# Patient Record
Sex: FEMALE | Race: WHITE | Employment: OTHER | URBAN - METROPOLITAN AREA
[De-identification: names, ages, dates, MRNs, and addresses within clinical notes are randomized per-mention and may not be internally consistent; named-entity substitution may affect disease eponyms.]

---

## 2017-01-03 DIAGNOSIS — E03.9 HYPOTHYROIDISM, UNSPECIFIED TYPE: Primary | ICD-10-CM

## 2017-01-03 NOTE — TELEPHONE ENCOUNTER
levothyroxine (SYNTHROID/LEVOTHROID) 75 MCG      Last Written Prescription Date: 12/2/16  Last Quantity: 90, # refills: 0  Last Office Visit with [facility], [facility] or [facility] prescribing provider: 7/25/16        TSH   Date Value Ref Range Status   12/05/2016 0.20* 0.40 - 4.00 mU/L Final

## 2017-01-04 RX ORDER — LEVOTHYROXINE SODIUM 75 UG/1
75 TABLET ORAL DAILY
Qty: 90 TABLET | Refills: 3 | Status: SHIPPED | OUTPATIENT
Start: 2017-01-04 | End: 2017-04-05

## 2017-01-16 DIAGNOSIS — I10 BENIGN ESSENTIAL HYPERTENSION: Primary | ICD-10-CM

## 2017-01-16 RX ORDER — LOSARTAN POTASSIUM 100 MG/1
100 TABLET ORAL DAILY
Qty: 90 TABLET | Refills: 2 | Status: SHIPPED | OUTPATIENT
Start: 2017-01-16 | End: 2017-03-15

## 2017-01-18 DIAGNOSIS — I48.91 ATRIAL FIBRILLATION AND FLUTTER (H): Primary | ICD-10-CM

## 2017-01-18 DIAGNOSIS — I48.92 ATRIAL FIBRILLATION AND FLUTTER (H): Primary | ICD-10-CM

## 2017-01-18 RX ORDER — ATENOLOL 50 MG/1
50 TABLET ORAL DAILY
Qty: 90 TABLET | Refills: 1 | Status: SHIPPED | OUTPATIENT
Start: 2017-01-18 | End: 2017-04-24

## 2017-01-18 NOTE — TELEPHONE ENCOUNTER
atenolol (TENORMIN) 50 MG tablet      Last Written Prescription Date: 7/25/2016  Last Fill Quantity: 90 tablet, # refills: 3    Last Office Visit with [facility], [facility] or [facility] prescribing provider:  7/25/2016   Future Office Visit:        BP Readings from Last 3 Encounters:   08/15/16 128/86   08/15/16 132/71   07/25/16 136/80

## 2017-01-31 DIAGNOSIS — E03.9 HYPOTHYROIDISM, UNSPECIFIED TYPE: Primary | ICD-10-CM

## 2017-01-31 RX ORDER — LEVOTHYROXINE SODIUM 88 UG/1
88 TABLET ORAL DAILY
Qty: 90 TABLET | Refills: 0 | Status: SHIPPED | OUTPATIENT
Start: 2017-01-31 | End: 2017-04-03

## 2017-01-31 NOTE — TELEPHONE ENCOUNTER
Yuval Jama MD at 1/4/2017 11:52 AM      Status: Signed         Expand All Collapse All     TSH falsely low. FT4 and FT3 noted. Med refill approved               Prescription approved per [facility] Refill Protocol. HINA Kruse R.N.

## 2017-01-31 NOTE — TELEPHONE ENCOUNTER
levothyroxine (SYNTHROID/LEVOTHROID) 88 MCG tablet     Last Written Prescription Date: 12/02/2016  Last Quantity: 90, # refills: 0  Last Office Visit with [facility], [facility] or [facility] prescribing provider: 7/25/2016   Next 5 appointments (look out 90 days)     Feb 07, 2017  1:30 PM   Office Visit with Yuval Jama MD   [facility]    [address] [address] [phone]                   TSH   Date Value Ref Range Status   12/05/2016 0.20* 0.40 - 4.00 mU/L Final

## 2017-02-06 ENCOUNTER — TELEPHONE (OUTPATIENT)
Dept: INTERNAL MEDICINE | Facility: CLINIC | Age: 62
End: 2017-02-06

## 2017-02-06 NOTE — TELEPHONE ENCOUNTER
Call Regarding Preventive Health Screening Colonoscopy    Attempt 1    Message    Comments: per patient requested a call back in March 2017      Outreach   Donita Mercer

## 2017-03-02 DIAGNOSIS — G25.81 RESTLESS LEGS SYNDROME (RLS): ICD-10-CM

## 2017-03-02 RX ORDER — GABAPENTIN 100 MG/1
CAPSULE ORAL
Qty: 90 CAPSULE | Refills: 3 | Status: SHIPPED | OUTPATIENT
Start: 2017-03-02 | End: 2017-04-24 | Stop reason: DRUGHIGH

## 2017-03-14 DIAGNOSIS — Z12.11 SPECIAL SCREENING FOR MALIGNANT NEOPLASMS, COLON: Primary | ICD-10-CM

## 2017-03-15 DIAGNOSIS — I10 BENIGN ESSENTIAL HYPERTENSION: ICD-10-CM

## 2017-03-15 RX ORDER — LOSARTAN POTASSIUM 100 MG/1
100 TABLET ORAL DAILY
Qty: 90 TABLET | Refills: 2 | Status: SHIPPED | OUTPATIENT
Start: 2017-03-15 | End: 2017-04-24

## 2017-04-03 DIAGNOSIS — E03.9 HYPOTHYROIDISM, UNSPECIFIED TYPE: ICD-10-CM

## 2017-04-03 DIAGNOSIS — I10 ESSENTIAL HYPERTENSION: Primary | ICD-10-CM

## 2017-04-03 DIAGNOSIS — E78.5 HYPERLIPIDEMIA LDL GOAL <130: ICD-10-CM

## 2017-04-03 NOTE — TELEPHONE ENCOUNTER
levothyroxine (SYNTHROID/LEVOTHROID) 88 MCG tablet     Last Written Prescription Date: 1/31/17  Last Quantity: 90, # refills: 0  Last Office Visit with [facility], [facility] or [facility] prescribing provider: 7/25/16        TSH   Date Value Ref Range Status   12/05/2016 0.20 (L) 0.40 - 4.00 mU/L Final

## 2017-04-05 RX ORDER — LEVOTHYROXINE SODIUM 88 UG/1
TABLET ORAL
Qty: 90 TABLET | Refills: 0 | Status: SHIPPED | OUTPATIENT
Start: 2017-04-05 | End: 2017-07-24

## 2017-04-05 RX ORDER — LEVOTHYROXINE SODIUM 75 UG/1
75 TABLET ORAL DAILY
Qty: 90 TABLET | Refills: 3 | Status: SHIPPED | OUTPATIENT
Start: 2017-04-05 | End: 2018-04-03

## 2017-04-05 NOTE — TELEPHONE ENCOUNTER
Labs DecemberTSH low but last T3 also low and T4 low normal. FSH and cortisol levels a appropriate for age so doesn't appear to be pituitary gland issue. Pt appears to he low end of normal thyroid function  despite the low TSH given the T3 and T4 levels. Will continue same  Levothyroxine dosing for now and pt to have  fasting labs repeated in early/mid June as ordered. RF done for 90 days for both doses of Levothyroxine. Any future adjustment in dosing will be based on labs in June. Inform pt about RF and to make appt for future labs in June

## 2017-04-24 ENCOUNTER — OFFICE VISIT (OUTPATIENT)
Dept: INTERNAL MEDICINE | Facility: CLINIC | Age: 62
End: 2017-04-24
Payer: COMMERCIAL

## 2017-04-24 VITALS
BODY MASS INDEX: 44.73 KG/M2 | DIASTOLIC BLOOD PRESSURE: 86 MMHG | OXYGEN SATURATION: 95 % | HEART RATE: 67 BPM | TEMPERATURE: 99 F | WEIGHT: 262 LBS | SYSTOLIC BLOOD PRESSURE: 146 MMHG | HEIGHT: 64 IN

## 2017-04-24 DIAGNOSIS — M54.41 BILATERAL LOW BACK PAIN WITH BILATERAL SCIATICA, UNSPECIFIED CHRONICITY: ICD-10-CM

## 2017-04-24 DIAGNOSIS — E03.9 HYPOTHYROIDISM, UNSPECIFIED TYPE: ICD-10-CM

## 2017-04-24 DIAGNOSIS — G43.009 MIGRAINE WITHOUT AURA AND WITHOUT STATUS MIGRAINOSUS, NOT INTRACTABLE: ICD-10-CM

## 2017-04-24 DIAGNOSIS — F33.2 SEVERE EPISODE OF RECURRENT MAJOR DEPRESSIVE DISORDER, WITHOUT PSYCHOTIC FEATURES (H): Primary | ICD-10-CM

## 2017-04-24 DIAGNOSIS — K21.9 GASTROESOPHAGEAL REFLUX DISEASE WITHOUT ESOPHAGITIS: ICD-10-CM

## 2017-04-24 DIAGNOSIS — G47.33 OSA ON CPAP: ICD-10-CM

## 2017-04-24 DIAGNOSIS — M54.42 BILATERAL LOW BACK PAIN WITH BILATERAL SCIATICA, UNSPECIFIED CHRONICITY: ICD-10-CM

## 2017-04-24 DIAGNOSIS — I10 ESSENTIAL HYPERTENSION: ICD-10-CM

## 2017-04-24 DIAGNOSIS — E78.5 HYPERLIPIDEMIA LDL GOAL <130: ICD-10-CM

## 2017-04-24 DIAGNOSIS — I48.0 PAROXYSMAL ATRIAL FIBRILLATION (H): ICD-10-CM

## 2017-04-24 LAB
ALBUMIN SERPL-MCNC: 3.6 G/DL (ref 3.4–5)
ALP SERPL-CCNC: 182 U/L (ref 40–150)
ALT SERPL W P-5'-P-CCNC: 59 U/L (ref 0–50)
ANION GAP SERPL CALCULATED.3IONS-SCNC: 9 MMOL/L (ref 3–14)
AST SERPL W P-5'-P-CCNC: 40 U/L (ref 0–45)
BILIRUB SERPL-MCNC: 0.4 MG/DL (ref 0.2–1.3)
BUN SERPL-MCNC: 10 MG/DL (ref 7–30)
CALCIUM SERPL-MCNC: 9.1 MG/DL (ref 8.5–10.1)
CHLORIDE SERPL-SCNC: 102 MMOL/L (ref 94–109)
CHOLEST SERPL-MCNC: 222 MG/DL
CO2 SERPL-SCNC: 28 MMOL/L (ref 20–32)
CREAT SERPL-MCNC: 0.78 MG/DL (ref 0.52–1.04)
GFR SERPL CREATININE-BSD FRML MDRD: 74 ML/MIN/1.7M2
GLUCOSE SERPL-MCNC: 94 MG/DL (ref 70–99)
HDLC SERPL-MCNC: 56 MG/DL
LDLC SERPL CALC-MCNC: 118 MG/DL
NONHDLC SERPL-MCNC: 166 MG/DL
POTASSIUM SERPL-SCNC: 4.1 MMOL/L (ref 3.4–5.3)
PROT SERPL-MCNC: 7.8 G/DL (ref 6.8–8.8)
SODIUM SERPL-SCNC: 139 MMOL/L (ref 133–144)
T3FREE SERPL-MCNC: 2.6 PG/ML (ref 2.3–4.2)
T4 FREE SERPL-MCNC: 1.07 NG/DL (ref 0.76–1.46)
TRIGL SERPL-MCNC: 241 MG/DL
TSH SERPL DL<=0.05 MIU/L-ACNC: 0.09 MU/L (ref 0.4–4)

## 2017-04-24 PROCEDURE — 99214 OFFICE O/P EST MOD 30 MIN: CPT | Performed by: INTERNAL MEDICINE

## 2017-04-24 PROCEDURE — 80053 COMPREHEN METABOLIC PANEL: CPT | Performed by: INTERNAL MEDICINE

## 2017-04-24 PROCEDURE — 80061 LIPID PANEL: CPT | Performed by: INTERNAL MEDICINE

## 2017-04-24 PROCEDURE — 84439 ASSAY OF FREE THYROXINE: CPT | Performed by: INTERNAL MEDICINE

## 2017-04-24 PROCEDURE — 36415 COLL VENOUS BLD VENIPUNCTURE: CPT | Performed by: INTERNAL MEDICINE

## 2017-04-24 PROCEDURE — 84481 FREE ASSAY (FT-3): CPT | Performed by: INTERNAL MEDICINE

## 2017-04-24 PROCEDURE — 84443 ASSAY THYROID STIM HORMONE: CPT | Performed by: INTERNAL MEDICINE

## 2017-04-24 RX ORDER — GABAPENTIN 300 MG/1
CAPSULE ORAL
Qty: 90 CAPSULE | Refills: 11 | Status: SHIPPED | OUTPATIENT
Start: 2017-04-24 | End: 2017-05-26

## 2017-04-24 RX ORDER — ATENOLOL 50 MG/1
50 TABLET ORAL DAILY
Qty: 30 TABLET | Refills: 11 | Status: SHIPPED | OUTPATIENT
Start: 2017-04-24 | End: 2018-05-11

## 2017-04-24 RX ORDER — TRIAMTERENE AND HYDROCHLOROTHIAZIDE 75; 50 MG/1; MG/1
TABLET ORAL
Qty: 30 TABLET | Refills: 11 | Status: SHIPPED | OUTPATIENT
Start: 2017-04-24 | End: 2018-05-11

## 2017-04-24 RX ORDER — SUMATRIPTAN 100 MG/1
100 TABLET, FILM COATED ORAL
Qty: 10 TABLET | Refills: 11 | Status: SHIPPED | OUTPATIENT
Start: 2017-04-24 | End: 2018-06-29

## 2017-04-24 RX ORDER — LOSARTAN POTASSIUM 100 MG/1
100 TABLET ORAL DAILY
Qty: 30 TABLET | Refills: 11 | Status: SHIPPED | OUTPATIENT
Start: 2017-04-24 | End: 2017-11-21

## 2017-04-24 RX ORDER — PANTOPRAZOLE SODIUM 40 MG/1
40 TABLET, DELAYED RELEASE ORAL DAILY
Qty: 30 TABLET | Refills: 11 | Status: SHIPPED | OUTPATIENT
Start: 2017-04-24 | End: 2018-01-16

## 2017-04-24 NOTE — PATIENT INSTRUCTIONS
Restart Fluoxetine at 20mg capsule, 1 capsule daily in AM for 2 weeks. Then increase to 2 capsules daily (40mg) until run out of the 20mg capsules. Then change to 40mg capsule, 1 capsule daily  Restart use of CPAP.  Increase Gabapentin to 300mg capsule, 1 capsule in AM and 1-2 capsules at bedtime for back pain and restless legs and headaches  Referral to [facility] for physical therapy - [place]  Continue other medications  See me in 1 month for follow-up mood and recheck blood pressure

## 2017-04-24 NOTE — MR AVS SNAPSHOT
After Visit Summary   4/24/2017    Maxine Sears    MRN: [id number]           Patient Information     Date Of Birth          1955        Visit Information        Provider Department      4/24/2017 2:30 PM Yuval Jama MD [facility]        Today's Diagnoses     Severe episode of recurrent major depressive disorder, without psychotic features (H)    -  1    Paroxysmal atrial fibrillation (H)        Hypothyroidism, unspecified type        Essential hypertension        Hyperlipidemia LDL goal <130          Care Instructions    Restart Fluoxetine at 20mg capsule, 1 capsule daily in AM for 2 weeks. Then increase to 2 capsules daily (40mg) until run out of the 20mg capsules. Then change to 40mg capsule, 1 capsule daily  Restart use of CPAP.  Increase Gabapentin to 300mg capsule, 1 capsule in AM and 1-2 capsules at bedtime for back pain and restless legs and headaches  Referral to [facility] for physical therapy - [facility]  Continue other medications        Follow-ups after your visit        Who to contact     If you have questions or need follow up information about today's clinic visit or your schedule please contact [facility] directly at [phone].  Normal or non-critical lab and imaging results will be communicated to you by Bitrockrhart, letter or phone within 4 business days after the clinic has received the results. If you do not hear from us within 7 days, please contact the clinic through Numerifyt or phone. If you have a critical or abnormal lab result, we will notify you by phone as soon as possible.  Submit refill requests through Digital Accademia or call your pharmacy and they will forward the refill request to us. Please allow 3 business days for your refill to be completed.          Additional Information About Your Visit        BitrockrharExecNote Information     Digital [employer]ademia lets you send messages to your doctor, view your test results, renew your prescriptions, schedule  "appointments and more. To sign up, go to [email or web address] . Click on \"Log in\" on the left side of the screen, which will take you to the Welcome page. Then click on \"Sign up Now\" on the right side of the page.     You will be asked to enter the access code listed below, as well as some personal information. Please follow the directions to create your username and password.     Your access code is: [email or web address]  Expires: 2017  3:09 PM     Your access code will  in 90 days. If you need help or a new code, please call your [place] clinic or [phone].        Care EveryWhere ID     This is your Care EveryWhere ID. This could be used by other organizations to access your [place] medical records  [id number]        Your Vitals Were     Pulse Temperature Height Pulse Oximetry Breastfeeding? BMI (Body Mass Index)    67 99  F (37.2  C) (Oral) 5' 4\" (1.626 m) 95% No 44.97 kg/m2       Blood Pressure from Last 3 Encounters:   17 146/86   08/15/16 128/86   08/15/16 132/71    Weight from Last 3 Encounters:   17 262 lb (118.8 kg)   08/15/16 257 lb 6.4 oz (116.8 kg)   08/15/16 257 lb (116.6 kg)              We Performed the Following     Comprehensive metabolic panel     EKG 12-lead complete w/read - Clinics     T3, Free     T4, free     TSH          Today's Medication Changes          These changes are accurate as of: 17  3:09 PM.  If you have any questions, ask your nurse or doctor.               Stop taking these medicines if you haven't already. Please contact your care team if you have questions.     diphenhydrAMINE 25 MG tablet   Commonly known as:  BENADRYL   Stopped by:  Yuval Jama MD           gabapentin 100 MG capsule   Commonly known as:  NEURONTIN   Stopped by:  Yuval Jama MD           traMADol 50 MG tablet   Commonly known as:  ULTRAM   Stopped by:  Yuval Jama MD                    Primary Care Provider Office Phone # Fax #    Yuval Jama [phone] "       [facility] [address] [facility] [address]        Goals        General    My goal is to be able to afford my medications: I will call the [facility]  (pt-stated)     Notes - Note created  1/19/2015  4:08 PM by Jovanna Abdalla RN    As of today's date 1/19/2015 goal is met at 0 - 25%.   Goal Status:  Active        Thank you!     Thank you for choosing [facility]  for your care. Our goal is always to provide you with excellent care. Hearing back from our patients is one way we can continue to improve our services. Please take a few minutes to complete the written survey that you may receive in the mail after your visit with us. Thank you!             Your Updated Medication List - Protect others around you: Learn how to safely use, store and throw away your medicines at www.disposemymeds.org.          This list is accurate as of: 4/24/17  3:09 PM.  Always use your most recent med list.                   Brand Name Dispense Instructions for use    atenolol 50 MG tablet    TENORMIN    90 tablet    Take 1 tablet (50 mg) by mouth daily       flecainide acetate 150 MG Tabs     90 tablet    Take 1/2 tab (75 mg) twice daily by mouth       FLUoxetine 40 MG capsule    PROzac    30 capsule    Take 1 capsule (40 mg) by mouth daily       * levothyroxine 88 MCG tablet    SYNTHROID/LEVOTHROID    90 tablet    TAKE 1 TABLET BY MOUTH DAILY IN ADDITION TO 75 MCG TO EQUAL 163 MCG DAILY       * levothyroxine 75 MCG tablet    SYNTHROID/LEVOTHROID    90 tablet    Take 1 tablet (75 mcg) by mouth daily 1 tab daily in addition to Levothyroxine 88mcg daily so total daily dose = 163mcg daily       losartan 100 MG tablet    COZAAR    90 tablet    Take 1 tablet (100 mg) by mouth daily       order for DME      DREAMSTATION 5-15 CM/H20 NASAL AIRFIT N10 HER       pantoprazole 40 MG EC tablet    PROTONIX    30 tablet    Take 1 tablet (40 mg) by mouth daily Take 30-60 minutes  before a meal.       rivaroxaban ANTICOAGULANT 20 MG Tabs tablet    XARELTO    30 tablet    Take 1 tablet (20 mg) by mouth daily (with dinner)       SUMAtriptan 100 MG tablet    IMITREX    10 tablet    Take 1 tablet (100 mg) by mouth at onset of headache for migraine May repeat in 2 hours if needed: max 2/day;       triamterene-hydrochlorothiazide 75-50 MG per tablet    MAXZIDE    90 tablet    1/2 tab daily       * Notice:  This list has 2 medication(s) that are the same as other medications prescribed for you. Read the directions carefully, and ask your doctor or other care provider to review them with you.

## 2017-04-24 NOTE — NURSING NOTE
"Chief Complaint   Patient presents with     Depression       Initial /86  Pulse 67  Temp 99  F (37.2  C) (Oral)  Ht 5' 4\" (1.626 m)  Wt 262 lb (118.8 kg)  SpO2 95%  Breastfeeding? No  BMI 44.97 kg/m2 Estimated body mass index is 44.97 kg/(m^2) as calculated from the following:    Height as of this encounter: 5' 4\" (1.626 m).    Weight as of this encounter: 262 lb (118.8 kg).  Medication Reconciliation: complete    "

## 2017-04-24 NOTE — PROGRESS NOTES
"  SUBJECTIVE:                                                    Maixne Sears is a 61 year old female who presents to clinic today for the following health issues:      Depression Followup    Status since last visit: Worsened     See PHQ-9 for current symptoms.  Other associated symptoms:     Complicating factors:   Significant life event:  Yes-  Fell and hurt hip- in constant pain   Current substance abuse:  None  Anxiety or Panic symptoms:  Yes-  anxiety    PHQ-9  English PHQ-9   Any Language            Amount of exercise or physical activity: None    Problems taking medications regularly: No- has stopped prozac    Medication side effects: none    Diet: regular (no restrictions)    Pt's past medical history, family history, habits, medications and allergies were reviewed with the patient today.  See snap shot for  HCM status. Most recent lab results reviewed with pt. Problem list and histories reviewed & adjusted, as indicated.  Additional history as below:    Denies chest pain, shortness of breath, abdominal pain  or side effects with medications. Has occ headache forehead, top of head and back of neck. Sx most days. Vision occ fuzzy. Last eye exam 3 years ago.   Has glasses. Some intermittent memory/concentration issues. Somestimes not getting to sleep until 2-3 AM when mind busy thinking about things or due to depression. Has sense of wanting to run away and other times doesn't want to get out of home. Fell months ago when tripped on some roots of a tree. Has some pain intermittent in back  with radiation to the BLEs.   Hx obstructive sleep apnea and not using but willing to restart     Additional ROS:   Constitutional, HEENT, Cardiovascular, Pulmonary, GI and , Neuro, MSK and Psych review of systems/symptoms are otherwise negative or unchanged from previous, except as noted above.      OBJECTIVE:  /86  Pulse 67  Temp 99  F (37.2  C) (Oral)  Ht 5' 4\" (1.626 m)  Wt 262 lb (118.8 kg)  SpO2 95% " " Breastfeeding? No  BMI 44.97 kg/m2   Estimated body mass index is 44.97 kg/(m^2) as calculated from the following:    Height as of this encounter: 5' 4\" (1.626 m).    Weight as of this encounter: 262 lb (118.8 kg).  Eye: PERRL, EOMI  HENT: ear canals and TM's normal and nose and mouth without ulcers or lesions. Narrowed post pharyngeal airway   Neck: no adenopathy. Thyroid normal to palpation. No bruits  Pulm: Lungs clear to auscultation   CV: Regular rates and rhythm  GI: Soft, obese, nontender, Normal active bowel sounds, No hepatosplenomegaly or masses palpable  Ext: Peripheral pulses intact. Minimal BLE edema.  Neuro: Normal strength and tone, sensory exam grossly normal. Stable gait  Gen: Flattened affect. Normal eye contact, thought content  Back: Tenderness to palpation bilateral paralumbar area. Neg SLRT bilaterally.      Assessment/Plan: (See plan discussion below for further details)  1. Severe episode of recurrent major depressive disorder, without psychotic features (H)  PHQ=22. Restart Fluxetine  - FLUoxetine (PROZAC) 20 MG capsule; Take 1 capsule (20 mg) by mouth daily  Dispense: 30 capsule; Refill: 11    2. Paroxysmal atrial fibrillation (H)   in NSR now. Continue current meds  - atenolol (TENORMIN) 50 MG tablet; Take 1 tablet (50 mg) by mouth daily  Dispense: 30 tablet; Refill: 11  - rivaroxaban ANTICOAGULANT (XARELTO) 20 MG TABS tablet; Take 1 tablet (20 mg) by mouth daily (with dinner)  Dispense: 30 tablet; Refill: 11    3. Hypothyroidism, unspecified type  Labs as ordered  - TSH  - T3, Free  - T4, free    4. Essential hypertension  Mild elevation. Continue meds for now. Labs as ordered  - Comprehensive metabolic panel  - losartan (COZAAR) 100 MG tablet; Take 1 tablet (100 mg) by mouth daily  Dispense: 30 tablet; Refill: 11  - triamterene-hydrochlorothiazide (MAXZIDE) 75-50 MG per tablet; 1/2 tab daily  Dispense: 30 tablet; Refill: 11  - Lipid panel reflex to direct LDL    5. Hyperlipidemia LDL " goal <130  Labs as ordered  - Comprehensive metabolic panel  - Lipid panel reflex to direct LDL    6. NELY on CPAP  Restart  CPAP    7. Bilateral low back pain with bilateral sciatica, unspecified chronicity  See plan discussion below  - gabapentin (NEURONTIN) 300 MG capsule; 1 capsule in AM and 2 capsules at bedtime  Dispense: 90 capsule; Refill: 11  - [facility] REFERRAL    8. Gastroesophageal reflux disease without esophagitis  Stable. Continue meds  - pantoprazole (PROTONIX) 40 MG EC tablet; Take 1 tablet (40 mg) by mouth daily Take 30-60 minutes before a meal.  Dispense: 30 tablet; Refill: 11    9. Migraine without aura and without status migrainosus, not intractable  Stable. Continue meds  - SUMAtriptan (IMITREX) 100 MG tablet; Take 1 tablet (100 mg) by mouth at onset of headache for migraine May repeat in 2 hours if needed: max 2/day;  Dispense: 10 tablet; Refill: 11    Plan discussion:  Restart Fluoxetine at 20mg capsule, 1 capsule daily in AM for 2 weeks. Then increase to 2 capsules daily (40mg) until run out of the 20mg capsules. Then change to 40mg capsule, 1 capsule daily  Restart use of CPAP.  Increase Gabapentin to 300mg capsule, 1 capsule in AM and 1-2 capsules at bedtime for back pain and restless legs and headaches  Referral to [facility] for physical therapy [facility]  Continue other medications  See me in 1 month for follow-up mood and recheck blood pressure        Yuval Jama MD  Internal Medicine Department  [facility]

## 2017-04-25 ASSESSMENT — PATIENT HEALTH QUESTIONNAIRE - PHQ9: SUM OF ALL RESPONSES TO PHQ QUESTIONS 1-9: 22

## 2017-05-18 DIAGNOSIS — I48.91 ATRIAL FIBRILLATION AND FLUTTER (H): ICD-10-CM

## 2017-05-18 DIAGNOSIS — I48.92 ATRIAL FIBRILLATION AND FLUTTER (H): ICD-10-CM

## 2017-05-18 RX ORDER — FLECAINIDE ACETATE 150 MG/1
TABLET ORAL
Qty: 30 TABLET | Refills: 0 | Status: SHIPPED | OUTPATIENT
Start: 2017-05-18 | End: 2017-06-22

## 2017-05-18 NOTE — TELEPHONE ENCOUNTER
Flecainide is approved per [facility] refill protocol for 1 total fill.  Patient due for CBC for further refills    Flecainide         Last Written Prescription Date: 10/31/16  Last Fill Quantity: 90,    # refills: 1  Last Office Visit with [facility] or [facility] prescribing provider:  4/24/17   Next 5 appointments (look out 90 days)     May 26, 2017  4:00 PM CDT   Office Visit with Yuval Jama MD   [facility]    [address] [address] [phone]                   Lab Results   Component Value Date    WBC 12.2 07/13/2015     Lab Results   Component Value Date    RBC 4.60 07/13/2015     Lab Results   Component Value Date    HGB 13.3 07/13/2015     Lab Results   Component Value Date    HCT 39.8 07/13/2015     No components found for: MCT  Lab Results   Component Value Date    MCV 87 07/13/2015     Lab Results   Component Value Date    MCH 28.9 07/13/2015     Lab Results   Component Value Date    MCHC 33.4 07/13/2015     Lab Results   Component Value Date    RDW 13.5 07/13/2015     Lab Results   Component Value Date     07/13/2015     Lab Results   Component Value Date    AST 40 04/24/2017     Lab Results   Component Value Date    ALT 59 04/24/2017     Creatinine   Date Value Ref Range Status   04/24/2017 0.78 0.52 - 1.04 mg/dL Final     Tom Spangler, PharmD  [facility]  [phone]

## 2017-05-26 ENCOUNTER — OFFICE VISIT (OUTPATIENT)
Dept: INTERNAL MEDICINE | Facility: CLINIC | Age: 62
End: 2017-05-26
Payer: COMMERCIAL

## 2017-05-26 DIAGNOSIS — F33.2 SEVERE EPISODE OF RECURRENT MAJOR DEPRESSIVE DISORDER, WITHOUT PSYCHOTIC FEATURES (H): ICD-10-CM

## 2017-05-26 DIAGNOSIS — E78.5 HYPERLIPIDEMIA LDL GOAL <130: ICD-10-CM

## 2017-05-26 DIAGNOSIS — M54.42 BILATERAL LOW BACK PAIN WITH BILATERAL SCIATICA, UNSPECIFIED CHRONICITY: ICD-10-CM

## 2017-05-26 DIAGNOSIS — G47.33 OSA ON CPAP: ICD-10-CM

## 2017-05-26 DIAGNOSIS — E66.01 MORBID OBESITY, UNSPECIFIED OBESITY TYPE (H): ICD-10-CM

## 2017-05-26 DIAGNOSIS — E03.9 HYPOTHYROIDISM, UNSPECIFIED TYPE: ICD-10-CM

## 2017-05-26 DIAGNOSIS — M54.41 BILATERAL LOW BACK PAIN WITH BILATERAL SCIATICA, UNSPECIFIED CHRONICITY: ICD-10-CM

## 2017-05-26 PROCEDURE — 99214 OFFICE O/P EST MOD 30 MIN: CPT | Performed by: INTERNAL MEDICINE

## 2017-05-26 RX ORDER — FLUOXETINE 40 MG/1
40 CAPSULE ORAL DAILY
Qty: 90 CAPSULE | Refills: 3 | Status: SHIPPED | OUTPATIENT
Start: 2017-05-26 | End: 2018-05-31 | Stop reason: DRUGHIGH

## 2017-05-26 RX ORDER — GABAPENTIN 300 MG/1
CAPSULE ORAL
Qty: 270 CAPSULE | Refills: 3 | Status: SHIPPED | OUTPATIENT
Start: 2017-05-26 | End: 2018-06-15

## 2017-05-26 NOTE — MR AVS SNAPSHOT
After Visit Summary   5/26/2017    Maxine Sears    MRN: [id number]           Patient Information     Date Of Birth          1955        Visit Information        Provider Department      5/26/2017 4:00 PM Yuval Jama MD [facility]        Today's Diagnoses     Severe episode of recurrent major depressive disorder, without psychotic features (H)        Bilateral low back pain with bilateral sciatica, unspecified chronicity          Care Instructions    Contact  Sleep Clinic [phone] re: CPAP mask issues  Continue current medications  If mood not even better in 4 weeks, then contact clinic.  Calorie/carbohydrate (sugar, bread, potato, pasta, etc) reduction in diet for weight loss.  Increase color on your plate with fruits and vegetables as able.  Increase  frequency of walking or other aerobic exercise as able (goal is daily)  Couch aerobics                    Follow-ups after your visit        Who to contact     If you have questions or need follow up information about today's clinic visit or your schedule please contact [facility] directly at [phone].  Normal or non-critical lab and imaging results will be communicated to you by MyChart, letter or phone within 4 business days after the clinic has received the results. If you do not hear from us within 7 days, please contact the clinic through Emergent Onehart or phone. If you have a critical or abnormal lab result, we will notify you by phone as soon as possible.  Submit refill requests through FetchBack or call your pharmacy and they will forward the refill request to us. Please allow 3 business days for your refill to be completed.          Additional Information About Your Visit        MyChart Information     FetchBack lets you send messages to your doctor, view your test results, renew your prescriptions, schedule appointments and more. To sign up, go to [email or web address] . Click on  "\"Log in\" on the left side of the screen, which will take you to the Welcome page. Then click on \"Sign up Now\" on the right side of the page.     You will be asked to enter the access code listed below, as well as some personal information. Please follow the directions to create your username and password.     Your access code is: [email or web address]  Expires: 2017  3:09 PM     Your access code will  in 90 days. If you need help or a new code, please call your [place] clinic or [phone].        Care EveryWhere ID     This is your Care EveryWhere ID. This could be used by other organizations to access your [place] medical records  [id number]        Your Vitals Were     Pulse Temperature Pulse Oximetry BMI (Body Mass Index)          65 99.3  F (37.4  C) (Oral) 95% 45.14 kg/m2         Blood Pressure from Last 3 Encounters:   17 140/82   17 146/86   08/15/16 128/86    Weight from Last 3 Encounters:   17 263 lb (119.3 kg)   17 262 lb (118.8 kg)   08/15/16 257 lb 6.4 oz (116.8 kg)              Today, you had the following     No orders found for display       Primary Care Provider Office Phone # Fax #    Yuval Jama [phone]       [facility] [address] [facility] [address]        Goals        General    My goal is to be able to afford my medications: I will call the [facility]  (pt-stated)     Notes - Note created  2015  4:08 PM by Jovanna Abdalla, RN    As of today's date 2015 goal is met at 0 - 25%.   Goal Status:  Active        Thank you!     Thank you for choosing [facility]  for your care. Our goal is always to provide you with excellent care. Hearing back from our patients is one way we can continue to improve our services. Please take a few minutes to complete the written survey that you may receive in the mail after your visit with us. Thank you!             Your Updated Medication List " - Protect others around you: Learn how to safely use, store and throw away your medicines at www.disposemymeds.org.          This list is accurate as of: 5/26/17  4:23 PM.  Always use your most recent med list.                   Brand Name Dispense Instructions for use    atenolol 50 MG tablet    TENORMIN    30 tablet    Take 1 tablet (50 mg) by mouth daily       flecainide acetate 150 MG Tabs     30 tablet    Take 1/2 tab (75 mg) twice daily by mouth       FLUoxetine 20 MG capsule    PROzac    30 capsule    Take 1 capsule (20 mg) by mouth daily       gabapentin 300 MG capsule    NEURONTIN    90 capsule    1 capsule in AM and 2 capsules at bedtime       * levothyroxine 88 MCG tablet    SYNTHROID/LEVOTHROID    90 tablet    TAKE 1 TABLET BY MOUTH DAILY IN ADDITION TO 75 MCG TO EQUAL 163 MCG DAILY       * levothyroxine 75 MCG tablet    SYNTHROID/LEVOTHROID    90 tablet    Take 1 tablet (75 mcg) by mouth daily 1 tab daily in addition to Levothyroxine 88mcg daily so total daily dose = 163mcg daily       losartan 100 MG tablet    COZAAR    30 tablet    Take 1 tablet (100 mg) by mouth daily       order for DME      DREAMSTATION 5-15 CM/H20 NASAL AIRFIT N10 HER       pantoprazole 40 MG EC tablet    PROTONIX    30 tablet    Take 1 tablet (40 mg) by mouth daily Take 30-60 minutes before a meal.       rivaroxaban ANTICOAGULANT 20 MG Tabs tablet    XARELTO    30 tablet    Take 1 tablet (20 mg) by mouth daily (with dinner)       SUMAtriptan 100 MG tablet    IMITREX    10 tablet    Take 1 tablet (100 mg) by mouth at onset of headache for migraine May repeat in 2 hours if needed: max 2/day;       triamterene-hydrochlorothiazide 75-50 MG per tablet    MAXZIDE    30 tablet    1/2 tab daily       * Notice:  This list has 2 medication(s) that are the same as other medications prescribed for you. Read the directions carefully, and ask your doctor or other care provider to review them with you.

## 2017-05-26 NOTE — NURSING NOTE
"Chief Complaint   Patient presents with     Depression       Initial /82  Pulse 65  Temp 99.3  F (37.4  C) (Oral)  Wt 263 lb (119.3 kg)  SpO2 95%  BMI 45.14 kg/m2 Estimated body mass index is 45.14 kg/(m^2) as calculated from the following:    Height as of 4/24/17: 5' 4\" (1.626 m).    Weight as of this encounter: 263 lb (119.3 kg).  Medication Reconciliation: complete  "

## 2017-05-26 NOTE — PROGRESS NOTES
"  SUBJECTIVE:                                                    Maxine Sears is a 61 year old female who presents to clinic today for the following health issues:        Depression Followup    Status since last visit: Stable     See PHQ-9 for current symptoms.  Other associated symptoms: None    Complicating factors:   Significant life event:  No   Current substance abuse:  None  Anxiety or Panic symptoms:  No    PHQ-9  English PHQ-9   Any Language            Amount of exercise or physical activity: None    Problems taking medications regularly: No    Medication side effects: none    Diet: regular (no restrictions)    Pt's past medical history, family history, habits, medications and allergies were reviewed with the patient today.  See snap shot for  HCM status. Most recent lab results reviewed with pt. Problem list and histories reviewed & adjusted, as indicated.  Additional history as below:    Has restarted using CPAP most night except for last 2  because of some mask leaking. Leg cramps and RLS sx feeling better with Gabapentin 300mg AM and 600mg PM (8 PM). Has some fatigue in AM after Gabapentin dose  Issues some with insomnia. \"My brain doesn't shut up\". Considering moving to [facility] in August. Some stress with living with sister currently  More calm with higher 40mg dose Prozac daily 2 week ago.  PHQ still elevated but better  Stable chronic back pain with use Gabapentin. occ use OTC    Lab Results   Component Value Date    GLC 94 04/24/2017     No results found for: A1C  Lab Results   Component Value Date    CHOL 222 04/24/2017     Lab Results   Component Value Date     04/24/2017     Lab Results   Component Value Date    HDL 56 04/24/2017     Lab Results   Component Value Date    TRIG 241 04/24/2017     Lab Results   Component Value Date    CR 0.78 04/24/2017     Lab Results   Component Value Date    ALT 59 04/24/2017     Lab Results   Component Value Date    AST 40 04/24/2017     No " results found for: MICROL  Lab Results   Component Value Date    TSH 0.09 04/24/2017         No identified additional risks  The 10-year ASCVD risk score (Georgeannika EISENBERG Jr, et al., 2013) is: 6.1%    Values used to calculate the score:      Age: 61 years      Sex: Female      Is Non- : No      Diabetic: No      Tobacco smoker: No      Systolic Blood Pressure: 138 mmHg      Is BP treated: Yes      HDL Cholesterol: 56 mg/dL      Total Cholesterol: 222 mg/dL          PHQ-9 ([employer]) 4/24/2017   No Interest In Doing Things    Feeling Depressed    Trouble Sleeping    Tired / No Energy    No appetite or Over-Eating    Feeling Bad about Self    Trouble Concentrating    Moving Slow or Restless    Suicidal Thoughts    Total Score    1.  Little interest or pleasure in doing things 3   2.  Feeling down, depressed, or hopeless 3   3.  Trouble falling or staying asleep, or sleeping too much 3   4.  Feeling tired or having little energy 3   5.  Poor appetite or overeating 3   6.  Feeling bad about yourself 3   7.  Trouble concentrating 3   8.  Moving slowly or restless 1   9.  Suicidal or self-harm thoughts 0   PHQ-9 Total Score 22   Difficulty at work, home, or with people Extremely dIfficult     PHQ-9 ([employer]) 5/26/2017   No Interest In Doing Things    Feeling Depressed    Trouble Sleeping    Tired / No Energy    No appetite or Over-Eating    Feeling Bad about Self    Trouble Concentrating    Moving Slow or Restless    Suicidal Thoughts    Total Score    1.  Little interest or pleasure in doing things 2   2.  Feeling down, depressed, or hopeless 1   3.  Trouble falling or staying asleep, or sleeping too much 3   4.  Feeling tired or having little energy 2   5.  Poor appetite or overeating 2   6.  Feeling bad about yourself 2   7.  Trouble concentrating 1   8.  Moving slowly or restless 1   9.  Suicidal or self-harm thoughts 0   PHQ-9 Total Score 14   Difficulty at work, home, or with people Somewhat difficult  "       Additional ROS:   Constitutional, HEENT, Cardiovascular, Pulmonary, GI and , Neuro, MSK and Psych review of systems/symptoms are otherwise negative or unchanged from previous, except as noted above.      OBJECTIVE:  /78  Pulse 65  Temp 98.3  F (36.8  C) (Oral)  Wt 263 lb (119.3 kg)  SpO2 95%  BMI 45.14 kg/m2   Estimated body mass index is 45.14 kg/(m^2) as calculated from the following:    Height as of 4/24/17: 5' 4\" (1.626 m).    Weight as of this encounter: 263 lb (119.3 kg).  Eye: PERRL, EOMI  HENT: ear canals and TM's normal and nose and mouth without ulcers or lesions   Neck: no adenopathy. Thyroid normal to palpation. No bruits  Pulm: Lungs clear to auscultation   CV: Regular rates and rhythm  GI: Soft, obese, nontender, Normal active bowel sounds, No hepatosplenomegaly or masses palpable  Ext: Peripheral pulses intact. Trace BLE ankle edema.  Neuro: Normal strength and tone, sensory exam grossly normal  Gen: Brighter affect. Pleasant  Back: Mild tenderness to palpation bilateral paralumbar area. Neg SLRT bilaterally.      Assessment/Plan: (See plan discussion below for further details)  1. Severe episode of recurrent major depressive disorder, without psychotic features (H)  Improved. Continue current meds and see me back in 3 months. If not improved further, will add Buproprion  - FLUoxetine (PROZAC) 40 MG capsule; Take 1 capsule (40 mg) by mouth daily  Dispense: 90 capsule; Refill: 3    2. Bilateral low back pain with bilateral sciatica, unspecified chronicity  Stable. Continue meds. Stretching in AM  - gabapentin (NEURONTIN) 300 MG capsule; 1 capsule in AM and 2 capsules at bedtime  Dispense: 270 capsule; Refill: 3    3. Morbid obesity, unspecified obesity type (H)  Calorie/carbohydrate (sugar, bread, potato, pasta, etc) reduction in diet for weight loss.  Increase color on your plate with fruits and vegetables. Increase  frequency of walking or other aerobic exercise as able (goal is " daily)    4. NELY on CPAP  Pt to follow-up with  Sleep clinic to look into other CPAP masks with better fit  Contact  Sleep Clinic [phone] re: CPAP mask issues    5. Hyperlipidemia LDL goal <130  Not at risk level to start statin. DIet and exercise encouraged as above    6. Hypothyroidism, unspecified type   TSH low but T3 and T4 low normal range. When levels  Used to get TSH barely into normal ran, T3 or T4 levels abnormally low. Will continue current Levothyroxine dosing    Plan discussion:  Contact  Sleep Clinic [phone] re: CPAP mask issues  Continue current medications, RFs done  If mood not even better in 3 months then  See me back in clinic  Calorie/carbohydrate (sugar, bread, potato, pasta, etc) reduction in diet for weight loss.  Increase color on your plate with fruits and vegetables as able.  Increase  frequency of walking or other aerobic exercise as able (goal is daily)  Couch aerobics when watching TV       Yuval Jama MD  Internal Medicine Department  [facility]      No identified additional risks  The 10-year ASCVD risk score ([place] FAINA Jr, et al., 2013) is: 6.1%    Values used to calculate the score:      Age: 61 years      Sex: Female      Is Non- : No      Diabetic: No      Tobacco smoker: No      Systolic Blood Pressure: 138 mmHg      Is BP treated: Yes      HDL Cholesterol: 56 mg/dL      Total Cholesterol: 222 mg/dL

## 2017-05-26 NOTE — PATIENT INSTRUCTIONS
Contact  Sleep Clinic [phone] re: CPAP mask issues  Continue current medications, RFs done  If mood not even better in 3 months then  See me back in clinic  Calorie/carbohydrate (sugar, bread, potato, pasta, etc) reduction in diet for weight loss.  Increase color on your plate with fruits and vegetables as able.  Increase  frequency of walking or other aerobic exercise as able (goal is daily)  Couch aerobics when watching TV

## 2017-05-27 ASSESSMENT — PATIENT HEALTH QUESTIONNAIRE - PHQ9: SUM OF ALL RESPONSES TO PHQ QUESTIONS 1-9: 14

## 2017-05-29 VITALS
TEMPERATURE: 98.3 F | OXYGEN SATURATION: 95 % | HEART RATE: 65 BPM | WEIGHT: 263 LBS | BODY MASS INDEX: 45.14 KG/M2 | DIASTOLIC BLOOD PRESSURE: 78 MMHG | SYSTOLIC BLOOD PRESSURE: 138 MMHG

## 2017-06-20 DIAGNOSIS — I48.91 ATRIAL FIBRILLATION AND FLUTTER (H): ICD-10-CM

## 2017-06-20 DIAGNOSIS — I48.92 ATRIAL FIBRILLATION AND FLUTTER (H): ICD-10-CM

## 2017-06-21 RX ORDER — FLECAINIDE ACETATE 150 MG/1
TABLET ORAL
Qty: 30 TABLET | Refills: 0 | OUTPATIENT
Start: 2017-06-21

## 2017-06-21 NOTE — TELEPHONE ENCOUNTER
"Flecainide was prescribed by cardiology  (Dr Vaughn). Was refilled \"by protocol\" by pharmacist 1 month ago without my knowledge or intervention. Pharmacy needs to speak with cardiology re: refills of this med. Inform pharmacy to request from cardiology. RF declined by me as I am not prescribing this medication to pt  "

## 2017-06-21 NOTE — TELEPHONE ENCOUNTER
flecainide acetate 150 MG TABS      Last Written Prescription Date:  5/18/2017  Last Fill Quantity: 30,   # refills: 0  Last Office Visit with [facility], [facility] or [facility] prescribing provider: 5/26/2017  Future Office visit:       Routing refill request to provider for review/approval because:  Drug not on the [facility], [facility] or [facility] refill protocol or controlled substance

## 2017-06-22 DIAGNOSIS — I48.91 ATRIAL FIBRILLATION AND FLUTTER (H): ICD-10-CM

## 2017-06-22 DIAGNOSIS — I48.92 ATRIAL FIBRILLATION AND FLUTTER (H): ICD-10-CM

## 2017-06-22 RX ORDER — FLECAINIDE ACETATE 150 MG/1
TABLET ORAL
Qty: 30 TABLET | Refills: 0 | Status: SHIPPED | OUTPATIENT
Start: 2017-06-22 | End: 2017-07-24

## 2017-07-14 ENCOUNTER — OFFICE VISIT (OUTPATIENT)
Dept: CARDIOLOGY | Facility: CLINIC | Age: 62
End: 2017-07-14
Payer: COMMERCIAL

## 2017-07-14 ENCOUNTER — HOSPITAL ENCOUNTER (OUTPATIENT)
Dept: CARDIOLOGY | Facility: CLINIC | Age: 62
Discharge: HOME | End: 2017-07-14
Attending: PHYSICIAN ASSISTANT | Admitting: PHYSICIAN ASSISTANT
Payer: COMMERCIAL

## 2017-07-14 VITALS
SYSTOLIC BLOOD PRESSURE: 118 MMHG | HEIGHT: 64 IN | DIASTOLIC BLOOD PRESSURE: 74 MMHG | WEIGHT: 264 LBS | HEART RATE: 60 BPM | BODY MASS INDEX: 45.07 KG/M2

## 2017-07-14 DIAGNOSIS — I48.0 PAROXYSMAL ATRIAL FIBRILLATION (H): ICD-10-CM

## 2017-07-14 DIAGNOSIS — I48.92 ATRIAL FIBRILLATION AND FLUTTER (H): Primary | ICD-10-CM

## 2017-07-14 DIAGNOSIS — I48.91 ATRIAL FIBRILLATION AND FLUTTER (H): Primary | ICD-10-CM

## 2017-07-14 PROCEDURE — 93306 TTE W/DOPPLER COMPLETE: CPT | Mod: 26 | Performed by: INTERNAL MEDICINE

## 2017-07-14 PROCEDURE — 40000264 ECHO COMPLETE WITH LUMASON

## 2017-07-14 PROCEDURE — 99214 OFFICE O/P EST MOD 30 MIN: CPT | Mod: 25 | Performed by: INTERNAL MEDICINE

## 2017-07-14 PROCEDURE — 93000 ELECTROCARDIOGRAM COMPLETE: CPT | Performed by: INTERNAL MEDICINE

## 2017-07-14 PROCEDURE — 25500064 ZZH RX 255 OP 636: Performed by: PHYSICIAN ASSISTANT

## 2017-07-14 RX ADMIN — SULFUR HEXAFLUORIDE 5 ML: KIT at 14:00

## 2017-07-14 NOTE — LETTER
"7/14/2017    Yuval Jama MD  [facility]   [address] [facility] [address]    RE: Maxine Sears       Dear Colleague,    I had the pleasure of seeing Maxine Sears in the [facility].    Electrophysiology/ Clinic Note         H&P and Plan:   REASON FOR VISIT:  Evaluation of paroxysmal atrial fibrillation.       HISTORY OF PRESENT ILLNESS:  Ms. Sears is a delightful 60-year-old lady with a history of hypertension and paroxysmal atrial fibrillation who is here for routine evaluation.        At the moment, she is doing well. She is on a combination of flecainide and atenolol.  She denies any recurrence of AFib since initiation of medications. During this visit, she denies any symptoms such as shortness of breath, lightheadedness, near-syncopal or syncopal episodes. Although, she admits having dyspnea with major exertion at times, which she attributes for being out of shape.      EKG  today showed normal sinus rhythm, QRS measuring 96 milliseconds.  Echocardiogram also done today revealed normal cardiac function and borderline LVH.    She had labs from April 2017, which revealed normal kidney function, TSH and lipid profile.    Stress test (March 2016) was negative for ischemia.      ASSESSMENT AND PLAN:   1.  Paroxysmal atrial fibrillation.  She responded well to flecainide and atenolol.    she is asymptomatic and denies recurrence of A. fib. Continue current therapy.   2.  Hypertension.  Blood pressure is well controlled.  Continue current medical therapy.   3.  Embolic prevention.  CHADS-VASc score of 2.   On xarelto.  Continue oral anticoagulation indefinitely.     Follow up in clinic with PA in 6 months and with me in 1 year.  Constantin Vaughn MD    Physical Exam:  Vitals: /74  Pulse 60  Ht 1.626 m (5' 4\")  Wt 119.7 kg (264 lb)  BMI 45.32 kg/m2    Constitutional:  AAO x3.  Pt is in NAD.  HEAD: normocephalic.  SKIN: Skin normal color, texture and " turgor with no lesions or eruptions.  Eyes: PERRL, EOMI.  ENT:  Supple, normal JVP. No lymphadenopathy or thyroid enlargement.  Chest:  CTAB.  Cardiac:    RRR, normal  S1 and S2.  No murmurs rubs or gallop.   Abdomen:  Normal BS.  Soft, non-tender and non-distended.  No rebound or guarding.    Extremities:  Pedious pulses palpable B/L.  No LE edema noticed.   Neurological: Strength and sensation grossly symmetric and intact throughout.       CURRENT MEDICATIONS:  Current Outpatient Prescriptions   Medication Sig Dispense Refill     flecainide acetate 150 MG TABS Take 1/2 tab (75 mg) twice daily by mouth 30 tablet 0     gabapentin (NEURONTIN) 300 MG capsule 1 capsule in AM and 2 capsules at bedtime 270 capsule 3     FLUoxetine (PROZAC) 40 MG capsule Take 1 capsule (40 mg) by mouth daily 90 capsule 3     FLUoxetine (PROZAC) 20 MG capsule Take 1 capsule (20 mg) by mouth daily 30 capsule 11     losartan (COZAAR) 100 MG tablet Take 1 tablet (100 mg) by mouth daily 30 tablet 11     atenolol (TENORMIN) 50 MG tablet Take 1 tablet (50 mg) by mouth daily 30 tablet 11     triamterene-hydrochlorothiazide (MAXZIDE) 75-50 MG per tablet 1/2 tab daily 30 tablet 11     pantoprazole (PROTONIX) 40 MG EC tablet Take 1 tablet (40 mg) by mouth daily Take 30-60 minutes before a meal. 30 tablet 11     rivaroxaban ANTICOAGULANT (XARELTO) 20 MG TABS tablet Take 1 tablet (20 mg) by mouth daily (with dinner) 30 tablet 11     SUMAtriptan (IMITREX) 100 MG tablet Take 1 tablet (100 mg) by mouth at onset of headache for migraine May repeat in 2 hours if needed: max 2/day; 10 tablet 11     levothyroxine (SYNTHROID/LEVOTHROID) 88 MCG tablet TAKE 1 TABLET BY MOUTH DAILY IN ADDITION TO 75 MCG TO EQUAL 163 MCG DAILY 90 tablet 0     levothyroxine (SYNTHROID/LEVOTHROID) 75 MCG tablet Take 1 tablet (75 mcg) by mouth daily 1 tab daily in addition to Levothyroxine 88mcg daily so total daily dose = 163mcg daily 90 tablet 3     order for DME  DREAMSTATION  5-15 CM/H20  NASAL AIRFIT N10 HER         ALLERGIES     Allergies   Allergen Reactions     Morphine Anaphylaxis     Ceclor [Cefaclor] Hives     Diltiazem Hives     Lisinopril Cough     Sertraline Nausea and Vomiting       PAST MEDICAL HISTORY:  Past Medical History:   Diagnosis Date     Atrial fibrillation and flutter (H) 1/5/15     Depression       Essential hypertension       Gastroesophageal reflux disease without esophagitis 11/11/2015     GERD (gastroesophageal reflux disease)      Hiatal hernia      Hyperlipidemia LDL goal <130 3/26/2015     Hypothyroidism      Migraine without aura and without status migrainosus, not intractable  Aug 2016     Morbid obesity, unspecified obesity type (H) 12/9/2015     Obesity      NELY on CPAP      Osteoarthritis      RLS (restless legs syndrome)      Symptomatic menopausal or female climacteric states (aka FLASHES)      TMJ disease        PAST SURGICAL HISTORY:  Past Surgical History:   Procedure Laterality Date     APPENDECTOMY       CHOLECYSTECTOMY       HYSTERECTOMY, DOM       TONSILLECTOMY         FAMILY HISTORY:  Family History   Problem Relation Age of Onset     Myocardial Infarction Mother      Heart Failure Mother      Heart Surgery Father      bypass surgery     CEREBROVASCULAR DISEASE Father      Heart Surgery Brother      Hyperlipidemia Brother      Hyperlipidemia Sister      Hyperlipidemia Brother      Sleep Apnea Sister        SOCIAL HISTORY:  Social History     Social History     Marital status:      Spouse name: N/A     Number of children: N/A     Years of education: N/A     Social History Main Topics     Smoking status: Former Smoker     Types: Cigarettes     Quit date: 10/14/1973     Smokeless tobacco: None      Comment: very minimal smoking history     Alcohol use No     Drug use: No     Sexual activity: No     Other Topics Concern     Caffeine Concern No     Sleep Concern Yes     Weight Concern No     Special Diet No     Exercise No      Seat Belt Yes     Social History Narrative       Review of Systems:  Skin:  Positive for lumps or bumps   Eyes:  Positive for glasses  ENT:  Negative    Respiratory:  Positive for dyspnea on exertion;sleep apnea;CPAP  Cardiovascular:  Negative for;edema;syncope or near-syncope;cyanosis;lightheadedness Positive for;exercise intolerance;palpitations;fatigue  Gastroenterology: Negative for constipation;melena;hematochezia  Genitourinary:  Negative    Musculoskeletal:  Positive for joint pain  Neurologic:  Positive for migraine headaches  Psychiatric:  Positive for sleep disturbances;depression  Heme/Lymph/Imm:  Positive for allergies  Endocrine:  Positive for thyroid disorder      Recent Lab Results:  LIPID RESULTS:  Lab Results   Component Value Date    CHOL 222 (H) 04/24/2017    HDL 56 04/24/2017     (H) 04/24/2017    TRIG 241 (H) 04/24/2017    CHOLHDLRATIO 4.7 09/24/2015       LIVER ENZYME RESULTS:  Lab Results   Component Value Date    AST 40 04/24/2017    ALT 59 (H) 04/24/2017       CBC RESULTS:  Lab Results   Component Value Date    WBC 12.2 (H) 07/13/2015    RBC 4.60 07/13/2015    HGB 13.3 07/13/2015    HCT 39.8 07/13/2015    MCV 87 07/13/2015    MCH 28.9 07/13/2015    MCHC 33.4 07/13/2015    RDW 13.5 07/13/2015     07/13/2015       BMP RESULTS:  Lab Results   Component Value Date     04/24/2017    POTASSIUM 4.1 04/24/2017    CHLORIDE 102 04/24/2017    CO2 28 04/24/2017    ANIONGAP 9 04/24/2017    GLC 94 04/24/2017    BUN 10 04/24/2017    CR 0.78 04/24/2017    GFRESTIMATED 74 04/24/2017    GFRESTBLACK >90   GFR Calc   04/24/2017    J CARLOS 9.1 04/24/2017        A1C RESULTS:  No results found for: A1C    INR RESULTS:  No results found for: INR      ECHOCARDIOGRAM  No results found for this or any previous visit (from the past 8760 hour(s)).      Orders Placed This Encounter   Procedures     EKG 12-lead complete w/read - Clinics (performed today)     No orders of the defined types  were placed in this encounter.    There are no discontinued medications.      Encounter Diagnosis   Name Primary?     Atrial fibrillation and flutter (H) Yes     Thank you for allowing me to participate in the care of your patient.    Sincerely,     Constantin Vaughn MD     [facility]

## 2017-07-14 NOTE — PROGRESS NOTES
"Electrophysiology/ Clinic Note         H&P and Plan:   REASON FOR VISIT:  Evaluation of paroxysmal atrial fibrillation.       HISTORY OF PRESENT ILLNESS:  Ms. Sears is a delightful 60-year-old lady with a history of hypertension and paroxysmal atrial fibrillation who is here for routine evaluation.        At the moment, she is doing well. She is on a combination of flecainide and atenolol.  She denies any recurrence of AFib since initiation of medications. During this visit, she denies any symptoms such as shortness of breath, lightheadedness, near-syncopal or syncopal episodes. Although, she admits having dyspnea with major exertion at times, which she attributes for being out of shape.      EKG  today showed normal sinus rhythm, QRS measuring 96 milliseconds.  Echocardiogram also done today revealed normal cardiac function and borderline LVH.    She had labs from April 2017, which revealed normal kidney function, TSH and lipid profile.    Stress test (March 2016) was negative for ischemia.      ASSESSMENT AND PLAN:   1.  Paroxysmal atrial fibrillation.  She responded well to flecainide and atenolol.   she is asymptomatic and denies recurrence of A. fib. Continue current therapy.   2.  Hypertension.  Blood pressure is well controlled.  Continue current medical therapy.   3.  Embolic prevention.  CHADS-VASc score of 2.   On xarelto.  Continue oral anticoagulation indefinitely.     Follow up in clinic with PA in 6 months and with me in 1 year.  Constantin Vaughn MD    Physical Exam:  Vitals: /74  Pulse 60  Ht 1.626 m (5' 4\")  Wt 119.7 kg (264 lb)  BMI 45.32 kg/m2    Constitutional:  AAO x3.  Pt is in NAD.  HEAD: normocephalic.  SKIN: Skin normal color, texture and turgor with no lesions or eruptions.  Eyes: PERRL, EOMI.  ENT:  Supple, normal JVP. No lymphadenopathy or thyroid enlargement.  Chest:  CTAB.  Cardiac:    RRR, normal  S1 and S2.  No murmurs rubs or gallop.   Abdomen:  Normal BS.  Soft, non-tender " and non-distended.  No rebound or guarding.    Extremities:  Pedious pulses palpable B/L.  No LE edema noticed.   Neurological: Strength and sensation grossly symmetric and intact throughout.       CURRENT MEDICATIONS:  Current Outpatient Prescriptions   Medication Sig Dispense Refill     flecainide acetate 150 MG TABS Take 1/2 tab (75 mg) twice daily by mouth 30 tablet 0     gabapentin (NEURONTIN) 300 MG capsule 1 capsule in AM and 2 capsules at bedtime 270 capsule 3     FLUoxetine (PROZAC) 40 MG capsule Take 1 capsule (40 mg) by mouth daily 90 capsule 3     FLUoxetine (PROZAC) 20 MG capsule Take 1 capsule (20 mg) by mouth daily 30 capsule 11     losartan (COZAAR) 100 MG tablet Take 1 tablet (100 mg) by mouth daily 30 tablet 11     atenolol (TENORMIN) 50 MG tablet Take 1 tablet (50 mg) by mouth daily 30 tablet 11     triamterene-hydrochlorothiazide (MAXZIDE) 75-50 MG per tablet 1/2 tab daily 30 tablet 11     pantoprazole (PROTONIX) 40 MG EC tablet Take 1 tablet (40 mg) by mouth daily Take 30-60 minutes before a meal. 30 tablet 11     rivaroxaban ANTICOAGULANT (XARELTO) 20 MG TABS tablet Take 1 tablet (20 mg) by mouth daily (with dinner) 30 tablet 11     SUMAtriptan (IMITREX) 100 MG tablet Take 1 tablet (100 mg) by mouth at onset of headache for migraine May repeat in 2 hours if needed: max 2/day; 10 tablet 11     levothyroxine (SYNTHROID/LEVOTHROID) 88 MCG tablet TAKE 1 TABLET BY MOUTH DAILY IN ADDITION TO 75 MCG TO EQUAL 163 MCG DAILY 90 tablet 0     levothyroxine (SYNTHROID/LEVOTHROID) 75 MCG tablet Take 1 tablet (75 mcg) by mouth daily 1 tab daily in addition to Levothyroxine 88mcg daily so total daily dose = 163mcg daily 90 tablet 3     order for DME DREAMSTATION  5-15 CM/H20  NASAL AIRFIT N10 HER         ALLERGIES     Allergies   Allergen Reactions     Morphine Anaphylaxis     Ceclor [Cefaclor] Hives     Diltiazem Hives     Lisinopril Cough     Sertraline Nausea and Vomiting       PAST MEDICAL HISTORY:  Past  Medical History:   Diagnosis Date     Atrial fibrillation and flutter (H) 1/5/15     Depression       Essential hypertension       Gastroesophageal reflux disease without esophagitis 11/11/2015     GERD (gastroesophageal reflux disease)      Hiatal hernia      Hyperlipidemia LDL goal <130 3/26/2015     Hypothyroidism      Migraine without aura and without status migrainosus, not intractable  Aug 2016     Morbid obesity, unspecified obesity type (H) 12/9/2015     Obesity      NELY on CPAP      Osteoarthritis      RLS (restless legs syndrome)      Symptomatic menopausal or female climacteric states (aka FLASHES)      TMJ disease        PAST SURGICAL HISTORY:  Past Surgical History:   Procedure Laterality Date     APPENDECTOMY       CHOLECYSTECTOMY       HYSTERECTOMY, DOM       TONSILLECTOMY         FAMILY HISTORY:  Family History   Problem Relation Age of Onset     Myocardial Infarction Mother      Heart Failure Mother      Heart Surgery Father      bypass surgery     CEREBROVASCULAR DISEASE Father      Heart Surgery Brother      Hyperlipidemia Brother      Hyperlipidemia Sister      Hyperlipidemia Brother      Sleep Apnea Sister        SOCIAL HISTORY:  Social History     Social History     Marital status:      Spouse name: N/A     Number of children: N/A     Years of education: N/A     Social History Main Topics     Smoking status: Former Smoker     Types: Cigarettes     Quit date: 10/14/1973     Smokeless tobacco: None      Comment: very minimal smoking history     Alcohol use No     Drug use: No     Sexual activity: No     Other Topics Concern     Caffeine Concern No     Sleep Concern Yes     Weight Concern No     Special Diet No     Exercise No     Seat Belt Yes     Social History Narrative       Review of Systems:  Skin:  Positive for lumps or bumps   Eyes:  Positive for glasses  ENT:  Negative    Respiratory:  Positive for dyspnea on exertion;sleep apnea;CPAP  Cardiovascular:  Negative for;edema;syncope or  near-syncope;cyanosis;lightheadedness Positive for;exercise intolerance;palpitations;fatigue  Gastroenterology: Negative for constipation;melena;hematochezia  Genitourinary:  Negative    Musculoskeletal:  Positive for joint pain  Neurologic:  Positive for migraine headaches  Psychiatric:  Positive for sleep disturbances;depression  Heme/Lymph/Imm:  Positive for allergies  Endocrine:  Positive for thyroid disorder      Recent Lab Results:  LIPID RESULTS:  Lab Results   Component Value Date    CHOL 222 (H) 04/24/2017    HDL 56 04/24/2017     (H) 04/24/2017    TRIG 241 (H) 04/24/2017    CHOLHDLRATIO 4.7 09/24/2015       LIVER ENZYME RESULTS:  Lab Results   Component Value Date    AST 40 04/24/2017    ALT 59 (H) 04/24/2017       CBC RESULTS:  Lab Results   Component Value Date    WBC 12.2 (H) 07/13/2015    RBC 4.60 07/13/2015    HGB 13.3 07/13/2015    HCT 39.8 07/13/2015    MCV 87 07/13/2015    MCH 28.9 07/13/2015    MCHC 33.4 07/13/2015    RDW 13.5 07/13/2015     07/13/2015       BMP RESULTS:  Lab Results   Component Value Date     04/24/2017    POTASSIUM 4.1 04/24/2017    CHLORIDE 102 04/24/2017    CO2 28 04/24/2017    ANIONGAP 9 04/24/2017    GLC 94 04/24/2017    BUN 10 04/24/2017    CR 0.78 04/24/2017    GFRESTIMATED 74 04/24/2017    GFRESTBLACK >90   GFR Calc   04/24/2017    J CARLOS 9.1 04/24/2017        A1C RESULTS:  No results found for: A1C    INR RESULTS:  No results found for: INR      ECHOCARDIOGRAM  No results found for this or any previous visit (from the past 8760 hour(s)).      Orders Placed This Encounter   Procedures     EKG 12-lead complete w/read - Clinics (performed today)     No orders of the defined types were placed in this encounter.    There are no discontinued medications.      Encounter Diagnosis   Name Primary?     Atrial fibrillation and flutter (H) Yes         CC  No referring provider defined for this encounter.

## 2017-07-14 NOTE — MR AVS SNAPSHOT
"              After Visit Summary   7/14/2017    Maxine Sears    MRN: [id number]           Patient Information     Date Of Birth          1955        Visit Information        Provider Department      7/14/2017 3:45 PM Constantin Vaughn MD [facility] AT [place]        Today's Diagnoses     Atrial fibrillation and flutter (H)    -  1       Follow-ups after your visit        Your next 10 appointments already scheduled     Jul 14, 2017  3:45 PM CDT   [facility] EP RETURN with Constantin Vaughn MD   [facility] HE[facility] ([facility])    [address] [place] [id number] [phone]              Future tests that were ordered for you today     Open Future Orders        Priority Expected Expires Ordered    Echo Complete with Lumason Routine 7/22/2017 8/15/2017 8/15/2016            Who to contact     If you have questions or need follow up information about today's clinic visit or your schedule please contact [facility] directly at [phone].  Normal or non-critical lab and imaging results will be communicated to you by Nano Thinkhart, letter or phone within 4 business days after the clinic has received the results. If you do not hear from us within 7 days, please contact the clinic through Nano Thinkhart or phone. If you have a critical or abnormal lab result, we will notify you by phone as soon as possible.  Submit refill requests through Patience or call your pharmacy and they will forward the refill request to us. Please allow 3 business days for your refill to be completed.          Additional Information About Your Visit        Nano Thinkhart Information     Patience lets you send messages to your doctor, view your test results, renew your prescriptions, schedule appointments and more. To sign up, go to [email or web address] . Click on \"Log in\" on the left side of the screen, which will take " "you to the Welcome page. Then click on \"Sign up Now\" on the right side of the page.     You will be asked to enter the access code listed below, as well as some personal information. Please follow the directions to create your username and password.     Your access code is: [email or web address]  Expires: 2017  3:09 PM     Your access code will  in 90 days. If you need help or a new code, please call your [facility] clinic or [phone].        Care EveryWhere ID     This is your Care EveryWhere ID. This could be used by other organizations to access your [facility] medical records  [id number]        Your Vitals Were     Pulse Height BMI (Body Mass Index)             60 1.626 m (5' 4\") 45.32 kg/m2          Blood Pressure from Last 3 Encounters:   17 118/74   17 138/78   17 146/86    Weight from Last 3 Encounters:   17 119.7 kg (264 lb)   17 119.3 kg (263 lb)   17 118.8 kg (262 lb)              We Performed the Following     EKG 12-lead complete w/read - Clinics (performed today)        Primary Care Provider Office Phone # Fax #    Yuval Jama [phone]       [facility] [address] [facility] [address]        Goals        General    My goal is to be able to afford my medications: I will [facility] the [facility]  (pt-stated)     Notes - Note created  2015  4:08 PM by Jovanna Abdalla, RN    As of today's date 2015 goal is met at 0 - 25%.   Goal Status:  Active        Equal Access to Services     [facility] AH: Hadii nicole Daily, waaxda luqadaha, qaybta kaalmada adeisraelyada, jay montes. So [facility] [phone].    ATENCIÓN: Si habla español, tiene a roman disposición servicios gratuitos de asistencia lingüística. Llame al [phone].    We comply with applicable federal civil rights laws and [place] laws. We do not discriminate on the basis of race, color, national origin, age, " disability sex, sexual orientation or gender identity.            Thank you!     Thank you for choosing [facility] AT [place]  for your care. Our goal is always to provide you with excellent care. Hearing back from our patients is one way we can continue to improve our services. Please take a few minutes to complete the written survey that you may receive in the mail after your visit with us. Thank you!             Your Updated Medication List - Protect others around you: Learn how to safely use, store and throw away your medicines at www.disposemymeds.org.          This list is accurate as of: 7/14/17  2:42 PM.  Always use your most recent med list.                   Brand Name Dispense Instructions for use Diagnosis    atenolol 50 MG tablet    TENORMIN    30 tablet    Take 1 tablet (50 mg) by mouth daily    Paroxysmal atrial fibrillation (H)       flecainide acetate 150 MG Tabs     30 tablet    Take 1/2 tab (75 mg) twice daily by mouth    Atrial fibrillation and flutter (H)       * FLUoxetine 20 MG capsule    PROzac    30 capsule    Take 1 capsule (20 mg) by mouth daily    Severe episode of recurrent major depressive disorder, without psychotic features (H)       * FLUoxetine 40 MG capsule    PROzac    90 capsule    Take 1 capsule (40 mg) by mouth daily    Severe episode of recurrent major depressive disorder, without psychotic features (H)       gabapentin 300 MG capsule    NEURONTIN    270 capsule    1 capsule in AM and 2 capsules at bedtime    Bilateral low back pain with bilateral sciatica, unspecified chronicity       * levothyroxine 88 MCG tablet    SYNTHROID/LEVOTHROID    90 tablet    TAKE 1 TABLET BY MOUTH DAILY IN ADDITION TO 75 MCG TO EQUAL 163 MCG DAILY    Hypothyroidism, unspecified type       * levothyroxine 75 MCG tablet    SYNTHROID/LEVOTHROID    90 tablet    Take 1 tablet (75 mcg) by mouth daily 1 tab daily in addition to Levothyroxine 88mcg daily so total daily dose  = 163mcg daily    Hypothyroidism, unspecified type       losartan 100 MG tablet    COZAAR    30 tablet    Take 1 tablet (100 mg) by mouth daily    Essential hypertension       order for DME      DREAMSTATION 5-15 CM/H20 NASAL AIRFIT N10 HER        pantoprazole 40 MG EC tablet    PROTONIX    30 tablet    Take 1 tablet (40 mg) by mouth daily Take 30-60 minutes before a meal.    Gastroesophageal reflux disease without esophagitis       rivaroxaban ANTICOAGULANT 20 MG Tabs tablet    XARELTO    30 tablet    Take 1 tablet (20 mg) by mouth daily (with dinner)    Paroxysmal atrial fibrillation (H)       SUMAtriptan 100 MG tablet    IMITREX    10 tablet    Take 1 tablet (100 mg) by mouth at onset of headache for migraine May repeat in 2 hours if needed: max 2/day;    Migraine without aura and without status migrainosus, not intractable       triamterene-hydrochlorothiazide 75-50 MG per tablet    MAXZIDE    30 tablet    1/2 tab daily    Essential hypertension       * Notice:  This list has 4 medication(s) that are the same as other medications prescribed for you. Read the directions carefully, and ask your doctor or other care provider to review them with you.

## 2017-07-24 DIAGNOSIS — E03.9 HYPOTHYROIDISM, UNSPECIFIED TYPE: ICD-10-CM

## 2017-07-24 DIAGNOSIS — I48.91 ATRIAL FIBRILLATION AND FLUTTER (H): ICD-10-CM

## 2017-07-24 DIAGNOSIS — I48.92 ATRIAL FIBRILLATION AND FLUTTER (H): ICD-10-CM

## 2017-07-24 RX ORDER — FLECAINIDE ACETATE 150 MG/1
TABLET ORAL
Qty: 30 TABLET | Refills: 5 | Status: SHIPPED | OUTPATIENT
Start: 2017-07-24 | End: 2018-02-07

## 2017-07-25 RX ORDER — LEVOTHYROXINE SODIUM 88 UG/1
TABLET ORAL
Qty: 30 TABLET | Refills: 7 | Status: SHIPPED | OUTPATIENT
Start: 2017-07-25 | End: 2018-04-02

## 2017-09-13 ENCOUNTER — APPOINTMENT (OUTPATIENT)
Dept: GENERAL RADIOLOGY | Facility: CLINIC | Age: 62
End: 2017-09-13
Attending: EMERGENCY MEDICINE
Payer: COMMERCIAL

## 2017-09-13 ENCOUNTER — HOSPITAL ENCOUNTER (EMERGENCY)
Facility: CLINIC | Age: 62
Discharge: HOME | End: 2017-09-13
Attending: EMERGENCY MEDICINE | Admitting: EMERGENCY MEDICINE
Payer: COMMERCIAL

## 2017-09-13 VITALS
OXYGEN SATURATION: 98 % | HEIGHT: 64 IN | WEIGHT: 255 LBS | HEART RATE: 64 BPM | RESPIRATION RATE: 18 BRPM | TEMPERATURE: 98.6 F | DIASTOLIC BLOOD PRESSURE: 51 MMHG | BODY MASS INDEX: 43.54 KG/M2 | SYSTOLIC BLOOD PRESSURE: 99 MMHG

## 2017-09-13 DIAGNOSIS — S80.02XA CONTUSION OF LEFT KNEE, INITIAL ENCOUNTER: ICD-10-CM

## 2017-09-13 DIAGNOSIS — S82.891A ANKLE FRACTURE, RIGHT, CLOSED, INITIAL ENCOUNTER: ICD-10-CM
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
## 2017-09-13 PROCEDURE — 73110 X-RAY EXAM OF WRIST: CPT | Mod: RT

## 2017-09-13 PROCEDURE — 27786 TREATMENT OF ANKLE FRACTURE: CPT

## 2017-09-13 PROCEDURE — 99285 EMERGENCY DEPT VISIT HI MDM: CPT | Mod: 25

## 2017-09-13 PROCEDURE — 73610 X-RAY EXAM OF ANKLE: CPT | Mod: RT

## 2017-09-13 PROCEDURE — 73562 X-RAY EXAM OF KNEE 3: CPT | Mod: LT

## 2017-09-13 RX ORDER — HYDROCODONE BITARTRATE AND ACETAMINOPHEN 5; 325 MG/1; MG/1
1-2 TABLET ORAL EVERY 6 HOURS PRN
Qty: 15 TABLET | Refills: 0 | Status: SHIPPED | OUTPATIENT
Start: 2017-09-13 | End: 2017-11-21

## 2017-09-13 ASSESSMENT — ENCOUNTER SYMPTOMS
JOINT SWELLING: 1
ARTHRALGIAS: 1
FEVER: 0
ABDOMINAL PAIN: 0
NUMBNESS: 0
SHORTNESS OF BREATH: 0
COUGH: 0
WEAKNESS: 0

## 2017-09-13 NOTE — ED AVS SNAPSHOT
Emergency Department    [address] [facility] [address]    Phone:  [phone]    Fax:  [phone]                                       Maxine Sears   MRN: [id number]    Department:   Emergency Department   Date of Visit:  9/13/2017           Patient Information     Date Of Birth          1955        Your diagnoses for this visit were:     Ankle fracture, right, closed, initial encounter     Contusion of left knee, initial encounter     Fall from standing, initial encounter        You were seen by Tom Ramirez MD.      Follow-up Information     Call Bradley Kramer MD.    Specialty:  Orthopedics    Contact information:    [facility]  [address] [address]  [phone]          Follow up with  Emergency Department.    Specialty:  EMERGENCY MEDICINE    Why:  As needed, If symptoms worsen    Contact information:    [address] [facility] [id number] [phone]        Discharge Instructions         Ankle Fracture, Distal Fibula  You have a fracture, or broken bone, of the end of the fibula bone. The fibula is one of two bones that support the ankle joint.    Home care    You will be given a splint, cast, or special boot to prevent movement at the injury site. Do not put weight on a splint. It will break. Follow your healthcare provider s advice about when to begin bearing weight on a cast or boot.    Keep your leg elevated when sitting or lying down. When sleeping, place a pillow under the injured leg. When sitting, support the injured leg so it is level with your waist. This is very important during the first 48 hours.    Keep the cast or splint completely dry at all times. When bathing, protect the cast or splint with 2 large plastic bags. Place 1 bag outside of the other. Tape each bag with duct tape at the top end. Water can still leak in even when the foot is covered. So it's best to keep the cast, splint, or  boot away from water. If a fiberglass cast or splint gets wet, dry it with a hair dryer on a cool setting.    Place an ice pack over the injured area for no more than 15 to 20 minutes. Do this every 3 to 6 hours for the first 24 to 48 hours. Continue this 3 to 4 times a day as needed. To make an ice pack, put ice cubes in a plastic bag that seals at the top. Wrap the bag in a clean, thin towel or cloth. Never put ice or an ice pack directly on the skin. The ice pack can be put right on the cast or splint. As the ice melts, be careful that the cast or splint doesn t get wet.    You may use over-the-counter pain medicine to control pain, unless another pain medicine was prescribed. If you have chronic liver or kidney disease or ever had a stomach ulcer or GI bleeding, talk with your provider before using these medicines.  Follow-up care  Follow up with your healthcare provider in 1 week, or as advised. This is to be sure the bone is healing properly. If you were given a splint, it may be changed to a cast after the swelling goes down.  If X-rays were taken, you will be told of any new findings that may affect your care.  When to seek medical advice  Call your healthcare provider right away if any of these occur:    The plaster cast or splint becomes wet or soft    The fiberglass cast or splint stays wet for more than 24 hours    There is increased tightness or pain under the cast or splint    Your toes become swollen, cold, blue, numb, or tingly    The cast becomes loose    The cast has a bad smell    Sore areas develop under the cast    The cast develops cracks or breaks   Date Last Reviewed: 11/23/2015 2000-2017 [employer]. [address] [place] [address], [place] [address]. All rights reserved. This information is not intended as a substitute for professional medical care. Always follow your healthcare professional's instructions.          24 Hour Appointment Hotline       To make an appointment at any  [facility], call [phone]). If you don't have a family doctor or clinic, we will help you find one. [facility] are conveniently located to serve the needs of you and your family.             Review of your medicines      Our records show that you are taking the medicines listed below. If these are incorrect, please call your family doctor or clinic.        Dose / Directions Last dose taken    atenolol 50 MG tablet   Commonly known as:  TENORMIN   Dose:  50 mg   Quantity:  30 tablet        Take 1 tablet (50 mg) by mouth daily   Refills:  11        flecainide acetate 150 MG Tabs   Quantity:  30 tablet        Take 1/2 tab (75 mg) twice daily by mouth   Refills:  5        * FLUoxetine 20 MG capsule   Commonly known as:  PROzac   Dose:  20 mg   Quantity:  30 capsule        Take 1 capsule (20 mg) by mouth daily   Refills:  11        * FLUoxetine 40 MG capsule   Commonly known as:  PROzac   Dose:  40 mg   Quantity:  90 capsule        Take 1 capsule (40 mg) by mouth daily   Refills:  3        gabapentin 300 MG capsule   Commonly known as:  NEURONTIN   Quantity:  270 capsule        1 capsule in AM and 2 capsules at bedtime   Refills:  3        * levothyroxine 75 MCG tablet   Commonly known as:  SYNTHROID/LEVOTHROID   Dose:  75 mcg   Quantity:  90 tablet        Take 1 tablet (75 mcg) by mouth daily 1 tab daily in addition to Levothyroxine 88mcg daily so total daily dose = 163mcg daily   Refills:  3        * levothyroxine 88 MCG tablet   Commonly known as:  SYNTHROID/LEVOTHROID   Quantity:  30 tablet        TAKE ONE TABLET BY MOUTH EVERY DAY. TAKE IN ADDITION TO 75MCG TABLETS OF LEVOTHYROXINE   Refills:  7        losartan 100 MG tablet   Commonly known as:  COZAAR   Dose:  100 mg   Quantity:  30 tablet        Take 1 tablet (100 mg) by mouth daily   Refills:  11        order for DME        DREAMSTATION 5-15 CM/H20 NASAL AIRFIT N10 HER   Refills:  0        pantoprazole 40 MG EC tablet   Commonly  known as:  PROTONIX   Dose:  40 mg   Quantity:  30 tablet        Take 1 tablet (40 mg) by mouth daily Take 30-60 minutes before a meal.   Refills:  11        rivaroxaban ANTICOAGULANT 20 MG Tabs tablet   Commonly known as:  XARELTO   Dose:  20 mg   Quantity:  30 tablet        Take 1 tablet (20 mg) by mouth daily (with dinner)   Refills:  11        SUMAtriptan 100 MG tablet   Commonly known as:  IMITREX   Dose:  100 mg   Quantity:  10 tablet        Take 1 tablet (100 mg) by mouth at onset of headache for migraine May repeat in 2 hours if needed: max 2/day;   Refills:  11        triamterene-hydrochlorothiazide 75-50 MG per tablet   Commonly known as:  MAXZIDE   Quantity:  30 tablet        1/2 tab daily   Refills:  11        * Notice:  This list has 4 medication(s) that are the same as other medications prescribed for you. Read the directions carefully, and ask your doctor or other care provider to review them with you.            Procedures and tests performed during your visit     Ankle XR, G/E 3 views, right    Knee XR, 3 views, left    Wrist XR, G/E 3 views, right      Orders Needing Specimen Collection     None      Pending Results     No orders found from 9/11/2017 to 9/14/2017.            Pending Culture Results     No orders found from 9/11/2017 to 9/14/2017.            Pending Results Instructions     If you had any lab results that were not finalized at the time of your Discharge, you can call the ED Lab Result RN at [phone]. You will be contacted by this team for any positive Lab results or changes in treatment. The nurses are available 7 days a week from 10A to 6:30P.  You can leave a message 24 hours per day and they will return your call.        Test Results From Your Hospital Stay        9/13/2017  1:39 PM      Narrative     XR ANKLE RT G/E 3 VW  9/13/2017 1:36 PM    HISTORY:  fall, ankle pain    COMPARISON:  None.        Impression     IMPRESSION:  There is a transverse nondisplaced fracture of the  distal  fibula with overlying soft tissue swelling. Ankle mortise is  preserved. Otherwise negative.     BETTYE BELL MD         9/13/2017  1:39 PM      Narrative     XR WRIST RT G/E 3 VW  9/13/2017 1:36 PM    HISTORY:  fall, wrist pain    COMPARISON:  None.        Impression     IMPRESSION:  Negative.     BETTYE BELL MD         9/13/2017  2:27 PM      Narrative     XR KNEE LT 3 VW  9/13/2017 2:23 PM    HISTORY:  fall, knee pain    COMPARISON:  12/9/2014        Impression     IMPRESSION:  Osseous degenerative changes in the medial compartment  with moderate medial compartment narrowing that appears improved when  compared with the prior exam. Prominent posterior superior patellar  spur causing medial patellofemoral compartment narrowing. Diffuse soft  tissue swelling anterior to the patella and extending caudad into the  upper tibial region. No fractures.    BETTYE BELL MD                Clinical Quality Measure: Blood Pressure Screening     Your blood pressure was checked while you were in the emergency department today. The last reading we obtained was  BP: 99/51 . Please read the guidelines below about what these numbers mean and what you should do about them.  If your systolic blood pressure (the top number) is less than 120 and your diastolic blood pressure (the bottom number) is less than 80, then your blood pressure is normal. There is nothing more that you need to do about it.  If your systolic blood pressure (the top number) is 120-139 or your diastolic blood pressure (the bottom number) is 80-89, your blood pressure may be higher than it should be. You should have your blood pressure rechecked within a year by a primary care provider.  If your systolic blood pressure (the top number) is 140 or greater or your diastolic blood pressure (the bottom number) is 90 or greater, you may have high blood pressure. High blood pressure is treatable, but if left untreated over time it can put you at risk for  "heart attack, stroke, or kidney failure. You should have your blood pressure rechecked by a primary care provider within the next 4 weeks.  If your provider in the emergency department today gave you specific instructions to follow-up with your doctor or provider even sooner than that, you should follow that instruction and not wait for up to 4 weeks for your follow-up visit.        Thank you for choosing [facility]       Thank you for choosing [facility] for your care. Our goal is always to provide you with excellent care. Hearing back from our patients is one way we can continue to improve our services. Please take a few minutes to complete the written survey that you may receive in the mail after you visit with us. Thank you!        [facility] Information     "[employer]" lets you send messages to your doctor, view your test results, renew your prescriptions, schedule appointments and more. To sign up, go to [email or web address]" . Click on \"Log in\" on the left side of the screen, which will take you to the Welcome page. Then click on \"Sign up Now\" on the right side of the page.     You will be asked to enter the access code listed below, as well as some personal information. Please follow the directions to create your username and password.     Your access code is: [email or web address]  Expires: 2017  2:52 PM     Your access code will  in 90 days. If you need help or a new code, please call your [facility] clinic or [phone].        Care EveryWhere ID     This is your Care EveryWhere ID. This could be used by other organizations to access your [facility] medical records  [id number]        Equal Access to Services     SAIMA ZIMMERMAN : Hadjuan Daily, waaxda luqadaha, qaybta kaalmada jay jones. So [facility] [phone].    ATENCIÓN: Si habla español, tiene a roman disposición servicios gratuitos de asistencia lingüística. Llame al [phone].    We comply with " applicable federal civil rights laws and [place] laws. We do not discriminate on the basis of race, color, national origin, age, disability sex, sexual orientation or gender identity.            After Visit Summary       This is your record. Keep this with you and show to your community pharmacist(s) and doctor(s) at your next visit.

## 2017-09-13 NOTE — ED PROVIDER NOTES
History     Chief Complaint:  Fall (as walking to store and tripped and rolled R ankle inward, swollen. pain) and Ankle Pain      HPI   Maxine Sears is a 62 year old female who presents with right ankle pain.  She states that she was walking and she rolled her ankle causing her to fall.  She complains of some minor right wrist pain following.  She is on Xarelto for history of A. fib.  She denies hitting her head and there is no loss of consciousness.    Allergies:  Allergies   Allergen Reactions     Morphine Anaphylaxis     Ceclor [Cefaclor] Hives     Diltiazem Hives     Lisinopril Cough     Sertraline Nausea and Vomiting        Medications:      levothyroxine (SYNTHROID/LEVOTHROID) 88 MCG tablet   flecainide acetate 150 MG TABS   gabapentin (NEURONTIN) 300 MG capsule   FLUoxetine (PROZAC) 40 MG capsule   FLUoxetine (PROZAC) 20 MG capsule   losartan (COZAAR) 100 MG tablet   atenolol (TENORMIN) 50 MG tablet   triamterene-hydrochlorothiazide (MAXZIDE) 75-50 MG per tablet   pantoprazole (PROTONIX) 40 MG EC tablet   rivaroxaban ANTICOAGULANT (XARELTO) 20 MG TABS tablet   SUMAtriptan (IMITREX) 100 MG tablet   levothyroxine (SYNTHROID/LEVOTHROID) 75 MCG tablet   order for DME     Past Medical History:    Past Medical History:   Diagnosis Date     Atrial fibrillation and flutter (H) 1/5/15     Depression       Essential hypertension       Gastroesophageal reflux disease without esophagitis 11/11/2015     GERD (gastroesophageal reflux disease)      Hiatal hernia      Hyperlipidemia LDL goal <130 3/26/2015     Hypothyroidism      Migraine without aura and without status migrainosus, not intractable  Aug 2016     Morbid obesity, unspecified obesity type (H) 12/9/2015     Obesity      NELY on CPAP      Osteoarthritis      RLS (restless legs syndrome)      Symptomatic menopausal or female climacteric states (aka FLASHES)      TMJ disease        Past Surgical History:    Past Surgical History:   Procedure Laterality Date  "    APPENDECTOMY       CHOLECYSTECTOMY       HYSTERECTOMY, DOM       TONSILLECTOMY         Family History:    Family History   Problem Relation Age of Onset     Myocardial Infarction Mother      Heart Failure Mother      Heart Surgery Father      bypass surgery     CEREBROVASCULAR DISEASE Father      Heart Surgery Brother      Hyperlipidemia Brother      Hyperlipidemia Sister      Hyperlipidemia Brother      Sleep Apnea Sister        Social History:  Marital Status:   [5]  Social History   Substance Use Topics     Smoking status: Former Smoker     Types: Cigarettes     Quit date: 10/14/1973     Smokeless tobacco: Not on file      Comment: very minimal smoking history     Alcohol use No        Review of Systems   Constitutional: Negative for fever.   Respiratory: Negative for cough and shortness of breath.    Cardiovascular: Negative for chest pain.   Gastrointestinal: Negative for abdominal pain.   Musculoskeletal: Positive for arthralgias and joint swelling.   Neurological: Negative for weakness and numbness.   All other systems reviewed and are negative.        Physical Exam   First Vitals:  BP: 99/51  Heart Rate: 66  Temp: 98.6  F (37  C)  Resp: 18  Height: 162.6 cm (5' 4\")  Weight: 115.7 kg (255 lb)  SpO2: 98 %    Physical Exam  General: Appears well-developed and well-nourished.   Head: No signs of trauma.   Neck: Normal range of motion. No midline tenderness.  CV: Normal rate and regular rhythm.    Resp: Effort normal and breath sounds normal. No respiratory distress.   MSK: +tenderness and swelling to lateral aspect of right ankle.  +pain with ROM of ankle.  No knee tenderness.  Mild right wrist tenderness without deformity.  No tenderness to remainder of extremities.  +neurovascularly intact distally with intact radial and DP pulses.    Neuro: The patient is alert and oriented.  Speech normal.  GCS 15  Skin: Skin is warm and dry. No rash noted.   Psych: normal mood and affect. behavior is normal. "       Emergency Department Course       Imaging:  Xr Ankle R:  IMPRESSION:  There is a transverse nondisplaced fracture of the distal  fibula with overlying soft tissue swelling. Ankle mortise is  preserved. Otherwise negative.     XR: Wrist R:    IMPRESSION:  Negative.     XR Knee L:  IMPRESSION:  Osseous degenerative changes in the medial compartment  with moderate medial compartment narrowing that appears improved when  compared with the prior exam. Prominent posterior superior patellar  spur causing medial patellofemoral compartment narrowing. Diffuse soft  tissue swelling anterior to the patella and extending caudad into the  upper tibial region. No fractures.    Emergency Department Course:  Patient was seen and evaluated.      Results discussed with patient    Patient discharged home.  Follow up and return instructions discussed.      Impression & Plan      Medical Decision Making:  Maxine Sears is a 62-year-old woman who presents due to right ankle pain.  She was walking when she actually rolled her ankle causing her to fall.  She denies hitting her head or any loss of consciousness.  She complained of pain primarily to the right ankle, but also had some pain to the left knee and right wrist.  X-rays were obtained of the affected areas which showed a distal fibula fracture, but no other signs of acute process.  The patient was placed in a right ankle splint and was able to ambulate with crutches.  She'll be discharged home with pain medication and follow-up with orthopedics.  She declined pain medications while in the emergency department.  She is recommended ice and elevation and nonweightbearing until cleared by orthopedics.    Diagnosis:    ICD-10-CM    1. Ankle fracture, right, closed, initial encounter S82.891A    2. Contusion of left knee, initial encounter S80.02XA    3. Fall from standing, initial encounter W19.XXXA        Disposition:  discharged to home    Discharge Medications:  Hydrocodone  5/325  15 tablets.    Tom Ramirez  9/13/2017    EMERGENCY DEPARTMENT       Tom Ramirez MD  09/13/17 [id number]

## 2017-09-13 NOTE — DISCHARGE INSTRUCTIONS
Ankle Fracture, Distal Fibula  You have a fracture, or broken bone, of the end of the fibula bone. The fibula is one of two bones that support the ankle joint.    Home care    You will be given a splint, cast, or special boot to prevent movement at the injury site. Do not put weight on a splint. It will break. Follow your healthcare provider s advice about when to begin bearing weight on a cast or boot.    Keep your leg elevated when sitting or lying down. When sleeping, place a pillow under the injured leg. When sitting, support the injured leg so it is level with your waist. This is very important during the first 48 hours.    Keep the cast or splint completely dry at all times. When bathing, protect the cast or splint with 2 large plastic bags. Place 1 bag outside of the other. Tape each bag with duct tape at the top end. Water can still leak in even when the foot is covered. So it's best to keep the cast, splint, or boot away from water. If a fiberglass cast or splint gets wet, dry it with a hair dryer on a cool setting.    Place an ice pack over the injured area for no more than 15 to 20 minutes. Do this every 3 to 6 hours for the first 24 to 48 hours. Continue this 3 to 4 times a day as needed. To make an ice pack, put ice cubes in a plastic bag that seals at the top. Wrap the bag in a clean, thin towel or cloth. Never put ice or an ice pack directly on the skin. The ice pack can be put right on the cast or splint. As the ice melts, be careful that the cast or splint doesn t get wet.    You may use over-the-counter pain medicine to control pain, unless another pain medicine was prescribed. If you have chronic liver or kidney disease or ever had a stomach ulcer or GI bleeding, talk with your provider before using these medicines.  Follow-up care  Follow up with your healthcare provider in 1 week, or as advised. This is to be sure the bone is healing properly. If you were given a splint, it may be changed to a  cast after the swelling goes down.  If X-rays were taken, you will be told of any new findings that may affect your care.  When to seek medical advice  Call your healthcare provider right away if any of these occur:    The plaster cast or splint becomes wet or soft    The fiberglass cast or splint stays wet for more than 24 hours    There is increased tightness or pain under the cast or splint    Your toes become swollen, cold, blue, numb, or tingly    The cast becomes loose    The cast has a bad smell    Sore areas develop under the cast    The cast develops cracks or breaks   Date Last Reviewed: 11/23/2015 2000-2017 [employer]. [address] [place] [address]. All rights reserved. This information is not intended as a substitute for professional medical care. Always follow your healthcare professional's instructions.

## 2017-09-13 NOTE — ED NOTES
Bed: ED14  Expected date:   Expected time:   Means of arrival:   Comments:  [facility] [place] [facility] - 62 F ankle injury eta 1300

## 2017-09-13 NOTE — ED AVS SNAPSHOT
Emergency Department    [address] [facility] [address]    Phone:  [phone]    Fax:  [phone]                                       Maxine Sears   MRN: [id number]    Department:   Emergency Department   Date of Visit:  9/13/2017           After Visit Summary Signature Page     I have received my discharge instructions, and my questions have been answered. I have discussed any challenges I see with this plan with the nurse or doctor.    ..........................................................................................................................................  Patient/Patient Representative Signature      ..........................................................................................................................................  Patient Representative Print Name and Relationship to Patient    ..................................................               ................................................  Date                                            Time    ..........................................................................................................................................  Reviewed by Signature/Title    ...................................................              ..............................................  Date                                                            Time

## 2017-09-15 ENCOUNTER — TRANSFERRED RECORDS (OUTPATIENT)
Dept: HEALTH INFORMATION MANAGEMENT | Facility: CLINIC | Age: 62
End: 2017-09-15

## 2017-10-06 ENCOUNTER — TRANSFERRED RECORDS (OUTPATIENT)
Dept: HEALTH INFORMATION MANAGEMENT | Facility: CLINIC | Age: 62
End: 2017-10-06

## 2017-11-06 ENCOUNTER — TELEPHONE (OUTPATIENT)
Dept: INTERNAL MEDICINE | Facility: CLINIC | Age: 62
End: 2017-11-06

## 2017-11-21 ENCOUNTER — OFFICE VISIT (OUTPATIENT)
Dept: INTERNAL MEDICINE | Facility: CLINIC | Age: 62
End: 2017-11-21
Payer: COMMERCIAL

## 2017-11-21 VITALS
DIASTOLIC BLOOD PRESSURE: 80 MMHG | OXYGEN SATURATION: 96 % | BODY MASS INDEX: 46.35 KG/M2 | TEMPERATURE: 99.7 F | HEART RATE: 70 BPM | SYSTOLIC BLOOD PRESSURE: 122 MMHG | WEIGHT: 270 LBS

## 2017-11-21 DIAGNOSIS — J01.10 ACUTE NON-RECURRENT FRONTAL SINUSITIS: ICD-10-CM

## 2017-11-21 DIAGNOSIS — I10 ESSENTIAL HYPERTENSION: Primary | ICD-10-CM

## 2017-11-21 DIAGNOSIS — G47.33 OSA ON CPAP: ICD-10-CM

## 2017-11-21 DIAGNOSIS — K21.9 GASTROESOPHAGEAL REFLUX DISEASE WITHOUT ESOPHAGITIS: ICD-10-CM

## 2017-11-21 DIAGNOSIS — E66.01 MORBID OBESITY, UNSPECIFIED OBESITY TYPE (H): ICD-10-CM

## 2017-11-21 PROCEDURE — 99214 OFFICE O/P EST MOD 30 MIN: CPT | Performed by: INTERNAL MEDICINE

## 2017-11-21 RX ORDER — LOSARTAN POTASSIUM 100 MG/1
50 TABLET ORAL DAILY
Qty: 30 TABLET | Refills: 11
Start: 2017-11-21 | End: 2018-05-11

## 2017-11-21 RX ORDER — AMOXICILLIN 500 MG/1
500 CAPSULE ORAL 3 TIMES DAILY
Qty: 21 CAPSULE | Refills: 0 | Status: SHIPPED | OUTPATIENT
Start: 2017-11-21 | End: 2017-11-28

## 2017-11-21 NOTE — MR AVS SNAPSHOT
After Visit Summary   11/21/2017    Maxine Sears    MRN: [id number]           Patient Information     Date Of Birth          1955        Visit Information        Provider Department      11/21/2017 2:30 PM Yuval Jama MD [facility]        Today's Diagnoses     Gastroesophageal reflux disease without esophagitis    -  1    Essential hypertension        NELY on CPAP        Fever, unspecified fever cause          Care Instructions    Reduce Losartan to 100mg tab, 1/2 tab daily for blood pressure to see if higher blood pressure helps dizziness/lightheadedness  Amoxicillin 500mg three times a day for 7 days (antibiotic) for fever   Omeprazole 20mg tab, 1 tab daily in A about 30 min before breakfast  For heartburn  Will send letter to insurance re: Pantoprazole coverage  Restart CPAP every night to see if helps headaches and fatigue  Update me in 1 week re: lightheadedness and headaches. If headache not better and lightheadedness better, then possible AM dose increase Gabapentin   Stretching neck muscles in AM  Future flu shot when fever/chills resolve          Follow-ups after your visit        Who to contact     If you have questions or need follow up information about today's clinic visit or your schedule please contact [facility] directly at [phone].  Normal or non-critical lab and imaging results will be communicated to you by MyChart, letter or phone within 4 business days after the clinic has received the results. If you do not hear from us within 7 days, please contact the clinic through [facility] or phone. If you have a critical or abnormal lab result, we will notify you by phone as soon as possible.  Submit refill requests through [employer] or call your pharmacy and they will forward the refill request to us. Please allow 3 business days for your refill to be completed.          Additional Information About Your Visit        [facility]  "Information     [employer] lets you send messages to your doctor, view your test results, renew your prescriptions, schedule appointments and more. To sign up, go to [email or web address] . Click on \"Log in\" on the left side of the screen, which will take you to the Welcome page. Then click on \"Sign up Now\" on the right side of the page.     You will be asked to enter the access code listed below, as well as some personal information. Please follow the directions to create your username and password.     Your access code is: [email or web address]  Expires: 2017  1:52 PM     Your access code will  in 90 days. If you need help or a new code, please call your [place] clinic or [phone].        Care EveryWhere ID     This is your Care EveryWhere ID. This could be used by other organizations to access your [place] medical records  [id number]        Your Vitals Were     Pulse Temperature Pulse Oximetry BMI (Body Mass Index)          70 99.7  F (37.6  C) (Oral) 96% 46.35 kg/m2         Blood Pressure from Last 3 Encounters:   17 122/80   17 99/51   17 118/74    Weight from Last 3 Encounters:   17 270 lb (122.5 kg)   17 255 lb (115.7 kg)   17 264 lb (119.7 kg)              Today, you had the following     No orders found for display         Today's Medication Changes          These changes are accurate as of: 17  3:15 PM.  If you have any questions, ask your nurse or doctor.               Start taking these medicines.        Dose/Directions    amoxicillin 500 MG capsule   Commonly known as:  AMOXIL   Used for:  Fever, unspecified fever cause   Started by:  Yuval Jama MD        Dose:  500 mg   Take 1 capsule (500 mg) by mouth 3 times daily for 7 days   Quantity:  21 capsule   Refills:  0       omeprazole 20 MG CR capsule   Commonly known as:  priLOSEC   Used for:  Gastroesophageal reflux disease without esophagitis   Started by:  Yuval Jama MD        Dose:  20 mg "   Take 1 capsule (20 mg) by mouth daily   Quantity:  30 capsule   Refills:  11         These medicines have changed or have updated prescriptions.        Dose/Directions    losartan 100 MG tablet   Commonly known as:  COZAAR   This may have changed:  how much to take   Used for:  Essential hypertension   Changed by:  Yuval Jama MD        Dose:  50 mg   Take 0.5 tablets (50 mg) by mouth daily   Quantity:  30 tablet   Refills:  11            Where to get your medicines      These medications were sent to [facility] [address] [address]     Phone:  [phone]     amoxicillin 500 MG capsule    omeprazole 20 MG CR capsule         Some of these will need a paper prescription and others can be bought over the counter.  Ask your nurse if you have questions.     You don't need a prescription for these medications     losartan 100 MG tablet                Primary Care Provider Office Phone # Fax #    Yuval Jama [phone]       [address] [place] [id number]        Goals        General    My goal is to be able to afford my medications: I will [facility] the [facility]  (pt-stated)     Notes - Note created  1/19/2015  4:08 PM by Jovanna Abdalla RN    As of today's date 1/19/2015 goal is met at 0 - 25%.   Goal Status:  Active        Equal Access to Services     [facility] AH: Hadii nicole khan hadasho Soomaali, waaxda luqadaha, qaybta kaalmada adeegyada, waxyamini kendrick . So [facility] [phone].    ATENCIÓN: Si habla español, tiene a roman disposición servicios gratuitos de asistencia lingüística. Llame al [phone].    We comply with applicable federal civil rights laws and [place] laws. We do not discriminate on the basis of race, color, national origin, age, disability, sex, sexual orientation, or gender identity.            Thank you!     Thank you for choosing [facility]  [facility]  for your care. Our goal is always to provide you with excellent care. Hearing back from our patients is one way we can continue to improve our services. Please take a few minutes to complete the written survey that you may receive in the mail after your visit with us. Thank you!             Your Updated Medication List - Protect others around you: Learn how to safely use, store and throw away your medicines at www.disposemymeds.org.          This list is accurate as of: 11/21/17  3:15 PM.  Always use your most recent med list.                   Brand Name Dispense Instructions for use Diagnosis    amoxicillin 500 MG capsule    AMOXIL    21 capsule    Take 1 capsule (500 mg) by mouth 3 times daily for 7 days    Fever, unspecified fever cause       atenolol 50 MG tablet    TENORMIN    30 tablet    Take 1 tablet (50 mg) by mouth daily    Paroxysmal atrial fibrillation (H)       flecainide acetate 150 MG Tabs     30 tablet    Take 1/2 tab (75 mg) twice daily by mouth    Atrial fibrillation and flutter (H)       FLUoxetine 40 MG capsule    PROzac    90 capsule    Take 1 capsule (40 mg) by mouth daily    Severe episode of recurrent major depressive disorder, without psychotic features (H)       gabapentin 300 MG capsule    NEURONTIN    270 capsule    1 capsule in AM and 2 capsules at bedtime    Bilateral low back pain with bilateral sciatica, unspecified chronicity       * levothyroxine 75 MCG tablet    SYNTHROID/LEVOTHROID    90 tablet    Take 1 tablet (75 mcg) by mouth daily 1 tab daily in addition to Levothyroxine 88mcg daily so total daily dose = 163mcg daily    Hypothyroidism, unspecified type       * levothyroxine 88 MCG tablet    SYNTHROID/LEVOTHROID    30 tablet    TAKE ONE TABLET BY MOUTH EVERY DAY. TAKE IN ADDITION TO 75MCG TABLETS OF LEVOTHYROXINE    Hypothyroidism, unspecified type       losartan 100 MG tablet    COZAAR    30 tablet    Take 0.5 tablets (50 mg) by mouth daily    Essential  hypertension       omeprazole 20 MG CR capsule    priLOSEC    30 capsule    Take 1 capsule (20 mg) by mouth daily    Gastroesophageal reflux disease without esophagitis       order for DME      DREAMSTATION 5-15 CM/H20 NASAL AIRFIT N10 HER        pantoprazole 40 MG EC tablet    PROTONIX    30 tablet    Take 1 tablet (40 mg) by mouth daily Take 30-60 minutes before a meal.    Gastroesophageal reflux disease without esophagitis       rivaroxaban ANTICOAGULANT 20 MG Tabs tablet    XARELTO    30 tablet    Take 1 tablet (20 mg) by mouth daily (with dinner)    Paroxysmal atrial fibrillation (H)       SUMAtriptan 100 MG tablet    IMITREX    10 tablet    Take 1 tablet (100 mg) by mouth at onset of headache for migraine May repeat in 2 hours if needed: max 2/day;    Migraine without aura and without status migrainosus, not intractable       triamterene-hydrochlorothiazide 75-50 MG per tablet    MAXZIDE    30 tablet    1/2 tab daily    Essential hypertension       * Notice:  This list has 2 medication(s) that are the same as other medications prescribed for you. Read the directions carefully, and ask your doctor or other care provider to review them with you.

## 2017-11-21 NOTE — PATIENT INSTRUCTIONS
"Reduce Losartan to 100mg tab, 1/2 tab daily for blood pressure to see if higher blood pressure helps dizziness/lightheadedness  Amoxicillin 500mg three times a day for 7 days (antibiotic) for fever   Omeprazole 20mg tab, 1 tab daily in A about 30 min before breakfast  For heartburn  Will send letter to insurance re: Pantoprazole coverage  Restart CPAP every night to see if helps headaches and fatigue  Update me in 1 week [phone]  re: lightheadedness and headaches. If headache not better and lightheadedness better, then possible AM dose increase Gabapentin   Stretching neck muscles in AM  Future flu shot when fever/chills resolve    Calorie/carbohydrate (sugar, bread, potato, pasta, etc) reduction in diet for weight loss.  Increase color on your plate with fruits and vegetables. Increase  frequency of walking or other aerobic exercise as able (goal is daily). May do \"couch aerobics\"  "

## 2017-11-21 NOTE — PROGRESS NOTES
"  SUBJECTIVE:   Maxine Sears is a 62 year old female who presents to clinic today for the following health issues:        Chief Complaint   Patient presents with     Dizziness     Chills     Fatigue     Heart Burn     Patient's Insurance will not cover Rx given for her heart burn     Headache     Pt's past medical history, family history, habits, medications and allergies were reviewed with the patient today.  See snap shot for  HCM status. Most recent lab results reviewed with pt. Problem list and histories reviewed & adjusted, as indicated.  Additional history as below:    Hx fx right fibula 2 mos ago. Using walking boot and improving. Last saw ortho 1 month ago and will be having further follow-up in a couple weeks.  Using a walker with ambulation. Intermittent lightheadedness. Occurred after getting out of shower or standing too long. No sx in bed. No vertigo. \"Feels like everything is going to go black\". No palpitations.   HA that is waking up or through the day. Hx NELY and not used  CPAP for the past 1 week. Rare use prior to that. Staying hydrated. Rare coffee. HAs some fatigue recently. Sx mostly in back of head/neck area or occ bilateral temporal  Some recent frontal sinnus pain also. Some yellow nasal discharge. Mild fever today  Using Equate Excedrin for headache. Used 2-3x/week max  Heartburn  Well controlled with Pantoprazole. Off med now due to need for PA as limit quantity per year     Additional ROS:   Constitutional, HEENT, Cardiovascular, Pulmonary, GI and , Neuro, MSK and Psych review of systems/symptoms are otherwise negative or unchanged from previous, except as noted above.      OBJECTIVE:  /80  Pulse 70  Temp 99.7  F (37.6  C) (Oral)  Wt 270 lb (122.5 kg)  SpO2 96%  BMI 46.35 kg/m2   Estimated body mass index is 46.35 kg/(m^2) as calculated from the following:    Height as of 9/13/17: 5' 4\" (1.626 m).    Weight as of this encounter: 270 lb (122.5 kg).  Eye: PERRL, " EOMI  HENT: ear canals and TM's normal and nose and mouth without ulcers or lesions. No TA or TMJ area tenderness. Bilateral frontal sinuses tender to palpation with some yellow nasal discharge  Neck: no adenopathy. Thyroid normal to palpation. No bruits. Mild tenderness to palpation paracervical musculature. No nucchal rigi  Pulm: Lungs clear to auscultation   CV: Regular rates and rhythm. No drop in SBP with orthostatic maneuver today  GI: Soft, nontender, Normal active bowel sounds, No hepatosplenomegaly or masses palpable  Ext: Peripheral pulses intact. No  LLE edema. Walking boot RLE and not removed  Neuro: Normal strength and tone, sensory exam grossly normal. Neg Romberg and Hallpike    Assessment/Plan: (See plan discussion below for further details)  1. Essential hypertension  Controlled but having intermittent orthostatic sx despite no BP drop today. See plan discussion below  - losartan (COZAAR) 100 MG tablet; Take 0.5 tablets (50 mg) by mouth daily  Dispense: 30 tablet; Refill: 11    2. Gastroesophageal reflux disease without esophagitis  See plan discussion below  - omeprazole (PRILOSEC) 20 MG CR capsule; Take 1 capsule (20 mg) by mouth daily  Dispense: 30 capsule; Refill: 11    3. Morbid obesity, unspecified obesity type (H)  See plan discussion below    4. Acute non-recurrent frontal sinusitis  - amoxicillin (AMOXIL) 500 MG capsule; Take 1 capsule (500 mg) by mouth 3 times daily for 7 days  Dispense: 21 capsule; Refill: 0    5. NELY on CPAP   restart use CPAP, especially with HA hx      Plan discussion:  Reduce Losartan to 100mg tab, 1/2 tab daily for blood pressure to see if higher blood pressure helps dizziness/lightheadedness  Amoxicillin 500mg three times a day for 7 days (antibiotic) for fever   Omeprazole 20mg tab, 1 tab daily in A about 30 min before breakfast  For heartburn  Will send letter to insurance re: Pantoprazole coverage  Restart CPAP every night to see if helps headaches and  "fatigue  Update me in 1 week [phone]  re: lightheadedness and headaches. If headache not better and lightheadedness better, then possible AM dose increase Gabapentin   Stretching neck muscles in AM  Future flu shot when fever/chills resolve    Calorie/carbohydrate (sugar, bread, potato, pasta, etc) reduction in diet for weight loss.  Increase color on your plate with fruits and vegetables. Increase  frequency of walking or other aerobic exercise as able (goal is daily). May do \"couch aerobics\"              Yuval Jama MD  Internal Medicine Department  [facility]         "

## 2017-11-21 NOTE — LETTER
[facility]  [address] [address]  [phone]      11/23/2017       Maxine Sears  [address]   [facility] [address]    To:  [employer]  Prior Authorization Appeals Dept  Fax [phone]    To whom it may concern,   I am writing this letter in regards to my patient Maxine Sears  and her history of chronic acid reflux troubles (ICD 10 code K21.9). She has tried controlling this with diet, TUMS and  H2 blocker medication (Ranitidine/Zantac) without control. She has responded well to daily use of Pantoprazole 40mg daily and does not have reflux symptoms with that daily dose. Her symptoms are year-round and therefore requires daily therapy. A recent prior authorization request for longterm use of Pantoprazole was declined with the notation that her insurance had a quantity limit of #120 tabs per 365 days. I am requesting that she be  approved for daily use of Pantoprazole throughout the year. If you have further questions regarding this matter, please feel free to contact me at [phone]    Sincerely,            Yuval Jama MD  Internal Medicine

## 2017-11-21 NOTE — NURSING NOTE
"Chief Complaint   Patient presents with     Dizziness     Chills     Fatigue     Heart Burn     Patient's Insurance will not cover Rx given for her heart burn     Headache       Initial /80  Pulse 70  Temp 99.7  F (37.6  C) (Oral)  Wt 270 lb (122.5 kg)  SpO2 96%  BMI 46.35 kg/m2 Estimated body mass index is 46.35 kg/(m^2) as calculated from the following:    Height as of 9/13/17: 5' 4\" (1.626 m).    Weight as of this encounter: 270 lb (122.5 kg).  Medication Reconciliation: complete  "

## 2017-12-07 ENCOUNTER — TELEPHONE (OUTPATIENT)
Dept: INTERNAL MEDICINE | Facility: CLINIC | Age: 62
End: 2017-12-07

## 2017-12-07 NOTE — TELEPHONE ENCOUNTER
-PRIOR AUTHORIZATION REQUIRED-  This medication requires a Prior Authorization in order for the Insurance to cover.  Please contact the insurance to start the PA for this medication.  Please inform the pharmacy if the PA gets approved or denied.  Omeprazole 20mg Capsules  ID:[id number]  Insurance Company:[employer]  Phone #:[phone]    Cynthia Padgett, Pharmacy Technician  [facility]  [phone]

## 2017-12-08 NOTE — TELEPHONE ENCOUNTER
Prior authorization    Medication name omeprazole  Insurance [facility]  Insurance ID number [id number]  Prior authorization faxed through cover my meds

## 2017-12-11 NOTE — TELEPHONE ENCOUNTER
Pt called  Asking for status PA of omeprazole, informed pt that PA n process.  She plans to possibly  prilosec OTC in the nexrt day or so.

## 2017-12-13 NOTE — TELEPHONE ENCOUNTER
" Have two options:\"  1. Try Ranitidine (generic Zantac) again to see if helps control acid reflux (did not work well enough when last tried in September 2014)  2. Pt to pay out of pocket for Omeprazole 20mg daily. Best price I am aware of is at [facility] where #42 tabs costs  $14 or at [facility] where that quantity would cost $15  If wants to try Ranitidine again, can do Rx to see if insurance will cover or not  "

## 2017-12-13 NOTE — TELEPHONE ENCOUNTER
See telephone message from November 6, 2017.  At that time, patient had pantoprazole long-term use initially denied and an appeal letter was written and faxed documenting whether the patient requires long-term PPI therapy.  We have not received back the patient's insurance response regarding this.  Please check with insurance regarding status of that appeal.  Omeprazole will have the same rules as pantoprazole with the patient's insurance so no need to write an additional letter for omeprazole.  Would suggest patient continue to get omeprazole 20 mg daily over-the-counter until we hear back from her insurance regarding the pantoprazole appeal and whether they will cover for her. Please update pt

## 2017-12-14 NOTE — TELEPHONE ENCOUNTER
Patient is wanting the Omeprazole Rx to go to [facility] on [facility].

## 2018-01-02 ENCOUNTER — TELEPHONE (OUTPATIENT)
Dept: INTERNAL MEDICINE | Facility: CLINIC | Age: 63
End: 2018-01-02

## 2018-01-03 NOTE — TELEPHONE ENCOUNTER
Patient states she completed mammogram, but is unsure of any info on it. States she can't schedule colonoscopy as she is in the middle of moving homes. She will either call back to schedule, otherwise if it has been over 6 weeks and she hasn't scheduled yet, she is okay with receiving a call back for reminder.

## 2018-01-10 ENCOUNTER — TELEPHONE (OUTPATIENT)
Dept: INTERNAL MEDICINE | Facility: CLINIC | Age: 63
End: 2018-01-10

## 2018-01-10 DIAGNOSIS — K21.9 GASTROESOPHAGEAL REFLUX DISEASE, ESOPHAGITIS PRESENCE NOT SPECIFIED: Primary | ICD-10-CM

## 2018-01-10 RX ORDER — PANTOPRAZOLE SODIUM 40 MG/1
40 TABLET, DELAYED RELEASE ORAL DAILY
Qty: 90 TABLET | Refills: 3 | Status: SHIPPED | OUTPATIENT
Start: 2018-01-10 | End: 2018-06-29

## 2018-01-11 NOTE — TELEPHONE ENCOUNTER
Addressed at appt 11/21/17  
Appeal has been over turned PA approved for 1 year   
Faxed appeal letter to number below  
For the Pantoprazole 40mg prescription plan limitations exceeded (maximum 120 days supply in 365 days). Prior authorization required. Third party: [employer]  Id: [id number]  Phone : [phone]    Thank you!  Mayelin Gimenez  [facility] [employer]  On behalf of [facility]    
PA denied.  Reason given:      Medication is only covered for qty 120 per 365 days        To appeal will need letter faxed too:      [phone]    
Prior authorization    Medication name pantoprazole  Insurance [facility]  Insurance ID number [id number]  Prior authorization faxed through cover my meds    
Routed to [facility].  
See other tele encounter 1/10/18 (had to create new encounter to removed Omeprazole from the med list since that Rx was entered after this encounter was originally created so cannot be removed through this encounter)  
Attending Only

## 2018-01-11 NOTE — TELEPHONE ENCOUNTER
Call pt. Insurance has finally approved pt to use Pantoprazole daily for  Acid reflux after appeals letter sent to them. If pt now using any Omeprazole, stop that and start Pantoprazole 40mg  capsule, 1 capsule daily in AM about 30-60 min before breakfast. Rx faxed to the [facility] pharmacy

## 2018-01-16 ENCOUNTER — OFFICE VISIT (OUTPATIENT)
Dept: INTERNAL MEDICINE | Facility: CLINIC | Age: 63
End: 2018-01-16
Payer: COMMERCIAL

## 2018-01-16 VITALS
WEIGHT: 265.7 LBS | SYSTOLIC BLOOD PRESSURE: 126 MMHG | TEMPERATURE: 98.8 F | DIASTOLIC BLOOD PRESSURE: 80 MMHG | BODY MASS INDEX: 45.61 KG/M2 | OXYGEN SATURATION: 96 % | HEART RATE: 69 BPM

## 2018-01-16 DIAGNOSIS — J20.9 BRONCHITIS WITH BRONCHOSPASM: Primary | ICD-10-CM

## 2018-01-16 DIAGNOSIS — I48.91 ATRIAL FIBRILLATION AND FLUTTER (H): ICD-10-CM

## 2018-01-16 DIAGNOSIS — I48.92 ATRIAL FIBRILLATION AND FLUTTER (H): ICD-10-CM

## 2018-01-16 PROCEDURE — 99214 OFFICE O/P EST MOD 30 MIN: CPT | Performed by: INTERNAL MEDICINE

## 2018-01-16 RX ORDER — ALBUTEROL SULFATE 90 UG/1
2 AEROSOL, METERED RESPIRATORY (INHALATION) EVERY 4 HOURS PRN
Qty: 1 INHALER | Refills: 1 | Status: SHIPPED | OUTPATIENT
Start: 2018-01-16 | End: 2019-03-02

## 2018-01-16 RX ORDER — AZITHROMYCIN 250 MG/1
TABLET, FILM COATED ORAL
Qty: 6 TABLET | Refills: 0 | Status: SHIPPED | OUTPATIENT
Start: 2018-01-16 | End: 2018-01-21

## 2018-01-16 NOTE — PROGRESS NOTES
SUBJECTIVE:   Maxine Sears is a 62 year old female who presents to clinic today for the following health issues:      RESPIRATORY SYMPTOMS      Duration: 1 week    Description  sore throat, cough, chills, hoarse voice and SOB    Severity: moderate to severe    Accompanying signs and symptoms: productive cough and sometimes with mucus and coughing attacks    History (predisposing factors):  tobacco abuse    Precipitating or alleviating factors: None    Therapies tried and outcome:  dayquil for a few days with out relief    Recent URI  with typical URI sx.  At that time, the patient complained of typical URI symptoms: including low grade temps, coryza, nasal stuffiness, congestion, postnasal drip, sore throat, malaise.  Those symptoms have all left and now is having regular cough throughout the day.  Mainly dry cough, but somtimes productive, mainly in the mornings, but the nonproductive during rest of day.  Has some occ wheezing , cough worse at bedtime and in cold or humid air.    Does not have a history of asthma.  Does not smoke.         Problem list and histories reviewed & adjusted, as indicated.  Additional history: as documented        Reviewed and updated as needed this visit by clinical staffAllergies       Reviewed and updated as needed this visit by Provider           Past Medical History:  ---------------------------  Past Medical History:   Diagnosis Date     Atrial fibrillation and flutter (H) 1/5/15     Depression       Essential hypertension       Gastroesophageal reflux disease without esophagitis 11/11/2015     GERD (gastroesophageal reflux disease)      Hiatal hernia      Hyperlipidemia LDL goal <130 3/26/2015     Hypothyroidism      Migraine without aura and without status migrainosus, not intractable  Aug 2016     Morbid obesity, unspecified obesity type (H) 12/9/2015     Obesity      NELY on CPAP      Osteoarthritis      RLS (restless legs syndrome)      Symptomatic menopausal or female  climacteric states (aka FLASHES)      TMJ disease        Past Surgical History:  ---------------------------  Past Surgical History:   Procedure Laterality Date     APPENDECTOMY       CHOLECYSTECTOMY       HYSTERECTOMY, DOM       TONSILLECTOMY         Current Medications:  ---------------------------  Current Outpatient Prescriptions   Medication Sig Dispense Refill     pantoprazole (PROTONIX) 40 MG EC tablet Take 1 tablet (40 mg) by mouth daily Take 30-60 minutes before a meal. 90 tablet 3     losartan (COZAAR) 100 MG tablet Take 0.5 tablets (50 mg) by mouth daily 30 tablet 11     levothyroxine (SYNTHROID/LEVOTHROID) 88 MCG tablet TAKE ONE TABLET BY MOUTH EVERY DAY. TAKE IN ADDITION TO 75MCG TABLETS OF LEVOTHYROXINE 30 tablet 7     flecainide acetate 150 MG TABS Take 1/2 tab (75 mg) twice daily by mouth 30 tablet 5     gabapentin (NEURONTIN) 300 MG capsule 1 capsule in AM and 2 capsules at bedtime 270 capsule 3     FLUoxetine (PROZAC) 40 MG capsule Take 1 capsule (40 mg) by mouth daily 90 capsule 3     atenolol (TENORMIN) 50 MG tablet Take 1 tablet (50 mg) by mouth daily 30 tablet 11     triamterene-hydrochlorothiazide (MAXZIDE) 75-50 MG per tablet 1/2 tab daily 30 tablet 11     rivaroxaban ANTICOAGULANT (XARELTO) 20 MG TABS tablet Take 1 tablet (20 mg) by mouth daily (with dinner) 30 tablet 11     SUMAtriptan (IMITREX) 100 MG tablet Take 1 tablet (100 mg) by mouth at onset of headache for migraine May repeat in 2 hours if needed: max 2/day; 10 tablet 11     levothyroxine (SYNTHROID/LEVOTHROID) 75 MCG tablet Take 1 tablet (75 mcg) by mouth daily 1 tab daily in addition to Levothyroxine 88mcg daily so total daily dose = 163mcg daily 90 tablet 3     order for DME DREAMSTATION  5-15 CM/H20  NASAL AIRFIT N10 HER         Allergies:  -------------  Allergies   Allergen Reactions     Morphine Anaphylaxis     Ceclor [Cefaclor] Hives     Diltiazem Hives     Lisinopril Cough     Sertraline Nausea and Vomiting       Social  History:  -------------------  Social History     Social History     Marital status:      Spouse name: N/A     Number of children: N/A     Years of education: N/A     Occupational History     Not on file.     Social History Main Topics     Smoking status: Former Smoker     Types: Cigarettes     Quit date: 10/14/1973     Smokeless tobacco: Not on file      Comment: very minimal smoking history     Alcohol use No     Drug use: No     Sexual activity: No     Other Topics Concern     Caffeine Concern No     Sleep Concern Yes     Weight Concern No     Special Diet No     Exercise No     Seat Belt Yes     Social History Narrative       Family Medical History:  ------------------------------  Family History   Problem Relation Age of Onset     Myocardial Infarction Mother      Heart Failure Mother      Heart Surgery Father      bypass surgery     CEREBROVASCULAR DISEASE Father      Heart Surgery Brother      Hyperlipidemia Brother      Hyperlipidemia Sister      Hyperlipidemia Brother      Sleep Apnea Sister          ROS:  REVIEW OF SYSTEMS:    RESP: positive for cough, dyspnea, wheezing; negative for hemoptysis   CV: negative for chest pain, palpitations, PND, orthopnea;   GI: negative for dysphagia, N/V, pain, melena, diarrhea and constipation  NEURO: negative for numbness/tingling, paralysis, incoordination, or focal weakness     OBJECTIVE:                                                    /80  Pulse 69  Temp 98.8  F (37.1  C) (Oral)  Wt 265 lb 11.2 oz (120.5 kg)  SpO2 96%  BMI 45.61 kg/m2     GENERAL alert and no distress  EYES:  Normal sclera,conjunctiva, EOMI  HENT: oral and posterior pharynx without lesions or erythema, facies symmetric  NECK: Neck supple. No LAD, without thyroidmegaly or JVD., Carotids without bruits.  RESP: Scattered mild rhonci in all lung fields, no focal rales, scattered mild end expiraotry wheezes with forced expiration   CV: Irregular rhythm ( consistent with known atrial  fibrillation), regular rate; normal S1S2 without murmurs, rubs or gallops   LYMPH: no cervical lymph adenopathy appreciated  MS: extremities- no gross deformities of the visible extremities noted, no edema  PSYCH: Alert and oriented times 3; speech- coherent  SKIN:  No obvious significant skin lesions on visible portions of face          ASSESSMENT/PLAN:                                                      (J20.9) Bronchitis with bronchospasm  (primary encounter diagnosis)  Comment: Pt appears to be having bronchospasm. Discussed issues of bronchial disease/bronchospasm.    Recommended symptomatic treatment with bronchodilator (albuterol) as needed.  Due to the degree of inflammation present in exam, Prednisone was not indicated.   Antibiotics are possibly indicated based on history and exam findings today.  Continue to treat any congestion as needed with decongestants, any allergic components with nonsedating antihistamine.  If sx. recur or worsen, may need more chronic/regular medication such as Advair or similar.  Patient was instructed to contact us if there is any worsening, changes in symptoms, or no improvement.     Plan: azithromycin (ZITHROMAX) 250 MG tablet,         albuterol (PROAIR HFA) 108 (90 BASE) MCG/ACT         Inhaler            (I48.91,  I48.92) Atrial fibrillation and flutter (H)  Comment:   Plan:        See Patient Instructions    MARIELA KRUEGER M.D., MD  [facility]

## 2018-01-16 NOTE — MR AVS SNAPSHOT
"              After Visit Summary   1/16/2018    Maxine Sears    MRN: [id number]           Patient Information     Date Of Birth          1955        Visit Information        Provider Department      1/16/2018 8:40 AM Danial Haines MD [facility]        Today's Diagnoses     Bronchitis with bronchospasm    -  1    Atrial fibrillation and flutter (H)          Care Instructions    Post infectious Bronchospasm (wheezing):  ===============================================      *  Bronchospasm is irritation of the airways that causes them to spasm and temporarily \"narrow\" which causes coughing (usually minimal sputum production), shortness of breath, dyspnea on exertion, wheezing.  Your airways will be more reactive to things such as temperature changes (too cold, too hot, too humid, etc.), activity, pollutants such as dander, smoke, air pollution, etc.  This will get better with time, but will produce lingering symptoms as described above for a couple weeks to a couple of months.     *  Antibiotics probably indicated     --Azithromycin 2 tablets today, then one tablet per day for 4 days (6 tablets over 5 days)    *  Albuterol inhaler, use 2 puffs, 3-5 times per day as needed for any coughing, wheezing, tightness in the breathing, or shortness of breath.  This inhaled medication helps reduce the inflammation and spasm of the airways which will help the breathing become easier.  This is a similar medication we use to treat asthma, but you do not have asthma.      Consider using this inhaler before any known triggers for our coughing or wheezing (usually these include cold or hot temperatures, humidity, dust, air pollutants, smoke).      *  Expect that your airways may remain more easily irritated for a few weeks after upper respiratory infections.     If you require the albuterol rescue inhaler on a regular basis more than a few times per week, you may need additional " medications to help control the breathing better.    These are the available forms of Albuterol, your insurance formulary will determine which one is preferred.  They all work the same.                 *  Cough suppressant Delsym, follow directions on bottle    *  Mucinex extended release, one or two tablets twice per day for the next 5-7 days.  This helps loosen the secretions to they can be passed more easily out of the body.     *  Be sure to drink lots of fluids.  If the appetite and intake of food is way down, then at leat try to eat soup.  Dehydration is the thing that causes people to end up in the hospital with viral infections and the flu.     *  For sore throat:  Try lozenges (Cepostat, Cepacol, hard candies, Zinc lozenges, etc)    *  If you have thick secretions:  Mucinex extended release twice per day on a regular basis for the next few days, then twice per day as needed.  OK to take the regular Mucinex, you may just have to take it 2-3 times per day.      *  If you have dry nasal passages or frequent bloody noses during the winter time:  Saline nasal spray as often as needed for dry nose.     *  If you have a lot of congestion and/or runny noses:  OK to try decongestants as needed (e.g. pseudoephedrine or phenylephrine).  Be sure to take the lowest dose needed.  Take decongestants from only ONE source.  It is possible to inadvertantly take more than the recommended amounts if you take decongestants from multiple products (i.e. Do NOT take Mucinex-D and Claritin-D together)    *  If you have been told to NOT take decongestants or if you cannot tolerate decongestants due to effect on blood pressure, sleep or heart rate:  Try Coricidin HBP or Chlortrimeton (chlorpheniramine).  These can have a similar effect as some decongestants but will not affect your heart rate or blood pressure.      *  If you have SEVERE nasal congestion:  Affrin nasal spray as needed for severe nasal congestion (especially before  "the airplane trip), but do not use for more than one week.      *  Tylenol as needed for any headaches of low grade temperatures.     *  Ibuprofen as needed for body aches, fevers, headaches    *  Call the clinic if any changes in the symptoms such as worsening fevers, or the amount and quality of the sputum changes, or if you have any major difficulties breathing, or the symptoms fail to clear for a few weeks.    *  Most upper respiratory infection will clear 1-2 weeks, but it is not uncommon for post viral coughs to last for several weeks, even rarely the entire winter.          BRONCHOSPASM (AIRWAY SPASM):               Follow-ups after your visit        Who to contact     If you have questions or need follow up information about today's clinic visit or your schedule please contact [facility] directly at [phone].  Normal or non-critical lab and imaging results will be communicated to you by Narrablehart, letter or phone within 4 business days after the clinic has received the results. If you do not hear from us within 7 days, please contact the clinic through Narrablehart or phone. If you have a critical or abnormal lab result, we will notify you by phone as soon as possible.  Submit refill requests through Smackages or call your pharmacy and they will forward the refill request to us. Please allow 3 business days for your refill to be completed.          Additional Information About Your Visit        Smackages Information     Smackages lets you send messages to your doctor, view your test results, renew your prescriptions, schedule appointments and more. To sign up, go to [email or web address] . Click on \"Log in\" on the left side of the screen, which will take you to the Welcome page. Then click on \"Sign up Now\" on the right side of the page.     You will be asked to enter the access code listed below, as well as some personal information. Please follow the directions to create your username and " password.     Your access code is: [email or web address]  Expires: 2018  9:03 AM     Your access code will  in 90 days. If you need help or a new code, please call your [place] clinic or [phone].        Care EveryWhere ID     This is your Care EveryWhere ID. This could be used by other organizations to access your [place] medical records  [id number]        Your Vitals Were     Pulse Temperature Pulse Oximetry BMI (Body Mass Index)          69 98.8  F (37.1  C) (Oral) 96% 45.61 kg/m2         Blood Pressure from Last 3 Encounters:   18 126/80   17 122/80   17 99/51    Weight from Last 3 Encounters:   18 265 lb 11.2 oz (120.5 kg)   17 270 lb (122.5 kg)   17 255 lb (115.7 kg)              Today, you had the following     No orders found for display         Today's Medication Changes          These changes are accurate as of: 18  9:03 AM.  If you have any questions, ask your nurse or doctor.               Start taking these medicines.        Dose/Directions    albuterol 108 (90 BASE) MCG/ACT Inhaler   Commonly known as:  PROAIR HFA   Used for:  Bronchitis with bronchospasm   Started by:  Danial Haines MD        Dose:  2 puff   Inhale 2 puffs into the lungs every 4 hours as needed for shortness of breath / dyspnea or wheezing   Quantity:  1 Inhaler   Refills:  1       azithromycin 250 MG tablet   Commonly known as:  ZITHROMAX   Used for:  Bronchitis with bronchospasm   Started by:  Danial Haines MD        Two tablets first day, then one tablet daily for four days.   Quantity:  6 tablet   Refills:  0            Where to get your medicines      These medications were sent to [facility] [address] [address]     Phone:  [phone]     albuterol 108 (90 BASE) MCG/ACT Inhaler    azithromycin 250 MG tablet                Primary Care Provider Office Phone # Fax #    Yuval Jama MD  [phone]       [address] [facility] [address]        Goals        General    My goal is to be able to afford my medications: [facility]  (pt-stated)     Notes - Note created  1/19/2015  4:08 PM by Jovanna Abdalla RN    As of today's date 1/19/2015 goal is met at 0 - 25%.   Goal Status:  Active        Equal Access to Services     LOBO [facility] : Hadii aad ku hadasho Soomaali, waaxda luqadaha, qaybta kaalmada adeegyada, waxay idiin hayaan adeeg kharash la'aan . So [facility] [phone].    ATENCIÓN: Si habla espdiana, tiene a roman disposición servicios gratuitos de asistencia lingüística. Llame al [phone].    We comply with applicable federal civil rights laws and [place] laws. We do not discriminate on the basis of race, color, national origin, age, disability, sex, sexual orientation, or gender identity.            Thank you!     Thank you for choosing [facility]  for your care. Our goal is always to provide you with excellent care. Hearing back from our patients is one way we can continue to improve our services. Please take a few minutes to complete the written survey that you may receive in the mail after your visit with us. Thank you!             Your Updated Medication List - Protect others around you: Learn how to safely use, store and throw away your medicines at www.disposemymeds.org.          This list is accurate as of: 1/16/18  9:03 AM.  Always use your most recent med list.                   Brand Name Dispense Instructions for use Diagnosis    albuterol 108 (90 BASE) MCG/ACT Inhaler    PROAIR HFA    1 Inhaler    Inhale 2 puffs into the lungs every 4 hours as needed for shortness of breath / dyspnea or wheezing    Bronchitis with bronchospasm       atenolol 50 MG tablet    TENORMIN    30 tablet    Take 1 tablet (50 mg) by mouth daily    Paroxysmal atrial fibrillation (H)       azithromycin 250 MG tablet    ZITHROMAX     6 tablet    Two tablets first day, then one tablet daily for four days.    Bronchitis with bronchospasm       flecainide acetate 150 MG Tabs     30 tablet    Take 1/2 tab (75 mg) twice daily by mouth    Atrial fibrillation and flutter (H)       FLUoxetine 40 MG capsule    PROzac    90 capsule    Take 1 capsule (40 mg) by mouth daily    Severe episode of recurrent major depressive disorder, without psychotic features (H)       gabapentin 300 MG capsule    NEURONTIN    270 capsule    1 capsule in AM and 2 capsules at bedtime    Bilateral low back pain with bilateral sciatica, unspecified chronicity       * levothyroxine 75 MCG tablet    SYNTHROID/LEVOTHROID    90 tablet    Take 1 tablet (75 mcg) by mouth daily 1 tab daily in addition to Levothyroxine 88mcg daily so total daily dose = 163mcg daily    Hypothyroidism, unspecified type       * levothyroxine 88 MCG tablet    SYNTHROID/LEVOTHROID    30 tablet    TAKE ONE TABLET BY MOUTH EVERY DAY. TAKE IN ADDITION TO 75MCG TABLETS OF LEVOTHYROXINE    Hypothyroidism, unspecified type       losartan 100 MG tablet    COZAAR    30 tablet    Take 0.5 tablets (50 mg) by mouth daily    Essential hypertension       order for DME      DREAMSTATION 5-15 CM/H20 NASAL AIRFIT N10 HER        pantoprazole 40 MG EC tablet    PROTONIX    90 tablet    Take 1 tablet (40 mg) by mouth daily Take 30-60 minutes before a meal.    Gastroesophageal reflux disease, esophagitis presence not specified       rivaroxaban ANTICOAGULANT 20 MG Tabs tablet    XARELTO    30 tablet    Take 1 tablet (20 mg) by mouth daily (with dinner)    Paroxysmal atrial fibrillation (H)       SUMAtriptan 100 MG tablet    IMITREX    10 tablet    Take 1 tablet (100 mg) by mouth at onset of headache for migraine May repeat in 2 hours if needed: max 2/day;    Migraine without aura and without status migrainosus, not intractable       triamterene-hydrochlorothiazide 75-50 MG per tablet    MAXZIDE    30 tablet    1/2 tab daily     Essential hypertension       * Notice:  This list has 2 medication(s) that are the same as other medications prescribed for you. Read the directions carefully, and ask your doctor or other care provider to review them with you.

## 2018-01-16 NOTE — PATIENT INSTRUCTIONS
"Post infectious Bronchospasm (wheezing):  ===============================================      *  Bronchospasm is irritation of the airways that causes them to spasm and temporarily \"narrow\" which causes coughing (usually minimal sputum production), shortness of breath, dyspnea on exertion, wheezing.  Your airways will be more reactive to things such as temperature changes (too cold, too hot, too humid, etc.), activity, pollutants such as dander, smoke, air pollution, etc.  This will get better with time, but will produce lingering symptoms as described above for a couple weeks to a couple of months.     *  Antibiotics probably indicated     --Azithromycin 2 tablets today, then one tablet per day for 4 days (6 tablets over 5 days)    *  Albuterol inhaler, use 2 puffs, 3-5 times per day as needed for any coughing, wheezing, tightness in the breathing, or shortness of breath.  This inhaled medication helps reduce the inflammation and spasm of the airways which will help the breathing become easier.  This is a similar medication we use to treat asthma, but you do not have asthma.      Consider using this inhaler before any known triggers for our coughing or wheezing (usually these include cold or hot temperatures, humidity, dust, air pollutants, smoke).      *  Expect that your airways may remain more easily irritated for a few weeks after upper respiratory infections.     If you require the albuterol rescue inhaler on a regular basis more than a few times per week, you may need additional medications to help control the breathing better.    These are the available forms of Albuterol, your insurance formulary will determine which one is preferred.  They all work the same.                 *  Cough suppressant Delsym, follow directions on bottle    *  Mucinex extended release, one or two tablets twice per day for the next 5-7 days.  This helps loosen the secretions to they can be passed more easily out of the body.     * "  Be sure to drink lots of fluids.  If the appetite and intake of food is way down, then at leat try to eat soup.  Dehydration is the thing that causes people to end up in the hospital with viral infections and the flu.     *  For sore throat:  Try lozenges (Cepostat, Cepacol, hard candies, Zinc lozenges, etc)    *  If you have thick secretions:  Mucinex extended release twice per day on a regular basis for the next few days, then twice per day as needed.  OK to take the regular Mucinex, you may just have to take it 2-3 times per day.      *  If you have dry nasal passages or frequent bloody noses during the winter time:  Saline nasal spray as often as needed for dry nose.     *  If you have a lot of congestion and/or runny noses:  OK to try decongestants as needed (e.g. pseudoephedrine or phenylephrine).  Be sure to take the lowest dose needed.  Take decongestants from only ONE source.  It is possible to inadvertantly take more than the recommended amounts if you take decongestants from multiple products (i.e. Do NOT take Mucinex-D and Claritin-D together)    *  If you have been told to NOT take decongestants or if you cannot tolerate decongestants due to effect on blood pressure, sleep or heart rate:  Try Coricidin HBP or Chlortrimeton (chlorpheniramine).  These can have a similar effect as some decongestants but will not affect your heart rate or blood pressure.      *  If you have SEVERE nasal congestion:  Affrin nasal spray as needed for severe nasal congestion (especially before the airplane trip), but do not use for more than one week.      *  Tylenol as needed for any headaches of low grade temperatures.     *  Ibuprofen as needed for body aches, fevers, headaches    *  Call the clinic if any changes in the symptoms such as worsening fevers, or the amount and quality of the sputum changes, or if you have any major difficulties breathing, or the symptoms fail to clear for a few weeks.    *  Most upper  respiratory infection will clear 1-2 weeks, but it is not uncommon for post viral coughs to last for several weeks, even rarely the entire winter.          BRONCHOSPASM (AIRWAY SPASM):

## 2018-02-07 DIAGNOSIS — I48.92 ATRIAL FIBRILLATION AND FLUTTER (H): ICD-10-CM

## 2018-02-07 DIAGNOSIS — I48.91 ATRIAL FIBRILLATION AND FLUTTER (H): ICD-10-CM

## 2018-02-07 RX ORDER — FLECAINIDE ACETATE 150 MG/1
TABLET ORAL
Qty: 30 TABLET | Refills: 5 | Status: SHIPPED | OUTPATIENT
Start: 2018-02-07 | End: 2018-06-29

## 2018-04-02 DIAGNOSIS — E78.5 HYPERLIPIDEMIA LDL GOAL <130: Primary | ICD-10-CM

## 2018-04-02 DIAGNOSIS — E03.9 HYPOTHYROIDISM, UNSPECIFIED TYPE: ICD-10-CM

## 2018-04-02 DIAGNOSIS — I10 ESSENTIAL HYPERTENSION: ICD-10-CM

## 2018-04-02 NOTE — TELEPHONE ENCOUNTER
"Requested Prescriptions   Pending Prescriptions Disp Refills     levothyroxine (SYNTHROID/LEVOTHROID) 88 MCG tablet [Pharmacy Med Name: LEVOTHYROXINE SODIUM 88MCG TABS] 30 tablet 0      Last Written Prescription Date:  07/25/17  Last Fill Quantity: 30,  # refills: 7   Last office visit: 1/16/2018 with prescribing provider:  01/16/18   Future Office Visit:  0 Sig: TAKE ONE TABLET BY MOUTH EVERY DAY. TAKE IN ADDITION TO 75MCG TABLETS OF LEVOTHYROXINE    Thyroid Protocol Failed    4/2/2018 10:59 AM       Failed - Normal TSH on file in past 12 months    Recent Labs   Lab Test  04/24/17   1535   TSH  0.09*             Passed - Patient is 12 years or older       Passed - Recent (12 mo) or future (30 days) visit within the authorizing provider's specialty    Patient had office visit in the last 12 months or has a visit in the next 30 days with authorizing provider or within the authorizing provider's specialty.  See \"Patient Info\" tab in inbasket, or \"Choose Columns\" in Meds & Orders section of the refill encounter.           Passed - No active pregnancy on record    If patient is pregnant or has had a positive pregnancy test, please check TSH.         Passed - No positive pregnancy test in past 12 months    If patient is pregnant or has had a positive pregnancy test, please check TSH.          "

## 2018-04-03 RX ORDER — LEVOTHYROXINE SODIUM 75 UG/1
75 TABLET ORAL DAILY
Qty: 90 TABLET | Refills: 0 | Status: SHIPPED | OUTPATIENT
Start: 2018-04-03 | End: 2018-06-29

## 2018-04-03 RX ORDER — LEVOTHYROXINE SODIUM 88 UG/1
TABLET ORAL
Qty: 90 TABLET | Refills: 0 | Status: SHIPPED | OUTPATIENT
Start: 2018-04-03 | End: 2018-06-29

## 2018-04-03 NOTE — TELEPHONE ENCOUNTER
Call patient.  Thyroid medications renewed for 90 days.  Patient to have fasting labs done in early to mid May and then see me a few days later to review results and follow-up on blood pressure and other medical issues.

## 2018-05-11 DIAGNOSIS — I10 ESSENTIAL HYPERTENSION: ICD-10-CM

## 2018-05-11 DIAGNOSIS — I48.0 PAROXYSMAL ATRIAL FIBRILLATION (H): ICD-10-CM

## 2018-05-11 NOTE — TELEPHONE ENCOUNTER
"Requested Prescriptions   Pending Prescriptions Disp Refills     atenolol (TENORMIN) 50 MG tablet [Pharmacy Med Name: ATENOLOL 50MG TABS] 30 tablet 1     Sig: TAKE ONE TABLET BY MOUTH EVERY DAY    Beta-Blockers Protocol Passed    5/11/2018  9:07 AM       Passed - Blood pressure under 140/90 in past 12 months    BP Readings from Last 3 Encounters:   01/16/18 126/80   11/21/17 122/80   09/13/17 99/51                Passed - Patient is age 6 or older       Passed - Recent (12 mo) or future (30 days) visit within the authorizing provider's specialty    Patient had office visit in the last 12 months or has a visit in the next 30 days with authorizing provider or within the authorizing provider's specialty.  See \"Patient Info\" tab in inbasket, or \"Choose Columns\" in Meds & Orders section of the refill encounter.            losartan (COZAAR) 100 MG tablet [Pharmacy Med Name: LOSARTAN POTASSIUM 100MG TABS] 30 tablet 2     Sig: TAKE ONE TABLET BY MOUTH EVERY DAY    Angiotensin-II Receptors Failed    5/11/2018  9:07 AM       Failed - Normal serum creatinine on file in past 12 months    Recent Labs   Lab Test  04/24/17   1535   CR  0.78            Failed - Normal serum potassium on file in past 12 months    Recent Labs   Lab Test  04/24/17   1535   POTASSIUM  4.1                   Passed - Blood pressure under 140/90 in past 12 months    BP Readings from Last 3 Encounters:   01/16/18 126/80   11/21/17 122/80   09/13/17 99/51                Passed - Recent (12 mo) or future (30 days) visit within the authorizing provider's specialty    Patient had office visit in the last 12 months or has a visit in the next 30 days with authorizing provider or within the authorizing provider's specialty.  See \"Patient Info\" tab in inbasket, or \"Choose Columns\" in Meds & Orders section of the refill encounter.           Passed - Patient is age 18 or older       Passed - No active pregnancy on record       Passed - No positive pregnancy test in " "past 12 months        triamterene-hydrochlorothiazide (MAXZIDE) 75-50 MG per tablet [Pharmacy Med Name: TRIAMTERENE-HCTZ 75-50MG TABS] 30 tablet 6     Sig: TAKE ONE-HALF TABLET ONCE DAILY    Diuretics (Including Combos) Protocol Failed    5/11/2018  9:07 AM       Failed - Normal serum creatinine on file in past 12 months    Recent Labs   Lab Test  04/24/17   1535   CR  0.78             Failed - Normal serum potassium on file in past 12 months    Recent Labs   Lab Test  04/24/17   1535   POTASSIUM  4.1                   Failed - Normal serum sodium on file in past 12 months    Recent Labs   Lab Test  04/24/17   1535   NA  139             Passed - Blood pressure under 140/90 in past 12 months    BP Readings from Last 3 Encounters:   01/16/18 126/80   11/21/17 122/80   09/13/17 99/51                Passed - Recent (12 mo) or future (30 days) visit within the authorizing provider's specialty    Patient had office visit in the last 12 months or has a visit in the next 30 days with authorizing provider or within the authorizing provider's specialty.  See \"Patient Info\" tab in inbasket, or \"Choose Columns\" in Meds & Orders section of the refill encounter.           Passed - Patient is age 18 or older       Passed - No active pregancy on record       Passed - No positive pregnancy test in past 12 months        XARELTO 20 MG TABS tablet [Pharmacy Med Name: XARELTO 20MG TABS] 30 tablet 2     Sig: TAKE ONE TABLET BY MOUTH EVERY DAY WITH DINNER    Platelet Inhibitors Failed    5/11/2018  9:07 AM       Failed - Normal ALT on file in past 12 months    Recent Labs   Lab Test  04/24/17   1535   ALT  59*            Failed - Normal HGB on file in past 12 months    Recent Labs   Lab Test  07/13/15   1840   HGB  13.3              Failed - Normal AST on file in past 12 months    Recent Labs   Lab Test  04/24/17   1535   AST  40            Failed - Normal Platelets on file in past 12 months    Recent Labs   Lab Test  07/13/15   1840 " "  PLT  353              Failed - Normal serum creatinine on file in past 12 months    Recent Labs   Lab Test  04/24/17   1535   CR  0.78            Passed - Recent (12 mo) or future (30 days) visit within the authorizing provider's specialty    Patient had office visit in the last 12 months or has a visit in the next 30 days with authorizing provider or within the authorizing provider's specialty.  See \"Patient Info\" tab in inbasket, or \"Choose Columns\" in Meds & Orders section of the refill encounter.           Passed - Patient is age 18 or older       Passed - No active pregnancy on record       Passed - No positive pregnancy test in past 12 months        Routing refill request to provider for review/approval because:  Labs not current:  Platelets, creatinine,  Hgb, alt, sodium potassium          "

## 2018-05-16 RX ORDER — LOSARTAN POTASSIUM 100 MG/1
TABLET ORAL
Qty: 90 TABLET | Refills: 3 | Status: SHIPPED | OUTPATIENT
Start: 2018-05-16 | End: 2018-06-29

## 2018-05-16 RX ORDER — TRIAMTERENE AND HYDROCHLOROTHIAZIDE 75; 50 MG/1; MG/1
TABLET ORAL
Qty: 45 TABLET | Refills: 3 | Status: SHIPPED | OUTPATIENT
Start: 2018-05-16 | End: 2018-06-29

## 2018-05-16 RX ORDER — RIVAROXABAN 20 MG/1
TABLET, FILM COATED ORAL
Qty: 90 TABLET | Refills: 3 | Status: SHIPPED | OUTPATIENT
Start: 2018-05-16 | End: 2018-06-29

## 2018-05-16 RX ORDER — ATENOLOL 50 MG/1
TABLET ORAL
Qty: 90 TABLET | Refills: 3 | Status: SHIPPED | OUTPATIENT
Start: 2018-05-16 | End: 2018-06-29

## 2018-05-25 DIAGNOSIS — E03.9 HYPOTHYROIDISM, UNSPECIFIED TYPE: ICD-10-CM

## 2018-05-25 DIAGNOSIS — E78.5 HYPERLIPIDEMIA LDL GOAL <130: ICD-10-CM

## 2018-05-25 DIAGNOSIS — I10 ESSENTIAL HYPERTENSION: ICD-10-CM

## 2018-05-25 LAB
ALBUMIN SERPL-MCNC: 3.7 G/DL (ref 3.4–5)
ALP SERPL-CCNC: 97 U/L (ref 40–150)
ALT SERPL W P-5'-P-CCNC: 20 U/L (ref 0–50)
ANION GAP SERPL CALCULATED.3IONS-SCNC: 7 MMOL/L (ref 3–14)
AST SERPL W P-5'-P-CCNC: 13 U/L (ref 0–45)
BILIRUB SERPL-MCNC: 0.4 MG/DL (ref 0.2–1.3)
BUN SERPL-MCNC: 15 MG/DL (ref 7–30)
CALCIUM SERPL-MCNC: 9.3 MG/DL (ref 8.5–10.1)
CHLORIDE SERPL-SCNC: 106 MMOL/L (ref 94–109)
CHOLEST SERPL-MCNC: 213 MG/DL
CO2 SERPL-SCNC: 26 MMOL/L (ref 20–32)
CREAT SERPL-MCNC: 1.01 MG/DL (ref 0.52–1.04)
ERYTHROCYTE [DISTWIDTH] IN BLOOD BY AUTOMATED COUNT: 15.9 % (ref 10–15)
GFR SERPL CREATININE-BSD FRML MDRD: 55 ML/MIN/1.7M2
GLUCOSE SERPL-MCNC: 115 MG/DL (ref 70–99)
HCT VFR BLD AUTO: 42.4 % (ref 35–47)
HDLC SERPL-MCNC: 51 MG/DL
HGB BLD-MCNC: 13.4 G/DL (ref 11.7–15.7)
LDLC SERPL CALC-MCNC: 103 MG/DL
MCH RBC QN AUTO: 28.3 PG (ref 26.5–33)
MCHC RBC AUTO-ENTMCNC: 31.6 G/DL (ref 31.5–36.5)
MCV RBC AUTO: 90 FL (ref 78–100)
NONHDLC SERPL-MCNC: 162 MG/DL
PLATELET # BLD AUTO: 434 10E9/L (ref 150–450)
POTASSIUM SERPL-SCNC: 4.3 MMOL/L (ref 3.4–5.3)
PROT SERPL-MCNC: 8.1 G/DL (ref 6.8–8.8)
RBC # BLD AUTO: 4.74 10E12/L (ref 3.8–5.2)
SODIUM SERPL-SCNC: 139 MMOL/L (ref 133–144)
T3FREE SERPL-MCNC: 2.2 PG/ML (ref 2.3–4.2)
T4 FREE SERPL-MCNC: 0.96 NG/DL (ref 0.76–1.46)
TRIGL SERPL-MCNC: 297 MG/DL
TSH SERPL DL<=0.005 MIU/L-ACNC: 1.31 MU/L (ref 0.4–4)
WBC # BLD AUTO: 8.2 10E9/L (ref 4–11)

## 2018-05-25 PROCEDURE — 84481 FREE ASSAY (FT-3): CPT | Performed by: INTERNAL MEDICINE

## 2018-05-25 PROCEDURE — 84443 ASSAY THYROID STIM HORMONE: CPT | Performed by: INTERNAL MEDICINE

## 2018-05-25 PROCEDURE — 80061 LIPID PANEL: CPT | Performed by: INTERNAL MEDICINE

## 2018-05-25 PROCEDURE — 84439 ASSAY OF FREE THYROXINE: CPT | Performed by: INTERNAL MEDICINE

## 2018-05-25 PROCEDURE — 85027 COMPLETE CBC AUTOMATED: CPT | Performed by: INTERNAL MEDICINE

## 2018-05-25 PROCEDURE — 80053 COMPREHEN METABOLIC PANEL: CPT | Performed by: INTERNAL MEDICINE

## 2018-05-25 PROCEDURE — 36415 COLL VENOUS BLD VENIPUNCTURE: CPT | Performed by: INTERNAL MEDICINE

## 2018-05-31 ENCOUNTER — OFFICE VISIT (OUTPATIENT)
Dept: INTERNAL MEDICINE | Facility: CLINIC | Age: 63
End: 2018-05-31
Payer: COMMERCIAL

## 2018-05-31 VITALS
BODY MASS INDEX: 45.41 KG/M2 | TEMPERATURE: 99.1 F | HEIGHT: 64 IN | WEIGHT: 266 LBS | RESPIRATION RATE: 16 BRPM | SYSTOLIC BLOOD PRESSURE: 122 MMHG | DIASTOLIC BLOOD PRESSURE: 78 MMHG | HEART RATE: 70 BPM

## 2018-05-31 DIAGNOSIS — E66.01 MORBID OBESITY, UNSPECIFIED OBESITY TYPE (H): ICD-10-CM

## 2018-05-31 DIAGNOSIS — R73.9 BLOOD GLUCOSE ELEVATED: ICD-10-CM

## 2018-05-31 DIAGNOSIS — G47.33 OSA ON CPAP: ICD-10-CM

## 2018-05-31 DIAGNOSIS — F33.2 SEVERE EPISODE OF RECURRENT MAJOR DEPRESSIVE DISORDER, WITHOUT PSYCHOTIC FEATURES (H): Primary | ICD-10-CM

## 2018-05-31 PROCEDURE — 99214 OFFICE O/P EST MOD 30 MIN: CPT | Performed by: INTERNAL MEDICINE

## 2018-05-31 RX ORDER — BUPROPION HYDROCHLORIDE 150 MG/1
150 TABLET ORAL EVERY MORNING
Qty: 30 TABLET | Refills: 11 | Status: SHIPPED | OUTPATIENT
Start: 2018-05-31 | End: 2018-06-29

## 2018-05-31 ASSESSMENT — PAIN SCALES - GENERAL: PAINLEVEL: SEVERE PAIN (6)

## 2018-05-31 NOTE — PROGRESS NOTES
SUBJECTIVE:   Maxine Sears is a 62 year old female who presents to clinic today for the following health issues:  Chief Complaint   Patient presents with     Hyperlipidemia     Hypertension     Hyperlipidemia Follow-Up      Rate your low fat/cholesterol diet?: poor    Taking statin?  No    Other lipid medications/supplements?:  none    Hypertension Follow-up      Outpatient blood pressures are being checked at store.  Results are with in normal ranges.    Low Salt Diet: low salt      Amount of exercise or physical activity: None    Problems taking medications regularly: No    Medication side effects: none    Diet: regular (no restrictions)    Pt's past medical history, family history, habits, medications and allergies were reviewed with the patient today.  See snap shot for  HCM status. Most recent lab results reviewed with pt. Problem list and histories reviewed & adjusted, as indicated.  Additional history as below:    Component      Latest Ref Rng & Units 4/24/2017 5/25/2018   Sodium      133 - 144 mmol/L 139 139   Potassium      3.4 - 5.3 mmol/L 4.1 4.3   Chloride      94 - 109 mmol/L 102 106   Carbon Dioxide      20 - 32 mmol/L 28 26   Anion Gap      3 - 14 mmol/L 9 7   Glucose      70 - 99 mg/dL 94 115 (H)   Urea Nitrogen      7 - 30 mg/dL 10 15   Creatinine      0.52 - 1.04 mg/dL 0.78 1.01   GFR Estimate      >60 mL/min/1.7m2 74 55 (L)   GFR Estimate If Black      >60 mL/min/1.7m2 >90 . . . 67   Calcium      8.5 - 10.1 mg/dL 9.1 9.3   Bilirubin Total      0.2 - 1.3 mg/dL 0.4 0.4   Albumin      3.4 - 5.0 g/dL 3.6 3.7   Protein Total      6.8 - 8.8 g/dL 7.8 8.1   Alkaline Phosphatase      40 - 150 U/L 182 (H) 97   ALT      0 - 50 U/L 59 (H) 20   AST      0 - 45 U/L 40 13   WBC      4.0 - 11.0 10e9/L  8.2   RBC Count      3.8 - 5.2 10e12/L  4.74   Hemoglobin      11.7 - 15.7 g/dL  13.4   Hematocrit      35.0 - 47.0 %  42.4   MCV      78 - 100 fl  90   MCH      26.5 - 33.0 pg  28.3   MCHC      31.5 - 36.5  g/dL  31.6   RDW      10.0 - 15.0 %  15.9 (H)   Platelet Count      150 - 450 10e9/L  434   Cholesterol      <200 mg/dL 222 (H) 213 (H)   Triglycerides      <150 mg/dL 241 (H) 297 (H)   HDL Cholesterol      >49 mg/dL 56 51   LDL Cholesterol Calculated      <100 mg/dL 118 (H) 103 (H)   Non HDL Cholesterol      <130 mg/dL 166 (H) 162 (H)   TSH      0.40 - 4.00 mU/L 0.09 (L) 1.31   T4 Free      0.76 - 1.46 ng/dL 1.07 0.96   Free T3      2.3 - 4.2 pg/mL 2.6        Weight down 4 pounds. Denies CP, SOB, abdominal pain, polyuria, polydipsia, vision changes, extremity numbness/parasthesias or skin problems.  Not using CPAP for obstructive sleep apnea  for quite awhile  Pain in low back with prolonged standing. No  radiation to LEs.  Not doing PT exercises that had been taught in the past   Asking to have  Paperwork done for dog that pt has  in appt. Using for emotional support      No identified additional risks  The 10-year ASCVD risk score (George FAINA Jr, et al., 2013) is: 5.4%    Values used to calculate the score:      Age: 62 years      Sex: Female      Is Non- : No      Diabetic: No      Tobacco smoker: No      Systolic Blood Pressure: 122 mmHg      Is BP treated: Yes      HDL Cholesterol: 51 mg/dL      Total Cholesterol: 213 mg/dL    PHQ-9 ([employer]) 5/26/2017   1.  Little interest or pleasure in doing things 2   2.  Feeling down, depressed, or hopeless 1   3.  Trouble falling or staying asleep, or sleeping too much 3   4.  Feeling tired or having little energy 2   5.  Poor appetite or overeating 2   6.  Feeling bad about yourself 2   7.  Trouble concentrating 1   8.  Moving slowly or restless 1   9.  Suicidal or self-harm thoughts 0   PHQ-9 Total Score 14   Difficulty at work, home, or with people Somewhat difficult     PHQ-9 ([employer]) 5/31/2018   1.  Little interest or pleasure in doing things 3   2.  Feeling down, depressed, or hopeless 3   3.  Trouble falling or staying asleep, or sleeping too  "much 0   4.  Feeling tired or having little energy 3   5.  Poor appetite or overeating 2   6.  Feeling bad about yourself 0   7.  Trouble concentrating 1   8.  Moving slowly or restless 0   9.  Suicidal or self-harm thoughts 0   PHQ-9 Total Score 12   Difficulty at work, home, or with people Not difficult at all        Additional ROS:   Constitutional, HEENT, Cardiovascular, Pulmonary, GI and , Neuro, MSK and Psych review of systems/symptoms are otherwise negative or unchanged from previous, except as noted above.      OBJECTIVE:  /78  Pulse 70  Temp 99.1  F (37.3  C) (Oral)  Resp 16  Ht 5' 4\" (1.626 m)  Wt 266 lb (120.7 kg)  BMI 45.66 kg/m2   Estimated body mass index is 45.66 kg/(m^2) as calculated from the following:    Height as of this encounter: 5' 4\" (1.626 m).    Weight as of this encounter: 266 lb (120.7 kg).  Eye: PERRL, EOMI  HENT: ear canals and TM's normal and nose and mouth without ulcers or lesions. Narrowed airway due to obesity  Neck: no adenopathy. Thyroid normal to palpation. No bruits  Pulm: Lungs clear to auscultation   CV: Regular rates and rhythm  GI: Soft, obese, nontender, Normal active bowel sounds, No hepatosplenomegaly or masses palpable  Ext: Peripheral pulses intact. No edema.  Neuro: Normal strength and tone, sensory exam grossly normal  Gen: Flattened affect. Normal dress and eye contact/thought content  Back: Tenderness to palpation bilateral paralumbar area. Neg SLRT bilaterally.      Assessment/Plan: (See plan discussion below for further details)  1. Severe episode of recurrent major depressive disorder, without psychotic features (H)  See plan discussion below for med change to help mood and also potentially weight  - DEPRESSION ACTION PLAN (DAP)  - FLUoxetine (PROZAC) 20 MG capsule; Take 1 capsule (20 mg) by mouth daily  Dispense: 30 capsule; Refill: 11  - buPROPion (WELLBUTRIN XL) 150 MG 24 hr tablet; Take 1 tablet (150 mg) by mouth every morning  Dispense: 30 " tablet; Refill: 11    2. Morbid obesity, unspecified obesity type (H)  See below for counseling. Also  Will reduce SSRI dose to help with weight    3. NELY on CPAP   Educated pt re: need to use CPAP every night and effect NELY has on mood, energy, risk stroke/MI, mood, etc. Pt will restart use    4. Blood glucose elevated  See counseling below. Labs 3 mos  - Hemoglobin A1c; Future  - Glucose; Future    Plan discussion:  Restart use of CPAP EVERY night  See me in 4 weeks for follow-up of energy/mood.  Calorie/carbohydrate (sugar, bread, potato, pasta, etc) reduction in diet for weight loss.  Increase color on your plate with fruits and vegetables. Increase  frequency of walking or other aerobic exercise as able (goal is daily). Goal [id number] karen diet   Restart Physical therapy exercises for back   Heat in AM if back stiff  Reduce Fluoxetine to 20mg capsule, 1 capsule daily for depression   Buproprion XL 150mg tab, 1 tab daily in AM for depression  Continue other medications  Will await fax re: emotional support animal forms  Calorie/carbohydrate (sugar, bread, potato, pasta, etc) reduction in diet for weight loss.  Increase color on your plate with fruits and vegetables. Increase  frequency of walking or other aerobic exercise as able (goal is daily)  Fasting labs in September for blood sugar and A1C                          Yuval Jama MD  Internal Medicine Department  [facility]

## 2018-05-31 NOTE — PATIENT INSTRUCTIONS
Restart use of CPAP EVERY night  See me in 4 weeks for follow-up of energy/mood.  Calorie/carbohydrate (sugar, bread, potato, pasta, etc) reduction in diet for weight loss.  Increase color on your plate with fruits and vegetables. Increase  frequency of walking or other aerobic exercise as able (goal is daily). Goal [id number] karen diet   Restart Physical therapy exercises for back   Heat in AM if back stiff  Reduce Fluoxetine to 20mg capsule, 1 capsule daily for depression   Buproprion XL 150mg tab, 1 tab daily in AM for depression  Continue other medications  Will await fax re: emotional support animal forms

## 2018-05-31 NOTE — MR AVS SNAPSHOT
After Visit Summary   5/31/2018    Maxine Sears    MRN: [id number]           Patient Information     Date Of Birth          1955        Visit Information        Provider Department      5/31/2018 10:30 AM Yuval Jama MD [facility]        Today's Diagnoses     Severe episode of recurrent major depressive disorder, without psychotic features (H)    -  1      Care Instructions    Restart use of CPAP EVERY night  See me in 4 weeks for follow-up of energy/mood.  Calorie/carbohydrate (sugar, bread, potato, pasta, etc) reduction in diet for weight loss.  Increase color on your plate with fruits and vegetables. Increase  frequency of walking or other aerobic exercise as able (goal is daily). Goal [id number] karen diet   Restart Physical therapy exercises for back   Heat in AM if back stiff  Reduce Fluoxetine to 20mg capsule, 1 capsule daily for depression   Buproprion XL 150mg tab, 1 tab daily in AM for depression  Continue other medications  Will await fax re: emotional support animal forms            Follow-ups after your visit        Who to contact     If you have questions or need follow up information about today's clinic visit or your schedule please contact [facility] directly at [phone].  Normal or non-critical lab and imaging results will be communicated to you by MyChart, letter or phone within 4 business days after the clinic has received the results. If you do not hear from us within 7 days, please contact the clinic through [employer] or phone. If you have a critical or abnormal lab result, we will notify you by phone as soon as possible.  Submit refill requests through [email or web address] or call your pharmacy and they will forward the refill request to us. Please allow 3 business days for your refill to be completed.          Additional Information About Your Visit        [email or web address] Information     [email or web address] lets you send messages to your doctor, view  "your test results, renew your prescriptions, schedule appointments and more. To sign up, go to [email or web address] . Click on \"Log in\" on the left side of the screen, which will take you to the Welcome page. Then click on \"Sign up Now\" on the right side of the page.     You will be asked to enter the access code listed below, as well as some personal information. Please follow the directions to create your username and password.     Your access code is: [email or web address]  Expires: 2018 10:22 AM     Your access code will  in 90 days. If you need help or a new code, please call your [place] clinic or [phone].        Care EveryWhere ID     This is your Care EveryWhere ID. This could be used by other organizations to access your [place] medical records  [id number]        Your Vitals Were     Pulse Temperature Respirations Height BMI (Body Mass Index)       70 99.1  F (37.3  C) (Oral) 16 5' 4\" (1.626 m) 45.66 kg/m2        Blood Pressure from Last 3 Encounters:   18 122/78   18 126/80   17 122/80    Weight from Last 3 Encounters:   18 266 lb (120.7 kg)   18 265 lb 11.2 oz (120.5 kg)   17 270 lb (122.5 kg)              We Performed the Following     DEPRESSION ACTION PLAN (DAP)          Today's Medication Changes          These changes are accurate as of 18 11:08 AM.  If you have any questions, ask your nurse or doctor.               Start taking these medicines.        Dose/Directions    buPROPion 150 MG 24 hr tablet   Commonly known as:  WELLBUTRIN XL   Used for:  Severe episode of recurrent major depressive disorder, without psychotic features (H)   Started by:  Yuval Jama MD        Dose:  150 mg   Take 1 tablet (150 mg) by mouth every morning   Quantity:  30 tablet   Refills:  11         These medicines have changed or have updated prescriptions.        Dose/Directions    FLUoxetine 20 MG capsule   Commonly known as:  PROzac   This may have changed:    - " medication strength  - how much to take   Used for:  Severe episode of recurrent major depressive disorder, without psychotic features (H)   Changed by:  Yuval Jama MD        Dose:  20 mg   Take 1 capsule (20 mg) by mouth daily   Quantity:  30 capsule   Refills:  11            Where to get your medicines      These medications were sent to [facility] [place], [place] - [address]  [address], [facility] [address]     Phone:  [phone]     buPROPion 150 MG 24 hr tablet    FLUoxetine 20 MG capsule                Primary Care Provider Office Phone # Fax #    Yuval Jama [phone]       [address]  [facility] [address]        Goals        General    My goal is to be able to afford my medications: I will [facility] the [facility]  (pt-stated)     Notes - Note created  1/19/2015  4:08 PM by Jovanna Abdalla RN    As of today's date 1/19/2015 goal is met at 0 - 25%.   Goal Status:  Active        Equal Access to Services     [facility]: Hadii aad ku hadasho Soomaali, waaxda luqadaha, qaybta kaalmada adeegyada, waxay idiin hayidalia kendrick . So [facility] [phone].    ATENCIÓN: Si habla español, tiene a roman disposición servicios gratuitos de asistencia lingüística. Llame al [phone].    We comply with applicable federal civil rights laws and [place] laws. We do not discriminate on the basis of race, color, national origin, age, disability, sex, sexual orientation, or gender identity.            Thank you!     Thank you for choosing [facility]  for your care. Our goal is always to provide you with excellent care. Hearing back from our patients is one way we can continue to improve our services. Please take a few minutes to complete the written survey that you may receive in the mail after your visit with us. Thank you!             Your Updated Medication List - Protect others around you: Learn how to safely  use, store and throw away your medicines at www.disposemymeds.org.          This list is accurate as of 5/31/18 11:08 AM.  Always use your most recent med list.                   Brand Name Dispense Instructions for use Diagnosis    albuterol 108 (90 Base) MCG/ACT Inhaler    PROAIR HFA    1 Inhaler    Inhale 2 puffs into the lungs every 4 hours as needed for shortness of breath / dyspnea or wheezing    Bronchitis with bronchospasm       atenolol 50 MG tablet    TENORMIN    90 tablet    TAKE ONE TABLET BY MOUTH EVERY DAY    Paroxysmal atrial fibrillation (H)       buPROPion 150 MG 24 hr tablet    WELLBUTRIN XL    30 tablet    Take 1 tablet (150 mg) by mouth every morning    Severe episode of recurrent major depressive disorder, without psychotic features (H)       flecainide acetate 150 MG Tabs     30 tablet    Take 1/2 tab (75 mg) twice daily by mouth    Atrial fibrillation and flutter (H)       FLUoxetine 20 MG capsule    PROzac    30 capsule    Take 1 capsule (20 mg) by mouth daily    Severe episode of recurrent major depressive disorder, without psychotic features (H)       gabapentin 300 MG capsule    NEURONTIN    270 capsule    1 capsule in AM and 2 capsules at bedtime    Bilateral low back pain with bilateral sciatica, unspecified chronicity       * levothyroxine 88 MCG tablet    SYNTHROID/LEVOTHROID    90 tablet    TAKE ONE TABLET BY MOUTH EVERY DAY. TAKE IN ADDITION TO 75MCG TABLETS OF LEVOTHYROXINE    Hypothyroidism, unspecified type       * levothyroxine 75 MCG tablet    SYNTHROID/LEVOTHROID    90 tablet    Take 1 tablet (75 mcg) by mouth daily 1 tab daily in addition to Levothyroxine 88mcg daily so total daily dose = 163mcg daily    Hypothyroidism, unspecified type       losartan 100 MG tablet    COZAAR    90 tablet    TAKE ONE TABLET BY MOUTH EVERY DAY    Essential hypertension       order for DME      DREAMSTATION 5-15 CM/H20 NASAL AIRFIT N10 HER        pantoprazole 40 MG EC tablet    PROTONIX    90  tablet    Take 1 tablet (40 mg) by mouth daily Take 30-60 minutes before a meal.    Gastroesophageal reflux disease, esophagitis presence not specified       SUMAtriptan 100 MG tablet    IMITREX    10 tablet    Take 1 tablet (100 mg) by mouth at onset of headache for migraine May repeat in 2 hours if needed: max 2/day;    Migraine without aura and without status migrainosus, not intractable       triamterene-hydrochlorothiazide 75-50 MG per tablet    MAXZIDE    45 tablet    TAKE ONE-HALF TABLET ONCE DAILY    Essential hypertension       XARELTO 20 MG Tabs tablet   Generic drug:  rivaroxaban ANTICOAGULANT     90 tablet    TAKE ONE TABLET BY MOUTH EVERY DAY WITH DINNER    Paroxysmal atrial fibrillation (H)       * Notice:  This list has 2 medication(s) that are the same as other medications prescribed for you. Read the directions carefully, and ask your doctor or other care provider to review them with you.

## 2018-05-31 NOTE — LETTER
My Depression Action Plan  Name: Maxine Sears   Date of Birth 1955  Date: 5/31/2018    My doctor: Yuval Jama   My clinic: [address] [id number] [phone]          GREEN    ZONE   Good Control    What it looks like:     Things are going generally well. You have normal up s and down s. You may even feel depressed from time to time, but bad moods usually last less than a day.   What you need to do:  1. Continue to care for yourself (see self care plan)  2. Check your depression survival kit and update it as needed  3. Follow your physician s recommendations including any medication.  4. Do not stop taking medication unless you consult with your physician first.           YELLOW         ZONE Getting Worse    What it looks like:     Depression is starting to interfere with your life.     It may be hard to get out of bed; you may be starting to isolate yourself from others.    Symptoms of depression are starting to last most all day and this has happened for several days.     You may have suicidal thoughts but they are not constant.   What you need to do:     1. Call your care team, your response to treatment will improve if you keep your care team informed of your progress. Yellow periods are signs an adjustment may need to be made.     2. Continue your self-care, even if you have to fake it!    3. Talk to someone in your support network    4. Open up your depression survival kit           RED    ZONE Medical Alert - Get Help    What it looks like:     Depression is seriously interfering with your life.     You may experience these or other symptoms: You can t get out of bed most days, can t work or engage in other necessary activities, you have trouble taking care of basic hygiene, or basic responsibilities, thoughts of suicide or death that will not go away, self-injurious behavior.     What you need to do:  1. Call your care  team and request a same-day appointment. If they are not available (weekends or after hours) call your local crisis line, emergency room or 911.            Depression Self Care Plan / Survival Kit    Self-Care for Depression  Here s the deal. Your body and mind are really not as separate as most people think.  What you do and think affects how you feel and how you feel influences what you do and think. This means if you do things that people who feel good do, it will help you feel better.  Sometimes this is all it takes.  There is also a place for medication and therapy depending on how severe your depression is, so be sure to consult with your medical provider and/ or Behavioral Health Consultant if your symptoms are worsening or not improving.     In order to better manage my stress, I will:    Exercise  Get some form of exercise, every day. This will help reduce pain and release endorphins, the  feel good  chemicals in your brain. This is almost as good as taking antidepressants!  This is not the same as joining a gym and then never going! (they count on that by the way ) It can be as simple as just going for a walk or doing some gardening, anything that will get you moving.      Hygiene   Maintain good hygiene (Get out of bed in the morning, Make your bed, Brush your teeth, Take a shower, and Get dressed like you were going to work, even if you are unemployed).  If your clothes don't fit try to get ones that do.    Diet  I will strive to eat foods that are good for me, drink plenty of water, and avoid excessive sugar, caffeine, alcohol, and other mood-altering substances.  Some foods that are helpful in depression are: complex carbohydrates, B vitamins, flaxseed, fish or fish oil, fresh fruits and vegetables.    Psychotherapy  I agree to participate in Individual Therapy (if recommended).    Medication  If prescribed medications, I agree to take them.  Missing doses can result in serious side effects.  I  understand that drinking alcohol, or other illicit drug use, may cause potential side effects.  I will not stop my medication abruptly without first discussing it with my provider.    Staying Connected With Others  I will stay in touch with my friends, family members, and my primary care provider/team.    Use your imagination  Be creative.  We all have a creative side; it doesn t matter if it s oil painting, sand castles, or mud pies! This will also kick up the endorphins.    Witness Beauty  (AKA stop and smell the roses) Take a look outside, even in mid-winter. Notice colors, textures. Watch the squirrels and birds.     Service to others  Be of service to others.  There is always someone else in need.  By helping others we can  get out of ourselves  and remember the really important things.  This also provides opportunities for practicing all the other parts of the program.    Humor  Laugh and be silly!  Adjust your TV habits for less news and crime-drama and more comedy.    Control your stress  Try breathing deep, massage therapy, biofeedback, and meditation. Find time to relax each day.     My support system    Clinic Contact:  Phone number:    Contact 1:  Phone number:    Contact 2:  Phone number:    Gnosticism/:  Phone number:    Therapist:  Phone number:    Local crisis center:    Phone number:    Other community support:  Phone number:

## 2018-06-01 ENCOUNTER — TELEPHONE (OUTPATIENT)
Dept: INTERNAL MEDICINE | Facility: CLINIC | Age: 63
End: 2018-06-01

## 2018-06-01 ASSESSMENT — PATIENT HEALTH QUESTIONNAIRE - PHQ9: SUM OF ALL RESPONSES TO PHQ QUESTIONS 1-9: 12

## 2018-06-01 NOTE — TELEPHONE ENCOUNTER
Reason for call:  Form   Our goal is to have forms completed within 72 hours, however some forms may require a visit or additional information.     Who is the form from? [employer]   Where did the form come from? form was faxed in  What clinic location was the form placed at? [facility]  Where was the form placed? Pcp's Folder  What number is listed as a contact on the form? [phone]    Phone call message - patient request for a letter, form or note:     Date needed: as soon as possible  Please fax to [employer] Attn: Yajaira Santos @ [phone]  Has the patient signed a consent form for release of information? YES    Additional comment:    Type of letter, form or note:  Request for Service Animal    Phone number to reach patient:  Cell number on file:    Telephone Information:   Mobile [phone]       Best Time:      Can we leave a detailed message on this number?  YES

## 2018-06-15 DIAGNOSIS — M54.42 BILATERAL LOW BACK PAIN WITH BILATERAL SCIATICA, UNSPECIFIED CHRONICITY: ICD-10-CM

## 2018-06-15 DIAGNOSIS — M54.41 BILATERAL LOW BACK PAIN WITH BILATERAL SCIATICA, UNSPECIFIED CHRONICITY: ICD-10-CM

## 2018-06-15 RX ORDER — GABAPENTIN 300 MG/1
CAPSULE ORAL
Qty: 270 CAPSULE | Refills: 3 | Status: SHIPPED | OUTPATIENT
Start: 2018-06-15 | End: 2018-06-29

## 2018-06-15 NOTE — TELEPHONE ENCOUNTER
Requested Prescriptions   Pending Prescriptions Disp Refills     gabapentin (NEURONTIN) 300 MG capsule [Pharmacy Med Name: GABAPENTIN 300MG CAPS] 270 capsule 0     Sig: TAKE ONE CAPSULE BY MOUTH EVERY MORNING AND TAKE TWO CAPSULES BY MOUTH EVERY NIGHT AT BEDTIME    There is no refill protocol information for this order      Last Written Prescription Date:  05/26/17  Last Fill Quantity: 270,  # refills: 3   Last office visit: 5/31/2018 with prescribing provider:  05/31/18   Future Office Visit: 06/29/18  Next 5 appointments (look out 90 days)     Jun 29, 2018 11:30 AM CDT   Office Visit with Yuval Jama MD   [facility]    [address] [address] [phone]

## 2018-06-29 ENCOUNTER — OFFICE VISIT (OUTPATIENT)
Dept: INTERNAL MEDICINE | Facility: CLINIC | Age: 63
End: 2018-06-29
Payer: COMMERCIAL

## 2018-06-29 VITALS
TEMPERATURE: 98.7 F | HEART RATE: 60 BPM | SYSTOLIC BLOOD PRESSURE: 126 MMHG | RESPIRATION RATE: 16 BRPM | DIASTOLIC BLOOD PRESSURE: 84 MMHG | OXYGEN SATURATION: 97 % | WEIGHT: 268 LBS | BODY MASS INDEX: 46 KG/M2

## 2018-06-29 DIAGNOSIS — I48.0 PAROXYSMAL ATRIAL FIBRILLATION (H): ICD-10-CM

## 2018-06-29 DIAGNOSIS — G43.009 MIGRAINE WITHOUT AURA AND WITHOUT STATUS MIGRAINOSUS, NOT INTRACTABLE: ICD-10-CM

## 2018-06-29 DIAGNOSIS — M54.42 BILATERAL LOW BACK PAIN WITH BILATERAL SCIATICA, UNSPECIFIED CHRONICITY: ICD-10-CM

## 2018-06-29 DIAGNOSIS — E03.9 HYPOTHYROIDISM, UNSPECIFIED TYPE: ICD-10-CM

## 2018-06-29 DIAGNOSIS — I10 ESSENTIAL HYPERTENSION: ICD-10-CM

## 2018-06-29 DIAGNOSIS — F33.2 SEVERE EPISODE OF RECURRENT MAJOR DEPRESSIVE DISORDER, WITHOUT PSYCHOTIC FEATURES (H): ICD-10-CM

## 2018-06-29 DIAGNOSIS — K21.9 GASTROESOPHAGEAL REFLUX DISEASE, ESOPHAGITIS PRESENCE NOT SPECIFIED: ICD-10-CM

## 2018-06-29 DIAGNOSIS — M54.41 BILATERAL LOW BACK PAIN WITH BILATERAL SCIATICA, UNSPECIFIED CHRONICITY: ICD-10-CM

## 2018-06-29 PROCEDURE — 99214 OFFICE O/P EST MOD 30 MIN: CPT | Performed by: INTERNAL MEDICINE

## 2018-06-29 RX ORDER — ATENOLOL 50 MG/1
50 TABLET ORAL DAILY
Qty: 90 TABLET | Refills: 3 | Status: SHIPPED | OUTPATIENT
Start: 2018-06-29 | End: 2019-04-30

## 2018-06-29 RX ORDER — BUPROPION HYDROCHLORIDE 150 MG/1
150 TABLET ORAL EVERY MORNING
Qty: 90 TABLET | Refills: 3 | Status: SHIPPED | OUTPATIENT
Start: 2018-06-29 | End: 2018-11-12

## 2018-06-29 RX ORDER — LEVOTHYROXINE SODIUM 88 UG/1
TABLET ORAL
Qty: 90 TABLET | Refills: 3 | Status: SHIPPED | OUTPATIENT
Start: 2018-06-29 | End: 2019-06-24

## 2018-06-29 RX ORDER — GABAPENTIN 300 MG/1
CAPSULE ORAL
Qty: 270 CAPSULE | Refills: 3 | Status: SHIPPED | OUTPATIENT
Start: 2018-06-29 | End: 2019-03-02

## 2018-06-29 RX ORDER — PANTOPRAZOLE SODIUM 40 MG/1
40 TABLET, DELAYED RELEASE ORAL DAILY
Qty: 90 TABLET | Refills: 3 | Status: ON HOLD | OUTPATIENT
Start: 2018-06-29 | End: 2019-03-05

## 2018-06-29 RX ORDER — FLECAINIDE ACETATE 150 MG/1
TABLET ORAL
Qty: 90 TABLET | Refills: 3 | Status: SHIPPED | OUTPATIENT
Start: 2018-06-29 | End: 2019-06-24

## 2018-06-29 RX ORDER — SUMATRIPTAN 100 MG/1
100 TABLET, FILM COATED ORAL
Qty: 10 TABLET | Refills: 11 | Status: SHIPPED | OUTPATIENT
Start: 2018-06-29 | End: 2024-10-02

## 2018-06-29 RX ORDER — LOSARTAN POTASSIUM 100 MG/1
100 TABLET ORAL DAILY
Qty: 90 TABLET | Refills: 3 | Status: ON HOLD | OUTPATIENT
Start: 2018-06-29 | End: 2019-03-05

## 2018-06-29 RX ORDER — TRIAMTERENE AND HYDROCHLOROTHIAZIDE 75; 50 MG/1; MG/1
TABLET ORAL
Qty: 45 TABLET | Refills: 3 | Status: ON HOLD | OUTPATIENT
Start: 2018-06-29 | End: 2019-03-05

## 2018-06-29 RX ORDER — LEVOTHYROXINE SODIUM 75 UG/1
TABLET ORAL
Qty: 90 TABLET | Refills: 3 | Status: SHIPPED | OUTPATIENT
Start: 2018-06-29 | End: 2019-06-24

## 2018-06-29 NOTE — PROGRESS NOTES
"  SUBJECTIVE:   Maxine Sears is a 62 year old female who presents to clinic today for the following health issues:    Chief Complaint   Patient presents with     Depression       Depression Followup    Status since last visit: Improved     See PHQ-9 for current symptoms.  Other associated symptoms: None    Complicating factors:   Significant life event:  No   Current substance abuse:  None  Anxiety or Panic symptoms:  No    PHQ-9 5/26/2017 5/31/2018 6/29/2018   Total Score 14 12 2   Q9: Suicide Ideation Not at all Not at all Not at all           Amount of exercise or physical activity: None    Problems taking medications regularly: No    Medication side effects: none    Diet: regular (no restrictions)    Pt's past medical history, family history, habits, medications and allergies were reviewed with the patient today.  See snap shot for  HCM status. Most recent lab results reviewed with pt. Problem list and histories reviewed & adjusted, as indicated.  Additional history as below:    Patient's mood is significantly better since starting bupropion 150 mg daily.  PHQ score now is 2.  Patient started using her CPAP for sleep apnea and energy is better.  Denies headaches now.  Patient would [facility]  as they do to- door delivery since patient does not have a car.  Other medical issues related to these medications stable       Additional ROS:   Constitutional, HEENT, Cardiovascular, Pulmonary, GI and , Neuro, MSK and Psych review of systems/symptoms are otherwise negative or unchanged from previous, except as noted above.      OBJECTIVE:  /84  Pulse 60  Temp 98.7  F (37.1  C) (Oral)  Resp 16  Wt 268 lb (121.6 kg)  SpO2 97%  BMI 46 kg/m2   Estimated body mass index is 46 kg/(m^2) as calculated from the following:    Height as of 5/31/18: 5' 4\" (1.626 m).    Weight as of this encounter: 268 lb (121.6 kg).    Pulm: Lungs clear to auscultation   CV: Regular rates " and rhythm  GI: Soft, obese, nontender, Normal active bowel sounds, No hepatosplenomegaly or masses palpable  Ext: Peripheral pulses intact. No edema.  Gen: Bright affect. Smiling    Assessment/Plan: (See plan discussion below for further details)  1. Severe episode of recurrent major depressive disorder, without psychotic features (H)  Improved.  Continue current medication  - buPROPion (WELLBUTRIN XL) 150 MG 24 hr tablet; Take 1 tablet (150 mg) by mouth every morning  Dispense: 90 tablet; Refill: 3  - FLUoxetine (PROZAC) 20 MG capsule; Take 1 capsule (20 mg) by mouth daily  Dispense: 90 capsule; Refill: 3    2. Hypothyroidism, unspecified type  TSH at goal.  Continue current medication  - levothyroxine (SYNTHROID/LEVOTHROID) 88 MCG tablet; 1 tab daily in addition to Levothyroxine 75mcg daily so total daily dose = 163mcg daily  Dispense: 90 tablet; Refill: 3  - levothyroxine (SYNTHROID/LEVOTHROID) 75 MCG tablet; 1 tab daily in addition to Levothyroxine 88mcg daily so total daily dose = 163mcg daily  Dispense: 90 tablet; Refill: 3    3. Paroxysmal atrial fibrillation (H)  No recent palpitations.  Continue anticoagulation and rate control  - rivaroxaban ANTICOAGULANT (XARELTO) 20 MG TABS tablet; Take 1 tablet (20 mg) by mouth daily (with dinner)  Dispense: 90 tablet; Refill: 3  - atenolol (TENORMIN) 50 MG tablet; Take 1 tablet (50 mg) by mouth daily  Dispense: 90 tablet; Refill: 3  - flecainide acetate 150 MG TABS; Take 1/2 tab (75 mg) twice daily by mouth  Dispense: 90 tablet; Refill: 3    4. Essential hypertension  Controlled  - triamterene-hydrochlorothiazide (MAXZIDE) 75-50 MG per tablet; TAKE ONE-HALF TABLET ONCE DAILY  Dispense: 45 tablet; Refill: 3  - losartan (COZAAR) 100 MG tablet; Take 1 tablet (100 mg) by mouth daily  Dispense: 90 tablet; Refill: 3    5. Migraine without aura and without status migrainosus, not intractable  Symptoms controlled.  Rare use of Imitrex  - SUMAtriptan (IMITREX) 100 MG tablet;  Take 1 tablet (100 mg) by mouth at onset of headache for migraine May repeat in 2 hours if needed: max 2/day;  Dispense: 10 tablet; Refill: 11    6. Gastroesophageal reflux disease, esophagitis presence not specified  Controlled  - pantoprazole (PROTONIX) 40 MG EC tablet; Take 1 tablet (40 mg) by mouth daily Take 30-60 minutes before a meal.  Dispense: 90 tablet; Refill: 3    7. Bilateral low back pain with bilateral sciatica, unspecified chronicity  Stable symptom control medication  - gabapentin (NEURONTIN) 300 MG capsule; TAKE ONE CAPSULE BY MOUTH EVERY MORNING AND TAKE TWO CAPSULES BY MOUTH EVERY NIGHT AT BEDTIME  Dispense: 270 capsule; Refill: 3      Plan discussion:  Continue current meds  Prescriptions refilled.    Follow up in 6 months. Earlier as needed  Calorie/carbohydrate (sugar, bread, potato, pasta, etc) reduction in diet for weight loss.  Increase color on your plate with fruits and vegetables. Increase  frequency of walking or other aerobic exercise as able (goal is daily)       Yuval Jama MD  Internal Medicine Department  [facility]

## 2018-06-29 NOTE — MR AVS SNAPSHOT
After Visit Summary   6/29/2018    Maxine Sears    MRN: [id number]           Patient Information     Date Of Birth          1955        Visit Information        Provider Department      6/29/2018 11:30 AM Yuval Jama MD [facility]        Today's Diagnoses     Severe episode of recurrent major depressive disorder, without psychotic features (H)        Hypothyroidism, unspecified type        Paroxysmal atrial fibrillation (H)        Essential hypertension        Migraine without aura and without status migrainosus, not intractable        Gastroesophageal reflux disease, esophagitis presence not specified        Bilateral low back pain with bilateral sciatica, unspecified chronicity           Follow-ups after your visit        Your next 10 appointments already scheduled     Aug 10, 2018  3:15 PM CDT   [facility] EP RETURN with Constantin Vaughn MD   [facility] ([facility])    [address] [place] [id number] [phone] OPT 2              Who to contact     If you have questions or need follow up information about today's clinic visit or your schedule please contact [facility] directly at [phone].  Normal or non-critical lab and imaging results will be communicated to you by [employer], letter or phone within 4 business days after the clinic has received the results. If you do not hear from us within 7 days, please contact the clinic through [employer] or phone. If you have a critical or abnormal lab result, we will notify you by phone as soon as possible.  Submit refill requests through [employer] or call your pharmacy and they will forward the refill request to us. Please allow 3 business days for your refill to be completed.          Additional Information About Your Visit        [employer] Information     [employer] lets you send messages to your doctor, view your test results, renew  "your prescriptions, schedule appointments and more. To sign up, go to [email or web address] . Click on \"Log in\" on the left side of the screen, which will take you to the Welcome page. Then click on \"Sign up Now\" on the right side of the page.     You will be asked to enter the access code listed below, as well as some personal information. Please follow the directions to create your username and password.     Your access code is: [email or web address]  Expires: 2018 10:22 AM     Your access code will  in 90 days. If you need help or a new code, please call your [facility] clinic or [phone].        Care EveryWhere ID     This is your Care EveryWhere ID. This could be used by other organizations to access your [facility] medical records  [id number]        Your Vitals Were     Pulse Temperature Respirations Pulse Oximetry BMI (Body Mass Index)       60 98.7  F (37.1  C) (Oral) 16 97% 46 kg/m2        Blood Pressure from Last 3 Encounters:   18 126/84   18 122/78   18 126/80    Weight from Last 3 Encounters:   18 268 lb (121.6 kg)   18 266 lb (120.7 kg)   18 265 lb 11.2 oz (120.5 kg)              Today, you had the following     No orders found for display         Today's Medication Changes          These changes are accurate as of 18  7:41 PM.  If you have any questions, ask your nurse or doctor.               These medicines have changed or have updated prescriptions.        Dose/Directions    atenolol 50 MG tablet   Commonly known as:  TENORMIN   This may have changed:  See the new instructions.   Used for:  Paroxysmal atrial fibrillation (H)   Changed by:  Yuval Jama MD        Dose:  50 mg   Take 1 tablet (50 mg) by mouth daily   Quantity:  90 tablet   Refills:  3       * levothyroxine 88 MCG tablet   Commonly known as:  SYNTHROID/LEVOTHROID   This may have changed:  See the new instructions.   Used for:  Hypothyroidism, unspecified type   Changed by:  Yuval Jama" MD        1 tab daily in addition to Levothyroxine 75mcg daily so total daily dose = 163mcg daily   Quantity:  90 tablet   Refills:  3       * levothyroxine 75 MCG tablet   Commonly known as:  SYNTHROID/LEVOTHROID   This may have changed:    - how much to take  - how to take this  - when to take this   Used for:  Hypothyroidism, unspecified type   Changed by:  Yuval Jama MD        1 tab daily in addition to Levothyroxine 88mcg daily so total daily dose = 163mcg daily   Quantity:  90 tablet   Refills:  3       losartan 100 MG tablet   Commonly known as:  COZAAR   This may have changed:  See the new instructions.   Used for:  Essential hypertension   Changed by:  Yuval Jama MD        Dose:  100 mg   Take 1 tablet (100 mg) by mouth daily   Quantity:  90 tablet   Refills:  3       rivaroxaban ANTICOAGULANT 20 MG Tabs tablet   Commonly known as:  XARELTO   This may have changed:  See the new instructions.   Used for:  Paroxysmal atrial fibrillation (H)   Changed by:  Yuval Jama MD        Dose:  20 mg   Take 1 tablet (20 mg) by mouth daily (with dinner)   Quantity:  90 tablet   Refills:  3       * Notice:  This list has 2 medication(s) that are the same as other medications prescribed for you. Read the directions carefully, and ask your doctor or other care provider to review them with you.         Where to get your medicines      These medications [place] [facility] [place] [address], [place] [address]     Phone:  [phone]     atenolol 50 MG tablet    buPROPion 150 MG 24 hr tablet    flecainide acetate 150 MG Tabs    FLUoxetine 20 MG capsule    gabapentin 300 MG capsule    levothyroxine 75 MCG tablet    levothyroxine 88 MCG tablet    losartan 100 MG tablet    pantoprazole 40 MG EC tablet    rivaroxaban ANTICOAGULANT 20 MG Tabs tablet    SUMAtriptan 100 MG tablet    triamterene-hydrochlorothiazide 75-50 MG per tablet                Primary Care  Provider Office Phone # Fax #    Yuval Jama [phone]       [address] [facility] [address]        Goals        General    My goal is to be able to afford my medications: [facility]  (pt-stated)     Notes - Note created  1/19/2015  4:08 PM by Jovanna Abdalla RN    As of today's date 1/19/2015 goal is met at 0 - 25%.   Goal Status:  Active        Equal Access to Services     [facility] : Hadii aad ku hadasho Soomaali, waaxda luqadaha, qaybta kaalmada adeegyada, waxay idiin hayaan adeeg kharash la'aan . So [facility] [phone].    ATENCIÓN: Si habla español, tiene a roman disposición servicios gratuitos de asistencia lingüística. Llame al [phone].    We comply with applicable federal civil rights laws and [place] laws. We do not discriminate on the basis of race, color, national origin, age, disability, sex, sexual orientation, or gender identity.            Thank you!     Thank you for choosing [facility]  for your care. Our goal is always to provide you with excellent care. Hearing back from our patients is one way we can continue to improve our services. Please take a few minutes to complete the written survey that you may receive in the mail after your visit with us. Thank you!             Your Updated Medication List - Protect others around you: Learn how to safely use, store and throw away your medicines at www.disposemymeds.org.          This list is accurate as of 6/29/18  7:41 PM.  Always use your most recent med list.                   Brand Name Dispense Instructions for use Diagnosis    albuterol 108 (90 Base) MCG/ACT Inhaler    PROAIR HFA    1 Inhaler    Inhale 2 puffs into the lungs every 4 hours as needed for shortness of breath / dyspnea or wheezing    Bronchitis with bronchospasm       atenolol 50 MG tablet    TENORMIN    90 tablet    Take 1 tablet (50 mg) by mouth daily    Paroxysmal atrial fibrillation (H)        buPROPion 150 MG 24 hr tablet    WELLBUTRIN XL    90 tablet    Take 1 tablet (150 mg) by mouth every morning    Severe episode of recurrent major depressive disorder, without psychotic features (H)       flecainide acetate 150 MG Tabs     90 tablet    Take 1/2 tab (75 mg) twice daily by mouth    Paroxysmal atrial fibrillation (H)       FLUoxetine 20 MG capsule    PROzac    90 capsule    Take 1 capsule (20 mg) by mouth daily    Severe episode of recurrent major depressive disorder, without psychotic features (H)       gabapentin 300 MG capsule    NEURONTIN    270 capsule    TAKE ONE CAPSULE BY MOUTH EVERY MORNING AND TAKE TWO CAPSULES BY MOUTH EVERY NIGHT AT BEDTIME    Bilateral low back pain with bilateral sciatica, unspecified chronicity       * levothyroxine 88 MCG tablet    SYNTHROID/LEVOTHROID    90 tablet    1 tab daily in addition to Levothyroxine 75mcg daily so total daily dose = 163mcg daily    Hypothyroidism, unspecified type       * levothyroxine 75 MCG tablet    SYNTHROID/LEVOTHROID    90 tablet    1 tab daily in addition to Levothyroxine 88mcg daily so total daily dose = 163mcg daily    Hypothyroidism, unspecified type       losartan 100 MG tablet    COZAAR    90 tablet    Take 1 tablet (100 mg) by mouth daily    Essential hypertension       order for DME      DREAMSTATION 5-15 CM/H20 NASAL AIRFIT N10 HER        pantoprazole 40 MG EC tablet    PROTONIX    90 tablet    Take 1 tablet (40 mg) by mouth daily Take 30-60 minutes before a meal.    Gastroesophageal reflux disease, esophagitis presence not specified       rivaroxaban ANTICOAGULANT 20 MG Tabs tablet    XARELTO    90 tablet    Take 1 tablet (20 mg) by mouth daily (with dinner)    Paroxysmal atrial fibrillation (H)       SUMAtriptan 100 MG tablet    IMITREX    10 tablet    Take 1 tablet (100 mg) by mouth at onset of headache for migraine May repeat in 2 hours if needed: max 2/day;    Migraine without aura and without status migrainosus, not  intractable       triamterene-hydrochlorothiazide 75-50 MG per tablet    MAXZIDE    45 tablet    TAKE ONE-HALF TABLET ONCE DAILY    Essential hypertension       * Notice:  This list has 2 medication(s) that are the same as other medications prescribed for you. Read the directions carefully, and ask your doctor or other care provider to review them with you.

## 2018-06-30 ASSESSMENT — PATIENT HEALTH QUESTIONNAIRE - PHQ9: SUM OF ALL RESPONSES TO PHQ QUESTIONS 1-9: 2

## 2018-08-10 ENCOUNTER — OFFICE VISIT (OUTPATIENT)
Dept: CARDIOLOGY | Facility: CLINIC | Age: 63
End: 2018-08-10
Payer: COMMERCIAL

## 2018-08-10 VITALS
WEIGHT: 272 LBS | HEART RATE: 66 BPM | BODY MASS INDEX: 46.44 KG/M2 | SYSTOLIC BLOOD PRESSURE: 120 MMHG | DIASTOLIC BLOOD PRESSURE: 80 MMHG | HEIGHT: 64 IN

## 2018-08-10 DIAGNOSIS — I48.91 ATRIAL FIBRILLATION AND FLUTTER (H): Primary | ICD-10-CM

## 2018-08-10 DIAGNOSIS — I48.92 ATRIAL FIBRILLATION AND FLUTTER (H): Primary | ICD-10-CM

## 2018-08-10 PROCEDURE — 93000 ELECTROCARDIOGRAM COMPLETE: CPT | Performed by: INTERNAL MEDICINE

## 2018-08-10 PROCEDURE — 99213 OFFICE O/P EST LOW 20 MIN: CPT | Performed by: INTERNAL MEDICINE

## 2018-08-10 NOTE — PROGRESS NOTES
"Electrophysiology/ Clinic Note         H&P and Plan:     REASON FOR VISIT:  Evaluation of paroxysmal atrial fibrillation.       HISTORY OF PRESENT ILLNESS:  Ms. Sears is a delightful 62-year-old lady with a history of hypertension and paroxysmal atrial fibrillation who is here for routine evaluation.       She continues to do well on a combination of flecainide and atenolol.  She denies any major recurrence of AFib since initiation of medications. She denies any symptoms such as shortness of breath, lightheadedness, near-syncopal or syncopal episodes.       EKG  today showed normal sinus rhythm, QRS measuring 98 milliseconds, which it is unchanged from previous ECG. Echocardiogram done in 2017 revealed normal cardiac function and borderline LVH. Labs done 05/2018 revealed normal kidney function.      ASSESSMENT AND PLAN:   1.  Paroxysmal atrial fibrillation.  She responded well to flecainide and atenolol. We will continue current therapy.   2.  Hypertension.  Blood pressure is well controlled.  Continue current medical therapy.   3.  Embolic prevention.  CHADS-VASc score of 2.   On xarelto.  Continue oral anticoagulation indefinitely.      Follow up in clinic with me in 1 year.      Constantin Vaughn MD    Physical Exam:  Vitals: /80  Pulse 66  Ht 1.626 m (5' 4.02\")  Wt 123.4 kg (272 lb)  BMI 46.67 kg/m2    Constitutional:  AAO x3.  Pt is in NAD.  HEAD: normocephalic.  SKIN: Skin normal color, texture and turgor with no lesions or eruptions.  Eyes: PERRL, EOMI.  ENT:  Supple, normal JVP. No lymphadenopathy or thyroid enlargement.  Chest:  CTAB.  Cardiac: RRR, normal  S1 and S2.  No murmurs rubs or gallop.   Abdomen:  Normal BS.  Soft, non-tender and non-distended.  No rebound or guarding.    Extremities:  Pedious pulses palpable B/L.  No LE edema noticed.   Neurological: Strength and sensation grossly symmetric and intact throughout.       CURRENT MEDICATIONS:  Current Outpatient Prescriptions   Medication " Sig Dispense Refill     albuterol (PROAIR HFA) 108 (90 BASE) MCG/ACT Inhaler Inhale 2 puffs into the lungs every 4 hours as needed for shortness of breath / dyspnea or wheezing 1 Inhaler 1     atenolol (TENORMIN) 50 MG tablet Take 1 tablet (50 mg) by mouth daily 90 tablet 3     buPROPion (WELLBUTRIN XL) 150 MG 24 hr tablet Take 1 tablet (150 mg) by mouth every morning 90 tablet 3     flecainide acetate 150 MG TABS Take 1/2 tab (75 mg) twice daily by mouth 90 tablet 3     FLUoxetine (PROZAC) 20 MG capsule Take 1 capsule (20 mg) by mouth daily 90 capsule 3     gabapentin (NEURONTIN) 300 MG capsule TAKE ONE CAPSULE BY MOUTH EVERY MORNING AND TAKE TWO CAPSULES BY MOUTH EVERY NIGHT AT BEDTIME 270 capsule 3     levothyroxine (SYNTHROID/LEVOTHROID) 75 MCG tablet 1 tab daily in addition to Levothyroxine 88mcg daily so total daily dose = 163mcg daily 90 tablet 3     levothyroxine (SYNTHROID/LEVOTHROID) 88 MCG tablet 1 tab daily in addition to Levothyroxine 75mcg daily so total daily dose = 163mcg daily 90 tablet 3     losartan (COZAAR) 100 MG tablet Take 1 tablet (100 mg) by mouth daily 90 tablet 3     pantoprazole (PROTONIX) 40 MG EC tablet Take 1 tablet (40 mg) by mouth daily Take 30-60 minutes before a meal. 90 tablet 3     rivaroxaban ANTICOAGULANT (XARELTO) 20 MG TABS tablet Take 1 tablet (20 mg) by mouth daily (with dinner) 90 tablet 3     SUMAtriptan (IMITREX) 100 MG tablet Take 1 tablet (100 mg) by mouth at onset of headache for migraine May repeat in 2 hours if needed: max 2/day; 10 tablet 11     triamterene-hydrochlorothiazide (MAXZIDE) 75-50 MG per tablet TAKE ONE-HALF TABLET ONCE DAILY 45 tablet 3     order for DME DREAMSTATION  5-15 CM/H20  NASAL AIRFIT N10 HER         ALLERGIES     Allergies   Allergen Reactions     Morphine Anaphylaxis     Ceclor [Cefaclor] Hives     Diltiazem Hives     Lisinopril Cough     Sertraline Nausea and Vomiting       PAST MEDICAL HISTORY:  Past Medical History:   Diagnosis Date      Atrial fibrillation and flutter (H) 1/5/15     Depression       Essential hypertension       Gastroesophageal reflux disease without esophagitis 11/11/2015     GERD (gastroesophageal reflux disease)      Hiatal hernia      Hyperlipidemia LDL goal <130 3/26/2015     Hypothyroidism      Migraine without aura and without status migrainosus, not intractable  Aug 2016     Morbid obesity, unspecified obesity type (H) 12/9/2015     Obesity      NELY on CPAP      Osteoarthritis      RLS (restless legs syndrome)      Symptomatic menopausal or female climacteric states (aka FLASHES)      TMJ disease        PAST SURGICAL HISTORY:  Past Surgical History:   Procedure Laterality Date     APPENDECTOMY       CHOLECYSTECTOMY       HYSTERECTOMY, DMO       TONSILLECTOMY         FAMILY HISTORY:  Family History   Problem Relation Age of Onset     Myocardial Infarction Mother      Heart Failure Mother      Heart Surgery Father      bypass surgery     Cerebrovascular Disease Father      Heart Surgery Brother      Hyperlipidemia Brother      Hyperlipidemia Sister      Hyperlipidemia Brother      Sleep Apnea Sister        SOCIAL HISTORY:  Social History     Social History     Marital status:      Spouse name: N/A     Number of children: N/A     Years of education: N/A     Social History Main Topics     Smoking status: Former Smoker     Packs/day: 0.25     Types: Cigarettes     Start date: 1971     Quit date: 1972     Smokeless tobacco: Never Used      Comment: very minimal smoking history     Alcohol use No     Drug use: No     Sexual activity: No     Other Topics Concern     Caffeine Concern No     Sleep Concern Yes     Weight Concern No     Special Diet No     Exercise No     Seat Belt Yes     Social History Narrative       Review of Systems:  Skin:  Positive for lumps or bumps   Eyes:  Positive for glasses  ENT:  Negative    Respiratory:  Positive for dyspnea on exertion;sleep apnea;CPAP  Cardiovascular:  Negative for;edema;syncope  or near-syncope;cyanosis;lightheadedness;palpitations Positive for;exercise intolerance;fatigue  Gastroenterology: Negative for constipation;melena;hematochezia  Genitourinary:  Negative    Musculoskeletal:  Positive for joint pain  Neurologic:  Positive for migraine headaches  Psychiatric:  Positive for sleep disturbances;depression  Heme/Lymph/Imm:  Positive for allergies  Endocrine:  Positive for thyroid disorder      Recent Lab Results:  LIPID RESULTS:  Lab Results   Component Value Date    CHOL 213 (H) 05/25/2018    HDL 51 05/25/2018     (H) 05/25/2018    TRIG 297 (H) 05/25/2018    CHOLHDLRATIO 4.7 09/24/2015       LIVER ENZYME RESULTS:  Lab Results   Component Value Date    AST 13 05/25/2018    ALT 20 05/25/2018       CBC RESULTS:  Lab Results   Component Value Date    WBC 8.2 05/25/2018    RBC 4.74 05/25/2018    HGB 13.4 05/25/2018    HCT 42.4 05/25/2018    MCV 90 05/25/2018    MCH 28.3 05/25/2018    MCHC 31.6 05/25/2018    RDW 15.9 (H) 05/25/2018     05/25/2018       BMP RESULTS:  Lab Results   Component Value Date     05/25/2018    POTASSIUM 4.3 05/25/2018    CHLORIDE 106 05/25/2018    CO2 26 05/25/2018    ANIONGAP 7 05/25/2018     (H) 05/25/2018    BUN 15 05/25/2018    CR 1.01 05/25/2018    GFRESTIMATED 55 (L) 05/25/2018    GFRESTBLACK 67 05/25/2018    J CARLOS 9.3 05/25/2018        A1C RESULTS:  No results found for: A1C    INR RESULTS:  No results found for: INR      ECHOCARDIOGRAM  No results found for this or any previous visit (from the past 8760 hour(s)).      Orders Placed This Encounter   Procedures     EKG 12-lead complete w/read - Clinics (performed today)     No orders of the defined types were placed in this encounter.    There are no discontinued medications.      Encounter Diagnosis   Name Primary?     Atrial fibrillation and flutter (H) Yes         CC  Yuval Jama MD  [address] [place] [address]

## 2018-08-10 NOTE — MR AVS SNAPSHOT
"              After Visit Summary   8/10/2018    Maxine Sears    MRN: [id number]           Patient Information     Date Of Birth          1955        Visit Information        Provider Department      8/10/2018 3:15 PM Constantin Vaughn MD [facility]        Today's Diagnoses     Atrial fibrillation and flutter (H)    -  1       Follow-ups after your visit        Who to contact     If you have questions or need follow up information about today's clinic visit or your schedule please contact [facility] directly at [phone].  Normal or non-critical lab and imaging results will be communicated to you by MyChart, letter or phone within 4 business days after the clinic has received the results. If you do not hear from us within 7 days, please contact the clinic through MyChart or phone. If you have a critical or abnormal lab result, we will notify you by phone as soon as possible.  Submit refill requests through [employer] or call your pharmacy and they will forward the refill request to us. Please allow 3 business days for your refill to be completed.          Additional Information About Your Visit        Care EveryWhere ID     This is your Care EveryWhere ID. This could be used by other organizations to access your [place] medical records  [id number]        Your Vitals Were     Pulse Height BMI (Body Mass Index)             66 1.626 m (5' 4.02\") 46.67 kg/m2          Blood Pressure from Last 3 Encounters:   08/10/18 120/80   06/29/18 126/84   05/31/18 122/78    Weight from Last 3 Encounters:   08/10/18 123.4 kg (272 lb)   06/29/18 121.6 kg (268 lb)   05/31/18 120.7 kg (266 lb)              We Performed the Following     EKG 12-lead complete w/read - Clinics (performed today)        Primary Care Provider Office Phone # Fax #    Yuval Jama [phone] [address] [place] [id number]        Goals     "    General    My goal is to be able to afford my medications: [facility]  (pt-stated)     Notes - Note created  1/19/2015  4:08 PM by Jovanna Abdalla RN    As of today's date 1/19/2015 goal is met at 0 - 25%.   Goal Status:  Active        Equal Access to Services     [facility] : Hadii aad ku hadasho Soomaali, waaxda luqadaha, qaybta kaalmada adeegyada, waxyamini idiin hayaan adeisrael luna laomarn . So [facility] [phone].    ATENCIÓN: Si habla español, tiene a roman disposición servicios gratuitos de asistencia lingüística. Llame al [phone].    We comply with applicable federal civil rights laws and [place] laws. We do not discriminate on the basis of race, color, national origin, age, disability, sex, sexual orientation, or gender identity.            Thank you!     Thank you for choosing [facility]  for your care. Our goal is always to provide you with excellent care. Hearing back from our patients is one way we can continue to improve our services. Please take a few minutes to complete the written survey that you may receive in the mail after your visit with us. Thank you!             Your Updated Medication List - Protect others around you: Learn how to safely use, store and throw away your medicines at www.disposemymeds.org.          This list is accurate as of 8/10/18  3:49 PM.  Always use your most recent med list.                   Brand Name Dispense Instructions for use Diagnosis    albuterol 108 (90 Base) MCG/ACT inhaler    PROAIR HFA    1 Inhaler    Inhale 2 puffs into the lungs every 4 hours as needed for shortness of breath / dyspnea or wheezing    Bronchitis with bronchospasm       atenolol 50 MG tablet    TENORMIN    90 tablet    Take 1 tablet (50 mg) by mouth daily    Paroxysmal atrial fibrillation (H)       buPROPion 150 MG 24 hr tablet    WELLBUTRIN XL    90 tablet    Take 1 tablet (150 mg) by mouth every morning     Severe episode of recurrent major depressive disorder, without psychotic features (H)       flecainide acetate 150 MG Tabs     90 tablet    Take 1/2 tab (75 mg) twice daily by mouth    Paroxysmal atrial fibrillation (H)       FLUoxetine 20 MG capsule    PROzac    90 capsule    Take 1 capsule (20 mg) by mouth daily    Severe episode of recurrent major depressive disorder, without psychotic features (H)       gabapentin 300 MG capsule    NEURONTIN    270 capsule    TAKE ONE CAPSULE BY MOUTH EVERY MORNING AND TAKE TWO CAPSULES BY MOUTH EVERY NIGHT AT BEDTIME    Bilateral low back pain with bilateral sciatica, unspecified chronicity       * levothyroxine 88 MCG tablet    SYNTHROID/LEVOTHROID    90 tablet    1 tab daily in addition to Levothyroxine 75mcg daily so total daily dose = 163mcg daily    Hypothyroidism, unspecified type       * levothyroxine 75 MCG tablet    SYNTHROID/LEVOTHROID    90 tablet    1 tab daily in addition to Levothyroxine 88mcg daily so total daily dose = 163mcg daily    Hypothyroidism, unspecified type       losartan 100 MG tablet    COZAAR    90 tablet    Take 1 tablet (100 mg) by mouth daily    Essential hypertension       order for DME      DREAMSTATION 5-15 CM/H20 NASAL AIRFIT N10 HER        pantoprazole 40 MG EC tablet    PROTONIX    90 tablet    Take 1 tablet (40 mg) by mouth daily Take 30-60 minutes before a meal.    Gastroesophageal reflux disease, esophagitis presence not specified       rivaroxaban ANTICOAGULANT 20 MG Tabs tablet    XARELTO    90 tablet    Take 1 tablet (20 mg) by mouth daily (with dinner)    Paroxysmal atrial fibrillation (H)       SUMAtriptan 100 MG tablet    IMITREX    10 tablet    Take 1 tablet (100 mg) by mouth at onset of headache for migraine May repeat in 2 hours if needed: max 2/day;    Migraine without aura and without status migrainosus, not intractable       triamterene-hydrochlorothiazide 75-50 MG per tablet    MAXZIDE    45 tablet    TAKE ONE-HALF TABLET  ONCE DAILY    Essential hypertension       * Notice:  This list has 2 medication(s) that are the same as other medications prescribed for you. Read the directions carefully, and ask your doctor or other care provider to review them with you.

## 2018-08-10 NOTE — LETTER
"8/10/2018    Yuval Jama MD  [address] [facility] [address]    RE: Maxine Sears       Dear Colleague,    I had the pleasure of seeing Maxine Sears in the [facility].    Electrophysiology/ Clinic Note         H&P and Plan:     REASON FOR VISIT:  Evaluation of paroxysmal atrial fibrillation.       HISTORY OF PRESENT ILLNESS:  Ms. Sears is a delightful 62-year-old lady with a history of hypertension and paroxysmal atrial fibrillation who is here for routine evaluation.       She continues to do well on a combination of flecainide and atenolol.  She denies any major recurrence of AFib since initiation of medications. She denies any symptoms such as shortness of breath, lightheadedness, near-syncopal or syncopal episodes.       EKG  today showed normal sinus rhythm, QRS measuring 98 milliseconds, which it is unchanged from previous ECG. Echocardiogram done in 2017 revealed normal cardiac function and borderline LVH. Labs done 05/2018 revealed normal kidney function.      ASSESSMENT AND PLAN:   1.  Paroxysmal atrial fibrillation.  She responded well to flecainide and atenolol. We will continue current therapy.   2.  Hypertension.  Blood pressure is well controlled.  Continue current medical therapy.   3.  Embolic prevention.  CHADS-VASc score of 2.   On xarelto.  Continue oral anticoagulation indefinitely.      Follow up in clinic with me in 1 year.      Constantin Vaughn MD    Physical Exam:  Vitals: /80  Pulse 66  Ht 1.626 m (5' 4.02\")  Wt 123.4 kg (272 lb)  BMI 46.67 kg/m2    Constitutional:  AAO x3.  Pt is in NAD.  HEAD: normocephalic.  SKIN: Skin normal color, texture and turgor with no lesions or eruptions.  Eyes: PERRL, EOMI.  ENT:  Supple, normal JVP. No lymphadenopathy or thyroid enlargement.  Chest:  CTAB.  Cardiac: RRR, normal  S1 and S2.  No murmurs rubs or gallop.   Abdomen:  Normal BS.  Soft, non-tender and non-distended.  No rebound or guarding.  "   Extremities:  Pedious pulses palpable B/L.  No LE edema noticed.   Neurological: Strength and sensation grossly symmetric and intact throughout.       CURRENT MEDICATIONS:  Current Outpatient Prescriptions   Medication Sig Dispense Refill     albuterol (PROAIR HFA) 108 (90 BASE) MCG/ACT Inhaler Inhale 2 puffs into the lungs every 4 hours as needed for shortness of breath / dyspnea or wheezing 1 Inhaler 1     atenolol (TENORMIN) 50 MG tablet Take 1 tablet (50 mg) by mouth daily 90 tablet 3     buPROPion (WELLBUTRIN XL) 150 MG 24 hr tablet Take 1 tablet (150 mg) by mouth every morning 90 tablet 3     flecainide acetate 150 MG TABS Take 1/2 tab (75 mg) twice daily by mouth 90 tablet 3     FLUoxetine (PROZAC) 20 MG capsule Take 1 capsule (20 mg) by mouth daily 90 capsule 3     gabapentin (NEURONTIN) 300 MG capsule TAKE ONE CAPSULE BY MOUTH EVERY MORNING AND TAKE TWO CAPSULES BY MOUTH EVERY NIGHT AT BEDTIME 270 capsule 3     levothyroxine (SYNTHROID/LEVOTHROID) 75 MCG tablet 1 tab daily in addition to Levothyroxine 88mcg daily so total daily dose = 163mcg daily 90 tablet 3     levothyroxine (SYNTHROID/LEVOTHROID) 88 MCG tablet 1 tab daily in addition to Levothyroxine 75mcg daily so total daily dose = 163mcg daily 90 tablet 3     losartan (COZAAR) 100 MG tablet Take 1 tablet (100 mg) by mouth daily 90 tablet 3     pantoprazole (PROTONIX) 40 MG EC tablet Take 1 tablet (40 mg) by mouth daily Take 30-60 minutes before a meal. 90 tablet 3     rivaroxaban ANTICOAGULANT (XARELTO) 20 MG TABS tablet Take 1 tablet (20 mg) by mouth daily (with dinner) 90 tablet 3     SUMAtriptan (IMITREX) 100 MG tablet Take 1 tablet (100 mg) by mouth at onset of headache for migraine May repeat in 2 hours if needed: max 2/day; 10 tablet 11     triamterene-hydrochlorothiazide (MAXZIDE) 75-50 MG per tablet TAKE ONE-HALF TABLET ONCE DAILY 45 tablet 3     order for DME DREAMSTATION  5-15 CM/H20  NASAL AIRFIT N10 HER         ALLERGIES     Allergies    Allergen Reactions     Morphine Anaphylaxis     Ceclor [Cefaclor] Hives     Diltiazem Hives     Lisinopril Cough     Sertraline Nausea and Vomiting       PAST MEDICAL HISTORY:  Past Medical History:   Diagnosis Date     Atrial fibrillation and flutter (H) 1/5/15     Depression       Essential hypertension       Gastroesophageal reflux disease without esophagitis 11/11/2015     GERD (gastroesophageal reflux disease)      Hiatal hernia      Hyperlipidemia LDL goal <130 3/26/2015     Hypothyroidism      Migraine without aura and without status migrainosus, not intractable  Aug 2016     Morbid obesity, unspecified obesity type (H) 12/9/2015     Obesity      NELY on CPAP      Osteoarthritis      RLS (restless legs syndrome)      Symptomatic menopausal or female climacteric states (aka FLASHES)      TMJ disease        PAST SURGICAL HISTORY:  Past Surgical History:   Procedure Laterality Date     APPENDECTOMY       CHOLECYSTECTOMY       HYSTERECTOMY, DOM       TONSILLECTOMY         FAMILY HISTORY:  Family History   Problem Relation Age of Onset     Myocardial Infarction Mother      Heart Failure Mother      Heart Surgery Father      bypass surgery     Cerebrovascular Disease Father      Heart Surgery Brother      Hyperlipidemia Brother      Hyperlipidemia Sister      Hyperlipidemia Brother      Sleep Apnea Sister        SOCIAL HISTORY:  Social History     Social History     Marital status:      Spouse name: N/A     Number of children: N/A     Years of education: N/A     Social History Main Topics     Smoking status: Former Smoker     Packs/day: 0.25     Types: Cigarettes     Start date: 1971     Quit date: 1972     Smokeless tobacco: Never Used      Comment: very minimal smoking history     Alcohol use No     Drug use: No     Sexual activity: No     Other Topics Concern     Caffeine Concern No     Sleep Concern Yes     Weight Concern No     Special Diet No     Exercise No     Seat Belt Yes     Social History  Narrative       Review of Systems:  Skin:  Positive for lumps or bumps   Eyes:  Positive for glasses  ENT:  Negative    Respiratory:  Positive for dyspnea on exertion;sleep apnea;CPAP  Cardiovascular:  Negative for;edema;syncope or near-syncope;cyanosis;lightheadedness;palpitations Positive for;exercise intolerance;fatigue  Gastroenterology: Negative for constipation;melena;hematochezia  Genitourinary:  Negative    Musculoskeletal:  Positive for joint pain  Neurologic:  Positive for migraine headaches  Psychiatric:  Positive for sleep disturbances;depression  Heme/Lymph/Imm:  Positive for allergies  Endocrine:  Positive for thyroid disorder      Recent Lab Results:  LIPID RESULTS:  Lab Results   Component Value Date    CHOL 213 (H) 05/25/2018    HDL 51 05/25/2018     (H) 05/25/2018    TRIG 297 (H) 05/25/2018    CHOLHDLRATIO 4.7 09/24/2015       LIVER ENZYME RESULTS:  Lab Results   Component Value Date    AST 13 05/25/2018    ALT 20 05/25/2018       CBC RESULTS:  Lab Results   Component Value Date    WBC 8.2 05/25/2018    RBC 4.74 05/25/2018    HGB 13.4 05/25/2018    HCT 42.4 05/25/2018    MCV 90 05/25/2018    MCH 28.3 05/25/2018    MCHC 31.6 05/25/2018    RDW 15.9 (H) 05/25/2018     05/25/2018       BMP RESULTS:  Lab Results   Component Value Date     05/25/2018    POTASSIUM 4.3 05/25/2018    CHLORIDE 106 05/25/2018    CO2 26 05/25/2018    ANIONGAP 7 05/25/2018     (H) 05/25/2018    BUN 15 05/25/2018    CR 1.01 05/25/2018    GFRESTIMATED 55 (L) 05/25/2018    GFRESTBLACK 67 05/25/2018    J CARLOS 9.3 05/25/2018        A1C RESULTS:  No results found for: A1C    INR RESULTS:  No results found for: INR      ECHOCARDIOGRAM  No results found for this or any previous visit (from the past 8760 hour(s)).      Orders Placed This Encounter   Procedures     EKG 12-lead complete w/read - Clinics (performed today)     No orders of the defined types were placed in this encounter.    There are no discontinued  medications.      Encounter Diagnosis   Name Primary?     Atrial fibrillation and flutter (H) Yes       Thank you for allowing me to participate in the care of your patient.    Sincerely,     Constantin Vaughn MD     [facility]

## 2018-08-10 NOTE — LETTER
"8/10/2018    Yuval Jama MD  [address] [facility] [address]    RE: Maxine Sears       Dear Colleague,    I had the pleasure of seeing Maxine Sears in the [facility].    Electrophysiology/ Clinic Note         H&P and Plan:     REASON FOR VISIT:  Evaluation of paroxysmal atrial fibrillation.       HISTORY OF PRESENT ILLNESS:  Ms. Sears is a delightful 62-year-old lady with a history of hypertension and paroxysmal atrial fibrillation who is here for routine evaluation.       She continues to do well on a combination of flecainide and atenolol.  She denies any major recurrence of AFib since initiation of medications. She denies any symptoms such as shortness of breath, lightheadedness, near-syncopal or syncopal episodes.       EKG  today showed normal sinus rhythm, QRS measuring 98 milliseconds, which it is unchanged from previous ECG. Echocardiogram done in 2017 revealed normal cardiac function and borderline LVH. Labs done 05/2018 revealed normal kidney function.      ASSESSMENT AND PLAN:   1.  Paroxysmal atrial fibrillation.  She responded well to flecainide and atenolol. We will continue current therapy.   2.  Hypertension.  Blood pressure is well controlled.  Continue current medical therapy.   3.  Embolic prevention.  CHADS-VASc score of 2.   On xarelto.  Continue oral anticoagulation indefinitely.      Follow up in clinic with me in 1 year.      Constantin Vaughn MD    Physical Exam:  Vitals: /80  Pulse 66  Ht 1.626 m (5' 4.02\")  Wt 123.4 kg (272 lb)  BMI 46.67 kg/m2    Constitutional:  AAO x3.  Pt is in NAD.  HEAD: normocephalic.  SKIN: Skin normal color, texture and turgor with no lesions or eruptions.  Eyes: PERRL, EOMI.  ENT:  Supple, normal JVP. No lymphadenopathy or thyroid enlargement.  Chest:  CTAB.  Cardiac: RRR, normal  S1 and S2.  No murmurs rubs or gallop.   Abdomen:  Normal BS.  Soft, non-tender and non-distended.  No rebound or guarding.  "   Extremities:  Pedious pulses palpable B/L.  No LE edema noticed.   Neurological: Strength and sensation grossly symmetric and intact throughout.       CURRENT MEDICATIONS:  Current Outpatient Prescriptions   Medication Sig Dispense Refill     albuterol (PROAIR HFA) 108 (90 BASE) MCG/ACT Inhaler Inhale 2 puffs into the lungs every 4 hours as needed for shortness of breath / dyspnea or wheezing 1 Inhaler 1     atenolol (TENORMIN) 50 MG tablet Take 1 tablet (50 mg) by mouth daily 90 tablet 3     buPROPion (WELLBUTRIN XL) 150 MG 24 hr tablet Take 1 tablet (150 mg) by mouth every morning 90 tablet 3     flecainide acetate 150 MG TABS Take 1/2 tab (75 mg) twice daily by mouth 90 tablet 3     FLUoxetine (PROZAC) 20 MG capsule Take 1 capsule (20 mg) by mouth daily 90 capsule 3     gabapentin (NEURONTIN) 300 MG capsule TAKE ONE CAPSULE BY MOUTH EVERY MORNING AND TAKE TWO CAPSULES BY MOUTH EVERY NIGHT AT BEDTIME 270 capsule 3     levothyroxine (SYNTHROID/LEVOTHROID) 75 MCG tablet 1 tab daily in addition to Levothyroxine 88mcg daily so total daily dose = 163mcg daily 90 tablet 3     levothyroxine (SYNTHROID/LEVOTHROID) 88 MCG tablet 1 tab daily in addition to Levothyroxine 75mcg daily so total daily dose = 163mcg daily 90 tablet 3     losartan (COZAAR) 100 MG tablet Take 1 tablet (100 mg) by mouth daily 90 tablet 3     pantoprazole (PROTONIX) 40 MG EC tablet Take 1 tablet (40 mg) by mouth daily Take 30-60 minutes before a meal. 90 tablet 3     rivaroxaban ANTICOAGULANT (XARELTO) 20 MG TABS tablet Take 1 tablet (20 mg) by mouth daily (with dinner) 90 tablet 3     SUMAtriptan (IMITREX) 100 MG tablet Take 1 tablet (100 mg) by mouth at onset of headache for migraine May repeat in 2 hours if needed: max 2/day; 10 tablet 11     triamterene-hydrochlorothiazide (MAXZIDE) 75-50 MG per tablet TAKE ONE-HALF TABLET ONCE DAILY 45 tablet 3     order for DME DREAMSTATION  5-15 CM/H20  NASAL AIRFIT N10 HER         ALLERGIES     Allergies    Allergen Reactions     Morphine Anaphylaxis     Ceclor [Cefaclor] Hives     Diltiazem Hives     Lisinopril Cough     Sertraline Nausea and Vomiting       PAST MEDICAL HISTORY:  Past Medical History:   Diagnosis Date     Atrial fibrillation and flutter (H) 1/5/15     Depression       Essential hypertension       Gastroesophageal reflux disease without esophagitis 11/11/2015     GERD (gastroesophageal reflux disease)      Hiatal hernia      Hyperlipidemia LDL goal <130 3/26/2015     Hypothyroidism      Migraine without aura and without status migrainosus, not intractable  Aug 2016     Morbid obesity, unspecified obesity type (H) 12/9/2015     Obesity      NELY on CPAP      Osteoarthritis      RLS (restless legs syndrome)      Symptomatic menopausal or female climacteric states (aka FLASHES)      TMJ disease        PAST SURGICAL HISTORY:  Past Surgical History:   Procedure Laterality Date     APPENDECTOMY       CHOLECYSTECTOMY       HYSTERECTOMY, DOM       TONSILLECTOMY         FAMILY HISTORY:  Family History   Problem Relation Age of Onset     Myocardial Infarction Mother      Heart Failure Mother      Heart Surgery Father      bypass surgery     Cerebrovascular Disease Father      Heart Surgery Brother      Hyperlipidemia Brother      Hyperlipidemia Sister      Hyperlipidemia Brother      Sleep Apnea Sister        SOCIAL HISTORY:  Social History     Social History     Marital status:      Spouse name: N/A     Number of children: N/A     Years of education: N/A     Social History Main Topics     Smoking status: Former Smoker     Packs/day: 0.25     Types: Cigarettes     Start date: 1971     Quit date: 1972     Smokeless tobacco: Never Used      Comment: very minimal smoking history     Alcohol use No     Drug use: No     Sexual activity: No     Other Topics Concern     Caffeine Concern No     Sleep Concern Yes     Weight Concern No     Special Diet No     Exercise No     Seat Belt Yes     Social History  Narrative       Review of Systems:  Skin:  Positive for lumps or bumps   Eyes:  Positive for glasses  ENT:  Negative    Respiratory:  Positive for dyspnea on exertion;sleep apnea;CPAP  Cardiovascular:  Negative for;edema;syncope or near-syncope;cyanosis;lightheadedness;palpitations Positive for;exercise intolerance;fatigue  Gastroenterology: Negative for constipation;melena;hematochezia  Genitourinary:  Negative    Musculoskeletal:  Positive for joint pain  Neurologic:  Positive for migraine headaches  Psychiatric:  Positive for sleep disturbances;depression  Heme/Lymph/Imm:  Positive for allergies  Endocrine:  Positive for thyroid disorder      Recent Lab Results:  LIPID RESULTS:  Lab Results   Component Value Date    CHOL 213 (H) 05/25/2018    HDL 51 05/25/2018     (H) 05/25/2018    TRIG 297 (H) 05/25/2018    CHOLHDLRATIO 4.7 09/24/2015       LIVER ENZYME RESULTS:  Lab Results   Component Value Date    AST 13 05/25/2018    ALT 20 05/25/2018       CBC RESULTS:  Lab Results   Component Value Date    WBC 8.2 05/25/2018    RBC 4.74 05/25/2018    HGB 13.4 05/25/2018    HCT 42.4 05/25/2018    MCV 90 05/25/2018    MCH 28.3 05/25/2018    MCHC 31.6 05/25/2018    RDW 15.9 (H) 05/25/2018     05/25/2018       BMP RESULTS:  Lab Results   Component Value Date     05/25/2018    POTASSIUM 4.3 05/25/2018    CHLORIDE 106 05/25/2018    CO2 26 05/25/2018    ANIONGAP 7 05/25/2018     (H) 05/25/2018    BUN 15 05/25/2018    CR 1.01 05/25/2018    GFRESTIMATED 55 (L) 05/25/2018    GFRESTBLACK 67 05/25/2018    J CARLOS 9.3 05/25/2018        A1C RESULTS:  No results found for: A1C    INR RESULTS:  No results found for: INR      ECHOCARDIOGRAM  No results found for this or any previous visit (from the past 8760 hour(s)).      Orders Placed This Encounter   Procedures     EKG 12-lead complete w/read - Clinics (performed today)     No orders of the defined types were placed in this encounter.    There are no discontinued  medications.      Encounter Diagnosis   Name Primary?     Atrial fibrillation and flutter (H) Yes         CC  Yuval Jama MD  [address] [place] [address]                Thank you for allowing me to participate in the care of your patient.      Sincerely,     Constantin Vaughn MD     [facility]    cc:   Yuval Jama MD  [address] [place], [place] [address]

## 2018-08-13 ENCOUNTER — TELEPHONE (OUTPATIENT)
Dept: INTERNAL MEDICINE | Facility: CLINIC | Age: 63
End: 2018-08-13

## 2018-08-13 NOTE — LETTER
[facility]  [address] [address]  [phone]  August 13, 2018    Maxine Sears  [address]  [facility] [address]    Dear Maxine,    We care about your health and based on a review of your medical records, recommend the the following, to better manage your health:      You are in particular need of attention regarding:  -Breast Cancer Screening  -Colon Cancer Screening    I am recommending that you:     -schedule a MAMMOGRAM which is due.    1 in 8 women will develop invasive breast cancer during her lifetime and it is the most common non-skin cancer in American women.  EARLY detection, new treatments, and a better understanding of the disease have increased survival rates - the 5 year survival rate in the 1960s was 63% and today it is close to 90%.    If you are under/uninsured, we recommend you contact the [facility]. They offer mammograms at no charge or on a sliding fee charge. You can schedule with them at [phone]. Please have them send us the results.      Please disregard this reminder if you have had this exam elsewhere within the last year.  It would be helpful for us to have a copy of your mammogram report in your file so that we can best coordinate your care - please contact us with when your test was done so we can update your record.             -schedule a COLONOSCOPY to look for colon cancer (due every 10 years or 5 years in higher risk situations.)        Colon cancer is now the second leading cause of cancer-related deaths in the [place] for both men and women and there are over 130,000 new cases and 50,000 deaths per year from colon cancer.  Colonoscopies can prevent 90-95% of these deaths.  Problem lesions can be removed before they ever become cancer.  This test is not only looking for cancer, but also getting rid of precancerious lesions.    If you are under/uninsured, we recommend you  contact the [facility] program. [facility] is a free colorectal cancer screening program that provides colonoscopies for eligible under/uninsured [place] men and women. If you are interested in receiving a free colonoscopy, please call [employer] at [phone] (mention code [facility]) to see if you re eligible.      If you do not wish to do a colonoscopy or cannot afford to do one, at this time, there is another option. It is called a FIT test or Fecal Immunochemical Occult Blood Test (take home stool sample kit).  It does not replace the colonoscopy for colorectal cancer screening, but it can detect hidden bleeding in the lower colon.  It does need to be repeated every year and if a positive result is obtained, you would be referred for a colonoscopy.          If you have completed either one of these tests at another facility, please call with the details of when and where the tests were done and if they were normal or not. Or have the records sent to our clinic so that we can best coordinate your care.      Here is a list of Health Maintenance topics that are due now or due soon:  Health Maintenance Due   Topic Date Due     MIGRAINE ACTION PLAN  08/23/1973     HIV SCREEN (SYSTEM ASSIGNED)  08/23/1973     Colon Cancer Screening - every 10 years.  08/23/2005     Discuss Advance Directive Planning  08/23/2010     Mammogram - every 2 years  09/15/2017       Please call us at [phone] or [phone] (or use [employer]) to address the above recommendations.     Thank you for trusting [facility].  We appreciate the opportunity to serve you and look forward to supporting your healthcare needs in the future.    If you have (or plan to have) any of these tests done at a facility other than a [facility] or a [facility], please have the results from these tests sent to your primary physician at [facility].    Healthy Regards,    Yuval Jama MD/Yajaira JANG [facility]

## 2018-08-13 NOTE — TELEPHONE ENCOUNTER
Panel Management Review    Patient Active Problem List   Diagnosis     Osteoarthritis     TMJ disease     Atrial fibrillation and flutter (H)     Health Care Home     Hyperlipidemia LDL goal <130     Gastroesophageal reflux disease without esophagitis     Hypothyroidism     Morbid obesity, unspecified obesity type (H)     Migraine without aura and without status migrainosus, not intractable     Essential hypertension     NELY on CPAP     RLS (restless legs syndrome)     Severe episode of recurrent major depressive disorder, without psychotic features (H)     Gastroesophageal reflux disease, esophagitis presence not specified     Patient has the following on her problem list:     Hypertension   Last three blood pressure readings:  BP Readings from Last 3 Encounters:   08/10/18 120/80   06/29/18 126/84   05/31/18 122/78     Blood pressure: Passed    HTN Guidelines:  Age 18-59 BP range:  Less than 140/90  Age 60-85 with Diabetes:  Less than 140/90  Age 60-85 without Diabetes:  less than 150/90      Composite cancer screening  Chart review shows that this patient is due/due soon for the following Mammogram and Colonoscopy  Summary:    Patient is due/failing the following:   COLONOSCOPY and MAMMOGRAM    Action needed:   LETTER SENT     Type of outreach:    Sent letter.    Questions for provider review:    None                                                                                                                                    Yajaira Kennedy, CMA

## 2018-09-13 ENCOUNTER — TELEPHONE (OUTPATIENT)
Dept: INTERNAL MEDICINE | Facility: CLINIC | Age: 63
End: 2018-09-13

## 2018-09-13 NOTE — TELEPHONE ENCOUNTER
Panel Management Review    Patient Active Problem List   Diagnosis     Osteoarthritis     TMJ disease     Atrial fibrillation and flutter (H)     Health Care Home     Hyperlipidemia LDL goal <130     Gastroesophageal reflux disease without esophagitis     Hypothyroidism     Morbid obesity, unspecified obesity type (H)     Migraine without aura and without status migrainosus, not intractable     Essential hypertension     NELY on CPAP     RLS (restless legs syndrome)     Severe episode of recurrent major depressive disorder, without psychotic features (H)     Gastroesophageal reflux disease, esophagitis presence not specified       Patient has the following on her problem list:     Depression / Dysthymia review    Measure:  Needs PHQ-9 score of 4 or less during index window.  Administer PHQ-9 and if score is 5 or more, send encounter to provider for next steps.    5 - 7 month window range: 14    PHQ-9 SCORE 5/26/2017 5/31/2018 6/29/2018   Total Score - - -   Total Score 14 12 2       If PHQ-9 recheck is 5 or more, route to provider for next steps.    Patient is due for:  None      Composite cancer screening  Chart review shows that this patient is due/due soon for the following None  Summary:    Patient is due/failing the following:   No Action Needed    Action needed:   No Action Needed    Type of outreach:    No Action Needed    Questions for provider review:    None                                                                                                                                    Yajaira Kennedy, CMA

## 2018-11-08 NOTE — PROGRESS NOTES
"  SUBJECTIVE:   Maxine Sears is a 63 year old female who presents to clinic today for the following health issues:    Chief Complaint   Patient presents with     Imm/Inj     Flu Shot     Hypertension     Hyperlipidemia     Thyroid Problem       Hyperlipidemia Follow-Up      Rate your low fat/cholesterol diet?: fair    Taking statin?  No    Other lipid medications/supplements?:  none    Hypertension Follow-up      Outpatient blood pressures are not being checked.    Low Salt Diet: low salt    Hypothyroidism Follow-up      Since last visit, patient describes the following symptoms: Weight stable, no hair loss, no skin changes, no constipation, no loose stools      Amount of exercise or physical activity: Minimal    Problems taking medications regularly: No    Medication side effects: Profuse Sweating from antidepressants    Diet: low salt    Pt has stopped Wellbutrin and Fluoxetine. Mood much better now. PHQ=3 and 2 points of the score related to fatigue with obstructive sleep apnea. Using CPAP   More motivated since off med. Doing a lot of crafts and in community room  since then.  Denies chest pain, shortness of breath, abdominal pain, headache, vision changes or side effects with medications.  Weight  same as pervious. Lower carb diet.  Motivated to lose weight. Will be getting more vegetables with diet soon after \"payday\". HAs exerciseroom that can be using. Building as longer hallways    No identified additional risks  The 10-year ASCVD risk score (George FAINA Jr, et al., 2013) is: 5.8%    Values used to calculate the score:      Age: 63 years      Sex: Female      Is Non- : No      Diabetic: No      Tobacco smoker: No      Systolic Blood Pressure: 120 mmHg      Is BP treated: Yes      HDL Cholesterol: 51 mg/dL      Total Cholesterol: 213 mg/dL    Lab Results   Component Value Date     05/25/2018     No results found for: A1C  Lab Results   Component Value Date    CHOL 213 " "05/25/2018     Lab Results   Component Value Date     05/25/2018     Lab Results   Component Value Date    HDL 51 05/25/2018     Lab Results   Component Value Date    TRIG 297 05/25/2018     Lab Results   Component Value Date    CR 1.01 05/25/2018     Lab Results   Component Value Date    ALT 20 05/25/2018     Lab Results   Component Value Date    AST 13 05/25/2018       Lab Results   Component Value Date    TSH 1.31 05/25/2018         Pt's past medical history, family history, habits, medications and allergies were reviewed with the patient today.  See snap shot for  HCM status. Most recent lab results reviewed with pt. Problem list and histories reviewed & adjusted, as indicated.  Additional history as below:       Additional ROS:   Constitutional, HEENT, Cardiovascular, Pulmonary, GI and , Neuro, MSK and Psych review of systems/symptoms are otherwise negative or unchanged from previous, except as noted above.      OBJECTIVE:  /78  Pulse 62  Temp 98.7  F (37.1  C) (Oral)  Resp 16  Wt 272 lb (123.4 kg)  SpO2 96%  BMI 46.67 kg/m2   Estimated body mass index is 46.67 kg/(m^2) as calculated from the following:    Height as of 8/10/18: 5' 4.02\" (1.626 m).    Weight as of this encounter: 272 lb (123.4 kg).     Neck: no adenopathy. Thyroid normal to palpation. No bruits  Pulm: Lungs clear to auscultation   CV: Regular rates and rhythm  GI: Soft, morbidly obese, nontender, Normal active bowel sounds, No hepatosplenomegaly or masses palpable  Ext: Peripheral pulses intact. Trace BLE edema.  Neuro: Normal strength and tone, sensory exam grossly normal  Gen: Pleasant affect.  Smiling.  Normal thought content    Assessment/Plan: (See plan discussion below for further details)  1. Blood glucose elevated  Counseled regarding carbohydrate reduction and increase in exercise.  Labs as below  - Hemoglobin A1c  - Basic metabolic panel; Future    2. Essential hypertension  Controlled.  Continue current " medication.  Peter labs ordered  - Basic metabolic panel; Future    3. Morbid obesity, unspecified obesity type (H)  See plan discussion below regarding counseling    4. Hyperlipidemia LDL goal <130  Mild elevation 6 months ago.  Work on diet and exercise recheck in 6 months  - Lipid panel reflex to direct LDL Fasting; Future    5. Hypothyroidism, unspecified type  T3 minimally low at last lab check.  Normal T4 and TSH.  Recheck labs in 6 months with current levothyroxine dosing  - TSH with free T4 reflex; Future    6. Visit for screening mammogram  - MA SCREENING DIGITAL BILAT - Future  (s+30); Future    7. Need for prophylactic vaccination and inoculation against influenza  - FLU VACCINE, (RIV4) RECOMBINANT HA  , IM (FluBlok, egg free) [90201]- >18 YRS (FMG recommended  50-64 YRS)  - Vaccine Administration, Initial [40794]    8. Screen for colon cancer  Declines  colonoscopy despite MD recommendation for the procedure. FIT test ordered. Reviewed the reduced sensitivity of the FIT test for colon cancer screening compared to colonoscopy and pt states understanding of this. Patient to inform physician in the future if willing to undergo future colonoscopy.   - Fecal colorectal cancer screen (FIT); Future        Plan discussion:  Labs as ordered  Flu vaccine   Check with insurance or speak with your pharmacist re: Shingrix vaccine coverage for shingles prevention.  This is a 2 shot series done 2-6 months apart  Walking exercise in the hallways of the building or try stationary bike or swimming if able to get access to a pool   Reduce calorie/carbohydrate (sugar, bread, potato, pasta, rice, etc)  intake  in diet for weight loss, increase color on your plate with fruits and vegetables and meats   See me in 6 months for follow-up or earlier as needed  FIT stool test   Mammogram         Yuval Jama MD  Internal Medicine Department  [facility]              Injectable Influenza Immunization Documentation    1.   Is the person to be vaccinated sick today?   No    2. Does the person to be vaccinated have an allergy to a component   of the vaccine?   No  Egg Allergy Algorithm Link    3. Has the person to be vaccinated ever had a serious reaction   to influenza vaccine in the past?   No    4. Has the person to be vaccinated ever had Guillain-Barré syndrome?   No    Form completed by Yajaira Kennedy [facility]

## 2018-11-12 ENCOUNTER — OFFICE VISIT (OUTPATIENT)
Dept: INTERNAL MEDICINE | Facility: CLINIC | Age: 63
End: 2018-11-12
Payer: COMMERCIAL

## 2018-11-12 VITALS
TEMPERATURE: 98.7 F | RESPIRATION RATE: 16 BRPM | WEIGHT: 272 LBS | OXYGEN SATURATION: 96 % | SYSTOLIC BLOOD PRESSURE: 120 MMHG | BODY MASS INDEX: 46.67 KG/M2 | DIASTOLIC BLOOD PRESSURE: 78 MMHG | HEART RATE: 62 BPM

## 2018-11-12 DIAGNOSIS — Z12.31 VISIT FOR SCREENING MAMMOGRAM: ICD-10-CM

## 2018-11-12 DIAGNOSIS — E03.9 HYPOTHYROIDISM, UNSPECIFIED TYPE: ICD-10-CM

## 2018-11-12 DIAGNOSIS — I10 ESSENTIAL HYPERTENSION: ICD-10-CM

## 2018-11-12 DIAGNOSIS — Z12.11 SCREEN FOR COLON CANCER: ICD-10-CM

## 2018-11-12 DIAGNOSIS — E66.01 MORBID OBESITY, UNSPECIFIED OBESITY TYPE (H): ICD-10-CM

## 2018-11-12 DIAGNOSIS — R73.9 BLOOD GLUCOSE ELEVATED: ICD-10-CM

## 2018-11-12 DIAGNOSIS — Z23 NEED FOR PROPHYLACTIC VACCINATION AND INOCULATION AGAINST INFLUENZA: ICD-10-CM

## 2018-11-12 DIAGNOSIS — E78.5 HYPERLIPIDEMIA LDL GOAL <130: ICD-10-CM

## 2018-11-12 LAB — HBA1C MFR BLD: 6.3 % (ref 0–5.6)

## 2018-11-12 PROCEDURE — 36415 COLL VENOUS BLD VENIPUNCTURE: CPT | Performed by: INTERNAL MEDICINE

## 2018-11-12 PROCEDURE — 99214 OFFICE O/P EST MOD 30 MIN: CPT | Mod: 25 | Performed by: INTERNAL MEDICINE

## 2018-11-12 PROCEDURE — 90682 RIV4 VACC RECOMBINANT DNA IM: CPT | Performed by: INTERNAL MEDICINE

## 2018-11-12 PROCEDURE — 83036 HEMOGLOBIN GLYCOSYLATED A1C: CPT | Performed by: INTERNAL MEDICINE

## 2018-11-12 PROCEDURE — 90471 IMMUNIZATION ADMIN: CPT | Performed by: INTERNAL MEDICINE

## 2018-11-12 ASSESSMENT — PATIENT HEALTH QUESTIONNAIRE - PHQ9: SUM OF ALL RESPONSES TO PHQ QUESTIONS 1-9: 3

## 2018-11-12 NOTE — MR AVS SNAPSHOT
After Visit Summary   11/12/2018    Maxine Sears    MRN: [id number]           Patient Information     Date Of Birth          1955        Visit Information        Provider Department      11/12/2018 11:30 AM Yuval Jama MD [facility]        Today's Diagnoses     Hyperlipidemia LDL goal <130    -  1    Screen for colon cancer        Visit for screening mammogram        Need for prophylactic vaccination and inoculation against influenza        Blood glucose elevated        Essential hypertension          Care Instructions    Labs as ordered  Flu vaccine   Check with insurance or speak with your pharmacist re: Shingrix vaccine coverage for shingles prevention.  This is a 2 shot series done 2-6 months apart  Walking exercise in the hallways of the building or try stationary bike or swimming if able to get access to a pool  See me in 6 months for follow-up or earlier as needed                 Follow-ups after your visit        Future tests that were ordered for you today     Open Future Orders        Priority Expected Expires Ordered    MA SCREENING DIGITAL BILAT - Future  (s+30) Routine  11/8/2019 11/12/2018    Basic metabolic panel Routine 10/13/2019 11/12/2019 11/12/2018    Fecal colorectal cancer screen (FIT) Routine 12/3/2018 2/4/2019 11/12/2018            Who to contact     If you have questions or need follow up information about today's clinic visit or your schedule please contact [facility] directly at [phone].  Normal or non-critical lab and imaging results will be communicated to you by MyChart, letter or phone within 4 business days after the clinic has received the results. If you do not hear from us within 7 days, please contact the clinic through MyChart or phone. If you have a critical or abnormal lab result, we will notify you by phone as soon as possible.  Submit refill requests through mInfo or call your pharmacy and  "they will forward the refill request to us. Please allow 3 business days for your refill to be completed.          Additional Information About Your Visit        MyChart Information     [employer] lets you send messages to your doctor, view your test results, renew your prescriptions, schedule appointments and more. To sign up, go to [email or web address] . Click on \"Log in\" on the left side of the screen, which will take you to the Welcome page. Then click on \"Sign up Now\" on the right side of the page.     You will be asked to enter the access code listed below, as well as some personal information. Please follow the directions to create your username and password.     Your access code is: [email or web address]  Expires: 2/10/2019 12:08 PM     Your access code will  in 90 days. If you need help or a new code, please call your [facility] clinic or [phone].        Care EveryWhere ID     This is your Care EveryWhere ID. This could be used by other organizations to access your [facility] medical records  [id number]        Your Vitals Were     Pulse Temperature Respirations Pulse Oximetry BMI (Body Mass Index)       62 98.7  F (37.1  C) (Oral) 16 96% 46.67 kg/m2        Blood Pressure from Last 3 Encounters:   18 120/78   08/10/18 120/80   18 126/84    Weight from Last 3 Encounters:   18 272 lb (123.4 kg)   08/10/18 272 lb (123.4 kg)   18 268 lb (121.6 kg)              We Performed the Following     FLU VACCINE, (RIV4) RECOMBINANT HA  , IM (FluBlok, egg free) [01133]- >18 YRS (FMG recommended  50-64 YRS)     Hemoglobin A1c     Vaccine Administration, Initial [85989]        Primary Care Provider Office Phone # Fax #    Yuval Jama [phone]       [address] [facility] [address]        Goals        General    My goal is to be able to afford my medications: [facility] will [facility] the [facility]  (pt-stated)     Notes - Note created  2015  4:08 PM by Rm, " KRISTOPHER Nugent    As of today's date 1/19/2015 goal is met at 0 - 25%.   Goal Status:  Active        Equal Access to Services     ALANALOBO ERASMO : Hadii nicole khan tori Daily, wajermaineda lubryan, nicolasta katcda karen, jay luna laomardavid jyoti. So [facility] [phone].    ATENCIÓN: Si habla español, tiene a roman disposición servicios gratuitos de asistencia lingüística. Llame al [phone].    We comply with applicable federal civil rights laws and [place] laws. We do not discriminate on the basis of race, color, national origin, age, disability, sex, sexual orientation, or gender identity.            Thank you!     Thank you for choosing [facility]  for your care. Our goal is always to provide you with excellent care. Hearing back from our patients is one way we can continue to improve our services. Please take a few minutes to complete the written survey that you may receive in the mail after your visit with us. Thank you!             Your Updated Medication List - Protect others around you: Learn how to safely use, store and throw away your medicines at www.disposemymeds.org.          This list is accurate as of 11/12/18 12:08 PM.  Always use your most recent med list.                   Brand Name Dispense Instructions for use Diagnosis    albuterol 108 (90 Base) MCG/ACT inhaler    PROAIR HFA    1 Inhaler    Inhale 2 puffs into the lungs every 4 hours as needed for shortness of breath / dyspnea or wheezing    Bronchitis with bronchospasm       atenolol 50 MG tablet    TENORMIN    90 tablet    Take 1 tablet (50 mg) by mouth daily    Paroxysmal atrial fibrillation (H)       flecainide acetate 150 MG Tabs     90 tablet    Take 1/2 tab (75 mg) twice daily by mouth    Paroxysmal atrial fibrillation (H)       gabapentin 300 MG capsule    NEURONTIN    270 capsule    TAKE ONE CAPSULE BY MOUTH EVERY MORNING AND TAKE TWO CAPSULES BY MOUTH EVERY NIGHT AT BEDTIME    Bilateral low back  pain with bilateral sciatica, unspecified chronicity       * levothyroxine 88 MCG tablet    SYNTHROID/LEVOTHROID    90 tablet    1 tab daily in addition to Levothyroxine 75mcg daily so total daily dose = 163mcg daily    Hypothyroidism, unspecified type       * levothyroxine 75 MCG tablet    SYNTHROID/LEVOTHROID    90 tablet    1 tab daily in addition to Levothyroxine 88mcg daily so total daily dose = 163mcg daily    Hypothyroidism, unspecified type       losartan 100 MG tablet    COZAAR    90 tablet    Take 1 tablet (100 mg) by mouth daily    Essential hypertension       order for DME      DREAMSTATION 5-15 CM/H20 NASAL AIRFIT N10 HER        pantoprazole 40 MG EC tablet    PROTONIX    90 tablet    Take 1 tablet (40 mg) by mouth daily Take 30-60 minutes before a meal.    Gastroesophageal reflux disease, esophagitis presence not specified       rivaroxaban ANTICOAGULANT 20 MG Tabs tablet    XARELTO    90 tablet    Take 1 tablet (20 mg) by mouth daily (with dinner)    Paroxysmal atrial fibrillation (H)       SUMAtriptan 100 MG tablet    IMITREX    10 tablet    Take 1 tablet (100 mg) by mouth at onset of headache for migraine May repeat in 2 hours if needed: max 2/day;    Migraine without aura and without status migrainosus, not intractable       triamterene-hydrochlorothiazide 75-50 MG per tablet    MAXZIDE    45 tablet    TAKE ONE-HALF TABLET ONCE DAILY    Essential hypertension       * Notice:  This list has 2 medication(s) that are the same as other medications prescribed for you. Read the directions carefully, and ask your doctor or other care provider to review them with you.

## 2018-11-12 NOTE — LETTER
"[facility]  [address] [address]  [phone]      2/10/2019       Maxine Sears  [address]  [facility] [address]        Dear Maxine,  Review of your chart shows you will be due for follow-up laboratory monitoring studies in  March  with your history of mild cholesterol and blood sugar elevations  Call our appointment desk at [phone] to schedule a  fasting  \"lab only\"  appointment at that time  For fasting labs, please refrain from eating for 8 hours or more. You may drink water and take medications.   Please also make an appointment to see me a few days after the lab test is drawn  to review the results and follow-up on other medical issues as necessary.   In the meantime, reduce carbohydrate (sugars, starchy foods such as bread, rice, potato, pasta, etc) intake  in your diet, increase the amount of color on your plate with fruits, vegetables and lean meats and increase frequency of walking or other exercise   as able.    Sincerely,      Yuval Jama MD  Internal Medicine      "

## 2018-11-12 NOTE — PATIENT INSTRUCTIONS
Labs as ordered  Flu vaccine   Check with insurance or speak with your pharmacist re: Shingrix vaccine coverage for shingles prevention.  This is a 2 shot series done 2-6 months apart  Walking exercise in the hallways of the building or try stationary bike or swimming if able to get access to a pool   Reduce calorie/carbohydrate (sugar, bread, potato, pasta, rice, etc)  intake  in diet for weight loss, increase color on your plate with fruits and vegetables and meats   See me in 6 months for follow-up or earlier as needed  FIT stool test   Mammogram

## 2018-11-14 DIAGNOSIS — Z12.11 SCREEN FOR COLON CANCER: ICD-10-CM

## 2018-11-14 PROCEDURE — 82274 ASSAY TEST FOR BLOOD FECAL: CPT | Performed by: INTERNAL MEDICINE

## 2018-11-15 LAB — HEMOCCULT STL QL IA: NEGATIVE

## 2018-11-26 ENCOUNTER — TELEPHONE (OUTPATIENT)
Dept: INTERNAL MEDICINE | Facility: CLINIC | Age: 63
End: 2018-11-26

## 2018-11-26 NOTE — LETTER
[facility]  [address] [address]  [phone]  November 26, 2018    Maxine Sears  [address]  [facility] [address]    Dear Maxine,    We care about your health and based on a review of your medical records, recommend the the following, to better manage your health:      You are in particular need of attention regarding:  -Depression/Anxiety    I am recommending that you:     Please complete the enclosed PHQ9 and mail back to clinic in the envelope provided before January 31, 2019.        Here is a list of Health Maintenance topics that are due now or due soon:  Health Maintenance Due   Topic Date Due     MIGRAINE ACTION PLAN  08/23/1973     Colon Cancer Screening - every 10 years.  08/23/2005     Discuss Advance Directive Planning  08/23/2010     Mammogram - every 2 years  09/15/2017       Please call us at [phone] or [phone] (or use [employer]) to address the above recommendations.     Thank you for trusting [facility].  We appreciate the opportunity to serve you and look forward to supporting your healthcare needs in the future.    If you have (or plan to have) any of these tests done at a facility other than a [facility] or a [facility], please have the results from these tests sent to your primary physician at [facility].    Healthy Regards,    Yuval Jama MD/Yajaira Kennedy CMA

## 2018-11-26 NOTE — TELEPHONE ENCOUNTER
Panel Management Review      Patient has the following on her problem list:     Depression / Dysthymia review    Measure:  Needs PHQ-9 score of 4 or less during index window.  Administer PHQ-9 and if score is 5 or more, send encounter to provider for next steps.    5 - 7 month window range: 6    PHQ-9 SCORE 5/31/2018 6/29/2018 11/12/2018   Total Score - - -   Total Score 12 2 3       If PHQ-9 recheck is 5 or more, route to provider for next steps.    Patient is due for:  PHQ9      Composite cancer screening  Chart review shows that this patient is due/due soon for the following None  Summary:    Patient is due/failing the following:   PHQ9    Action needed:   Patient needs to do PHQ9.    Type of outreach:    Sent letter.    Questions for provider review:    None                                                                                                                                    Yajaira Kennedy CMA

## 2019-01-15 ENCOUNTER — TELEPHONE (OUTPATIENT)
Dept: INTERNAL MEDICINE | Facility: CLINIC | Age: 64
End: 2019-01-15

## 2019-01-15 NOTE — TELEPHONE ENCOUNTER
Prior Authorization Retail Medication Request    Medication/Dose: pantoprazole sodium  ICD code (if different than what is on RX):  K21.9  Previously Tried and Failed:    Rationale:      Insurance Name:  [employer] [phone]  Insurance ID:  [id number]    KEY: [id number]    Pharmacy Information (if different than what is on RX)  Name:  [address]  Phone:  [phone]

## 2019-01-21 NOTE — TELEPHONE ENCOUNTER
Central Prior Authorization Team   Phone: [phone]      [place] Initiation    Medication: pantoprazole sodium  Insurance Company: [facility] - Phone [phone] Fax [phone]  Pharmacy Filling the Rx: [facility] [id number] - [place], [place] - [address] [place] [address] AV  Filling Pharmacy Phone: [phone]  Filling Pharmacy Fax:    Start Date: 1/21/2019

## 2019-01-22 NOTE — TELEPHONE ENCOUNTER
PRIOR AUTHORIZATION DENIED    Medication: pantoprazole sodium    Denial Date: 1/21/2019    Denial Rational:  Plan limits one tablet per day for a maximum of 120 days within 365.      Appeal Information:    If you would like to appeal, please supply P/A team with a letter of medical necessity with clinical reason.

## 2019-02-03 ENCOUNTER — HOSPITAL ENCOUNTER (EMERGENCY)
Facility: CLINIC | Age: 64
Discharge: HOME | End: 2019-02-04
Attending: EMERGENCY MEDICINE | Admitting: EMERGENCY MEDICINE
Payer: COMMERCIAL

## 2019-02-03 DIAGNOSIS — I48.91 ATRIAL FIBRILLATION WITH RVR (H): ICD-10-CM

## 2019-02-03 LAB
ANION GAP SERPL CALCULATED.3IONS-SCNC: 9 MMOL/L (ref 3–14)
BUN SERPL-MCNC: 17 MG/DL (ref 7–30)
CALCIUM SERPL-MCNC: 8.9 MG/DL (ref 8.5–10.1)
CHLORIDE SERPL-SCNC: 101 MMOL/L (ref 94–109)
CO2 SERPL-SCNC: 25 MMOL/L (ref 20–32)
CREAT SERPL-MCNC: 0.82 MG/DL (ref 0.52–1.04)
ERYTHROCYTE [DISTWIDTH] IN BLOOD BY AUTOMATED COUNT: 15 % (ref 10–15)
GFR SERPL CREATININE-BSD FRML MDRD: 76 ML/MIN/{1.73_M2}
GLUCOSE SERPL-MCNC: 119 MG/DL (ref 70–99)
HCT VFR BLD AUTO: 38.1 % (ref 35–47)
HGB BLD-MCNC: 12.6 G/DL (ref 11.7–15.7)
INTERPRETATION ECG - MUSE: NORMAL
MCH RBC QN AUTO: 28.1 PG (ref 26.5–33)
MCHC RBC AUTO-ENTMCNC: 33.1 G/DL (ref 31.5–36.5)
MCV RBC AUTO: 85 FL (ref 78–100)
PLATELET # BLD AUTO: 416 10E9/L (ref 150–450)
POTASSIUM SERPL-SCNC: 3.5 MMOL/L (ref 3.4–5.3)
RBC # BLD AUTO: 4.49 10E12/L (ref 3.8–5.2)
SODIUM SERPL-SCNC: 135 MMOL/L (ref 133–144)
TSH SERPL DL<=0.005 MIU/L-ACNC: 0.48 MU/L (ref 0.4–4)
WBC # BLD AUTO: 12.3 10E9/L (ref 4–11)

## 2019-02-03 PROCEDURE — 96374 THER/PROPH/DIAG INJ IV PUSH: CPT

## 2019-02-03 PROCEDURE — 85027 COMPLETE CBC AUTOMATED: CPT | Performed by: EMERGENCY MEDICINE

## 2019-02-03 PROCEDURE — 25000128 H RX IP 250 OP 636: Performed by: EMERGENCY MEDICINE

## 2019-02-03 PROCEDURE — 96361 HYDRATE IV INFUSION ADD-ON: CPT

## 2019-02-03 PROCEDURE — 99285 EMERGENCY DEPT VISIT HI MDM: CPT | Mod: 25

## 2019-02-03 PROCEDURE — 93005 ELECTROCARDIOGRAM TRACING: CPT

## 2019-02-03 PROCEDURE — 25000125 ZZHC RX 250: Performed by: EMERGENCY MEDICINE

## 2019-02-03 PROCEDURE — 99152 MOD SED SAME PHYS/QHP 5/>YRS: CPT

## 2019-02-03 PROCEDURE — 84443 ASSAY THYROID STIM HORMONE: CPT | Performed by: EMERGENCY MEDICINE

## 2019-02-03 PROCEDURE — 80048 BASIC METABOLIC PNL TOTAL CA: CPT | Performed by: EMERGENCY MEDICINE

## 2019-02-03 PROCEDURE — 92960 CARDIOVERSION ELECTRIC EXT: CPT

## 2019-02-03 RX ORDER — METOPROLOL TARTRATE 1 MG/ML
5 INJECTION, SOLUTION INTRAVENOUS
Status: COMPLETED | OUTPATIENT
Start: 2019-02-03 | End: 2019-02-03

## 2019-02-03 RX ORDER — SODIUM CHLORIDE 9 MG/ML
1000 INJECTION, SOLUTION INTRAVENOUS CONTINUOUS
Status: DISCONTINUED | OUTPATIENT
Start: 2019-02-03 | End: 2019-02-04 | Stop reason: HOSPADM

## 2019-02-03 RX ADMIN — METOPROLOL TARTRATE 5 MG: 5 INJECTION, SOLUTION INTRAVENOUS at 23:30

## 2019-02-03 RX ADMIN — SODIUM CHLORIDE 1000 ML: 9 INJECTION, SOLUTION INTRAVENOUS at 23:39

## 2019-02-03 RX ADMIN — METOPROLOL TARTRATE 5 MG: 5 INJECTION, SOLUTION INTRAVENOUS at 23:43

## 2019-02-03 RX ADMIN — METOPROLOL TARTRATE 5 MG: 5 INJECTION, SOLUTION INTRAVENOUS at 23:48

## 2019-02-03 ASSESSMENT — MIFFLIN-ST. JEOR: SCORE: 1769.25

## 2019-02-03 ASSESSMENT — ENCOUNTER SYMPTOMS
FEVER: 0
COUGH: 1
PALPITATIONS: 1
RHINORRHEA: 1
SHORTNESS OF BREATH: 1

## 2019-02-03 NOTE — ED AVS SNAPSHOT
Emergency Department  [address] [place] [address]  Phone:  [phone]  Fax:  [phone]                                    Maxine Sears   MRN: [id number]    Department:   Emergency Department   Date of Visit:  2/3/2019           After Visit Summary Signature Page    I have received my discharge instructions, and my questions have been answered. I have discussed any challenges I see with this plan with the nurse or doctor.    ..........................................................................................................................................  Patient/Patient Representative Signature      ..........................................................................................................................................  Patient Representative Print Name and Relationship to Patient    ..................................................               ................................................  Date                                   Time    ..........................................................................................................................................  Reviewed by Signature/Title    ...................................................              ..............................................  Date                                               Time          22EPIC Rev 08/18

## 2019-02-04 VITALS
BODY MASS INDEX: 46.26 KG/M2 | DIASTOLIC BLOOD PRESSURE: 76 MMHG | OXYGEN SATURATION: 99 % | TEMPERATURE: 98.8 F | HEART RATE: 66 BPM | RESPIRATION RATE: 18 BRPM | SYSTOLIC BLOOD PRESSURE: 137 MMHG | WEIGHT: 271 LBS | HEIGHT: 64 IN

## 2019-02-04 PROCEDURE — 25000128 H RX IP 250 OP 636: Performed by: EMERGENCY MEDICINE

## 2019-02-04 RX ORDER — PROPOFOL 10 MG/ML
61-100 INJECTION, EMULSION INTRAVENOUS ONCE
Status: COMPLETED | OUTPATIENT
Start: 2019-02-04 | End: 2019-02-04

## 2019-02-04 RX ORDER — FLUMAZENIL 0.1 MG/ML
0.2 INJECTION, SOLUTION INTRAVENOUS
Status: DISCONTINUED | OUTPATIENT
Start: 2019-02-04 | End: 2019-02-04 | Stop reason: HOSPADM

## 2019-02-04 RX ORDER — NALOXONE HYDROCHLORIDE 0.4 MG/ML
.1-.4 INJECTION, SOLUTION INTRAMUSCULAR; INTRAVENOUS; SUBCUTANEOUS
Status: DISCONTINUED | OUTPATIENT
Start: 2019-02-04 | End: 2019-02-04 | Stop reason: HOSPADM

## 2019-02-04 RX ADMIN — PROPOFOL 80 MG: 10 INJECTION, EMULSION INTRAVENOUS at 01:12

## 2019-02-04 NOTE — ED NOTES
Pt was sedated for cardioversion. Initial bolus of propofol 40mg given at 0112 and shocked with 150J synced. Initially converted to NSR, then reverted to a-fib. 40mg bolus of propofol redosed at 0114 and shocked again with 150J synced. Pt converted to NSR, rate of 67.

## 2019-02-04 NOTE — ED NOTES
Pt discharged from ED. Pt unable to get ride home. No taxis running due to weather. Pt will stay in room 7 and continue to try and get transportation home.

## 2019-02-04 NOTE — ED PROVIDER NOTES
History     Chief Complaint:  Palpitations     HPI   Maxine Sears is a 63 year old female with history of Afib, on Xarelto, hypertension, hyperlipidemia, hypothyroidism, and NELY, who presents for evaluation of palpitations. She reports being more often in a regular rhythm than in Afib. At 2100 tonight, she felt that her heart was racing and normally slows back down on its own, but as it did not slow she called EMS. Per EMS, she was in atrial fibrillation with RVR, rate in the 120's, and hypertensive to the 170's. In the past, when she has needed conversion, she reports responding to medications well. Endorses mild shortness of breath and needing to take a deeper breath every once in a while, recent runny nose, cough, and has been taking OTC medications. She has been eating and drinking appropriately in the past few days, and recently urine has been clear. Denies chest pain, or leg swelling. Denies any alcohol or caffeine use.     Allergies:  Morphine  Ceclor [Cefaclor]  Diltiazem  Lisinopril  Sertraline      Medications:    albuterol (PROAIR HFA) 108 (90 BASE) MCG/ACT Inhaler  atenolol (TENORMIN) 50 MG tablet  flecainide acetate 150 MG TABS  gabapentin (NEURONTIN) 300 MG capsule  levothyroxine (SYNTHROID/LEVOTHROID) 88 MCG tablet  losartan (COZAAR) 100 MG tablet  pantoprazole (PROTONIX) 40 MG EC tablet  rivaroxaban ANTICOAGULANT (XARELTO) 20 MG TABS tablet  SUMAtriptan (IMITREX) 100 MG tablet  triamterene-hydrochlorothiazide (MAXZIDE) 75-50 MG per tablet    Past Medical History:    Atrial fibrillation and flutter  Depression  Hypertension  GERD (gastroesophageal reflux disease)  Hiatal hernia  Hyperlipidemia  Hypothyroidism  Migraines  Morbid obesity  NELY on CPAP  Osteoarthritis  Restless legs syndrome  Symptomatic menopausal female  TMJ disease    Past Surgical History:    Appendectomy  Cholecystectomy   Hysterectomy, DOM  Tonsillectomy     Family History:    Myocardial infarction  Heart failure  Heart  "bypass  Hyperlipidemia  Sleep apnea    Social History:  Relationship status:   Tobacco use: Former, 0.25 PPD  Alcohol use: No  The patient presents via ambulance.    Marital Status:   [5]     Review of Systems   Constitutional: Negative for fever.   HENT: Positive for rhinorrhea.    Respiratory: Positive for cough and shortness of breath.    Cardiovascular: Positive for palpitations. Negative for chest pain and leg swelling.   All other systems reviewed and are negative.    Physical Exam     Patient Vitals for the past 24 hrs:   BP Temp Temp src Pulse Heart Rate Resp SpO2 Height Weight   02/04/19 0508 137/76 -- -- -- 74 18 99 % -- --   02/04/19 0125 -- -- -- -- -- 14 98 % -- --   02/04/19 0122 145/82 -- -- -- -- 17 100 % -- --   02/04/19 0118 150/85 -- -- 66 -- 19 100 % -- --   02/04/19 0116 148/85 -- -- 67 -- (!) 34 100 % -- --   02/04/19 0115 -- -- -- 67 -- -- -- -- --   02/04/19 0112 (!) 157/109 -- -- 117 -- 15 100 % -- --   02/04/19 0110 (!) 160/130 -- -- 131 -- 17 97 % -- --   02/04/19 0109 (!) 156/106 -- -- -- -- 17 98 % -- --   02/04/19 0000 (!) 151/101 -- -- 103 115 22 98 % -- --   02/03/19 2340 (!) 161/111 -- -- 124 129 17 98 % -- --   02/03/19 2320 (!) 186/114 -- -- 124 125 11 95 % -- --   02/03/19 2307 (!) 172/117 98.8  F (37.1  C) Oral 111 -- 26 96 % 1.626 m (5' 4\") 122.9 kg (271 lb)        Physical Exam  General: Alert and cooperative with exam. Patient in mild distress. Normal mentation.  Head:  Scalp is NC/AT  Eyes:  No scleral icterus, PERRL  ENT:  The external nose and ears are normal. The oropharynx is normal and without erythema; mucus membranes are moist. Uvula midline, no evidence of deep space infection.  Neck:  Normal range of motion without rigidity.  CV:  Tachycardic rate and irregularly irregular rhythm     Resp:  Breath sounds are clear bilaterally    Non-labored, no retractions or accessory muscle use  GI:  Obese. Abdomen is soft, no distension, no tenderness. No peritoneal " signs  MS:  No lower extremity edema   Skin:  Warm and dry, No rash or lesions noted.  Neuro: Oriented x 3. No gross motor deficits.    Emergency Department Course     ECG #1:  Indication: Palpitations  Completed at 2306.  Read at 2315.   Rate 129 bpm. MS interval *. QRS duration 88. QT/QTc 320/468. P-R-T axes *  53  -46.  Atrial fibrillation with rapid ventricular response. Marked ST abnormality, possible interior subendocardial injury. Abnormal ECG. Changes noted above compared to EKG dated 7/13/15.     ECG #2:  Indication: Post-cardioversion  Completed at 0118.  Read at 0120.   Rate 67 bpm. MS interval 200. QRS duration 88. QT/QTc 414/437. P-R-T axes 44  34  35.  Normal sinus rhythm. Normal ECG.       Laboratory:  Laboratory findings were communicated with the patient who voiced understanding of the findings.    CBC: WBC: 12.3(H) o/w WNL (HGB 12.6, )  BMP: Glucose: 119(H) o/w WNL (Creatinine 0.82)   TSH with free T4 reflex: 0.48     Procedures:  [facility] Procedure Note      Sedation:      Performed by: Jay Charles DO  Authorized by: Jay Charles DO    Pre-Procedure Assessment done at 2330.    Expected Level:  Moderate Sedation    Indication:  Sedation is required to allow for Cardioversion    Consent obtained from patient after discussing the risks, benefits and alternatives.    PO Intake:  Appropriately NPO for procedure    ASA Class:  Class 2 - MILD SYSTEMIC DISEASE, NO ACUTE PROBLEMS, NO FUNCTIONAL LIMITATIONS.    Mallampati:  Grade 2:  Soft palate, base of uvula, tonsillar pillars, and portion of posterior pharyngeal wall visible    Lungs: Lungs Clear with good breath sounds bilaterally.     Heart: normal heart sounds with tachycardia    History and physical reviewed and no updates needed. I have reviewed the lab findings, diagnostic data, medications, and the plan for sedation. I have determined this patient to be an appropriate candidate for the planned  sedation and procedure and have reassessed the patient IMMEDIATELY PRIOR to sedation and procedure.    Sedation Post Procedure Summary:    Prior to the start of the procedure and with procedural staff participation, I verbally confirmed the patient s identity using two indicators, relevant allergies, that the procedure was appropriate and matched the consent or emergent situation, and that the correct equipment/implants were available. Immediately prior to starting the procedure I conducted the Time Out with the procedural staff and re-confirmed the patient s name, procedure, and site/side. (The [facility] universal protocol was followed.)  Yes      Sedatives: Propofol Total of 80 mg; 40mg then first shock. Patient reverted to A fib and was provided 20 mg +20 mg propofol and shocked again    Vital signs, airway, End Tidal CO2 and pulse oximetry were monitored and remained stable throughout the procedure and sedation was maintained until the procedure was complete.  The patient was monitored by staff until sedation discharge criteria were met.    Patient tolerance: Patient tolerated the procedure well with no immediate complications.    Time of sedation in minutes:  10 minutes from beginning to end of physician one to one monitoring.       Electrical Cardioversion Procedure Note:         Indication:  Atrial Fibrillation        Consent:  Risks (including but not limited to: arrhythmia, stroke or death),  benefits and alternatives were discussed with patient and consent for procedure was obtained.      Timeout:  Universal protocol was followed.  TIME OUT conducted just    Prior to starting procedure confirmed patient identity, site/side, procedure    Patient position, and availability of correct equipment and implants:    Yes      Medication: Propofol (Diprivan) total of 80 mg. 40mg then first shock. Patient reverted to A fib and was provided 20 mg +20 mg propofol and shocked again      Procedure note:   Electrodes were placed  in the anterior/posterior position,   Trial 1:  40 mg Propofol, Synchronized shock at  150 was successful however after 1 minute the patient reverted to atrial fibrillation and Trial 2:  20 mg + 20 mg Propofol, Synchronized shock at  150  was successful    Patient Status:  Patient tolerated the procedure well.  There were no complications.      Interventions:  2339 Normal Saline 1000 mL IV   2348 Metoprolol 5 mg IV x3  0112 Propofol 80 mg total IV    Emergency Department Course:  Nursing notes and vitals reviewed.  EKG obtained in the ED, see results above.    IV was inserted and blood was drawn for laboratory testing, results above.     2324 I performed an exam of the patient as documented above.   0023 I reassessed the patient.   0107  TIME OUT  0110 , O2 97%, /130  0111  40 mg Propofol pushed IV  0112  Shock charged, and delivered at 150 Joules.   0112 HR 71, O2 100%, sinus rhythm  0113  , Afib, /109  0114 20 mg Propofol pushed  0115  20 mg Propofol pushed  0115 Charged to 150 Joules, and shock delivered. HR 68.  0117  /65, HR 70, O2 100%.  0118 Repeat EKG obtained in the ED, see results above.    0225  I reassessed the patient.     Findings and plan explained to the patient. Patient discharged home with instructions regarding supportive care, medications, and reasons to return. The importance of close follow-up was reviewed.      I personally reviewed the laboratory results with the Patient and answered all related questions prior to discharge.    Impression & Plan      Medical Decision Making:  Maxine Sears is a 63 year old female who presents for evaluation of palpitations, with very clear onset and is anticoagulated on Xarelto (compliant with anticoagulation).  This is consistent with atrial fibrillation with rapid ventricular response.  Based on acute symptoms less than 48 hours, very clear story, good historian and after obtaining informed consent,  electrical cardioversion was successful in converting rhythm back to normal sinus.   I doubt acute coronary syndrome, thyroid issues, PE, dissection, drug ingestion, acute electrolyte imbalance, etc.  Labs without significant findings as noted above.  Repeat EKG demonstrates normal sinus rhythm. She is asymptomatic after cardioversion now and would not hospitalize. Discussed with patient and she is in agreement.  Recommended close follow-up with her electrophysiologist and continued compliance with medications.  Recommended avoidance of over-the-counter cough and cold medicines as well as stimulants or alcohol.  Return precautions discussed.  Patient discharged home.    Diagnosis:    ICD-10-CM    1. Atrial fibrillation with RVR (H) I48.91        Disposition:   Discharged to home       Scribe Disclosure:  I, Rama Boston, am serving as a scribe at 11:27 PM on 2/3/2019 to document services personally performed by Jay Charles DO, based on my observations and the provider's statements to me.       Michel Marvin  2/3/2019    EMERGENCY DEPARTMENT       Jay Charles DO  02/04/19 0828

## 2019-02-04 NOTE — ED NOTES
Bed: ED13  Expected date:   Expected time:   Means of arrival:   Comments:  Pete 535- 63F- A-adalberto with RVR

## 2019-03-02 ENCOUNTER — HOSPITAL ENCOUNTER (INPATIENT)
Facility: CLINIC | Age: 64
LOS: 3 days | Discharge: HOME | End: 2019-03-05
Attending: EMERGENCY MEDICINE | Admitting: INTERNAL MEDICINE
Payer: MEDICAID

## 2019-03-02 DIAGNOSIS — K92.2 GASTROINTESTINAL HEMORRHAGE, UNSPECIFIED GASTROINTESTINAL HEMORRHAGE TYPE: ICD-10-CM

## 2019-03-02 DIAGNOSIS — K92.2 ACUTE GI BLEEDING: Primary | ICD-10-CM

## 2019-03-02 DIAGNOSIS — I10 ESSENTIAL HYPERTENSION: ICD-10-CM

## 2019-03-02 LAB
ABO + RH BLD: NORMAL
ABO + RH BLD: NORMAL
ALBUMIN SERPL-MCNC: 3.5 G/DL (ref 3.4–5)
ALP SERPL-CCNC: 92 U/L (ref 40–150)
ALT SERPL W P-5'-P-CCNC: 20 U/L (ref 0–50)
ANION GAP SERPL CALCULATED.3IONS-SCNC: 6 MMOL/L (ref 3–14)
AST SERPL W P-5'-P-CCNC: 15 U/L (ref 0–45)
BILIRUB SERPL-MCNC: 0.2 MG/DL (ref 0.2–1.3)
BLD GP AB SCN SERPL QL: NORMAL
BLOOD BANK CMNT PATIENT-IMP: NORMAL
BUN SERPL-MCNC: 40 MG/DL (ref 7–30)
CALCIUM SERPL-MCNC: 9.2 MG/DL (ref 8.5–10.1)
CHLORIDE SERPL-SCNC: 109 MMOL/L (ref 94–109)
CO2 SERPL-SCNC: 25 MMOL/L (ref 20–32)
CREAT SERPL-MCNC: 0.76 MG/DL (ref 0.52–1.04)
ERYTHROCYTE [DISTWIDTH] IN BLOOD BY AUTOMATED COUNT: 15.5 % (ref 10–15)
GFR SERPL CREATININE-BSD FRML MDRD: 83 ML/MIN/{1.73_M2}
GLUCOSE SERPL-MCNC: 127 MG/DL (ref 70–99)
HCT VFR BLD AUTO: 33.4 % (ref 35–47)
HGB BLD-MCNC: 10.7 G/DL (ref 11.7–15.7)
INR PPP: 1.78 (ref 0.86–1.14)
MCH RBC QN AUTO: 27.6 PG (ref 26.5–33)
MCHC RBC AUTO-ENTMCNC: 32 G/DL (ref 31.5–36.5)
MCV RBC AUTO: 86 FL (ref 78–100)
PLATELET # BLD AUTO: 403 10E9/L (ref 150–450)
POTASSIUM SERPL-SCNC: 4.2 MMOL/L (ref 3.4–5.3)
PROT SERPL-MCNC: 7.6 G/DL (ref 6.8–8.8)
RBC # BLD AUTO: 3.88 10E12/L (ref 3.8–5.2)
SODIUM SERPL-SCNC: 140 MMOL/L (ref 133–144)
SPECIMEN EXP DATE BLD: NORMAL
TROPONIN I SERPL-MCNC: <0.015 UG/L (ref 0–0.04)
WBC # BLD AUTO: 13.2 10E9/L (ref 4–11)

## 2019-03-02 PROCEDURE — 86901 BLOOD TYPING SEROLOGIC RH(D): CPT | Performed by: EMERGENCY MEDICINE

## 2019-03-02 PROCEDURE — 99285 EMERGENCY DEPT VISIT HI MDM: CPT | Mod: 25

## 2019-03-02 PROCEDURE — 84484 ASSAY OF TROPONIN QUANT: CPT | Performed by: EMERGENCY MEDICINE

## 2019-03-02 PROCEDURE — 25000555 ZZHC RX FACTOR IP 250 OP 636: Performed by: EMERGENCY MEDICINE

## 2019-03-02 PROCEDURE — 25000128 H RX IP 250 OP 636: Performed by: INTERNAL MEDICINE

## 2019-03-02 PROCEDURE — 99223 1ST HOSP IP/OBS HIGH 75: CPT | Mod: AI | Performed by: INTERNAL MEDICINE

## 2019-03-02 PROCEDURE — 25000132 ZZH RX MED GY IP 250 OP 250 PS 637: Performed by: INTERNAL MEDICINE

## 2019-03-02 PROCEDURE — 96361 HYDRATE IV INFUSION ADD-ON: CPT

## 2019-03-02 PROCEDURE — 96375 TX/PRO/DX INJ NEW DRUG ADDON: CPT

## 2019-03-02 PROCEDURE — 25000128 H RX IP 250 OP 636: Performed by: EMERGENCY MEDICINE

## 2019-03-02 PROCEDURE — 93005 ELECTROCARDIOGRAM TRACING: CPT

## 2019-03-02 PROCEDURE — 85610 PROTHROMBIN TIME: CPT | Performed by: EMERGENCY MEDICINE

## 2019-03-02 PROCEDURE — 85018 HEMOGLOBIN: CPT | Performed by: INTERNAL MEDICINE

## 2019-03-02 PROCEDURE — 21000001 ZZH R&B HEART CARE

## 2019-03-02 PROCEDURE — C9113 INJ PANTOPRAZOLE SODIUM, VIA: HCPCS | Performed by: EMERGENCY MEDICINE

## 2019-03-02 PROCEDURE — 86900 BLOOD TYPING SEROLOGIC ABO: CPT | Performed by: EMERGENCY MEDICINE

## 2019-03-02 PROCEDURE — 25800030 ZZH RX IP 258 OP 636: Performed by: INTERNAL MEDICINE

## 2019-03-02 PROCEDURE — 86850 RBC ANTIBODY SCREEN: CPT | Performed by: EMERGENCY MEDICINE

## 2019-03-02 PROCEDURE — 85027 COMPLETE CBC AUTOMATED: CPT | Performed by: EMERGENCY MEDICINE

## 2019-03-02 PROCEDURE — C9132 KCENTRA, PER I.U.: HCPCS | Performed by: EMERGENCY MEDICINE

## 2019-03-02 PROCEDURE — C9113 INJ PANTOPRAZOLE SODIUM, VIA: HCPCS | Performed by: INTERNAL MEDICINE

## 2019-03-02 PROCEDURE — 80053 COMPREHEN METABOLIC PANEL: CPT | Performed by: EMERGENCY MEDICINE

## 2019-03-02 PROCEDURE — 96374 THER/PROPH/DIAG INJ IV PUSH: CPT

## 2019-03-02 PROCEDURE — 99207 ZZC CDG-MDM COMPONENT: MEETS MODERATE - UP CODED: CPT | Performed by: INTERNAL MEDICINE

## 2019-03-02 PROCEDURE — 36415 COLL VENOUS BLD VENIPUNCTURE: CPT | Performed by: INTERNAL MEDICINE

## 2019-03-02 RX ORDER — GABAPENTIN 300 MG/1
900 CAPSULE ORAL AT BEDTIME
COMMUNITY
End: 2019-10-07

## 2019-03-02 RX ORDER — ONDANSETRON 2 MG/ML
4 INJECTION INTRAMUSCULAR; INTRAVENOUS
Status: DISCONTINUED | OUTPATIENT
Start: 2019-03-02 | End: 2019-03-02

## 2019-03-02 RX ORDER — SODIUM CHLORIDE 9 MG/ML
INJECTION, SOLUTION INTRAVENOUS CONTINUOUS
Status: DISCONTINUED | OUTPATIENT
Start: 2019-03-02 | End: 2019-03-03

## 2019-03-02 RX ORDER — NITROGLYCERIN 0.4 MG/1
0.4 TABLET SUBLINGUAL EVERY 5 MIN PRN
Status: DISCONTINUED | OUTPATIENT
Start: 2019-03-02 | End: 2019-03-05 | Stop reason: HOSPADM

## 2019-03-02 RX ORDER — ONDANSETRON 4 MG/1
4 TABLET, ORALLY DISINTEGRATING ORAL EVERY 6 HOURS PRN
Status: DISCONTINUED | OUTPATIENT
Start: 2019-03-02 | End: 2019-03-05 | Stop reason: HOSPADM

## 2019-03-02 RX ORDER — ONDANSETRON 2 MG/ML
4 INJECTION INTRAMUSCULAR; INTRAVENOUS EVERY 6 HOURS PRN
Status: DISCONTINUED | OUTPATIENT
Start: 2019-03-02 | End: 2019-03-05 | Stop reason: HOSPADM

## 2019-03-02 RX ORDER — NALOXONE HYDROCHLORIDE 0.4 MG/ML
.1-.4 INJECTION, SOLUTION INTRAMUSCULAR; INTRAVENOUS; SUBCUTANEOUS
Status: DISCONTINUED | OUTPATIENT
Start: 2019-03-02 | End: 2019-03-05 | Stop reason: HOSPADM

## 2019-03-02 RX ORDER — ACETAMINOPHEN 325 MG/1
650 TABLET ORAL EVERY 4 HOURS PRN
Status: DISCONTINUED | OUTPATIENT
Start: 2019-03-02 | End: 2019-03-05 | Stop reason: HOSPADM

## 2019-03-02 RX ORDER — LIDOCAINE 40 MG/G
CREAM TOPICAL
Status: DISCONTINUED | OUTPATIENT
Start: 2019-03-02 | End: 2019-03-03

## 2019-03-02 RX ADMIN — ONDANSETRON 4 MG: 2 INJECTION INTRAMUSCULAR; INTRAVENOUS at 16:58

## 2019-03-02 RX ADMIN — PROTHROMBIN, COAGULATION FACTOR VII HUMAN, COAGULATION FACTOR IX HUMAN, COAGULATION FACTOR X HUMAN, PROTEIN C, PROTEIN S HUMAN, AND WATER 4950 UNITS: KIT at 17:10

## 2019-03-02 RX ADMIN — PANTOPRAZOLE SODIUM 40 MG: 40 INJECTION, POWDER, FOR SOLUTION INTRAVENOUS at 16:58

## 2019-03-02 RX ADMIN — PANTOPRAZOLE SODIUM 40 MG: 40 INJECTION, POWDER, FOR SOLUTION INTRAVENOUS at 20:17

## 2019-03-02 RX ADMIN — SODIUM CHLORIDE 1000 ML: 9 INJECTION, SOLUTION INTRAVENOUS at 16:58

## 2019-03-02 RX ADMIN — ACETAMINOPHEN 650 MG: 325 TABLET, FILM COATED ORAL at 22:21

## 2019-03-02 RX ADMIN — SODIUM CHLORIDE: 9 INJECTION, SOLUTION INTRAVENOUS at 20:58

## 2019-03-02 ASSESSMENT — ENCOUNTER SYMPTOMS
DYSURIA: 0
FATIGUE: 1
VOMITING: 0
NAUSEA: 1
CHILLS: 1
FEVER: 0
FREQUENCY: 0
DIARRHEA: 0
BLOOD IN STOOL: 1
LIGHT-HEADEDNESS: 1
SHORTNESS OF BREATH: 0
ABDOMINAL PAIN: 0
HEMATURIA: 0
HEADACHES: 1

## 2019-03-02 NOTE — ED NOTES
Bed: ED10  Expected date:   Expected time:   Means of arrival:   Comments:  535  63 F GI bleed  1519

## 2019-03-02 NOTE — H&P
"[facility]    History and Physical  Hospitalist       Date of Admission:  3/2/2019    Assessment & Plan   Maxine Sears is a 63 year old female with PMH of atrial fibrillation on Xarelto, hypothyroidism, HTN, NELY, GERD, hiatal hernia, who presents with acute upper GI bleed.    Acute GI bleed, probably upper  Syncope secondary to above  Relative hypotension  Patient reports onset of weakness this morning, then melena at 2:30PM today. In the ED, she had a repeat large melenic stool witnessed by the ED attending. She reports syncopizing at that time and doesn't remember having the stool or being cleaned. Her vitals show relative hypotension SBP 100s, reports dizziness when sitting up. Exam reveals mild epigastric tenderness as well as vasoconstriction in her hands and feet. Labwork reveals Hgb of 10.7 which is below her baseline of 12.6. Overall she appears to have an active GI bleed that is non life threatening and compensated at this point.   I will have GI consulted for intervention in the morning, however if she decompensates (more bleeding, hypotension, tachycardia, etc), this may be done more urgently.   Cause of her bleed appears to be related to her Xarelto. She could have a bleeding ulcer. She denies any significant NSAID use. Xarelto was \"reversed\" in the ED with LANI garcia.  - NPO except for ice chips  - NS @ 100ml/hr  - PPI IV BID  - Blood consent form signed, type and screen ordered  - Transfuse pRBC for Hgb < 7.0 or active bleeding  - Hemoglobin q6h  - Bedside commode  - IMC status due to syncope from bleeding while in ED  - [facility] GI consult    Paroxysmal atrial fibrillation: Currently in NSR with HRs in 70s.  - Hold Xarelto  - Hold flecainide while NPO    HTN: SBP 100s, soft.  - Hold PTA atenolol, losartan, Maxzide pending EGD    Hypothyroidism: Hold levothyroxine while NPO    NELY: CPAP at bedtime    DVT Prophylaxis: Pneumatic Compression Devices  Code Status: Full " Code    Disposition: Expected discharge in ~2 days    Rasheed Dimas MD    Primary Care Physician   Yuval Jama    Chief Complaint   Melena    History is obtained from the patient    History of Present Illness   Maxine Sears is a 63 year old female who presents with melena. She reports feeling a weakness starting this early morning which made it difficult for her to sleep. This feeling persisted, and then around 2:30PM today she had an acute episode of large dark tarry stool. She has never had bleeding like thi sheilaefore. Denies any significant abdominal pain. Was feeling diaphoretic, dizzy, and lightheaded. She denies any significant ibuprofen use-- on average less than once a week. She has a prescription for PPIs for reflux/hiatal hernia but didn't take any until this morning. She reports a history of blood transfusions from blood loss after a hysterectomy.    In the ED, patient had a large melenic stool. She reports that she passed out during this episode and doesn't remember having the stool. She reports feeling dizzy/lightheaded before passing out.    Past Medical History    I have reviewed this patient's medical history and updated it with pertinent information if needed.   Past Medical History:   Diagnosis Date     Atrial fibrillation and flutter (H) 1/5/15     Depression       Essential hypertension       Gastroesophageal reflux disease without esophagitis 11/11/2015     GERD (gastroesophageal reflux disease)      Hiatal hernia      Hyperlipidemia LDL goal <130 3/26/2015     Hypothyroidism      Migraine without aura and without status migrainosus, not intractable  Aug 2016     Morbid obesity, unspecified obesity type (H) 12/9/2015     Obesity      NELY on CPAP      Osteoarthritis      RLS (restless legs syndrome)      Symptomatic menopausal or female climacteric states (aka FLASHES)      TMJ disease        Past Surgical History   I have reviewed this patient's surgical history and updated it with  pertinent information if needed.  Past Surgical History:   Procedure Laterality Date     APPENDECTOMY       CHOLECYSTECTOMY       HYSTERECTOMY, DOM       TONSILLECTOMY         Prior to Admission Medications   Prior to Admission Medications   Prescriptions Last Dose Informant Patient Reported? Taking?   SUMAtriptan (IMITREX) 100 MG tablet PRN Self No No   Sig: Take 1 tablet (100 mg) by mouth at onset of headache for migraine May repeat in 2 hours if needed: max 2/day;   atenolol (TENORMIN) 50 MG tablet 3/2/2019 at AM Self No Yes   Sig: Take 1 tablet (50 mg) by mouth daily   flecainide acetate 150 MG TABS 3/1/2019 at PM Self No Yes   Sig: Take 1/2 tab (75 mg) twice daily by mouth   gabapentin (NEURONTIN) 300 MG capsule 3/1/2019 at PM Self Yes Yes   Sig: Take 600 mg by mouth At Bedtime 2 capsules   levothyroxine (SYNTHROID/LEVOTHROID) 75 MCG tablet 3/2/2019 at AM Self No Yes   Si tab daily in addition to Levothyroxine 88mcg daily so total daily dose = 163mcg daily   levothyroxine (SYNTHROID/LEVOTHROID) 88 MCG tablet 3/2/2019 at AM Self No Yes   Si tab daily in addition to Levothyroxine 75mcg daily so total daily dose = 163mcg daily   losartan (COZAAR) 100 MG tablet 3/2/2019 at AM Self No Yes   Sig: Take 1 tablet (100 mg) by mouth daily   order for DME  Self Yes No   Sig: DREAMSTATION  5-15 CM/H20  NASAL AIRFIT N10 HER   pantoprazole (PROTONIX) 40 MG EC tablet 3/2/2019 at AM Self No Yes   Sig: Take 1 tablet (40 mg) by mouth daily Take 30-60 minutes before a meal.   rivaroxaban ANTICOAGULANT (XARELTO) 20 MG TABS tablet 3/2/2019 at AM Self No Yes   Sig: Take 1 tablet (20 mg) by mouth daily (with dinner)   triamterene-hydrochlorothiazide (MAXZIDE) 75-50 MG per tablet 3/2/2019 at AM Self No Yes   Sig: TAKE ONE-HALF TABLET ONCE DAILY      Facility-Administered Medications: None     Allergies   Allergies   Allergen Reactions     Morphine Anaphylaxis     Ceclor [Cefaclor] Hives     Diltiazem Hives     Lisinopril Cough      Sertraline Nausea and Vomiting       Social History   I have reviewed this patient's social history and updated it with pertinent information if needed. Maxine Sears  reports that she quit smoking about 47 years ago. Her smoking use included cigarettes. She started smoking about 48 years ago. She smoked 0.25 packs per day. she has never used smokeless tobacco. She reports that she does not drink alcohol or use drugs.    Family History   I have reviewed this patient's family history and updated it with pertinent information if needed.   Family History   Problem Relation Age of Onset     Myocardial Infarction Mother      Heart Failure Mother      Heart Surgery Father         bypass surgery     Cerebrovascular Disease Father      Heart Surgery Brother      Hyperlipidemia Brother      Hyperlipidemia Sister      Hyperlipidemia Brother      Sleep Apnea Sister        Review of Systems   The 10 point Review of Systems is negative other than noted in the HPI or here.     Physical Exam   Temp: 98.1  F (36.7  C) Temp src: Oral BP: 103/62 Pulse: 75   Resp: 14 SpO2: 97 % O2 Device: None (Room air)    Vital Signs with Ranges  Temp:  [98.1  F (36.7  C)] 98.1  F (36.7  C)  Pulse:  [73-75] 75  Resp:  [14-16] 14  BP: ()/() 103/62  SpO2:  [97 %] 97 %  0 lbs 0 oz    Constitutional: Obese female in NAD  Eyes: PERRL, nonicteric, normal ocular movements  HEENT: Normocephalic, atraumatic, oral mucosa moist  Respiratory: CTAB, no wheezing or crackles  Cardiovascular: RRR, normal S1/2, no m/r/g  GI: No organomegaly, normoactive bowel sounds, mild epigastric tenderness  Vascular: Palpable peripheral pulses in upper and lower extremities, nonpitting lower extremity pitting edema, noted cool and clammy hands and feet  Skin: No rashes or scars  Musculoskeletal: Moves all extremities  Neurologic: A&Ox3  Psychiatric: Appropriate affect and mood    Data   Data reviewed today:  I personally reviewed labwork.  Recent Labs   Lab  03/02/19  1610   WBC 13.2*   HGB 10.7*   MCV 86      INR 1.78*      POTASSIUM 4.2   CHLORIDE 109   CO2 25   BUN 40*   CR 0.76   ANIONGAP 6   J CARLOS 9.2   *   ALBUMIN 3.5   PROTTOTAL 7.6   BILITOTAL 0.2   ALKPHOS 92   ALT 20   AST 15   TROPI <0.015       Imaging:  No results found for this or any previous visit (from the past 24 hour(s)).

## 2019-03-02 NOTE — ED NOTES
Brought in by [facility] from home for dizziness and rectal bleeding.  Started having dizziness last night, today about an hour before coming to ED had black stools.  Denies any abd pain at this time.  No vomiting however does have nausea .  Is pale in color and clammy.  Saline lock in place by EMS to left hand and gave  mL.

## 2019-03-02 NOTE — ED NOTES
"[facility]  ED Nurse Handoff Report    ED Chief complaint: Generalized Weakness and Rectal Bleeding      ED Diagnosis:   Final diagnoses:   Gastrointestinal hemorrhage, unspecified gastrointestinal hemorrhage type       Code Status: Full Code    Allergies:   Allergies   Allergen Reactions     Morphine Anaphylaxis     Ceclor [Cefaclor] Hives     Diltiazem Hives     Lisinopril Cough     Sertraline Nausea and Vomiting       Activity level - Baseline/Home:  Independent    Activity Level - Current:   Stand with Assist     Needed?: No    Isolation: No  Infection: Not Applicable  Bariatric?: No    Vital Signs:   Vitals:    03/02/19 1519 03/02/19 1701 03/02/19 1702   BP: (!) 130/118 (!) 85/64 103/62   Pulse: 73 75 75   Resp: 16 16 14   Temp: 98.1  F (36.7  C)     TempSrc: Oral     SpO2: 97%     Height: 1.626 m (5' 4\")         Cardiac Rhythm: ,   Cardiac  Cardiac Rhythm: Normal sinus rhythm    Pain level: 0-10 Pain Scale: 0    Is this patient confused?: No   Does this patient have a guardian?  No         If yes, is there guardianship documents in the Epic \"Code/ACP\" activity?  N/A         Guardian Notified?  N/A  [place] - Suicide Severity Rating Scale Completed?  Yes  If yes, what color did the patient score?  White    Patient Report: Initial Complaint: Dizziness and rectal bleeding  Focused Assessment: Brought in by [facility] from home for dizziness that started last night and rectal bleeding that occurred about an hour before calling EMS.  Is pale in color and clammy.  Does have some nausea however there is no vomiting.  Denies any abd pain at this time.  Tests Performed: bllod work and EKG  Abnormal Results: see lab results  Treatments provided: 0.9% NS 1 L, Zofran 4 mg IV, Protonix 40 mg IV, KCentra Infusion, 2 IV lines    Family Comments: sister at bedside    OBS brochure/video discussed/provided to patient/family: N/A              Name of person given brochure if not patient: n/a          "     Relationship to patient: n/a    ED Medications:   Medications   ondansetron (ZOFRAN) injection 4 mg (4 mg Intravenous Given 3/2/19 1658)   0.9% sodium chloride BOLUS (1,000 mLs Intravenous New Bag 3/2/19 1658)   pantoprazole (PROTONIX) 40 mg IV push injection (40 mg Intravenous Given 3/2/19 1658)   prothrombin 4 factor complex concentrate (KCENTRA) infusion 4,950 Units (4,950 Units Intravenous Given 3/2/19 1710)       Drips infusing?:  Yes    For the majority of the shift this patient was Yellow.   Interventions performed were basic cares, medications, GI bleed.    Severe Sepsis OR Septic Shock Diagnosis Present: No    To be done/followed up on inpatient unit:  stop infusions when completed    ED NURSE PHONE NUMBER: *[id number]

## 2019-03-02 NOTE — ED PROVIDER NOTES
History     Chief Complaint:  Generalized Weakness and Rectal Bleeding    HPI   Maxine Sears is a 63 year old female with a history of atrial fibrillation on Xarelto who presents with generalized weakness and rectal bleeding. Last night, the patient reports she suddenly began feeling very weak and lightheaded. This morning when she woke up, she states she continued to feel extremely weak and lightheaded with intermittent nausea and chills. Then this afternoon around 1500, the patient notes she went to use the bathroom when she had a very dark, tarry stool, so she called 911 to bring her to the ED for further evaluation. Here in the ED, she continues to endorse lightheadedness, weakness, and nausea. She also notes a slight headache. The patient reports she uses Advil from time to time for discomfort, and notes her last dose was a few days ago. She denies any chest pain or pressure, leg swelling, abdominal pain, vomiting, shortness of breath, fevers, urinary symptoms, rashes or sores, history of bleeding issues including ulcers, or other acute symptoms or changes.    Allergies:  Morphine  Ceclor   Diltiazem  Lisinopril  Sertraline     Medications:    Albuterol inhaler  Atenolol  Gabapentin  Levothyroxine  Losartan  Protonix  Imitrex  Triamterene-hydrochlorothiazide  Xarelto    Past Medical History:    Atrial fibrillation and flutter  Depression  Hypertension  Gastroesophageal reflux disease  Hiatal hernia  Hyperlipidemia  Hypothyroidism  Migraines  Morbid obesity  Obstructive sleep apnea on CPAP  Restless legs syndrome  Osteoarthritis  TMJ disease    Past Surgical History:    Appendectomy  Cholecystectomy  Total abdominal hysterectomy with bilateral salpingo-oophorectomy  Forearm/wrist surgery  Tonsillectomy  Tubal ligation    Family History:    myocardial infarction   Heart failure  Cerebrovascular disease    Social History:  Marital Status:     Smoker:   Former   Alcohol:   No   Drugs:   No   Lives:  "   Independently  PCP:   Yuval Jama    Review of Systems   Constitutional: Positive for chills and fatigue. Negative for fever.   Respiratory: Negative for shortness of breath.    Cardiovascular: Negative for chest pain and leg swelling.   Gastrointestinal: Positive for blood in stool and nausea. Negative for abdominal pain, diarrhea and vomiting.   Genitourinary: Negative for dysuria, frequency, hematuria and urgency.   Skin: Negative for rash.   Neurological: Positive for light-headedness and headaches.   All other systems reviewed and are negative.    Physical Exam     Patient Vitals for the past 24 hrs:   BP Temp Temp src Pulse Resp SpO2 Height   03/02/19 1702 103/62 -- -- 75 14 -- --   03/02/19 1701 (!) 85/64 -- -- 75 16 -- --   03/02/19 1519 (!) 130/118 98.1  F (36.7  C) Oral 73 16 97 % 1.626 m (5' 4\")     Physical Exam  Constitutional: Morbidly obese white female, supine. Diaphoretic.  HENT: No signs of trauma.   Eyes: EOM are normal. Pupils are equal, round, and reactive to light.   Neck: Normal range of motion. No JVD present. No cervical adenopathy.  Cardiovascular: Regular rhythm.  Exam reveals no gallop and no friction rub.    No murmur heard.  Pulmonary/Chest: Bilateral breath sounds normal. No wheezes, rhonchi or rales.  Abdominal: Soft. No tenderness. No rebound or guarding. Transverse suprapubic incision. 1+ femoral pulses.  Rectal: Black hemoccult positive stool.  HC positive  Musculoskeletal: No edema. No tenderness.   Lymphadenopathy: No lymphadenopathy.   Neurological: Alert and oriented to person, place, and time. Normal strength. Coordination normal.   Skin: Skin is warm and dry. No rash noted. No erythema.     Emergency Department Course   ECG (15:36:37):  Rate 74 bpm. ID interval 192. QRS duration 88. QT/QTc 388/430. P-R-T axes 35 35 41. Normal sinus rhythm. Nonspecific T wave abnormality. Interpreted at 1617 by Kei Cagle MD.    Laboratory:  CBC: WBC 13.2, HGB 10.7, PLT " 403  CMP: Glucose 127, BUN 40, Creatinine 0.76  Troponin: <0.015  INR: 1.78  ABO/Rh type: A positive    Interventions:  1658: 4 mg Zofran IV  1658: NS 1L IV Bolus  1658: 40 mg Protonix IV  1710: 4,950 units Kcentra IV    Emergency Department Course:  The patient arrived in the emergency department via EMS.  Past medical records, nursing notes, and vitals reviewed.  1618: I performed an exam of the patient and obtained history, as documented above.  ECG performed, results above.  IV inserted and blood drawn. The patient was placed on continuous cardiac monitoring and pulse oximetry.    1717: I discussed the patient's case with Dr. Dimas of the hospitalist service, who will admit the patient to an [facility] bed for further monitoring, evaluation, and treatment.     1730: Dr. Dimas spoke with the patient and obtained consent for a blood transfusion if the need arises. He also contacted gastroenterology who agrees with the plan.    Impression & Plan    Medical Decision Making:  Maxine Sears is a 63 year old female who presents to the ED for evaluation of lightheadedness and black tarry stools. She is on Xarelto for paroxysmal atrial fibrillation and states she occasionally takes Advil. Her last dose was three days ago. She is not a drinker. She began feeling lightheaded as if she might pass out last night. Then this morning, she noted she had her first episode of black tarry stool. She denies any abdominal pain or any history of ulcers or bleeding problems. On exam, she is morbidly obese and diaphoretic. Her vital signs are stable. Rectal exam revealed melenotic stool coming from the anus, on both buttocks and upper thighs posteriorly. The patient had IVs placed x2. She was given fluids, Zofran, Protonix, and Kcentra. Her hemoglobin is 10, which is down approximately two points compared to one month ago. She is starting to feel a little bit better. I discussed the patient with the hospitalist. She will be admitted to [facility]  for further evaluation and treatment. The hospitalist did contact GI as well.    Diagnosis:    ICD-10-CM   1. Gastrointestinal hemorrhage, unspecified gastrointestinal hemorrhage type K92.2     Disposition: Admitted to [facility]    Jade Rivera  3/2/2019    EMERGENCY DEPARTMENT    Jade MORALES, am serving as a scribe at 4:18 PM on 3/2/2019 to document services personally performed by Kei Cagle MD based on my observations and the provider's statements to me.      Kei Cagle MD  03/02/19 1939

## 2019-03-02 NOTE — PHARMACY-ADMISSION MEDICATION HISTORY
Admission medication history interview status for the 3/2/2019  admission is complete. See EPIC admission navigator for prior to admission medications     Medication history source reliability:Good    Actions taken by pharmacist (provider contacted, etc):  PTA list obtained from patient's discharge report summary on 2/5/19 and interviewed with patient     Additional medication history information not noted on PTA med list :None    Medication reconciliation/reorder completed by provider prior to medication history? No    Time spent in this activity:  20 minutes    Prior to Admission medications    Medication Sig Last Dose Taking? Auth Provider   atenolol (TENORMIN) 50 MG tablet Take 1 tablet (50 mg) by mouth daily 3/2/2019 at AM Yes Yuval Jama MD   flecainide acetate 150 MG TABS Take 1/2 tab (75 mg) twice daily by mouth 3/1/2019 at PM Yes Yuval Jama MD   gabapentin (NEURONTIN) 300 MG capsule Take 600 mg by mouth At Bedtime 2 capsules 3/1/2019 at PM Yes Unknown, Entered By History   levothyroxine (SYNTHROID/LEVOTHROID) 75 MCG tablet 1 tab daily in addition to Levothyroxine 88mcg daily so total daily dose = 163mcg daily 3/2/2019 at AM Yes Yuval Jama MD   levothyroxine (SYNTHROID/LEVOTHROID) 88 MCG tablet 1 tab daily in addition to Levothyroxine 75mcg daily so total daily dose = 163mcg daily 3/2/2019 at AM Yes Yuval Jama MD   losartan (COZAAR) 100 MG tablet Take 1 tablet (100 mg) by mouth daily 3/2/2019 at AM Yes Yuval Jama MD   pantoprazole (PROTONIX) 40 MG EC tablet Take 1 tablet (40 mg) by mouth daily Take 30-60 minutes before a meal. 3/2/2019 at AM Yes Yuval Jama MD   rivaroxaban ANTICOAGULANT (XARELTO) 20 MG TABS tablet Take 1 tablet (20 mg) by mouth daily (with dinner) 3/2/2019 at AM Yes Yuval Jama MD   triamterene-hydrochlorothiazide (MAXZIDE) 75-50 MG per tablet TAKE ONE-HALF TABLET ONCE DAILY 3/2/2019 at AM Yes Yuval Jama MD   order for DME DREAMSTATION  5-15 CM/H20  NASAL AIRFIT N10 HER    Reported, Patient   SUMAtriptan (IMITREX) 100 MG tablet Take 1 tablet (100 mg) by mouth at onset of headache for migraine May repeat in 2 hours if needed: max 2/day; CORRYN  Yuval Jama MD Poppy Yue Wang, PharmD IV Student

## 2019-03-03 LAB
ANION GAP SERPL CALCULATED.3IONS-SCNC: 4 MMOL/L (ref 3–14)
BUN SERPL-MCNC: 37 MG/DL (ref 7–30)
CALCIUM SERPL-MCNC: 8.2 MG/DL (ref 8.5–10.1)
CHLORIDE SERPL-SCNC: 115 MMOL/L (ref 94–109)
CO2 SERPL-SCNC: 25 MMOL/L (ref 20–32)
CREAT SERPL-MCNC: 0.88 MG/DL (ref 0.52–1.04)
ERYTHROCYTE [DISTWIDTH] IN BLOOD BY AUTOMATED COUNT: 15.5 % (ref 10–15)
GFR SERPL CREATININE-BSD FRML MDRD: 70 ML/MIN/{1.73_M2}
GLUCOSE SERPL-MCNC: 107 MG/DL (ref 70–99)
HCT VFR BLD AUTO: 27.1 % (ref 35–47)
HGB BLD-MCNC: 8.1 G/DL (ref 11.7–15.7)
HGB BLD-MCNC: 8.7 G/DL (ref 11.7–15.7)
HGB BLD-MCNC: 8.9 G/DL (ref 11.7–15.7)
HGB BLD-MCNC: 9.1 G/DL (ref 11.7–15.7)
INTERPRETATION ECG - MUSE: NORMAL
MCH RBC QN AUTO: 28.1 PG (ref 26.5–33)
MCHC RBC AUTO-ENTMCNC: 32.8 G/DL (ref 31.5–36.5)
MCV RBC AUTO: 86 FL (ref 78–100)
PLATELET # BLD AUTO: 316 10E9/L (ref 150–450)
POTASSIUM SERPL-SCNC: 3.5 MMOL/L (ref 3.4–5.3)
RBC # BLD AUTO: 3.17 10E12/L (ref 3.8–5.2)
SODIUM SERPL-SCNC: 144 MMOL/L (ref 133–144)
UPPER GI ENDOSCOPY: NORMAL
WBC # BLD AUTO: 14.1 10E9/L (ref 4–11)

## 2019-03-03 PROCEDURE — 25000128 H RX IP 250 OP 636: Performed by: INTERNAL MEDICINE

## 2019-03-03 PROCEDURE — 3E0G8GC INTRODUCTION OF OTHER THERAPEUTIC SUBSTANCE INTO UPPER GI, VIA NATURAL OR ARTIFICIAL OPENING ENDOSCOPIC: ICD-10-PCS | Performed by: INTERNAL MEDICINE

## 2019-03-03 PROCEDURE — 36415 COLL VENOUS BLD VENIPUNCTURE: CPT | Performed by: INTERNAL MEDICINE

## 2019-03-03 PROCEDURE — 25000132 ZZH RX MED GY IP 250 OP 250 PS 637: Performed by: INTERNAL MEDICINE

## 2019-03-03 PROCEDURE — 12000000 ZZH R&B MED SURG/OB

## 2019-03-03 PROCEDURE — 80048 BASIC METABOLIC PNL TOTAL CA: CPT | Performed by: INTERNAL MEDICINE

## 2019-03-03 PROCEDURE — C9113 INJ PANTOPRAZOLE SODIUM, VIA: HCPCS | Performed by: INTERNAL MEDICINE

## 2019-03-03 PROCEDURE — 25000125 ZZHC RX 250: Performed by: INTERNAL MEDICINE

## 2019-03-03 PROCEDURE — 43255 EGD CONTROL BLEEDING ANY: CPT | Performed by: INTERNAL MEDICINE

## 2019-03-03 PROCEDURE — 40000884 ZZH STATISTIC STEP DOWN HRS NIGHT

## 2019-03-03 PROCEDURE — G0500 MOD SEDAT ENDO SERVICE >5YRS: HCPCS | Performed by: INTERNAL MEDICINE

## 2019-03-03 PROCEDURE — 85027 COMPLETE CBC AUTOMATED: CPT | Performed by: INTERNAL MEDICINE

## 2019-03-03 PROCEDURE — 85018 HEMOGLOBIN: CPT | Performed by: INTERNAL MEDICINE

## 2019-03-03 PROCEDURE — 99232 SBSQ HOSP IP/OBS MODERATE 35: CPT | Performed by: INTERNAL MEDICINE

## 2019-03-03 RX ORDER — GABAPENTIN 600 MG/1
600 TABLET ORAL AT BEDTIME
Status: DISCONTINUED | OUTPATIENT
Start: 2019-03-03 | End: 2019-03-05 | Stop reason: HOSPADM

## 2019-03-03 RX ORDER — LIDOCAINE 40 MG/G
CREAM TOPICAL
Status: DISCONTINUED | OUTPATIENT
Start: 2019-03-03 | End: 2019-03-03 | Stop reason: HOSPADM

## 2019-03-03 RX ORDER — LEVOTHYROXINE SODIUM 75 UG/1
75 TABLET ORAL DAILY
Status: DISCONTINUED | OUTPATIENT
Start: 2019-03-03 | End: 2019-03-03

## 2019-03-03 RX ORDER — LEVOTHYROXINE SODIUM 88 UG/1
88 TABLET ORAL DAILY
Status: DISCONTINUED | OUTPATIENT
Start: 2019-03-03 | End: 2019-03-03

## 2019-03-03 RX ORDER — FLECAINIDE ACETATE 50 MG/1
75 TABLET ORAL 2 TIMES DAILY
Status: DISCONTINUED | OUTPATIENT
Start: 2019-03-03 | End: 2019-03-03

## 2019-03-03 RX ORDER — FENTANYL CITRATE 50 UG/ML
INJECTION, SOLUTION INTRAMUSCULAR; INTRAVENOUS PRN
Status: DISCONTINUED | OUTPATIENT
Start: 2019-03-03 | End: 2019-03-03 | Stop reason: HOSPADM

## 2019-03-03 RX ADMIN — LEVOTHYROXINE SODIUM 163 MCG: 88 TABLET ORAL at 13:52

## 2019-03-03 RX ADMIN — GABAPENTIN 600 MG: 600 TABLET, FILM COATED ORAL at 21:05

## 2019-03-03 RX ADMIN — Medication 75 MG: at 13:52

## 2019-03-03 RX ADMIN — PANTOPRAZOLE SODIUM 40 MG: 40 INJECTION, POWDER, FOR SOLUTION INTRAVENOUS at 08:05

## 2019-03-03 RX ADMIN — PANTOPRAZOLE SODIUM 40 MG: 40 INJECTION, POWDER, FOR SOLUTION INTRAVENOUS at 21:04

## 2019-03-03 RX ADMIN — Medication 75 MG: at 21:05

## 2019-03-03 ASSESSMENT — MIFFLIN-ST. JEOR: SCORE: 1690

## 2019-03-03 ASSESSMENT — ACTIVITIES OF DAILY LIVING (ADL): ADLS_ACUITY_SCORE: 15

## 2019-03-03 NOTE — PROGRESS NOTES
[facility]    Hospitalist Progress Note    Assessment & Plan   Maxine Sears is a 63 year old female with PMH of atrial fibrillation on Xarelto, hypothyroidism, HTN, NELY, GERD, hiatal hernia, who presents with acute upper GI bleed.     Acute GI bleed, probably upper  Acute blood loss anemia due to above   Syncope due to #1 and 2 above  -Patient presenting with melena that started yesterday, however in retrospect patient has had on and off melena for a few weeks now.    -in the ED, she had a repeat large melenic stool witnessed by the ED attending.   -She reports syncopizing at that time and doesn't remember having the stool or being cleaned. Her vitals show relative hypotension SBP 100s, reports dizziness when sitting up.   -Baseline hemoglobin appears to be between 12.5-13.5; presenting hemoglobin yesterday was 10.7, now dropped to 8.9.  -Continue IV Protonix 40 mg twice daily  -Serial hemoglobin  -N.p.o., GI to see this morning for endoscopy  -Transfuse as needed                  Paroxysmal atrial fibrillation:   -Currently in NSR with HRs in 70s.  -Hold Xarelto  -Continue flecainide     HTN: SBP 100s, soft.  -Hold PTA atenolol, losartan, Maxzide pending EGD     Hypothyroidism:   -Hold levothyroxine while NPO     NELY:   -CPAP per home settings         # Pain Assessment:  Current Pain Score 3/3/2019   Patient currently in pain? sleeping: patient not able to self report   Pain score (0-10) -   Maxine carver pain level was assessed and she currently denies pain.        D/W: RN  DVT Prophylaxis: Pneumatic Compression Devices  Code Status: Full Code    Disposition: Expected discharge in 1-2 days    Zachary Sal MD    Interval History   No further melena.  Hemoglobin slightly lower at 8.9.  Denies lightheadedness or dizziness now.  No abdominal pain.  Denies chest pain or dyspnea.    -Data reviewed today: I reviewed all new labs and imaging results over the last 24 hours. I personally reviewed  the EKG tracing showing Normal sinus rhythm.      Physical Exam   Temp: 98.7  F (37.1  C) Temp src: Oral BP: 107/54 Pulse: 68 Heart Rate: 67 Resp: 20 SpO2: 98 % O2 Device: None (Room air)    Vitals:    03/03/19 0210   Weight: 115 kg (253 lb 8.5 oz)     Vital Signs with Ranges  Temp:  [98.1  F (36.7  C)-98.7  F (37.1  C)] 98.7  F (37.1  C)  Pulse:  [68-81] 68  Heart Rate:  [] 67  Resp:  [10-20] 20  BP: ()/() 107/54  SpO2:  [97 %-99 %] 98 %  I/O last 3 completed shifts:  In: 1875 [I.V.:1875]  Out: 200 [Urine:200]    Constitutional: AAOX3, NAD, Appears comfortable  HEENT: Moist oral mucosa, no oral lesions, No pallor or icterus  Neck- Supple, Good ROM, No JVD  Respiratory:  No crackles, No wheezes, CTA B/L, Normal WOB  Cardiovascular: RRR, No murmur  GI: Soft, Non- tender, BS- normoactive, No Guarding/rebound/rigidity  Skin/Integument: Warm and dry, no rashes  MSK: No joint deformity or swelling, no edema  Neuro: CN- grossly intact       Medications       - MEDICATION INSTRUCTIONS -       - MEDICATION INSTRUCTIONS -       sodium chloride 100 mL/hr at 03/02/19 2058         flecainide  150 mg Oral BID     gabapentin  600 mg Oral At Bedtime     levothyroxine  75 mcg Oral Daily     levothyroxine  88 mcg Oral Daily     pantoprazole (PROTONIX) IV  40 mg Intravenous BID     sodium chloride (PF)  3 mL Intracatheter Q8H       Data     Recent Labs   Lab 03/03/19  0540 03/02/19  2345 03/02/19  1610   WBC 14.1*  --  13.2*   HGB 8.9* 9.1* 10.7*   MCV 86  --  86     --  403   INR  --   --  1.78*     --  140   POTASSIUM 3.5  --  4.2   CHLORIDE 115*  --  109   CO2 25  --  25   BUN 37*  --  40*   CR 0.88  --  0.76   ANIONGAP 4  --  6   J CARLOS 8.2*  --  9.2   *  --  127*   ALBUMIN  --   --  3.5   PROTTOTAL  --   --  7.6   BILITOTAL  --   --  0.2   ALKPHOS  --   --  92   ALT  --   --  20   AST  --   --  15   TROPI  --   --  <0.015       No results found for this or any previous visit (from the past 24  hour(s)).

## 2019-03-03 NOTE — PLAN OF CARE
Pt c/o headache, improved w/ tylenol. Bedrest w/ BSC, pt get lightheaded and feels like she will pass out when sitting, positive orthostatic change. No bloody stools noted this shift. Fluids running at 100ml/hr. NPO w/ Ice chips, planing EGD today, education provided.  Tele SR. Follow hgb Q6H, last draw was 9.1. Continue to monitor closely.

## 2019-03-03 NOTE — PROGRESS NOTES
[facility]  Gastroenterology Progress Note     Maxine Sears MRN# [id number]   YOB: 1955 Age: 63 year old          Assessment and Plan:     Acute GI bleeding  Clinically doing well no further episodes of vomiting or melena patient's hemoglobin is dropped to 8.9 baseline is between 12-13.  Patient had been on Xarelto.  Patient is denying any new GI symptoms.  EGD was done this morning which showed a 15 mm bleeding duodenal ulcer which was treated with BiCAP and appendectomy injection hemostasis was secured.  I will recommend the patient to continue on clear liquid diet today start on Protonix 40 mg twice a day IV check serial hemoglobins.       Gastrointestinal hemorrhage, unspecified gastrointestinal hemorrhage type      Interval History:   doing well; no cp, sob, n/v/d, or abd pain.              Review of Systems:   C: NEGATIVE for fever, chills, change in weight  E/M: NEGATIVE for ear, mouth and throat problems  R: NEGATIVE for significant cough or SOB  CV: NEGATIVE for chest pain, palpitations or peripheral edema             Medications:   I have reviewed this patient's current medications    flecainide  75 mg Oral Q12H QUINTIN     gabapentin  600 mg Oral At Bedtime     levothyroxine  163 mcg Oral QAM AC     pantoprazole (PROTONIX) IV  40 mg Intravenous BID     sodium chloride (PF)  3 mL Intracatheter Q8H                  Physical Exam:   Vitals were reviewed  Vital Signs with Ranges  Temp:  [98.1  F (36.7  C)-98.7  F (37.1  C)] 98.7  F (37.1  C)  Pulse:  [68-90] 71  Heart Rate:  [] 67  Resp:  [10-29] 25  BP: ()/() 107/56  SpO2:  [97 %-100 %] 98 %  I/O last 3 completed shifts:  In: 1875 [I.V.:1875]  Out: 200 [Urine:200]  Constitutional: healthy, alert and no distress   Cardiovascular: negative, PMI normal. No lifts, heaves, or thrills. RRR. No murmurs, clicks gallops or rub  Respiratory: negative, Percussion normal. Good diaphragmatic excursion. Lungs  clear  Head: Normocephalic. No masses, lesions, tenderness or abnormalities  Neck: Neck supple. No adenopathy. Thyroid symmetric, normal size,, Carotids without bruits.  Abdomen: Abdomen soft, non-tender. BS normal. No masses, organomegaly  SKIN: no suspicious lesions or rashes           Data:   I reviewed the patient's new clinical lab test results.   Recent Labs   Lab Test 03/03/19  0540 03/02/19  2345 03/02/19  1610 02/03/19  2317   WBC 14.1*  --  13.2* 12.3*   HGB 8.9* 9.1* 10.7* 12.6   MCV 86  --  86 85     --  403 416   INR  --   --  1.78*  --      Recent Labs   Lab Test 03/03/19  0540 03/02/19  1610 02/03/19  2317   POTASSIUM 3.5 4.2 3.5   CHLORIDE 115* 109 101   CO2 25 25 25   BUN 37* 40* 17   ANIONGAP 4 6 9     Recent Labs   Lab Test 03/02/19  1610 05/25/18  1023 04/24/17  1535  04/27/11  1030   ALBUMIN 3.5 3.7 3.6   < >  --    BILITOTAL 0.2 0.4 0.4   < >  --    ALT 20 20 59*   < >  --    AST 15 13 40   < >  --    PROTEIN  --   --   --   --  100*    < > = values in this interval not displayed.       I reviewed the patient's new imaging results.    All laboratory data reviewed  All imaging studies reviewed by me.    Ben Ko MD,  3/3/2019  [facility]  Office : [phone]  Cell: [phone]

## 2019-03-03 NOTE — PROGRESS NOTES
SPIRITUAL HEALTH SERVICES  Spiritual Assessment Progress Note  [facility]   met with pt to give her a copy of [employer] Choices - HCD.  Pt had just gotten back from EGD but was open to conversation about completion of document.     provided education and pt will complete the document later.    No need to follow up unless requested.                                                                                                                                           Berenice Whitt M.Div., [facility]  Staff    Pager [phone]

## 2019-03-03 NOTE — CONSULTS
Consult received.  Chart reviewed.  Patient is hemodynamically stable.  NPO  I/V Protonix.   Plan for EGD in AM or tonight if change in status.    Ben Cruz MD  [facility]

## 2019-03-03 NOTE — CONSULTS
"BRIEF NUTRITION ASSESSMENT      REASON FOR ASSESSMENT:  Maxine Sears is a 63 year old female seen by Registered Dietitian for Admission Nutrition Risk Screen for unintentional loss of 10# or more in the past two months    CURRENT DIET AND INTAKE:  Diet:  Clear liquid              Chart reviewed  3/3: EGD was done this morning which showed a 15 mm bleeding duodenal ulcer which was treated with BiCAP     Visited with pt this afternoon  Pt tells me that she is hungry and would like me to order her some lunch    Pt has had a good appetite  Notes that she follows a regular diet  \"I love potatoes!\"  States that she has made some diet changes that have likely resulted in wt loss - \"I have been eating more meat and veggies and less starches\"    ANTHROPOMETRICS:  Height: 5' 4\"  Weight:(3/3) 115 kg /  253 lbs 8.46 oz  Body mass index is 43.52 kg/m .   Weight Status: Obesity Grade III BMI >40  IBW:  54.5 kg  %IBW: 211%  Weight History:   Wt Readings from Last 10 Encounters:   03/03/19 115 kg (253 lb 8.5 oz)   02/03/19 122.9 kg (271 lb)   11/12/18 123.4 kg (272 lb)   08/10/18 123.4 kg (272 lb)   06/29/18 121.6 kg (268 lb)   05/31/18 120.7 kg (266 lb)   01/16/18 120.5 kg (265 lb 11.2 oz)   11/21/17 122.5 kg (270 lb)   09/13/17 115.7 kg (255 lb)   07/14/17 119.7 kg (264 lb)         LABS:  Labs noted    MALNUTRITION:  Patient does not meet two of the following criteria necessary for diagnosing malnutrition: significant weight loss, reduced intake, subcutaneous fat loss, muscle loss or fluid retention    NUTRITION INTERVENTION:  Nutrition Diagnosis:  No nutrition diagnosis at this time.    Implementation:  Nutrition Education ---> Reviewed current diet order and meal ordering process    FOLLOW UP/MONITORING:   Will re-evaluate in 7 - 10 days, or sooner, if re-consulted.          "

## 2019-03-04 LAB
HGB BLD-MCNC: 7.5 G/DL (ref 11.7–15.7)
HGB BLD-MCNC: 7.8 G/DL (ref 11.7–15.7)

## 2019-03-04 PROCEDURE — 85018 HEMOGLOBIN: CPT | Performed by: INTERNAL MEDICINE

## 2019-03-04 PROCEDURE — 99232 SBSQ HOSP IP/OBS MODERATE 35: CPT | Performed by: INTERNAL MEDICINE

## 2019-03-04 PROCEDURE — 25000132 ZZH RX MED GY IP 250 OP 250 PS 637: Performed by: INTERNAL MEDICINE

## 2019-03-04 PROCEDURE — 25000128 H RX IP 250 OP 636: Performed by: INTERNAL MEDICINE

## 2019-03-04 PROCEDURE — 36415 COLL VENOUS BLD VENIPUNCTURE: CPT | Performed by: INTERNAL MEDICINE

## 2019-03-04 PROCEDURE — C9113 INJ PANTOPRAZOLE SODIUM, VIA: HCPCS | Performed by: INTERNAL MEDICINE

## 2019-03-04 PROCEDURE — 12000000 ZZH R&B MED SURG/OB

## 2019-03-04 RX ORDER — FLUMAZENIL 0.1 MG/ML
0.2 INJECTION, SOLUTION INTRAVENOUS
Status: ACTIVE | OUTPATIENT
Start: 2019-03-04 | End: 2019-03-04

## 2019-03-04 RX ORDER — NALOXONE HYDROCHLORIDE 0.4 MG/ML
.1-.4 INJECTION, SOLUTION INTRAMUSCULAR; INTRAVENOUS; SUBCUTANEOUS
Status: ACTIVE | OUTPATIENT
Start: 2019-03-04 | End: 2019-03-05

## 2019-03-04 RX ORDER — LEVOTHYROXINE SODIUM 75 UG/1
75 TABLET ORAL DAILY
Status: DISCONTINUED | OUTPATIENT
Start: 2019-03-04 | End: 2019-03-04

## 2019-03-04 RX ORDER — ATENOLOL 50 MG/1
50 TABLET ORAL DAILY
Status: DISCONTINUED | OUTPATIENT
Start: 2019-03-04 | End: 2019-03-05 | Stop reason: HOSPADM

## 2019-03-04 RX ORDER — SUMATRIPTAN 50 MG/1
100 TABLET, FILM COATED ORAL
Status: DISCONTINUED | OUTPATIENT
Start: 2019-03-04 | End: 2019-03-05 | Stop reason: HOSPADM

## 2019-03-04 RX ADMIN — LEVOTHYROXINE SODIUM 163 MCG: 88 TABLET ORAL at 06:41

## 2019-03-04 RX ADMIN — GABAPENTIN 600 MG: 600 TABLET, FILM COATED ORAL at 21:12

## 2019-03-04 RX ADMIN — PANTOPRAZOLE SODIUM 40 MG: 40 INJECTION, POWDER, FOR SOLUTION INTRAVENOUS at 09:39

## 2019-03-04 RX ADMIN — ACETAMINOPHEN 650 MG: 325 TABLET, FILM COATED ORAL at 06:48

## 2019-03-04 RX ADMIN — PANTOPRAZOLE SODIUM 40 MG: 40 INJECTION, POWDER, FOR SOLUTION INTRAVENOUS at 20:49

## 2019-03-04 RX ADMIN — Medication 75 MG: at 20:49

## 2019-03-04 RX ADMIN — ACETAMINOPHEN 650 MG: 325 TABLET, FILM COATED ORAL at 10:47

## 2019-03-04 RX ADMIN — ATENOLOL 50 MG: 50 TABLET ORAL at 17:41

## 2019-03-04 RX ADMIN — Medication 75 MG: at 09:39

## 2019-03-04 ASSESSMENT — ACTIVITIES OF DAILY LIVING (ADL)
ADLS_ACUITY_SCORE: 15

## 2019-03-04 NOTE — PLAN OF CARE
A&Ox4. VSS on RA. Up with SBA; denies lightheadedness/dizziness with ambulation. Clear liquid diet. Hgb 8.1, 7.8, trending down, q6h checks, next 0600 still pending. trnsf <7, has current T&C. C/o headache 3/10, tylenol given. Slept well. LS clear. BS hypoactive, no BM this shift. 2 PIV SL.

## 2019-03-04 NOTE — PROGRESS NOTES
[facility]  Gastroenterology Progress Note     Maxine Sears MRN# [id number]   YOB: 1955 Age: 63 year old          Assessment and Plan:     Acute GI bleeding- S/p EGD on 3/3/2019 revealed bleeding duodenal ulcer. Treated with argon plasma coagulation. Hemoglobin has decreased slightly from 7.8 to 7.5 over last 6 hours. Will repeat again after 6 hours.  Tolerating clear liquid diet, will advance to full. If hemoglobin continues to drop may need to consider repeat EGD/ PRBC transfusion. Continue to hold Xarelto. Continue with pantoprazole IV.    Anemia         Gastrointestinal hemorrhage, unspecified gastrointestinal hemorrhage type      Interval History:   no new complaints, doing well, denies chest pain, denies shortness of breath, denies abdominal pain, alert, oriented to person, place and time and doing well; no cp, sob, n/v/d, or abd pain.              Review of Systems:   C: NEGATIVE for fever, chills, change in weight  E/M: NEGATIVE for ear, mouth and throat problems  R: NEGATIVE for significant cough or SOB  CV: NEGATIVE for chest pain, palpitations or peripheral edema             Medications:   I have reviewed this patient's current medications    flecainide  75 mg Oral Q12H QUINTIN     gabapentin  600 mg Oral At Bedtime     levothyroxine  163 mcg Oral QAM AC     pantoprazole (PROTONIX) IV  40 mg Intravenous BID     sodium chloride (PF)  3 mL Intracatheter Q8H                  Physical Exam:   Vitals were reviewed  Vital Signs with Ranges  Temp:  [98.2  F (36.8  C)-98.9  F (37.2  C)] 98.3  F (36.8  C)  Pulse:  [67-90] 76  Heart Rate:  [67-81] 72  Resp:  [16-29] 16  BP: ()/(40-81) 134/67  SpO2:  [95 %-100 %] 97 %  I/O last 3 completed shifts:  In: 1775 [P.O.:950; I.V.:825]  Out: 1375 [Urine:1375]  Constitutional: healthy, alert and no distress   Cardiovascular: negative, PMI normal. No lifts, heaves, or thrills. RRR. No murmurs, clicks gallops or rub  Respiratory:  negative, Percussion normal. Good diaphragmatic excursion. Lungs clear  Head: Normocephalic. No masses, lesions, tenderness or abnormalities  Neck: Neck supple. No adenopathy. Thyroid symmetric, normal size,, Carotids without bruits.  Abdomen: Abdomen soft, non-tender. BS normal. No masses, organomegaly           Data:   I reviewed the patient's new clinical lab test results.   Recent Labs   Lab Test 03/04/19  0638 03/04/19  0045 03/03/19  1748  03/03/19  0540  03/02/19  1610 02/03/19  2317   WBC  --   --   --   --  14.1*  --  13.2* 12.3*   HGB 7.5* 7.8* 8.1*   < > 8.9*   < > 10.7* 12.6   MCV  --   --   --   --  86  --  86 85   PLT  --   --   --   --  316  --  403 416   INR  --   --   --   --   --   --  1.78*  --     < > = values in this interval not displayed.     Recent Labs   Lab Test 03/03/19  0540 03/02/19  1610 02/03/19  2317   POTASSIUM 3.5 4.2 3.5   CHLORIDE 115* 109 101   CO2 25 25 25   BUN 37* 40* 17   ANIONGAP 4 6 9     Recent Labs   Lab Test 03/02/19  1610 05/25/18  1023 04/24/17  1535  04/27/11  1030   ALBUMIN 3.5 3.7 3.6   < >  --    BILITOTAL 0.2 0.4 0.4   < >  --    ALT 20 20 59*   < >  --    AST 15 13 40   < >  --    PROTEIN  --   --   --   --  100*    < > = values in this interval not displayed.       I reviewed the patient's new imaging results.    All laboratory data reviewed  All imaging studies reviewed by me.    Theresa Knox PA-C,  3/4/2019  [facility]  Office : [phone]  Cell: [phone] (Dr. Ko)  Cell: [phone] (Theresa Knox PA-C)

## 2019-03-04 NOTE — PLAN OF CARE
Pt is A&Ox4, VSS on RA. C/o headache throughout day, tylenol and heat packs given, mildly effective. Ambulated in ely x1. Resting in afternoon. Tolerated full liquid diet, no nausea. Hgb 7.5 -> 7.8 @ 1200. Recheck TBD per GI. Up SBA. Voiding well. No BM. Tele NSR.

## 2019-03-04 NOTE — PROGRESS NOTES
[facility]    Hospitalist Progress Note    Assessment & Plan   Maxine Sears is a 63 year old female with PMH of atrial fibrillation on Xarelto, hypothyroidism, HTN, NELY, GERD, hiatal hernia, who presents with acute upper GI bleed.     Acute GI bleed, probably upper  Acute blood loss anemia due to above   Syncope due to #1 and 2 above  -Patient presenting with melena that started day prior to presentation, however in retrospect patient has had on and off melena for a few weeks now.  -in the ED, she had a repeat large melenic stool witnessed by the ED attending.   -She reports syncopizing at that time and doesn't remember having the stool or being cleaned. Her vitals show relative hypotension SBP 100s, reports dizziness when sitting up.   -Baseline hemoglobin appears to be between 12.5-13.5; presenting hemoglobin was 10.7, haritha of 7.5.  -Upper GI endoscopy 3/3 revealed  oozing duodenal ulcer with a visible vessel. Treated with argon plasma coagulation   -Due to slow drift down of hemoglobin, continue to monitor her hemoglobin although no clinical evidence of hematochezia or melena.  If hemoglobin continues to drop may need repeat endoscopy.  Last hemoglobin was 7.8.  Appears to be stabilizing.  -Continue IV Protonix 40 mg twice daily  -Serial hemoglobin  -Full liquid diet  -Transfuse as needed                  Paroxysmal atrial fibrillation:   -Currently in NSR.  -Hold Xarelto  -Continue flecainide     HTN:   -Shiley blood pressure was soft, blood pressure better today  -Hold PTA  losartan, Maxzide   -Resume prior to admission atenolol     Hypothyroidism:   -Resume levothyroxine     NELY:   -CPAP per home settings         # Pain Assessment:  Current Pain Score 3/4/2019   Patient currently in pain? -   Pain score (0-10) 2   Maxine carver pain level was assessed and she currently denies pain.        D/W: RN  DVT Prophylaxis: Pneumatic Compression Devices  Code Status: Full Code    Disposition:  Expected discharge 3/5, if hemoglobin stable and no ongoing evidence of bleeding    Zachary Sal MD    Interval History   Hemoglobin slightly drifting low however no hematochezia or melena reported.  Patient status post endoscopy yesterday.  Denies abdominal pain.    -Data reviewed today: I reviewed all new labs and imaging results over the last 24 hours. I personally reviewed the EKG tracing showing Normal sinus rhythm.      Physical Exam   Temp: 98.3  F (36.8  C) Temp src: Oral BP: 134/67 Pulse: 76 Heart Rate: 72 Resp: 16 SpO2: 97 % O2 Device: None (Room air)    Vitals:    03/03/19 0210   Weight: 115 kg (253 lb 8.5 oz)     Vital Signs with Ranges  Temp:  [98.3  F (36.8  C)-98.9  F (37.2  C)] 98.3  F (36.8  C)  Pulse:  [76] 76  Heart Rate:  [72-80] 72  Resp:  [16-18] 16  BP: (125-154)/(58-71) 134/67  SpO2:  [95 %-98 %] 97 %  I/O last 3 completed shifts:  In: 1775 [P.O.:950; I.V.:825]  Out: 1375 [Urine:1375]    Constitutional: AAOX3, NAD  HEENT: No pallor or icterus  Neck- Supple, Good ROM, No JVD  Respiratory:  CTA B/L, Normal WOB  Cardiovascular: RRR, No murmur  GI: Soft, Non- tender, BS- normoactive  Skin/Integument: Warm and dry, no rashes  MSK: No joint deformity or swelling, no edema  Neuro: CN- grossly intact       Medications       - MEDICATION INSTRUCTIONS -           flecainide  75 mg Oral Q12H QUINTIN     gabapentin  600 mg Oral At Bedtime     levothyroxine  163 mcg Oral QAM AC     pantoprazole (PROTONIX) IV  40 mg Intravenous BID     sodium chloride (PF)  3 mL Intracatheter Q8H       Data     Recent Labs   Lab 03/04/19  1222 03/04/19  0638 03/04/19  0045  03/03/19  0540  03/02/19  1610   WBC  --   --   --   --  14.1*  --  13.2*   HGB 7.8* 7.5* 7.8*   < > 8.9*   < > 10.7*   MCV  --   --   --   --  86  --  86   PLT  --   --   --   --  316  --  403   INR  --   --   --   --   --   --  1.78*   NA  --   --   --   --  144  --  140   POTASSIUM  --   --   --   --  3.5  --  4.2   CHLORIDE  --   --   --   --  115*   --  109   CO2  --   --   --   --  25  --  25   BUN  --   --   --   --  37*  --  40*   CR  --   --   --   --  0.88  --  0.76   ANIONGAP  --   --   --   --  4  --  6   J CARLOS  --   --   --   --  8.2*  --  9.2   GLC  --   --   --   --  107*  --  127*   ALBUMIN  --   --   --   --   --   --  3.5   PROTTOTAL  --   --   --   --   --   --  7.6   BILITOTAL  --   --   --   --   --   --  0.2   ALKPHOS  --   --   --   --   --   --  92   ALT  --   --   --   --   --   --  20   AST  --   --   --   --   --   --  15   TROPI  --   --   --   --   --   --  <0.015    < > = values in this interval not displayed.       No results found for this or any previous visit (from the past 24 hour(s)).

## 2019-03-04 NOTE — PROGRESS NOTES
SW:  D: SW spoke with patient. Tentative dischagr eplan for tomorrow 3/5. Patient reports sister Morelia can pick her up around 1000. If this time changes for any reason patient will inform the bedside nurse. No further concerns.  P: Follow as needed.    BIB Garcia

## 2019-03-04 NOTE — PLAN OF CARE
A&Ox4. VSS on RA. Up with SBA; denies lightheadedness/dizziness with ambulation. Clear liquid diet. Hgb 8.1, trending down, q6h checks. Denies pain. LS clear. BS hypoactive, no BM this shift. 2 PIV SL. Will continue to monitor.

## 2019-03-05 VITALS
WEIGHT: 253.53 LBS | RESPIRATION RATE: 16 BRPM | HEART RATE: 76 BPM | DIASTOLIC BLOOD PRESSURE: 52 MMHG | OXYGEN SATURATION: 95 % | SYSTOLIC BLOOD PRESSURE: 105 MMHG | TEMPERATURE: 97.9 F | HEIGHT: 64 IN | BODY MASS INDEX: 43.28 KG/M2

## 2019-03-05 PROBLEM — K26.9 DUODENAL ULCER: Status: ACTIVE | Noted: 2019-03-04

## 2019-03-05 LAB
ERYTHROCYTE [DISTWIDTH] IN BLOOD BY AUTOMATED COUNT: 16 % (ref 10–15)
HCT VFR BLD AUTO: 22.2 % (ref 35–47)
HGB BLD-MCNC: 7.2 G/DL (ref 11.7–15.7)
HGB BLD-MCNC: 7.3 G/DL (ref 11.7–15.7)
MCH RBC QN AUTO: 28.1 PG (ref 26.5–33)
MCHC RBC AUTO-ENTMCNC: 32.4 G/DL (ref 31.5–36.5)
MCV RBC AUTO: 87 FL (ref 78–100)
PLATELET # BLD AUTO: 279 10E9/L (ref 150–450)
RBC # BLD AUTO: 2.56 10E12/L (ref 3.8–5.2)
WBC # BLD AUTO: 9.6 10E9/L (ref 4–11)

## 2019-03-05 PROCEDURE — 85018 HEMOGLOBIN: CPT | Performed by: INTERNAL MEDICINE

## 2019-03-05 PROCEDURE — 25000132 ZZH RX MED GY IP 250 OP 250 PS 637: Performed by: INTERNAL MEDICINE

## 2019-03-05 PROCEDURE — C9113 INJ PANTOPRAZOLE SODIUM, VIA: HCPCS | Performed by: INTERNAL MEDICINE

## 2019-03-05 PROCEDURE — 25000128 H RX IP 250 OP 636: Performed by: INTERNAL MEDICINE

## 2019-03-05 PROCEDURE — 36415 COLL VENOUS BLD VENIPUNCTURE: CPT | Performed by: INTERNAL MEDICINE

## 2019-03-05 PROCEDURE — 99239 HOSP IP/OBS DSCHRG MGMT >30: CPT | Performed by: INTERNAL MEDICINE

## 2019-03-05 PROCEDURE — 85027 COMPLETE CBC AUTOMATED: CPT | Performed by: INTERNAL MEDICINE

## 2019-03-05 RX ORDER — PANTOPRAZOLE SODIUM 40 MG/1
40 TABLET, DELAYED RELEASE ORAL
Status: DISCONTINUED | OUTPATIENT
Start: 2019-03-05 | End: 2019-03-05 | Stop reason: HOSPADM

## 2019-03-05 RX ORDER — LOSARTAN POTASSIUM 100 MG/1
100 TABLET ORAL DAILY
Qty: 90 TABLET | Refills: 3
Start: 2019-03-05 | End: 2020-02-21

## 2019-03-05 RX ORDER — PANTOPRAZOLE SODIUM 40 MG/1
40 TABLET, DELAYED RELEASE ORAL
Qty: 60 TABLET | Refills: 1 | Status: SHIPPED | OUTPATIENT
Start: 2019-03-05 | End: 2019-04-16

## 2019-03-05 RX ADMIN — PANTOPRAZOLE SODIUM 40 MG: 40 INJECTION, POWDER, FOR SOLUTION INTRAVENOUS at 08:00

## 2019-03-05 RX ADMIN — ATENOLOL 50 MG: 50 TABLET ORAL at 08:01

## 2019-03-05 RX ADMIN — LEVOTHYROXINE SODIUM 163 MCG: 88 TABLET ORAL at 06:34

## 2019-03-05 RX ADMIN — Medication 75 MG: at 08:01

## 2019-03-05 ASSESSMENT — ACTIVITIES OF DAILY LIVING (ADL)
ADLS_ACUITY_SCORE: 15

## 2019-03-05 NOTE — PLAN OF CARE
Discharge    Patient discharged to home via daughter    Care plan note: Patient was A/O x4. VSS on RA. Up SBA, no complaints of dizziness/lightheadedness. Denies pain/nausea/SOB. Tolerated mechanical soft diet. Hgb 7.2, spoke with Dr. Ko and he is okay with patient discharging. No s/s of bleeding. Discharge AVS reviewed, patient educated on GI bleed/new med protonix and when to seek medical attention.Instructed to make follow up for around 3/11, patient made one for 3/15. Instructed to hold xaeralto until follow up. Dr. Ko would like her to follow up as well, his clinic number was attached. Patient aware she needs to  prescription. Patient verbalized understanding and had no further questions. IV removed. All patient belongings accounted for and sent home with patient.     Listed belongings gathered and returned to patient. Yes  Care Plan and Patient education resolved: Yes  Prescriptions if needed, hard copies sent with patient  No  Home and hospital acquired medications returned to patient: NA  Medication Bin checked and emptied on discharge Yes  Follow up appointment made for patient: Yes

## 2019-03-05 NOTE — PLAN OF CARE
Pt A/O x4, VSS on RA. Pt denies pain, N/V and SOB. Most recent Hgb 7.3, Q6H checks. No s/s of bleeding. Pt denies pain, N/V and SOB. Abdomen soft, nontender. BS hypoactive, passing flatus. LS clear bilaterally. Tele NSR. SBA, full liquid diet. Discharge today pending Hgb. Will continue to monitor.     0645: Most recent Hgb 7.2.

## 2019-03-05 NOTE — DISCHARGE SUMMARY
[facility]    Discharge Summary  Hospitalist    Date of Admission:  3/2/2019  Date of Discharge:  3/5/2019  Discharging Provider: Zachary Sal MD    Discharge Diagnoses      Acute GI bleed due to bleeding duodenal ulcer  Acute blood loss anemia due to above   Syncope due to #1 and 2 above  Paroxysmal atrial fibrillation   HTN  Hypothyroidism  NELY    Hospital Course   Maxine Sears is a 63 year old female with PMH of atrial fibrillation on Xarelto, hypothyroidism, HTN, NELY, GERD, hiatal hernia, who presents with acute upper GI bleed.     Acute GI bleed, probably upper  Acute blood loss anemia due to above   Syncope due to #1 and 2 above  -Patient presenting with melena that started day prior to presentation, however in retrospect patient has had on and off melena for a few weeks now.  -in the ED, she had a repeat large melenic stool witnessed by the ED attending.   -She reports syncopizing at that time and doesn't remember having the stool or being cleaned. Her vitals show relative hypotension SBP 100s, reports dizziness when sitting up.   -Baseline hemoglobin appears to be between 12.5-13.5; presenting hemoglobin was 10.7, haritha of 7.2.  Did not require blood transfusion.  -Upper GI endoscopy 3/3 revealed  oozing duodenal ulcer with a visible vessel. Treated with argon plasma coagulation   -Due to slow drift down of hemoglobin, she was monitored in the hospital for another 24 hours.  Hemoglobin was stable.  No clinical evidence of ongoing bleeding.  Patient will be discharged on twice daily Protonix.  Discontinue Xarelto for another week.  Please see discussion below.  -Outpatient follow-up with GI in 1 week                  Paroxysmal atrial fibrillation:   -Currently in NSR.  -Hold Xarelto for another week, to be resumed 3/12 after CBC done at PCPs office  -Continue flecainide     HTN:   -Blood pressure initially was on the softer side  -Continue PTA  losartan, and atenolol.hold Maxzide  until reevaluated by PCP.    Hypothyroidism:   -Resume levothyroxine     NELY:   -CPAP per home settings     Zachary Sal MD    Significant Results and Procedures   See below    Pending Results     Unresulted Labs Ordered in the Past 30 Days of this Admission     No orders found from 1/1/2019 to 3/3/2019.          Code Status   Full Code       Primary Care Physician   Yuval Jama    Physical Exam   Temp: 97.9  F (36.6  C) Temp src: Oral BP: 105/52   Heart Rate: 65 Resp: 16 SpO2: 95 % O2 Device: None (Room air)      Constitutional: AAOX3, NAD  Respiratory: CTA B/L, Normal WOB  Cardiovascular: RRR, No murmur  GI: Soft, Non- tender, BS- normoactive  Neuro: CN- grossly intact; moving all 4 extremities spontaneously.    Discharge Disposition   Discharged to home  Condition at discharge: Stable    Consultations This Hospital Stay   GASTROENTEROLOGY IP CONSULT  ADVANCE DIRECTIVE IP CONSULT    Time Spent on this Encounter   I, Zachary Sal, personally saw the patient today and spent greater than 30 minutes discharging this patient.    Discharge Orders      Follow-up and recommended labs and tests    Follow up with primary care provider, Yuval Jama, within 7 days for hospital follow- up.  The following labs/tests are recommended: CBC on 3/11/19  Dr Ko in 7-10 days     Activity    Your activity upon discharge: activity as tolerated     Discharge Instructions    Hold Xarelto until follow-up CBC obtained on 3/11/19.  Can resume on 3/12/19 if hemoglobin stable and no further evidence of GI bleed.     Diet    Follow this diet upon discharge: Orders Placed This Encounter      Mechanical/Dental Soft Diet       Discharge Medications   Current Discharge Medication List      CONTINUE these medications which have CHANGED    Details   losartan (COZAAR) 100 MG tablet Take 1 tablet (100 mg) by mouth daily  Qty: 90 tablet, Refills: 3    Comments: Resume 3/6 if BP stable  Associated Diagnoses: Essential hypertension       pantoprazole (PROTONIX) 40 MG EC tablet Take 1 tablet (40 mg) by mouth 2 times daily (before meals)  Qty: 60 tablet, Refills: 1    Comments: Future refills by PCP Dr. Yuval Jama with phone number [phone].  Associated Diagnoses: Acute GI bleeding         CONTINUE these medications which have NOT CHANGED    Details   atenolol (TENORMIN) 50 MG tablet Take 1 tablet (50 mg) by mouth daily  Qty: 90 tablet, Refills: 3    Comments: Profile only. Pt doesn't require refill at this time  Associated Diagnoses: Paroxysmal atrial fibrillation (H)      flecainide acetate 150 MG TABS Take 1/2 tab (75 mg) twice daily by mouth  Qty: 90 tablet, Refills: 3    Comments: Profile only. Pt doesn't require refill at this time  Associated Diagnoses: Paroxysmal atrial fibrillation (H)      gabapentin (NEURONTIN) 300 MG capsule Take 600 mg by mouth At Bedtime 2 capsules      !! levothyroxine (SYNTHROID/LEVOTHROID) 75 MCG tablet 1 tab daily in addition to Levothyroxine 88mcg daily so total daily dose = 163mcg daily  Qty: 90 tablet, Refills: 3    Comments: Profile only. Pt doesn't require refill at this time  Associated Diagnoses: Hypothyroidism, unspecified type      !! levothyroxine (SYNTHROID/LEVOTHROID) 88 MCG tablet 1 tab daily in addition to Levothyroxine 75mcg daily so total daily dose = 163mcg daily  Qty: 90 tablet, Refills: 3    Comments: Profile only. Pt doesn't require refill at this time  Associated Diagnoses: Hypothyroidism, unspecified type      order for DME DREAMSTATION  5-15 CM/H20  NASAL AIRFIT N10 HER      SUMAtriptan (IMITREX) 100 MG tablet Take 1 tablet (100 mg) by mouth at onset of headache for migraine May repeat in 2 hours if needed: max 2/day;  Qty: 10 tablet, Refills: 11    Comments: Profile only. Pt doesn't require refill at this time  Associated Diagnoses: Migraine without aura and without status migrainosus, not intractable       !! - Potential duplicate medications found. Please discuss with provider.       STOP taking these medications       rivaroxaban ANTICOAGULANT (XARELTO) 20 MG TABS tablet Comments:   Reason for Stopping:         triamterene-hydrochlorothiazide (MAXZIDE) 75-50 MG per tablet Comments:   Reason for Stopping:             Allergies   Allergies   Allergen Reactions     Morphine Anaphylaxis     Ceclor [Cefaclor] Hives     Diltiazem Hives     Lisinopril Cough     Sertraline Nausea and Vomiting     Data   Most Recent 3 CBC's:  Recent Labs   Lab Test 03/05/19  0622 03/05/19  0001 03/04/19  1816  03/03/19  0540  03/02/19  1610   WBC 9.6  --   --   --  14.1*  --  13.2*   HGB 7.2* 7.3* 7.8*   < > 8.9*   < > 10.7*   MCV 87  --   --   --  86  --  86     --   --   --  316  --  403    < > = values in this interval not displayed.      Most Recent 3 BMP's:  Recent Labs   Lab Test 03/03/19  0540 03/02/19  1610 02/03/19  2317    140 135   POTASSIUM 3.5 4.2 3.5   CHLORIDE 115* 109 101   CO2 25 25 25   BUN 37* 40* 17   CR 0.88 0.76 0.82   ANIONGAP 4 6 9   J CARLOS 8.2* 9.2 8.9   * 127* 119*     Most Recent 2 LFT's:  Recent Labs   Lab Test 03/02/19  1610 05/25/18  1023   AST 15 13   ALT 20 20   ALKPHOS 92 97   BILITOTAL 0.2 0.4     Most Recent INR's and Anticoagulation Dosing History:  Anticoagulation Dose History     Recent Dosing and Labs Latest Ref Rng & Units 3/2/2019    INR 0.86 - 1.14 1.78(H)        Most Recent 3 Troponin's:  Recent Labs   Lab Test 03/02/19  1610 07/13/15  1840 01/05/15  2055   TROPI <0.015 <0.015  The 99th percentile for upper reference range is 0.045 ug/L.  Troponin values in   the range of 0.045 - 0.120 ug/L may be associated with risks of adverse   clinical events.   <0.015  The 99th percentile for upper reference range is 0.045 ug/L.  Troponin values in   the range of 0.045 - 0.120 ug/L may be associated with risks of adverse   clinical events.   Effective 7/30/2014, the reference range for this assay has changed to reflect   new instrumentation/methodology.       Most  Recent Cholesterol Panel:  Recent Labs   Lab Test 05/25/18  1023   CHOL 213*   *   HDL 51   TRIG 297*     Most Recent 6 Bacteria Isolates From Any Culture (See EPIC Reports for Culture Details):  Recent Labs   Lab Test 04/27/11  1030   CULT >100,000 colonies/mL Escherichia coli     Most Recent TSH, T4 and A1c Labs:  Recent Labs   Lab Test 02/03/19  2317 11/12/18  1215 05/25/18  1023   TSH 0.48  --  1.31   T4  --   --  0.96   A1C  --  6.3*  --        Results for orders placed or performed during the hospital encounter of 09/13/17   Ankle XR, G/E 3 views, right    Narrative    XR ANKLE RT G/E 3 VW  9/13/2017 1:36 PM    HISTORY:  fall, ankle pain    COMPARISON:  None.      Impression    IMPRESSION:  There is a transverse nondisplaced fracture of the distal  fibula with overlying soft tissue swelling. Ankle mortise is  preserved. Otherwise negative.     BETTYE BELL MD   Wrist XR, G/E 3 views, right    Narrative    XR WRIST RT G/E 3 VW  9/13/2017 1:36 PM    HISTORY:  fall, wrist pain    COMPARISON:  None.      Impression    IMPRESSION:  Negative.     BETTYE BELL MD   Knee XR, 3 views, left    Narrative    XR KNEE LT 3 VW  9/13/2017 2:23 PM    HISTORY:  fall, knee pain    COMPARISON:  12/9/2014      Impression    IMPRESSION:  Osseous degenerative changes in the medial compartment  with moderate medial compartment narrowing that appears improved when  compared with the prior exam. Prominent posterior superior patellar  spur causing medial patellofemoral compartment narrowing. Diffuse soft  tissue swelling anterior to the patella and extending caudad into the  upper tibial region. No fractures.    BETTYE BELL MD

## 2019-03-05 NOTE — PROGRESS NOTES
Patient is doing well no further signs of GI bleeding hemoglobin is stable.  I will recommend to advance diet.  Continue on Protonix 40 mg twice a day.  Patient can be discharged from GI standpoint if patient is able to tolerate soft diet and hemoglobin remains stable in the next 24 hours.    Ben Ko MD

## 2019-03-05 NOTE — PLAN OF CARE
"Pt. A and O x4, calm and cooperative. HGB 7.8 at 1800, next HGB to be checked at midnight.  Up with SBA to the bathroom, denies dizziness, no stools this shift. Cinthya. Full liquid diet without nausea. BS hypoactive. VS stable. Pt. Anticipating possible discharge tomorrow if HGB remains stable. Pt. C/O headache which she feels is a \"sinus\" headache, refused offer of tylenol, warm pack x1 to facial area with stated decrease in pain.   "

## 2019-03-06 ENCOUNTER — PATIENT OUTREACH (OUTPATIENT)
Dept: CARE COORDINATION | Facility: CLINIC | Age: 64
End: 2019-03-06

## 2019-03-06 ENCOUNTER — TELEPHONE (OUTPATIENT)
Dept: INTERNAL MEDICINE | Facility: CLINIC | Age: 64
End: 2019-03-06

## 2019-03-06 DIAGNOSIS — I10 ESSENTIAL HYPERTENSION: ICD-10-CM

## 2019-03-06 DIAGNOSIS — K92.2 ACUTE GI BLEEDING: Primary | ICD-10-CM

## 2019-03-06 NOTE — Clinical Note
Dr. Jama,This patient has an appointment with you next week Friday 3/15 to follow-up from recent hospitalization for GI bleed.  She was instructed to hold her Xarelto (A-fib) until repeat labs and confirmation of no signs/symptoms of bleeding.  There are currently no orders/appointments for her to get labs done.  Would you like her to get some prior to seeing you?  If so, can you please enter lab orders.  I am happy to communicate your response to the patient and assist her to set up a lab appointment, if needed.Thanks!Damien Person, RNClinic Care CoordinatorSt. Vincent Clay Hospital XerxesP: [phone]

## 2019-03-06 NOTE — TELEPHONE ENCOUNTER
Please see note below.  BP was elevated while in hospital and now patient is off of blood thinner and experiencing HA pain of 8/10.  Writer felt patient should be reassessed today.  Scheduled for appt with partner today.

## 2019-03-06 NOTE — TELEPHONE ENCOUNTER
"Hospital/TCU/ED for chronic condition Discharge Protocol    \"Hi, my name is Antoinette Flores, a registered nurse, and I am calling from [facility] [place].  I am calling to follow up and see how things are going for you after your recent emergency visit/hospital/TCU stay.\"    Tell me how you are doing now that you are home?\" I still have a terrible HA and can't seem to take anything for it due to having to stay away from NSAIDS.  (Writer suggested Tylenol but patient notes that she does not have any on hand and cannot afford to buy some until next week).      Discharge Instructions    \"Let's review your discharge instructions.  What is/are the follow-up recommendations?  Pt. Response: I am following up with Dr. Jama on 3/15.    \"Has an appointment with your primary care provider been scheduled?\"   Yes. (confirm)    \"When you see the provider, I would recommend that you bring your medications with you.\"    Medications    \"Tell me what changed about your medicines when you discharged?\"    Changes to chronic meds?    0-1    \"What questions do you have about your medications?\"    None     New diagnoses of heart failure, COPD, diabetes, or MI?    No              Medication reconciliation completed? Yes  Was MTM referral placed (*Make sure to put transitions as reason for referral)?   No    Call Summary    \"What questions or concerns do you have about your recent visit and your follow-up care?\"     What should I do about my headache?. Advice given: Advised patient be seen in clinic to have BP checked and discuss with provider other options for HA treatment.  Hospital stopped patient's blood thinner and now patient having HA pain of 8/10.  She does note that she had a headache while in hospital too and was given Tylenol which did not help.  PCP does not have any openings today.  Scheduled with partner.     \"If you have questions or things don't continue to improve, we encourage you contact us through the main clinic number (give " "number).  Even if the clinic is not open, triage nurses are available 24/7 to help you.     We would like you to know that our clinic has extended hours (provide information).  We also have urgent care (provide details on closest location and hours/contact info)\"      \"Thank you for your time and take care!\"             "

## 2019-03-06 NOTE — PROGRESS NOTES
can'tClinic Care Coordination Contact    Situation: Patient chart reviewed by care coordinator.    Background: Patient admitted to [facility] from 3/2/19 to 3/5/19 for upper GI bleed.  Her Xarelto was held; to be resumed on 3/11/19 if hemoglobin stable and no signs of GI bleed.  The patient was discharged home with recommendations for follow-up with PCP and GI (Dr. Ko).    Assessment: Patient called by clinic RN today for hospital follow-up at which time she reported headache (pain 8/10) and report of inability to afford Tylenol at this time.  Due to history of elevated BP, patient being off of blood thinner, and now headache, the patient was scheduled for a PCP visit today 3/6/19 for further evaluation.    Plan/Recommendations: CCC RN will request [facility] SW to provide patient with medication assistance information during today's clinic visit, if needed, due to patient mention of inability to afford Tylenol.  CCC RN will plan to outreach to patient in approximately 1 business day to review hospitalization, clinic visit, and assess need for additional support/resources.    Damien Person RN  Clinic Care Coordinator  [facility]  Ph: [phone]

## 2019-03-07 ASSESSMENT — ACTIVITIES OF DAILY LIVING (ADL): DEPENDENT_IADLS:: INDEPENDENT

## 2019-03-07 NOTE — PROGRESS NOTES
Clinic Care Coordination Contact    Clinic Care Coordination Contact  OUTREACH    Referral Information:  Referral Source: IP Report  Primary Diagnosis: GI Disorders  Chief Complaint   Patient presents with     Clinic Care Coordination - [facility]     GI bleed     Clinical Concerns:  Follow-up call to the patient to get updates on her status following recent hospitalization for GI bleed.  Of note, the patient was to have clinic appointment yesterday for headache but cancelled.    Today the patient reports to be feeling quite well.  She updated that her transportation fell through yesterday; her sister was using her car for her own appointments and wasn't able to get back in time for the patient to make it to her appointment.  Additionally, the patient's sister was able to  some Tylenol for her which she took and reports nearly resolved headache.  She does have a slight headache today but states it is manageable.    The patient reports a bowel movement today which still appeared a bit dark but the patient does not feel this is active bleeding.  She did report some swelling to her BLE, left leg > right leg; she thinks this is due to not taking her triamterene-hydrochlorothiazide.    The patient confirmed her upcoming PCP appointment.  CCC RN provided her with phone number to schedule GI appointment with Dr. Ko as instructed (ph: [phone]).    Medication Management:  The patient confirmed she is holding her Xarelto and triamterene-hydrochlorothiazide as instructed after her hospitalization.  She denied concerns regarding affording her medications stating everything prescription is covered, it's just over-the-counter ones that are sometimes too expensive.    Functional Status:  Independent    Diet/Exercise/Sleep:  Food Insecurity: No - the patient currently stated that her money is tight until she gets paid but that she has enough food.  She agreed to contact [facility] RN should this change, and was open to  receiving resources for food, if needed, at her upcoming PCP visit.    Transportation:  Transportation concerns (GOAL):: No  Transportation means:: Regular car     Psychosocial:  Informal Support system:: Family (sister)    Patient/Caregiver understanding: Yes     Future Appointments              In 5 days [facility]    In 1 week Yuval Jama MD [facility]        Plan: The patient will continue monitoring for signs/symptoms of GI bleeding and will promptly contact clinic with any concerns.  The patient will continue holding her Xarelto and triamterene-hydrochlorothiazide until otherwise instructed.  The patient will schedule an appointment with Dr. Maikel HARMON as recommended and will attend her upcoming PCP appointment as scheduled.  The patient agreed to contact [facility] RN with additional questions or concerns.  CCC RN will outreach to patient in approximately 1 week to get updates on patient status, assess progress with follow-up, and offer additional support and resources as indicated.    UPDATE 3/8/19 at 0910: Per Dr. Jama, CCC RN called and requested that patient get fasting labs drawn mid-next week prior to her PCP appointment.  The patient was agreeable; CCC RN assisted patient to schedule lab appointment.    Damien Person, RN  Clinic Care Coordinator  [facility]  Ph: [phone]

## 2019-03-09 NOTE — TELEPHONE ENCOUNTER
Med was prescribed again by GI 3/5/19 and apparently went through OK then.  CC note reviewed from 3/6/19 and states no issues with getting meds

## 2019-03-11 ENCOUNTER — TELEPHONE (OUTPATIENT)
Dept: INTERNAL MEDICINE | Facility: CLINIC | Age: 64
End: 2019-03-11

## 2019-03-11 NOTE — TELEPHONE ENCOUNTER
Prior Authorization Retail Medication Request    Medication/Dose: Xarelto 20mg  ICD code (if different than what is on RX):    Previously Tried and Failed:    Rationale:      Insurance Name:  [place]icaid [place] [id number]  Insurance ID:  [id number]      Pharmacy Information (if different than what is on RX)  Name:  [address]  Phone:  [phone]

## 2019-03-12 ENCOUNTER — HOSPITAL ENCOUNTER (OUTPATIENT)
Facility: CLINIC | Age: 64
End: 2019-03-12
Attending: INTERNAL MEDICINE | Admitting: INTERNAL MEDICINE
Payer: COMMERCIAL

## 2019-03-13 DIAGNOSIS — R73.9 BLOOD GLUCOSE ELEVATED: ICD-10-CM

## 2019-03-13 DIAGNOSIS — I10 ESSENTIAL HYPERTENSION: ICD-10-CM

## 2019-03-13 DIAGNOSIS — K92.2 ACUTE GI BLEEDING: ICD-10-CM

## 2019-03-13 DIAGNOSIS — E78.5 HYPERLIPIDEMIA LDL GOAL <130: ICD-10-CM

## 2019-03-13 LAB
ALBUMIN SERPL-MCNC: 3.8 G/DL (ref 3.4–5)
ALP SERPL-CCNC: 89 U/L (ref 40–150)
ALT SERPL W P-5'-P-CCNC: 22 U/L (ref 0–50)
ANION GAP SERPL CALCULATED.3IONS-SCNC: 8 MMOL/L (ref 3–14)
AST SERPL W P-5'-P-CCNC: 15 U/L (ref 0–45)
BILIRUB SERPL-MCNC: 0.3 MG/DL (ref 0.2–1.3)
BUN SERPL-MCNC: 11 MG/DL (ref 7–30)
CALCIUM SERPL-MCNC: 9 MG/DL (ref 8.5–10.1)
CHLORIDE SERPL-SCNC: 106 MMOL/L (ref 94–109)
CHOLEST SERPL-MCNC: 172 MG/DL
CO2 SERPL-SCNC: 24 MMOL/L (ref 20–32)
CREAT SERPL-MCNC: 0.92 MG/DL (ref 0.52–1.04)
GFR SERPL CREATININE-BSD FRML MDRD: 66 ML/MIN/{1.73_M2}
GLUCOSE SERPL-MCNC: 140 MG/DL (ref 70–99)
HBA1C MFR BLD: 5.9 % (ref 0–5.6)
HDLC SERPL-MCNC: 45 MG/DL
HGB BLD-MCNC: 9 G/DL (ref 11.7–15.7)
IRON SATN MFR SERPL: 3 % (ref 15–46)
IRON SERPL-MCNC: 15 UG/DL (ref 35–180)
LDLC SERPL CALC-MCNC: 85 MG/DL
NONHDLC SERPL-MCNC: 127 MG/DL
POTASSIUM SERPL-SCNC: 3.8 MMOL/L (ref 3.4–5.3)
PROT SERPL-MCNC: 7.6 G/DL (ref 6.8–8.8)
RETICS # AUTO: 88.4 10E9/L (ref 25–95)
RETICS/RBC NFR AUTO: 2.6 % (ref 0.5–2)
SODIUM SERPL-SCNC: 138 MMOL/L (ref 133–144)
TIBC SERPL-MCNC: 472 UG/DL (ref 240–430)
TRIGL SERPL-MCNC: 212 MG/DL

## 2019-03-13 PROCEDURE — 83550 IRON BINDING TEST: CPT | Performed by: INTERNAL MEDICINE

## 2019-03-13 PROCEDURE — 85018 HEMOGLOBIN: CPT | Performed by: INTERNAL MEDICINE

## 2019-03-13 PROCEDURE — 85045 AUTOMATED RETICULOCYTE COUNT: CPT | Performed by: INTERNAL MEDICINE

## 2019-03-13 PROCEDURE — 36415 COLL VENOUS BLD VENIPUNCTURE: CPT | Performed by: INTERNAL MEDICINE

## 2019-03-13 PROCEDURE — 80061 LIPID PANEL: CPT | Performed by: INTERNAL MEDICINE

## 2019-03-13 PROCEDURE — 83036 HEMOGLOBIN GLYCOSYLATED A1C: CPT | Performed by: INTERNAL MEDICINE

## 2019-03-13 PROCEDURE — 83540 ASSAY OF IRON: CPT | Performed by: INTERNAL MEDICINE

## 2019-03-13 PROCEDURE — 80053 COMPREHEN METABOLIC PANEL: CPT | Performed by: INTERNAL MEDICINE

## 2019-03-13 NOTE — PROGRESS NOTES
SUBJECTIVE:   Maxine Sears is a 63 year old female who presents to clinic today for the following health issues:          Hospital Follow-up Visit:    Hospital/Nursing Home/IP Rehab Facility: [facility]  Date of Admission: 03/03/2019  Date of Discharge: 03/05/2019  Reason(s) for Admission: Acute GI Bleed  Tele contact 3/6/19            Problems taking medications regularly:  None       Medication changes since discharge: None       Problems adhering to non-medication therapy:  None    Summary of hospitalization:  [facility] discharge summary reviewed  Diagnostic Tests/Treatments reviewed.  Follow up needed:  Labs and future EGD  Other Healthcare Providers Involved in Patient s Care:          GI  Update since discharge: improved.     Post Discharge Medication Reconciliation: discharge medications reconciled and changed, per note/orders (see AVS).  Plan of care communicated with patient     Coding guidelines for this visit:  Type of Medical   Decision Making Face-to-Face Visit       within 7 Days of discharge Face-to-Face Visit        within 14 days of discharge   Moderate Complexity [id number]   High Complexity [id number]            Discharge summary reviewed. Part of the summary as below:    Discharge Summary  Hospitalist     Date of Admission:  3/2/2019  Date of Discharge:  3/5/2019  Discharging Provider: Zachary Sal MD        Discharge Diagnoses        Acute GI bleed due to bleeding duodenal ulcer  Acute blood loss anemia due to above   Syncope due to #1 and 2 above  Paroxysmal atrial fibrillation   HTN  Hypothyroidism  NELY        Hospital Course     Maxine Sears is a 63 year old female with PMH of atrial fibrillation on Xarelto, hypothyroidism, HTN, NELY, GERD, hiatal hernia, who presents with acute upper GI bleed.     Acute GI bleed, probably upper  Acute blood loss anemia due to above   Syncope due to #1 and 2 above  -Patient presenting with melena that started day  prior to presentation, however in retrospect patient has had on and off melena for a few weeks now.  -in the ED, she had a repeat large melenic stool witnessed by the ED attending.   -She reports syncopizing at that time and doesn't remember having the stool or being cleaned. Her vitals show relative hypotension SBP 100s, reports dizziness when sitting up.   -Baseline hemoglobin appears to be between 12.5-13.5; presenting hemoglobin was 10.7, haritha of 7.2.  Did not require blood transfusion.  -Upper GI endoscopy 3/3 revealed  oozing duodenal ulcer with a visible vessel. Treated with argon plasma coagulation   -Due to slow drift down of hemoglobin, she was monitored in the hospital for another 24 hours.  Hemoglobin was stable.  No clinical evidence of ongoing bleeding.  Patient will be discharged on twice daily Protonix.  Discontinue Xarelto for another week.  Please see discussion below.  -Outpatient follow-up with GI in 1 week                  Paroxysmal atrial fibrillation:   -Currently in NSR.  -Hold Xarelto for another week, to be resumed 3/12 after CBC done at PCPs office  -Continue flecainide     HTN:   -Blood pressure initially was on the softer side  -Continue PTA  losartan, and atenolol.hold Maxzide until reevaluated by PCP.     Hypothyroidism:   -Resume levothyroxine     NELY:   -CPAP per home settings     Zachary Sal MD       Pt's past medical history, family history, habits, medications and allergies were reviewed with the patient today.  See snap shot for  HCM status. Most recent lab results reviewed with pt. Problem list and histories reviewed & adjusted, as indicated.  Additional history as below:    Since hospital discharge, patient strength has been improving but still somewhat tired.  Hemoglobin has improved to 9.0.  Patient quite iron deficient currently.  Patient has been exercising by doing some walking in the hallways of her building.  Is able to go down to the end of the hallway before  "notices some fatigue and slight shortness of breath.  No chest pain.  Patient states both of these are better than when she was in the hospital.  Legs feel a little bit weak with prolonged standing.  Denies numbness in them.  No falls since coming home.  Patient will be having a repeat endoscopy in late April to follow-up on the duodenal ulcer seen on endoscopy during recent hospitalization.  History of paroxysmal atrial fibrillation.  Patient on antiarrhythmic medication and off of Xarelto currently since recent GI bleed.  Patient denies abdominal pain.  Stools have become brown again.  Occasional headache which is on the forehead and bilateral temporal and top of the head with some radiation to the neck.  Patient gets these occasionally.  Denies headache currently.  States this is  very different from her migraine headaches in which she will have pain behind the right eye only.  No associated nausea or vomiting with these.       Additional ROS:   Constitutional, HEENT, Cardiovascular, Pulmonary, GI and , Neuro, MSK and Psych review of systems/symptoms are otherwise negative or unchanged from previous, except as noted above.      OBJECTIVE:  /70   Pulse 70   Temp 97.9  F (36.6  C) (Oral)   Resp 16   Ht 1.626 m (5' 4\")   Wt 122.6 kg (270 lb 4.8 oz)   SpO2 96%   BMI 46.40 kg/m     Estimated body mass index is 46.4 kg/m  as calculated from the following:    Height as of this encounter: 1.626 m (5' 4\").    Weight as of this encounter: 122.6 kg (270 lb 4.8 oz).     HENT: ear canals and TM's normal and nose and mouth without ulcers or lesions. No TA or TMJ area tenderness to palpation   Neck: no adenopathy. Thyroid normal to palpation. No bruits.  Mild tenderness to palpation bilateral paracervical musculature. No nucchal rigidity  Pulm: Lungs clear to auscultation   CV: Regular rates and rhythm  GI: Soft, obese, nontender, Normal active bowel sounds, No hepatosplenomegaly or masses palpable  Ext: " Peripheral pulses intact. No edema.  Neuro: Normal strength and tone, sensory exam grossly normal    Assessment/Plan: (See plan discussion below for further details)  1. Acute GI bleeding  Recent duodenal ulcer treated with argon plasma coagulation.  Hemoglobin improving and stools now back to normal in color.  Will continue PPI therapy.  Patient will have endoscopy in late April for follow-up as scheduled     2. Anemia, unspecified type  Improving.  Patient quite iron deficient secondary to recent GI bleed.  Will start Vitron-C given PPI use.  Recheck hemoglobin in a week.  If improving further, will then reinitiate anticoagulation therapy as below.  To assist hemoglobin production, will also give a B12 shot x1 today  - cyanocobalamin injection 1,000 mcg  - ferrous fumarate 65 mg, [place]. FE,-Vitamin C 125 mg (VITRON-C)  MG TABS tablet; Take 1 tablet by mouth daily  Dispense: 30 tablet; Refill: 1  - Hemoglobin; Future  - INJECTION INTRAMUSCULAR OR SUB-Q    3. Atrial fibrillation and flutter (H)  Patient currently in normal sinus rhythm.  On flecainide.  Will defer starting anticoagulation back until hemoglobin a little bit higher even though GI had cleared patient to restart and follow-up hemoglobin improved.  Patient at very low risk for embolic issue at this time since in sinus rhythm and taking antiarrhythmic therapy.    4. Morbid obesity, unspecified obesity type (H)   Patient up 5 pounds over the past year.  See counseling below    5. Tension headache  No symptoms currently.  Patient will do specific neck stretching exercises in the morning.  Tylenol if recurrent pain    Plan discussion:   Vitron- C, 1 tab  Daily for iron replacement (prescription or over the counter). May cause some darkening of stool  Nonfasting Hemoglobin lab next Friday.    Call if stools darker AND feeling weaker suddenly   Stay off Xarelto for now. If hemoglobin better still next week, then will restart Heat/stretching neck muscles  in AM for tension headache prevention  Endoscopy in 6-8 weeks with Dr Ko. If back on Xarelto then, will need for hold for 3 days prior  Vit B12 shot today  Neck stretching exercises in the morning.  Heat as needed to relax neck and upper back musculature.   Knee lift and knee extension exercises 2-3x/day    Yuval Jama MD  Internal Medicine Department  [facility]

## 2019-03-14 NOTE — TELEPHONE ENCOUNTER
PA Initiation    Medication: Xarelto 20mg- INITIATED  Insurance Company: [place] Medicaid ([facility]) - Phone [phone] Fax [phone]  Pharmacy Filling the Rx: [place] [facility] [id number] - [place], [place] - [facility] [address]  Filling Pharmacy Phone: [phone]  Filling Pharmacy Fax: [phone]  Start Date: 3/14/2019

## 2019-03-15 ENCOUNTER — PATIENT OUTREACH (OUTPATIENT)
Dept: INTERNAL MEDICINE | Facility: CLINIC | Age: 64
End: 2019-03-15

## 2019-03-15 ENCOUNTER — OFFICE VISIT (OUTPATIENT)
Dept: INTERNAL MEDICINE | Facility: CLINIC | Age: 64
End: 2019-03-15
Payer: MEDICAID

## 2019-03-15 VITALS
BODY MASS INDEX: 46.15 KG/M2 | HEIGHT: 64 IN | TEMPERATURE: 97.9 F | RESPIRATION RATE: 16 BRPM | WEIGHT: 270.3 LBS | SYSTOLIC BLOOD PRESSURE: 122 MMHG | DIASTOLIC BLOOD PRESSURE: 70 MMHG | HEART RATE: 70 BPM | OXYGEN SATURATION: 96 %

## 2019-03-15 DIAGNOSIS — I48.92 ATRIAL FIBRILLATION AND FLUTTER (H): ICD-10-CM

## 2019-03-15 DIAGNOSIS — K92.2 ACUTE GI BLEEDING: Primary | ICD-10-CM

## 2019-03-15 DIAGNOSIS — E66.01 MORBID OBESITY, UNSPECIFIED OBESITY TYPE (H): ICD-10-CM

## 2019-03-15 DIAGNOSIS — G44.209 TENSION HEADACHE: ICD-10-CM

## 2019-03-15 DIAGNOSIS — D64.9 ANEMIA, UNSPECIFIED TYPE: ICD-10-CM

## 2019-03-15 DIAGNOSIS — I48.91 ATRIAL FIBRILLATION AND FLUTTER (H): ICD-10-CM

## 2019-03-15 PROCEDURE — 99495 TRANSJ CARE MGMT MOD F2F 14D: CPT | Mod: 25 | Performed by: INTERNAL MEDICINE

## 2019-03-15 PROCEDURE — 96372 THER/PROPH/DIAG INJ SC/IM: CPT | Performed by: INTERNAL MEDICINE

## 2019-03-15 RX ORDER — CYANOCOBALAMIN 1000 UG/ML
1000 INJECTION, SOLUTION INTRAMUSCULAR; SUBCUTANEOUS ONCE
Status: COMPLETED | OUTPATIENT
Start: 2019-03-15 | End: 2019-03-15

## 2019-03-15 RX ADMIN — CYANOCOBALAMIN 1000 MCG: 1000 INJECTION, SOLUTION INTRAMUSCULAR; SUBCUTANEOUS at 12:24

## 2019-03-15 ASSESSMENT — ACTIVITIES OF DAILY LIVING (ADL): DEPENDENT_IADLS:: CLEANING;TRANSPORTATION

## 2019-03-15 ASSESSMENT — MIFFLIN-ST. JEOR: SCORE: 1766.07

## 2019-03-15 NOTE — LETTER
[facility]  Complex Care Plan  About Me  Patient Name:  Maxine Velasquez    YOB: 1955  Age:     63 year old   Anita MRN:   [id number] Telephone Information:  Home Phone [phone]   Mobile [phone]       Address:    [address]  [facility] [facility] [address] Email address:  No e-mail address on record      Emergency Contact(s)  Name Relationship Lgl Grd Work Phone Home Phone Mobile Phone   1. SEVERSON,VIVIAN Sister   [phone]   2. BRANDI GEORGES Daughter  none [phone]   3. PRATIMA VELASQUEZ Son   [phone]           Primary language:  English     needed? No   [facility]:  [phone] op. 1  Other communication barriers: None  Preferred Method of Communication:  Phone  Current living arrangement: I live alone  Mobility Status/ Medical Equipment: Independent    Health Maintenance  Health Maintenance Reviewed: Due/Overdue   Health Maintenance Due   Topic Date Due     PREVENTIVE CARE VISIT  1955     MIGRAINE ACTION PLAN  08/23/1973     COLON CANCER SCREEN (SYSTEM ASSIGNED)  08/23/2005     ZOSTER IMMUNIZATION (1 of 2) 08/23/2005     ADVANCE DIRECTIVE PLANNING Q5 YRS  08/23/2010     MAMMO SCREEN Q2 YR (SYSTEM ASSIGNED)  09/15/2017     My Access Plan  Medical Emergency 911   Primary Clinic Line [facility] [phone]   24 Hour Appointment Line [phone] or  [phone]) (toll-free)   24 Hour Nurse Line [phone] (toll-free)   Preferred Urgent Care [facility], [phone]   Preferred Hospital [facility]  [phone]   Preferred Pharmacy [facility] [place], [place] - [address]     Behavioral Health Crisis Line The National Suicide Prevention Lifeline at 1-415.413.9654 or 911     My Care Team Members    Patient Care Team       Relationship Specialty Notifications Start End     Yuval Jama MD PCP - General Internal Medicine  10/20/14     Merged    Phone: [phone] Fax: [phone]         [address] [place] [address]    Yuval Jama MD  Internal Medicine  10/20/14     Merged    Phone: [phone] Fax: [phone]         [address] [place] [address]    Yuval Jama MD Assigned PCP   12/19/14     Phone: [phone] Fax: [phone]         [address] [address]    Damien Person, RN Lead Care Coordinator Primary Care - CC  3/6/19     Phone: [phone]                 My Care Plans  Self Management and Treatment Plan  Goals and (Comments)  Goals        General    1. Medical (pt-stated)     Notes - Note edited  3/15/2019  1:33 PM by Damien Person, RN    Goal Statement: I will work to feel less fatigued and weak by taking my medications as prescribed, getting routine lab-work, and continuing to working with my care teams within the next 2 months.  Measure of Success: The patient will report improved strength and energy and will demonstrate stable labs.  Supportive Steps to Achieve: The patient will continue attending health appointments as recommended, will take her medications as prescribed, and will report any new signs/symptoms to clinic.  CCC RN will continue routine patient outreaches to provide ongoing support and additional resources as needed.  Barriers: History of GI bleed.  Strengths: Patient is motivated to feel better and states her health as her top priority.  Date to Achieve By: 5/31/19  Patient expressed understanding of goal: Yes             Action Plans on File:    Depression     Advance Care Plans/Directives Type:        My Medical and Care Information  Problem List   Patient Active Problem List   Diagnosis     Osteoarthritis     TMJ disease     Atrial fibrillation and flutter (H)     Health Care Home     Hyperlipidemia LDL goal <130     Gastroesophageal reflux disease without esophagitis     Hypothyroidism     Morbid obesity,  unspecified obesity type (H)     Migraine without aura and without status migrainosus, not intractable     Essential hypertension     NELY on CPAP     RLS (restless legs syndrome)     Severe episode of recurrent major depressive disorder, without psychotic features (H)     Gastroesophageal reflux disease, esophagitis presence not specified     Acute GI bleeding     Duodenal ulcer      Current Medications:  Current Outpatient Medications   Medication     atenolol (TENORMIN) 50 MG tablet     ferrous fumarate 65 mg, [place]. FE,-Vitamin C 125 mg (VITRON-C)  MG TABS tablet     flecainide acetate 150 MG TABS     gabapentin (NEURONTIN) 300 MG capsule     levothyroxine (SYNTHROID/LEVOTHROID) 75 MCG tablet     levothyroxine (SYNTHROID/LEVOTHROID) 88 MCG tablet     losartan (COZAAR) 100 MG tablet     order for DME     pantoprazole (PROTONIX) 40 MG EC tablet     SUMAtriptan (IMITREX) 100 MG tablet     No current facility-administered medications for this visit.      Care Coordination Start Date: 3/15/2019   Frequency of Care Coordination: 2 weeks   Form Last Updated: 03/15/2019

## 2019-03-15 NOTE — PROGRESS NOTES
Clinic Care Coordination Contact    Clinic Care Coordination Contact  OUTREACH    Referral Information:  Referral Source: IP Report  Primary Diagnosis: GI Disorders  Chief Complaint   Patient presents with     Clinic Care Coordination - Face To Face     Clinical Concerns:  CCC RN met with the patient today during her PCP clinic visit.  The patient reports she's doing fair but has noticed persistent fatigue and weakness.  Her labs show her hemoglobin to be improved but significant for iron deficiency.    Dr. Jama is going to have her continue holding her Xarelto for another week until another repeat hemoglobin confirms it continues to improve.  She needs an endoscopy in 6-8 weeks with Dr. Ko (GI).    At this time, the patient states that her top priority is her health and getting things on track.  She is motivated to regain her strength and energy and lose some weight.  CCC RN and patient discussed these goals and the patient decided to focus on her strength and energy right now.    Medication Management:  The patient received a Vitamin B12 shot today during her clinic appointment.  She will also be starting Vitron C (iron and Vit C supplement) to help with her iron deficiency.      Functional Status:  Dependent ADLs:: Independent  Dependent IADLs:: Cleaning, Transportation  The patient has a  that comes 2-3 times per week.  Mobility Status: Independent    Living Situation:  Lives alone in Independent Senior Living apartment.    Diet/Exercise/Sleep:  Food Insecurity: No  Tube Feeding: No  The patient reports her money has been tight, but she can still afford enough food and stated if she ever is in need, her sister always gets her food.  The patient denied receiving additional food resources at this time but agreed to contact CCC RN should her situation change.    Exercise:: Yes  Days per week of moderate to strenuous exercise (like a brisk walk): 3  On average, minutes per day of exercise at this  level: 20  How intense was your typical exercise? : Light (like stretching or slow walking)  Exercise Minutes per Week: 60    Transportation:  The patient has a friends and fellow [employer] members that can provide her with transportation when needed.     Psychosocial:  Informal Support system:: Family, Friends, [place] based     Financial/Insurance:   The patient is working to pay a couple of her bills off by May, so her money is a little tighter right now than usual.  However, she stated good support from her sister and fellow [employer] members and denies need for additional financial assistance at this time.     Resources and Interventions:  Equipment Currently Used at Home: walker, rolling     Goals:   Goals        General    1. Medical (pt-stated)     Notes - Note edited  3/15/2019  1:33 PM by Damien Person RN    Goal Statement: I will work to feel less fatigued and weak by taking my medications as prescribed, getting routine lab-work, and continuing to working with my care teams within the next 2 months.  Measure of Success: The patient will report improved strength and energy and will demonstrate stable labs.  Supportive Steps to Achieve: The patient will continue attending health appointments as recommended, will take her medications as prescribed, and will report any new signs/symptoms to clinic.  CCC RN will continue routine patient outreaches to provide ongoing support and additional resources as needed.  Barriers: History of GI bleed.  Strengths: Patient is motivated to feel better and states her health as her top priority.  Date to Achieve By: 5/31/19  Patient expressed understanding of goal: Yes          Patient/Caregiver understanding: Yes  Outreach Frequency: 2 weeks    Plan: The patient will continue taking her medications as prescribed and will contact clinic with any new/worsening symptoms or any concerns.  The patient will contact CCC RN with any additional questions or concerns.  The patient will  plan for endoscopy in 6-8 weeks with GI for further evaluation of GI bleed.  [facility] RN will outreach to patient in approximately 2 weeks to get updates on patient status, assess goal progress, and offer additional support and resources as indicated.    Damien Perosn RN  Clinic Care Coordinator  [facility]  Ph: [phone]

## 2019-03-15 NOTE — PATIENT INSTRUCTIONS
Vitron- C, 1 tab  Daily for iron replacement (prescription or over the counter). May cause some darkening of stool  Nonfasting Hemoglobin lab next Friday.    Call if stools darker AND feeling weaker suddenly   Stay off Xarelto for now. If hemoglobin better still next week, then will restart Heat/stretching neck muscles in AM for tension headache prevention  Endoscopy in 6-8 weeks with Dr Ko. If back on Xarelto then, will need for hold for 3 days prior  Vit B12 shot today  Neck stretching exercises in the morning.  Heat as needed to relax neck and upper back musculature.   Knee lift and knee extension exercises 2-3x/day

## 2019-03-15 NOTE — LETTER
[facility] CARE COORDINATION  [facility]  [address], [place] [address]    March 15, 2019    Maxine Rothmandmore  [address]  [facility] [address]      Dear Maxine,    I am a clinic care coordinator who works with Yuval Jama MD at [facility]. I wanted to thank you for spending the time to talk with me and provide you with my contact information so that you can call me with questions or concerns about your health care. Below is a description of clinic care coordination and how I can further assist you.     The clinic care coordinator is a registered nurse and/or  who understand the health care system. The goal of clinic care coordination is to help you manage your health and improve access to the [facility] system in the most efficient manner. The registered nurse can assist you in meeting your health care goals by providing education, coordinating services, and strengthening the communication among your providers. The  can assist you with financial, behavioral, psychosocial, chemical dependency, counseling, and/or psychiatric resources.    Please feel free to contact me at [phone] with any questions or concerns. We at [facility] are focused on providing you with the highest-quality healthcare experience possible and that all starts with you.     Sincerely,     Damien Person RN  Clinic Care Coordinator  [facility]  Ph: [phone]      Enclosed: I have enclosed a copy of the Complex Care Plan. This has helpful information and goals that we have talked about. Please keep this in an easy to access place to use as needed.

## 2019-03-19 NOTE — TELEPHONE ENCOUNTER
Prior Authorization Approval    Authorization Effective Date: 3/1/2019  Authorization Expiration Date: 3/31/2019  Medication: Xarelto 20mg- APPROVED  Approved Dose/Quantity:   Reference #: [id number]   Insurance Company: [place] Medicaid ([facility]) - Phone [phone] Fax [phone]  Expected CoPay:       CoPay Card Available:      Foundation Assistance Needed:    Which Pharmacy is filling the prescription (Not needed for infusion/clinic administered): [facility] ([id number] - [place], [place] - [address] [place] [address]  Pharmacy Notified: Yes  Patient Notified: Yes

## 2019-03-26 DIAGNOSIS — D64.9 ANEMIA, UNSPECIFIED TYPE: ICD-10-CM

## 2019-03-26 LAB — HGB BLD-MCNC: 8.8 G/DL (ref 11.7–15.7)

## 2019-03-26 PROCEDURE — 85018 HEMOGLOBIN: CPT | Performed by: INTERNAL MEDICINE

## 2019-03-26 PROCEDURE — 36415 COLL VENOUS BLD VENIPUNCTURE: CPT | Performed by: INTERNAL MEDICINE

## 2019-04-10 ENCOUNTER — TELEPHONE (OUTPATIENT)
Dept: INTERNAL MEDICINE | Facility: CLINIC | Age: 64
End: 2019-04-10

## 2019-04-10 ENCOUNTER — HOSPITAL ENCOUNTER (OUTPATIENT)
Facility: CLINIC | Age: 64
Setting detail: OBSERVATION
Discharge: HOME | End: 2019-04-12
Attending: EMERGENCY MEDICINE | Admitting: INTERNAL MEDICINE
Payer: COMMERCIAL

## 2019-04-10 ENCOUNTER — OFFICE VISIT (OUTPATIENT)
Dept: URGENT CARE | Facility: URGENT CARE | Age: 64
End: 2019-04-10
Payer: COMMERCIAL

## 2019-04-10 VITALS
BODY MASS INDEX: 47.26 KG/M2 | TEMPERATURE: 98.5 F | RESPIRATION RATE: 18 BRPM | SYSTOLIC BLOOD PRESSURE: 118 MMHG | OXYGEN SATURATION: 96 % | DIASTOLIC BLOOD PRESSURE: 70 MMHG | HEART RATE: 75 BPM | WEIGHT: 275.3 LBS

## 2019-04-10 DIAGNOSIS — K92.2 ACUTE GI BLEEDING: Primary | ICD-10-CM

## 2019-04-10 DIAGNOSIS — K26.9 DUODENAL ULCER: ICD-10-CM

## 2019-04-10 DIAGNOSIS — K92.2 UPPER GI BLEED: ICD-10-CM

## 2019-04-10 DIAGNOSIS — K92.1 MELENA: ICD-10-CM

## 2019-04-10 DIAGNOSIS — Z87.19 HX OF DUODENAL ULCER: ICD-10-CM

## 2019-04-10 DIAGNOSIS — K92.1 BLACK STOOLS: ICD-10-CM

## 2019-04-10 LAB
ABO + RH BLD: NORMAL
ABO + RH BLD: NORMAL
ALBUMIN SERPL-MCNC: 3.7 G/DL (ref 3.4–5)
ALP SERPL-CCNC: 101 U/L (ref 40–150)
ALT SERPL W P-5'-P-CCNC: 19 U/L (ref 0–50)
ANION GAP SERPL CALCULATED.3IONS-SCNC: 5 MMOL/L (ref 3–14)
AST SERPL W P-5'-P-CCNC: 14 U/L (ref 0–45)
BASOPHILS # BLD AUTO: 0.1 10E9/L (ref 0–0.2)
BASOPHILS # BLD AUTO: 0.1 10E9/L (ref 0–0.2)
BASOPHILS NFR BLD AUTO: 0.5 %
BASOPHILS NFR BLD AUTO: 0.6 %
BILIRUB SERPL-MCNC: 0.2 MG/DL (ref 0.2–1.3)
BLD GP AB SCN SERPL QL: NORMAL
BLOOD BANK CMNT PATIENT-IMP: NORMAL
BUN SERPL-MCNC: 11 MG/DL (ref 7–30)
CALCIUM SERPL-MCNC: 9 MG/DL (ref 8.5–10.1)
CHLORIDE SERPL-SCNC: 105 MMOL/L (ref 94–109)
CO2 SERPL-SCNC: 28 MMOL/L (ref 20–32)
CREAT SERPL-MCNC: 0.82 MG/DL (ref 0.52–1.04)
DIFFERENTIAL METHOD BLD: ABNORMAL
DIFFERENTIAL METHOD BLD: ABNORMAL
EOSINOPHIL # BLD AUTO: 0.2 10E9/L (ref 0–0.7)
EOSINOPHIL # BLD AUTO: 0.2 10E9/L (ref 0–0.7)
EOSINOPHIL NFR BLD AUTO: 1.9 %
EOSINOPHIL NFR BLD AUTO: 2.3 %
ERYTHROCYTE [DISTWIDTH] IN BLOOD BY AUTOMATED COUNT: 16.1 % (ref 10–15)
ERYTHROCYTE [DISTWIDTH] IN BLOOD BY AUTOMATED COUNT: 16.2 % (ref 10–15)
GFR SERPL CREATININE-BSD FRML MDRD: 76 ML/MIN/{1.73_M2}
GLUCOSE SERPL-MCNC: 106 MG/DL (ref 70–99)
HCT VFR BLD AUTO: 32.5 % (ref 35–47)
HCT VFR BLD AUTO: 33.6 % (ref 35–47)
HGB BLD-MCNC: 10.6 G/DL (ref 11.7–15.7)
HGB BLD-MCNC: 9.1 G/DL (ref 11.7–15.7)
HGB BLD-MCNC: 9.9 G/DL (ref 11.7–15.7)
IMM GRANULOCYTES # BLD: 0 10E9/L (ref 0–0.4)
IMM GRANULOCYTES NFR BLD: 0.3 %
LYMPHOCYTES # BLD AUTO: 3.1 10E9/L (ref 0.8–5.3)
LYMPHOCYTES # BLD AUTO: 3.5 10E9/L (ref 0.8–5.3)
LYMPHOCYTES NFR BLD AUTO: 34.3 %
LYMPHOCYTES NFR BLD AUTO: 35.3 %
MCH RBC QN AUTO: 26.3 PG (ref 26.5–33)
MCH RBC QN AUTO: 26.8 PG (ref 26.5–33)
MCHC RBC AUTO-ENTMCNC: 30.5 G/DL (ref 31.5–36.5)
MCHC RBC AUTO-ENTMCNC: 31.5 G/DL (ref 31.5–36.5)
MCV RBC AUTO: 85 FL (ref 78–100)
MCV RBC AUTO: 86 FL (ref 78–100)
MONOCYTES # BLD AUTO: 0.8 10E9/L (ref 0–1.3)
MONOCYTES # BLD AUTO: 0.8 10E9/L (ref 0–1.3)
MONOCYTES NFR BLD AUTO: 8.1 %
MONOCYTES NFR BLD AUTO: 8.8 %
NEUTROPHILS # BLD AUTO: 4.6 10E9/L (ref 1.6–8.3)
NEUTROPHILS # BLD AUTO: 5.6 10E9/L (ref 1.6–8.3)
NEUTROPHILS NFR BLD AUTO: 53 %
NEUTROPHILS NFR BLD AUTO: 54.9 %
NRBC # BLD AUTO: 0 10*3/UL
NRBC BLD AUTO-RTO: 0 /100
PLATELET # BLD AUTO: 541 10E9/L (ref 150–450)
PLATELET # BLD AUTO: 599 10E9/L (ref 150–450)
POTASSIUM SERPL-SCNC: 3.6 MMOL/L (ref 3.4–5.3)
PROT SERPL-MCNC: 7.7 G/DL (ref 6.8–8.8)
RBC # BLD AUTO: 3.77 10E12/L (ref 3.8–5.2)
RBC # BLD AUTO: 3.95 10E12/L (ref 3.8–5.2)
SODIUM SERPL-SCNC: 138 MMOL/L (ref 133–144)
SPECIMEN EXP DATE BLD: NORMAL
WBC # BLD AUTO: 10.2 10E9/L (ref 4–11)
WBC # BLD AUTO: 8.7 10E9/L (ref 4–11)

## 2019-04-10 PROCEDURE — 36415 COLL VENOUS BLD VENIPUNCTURE: CPT | Performed by: PHYSICIAN ASSISTANT

## 2019-04-10 PROCEDURE — 99207 ZZC CDG-MDM COMPONENT: MEETS LOW - DOWN CODED: CPT | Performed by: INTERNAL MEDICINE

## 2019-04-10 PROCEDURE — 85025 COMPLETE CBC W/AUTO DIFF WBC: CPT | Performed by: PHYSICIAN ASSISTANT

## 2019-04-10 PROCEDURE — 86850 RBC ANTIBODY SCREEN: CPT | Performed by: EMERGENCY MEDICINE

## 2019-04-10 PROCEDURE — 86900 BLOOD TYPING SEROLOGIC ABO: CPT | Performed by: EMERGENCY MEDICINE

## 2019-04-10 PROCEDURE — 96365 THER/PROPH/DIAG IV INF INIT: CPT

## 2019-04-10 PROCEDURE — 25800030 ZZH RX IP 258 OP 636: Performed by: EMERGENCY MEDICINE

## 2019-04-10 PROCEDURE — C9113 INJ PANTOPRAZOLE SODIUM, VIA: HCPCS | Performed by: EMERGENCY MEDICINE

## 2019-04-10 PROCEDURE — 25000128 H RX IP 250 OP 636: Performed by: EMERGENCY MEDICINE

## 2019-04-10 PROCEDURE — 36415 COLL VENOUS BLD VENIPUNCTURE: CPT | Performed by: INTERNAL MEDICINE

## 2019-04-10 PROCEDURE — 85018 HEMOGLOBIN: CPT | Performed by: INTERNAL MEDICINE

## 2019-04-10 PROCEDURE — 99285 EMERGENCY DEPT VISIT HI MDM: CPT | Mod: 25

## 2019-04-10 PROCEDURE — 96366 THER/PROPH/DIAG IV INF ADDON: CPT

## 2019-04-10 PROCEDURE — 99219 ZZC INITIAL OBSERVATION CARE,LEVL II: CPT | Performed by: INTERNAL MEDICINE

## 2019-04-10 PROCEDURE — G0378 HOSPITAL OBSERVATION PER HR: HCPCS

## 2019-04-10 PROCEDURE — 99215 OFFICE O/P EST HI 40 MIN: CPT | Performed by: PHYSICIAN ASSISTANT

## 2019-04-10 PROCEDURE — 80053 COMPREHEN METABOLIC PANEL: CPT | Performed by: EMERGENCY MEDICINE

## 2019-04-10 PROCEDURE — 86901 BLOOD TYPING SEROLOGIC RH(D): CPT | Performed by: EMERGENCY MEDICINE

## 2019-04-10 PROCEDURE — 25000132 ZZH RX MED GY IP 250 OP 250 PS 637: Performed by: INTERNAL MEDICINE

## 2019-04-10 PROCEDURE — 85025 COMPLETE CBC W/AUTO DIFF WBC: CPT | Performed by: EMERGENCY MEDICINE

## 2019-04-10 RX ORDER — ONDANSETRON 2 MG/ML
4 INJECTION INTRAMUSCULAR; INTRAVENOUS EVERY 6 HOURS PRN
Status: DISCONTINUED | OUTPATIENT
Start: 2019-04-10 | End: 2019-04-12 | Stop reason: HOSPADM

## 2019-04-10 RX ORDER — GABAPENTIN 300 MG/1
600 CAPSULE ORAL AT BEDTIME
Status: DISCONTINUED | OUTPATIENT
Start: 2019-04-10 | End: 2019-04-12 | Stop reason: HOSPADM

## 2019-04-10 RX ORDER — ACETAMINOPHEN 650 MG/1
650 SUPPOSITORY RECTAL EVERY 4 HOURS PRN
Status: DISCONTINUED | OUTPATIENT
Start: 2019-04-10 | End: 2019-04-12 | Stop reason: HOSPADM

## 2019-04-10 RX ORDER — LEVOTHYROXINE SODIUM 75 UG/1
75 TABLET ORAL
Status: DISCONTINUED | OUTPATIENT
Start: 2019-04-11 | End: 2019-04-10

## 2019-04-10 RX ORDER — FLECAINIDE ACETATE 150 MG/1
150 TABLET ORAL DAILY
Status: DISCONTINUED | OUTPATIENT
Start: 2019-04-11 | End: 2019-04-10

## 2019-04-10 RX ORDER — LOSARTAN POTASSIUM 100 MG/1
100 TABLET ORAL DAILY
Status: DISCONTINUED | OUTPATIENT
Start: 2019-04-11 | End: 2019-04-12 | Stop reason: HOSPADM

## 2019-04-10 RX ORDER — ACETAMINOPHEN 325 MG/1
650 TABLET ORAL EVERY 4 HOURS PRN
Status: DISCONTINUED | OUTPATIENT
Start: 2019-04-10 | End: 2019-04-12 | Stop reason: HOSPADM

## 2019-04-10 RX ORDER — NALOXONE HYDROCHLORIDE 0.4 MG/ML
.1-.4 INJECTION, SOLUTION INTRAMUSCULAR; INTRAVENOUS; SUBCUTANEOUS
Status: DISCONTINUED | OUTPATIENT
Start: 2019-04-10 | End: 2019-04-12 | Stop reason: HOSPADM

## 2019-04-10 RX ORDER — ATENOLOL 50 MG/1
50 TABLET ORAL DAILY
Status: DISCONTINUED | OUTPATIENT
Start: 2019-04-11 | End: 2019-04-12 | Stop reason: HOSPADM

## 2019-04-10 RX ORDER — PANTOPRAZOLE SODIUM 40 MG/1
40 TABLET, DELAYED RELEASE ORAL
Status: DISCONTINUED | OUTPATIENT
Start: 2019-04-10 | End: 2019-04-12 | Stop reason: HOSPADM

## 2019-04-10 RX ORDER — ONDANSETRON 4 MG/1
4 TABLET, ORALLY DISINTEGRATING ORAL EVERY 6 HOURS PRN
Status: DISCONTINUED | OUTPATIENT
Start: 2019-04-10 | End: 2019-04-12 | Stop reason: HOSPADM

## 2019-04-10 RX ORDER — LEVOTHYROXINE SODIUM 88 UG/1
88 TABLET ORAL
Status: DISCONTINUED | OUTPATIENT
Start: 2019-04-11 | End: 2019-04-10

## 2019-04-10 RX ADMIN — SODIUM CHLORIDE 8 MG/HR: 9 INJECTION, SOLUTION INTRAVENOUS at 17:12

## 2019-04-10 RX ADMIN — SODIUM CHLORIDE 80 MG: 9 INJECTION, SOLUTION INTRAVENOUS at 16:34

## 2019-04-10 RX ADMIN — FLECAINIDE ACETATE 75 MG: 50 TABLET ORAL at 22:21

## 2019-04-10 RX ADMIN — SODIUM CHLORIDE 1000 ML: 9 INJECTION, SOLUTION INTRAVENOUS at 16:30

## 2019-04-10 RX ADMIN — GABAPENTIN 600 MG: 300 CAPSULE ORAL at 20:31

## 2019-04-10 RX ADMIN — PANTOPRAZOLE SODIUM 40 MG: 40 TABLET, DELAYED RELEASE ORAL at 22:21

## 2019-04-10 ASSESSMENT — MIFFLIN-ST. JEOR
SCORE: 1796.46
SCORE: 1787.39

## 2019-04-10 ASSESSMENT — ENCOUNTER SYMPTOMS
BACK PAIN: 1
LIGHT-HEADEDNESS: 0
BLOOD IN STOOL: 1
SHORTNESS OF BREATH: 0
VOMITING: 0
DIZZINESS: 0
ABDOMINAL PAIN: 0
FEVER: 0
NAUSEA: 0

## 2019-04-10 NOTE — H&P
[facility]  History and Physical  Hospitalist       Date of Admission:  4/10/2019    Assessment & Plan   Maxine Sears is a very pleasant 63 year old female with GERD, hypothyroidism, morbid obesity, atrial flutter, hypertension, dyslipidemia, depression, migraines who was admitted on 4/10/2019 for melena in the setting of recent known oozing duodenal ulcer and recent resumption of anticoagulation with Xarelto 6 days prior to admission.    Melena  Duodenal Ulcer  Had upper GI endoscopy on 3/3/19 with finding of blood in the gastric antrum and an oozing, cratered duodenal ulcer with visible vessel in the first portion of the duodenum.  Area was injected with epinephrine for hemostasis.  Hemoglobin has been stable and she has been on oral iron replacement.  Remained off of Xarelto since prior hospitalization and only resumed it 6 days prior to admission.  Single episode of dark stool, suspected melena earlier today prompting an urgent care visit which then turned into an emergency department visit.  No further stools--either regular or melanotic--in the emergency department nor in the observation unit.  -admit to observation  -BID PPI  -Hold Xarelto for now  -serial hemoglobin q 8 hours  -Dr. Ko to consult for GI  -clear liquid diet  -NPO after midnight for anticipated EGD tomorrow  -resume oral iron replacement at discharge    Hypothyroidism  -continue PTA levothyroxine    Atrial Flutter  Hypertension  Stable BP and pulse.  Xarelto was on hold for a week after her discharge on 3/5/19 and has since been resumed.  She is very worried about preventing a stroke and so thought it was safe to resume Xarelto on 4/5/19.  -Continue PTA atenolol, losartan, flecainide  -Holding Xarelto for now.  Consider using modified stroke risk reduction with baby aspirin only or remain off of anticoagulation entirely for another month or so, prior to resuming therapeutic anticoagulation for stroke  prevention.    Depression  Chronic headaches  -Continue gabapentin    DVT prophylaxis: Pneumatic Compression Devices and Ambulate every shift  Code Status:  Full    Disposition: Expected discharge in to home tomorrow after GI consult and EGD.    Blessing Matos MD  Text Page    ~~~~~~~~~~~~~~~~~~~~~~~~~~~~~~~~~~~~~~~~~~~~~~~~~~~~~  Primary Care Physician   Yuval Jama    Chief Complaint   melena    History is obtained from the patient and medical records.    History of Present Illness   Maxine Sears is a very pleasant 63 year old female who presents after single episode of melena earlier today after going out for [place] with her sister.  Of note she is on chronic Xarelto for atrial flutter and had a recent hospitalization from 3/2 through 3/5/19 for acute blood loss anemia secondary to JOHN oozing duodenal ulcer with visible vessel.  Underwent EGD by Dr. Ko who injected the area with epinephrine and achieved good hemostasis.  She has been continuing on the twice daily proton pump inhibitor.  Advised to stop Xarelto for a week, and long exceeded this recommendation, but resumed this 6 days ago (on 4/5/19).  Denies any abdominal pain, rectal pain, hematemesis, hemoptysis, bright red blood per rectum.  Has chronic low back pain and occasional headaches but denies any recent ingestion of ibuprofen, aspirin, Naprosyn.    Discussed with Dr. Doron Matos from the Emergency Department.  Hemoglobin 10.6 which is increased from 8.8 on 3/28/19 when she was discharged after the prior GI bleed.  No hypotension or tachycardia.    Past Medical History    I have reviewed this patient's medical history and updated it with pertinent information if needed.   Past Medical History:   Diagnosis Date     Atrial fibrillation and flutter (H) 1/5/15     Depression       Duodenal ulcer 03/04/2019     Essential hypertension       Gastroesophageal reflux disease without esophagitis 11/11/2015     GERD (gastroesophageal reflux  disease)      Hiatal hernia      Hyperlipidemia LDL goal <130 3/26/2015     Hypothyroidism      Migraine without aura and without status migrainosus, not intractable  Aug 2016     Morbid obesity, unspecified obesity type (H) 2015     Obesity      NELY on CPAP      Osteoarthritis      RLS (restless legs syndrome)      Symptomatic menopausal or female climacteric states (aka FLASHES)      TMJ disease      Past Surgical History   I have reviewed this patient's surgical history and updated it with pertinent information if needed.  Past Surgical History:   Procedure Laterality Date     APPENDECTOMY       CHOLECYSTECTOMY       HYSTERECTOMY, DOM       TONSILLECTOMY         Prior to Admission Medications   Prior to Admission Medications   Prescriptions Last Dose Informant Patient Reported? Taking?   SUMAtriptan (IMITREX) 100 MG tablet Past Month at Unknown time Self No Yes   Sig: Take 1 tablet (100 mg) by mouth at onset of headache for migraine May repeat in 2 hours if needed: max 2/day;   atenolol (TENORMIN) 50 MG tablet Past Month at Unknown time Self No Yes   Sig: Take 1 tablet (50 mg) by mouth daily   ferrous fumarate 65 mg, [place]. FE,-Vitamin C 125 mg (VITRON-C)  MG TABS tablet 4/10/2019 at Unknown time Self No Yes   Sig: Take 1 tablet by mouth daily   flecainide acetate 150 MG TABS 4/10/2019 at Unknown time Self No Yes   Sig: Take 1/2 tab (75 mg) twice daily by mouth   gabapentin (NEURONTIN) 300 MG capsule 2019 at Unknown time Self Yes Yes   Sig: Take 600 mg by mouth At Bedtime 2 capsules   levothyroxine (SYNTHROID/LEVOTHROID) 75 MCG tablet 4/10/2019 at Unknown time Self No Yes   Si tab daily in addition to Levothyroxine 88mcg daily so total daily dose = 163mcg daily   levothyroxine (SYNTHROID/LEVOTHROID) 88 MCG tablet 4/10/2019 at Unknown time Self No Yes   Si tab daily in addition to Levothyroxine 75mcg daily so total daily dose = 163mcg daily   losartan (COZAAR) 100 MG tablet 4/10/2019 at  Unknown time Self No Yes   Sig: Take 1 tablet (100 mg) by mouth daily   order for DME  Self Yes No   Sig: DREAMSTATION  5-15 CM/H20  NASAL AIRFIT N10 HER   pantoprazole (PROTONIX) 40 MG EC tablet 4/10/2019 at Unknown time Self No Yes   Sig: Take 1 tablet (40 mg) by mouth 2 times daily (before meals)      Facility-Administered Medications: None     Allergies   Allergies   Allergen Reactions     Morphine Anaphylaxis     Ceclor [Cefaclor] Hives     Diltiazem Hives     Lisinopril Cough     Sertraline Nausea and Vomiting       Social History   I have reviewed this patient's social history and updated it with pertinent information if needed. Maxine Sears  reports that she quit smoking about 47 years ago. Her smoking use included cigarettes. She started smoking about 48 years ago. She smoked 0.25 packs per day. She has never used smokeless tobacco. She reports that she does not drink alcohol or use drugs.    Family History   I have reviewed this patient's family history and updated it with pertinent information if needed.   Family History   Problem Relation Age of Onset     Myocardial Infarction Mother      Heart Failure Mother      Heart Surgery Father         bypass surgery     Cerebrovascular Disease Father      Heart Surgery Brother      Hyperlipidemia Brother      Hyperlipidemia Sister      Hyperlipidemia Brother      Sleep Apnea Sister        Review of Systems   The 10 point Review of Systems is negative other than noted in the HPI or here.    Physical Exam   Temp: 98.5  F (36.9  C) Temp src: Temporal BP: 131/67 Pulse: 65   Resp: 18 SpO2: 97 % O2 Device: None (Room air)    Vital Signs with Ranges  Temp:  [98.5  F (36.9  C)] 98.5  F (36.9  C)  Pulse:  [65-75] 65  Resp:  [18] 18  BP: (118-164)/(67-73) 131/67  SpO2:  [96 %-99 %] 97 %  275 lbs 0 oz    Constitutional: Alert, NAD, pleasant and interactive  Eyes: PERRL, sclerae anicteric  HEENT: mmm, atraumatic  Respiratory: Lungs CTAB, no wheezes or  crackles  Cardiovascular: RRR, no murmurs  no LE edema  GI: Obese, soft, non-tender, nondistended  Skin/Integument: warm, dry, no acute rashes  Lymph/Hematologic: no supra or infraclavicular lymphadenopathy, no petechiae   Genitourinary: not examined  Musc: KRAFT, normal limb strength x 4  Neuro: AOx3, no focal deficits, no tremors  Psych: not anxious, not confused      Data   Data reviewed today:  I personally reviewed no images or EKG's today.  Recent Labs   Lab 04/10/19  1622 04/10/19  1339   WBC 10.2 8.7   HGB 10.6* 9.9*   MCV 85 86   * 541*     --    POTASSIUM 3.6  --    CHLORIDE 105  --    CO2 28  --    BUN 11  --    CR 0.82  --    ANIONGAP 5  --    J CARLOS 9.0  --    *  --    ALBUMIN 3.7  --    PROTTOTAL 7.7  --    BILITOTAL 0.2  --    ALKPHOS 101  --    ALT 19  --    AST 14  --        Imaging:  No results found for this or any previous visit (from the past 24 hour(s)).

## 2019-04-10 NOTE — TELEPHONE ENCOUNTER
Patient called stated she is currently sitting outside of  OX clinic    Went to bathroom and noted stool is black and runny in color.  Hx of GI bleed.     Patient currently on Vitron-C supplement and PPI patient reported she had recently resumed her Xalrelto as she did not hear back from PCP if she should restart after 3/26/2019 hemoglobin level.    No weakness, No lightheadedness, No fatigue. AxOx4. Abnormal color stool x1 today. No vomiting.    Advised be seen in UC, may need to repeat Iron level since recent resumption of Xalrelto. Unclear if abnormal stool color d/t supplement of further bleeding of GI ulcer.     Pt expressed understanding and acceptance of the plan.  Pt had no further questions at this time.  Advised can call back to clinic at any time with concerns.     Alice VERA RN, BSN, PHN

## 2019-04-10 NOTE — ED PROVIDER NOTES
"  History     Chief Complaint:  Rectal Bleeding     HPI   Maxine Sears is a 63 year old female with a history of duodenal ulcer and GI bleed who presents to the Emergency Department today for evaluation of rectal bleeding. Patient reports she went to lunch with her sister and when she used the restroom she had a runny and dark, almost black bowel movement. She presented to an Urgent Care where she says a rectal exam was performed which showed \"a lot\" of blood. Patient reports lower back pain on examination here. No chest pain or shortness of breath. No fever. No abdominal pain, nausea, or vomiting. No lightheadedness or dizziness. She last ate at lunchtime. Her GI physician is Dr. Ko.    Allergies:  Morphine: anaphylaxis  Ceclor: hives  Diltiazem: hives  Lisinopril: cough  Sertraline: nausea, vomiting      Medications:    Vitron  Gabapentin  Synthroid  Cozaar  Protonix  Imitrex   Tenormin   Xarelto     Past Medical History:    Duodenal ulcer  GI bleed  GERD  Depression  NELY  RLS  Hypertension  Migraine  Obesity  Hypothyroidism  Hyperlipidemia  Atrial fibrillation and flutter  Osteoarthritis  TMJ disease  Hiatal hernia    Past Surgical History:    Appendectomy  Cholecystectomy  Hysterectomy  Tonsillectomy     Family History:    Myocardial infarction  Heart failure  Cerebrovascular disease  Hyperlipidemia  Sleep apnea     Social History:  Smoking status: Former smoker, quit date 1972  Alcohol use: No  Marital Status:   [5]     Review of Systems   Constitutional: Negative for fever.   Respiratory: Negative for shortness of breath.    Cardiovascular: Negative for chest pain.   Gastrointestinal: Positive for blood in stool. Negative for abdominal pain, nausea and vomiting.   Musculoskeletal: Positive for back pain.   Neurological: Negative for dizziness and light-headedness.   All other systems reviewed and are negative.    Physical Exam     Patient Vitals for the past 24 hrs:   BP Temp Temp src Pulse " "Resp SpO2 Height Weight   04/10/19 1830 -- -- -- -- -- 99 % -- --   04/10/19 1829 127/75 -- -- 63 -- 99 % -- --   04/10/19 1721 -- -- -- -- -- 97 % -- --   04/10/19 1720 131/67 -- -- 65 -- -- -- --   04/10/19 1500 164/73 98.5  F (36.9  C) Temporal 70 18 99 % 1.626 m (5' 4\") 124.7 kg (275 lb)       Physical Exam    General: Alert, appears well-developed and well-nourished. Cooperative.     In mild distress  HEENT:  Head:  Atraumatic  Ears:  External ears are normal  Mouth/Throat:  Oropharynx is without erythema or exudate and mucous membranes are moist.   Eyes:   Conjunctivae normal and EOM are normal. No scleral icterus.  CV:  Normal rate, regular rhythm, normal heart sounds and radial pulses are 2+ and symmetric.  No murmur.  Resp:  Breath sounds are clear bilaterally    Non-labored, no retractions or accessory muscle use  GI:  Obese. Abdomen is soft, no distension, no tenderness. No rebound or guarding. No CVA tenderness bilaterally  Rectal:  Performed in presence of female nurse. No kaelyn blood on digital examination. No masses or hemorrhoids detected.  MS:  Normal range of motion. No edema.    Normal strength in all 4 extremities.     Back atraumatic.    No midline cervical, thoracic, or lumbar tenderness  Skin:  Warm and dry.  No rash or lesions noted.  Neuro:   Alert. Normal strength. GCS: 15  Psych: Normal mood and affect.  Lymph: No anterior or posterior cervical lymphadenopathy noted.     Emergency Department Course     Laboratory:  CBC: HGB 10.6 (L),  (H) o/w WNL (WBC 10.2)  CMP: Glucose 106 (H) o/w WNL (Creatinine 0.82)    ABO/Rh type and screen: A positive    Interventions:  1630: NS 1L IV Bolus  1634: Protonix 80 MG IV  1712: Protonix 80 MG infusion 8 MG/hr IV    Emergency Department Course:  Past medical records, nursing notes, and vitals reviewed.  1608: I performed an exam of the patient and obtained history, as documented above.     IV inserted and blood drawn.    1639: I spoke with  " Maikel of Gastroenterology regarding this patient.    1647: I rechecked the patient. Explained findings to the patient.    Findings and plan explained to the Patient who consents to admission.     1659: Discussed the patient with Dr. Matos, who will admit the patient to a obs bed for further monitoring, evaluation, and treatment.     Impression & Plan      Medical Decision Making:  This presents with blood in stool/melena.  I considered a broad differential diagnosis for this patient including upper GIB (ulcer, gastritis, avm, tumor, etc) vs lower GIB (tumor, diverticulosis, avm, meckels, etc), colitis, aortoenteric fistula, hemorrhoids, fissures,  Ischemic colitis, bacterial stool infection (salmonella, shigella, e. Coli, campylobacter).  The workup in the ED is consistent with gastrointestinal bleeding, suspicious for upper GI bleed. I favor upper at this time due to melena and hx of duodenal ulcer. I did give protonix.  No indication to start octreotide as my suspicion of variceal bleed is so low.  Given amount of blood on exam/degree of anemia/sxs consistent with concerning degree of blood loss, I would admit at this point for serial hemoglobins and endoscopy in AM.  Dr. Ko is aware of patient.  Patient was typed and screened. Discussed with hospitalist. They are stable and do not need ICU care at this point.     Diagnosis:    ICD-10-CM   1. Melena K92.1       2. Hx of duodenal ulcer Z87.19   3. Upper GI bleed K92.2     Disposition:  Admitted    Abeba Giles  4/10/2019    EMERGENCY DEPARTMENT  Scribe Disclosure:  I, Abeba Chan, am serving as a scribe at 4:08 PM on 4/10/2019 to document services personally performed by Doron Matos MD based on my observations and the provider's statements to me.        Doron Matos MD  04/10/19 3502

## 2019-04-10 NOTE — PLAN OF CARE
RECEIVING UNIT ED HANDOFF REVIEW    ED Nurse Handoff Report was reviewed by: Oneida Cox on April 10, 2019 at 6:23 PM

## 2019-04-10 NOTE — ED NOTES
"[facility]  ED Nurse Handoff Report    ED Chief complaint: Rectal Bleeding (hx of bleeding ulcer last month. today had a large black stool after eating breakfast.)      ED Diagnosis:   Final diagnoses:   Melena   Hx of duodenal ulcer   Upper GI bleed       Code Status: Full Code    Allergies:   Allergies   Allergen Reactions     Morphine Anaphylaxis     Ceclor [Cefaclor] Hives     Diltiazem Hives     Lisinopril Cough     Sertraline Nausea and Vomiting       Activity level - Baseline/Home:  Independent    Activity Level - Current:   Stand with Assist     Needed?: No    Isolation: No  Infection: Not Applicable  Bariatric?: No    Vital Signs:   Vitals:    04/10/19 1500   BP: 164/73   Pulse: 70   Resp: 18   Temp: 98.5  F (36.9  C)   TempSrc: Temporal   SpO2: 99%   Weight: 124.7 kg (275 lb)   Height: 1.626 m (5' 4\")       Cardiac Rhythm: ,        Pain level:      Is this patient confused?: No   Does this patient have a guardian?  No         If yes, is there guardianship documents in the Epic \"Code/ACP\" activity?  N/A         Guardian Notified?  N/A  [place] - Suicide Severity Rating Scale Completed?  Yes  If yes, what color did the patient score?  White    Patient Report: Initial Complaint: Maxine Sears is a 63 year old female with a history of duodenal ulcer and GI bleed who presents to the Emergency Department today for evaluation of rectal bleeding. Patient reports she went to lunch with her sister and when she used the restroom she had a runny and dark, almost black bowel movement.       Focused Assessment: A & O.  Slightly hypertensive but other VSS.  Up with SBA.  Denies pain.    Tests Performed: labs  Abnormal Results:   Results for orders placed or performed during the hospital encounter of 04/10/19   CBC with platelets differential   Result Value Ref Range    WBC 10.2 4.0 - 11.0 10e9/L    RBC Count 3.95 3.8 - 5.2 10e12/L    Hemoglobin 10.6 (L) 11.7 - 15.7 g/dL    Hematocrit " 33.6 (L) 35.0 - 47.0 %    MCV 85 78 - 100 fl    MCH 26.8 26.5 - 33.0 pg    MCHC 31.5 31.5 - 36.5 g/dL    RDW 16.2 (H) 10.0 - 15.0 %    Platelet Count 599 (H) 150 - 450 10e9/L    Diff Method Automated Method     % Neutrophils 54.9 %    % Lymphocytes 34.3 %    % Monocytes 8.1 %    % Eosinophils 1.9 %    % Basophils 0.5 %    % Immature Granulocytes 0.3 %    Nucleated RBCs 0 0 /100    Absolute Neutrophil 5.6 1.6 - 8.3 10e9/L    Absolute Lymphocytes 3.5 0.8 - 5.3 10e9/L    Absolute Monocytes 0.8 0.0 - 1.3 10e9/L    Absolute Eosinophils 0.2 0.0 - 0.7 10e9/L    Absolute Basophils 0.1 0.0 - 0.2 10e9/L    Abs Immature Granulocytes 0.0 0 - 0.4 10e9/L    Absolute Nucleated RBC 0.0    Comprehensive metabolic panel   Result Value Ref Range    Sodium 138 133 - 144 mmol/L    Potassium 3.6 3.4 - 5.3 mmol/L    Chloride 105 94 - 109 mmol/L    Carbon Dioxide 28 20 - 32 mmol/L    Anion Gap 5 3 - 14 mmol/L    Glucose 106 (H) 70 - 99 mg/dL    Urea Nitrogen 11 7 - 30 mg/dL    Creatinine 0.82 0.52 - 1.04 mg/dL    GFR Estimate 76 >60 mL/min/[1.73_m2]    GFR Estimate If Black 88 >60 mL/min/[1.73_m2]    Calcium 9.0 8.5 - 10.1 mg/dL    Bilirubin Total 0.2 0.2 - 1.3 mg/dL    Albumin 3.7 3.4 - 5.0 g/dL    Protein Total 7.7 6.8 - 8.8 g/dL    Alkaline Phosphatase 101 40 - 150 U/L    ALT 19 0 - 50 U/L    AST 14 0 - 45 U/L   ABO/Rh type and screen   Result Value Ref Range    ABO A     RH(D) Pos     Antibody Screen Neg     Test Valid Only At [facility]        Specimen Expires 04/13/2019      Treatments provided: protonix, 1L bolus    Family Comments: NA    OBS brochure/video discussed/provided to patient/family: Yes              Name of person given brochure if not patient: NA              Relationship to patient: NA    ED Medications:   Medications   pantoprazole (PROTONIX) 80 mg in sodium chloride 0.9 % 100 mL infusion (8 mg/hr Intravenous New Bag 4/10/19 3412)   0.9% sodium chloride BOLUS (1,000 mLs Intravenous New Bag 4/10/19  4450)   pantoprazole (PROTONIX) 80 mg in sodium chloride 0.9 % 100 mL intermittent infusion (0 mg Intravenous Stopped 4/10/19 9321)       Drips infusing?:  Yes    For the majority of the shift this patient was Green.   Interventions performed were NA.    Severe Sepsis OR Septic Shock Diagnosis Present: No    To be done/followed up on inpatient unit:  See [facility]    ED NURSE PHONE NUMBER: [id number]

## 2019-04-10 NOTE — PROGRESS NOTES
SUBJECTIVE:  Chief Complaint   Patient presents with     Black Stool     x 1 day but has history of bleeding ulcers. See triage note     Maxine Sears is a 63 year old female whose symptoms began 1 day ago and include black stools.    Symptoms are sudden onset and moderate.    Aggravating factors: nothing.    Alleviating factors:nothing  Associated symptoms:  Pain:no pain  Fever: no fever  Stools: dark stools  Appetite: normal  Risk factors: hx of GI Bleed with surgery, cauterization    Past Medical History:   Diagnosis Date     Atrial fibrillation and flutter (H) 1/5/15     Depression       Duodenal ulcer 2019     Essential hypertension       Gastroesophageal reflux disease without esophagitis 2015     GERD (gastroesophageal reflux disease)      Hiatal hernia      Hyperlipidemia LDL goal <130 3/26/2015     Hypothyroidism      Migraine without aura and without status migrainosus, not intractable  Aug 2016     Morbid obesity, unspecified obesity type (H) 2015     Obesity      NELY on CPAP      Osteoarthritis      RLS (restless legs syndrome)      Symptomatic menopausal or female climacteric states (aka FLASHES)      TMJ disease      Allergies   Allergen Reactions     Morphine Anaphylaxis     Ceclor [Cefaclor] Hives     Diltiazem Hives     Lisinopril Cough     Sertraline Nausea and Vomiting     Social History     Tobacco Use     Smoking status: Former Smoker     Packs/day: 0.25     Types: Cigarettes     Start date:      Last attempt to quit:      Years since quittin.3     Smokeless tobacco: Never Used     Tobacco comment: very minimal smoking history   Substance Use Topics     Alcohol use: No     Alcohol/week: 0.0 oz     Family History   Problem Relation Age of Onset     Myocardial Infarction Mother      Heart Failure Mother      Heart Surgery Father         bypass surgery     Cerebrovascular Disease Father      Heart Surgery Brother      Hyperlipidemia Brother      Hyperlipidemia  Sister      Hyperlipidemia Brother      Sleep Apnea Sister        ROS:  CONSTITUTIONAL:NEGATIVE for fever, chills, change in weight  INTEGUMENTARY/SKIN: NEGATIVE for worrisome rashes, moles or lesions  EYES: NEGATIVE for vision changes or irritation  ENT/MOUTH: NEGATIVE for ear, mouth and throat problems  RESP:NEGATIVE for significant cough or SOB  CV: NEGATIVE for chest pain, palpitations or peripheral edema  GI: POSITIVE for nausea, and dark tarry stools  : NEGATIVE for dysuria  EXT: Normal ROM  MUSCULOSKELETAL: NEGATIVE for significant arthralgias or myalgia  NEURO: NEGATIVE for weakness, dizziness or paresthesias    OBJECTIVE:  /70   Pulse 75   Temp 98.5  F (36.9  C) (Oral)   Resp 18   Wt 124.9 kg (275 lb 4.8 oz)   SpO2 96%   BMI 47.26 kg/m    GENERAL APPEARANCE: healthy, alert and no distress  HENT: ear canals and TM's normal.  Nose and mouth without ulcers, erythema or lesions  NECK: supple, nontender, no lymphadenopathy  RESP: lungs clear to auscultation - no rales, rhonchi or wheezes  CV: regular rates and rhythm, normal S1 S2, no murmur noted  ABDOMEN:  soft, nontender, no HSM or masses and bowel sounds normal  : Deferred  RECTAL: Positive for hemoccult stool   NEURO: Normal strength and tone, sensory exam grossly normal,  normal speech and mentation  SKIN: no suspicious lesions or rashes    Results for orders placed or performed in visit on 04/10/19   CBC with platelets differential   Result Value Ref Range    WBC 8.7 4.0 - 11.0 10e9/L    RBC Count 3.77 (L) 3.8 - 5.2 10e12/L    Hemoglobin 9.9 (L) 11.7 - 15.7 g/dL    Hematocrit 32.5 (L) 35.0 - 47.0 %    MCV 86 78 - 100 fl    MCH 26.3 (L) 26.5 - 33.0 pg    MCHC 30.5 (L) 31.5 - 36.5 g/dL    RDW 16.1 (H) 10.0 - 15.0 %    Platelet Count 541 (H) 150 - 450 10e9/L    % Neutrophils 53.0 %    % Lymphocytes 35.3 %    % Monocytes 8.8 %    % Eosinophils 2.3 %    % Basophils 0.6 %    Absolute Neutrophil 4.6 1.6 - 8.3 10e9/L    Absolute Lymphocytes 3.1  0.8 - 5.3 10e9/L    Absolute Monocytes 0.8 0.0 - 1.3 10e9/L    Absolute Eosinophils 0.2 0.0 - 0.7 10e9/L    Absolute Basophils 0.1 0.0 - 0.2 10e9/L    Diff Method Automated Method        ASSESSMENT/PLAN      ICD-10-CM    1. Acute GI bleeding K92.2 CBC with platelets differential   2. Duodenal ulcer K26.9 CBC with platelets differential   3. Black stools K92.1 CBC with platelets differential       Encounter Diagnoses   Name Primary?     Acute GI bleeding Yes     Duodenal ulcer      Black stools        Orders Placed This Encounter     CBC with platelets differential       Patient sent to the ED for GI bleed, she has had prior surgery and may need cauterization again  She has a ride to [facility] ED as a GI bleed can pose a life threatening acute medical problem

## 2019-04-11 LAB
HGB BLD-MCNC: 8.9 G/DL (ref 11.7–15.7)
UPPER GI ENDOSCOPY: NORMAL

## 2019-04-11 PROCEDURE — 25000132 ZZH RX MED GY IP 250 OP 250 PS 637: Performed by: INTERNAL MEDICINE

## 2019-04-11 PROCEDURE — 36415 COLL VENOUS BLD VENIPUNCTURE: CPT | Performed by: INTERNAL MEDICINE

## 2019-04-11 PROCEDURE — 40000104 ZZH STATISTIC MODERATE SEDATION < 10 MIN: Performed by: INTERNAL MEDICINE

## 2019-04-11 PROCEDURE — 25000128 H RX IP 250 OP 636: Performed by: INTERNAL MEDICINE

## 2019-04-11 PROCEDURE — 25000125 ZZHC RX 250: Performed by: INTERNAL MEDICINE

## 2019-04-11 PROCEDURE — 43235 EGD DIAGNOSTIC BRUSH WASH: CPT | Performed by: INTERNAL MEDICINE

## 2019-04-11 PROCEDURE — 85018 HEMOGLOBIN: CPT | Performed by: INTERNAL MEDICINE

## 2019-04-11 PROCEDURE — G0378 HOSPITAL OBSERVATION PER HR: HCPCS

## 2019-04-11 PROCEDURE — 99225 ZZC SUBSEQUENT OBSERVATION CARE,LEVEL II: CPT | Performed by: PHYSICIAN ASSISTANT

## 2019-04-11 RX ORDER — FENTANYL CITRATE 50 UG/ML
INJECTION, SOLUTION INTRAMUSCULAR; INTRAVENOUS PRN
Status: DISCONTINUED | OUTPATIENT
Start: 2019-04-11 | End: 2019-04-11 | Stop reason: HOSPADM

## 2019-04-11 RX ORDER — LIDOCAINE 40 MG/G
CREAM TOPICAL
Status: DISCONTINUED | OUTPATIENT
Start: 2019-04-11 | End: 2019-04-11 | Stop reason: HOSPADM

## 2019-04-11 RX ORDER — NALOXONE HYDROCHLORIDE 0.4 MG/ML
.1-.4 INJECTION, SOLUTION INTRAMUSCULAR; INTRAVENOUS; SUBCUTANEOUS
Status: ACTIVE | OUTPATIENT
Start: 2019-04-11 | End: 2019-04-12

## 2019-04-11 RX ORDER — FLUMAZENIL 0.1 MG/ML
0.2 INJECTION, SOLUTION INTRAVENOUS
Status: ACTIVE | OUTPATIENT
Start: 2019-04-11 | End: 2019-04-11

## 2019-04-11 RX ORDER — LIDOCAINE 40 MG/G
CREAM TOPICAL
Status: DISCONTINUED | OUTPATIENT
Start: 2019-04-11 | End: 2019-04-12 | Stop reason: HOSPADM

## 2019-04-11 RX ADMIN — FLECAINIDE ACETATE 75 MG: 50 TABLET ORAL at 10:57

## 2019-04-11 RX ADMIN — ACETAMINOPHEN 650 MG: 325 TABLET, FILM COATED ORAL at 05:55

## 2019-04-11 RX ADMIN — PANTOPRAZOLE SODIUM 40 MG: 40 TABLET, DELAYED RELEASE ORAL at 16:03

## 2019-04-11 RX ADMIN — LOSARTAN POTASSIUM 100 MG: 100 TABLET, FILM COATED ORAL at 10:57

## 2019-04-11 RX ADMIN — POLYETHYLENE GLYCOL 3350, SODIUM SULFATE ANHYDROUS, SODIUM BICARBONATE, SODIUM CHLORIDE, POTASSIUM CHLORIDE 4000 ML: 236; 22.74; 6.74; 5.86; 2.97 POWDER, FOR SOLUTION ORAL at 16:01

## 2019-04-11 RX ADMIN — FLECAINIDE ACETATE 75 MG: 50 TABLET ORAL at 22:44

## 2019-04-11 RX ADMIN — LEVOTHYROXINE SODIUM 163 MCG: 88 TABLET ORAL at 07:26

## 2019-04-11 RX ADMIN — PANTOPRAZOLE SODIUM 40 MG: 40 TABLET, DELAYED RELEASE ORAL at 05:55

## 2019-04-11 RX ADMIN — ACETAMINOPHEN 650 MG: 325 TABLET, FILM COATED ORAL at 18:34

## 2019-04-11 RX ADMIN — ATENOLOL 50 MG: 50 TABLET ORAL at 10:57

## 2019-04-11 RX ADMIN — ACETAMINOPHEN 650 MG: 325 TABLET, FILM COATED ORAL at 14:28

## 2019-04-11 NOTE — CONSULTS
[facility]  Gastroenterology Consultation         Maxine Sears  [address]  [place] [address]  63 year old female    Admission Date/Time: 4/10/2019  Primary Care Provider: Yuval Jama  Referring / Attending Physician: Dr. Tara Matos    We were asked to see the patient in consultation by Dr. Matos for evaluation of melena.      CC: Melena    HPI:  Maxine Sears is a 63 year old female who was admitted through ER due to history of black tarry stool started yesterday.  Patient was recently hospitalized and diagnosed with bleeding duodenal ulcer which was treated with endoscopic therapy patient did well patient was on PPI patient was taken off her Xarelto during that time patient restarted Xarelto about a week ago and now patient noticed her stools gotten significantly dark.  Patient's hemoglobin was stable compared to when patient left the hospital patient has been taking her Protonix.  Patient is denying any other systemic complaint patient is significantly worried due to current finding.  Patient denied any history of fever chills chest pain shortness of breath.    ROS: A comprehensive ten point review of systems was negative aside from those in mentioned in the HPI.      PAST MED HX:  I have reviewed this patient's medical history and updated it with pertinent information if needed.   Past Medical History:   Diagnosis Date     Atrial fibrillation and flutter (H) 1/5/15     Depression       Duodenal ulcer 03/04/2019     Essential hypertension       Gastroesophageal reflux disease without esophagitis 11/11/2015     GERD (gastroesophageal reflux disease)      Hiatal hernia      Hyperlipidemia LDL goal <130 3/26/2015     Hypothyroidism      Migraine without aura and without status migrainosus, not intractable  Aug 2016     Morbid obesity, unspecified obesity type (H) 12/9/2015     Obesity      NELY on CPAP      Osteoarthritis      RLS (restless legs syndrome)       Symptomatic menopausal or female climacteric states (aka FLASHES)      TMJ disease        MEDICATIONS:   Prior to Admission Medications   Prescriptions Last Dose Informant Patient Reported? Taking?   SUMAtriptan (IMITREX) 100 MG tablet Past Month at Unknown time Self No Yes   Sig: Take 1 tablet (100 mg) by mouth at onset of headache for migraine May repeat in 2 hours if needed: max 2/day;   atenolol (TENORMIN) 50 MG tablet Past Month at Unknown time Self No Yes   Sig: Take 1 tablet (50 mg) by mouth daily   ferrous fumarate 65 mg, Resighini. FE,-Vitamin C 125 mg (VITRON-C)  MG TABS tablet 4/10/2019 at Unknown time Self No Yes   Sig: Take 1 tablet by mouth daily   flecainide acetate 150 MG TABS 4/10/2019 at Unknown time Self No Yes   Sig: Take 1/2 tab (75 mg) twice daily by mouth   gabapentin (NEURONTIN) 300 MG capsule 2019 at Unknown time Self Yes Yes   Sig: Take 600 mg by mouth At Bedtime 2 capsules   levothyroxine (SYNTHROID/LEVOTHROID) 75 MCG tablet 4/10/2019 at Unknown time Self No Yes   Si tab daily in addition to Levothyroxine 88mcg daily so total daily dose = 163mcg daily   levothyroxine (SYNTHROID/LEVOTHROID) 88 MCG tablet 4/10/2019 at Unknown time Self No Yes   Si tab daily in addition to Levothyroxine 75mcg daily so total daily dose = 163mcg daily   losartan (COZAAR) 100 MG tablet 4/10/2019 at Unknown time Self No Yes   Sig: Take 1 tablet (100 mg) by mouth daily   order for DME  Self Yes No   Sig: DREAMSTATION  5-15 CM/H20  NASAL AIRFIT N10 HER   pantoprazole (PROTONIX) 40 MG EC tablet 4/10/2019 at Unknown time Self No Yes   Sig: Take 1 tablet (40 mg) by mouth 2 times daily (before meals)      Facility-Administered Medications: None       ALLERGIES:   Allergies   Allergen Reactions     Morphine Anaphylaxis     Ceclor [Cefaclor] Hives     Diltiazem Hives     Lisinopril Cough     Sertraline Nausea and Vomiting       SOCIAL HISTORY:  Social History     Tobacco Use     Smoking status: Former Smoker      Packs/day: 0.25     Types: Cigarettes     Start date:      Last attempt to quit:      Years since quittin.3     Smokeless tobacco: Never Used     Tobacco comment: very minimal smoking history   Substance Use Topics     Alcohol use: No     Alcohol/week: 0.0 oz     Drug use: No       FAMILY HISTORY:  Family History   Problem Relation Age of Onset     Myocardial Infarction Mother      Heart Failure Mother      Heart Surgery Father         bypass surgery     Cerebrovascular Disease Father      Heart Surgery Brother      Hyperlipidemia Brother      Hyperlipidemia Sister      Hyperlipidemia Brother      Sleep Apnea Sister        PHYSICAL EXAM:   General awake alert oriented  Vital Signs with Ranges  Temp: 96  F (35.6  C) Temp src: Oral BP: 149/67 Pulse: 63 Heart Rate: 63 Resp: 18 SpO2: 97 % O2 Device: None (Room air) Oxygen Delivery: 2 LPM  I/O last 3 completed shifts:  In: 50 [P.O.:50]  Out: 1700 [Urine:1700]    Constitutional: healthy, alert and no distress   Cardiovascular: negative, PMI normal. No lifts, heaves, or thrills. RRR. No murmurs, clicks gallops or rub  Respiratory: negative, Percussion normal. Good diaphragmatic excursion. Lungs clear  Head: Normocephalic. No masses, lesions, tenderness or abnormalities  Neck: Neck supple. No adenopathy. Thyroid symmetric, normal size,, Carotids without bruits.  Abdomen: Abdomen soft, non-tender. BS normal. No masses, organomegaly  SKIN: no suspicious lesions or rashes          ADDITIONAL COMMENTS:   I reviewed the patient's new clinical lab test results.   Recent Labs   Lab Test 19  0542 04/10/19  2140 04/10/19  1622 04/10/19  1339  19  0622  19  1610   WBC  --   --  10.2 8.7  --  9.6   < > 13.2*   HGB 8.9* 9.1* 10.6* 9.9*   < > 7.2*   < > 10.7*   MCV  --   --  85 86  --  87   < > 86   PLT  --   --  599* 541*  --  279   < > 403   INR  --   --   --   --   --   --   --  1.78*    < > = values in this interval not displayed.     Recent Labs    Lab Test 04/10/19  1622 03/13/19  0952 03/03/19  0540   POTASSIUM 3.6 3.8 3.5   CHLORIDE 105 106 115*   CO2 28 24 25   BUN 11 11 37*   ANIONGAP 5 8 4     Recent Labs   Lab Test 04/10/19  1622 03/13/19  0952 03/02/19  1610  04/27/11  1030   ALBUMIN 3.7 3.8 3.5   < >  --    BILITOTAL 0.2 0.3 0.2   < >  --    ALT 19 22 20   < >  --    AST 14 15 15   < >  --    PROTEIN  --   --   --   --  100*    < > = values in this interval not displayed.       I reviewed the patient's new imaging results.        CONSULTATION ASSESSMENT AND PLAN:    Active Problems:    Melena    Assessment: Very pleasant 63-year-old female with recent history of upper GI bleed from duodenal ulcer which was treated with endoscopic therapy and cauterization.  Patient had been off her Xarelto to last week patient restarted Xarelto about a week ago now patient had another bout of black tarry stool yesterday.  Findings are quite worrisome for possible recurrent GI bleed possible recurrent ulcer disease.  I will recommend the patient to be monitored with serial hemoglobins n.p.o. type and cross IV proton pump inhibitors.  Plan to proceed with upper GI endoscopy.  If negative then will consider further workup with colonoscopy patient has not had a colonoscopy for quite some time.  Plan was discussed in detail with patient.  Thank you very much for letting me participate in her care.                 Ben Ko MD, FACP  [facility].  Office: [phone]  Cell : [phone]/O melena.  H/O GI bleed from Duodenal ulcer.  On Xeralto.  Hemoglobin stable.  Plan for EGD tomorrow.  Hold Xeralto.  I/V Protonix    Ben Ko MD  [facility]

## 2019-04-11 NOTE — PLAN OF CARE
A&O. VSS. Denies pain. NPO. Gastro consulted. Protonix switched to PO.           Observation goals PRIOR TO DISCHARGE     Comments: -diagnostic tests and consults completed and resulted- not met  -vital signs normal or at patient baseline- met  Nurse to notify provider when observation goals have been met and patient is ready for discharge.

## 2019-04-11 NOTE — PROGRESS NOTES
[facility]  Gastroenterology Progress Note     Maxine Sears MRN# [id number]   YOB: 1955 Age: 63 year old          Assessment and Plan:     Melena  Patient did well overnight no new GI complaint no further bowel movement.  Patient has been off her Xarelto her hemoglobin is stable.  Upper GI endoscopy showed gastric scarring in the antrum area otherwise normal upper GI endoscopy.  Small hiatus hernia and Schatzki's ring.  Start patient on full liquid diet today and plan for colonoscopy tomorrow hold on her Xarelto.  Continue on PPIs.            Melena  Hx of duodenal ulcer  Upper GI bleed      Interval History:   doing well; no cp, sob, n/v/d, or abd pain.              Review of Systems:   C: NEGATIVE for fever, chills, change in weight  E/M: NEGATIVE for ear, mouth and throat problems  R: NEGATIVE for significant cough or SOB  CV: NEGATIVE for chest pain, palpitations or peripheral edema             Medications:   I have reviewed this patient's current medications    atenolol  50 mg Oral Daily     flecainide  75 mg Oral Q12H QUINTIN     gabapentin  600 mg Oral At Bedtime     levothyroxine  163 mcg Oral QAM AC     losartan  100 mg Oral Daily     pantoprazole  40 mg Oral BID AC                  Physical Exam:   Vitals were reviewed  Vital Signs with Ranges  Temp:  [95.8  F (35.4  C)-98.5  F (36.9  C)] 96  F (35.6  C)  Pulse:  [62-76] 63  Heart Rate:  [61-73] 63  Resp:  [8-18] 18  BP: ()/(43-75) 149/67  SpO2:  [92 %-100 %] 97 %  I/O last 3 completed shifts:  In: 50 [P.O.:50]  Out: 1700 [Urine:1700]  Constitutional: healthy, alert and no distress   Cardiovascular: negative, PMI normal. No lifts, heaves, or thrills. RRR. No murmurs, clicks gallops or rub  Respiratory: negative, Percussion normal. Good diaphragmatic excursion. Lungs clear  Head: Normocephalic. No masses, lesions, tenderness or abnormalities  Neck: Neck supple. No adenopathy. Thyroid symmetric, normal size,, Carotids  without bruits.  Abdomen: Abdomen soft, non-tender. BS normal. No masses, organomegaly  SKIN: no suspicious lesions or rashes           Data:   I reviewed the patient's new clinical lab test results.   Recent Labs   Lab Test 04/11/19  0542 04/10/19  2140 04/10/19  1622 04/10/19  1339  03/05/19  0622  03/02/19  1610   WBC  --   --  10.2 8.7  --  9.6   < > 13.2*   HGB 8.9* 9.1* 10.6* 9.9*   < > 7.2*   < > 10.7*   MCV  --   --  85 86  --  87   < > 86   PLT  --   --  599* 541*  --  279   < > 403   INR  --   --   --   --   --   --   --  1.78*    < > = values in this interval not displayed.     Recent Labs   Lab Test 04/10/19  1622 03/13/19  0952 03/03/19  0540   POTASSIUM 3.6 3.8 3.5   CHLORIDE 105 106 115*   CO2 28 24 25   BUN 11 11 37*   ANIONGAP 5 8 4     Recent Labs   Lab Test 04/10/19  1622 03/13/19  0952 03/02/19  1610  04/27/11  1030   ALBUMIN 3.7 3.8 3.5   < >  --    BILITOTAL 0.2 0.3 0.2   < >  --    ALT 19 22 20   < >  --    AST 14 15 15   < >  --    PROTEIN  --   --   --   --  100*    < > = values in this interval not displayed.       I reviewed the patient's new imaging results.    All laboratory data reviewed  All imaging studies reviewed by me.    Ben Ko MD,  4/11/2019  [facility]  Office : [phone]  Cell: [phone]

## 2019-04-11 NOTE — PLAN OF CARE
Observation goals PRIOR TO DISCHARGE     Comments: -diagnostic tests and consults completed and resulted- not met  -vital signs normal or at patient baseline - met  Nurse to notify provider when observation goals have been met and patient is ready for discharge.

## 2019-04-11 NOTE — PLAN OF CARE
Observation goals PRIOR TO DISCHARGE     Comments: -diagnostic tests and consults completed and resulted- not met  -vital signs normal or at patient baseline- met  Nurse to notify provider when observation goals have been met and patient is ready for discharge.

## 2019-04-11 NOTE — PROGRESS NOTES
[facility]    Medicine Progress Note - Hospitalist Service       Date of Admission:  4/10/2019  Assessment & Plan    Maxine Sears is a very pleasant 63 year old female with GERD, hypothyroidism, morbid obesity, atrial flutter, hypertension, dyslipidemia, depression, migraines who was admitted on 4/10/2019 for melena in the setting of recent known oozing duodenal ulcer and recent resumption of anticoagulation with Xarelto 6 days prior to admission.    Melena  Recent GI bleed 2/2 Duodenal Ulcer: Had upper GI endoscopy on 3/3/19 with finding of blood in the gastric antrum and an oozing, cratered duodenal ulcer with visible vessel in the first portion of the duodenum.  Area was injected with epinephrine for hemostasis.  Hemoglobin has been stable and she has been on oral iron replacement.  Remained off of Xarelto since prior hospitalization and only resumed it 6 days prior to admission.  Single episode of dark stool, suspected melena earlier today prompting an urgent care visit which then turned into an emergency department visit.   - Hgb trend 9.9--8.9. Approximately baseline from recent discharge  - Greatly appreciate GI consult  - Underwent EGD 4/11 negative for acute bleed. Plan for colonoscopy  tomorrow  - Continue to hold Xarelto  - Continue PPI     Hypothyroidism  -continue PTA levothyroxine    Atrial Flutter [id number] of 2  Hypertension  Stable BP and pulse.  Xarelto was on hold for a week after her discharge on 3/5/19 and has since been resumed.  She is very worried about preventing a stroke and so thought it was safe to resume Xarelto on 4/5/19.  -Continue PTA atenolol, losartan, flecainide  -Holding Xarelto for now.  Will discuss with GI when to resume. May be beneficial to follow up with EP as outpatient to discuss other anticoagulation options    Depression  Chronic headaches  -Continue gabapentin         Diet: Clear Liquid Diet    DVT Prophylaxis: Pneumatic Compression  Devices  Vasquez Catheter: not present  Code Status: Full Code      Disposition Plan   Expected discharge: Likely home tomorrow after colonoscopy    Entered: Nova Coronado PA-C 04/11/2019, 1:10 PM       The patient's care was discussed with the Patient and GI Consultant.    Nova Coronado PA-C  Hospitalist Service  [facility]    ______________________________________________________________________    Interval History   Case discuss GI no signs of bleeding. Will plan colonoscopy    Visited with patient. She is doing well. No further melena.     Data reviewed today: I reviewed all medications, new labs and imaging results over the last 24 hours. I personally reviewed no images or EKG's today.    Physical Exam   Vital Signs: Temp: 95.7  F (35.4  C) Temp src: Oral BP: 111/42 Pulse: 63 Heart Rate: 63 Resp: 16 SpO2: 93 % O2 Device: None (Room air) Oxygen Delivery: 2 LPM  Weight: 277 lbs 0 oz  Constitutional: Alert, resting comfortably in NAD  Respiratory: Normal effort, symmetric expansion, no crackles or wheezing  Cardiovascular: RRR no murmurs   GI: Non distended, normal bowels sounds, no tenderness or guarding  MSK: LE without edema. Dorsalis pedis pulse palpated bilaterally.   Skin/Integumen: Clear  Neuro: CN II-XII grossly intact  Psych:  Alert and oriented x 3. Normal affect      Data   Recent Labs   Lab 04/11/19  0542 04/10/19  2140 04/10/19  1622 04/10/19  1339   WBC  --   --  10.2 8.7   HGB 8.9* 9.1* 10.6* 9.9*   MCV  --   --  85 86   PLT  --   --  599* 541*   NA  --   --  138  --    POTASSIUM  --   --  3.6  --    CHLORIDE  --   --  105  --    CO2  --   --  28  --    BUN  --   --  11  --    CR  --   --  0.82  --    ANIONGAP  --   --  5  --    J CARLOS  --   --  9.0  --    GLC  --   --  106*  --    ALBUMIN  --   --  3.7  --    PROTTOTAL  --   --  7.7  --    BILITOTAL  --   --  0.2  --    ALKPHOS  --   --  101  --    ALT  --   --  19  --    AST  --   --  14  --

## 2019-04-11 NOTE — PLAN OF CARE
Alert and oriented X 4, up with SBA. EGD completed today, unremarkable. No BM since admission. BS hypoactive, denies abd pain. APAP given for HA. Tolerated mechanical soft diet after procedure, now back on clear liquids for planned colonoscopy tomorrow AM. Bowel prep to start this afternoon. IV SL. VSS on RA. Continue to monitor.

## 2019-04-11 NOTE — PLAN OF CARE
Discharge goals:  Diagnostic tests and consults completed and resulted - goal not met  Vital signs normal or at patient baseline - goal not met    Pt is a/o x 4. Soft BP otherwise VSS. Denies pain. Started on bowel prep. Starting to feel gassy, no BMs yet. Tolerating prep drink, up to bathroom independently.

## 2019-04-12 ENCOUNTER — PATIENT OUTREACH (OUTPATIENT)
Dept: CARE COORDINATION | Facility: CLINIC | Age: 64
End: 2019-04-12

## 2019-04-12 VITALS
TEMPERATURE: 98.4 F | HEART RATE: 67 BPM | RESPIRATION RATE: 20 BRPM | WEIGHT: 277 LBS | BODY MASS INDEX: 47.29 KG/M2 | OXYGEN SATURATION: 97 % | SYSTOLIC BLOOD PRESSURE: 141 MMHG | DIASTOLIC BLOOD PRESSURE: 53 MMHG | HEIGHT: 64 IN

## 2019-04-12 LAB
ANION GAP SERPL CALCULATED.3IONS-SCNC: 7 MMOL/L (ref 3–14)
BUN SERPL-MCNC: 9 MG/DL (ref 7–30)
CALCIUM SERPL-MCNC: 8.5 MG/DL (ref 8.5–10.1)
CHLORIDE SERPL-SCNC: 108 MMOL/L (ref 94–109)
CO2 SERPL-SCNC: 27 MMOL/L (ref 20–32)
COLONOSCOPY: NORMAL
CREAT SERPL-MCNC: 0.73 MG/DL (ref 0.52–1.04)
ERYTHROCYTE [DISTWIDTH] IN BLOOD BY AUTOMATED COUNT: 16.2 % (ref 10–15)
GFR SERPL CREATININE-BSD FRML MDRD: 87 ML/MIN/{1.73_M2}
GLUCOSE SERPL-MCNC: 104 MG/DL (ref 70–99)
HCT VFR BLD AUTO: 28.3 % (ref 35–47)
HGB BLD-MCNC: 8.7 G/DL (ref 11.7–15.7)
MCH RBC QN AUTO: 26 PG (ref 26.5–33)
MCHC RBC AUTO-ENTMCNC: 30.7 G/DL (ref 31.5–36.5)
MCV RBC AUTO: 85 FL (ref 78–100)
PLATELET # BLD AUTO: 433 10E9/L (ref 150–450)
POTASSIUM SERPL-SCNC: 3.5 MMOL/L (ref 3.4–5.3)
RBC # BLD AUTO: 3.34 10E12/L (ref 3.8–5.2)
SODIUM SERPL-SCNC: 142 MMOL/L (ref 133–144)
WBC # BLD AUTO: 8.5 10E9/L (ref 4–11)

## 2019-04-12 PROCEDURE — 25000132 ZZH RX MED GY IP 250 OP 250 PS 637: Performed by: INTERNAL MEDICINE

## 2019-04-12 PROCEDURE — 99217 ZZC OBSERVATION CARE DISCHARGE: CPT | Performed by: INTERNAL MEDICINE

## 2019-04-12 PROCEDURE — G0378 HOSPITAL OBSERVATION PER HR: HCPCS

## 2019-04-12 PROCEDURE — 25000128 H RX IP 250 OP 636: Performed by: SURGERY

## 2019-04-12 PROCEDURE — 85027 COMPLETE CBC AUTOMATED: CPT | Performed by: PHYSICIAN ASSISTANT

## 2019-04-12 PROCEDURE — G0500 MOD SEDAT ENDO SERVICE >5YRS: HCPCS | Performed by: SURGERY

## 2019-04-12 PROCEDURE — 45378 DIAGNOSTIC COLONOSCOPY: CPT | Performed by: SURGERY

## 2019-04-12 PROCEDURE — 80048 BASIC METABOLIC PNL TOTAL CA: CPT | Performed by: PHYSICIAN ASSISTANT

## 2019-04-12 PROCEDURE — 36415 COLL VENOUS BLD VENIPUNCTURE: CPT | Performed by: PHYSICIAN ASSISTANT

## 2019-04-12 RX ORDER — LIDOCAINE 40 MG/G
CREAM TOPICAL
Status: CANCELLED | OUTPATIENT
Start: 2019-04-12

## 2019-04-12 RX ORDER — FLUMAZENIL 0.1 MG/ML
0.2 INJECTION, SOLUTION INTRAVENOUS
Status: DISCONTINUED | OUTPATIENT
Start: 2019-04-12 | End: 2019-04-12 | Stop reason: HOSPADM

## 2019-04-12 RX ORDER — FENTANYL CITRATE 50 UG/ML
INJECTION, SOLUTION INTRAMUSCULAR; INTRAVENOUS PRN
Status: DISCONTINUED | OUTPATIENT
Start: 2019-04-12 | End: 2019-04-12 | Stop reason: HOSPADM

## 2019-04-12 RX ORDER — NALOXONE HYDROCHLORIDE 0.4 MG/ML
.1-.4 INJECTION, SOLUTION INTRAMUSCULAR; INTRAVENOUS; SUBCUTANEOUS
Status: DISCONTINUED | OUTPATIENT
Start: 2019-04-12 | End: 2019-04-12 | Stop reason: HOSPADM

## 2019-04-12 RX ORDER — ONDANSETRON 2 MG/ML
4 INJECTION INTRAMUSCULAR; INTRAVENOUS
Status: CANCELLED | OUTPATIENT
Start: 2019-04-12

## 2019-04-12 RX ADMIN — FLECAINIDE ACETATE 75 MG: 50 TABLET ORAL at 11:45

## 2019-04-12 RX ADMIN — PANTOPRAZOLE SODIUM 40 MG: 40 TABLET, DELAYED RELEASE ORAL at 10:25

## 2019-04-12 RX ADMIN — LEVOTHYROXINE SODIUM 163 MCG: 88 TABLET ORAL at 10:25

## 2019-04-12 RX ADMIN — ACETAMINOPHEN 650 MG: 325 TABLET, FILM COATED ORAL at 07:41

## 2019-04-12 RX ADMIN — ATENOLOL 50 MG: 50 TABLET ORAL at 11:46

## 2019-04-12 RX ADMIN — LOSARTAN POTASSIUM 100 MG: 100 TABLET, FILM COATED ORAL at 11:46

## 2019-04-12 ASSESSMENT — ACTIVITIES OF DAILY LIVING (ADL): DEPENDENT_IADLS:: CLEANING;TRANSPORTATION

## 2019-04-12 NOTE — PLAN OF CARE
PRIOR TO DISCHARGE     Comments:   -diagnostic tests and consults completed and resulted not met  -vital signs normal or at patient baseline met     Cont with bowel prep. Plan for colonoscopy tomorrow.

## 2019-04-12 NOTE — PLAN OF CARE
Alert and oriented X 4,VSS on RA up with SBA to independent. EGD completed yesterday, unremarkable. BS hypoactive, denies any pain. NPO for colonoscopy today, bowl prep completed, BM clear. I.V is SL. Continue to monitor.

## 2019-04-12 NOTE — PLAN OF CARE
A&Ox4. Up independently. VSS on RA. C/o 8/10 headache, given tylenol with relief. Hgb 8.7, pt reports no BM or signs of bleeding since 4/10. Colonoscopy complete. Advanced diet and tolerating regular without nausea.     Pt given discharge instructions. Questions answered. Discharge medications given and reviewed with patient. Follow up appointment scheduled for 4/25 pt aware. Pt to [place] home with sister

## 2019-04-12 NOTE — PLAN OF CARE
Observation goals PRIOR TO DISCHARGE       Comments:     -Diagnostic tests and consults completed and resulted -Not met, endoscopy in the morning    -Vital signs normal or at patient baseline-Met     Nurse to notify provider when observation goals have been met and patient is ready for discharge.

## 2019-04-12 NOTE — DISCHARGE SUMMARY
[facility]    Discharge Summary  Hospitalist    Date of Admission:  4/10/2019  Date of Discharge:  4/12/2019  1:50 PM  Discharging Provider: Yuli Denny MD  Date of Service (when I saw the patient): 04/12/19    Discharge Diagnoses   Melena  Iron deficiency anemia  H/o paroxysmal Afib    Chronic and stable medical problems:  HTN  Hypothyroidism  NELY  Migranes    History of Present Illness   Maxine Sears is a very pleasant 63 year old female with GERD, hypothyroidism, morbid obesity, atrial flutter, hypertension, dyslipidemia, depression, migraines who was admitted on 4/10/2019 for melena in the setting of recent known oozing duodenal ulcer and recent resumption of anticoagulation with Xarelto 6 days prior to admission; for a detailed HPI- please refer to H&P done by Dr Blessing Matos on 04/10/2019.      Hospital Course   Maxine Sears was admitted on 4/10/2019.  The following problems were addressed during her hospitalization:    Melena  Iron deficiency anemia  Recent GI bleed 2/2 Duodenal Ulcer  - [facility] for acute GI bleeding due to bleeding duodenal ulcer with acute blood loss anemia; she was on Xarelto for her h/o paroxysmal Afib; EGD on 3/3 revealed oozing duodenal ulcer with a visible vessel- treated with argon plasma coagulation; serial Hb trended down gradually from 12.5 to haritha of 7.2; she did not require blood transfusions; she was started on PPI BID and iron supplementation and recommended to hold Xarelto until follow up with PMD whom she saw on 3/15; repeat Hb was 9; he recommended to continue holding Xarelto until Hb will further improve; she resumed taking Xarelto on 04/05/2019 but then she had another episode of black stool on 04/10/2019 and presented to ER. She does not take ASA or other NSAIDs.  Her initial Hb was 9.9 and she was hemodynamically stable; she was admitted to Observation unit for further monitoring; PTA Xarelto  was held;  serial Hb trended down a little but relatively stable 9.1--8.9--8.7; she was seen by Dr Ko who repeated EGD on 04/11- which revealed mild Schatzki ring  And small hiatal hernia but no evidence of ulcers or active bleeding; Colonoscopy on 04/14- was also negative; GI recommends video capsule endoscopy as outpatient; follow up with PMD next week and repeat Hb; recommended to continue holding Xarelto at this time and she will also follow up with Dr Vaughn and further discuss about reinitiating anticoagulation therapy; CHADsVAsc score is 2; she remained in NSR; she will continue with her PTA Flecainide.        Yuli Denny MD    Significant Results and Procedures   EGD 04/11/2019  The examined esophagus was normal.   A mild Schatzki ring was found at the gastroesophageal junction.   A small hiatal hernia was present.   A 8 mm scar was found in the gastric antrum.   The duodenal bulb, first portion of the duodenum and second portion of  the duodenum were normal.     Colonoscopy 04/12/2019  The perianal and digital rectal examinations were normal.   A few small-mouthed diverticula were found in the sigmoid colon.   The exam was otherwise without abnormality on direct and retroflexion views.   The terminal ileum appeared normal.        Pending Results   None  Unresulted Labs Ordered in the Past 30 Days of this Admission     No orders found from 2/9/2019 to 4/11/2019.          Code Status   Full Code       Primary Care Physician   Yuval Jama    Physical Exam   Temp: 98.4  F (36.9  C) Temp src: Oral BP: 141/53 Pulse: 67 Heart Rate: 58 Resp: 20 SpO2: 97 % O2 Device: None (Room air) Oxygen Delivery: 2 LPM  Vitals:    04/10/19 1500 04/10/19 1910   Weight: 124.7 kg (275 lb) 125.6 kg (277 lb)     Vital Signs with Ranges  Temp:  [97.2  F (36.2  C)-98.4  F (36.9  C)] 98.4  F (36.9  C)  Pulse:  [56-84] 67  Heart Rate:  [58-85] 58  Resp:  [9-28] 20  BP: ()/(42-87) 141/53  SpO2:  [94 %-99 %] 97 %  I/O  last 3 completed shifts:  In: 660 [P.O.:660]  Out: -     Constitutional: Alert, resting comfortably in NAD  Respiratory:   Normal effort, symmetric expansion, no crackles or wheezing  Cardiovascular: S1S2, RRR, no murmurs   GI: Non distended, normal bowels sounds, no tenderness or guarding  MSK: LE without edema. Dorsalis pedis pulse palpated bilaterally.   Skin/Integumen: Clear  Neuro: CN II-XII grossly intact  Psych:  Alert and oriented x 3. Normal affect      Discharge Disposition   Discharged to home  Condition at discharge: Good    Consultations This Hospital Stay   GASTROENTEROLOGY IP CONSULT    Time Spent on this Encounter   I, Yuli Denny, personally saw the patient today and spent less than or equal to 30 minutes discharging this patient.    Discharge Orders      Reason for your hospital stay    You were admitted for evaluation of black stool. You were seen by GI and underwent EGD and colonoscopy which did not show any evidence of bleeding. Your Hb remained stable; you should not resume Xarelto at this time until you will follow up with Dr Vaughn to further discuss anticoagulation. You should also follow up with Dr Ko (GI) for outpatient video capsule endoscopy.     Follow-up and recommended labs and tests     Follow up with primary care provider, Yuval Jama, within 7 days for hospital follow- up.  The following labs/tests are recommended: Hb.    Follow up with Dr Vaughn- cardiology- to further discuss about resuming blood thinner medication.  Follow up with GI- Dr Ko for video capsule endoscopy.     Activity    Your activity upon discharge: activity as tolerated and no driving for today     When to contact your care team    Call your primary doctor or return to ER if you have any of the following: temperature greater than 100.5 or less than 96, recurrent blackstools of bloody stools, abdominal pain, coffee ground emesis, shortness of breath, dizziness, loss of consciousness.     Full Code      Diet    Follow this diet upon discharge: Orders Placed This Encounter      Advance Diet as Tolerated: Regular Diet Adult     Discharge Medications   Current Discharge Medication List      CONTINUE these medications which have NOT CHANGED    Details   atenolol (TENORMIN) 50 MG tablet Take 1 tablet (50 mg) by mouth daily  Qty: 90 tablet, Refills: 3    Comments: Profile only. Pt doesn't require refill at this time  Associated Diagnoses: Paroxysmal atrial fibrillation (H)      ferrous fumarate 65 mg, Augustine. FE,-Vitamin C 125 mg (VITRON-C)  MG TABS tablet Take 1 tablet by mouth daily  Qty: 30 tablet, Refills: 1    Associated Diagnoses: Anemia, unspecified type      flecainide acetate 150 MG TABS Take 1/2 tab (75 mg) twice daily by mouth  Qty: 90 tablet, Refills: 3    Comments: Profile only. Pt doesn't require refill at this time  Associated Diagnoses: Paroxysmal atrial fibrillation (H)      gabapentin (NEURONTIN) 300 MG capsule Take 600 mg by mouth At Bedtime 2 capsules      !! levothyroxine (SYNTHROID/LEVOTHROID) 75 MCG tablet 1 tab daily in addition to Levothyroxine 88mcg daily so total daily dose = 163mcg daily  Qty: 90 tablet, Refills: 3    Comments: Profile only. Pt doesn't require refill at this time  Associated Diagnoses: Hypothyroidism, unspecified type      !! levothyroxine (SYNTHROID/LEVOTHROID) 88 MCG tablet 1 tab daily in addition to Levothyroxine 75mcg daily so total daily dose = 163mcg daily  Qty: 90 tablet, Refills: 3    Comments: Profile only. Pt doesn't require refill at this time  Associated Diagnoses: Hypothyroidism, unspecified type      losartan (COZAAR) 100 MG tablet Take 1 tablet (100 mg) by mouth daily  Qty: 90 tablet, Refills: 3    Comments: Resume 3/6 if BP stable  Associated Diagnoses: Essential hypertension      pantoprazole (PROTONIX) 40 MG EC tablet Take 1 tablet (40 mg) by mouth 2 times daily (before meals)  Qty: 60 tablet, Refills: 1    Comments: Future refills by PCP Dr. Yuval DUDLEY  [place] with phone number [phone].  Associated Diagnoses: Acute GI bleeding      SUMAtriptan (IMITREX) 100 MG tablet Take 1 tablet (100 mg) by mouth at onset of headache for migraine May repeat in 2 hours if needed: max 2/day;  Qty: 10 tablet, Refills: 11    Comments: Profile only. Pt doesn't require refill at this time  Associated Diagnoses: Migraine without aura and without status migrainosus, not intractable      order for DME DREAMSTATION  5-15 CM/H20  NASAL AIRFIT N10 HER       !! - Potential duplicate medications found. Please discuss with provider.        Allergies   Allergies   Allergen Reactions     Morphine Anaphylaxis     Ceclor [Cefaclor] Hives     Diltiazem Hives     Lisinopril Cough     Sertraline Nausea and Vomiting     Data   Most Recent 3 CBC's:  Recent Labs   Lab Test 04/12/19  0555 04/11/19  0542 04/10/19  2140 04/10/19  1622 04/10/19  1339   WBC 8.5  --   --  10.2 8.7   HGB 8.7* 8.9* 9.1* 10.6* 9.9*   MCV 85  --   --  85 86     --   --  599* 541*      Most Recent 3 BMP's:  Recent Labs   Lab Test 04/12/19  0555 04/10/19  1622 03/13/19  0952    138 138   POTASSIUM 3.5 3.6 3.8   CHLORIDE 108 105 106   CO2 27 28 24   BUN 9 11 11   CR 0.73 0.82 0.92   ANIONGAP 7 5 8   J CARLOS 8.5 9.0 9.0   * 106* 140*     Most Recent 2 LFT's:  Recent Labs   Lab Test 04/10/19  1622 03/13/19  0952   AST 14 15   ALT 19 22   ALKPHOS 101 89   BILITOTAL 0.2 0.3     Most Recent INR's and Anticoagulation Dosing History:  Anticoagulation Dose History     Recent Dosing and Labs Latest Ref Rng & Units 3/2/2019    INR 0.86 - 1.14 1.78(H)        Most Recent 3 Troponin's:  Recent Labs   Lab Test 03/02/19  1610 07/13/15  1840 01/05/15  2055   TROPI <0.015 <0.015  The 99th percentile for upper reference range is 0.045 ug/L.  Troponin values in   the range of 0.045 - 0.120 ug/L may be associated with risks of adverse   clinical events.   <0.015  The 99th percentile for upper reference range is 0.045 ug/L.  Troponin  values in   the range of 0.045 - 0.120 ug/L may be associated with risks of adverse   clinical events.   Effective 7/30/2014, the reference range for this assay has changed to reflect   new instrumentation/methodology.       Most Recent Cholesterol Panel:  Recent Labs   Lab Test 03/13/19  0952   CHOL 172   LDL 85   HDL 45*   TRIG 212*     Most Recent 6 Bacteria Isolates From Any Culture (See EPIC Reports for Culture Details):  Recent Labs   Lab Test 04/27/11  1030   CULT >100,000 colonies/mL Escherichia coli     Most Recent TSH, T4 and A1c Labs:  Recent Labs   Lab Test 03/13/19  0952 02/03/19  2317  05/25/18  1023   TSH  --  0.48  --  1.31   T4  --   --   --  0.96   A1C 5.9*  --    < >  --     < > = values in this interval not displayed.     Results for orders placed or performed during the hospital encounter of 09/13/17   Ankle XR, G/E 3 views, right    Narrative    XR ANKLE RT G/E 3 VW  9/13/2017 1:36 PM    HISTORY:  fall, ankle pain    COMPARISON:  None.      Impression    IMPRESSION:  There is a transverse nondisplaced fracture of the distal  fibula with overlying soft tissue swelling. Ankle mortise is  preserved. Otherwise negative.     BETTYE BELL MD   Wrist XR, G/E 3 views, right    Narrative    XR WRIST RT G/E 3 VW  9/13/2017 1:36 PM    HISTORY:  fall, wrist pain    COMPARISON:  None.      Impression    IMPRESSION:  Negative.     BETTYE BELL MD   Knee XR, 3 views, left    Narrative    XR KNEE LT 3 VW  9/13/2017 2:23 PM    HISTORY:  fall, knee pain    COMPARISON:  12/9/2014      Impression    IMPRESSION:  Osseous degenerative changes in the medial compartment  with moderate medial compartment narrowing that appears improved when  compared with the prior exam. Prominent posterior superior patellar  spur causing medial patellofemoral compartment narrowing. Diffuse soft  tissue swelling anterior to the patella and extending caudad into the  upper tibial region. No fractures.    BETTYE BELL MD

## 2019-04-12 NOTE — PROGRESS NOTES
PRIMARY DIAGNOSIS: GI BLEED    OUTPATIENT/OBSERVATION GOALS TO BE MET BEFORE DISCHARGE  1. Orthostatic performed: N/A    2. Stable Hgb Yes.   Recent Labs   Lab Test 04/12/19  0555 04/11/19  0542 04/10/19  2140   HGB 8.7* 8.9* 9.1*       3. Resolved or declined bleeding episodes: Yes Last episode: 4/10 per pt    4. Appropriate testing complete: No    5. Cleared for discharge by consultants (if involved): No    6. Safe discharge environment identified: No    Discharge Planner Nurse   Safe discharge environment identified: No  Barriers to discharge: Yes       Entered by: Katja Fernandez 04/12/2019 7:49 AM     Please review provider order for any additional goals.   Nurse to notify provider when observation goals have been met and patient is ready for discharge.

## 2019-04-12 NOTE — PROGRESS NOTES
Clinic Care Coordination Contact    Situation: Patient chart reviewed by [facility] RN for routine follow-up outreach.    Background: Patient with history of GI bleed.  Patient was seen in urgent care on 4/10/19 for melena for which she was sent to the ED.    Assessment: Per chart review, the patient is currently admitted at [facility] as of 4/10/19 for upper GI bleed.    Plan/Recommendations: CCC RN will review chart in approximately 1 week and will outreach to patient when discharged from hospital.    Damien Person RN  Clinic Care Coordinator  [facility]  Ph: [phone]

## 2019-04-12 NOTE — DISCHARGE INSTRUCTIONS
Follow up as indicated.  Ben Ko MD,  4/11/2019  [facility]  Office : [phone]

## 2019-04-12 NOTE — PROGRESS NOTES
PRIMARY DIAGNOSIS: GI BLEED    OUTPATIENT/OBSERVATION GOALS TO BE MET BEFORE DISCHARGE  1. Orthostatic performed: N/A    2. Stable Hgb Yes.   Recent Labs   Lab Test 04/12/19  0555 04/11/19  0542 04/10/19  2140   HGB 8.7* 8.9* 9.1*       3. Resolved or declined bleeding episodes: Yes Last episode: 4/10 per pt    4. Appropriate testing complete: Yes    5. Cleared for discharge by consultants (if involved): Yes    6. Safe discharge environment identified: Yes    Discharge Planner Nurse   Safe discharge environment identified: Yes  Barriers to discharge: No       Entered by: Katja Fernandez 04/12/2019 1:13 PM     Please review provider order for any additional goals.   Nurse to notify provider when observation goals have been met and patient is ready for discharge.

## 2019-04-16 ENCOUNTER — TELEPHONE (OUTPATIENT)
Dept: INTERNAL MEDICINE | Facility: CLINIC | Age: 64
End: 2019-04-16

## 2019-04-16 DIAGNOSIS — K92.2 ACUTE GI BLEEDING: ICD-10-CM

## 2019-04-16 RX ORDER — PANTOPRAZOLE SODIUM 40 MG/1
TABLET, DELAYED RELEASE ORAL
Qty: 60 TABLET | Refills: 6 | Status: SHIPPED | OUTPATIENT
Start: 2019-04-16 | End: 2019-11-04

## 2019-04-16 NOTE — TELEPHONE ENCOUNTER
Prior Authorization Retail Medication Request    Medication/Dose: pantoprazole Sodium 40mg DR tablets  ICD code (if different than what is on RX):  K92.2  Previously Tried and Failed:    Rationale:      Insurance Name:  [employer] [facility] [phone]  Insurance ID:  [id number]    KEY: [id number]    Pharmacy Information (if different than what is on RX)  Name:  [address] [address]   Phone:  [phone]

## 2019-04-16 NOTE — TELEPHONE ENCOUNTER
"Requested Prescriptions   Pending Prescriptions Disp Refills     pantoprazole (PROTONIX) 40 MG EC tablet [Pharmacy Med Name: PANTOPRAZOLE 40MG TAB] 60 tablet 1     Sig: TAKE 1 TABLET BY MOUTH TWICE A DAY BEFORE MEALS       PPI Protocol Passed - 4/16/2019 11:15 AM        Passed - Not on Clopidogrel (unless Pantoprazole ordered)        Passed - No diagnosis of osteoporosis on record        Passed - Recent (12 mo) or future (30 days) visit within the authorizing provider's specialty     Patient had office visit in the last 12 months or has a visit in the next 30 days with authorizing provider or within the authorizing provider's specialty.  See \"Patient Info\" tab in inbasket, or \"Choose Columns\" in Meds & Orders section of the refill encounter.              Passed - Medication is active on med list        Passed - Patient is age 18 or older        Passed - No active pregnacy on record        Passed - No positive pregnancy test in past 12 months          "

## 2019-04-18 ENCOUNTER — PATIENT OUTREACH (OUTPATIENT)
Dept: CARE COORDINATION | Facility: CLINIC | Age: 64
End: 2019-04-18

## 2019-04-18 ENCOUNTER — ANCILLARY PROCEDURE (OUTPATIENT)
Dept: GENERAL RADIOLOGY | Facility: CLINIC | Age: 64
End: 2019-04-18
Attending: INTERNAL MEDICINE
Payer: COMMERCIAL

## 2019-04-18 ENCOUNTER — OFFICE VISIT (OUTPATIENT)
Dept: INTERNAL MEDICINE | Facility: CLINIC | Age: 64
End: 2019-04-18
Payer: COMMERCIAL

## 2019-04-18 VITALS
TEMPERATURE: 98.7 F | RESPIRATION RATE: 16 BRPM | HEART RATE: 74 BPM | BODY MASS INDEX: 46.86 KG/M2 | DIASTOLIC BLOOD PRESSURE: 74 MMHG | SYSTOLIC BLOOD PRESSURE: 144 MMHG | WEIGHT: 273 LBS | OXYGEN SATURATION: 98 %

## 2019-04-18 DIAGNOSIS — M54.50 CHRONIC BILATERAL LOW BACK PAIN WITHOUT SCIATICA: ICD-10-CM

## 2019-04-18 DIAGNOSIS — E66.01 MORBID OBESITY, UNSPECIFIED OBESITY TYPE (H): Primary | ICD-10-CM

## 2019-04-18 DIAGNOSIS — D64.9 ANEMIA, UNSPECIFIED TYPE: ICD-10-CM

## 2019-04-18 DIAGNOSIS — I48.91 ATRIAL FIBRILLATION AND FLUTTER (H): ICD-10-CM

## 2019-04-18 DIAGNOSIS — G89.29 CHRONIC BILATERAL LOW BACK PAIN WITHOUT SCIATICA: ICD-10-CM

## 2019-04-18 DIAGNOSIS — Z87.19 HISTORY OF GI BLEED: ICD-10-CM

## 2019-04-18 DIAGNOSIS — I48.92 ATRIAL FIBRILLATION AND FLUTTER (H): ICD-10-CM

## 2019-04-18 PROBLEM — K92.1 MELENA: Status: RESOLVED | Noted: 2019-04-10 | Resolved: 2019-04-18

## 2019-04-18 PROBLEM — K92.2 ACUTE GI BLEEDING: Status: RESOLVED | Noted: 2019-03-02 | Resolved: 2019-04-18

## 2019-04-18 PROBLEM — K26.9 DUODENAL ULCER: Status: RESOLVED | Noted: 2019-03-04 | Resolved: 2019-04-18

## 2019-04-18 LAB
FERRITIN SERPL-MCNC: 34 NG/ML (ref 8–252)
HGB BLD-MCNC: 10.7 G/DL (ref 11.7–15.7)
IRON SATN MFR SERPL: 34 % (ref 15–46)
IRON SERPL-MCNC: 155 UG/DL (ref 35–180)
TIBC SERPL-MCNC: 455 UG/DL (ref 240–430)

## 2019-04-18 PROCEDURE — 83540 ASSAY OF IRON: CPT | Performed by: INTERNAL MEDICINE

## 2019-04-18 PROCEDURE — 85018 HEMOGLOBIN: CPT | Performed by: INTERNAL MEDICINE

## 2019-04-18 PROCEDURE — 36415 COLL VENOUS BLD VENIPUNCTURE: CPT | Performed by: INTERNAL MEDICINE

## 2019-04-18 PROCEDURE — 72100 X-RAY EXAM L-S SPINE 2/3 VWS: CPT

## 2019-04-18 PROCEDURE — 83550 IRON BINDING TEST: CPT | Performed by: INTERNAL MEDICINE

## 2019-04-18 PROCEDURE — 99214 OFFICE O/P EST MOD 30 MIN: CPT | Performed by: INTERNAL MEDICINE

## 2019-04-18 PROCEDURE — 82728 ASSAY OF FERRITIN: CPT | Performed by: INTERNAL MEDICINE

## 2019-04-18 ASSESSMENT — ACTIVITIES OF DAILY LIVING (ADL): DEPENDENT_IADLS:: CLEANING;TRANSPORTATION

## 2019-04-18 ASSESSMENT — PATIENT HEALTH QUESTIONNAIRE - PHQ9: SUM OF ALL RESPONSES TO PHQ QUESTIONS 1-9: 3

## 2019-04-18 NOTE — LETTER
[facility]  [address], [place] [address]    April 19, 2019        Maxine Sears  [address]  [facility] [address]

## 2019-04-18 NOTE — PROGRESS NOTES
SUBJECTIVE:   Maxine Sears is a 63 year old female who presents to clinic today for the following   health issues:  Chief Complaint   Patient presents with     [facility] Hospitalization     Hospital Follow-up Visit:    Hospital/Nursing Home/IP Rehab Facility: [facility]  Date of Admission: 04/10/2019  Date of Discharge: 04/12/2019  Reason(s) for Admission: Melena  No telephone contact within 48 hours of discharge          Problems taking medications regularly:  None       Medication changes since discharge: None       Problems adhering to non-medication therapy:  None      Summary of hospitalization:  [facility] discharge summary reviewed  Diagnostic Tests/Treatments reviewed.  Follow up needed: labs  Other Healthcare Providers Involved in Patient s Care:          GI  Update since discharge: improved.     Post Discharge Medication Reconciliation: discharge medications reconciled, continue medications without change.  Plan of care communicated with patient and sister here today     Coding guidelines for this visit:  Type of Medical   Decision Making Face-to-Face Visit       within 7 Days of discharge Face-to-Face Visit        within 14 days of discharge   Moderate Complexity [id number]   High Complexity [id number]          Discharge summary reviewed. Part of the summary as below:    Discharge Summary  Hospitalist     Date of Admission:  4/10/2019  Date of Discharge:  4/12/2019  1:50 PM  Discharging Provider: Ylui Denny MD  Date of Service (when I saw the patient): 04/12/19        Discharge Diagnoses     Melena  Iron deficiency anemia  H/o paroxysmal Afib     Chronic and stable medical problems:  HTN  Hypothyroidism  NELY  Migranes          History of Present Illness     Maxine Sears is a very pleasant 63 year old female with GERD, hypothyroidism, morbid obesity, atrial flutter, hypertension, dyslipidemia, depression, migraines who was admitted on 4/10/2019 for melena in the  setting of recent known oozing duodenal ulcer and recent resumption of anticoagulation with Xarelto 6 days prior to admission; for a detailed HPI- please refer to H&P done by Dr Blessing Matos on 04/10/2019.             Hospital Course     Maxine Sears was admitted on 4/10/2019.  The following problems were addressed during her hospitalization:     Melena  Iron deficiency anemia  Recent GI bleed 2/2 Duodenal Ulcer  - she was admitted to [facility] from 03/02--03/05/2019 for acute GI bleeding due to bleeding duodenal ulcer with acute blood loss anemia; she was on Xarelto for her h/o paroxysmal Afib; EGD on 3/3 revealed oozing duodenal ulcer with a visible vessel- treated with argon plasma coagulation; serial Hb trended down gradually from 12.5 to haritha of 7.2; she did not require blood transfusions; she was started on PPI BID and iron supplementation and recommended to hold Xarelto until follow up with PMD whom she saw on 3/15; repeat Hb was 9; he recommended to continue holding Xarelto until Hb will further improve; she resumed taking Xarelto on 04/05/2019 but then she had another episode of black stool on 04/10/2019 and presented to ER. She does not take ASA or other NSAIDs.  Her initial Hb was 9.9 and she was hemodynamically stable; she was admitted to Observation unit for further monitoring; PTA Xarelto was held;  serial Hb trended down a little but relatively stable 9.1--8.9--8.7; she was seen by Dr Ko who repeated EGD on 04/11- which revealed mild Schatzki ring  And small hiatal hernia but no evidence of ulcers or active bleeding; Colonoscopy on 04/14- was also negative; GI recommends video capsule endoscopy as outpatient; follow up with PMD next week and repeat Hb; recommended to continue holding Xarelto at this time and she will also follow up with Dr Vaughn and further discuss about reinitiating anticoagulation therapy; CHADsVAsc score is 2; she remained in NSR; she will continue with her PTA  "Flecainide.           Yuli Denny MD          Significant Results and Procedures     EGD 04/11/2019  The examined esophagus was normal.   A mild Schatzki ring was found at the gastroesophageal junction.   A small hiatal hernia was present.   A 8 mm scar was found in the gastric antrum.   The duodenal bulb, first portion of the duodenum and second portion of  the duodenum were normal.      Colonoscopy 04/12/2019  The perianal and digital rectal examinations were normal.   A few small-mouthed diverticula were found in the sigmoid colon.   The exam was otherwise without abnormality on direct and retroflexion views.   The terminal ileum appeared normal.      Pt's past medical history, family history, habits, medications and allergies were reviewed with the patient today.  See snap shot for  HCM status. Most recent lab results reviewed with pt. Problem list and histories reviewed & adjusted, as indicated.  Additional history as below:    Normal BM yesterday. No blood seen in stool since discharge  Has not had video capsule endoscopy scheduled yet. Is supposed to be hearing from Dr Ko's office soon per when pt spoke with GI recently  Denies chest pain, shortness of breath or abd pain.  Stable chronic LBP. Prior PT in 2016 that did not help   Weight up 8 pounds in the past 1 year       Additional ROS:   Constitutional, HEENT, Cardiovascular, Pulmonary, GI and , Neuro, MSK and Psych review of systems/symptoms are otherwise negative or unchanged from previous, except as noted above.      OBJECTIVE:  /74   Pulse 74   Temp 98.7  F (37.1  C) (Oral)   Resp 16   Wt 123.8 kg (273 lb)   SpO2 98%   BMI 46.86 kg/m     Estimated body mass index is 46.86 kg/m  as calculated from the following:    Height as of 4/10/19: 1.626 m (5' 4\").    Weight as of this encounter: 123.8 kg (273 lb).     Neck: no adenopathy. Thyroid normal to palpation. No bruits  Pulm: Lungs clear to auscultation   CV: Regular rates and " rhythm  GI: Soft, morbidly obese, nontender, Normal active bowel sounds, No hepatosplenomegaly or masses palpable  Ext: Peripheral pulses intact. No edema.  Neuro: Normal strength and tone, sensory exam grossly normal  Back: Tenderness to palpation bilateral paralumbar area. Neg SLRT bilaterally.      Assessment/Plan: (See plan discussion below for further details)  1. History of GI bleed  No sign of active bleeding on recent endoscopy/endoscopy.  Will await video capsule study results from GI    2. Anemia, unspecified type  Related to GI bleeding.  Labs as ordered to determine if additional iron supplementation needed  - Ferritin  - Hemoglobin  - Iron and iron binding capacity    3. Atrial fibrillation and flutter (H)  Currently off of anti-coagulation due to history of recent GI bleeding.  Normal sinus rhythm now  with flecainide.  Plan is to follow-up with cardiology later this month to discuss timing of restarting Xarelto.  Remain off for now until further GI work-up completed    4. Morbid obesity, unspecified obesity type (H)  Contributing to chronic back pain and other issues.  Counseled regarding calorie/carbohydrate reduction.  Daily walking encouraged as able with back issues    5. Chronic bilateral low back pain without sciatica  Chronic.  Has not had physical therapy in 3 years.  Not a candidate for NSAIDs with GI bleed history patient not interested in aquatic therapy.  X-ray to rule out compression fracture.  If negative, will refer back to physical therapy for treatment and core strengthening  - XR Lumbar Spine 2/3 Views; Future            Yuval Jama MD  Internal Medicine Department  [facility]

## 2019-04-18 NOTE — PROGRESS NOTES
Clinic Care Coordination Contact    Clinic Care Coordination Contact  OUTREACH    Referral Information:  Referral Source: IP Report  Primary Diagnosis: GI Disorders  Chief Complaint   Patient presents with     Clinic Care Coordination - Face To Face     Clinical Concerns:  CCC RN met with the patient today during her PCP appointment.  She was following up with Dr. Jama regarding her recent hospitalization for upper GI bleed.    The patient reports to be feeling improved but still notes fatigue and limited stamina.  She denied any recent s/s of bleeding.  She will be having labs done today as well as further work-up by her GI team to assess for causes of her bleeding.    The patient updated that she feels she has been doing fairly well lately aside from her repeat episode of GI bleeding.  She continues to have good support from her sister and [employer]ism friends.  CCC RN discussed assisting patient to obtain additional in-home resources; see Financial/Insurance below.    Pain:  The patient reports chronic back pain.  She is due to get a back xray today for further evaluation.    Medication Management:  The patient confirmed she has all of her medications and has been taking them as prescribed.  No concerns identified.     Financial/Insurance:   The patient updated that she lost her  due to a change in the 's job.  The patient stated she gets about $22/month from her 's pension and about $1217/month from Social Security.  She has never looked into [facility] services but was interested in doing so.  [facility] RN provided her with the contact information for [facility] and encouraged her to request a [place] Cho[place] to evaluate whether or not she would be eligible for [facility] services; the patient agreed to do so.     Resources and Interventions:  Equipment Currently Used at Home: walker, rolling  The patient hopes to get a cane to have for around the house and for shorter distances; she will  wait to get this until she finds out whether or not she is eligible for [facility] services.    Referrals Placed: [facility]     Goals Addressed                 This Visit's Progress       Patient Stated      1. Medical (pt-stated)   On track     Goal Statement: I will work to feel less fatigued and weak by taking my medications as prescribed, getting routine lab-work, and continuing to working with my care teams within the next 2 months.  Measure of Success: The patient will report improved strength and energy and will demonstrate stable labs.  Supportive Steps to Achieve: The patient will continue attending health appointments as recommended, will take her medications as prescribed, and will report any new signs/symptoms to clinic.  CCC RN will continue routine patient outreaches to provide ongoing support and additional resources as needed.  Barriers: History of GI bleed.  Strengths: Patient is motivated to feel better and states her health as her top priority.  Date to Achieve By: 5/31/19  Patient expressed understanding of goal: Yes    As of today's date 4/18/2019 goal is met at 26 - 50%.   Goal Status:  Active -- The patient has continued taking her medications as prescribed, getting lab work as ordered, and collaborating with her care teams.  She was recently in the hospital again for GI bleeding but took appropriate steps for evaluation.        2. Psychosocial (pt-stated)   New     Goal Statement: I will inquire about additional in-home services by applying for Medical Assistance within the next 1 month.  Measure of Success: The patient will confirm contact with the [facility] and [facility] Assessment completed.  Supportive Steps to Achieve: CCC RN provided the patient with the contact information for [facility].  The patient will contact [facility] to request [facility] assessment with goal of obtaining additional in-home assistance.  Barriers: Limited knowledge of available resources.  Strengths:  The patient is motivated to obtain additional in-home assistance.  Date to Achieve By: 5/31/19  Patient expressed understanding of goal: Yes          Patient/Caregiver understanding: Yes  Outreach Frequency: 2 weeks  Future Appointments              In 1 week Constantin Vaughn MD [facility] CLIN        Plan:    The patient will contact [facility] to request [facility] assessment to evaluate whether or not she is eligible for [facility] services.    The patient will continue monitoring for any new/worsening symptoms, especially GI bleeding, and will notify clinic staff of any concerns.    The patient will participate in recommended follow-up and will attend her upcoming appointments as scheduled.    The patient will contact CCC RN with additional questions or concerns.    CCC RN will outreach to patient in approximately 2 weeks to get updates on patient status, assess goal progress, and offer additional support and resources as indicated.    Damien Person, RN  Clinic Care Coordinator  [facility]  Ph: [phone]

## 2019-04-18 NOTE — LETTER
[facility] Home  Complex Care Plan  About Me:    Patient Name:  Maxine Velasquez    YOB: 1955  Age:         63 year old   Anita MRN:    [id number] Telephone Information:  Home Phone [phone]   Mobile [phone]       Address:  [address]  [place] [address] Email address:  No e-mail address on record      Emergency Contact(s)    Name Relationship Lgl Grd Work Phone Home Phone Mobile Phone   1. SEVERSON,VIVIAN Sister   [phone]   2. BRANDI GEORGES Daughter  none [phone]   3. PRATIMA VELASQUEZ Son   [phone]           Primary language:  English     needed? No   [facility]:  [phone] op. 1  Other communication barriers: None  Preferred Method of Communication:  Phone  Current living arrangement: I live alone  Mobility Status/ Medical Equipment: Independent w/Device    Health Maintenance  Health Maintenance Reviewed: Due/Overdue   Health Maintenance Due   Topic Date Due     PREVENTIVE CARE VISIT  1955     ASTHMA CONTROL TEST Q3 MOS  08/23/1960     MIGRAINE ACTION PLAN  08/23/1973     ZOSTER IMMUNIZATION (1 of 2) 08/23/2005     ADVANCE DIRECTIVE PLANNING Q5 YRS  08/23/2010     MAMMO SCREEN Q2 YR (SYSTEM ASSIGNED)  09/15/2017     My Access Plan  Medical Emergency 911   Primary Clinic Line [facility] [phone]   24 Hour Appointment Line [phone] or  [phone]) (toll-free)   24 Hour Nurse Line [phone] (toll-free)   Preferred Urgent Care [facility], [phone]   Preferred Hospital [facility]  [phone]   Preferred Pharmacy [facility] [place], [place] - [address]     Behavioral Health Crisis Line The National Suicide Prevention Lifeline at 1-101.600.4924 or 911      My Care Team Members  Patient Care Team       Relationship Specialty Notifications Start  End    Yuval Jama MD PCP - General Internal Medicine  10/20/14     Merged    Phone: [phone] Fax: [phone]         [address] [place] [address]    Yuval Jama MD  Internal Medicine  10/20/14     Merged    Phone: [phone] Fax: [phone]         [address] [place] [address]    Yuval Jama MD Assigned PCP   12/19/14     Phone: [phone] Fax: [phone]         [address] [place] [address]    Damien Person, RN Lead Care Coordinator Primary Care -   3/6/19     Phone: [phone]                 My Care Plans  Self Management and Treatment Plan  Goals and (Comments)  Goals        General    1. Medical (pt-stated)     Notes - Note edited  4/19/2019  9:15 AM by Damien Person, RN    Goal Statement: I will work to feel less fatigued and weak by taking my medications as prescribed, getting routine lab-work, and continuing to working with my care teams within the next 2 months.  Measure of Success: The patient will report improved strength and energy and will demonstrate stable labs.  Supportive Steps to Achieve: The patient will continue attending health appointments as recommended, will take her medications as prescribed, and will report any new signs/symptoms to clinic.  CCC RN will continue routine patient outreaches to provide ongoing support and additional resources as needed.  Barriers: History of GI bleed.  Strengths: Patient is motivated to feel better and states her health as her top priority.  Date to Achieve By: 5/31/19  Patient expressed understanding of goal: Yes    As of today's date 4/18/2019 goal is met at 26 - 50%.   Goal Status:  Active -- The patient has continued taking her medications as prescribed, getting lab work as ordered, and collaborating with her care teams.  She was recently in the hospital again for GI bleeding but took appropriate steps for evaluation.      2. Psychosocial (pt-stated)     Notes - Note edited  4/19/2019  9:17 AM by Raza  KRISTOPHER Quezada    Goal Statement: I will inquire about additional in-home services by applying for Medical Assistance within the next 1 month.  Measure of Success: The patient will confirm contact with the [facility] and [facility] Assessment completed.  Supportive Steps to Achieve: CCC RN provided the patient with the contact information for [facility].  The patient will contact [facility] to request [facility] assessment with goal of obtaining additional in-home assistance.  Barriers: Limited knowledge of available resources.  Strengths: The patient is motivated to obtain additional in-home assistance.  Date to Achieve By: 5/31/19  Patient expressed understanding of goal: Yes             Action Plans on File:    Depression      My Medical and Care Information  Problem List   Patient Active Problem List   Diagnosis     Osteoarthritis     TMJ disease     Atrial fibrillation and flutter (H)     Health Care Home     Hyperlipidemia LDL goal <130     Gastroesophageal reflux disease without esophagitis     Hypothyroidism     Morbid obesity, unspecified obesity type (H)     Migraine without aura and without status migrainosus, not intractable     Essential hypertension     NELY on CPAP     RLS (restless legs syndrome)     Severe episode of recurrent major depressive disorder, without psychotic features (H)     Gastroesophageal reflux disease, esophagitis presence not specified      Current Medications:  Current Outpatient Medications   Medication     atenolol (TENORMIN) 50 MG tablet     ferrous fumarate 65 mg, [place]. FE,-Vitamin C 125 mg (VITRON-C)  MG TABS tablet     flecainide acetate 150 MG TABS     gabapentin (NEURONTIN) 300 MG capsule     levothyroxine (SYNTHROID/LEVOTHROID) 75 MCG tablet     levothyroxine (SYNTHROID/LEVOTHROID) 88 MCG tablet     losartan (COZAAR) 100 MG tablet     order for DME     pantoprazole (PROTONIX) 40 MG EC tablet     SUMAtriptan (IMITREX) 100 MG tablet     No current  facility-administered medications for this visit.      Care Coordination Start Date: 3/15/2019   Frequency of Care Coordination: 2 weeks   Form Last Updated: 04/19/2019

## 2019-04-19 ASSESSMENT — ASTHMA QUESTIONNAIRES: ACT_TOTALSCORE: 25

## 2019-04-22 NOTE — TELEPHONE ENCOUNTER
Patient is wondering if Dr Jama could write a letter of medical necessity for the patient to refill her pantropazole.  Patient is almost out of medication.

## 2019-04-22 NOTE — TELEPHONE ENCOUNTER
PA Initiation    Medication: pantoprazole Sodium 40mg DR tablets - INITIATED  Insurance Company: [employer] Plus PMAP - Phone [phone] Fax [phone]  Pharmacy Filling the Rx: [facility] [place] [place] [id number] - [place], [place] - [address] [place] [address] AVE  Filling Pharmacy Phone: [phone]  Filling Pharmacy Fax:    Start Date: 4/22/2019

## 2019-04-23 NOTE — TELEPHONE ENCOUNTER
Prior Authorization Approval    Authorization Effective Date: 1/22/2019  Authorization Expiration Date: 4/22/2020  Medication: pantoprazole Sodium 40mg DR tablets - APPROVED  Approved Dose/Quantity: 60 FOR 30  Reference #:     Insurance Company: [employer] Plus PMAP - Phone [phone] Fax [phone]  Expected CoPay:       CoPay Card Available:      Foundation Assistance Needed:    Which Pharmacy is filling the prescription (Not needed for infusion/clinic administered): [facility] [id number] - [place], [place] - [address] [place] [address]  Pharmacy Notified: Yes  Patient Notified: Yes

## 2019-04-29 ENCOUNTER — TELEPHONE (OUTPATIENT)
Dept: INTERNAL MEDICINE | Facility: CLINIC | Age: 64
End: 2019-04-29

## 2019-04-29 NOTE — TELEPHONE ENCOUNTER
Panel Management Review    Patient Active Problem List   Diagnosis     Osteoarthritis     TMJ disease     Atrial fibrillation and flutter (H)     Health Care Home     Hyperlipidemia LDL goal <130     Gastroesophageal reflux disease without esophagitis     Hypothyroidism     Morbid obesity, unspecified obesity type (H)     Migraine without aura and without status migrainosus, not intractable     Essential hypertension     NELY on CPAP     RLS (restless legs syndrome)     Severe episode of recurrent major depressive disorder, without psychotic features (H)     Gastroesophageal reflux disease, esophagitis presence not specified       Patient has the following on her problem list:     Depression / Dysthymia review    Measure:  Needs PHQ-9 score of 4 or less during index window.  Administer PHQ-9 and if score is 5 or more, send encounter to provider for next steps.    5 - 7 month window range: 3    PHQ-9 SCORE 6/29/2018 11/12/2018 4/18/2019   PHQ-9 Total Score - - -   PHQ-9 Total Score 2 3 3       If PHQ-9 recheck is 5 or more, route to provider for next steps.    Patient is due for:  None    Hypertension   Last three blood pressure readings:  BP Readings from Last 3 Encounters:   04/18/19 144/74   04/12/19 141/53   04/10/19 118/70     Blood pressure: Passed    HTN Guidelines:  Less than 140/90      Composite cancer screening  Chart review shows that this patient is due/due soon for the following None  Summary:    Patient is due/failing the following:   PHYSICAL    Action needed:   Patient needs office visit for Px.    Type of outreach:    Phone, left message for patient to call back.     Questions for provider review:    None                                                                                                                                    Yajaira Kennedy, [facility]       Chart routed to none .

## 2019-04-30 ENCOUNTER — OFFICE VISIT (OUTPATIENT)
Dept: CARDIOLOGY | Facility: CLINIC | Age: 64
End: 2019-04-30
Payer: COMMERCIAL

## 2019-04-30 VITALS
HEART RATE: 63 BPM | DIASTOLIC BLOOD PRESSURE: 80 MMHG | HEIGHT: 64 IN | SYSTOLIC BLOOD PRESSURE: 165 MMHG | WEIGHT: 273 LBS | BODY MASS INDEX: 46.61 KG/M2

## 2019-04-30 DIAGNOSIS — I10 BENIGN ESSENTIAL HYPERTENSION: Primary | ICD-10-CM

## 2019-04-30 PROCEDURE — 99214 OFFICE O/P EST MOD 30 MIN: CPT | Performed by: INTERNAL MEDICINE

## 2019-04-30 RX ORDER — CARVEDILOL 6.25 MG/1
6.25 TABLET ORAL 2 TIMES DAILY WITH MEALS
Qty: 60 TABLET | Refills: 3 | Status: SHIPPED | OUTPATIENT
Start: 2019-04-30 | End: 2019-05-07

## 2019-04-30 ASSESSMENT — MIFFLIN-ST. JEOR: SCORE: 1778.57

## 2019-04-30 NOTE — PROGRESS NOTES
"Electrophysiology/ Clinic Note         H&P and Plan:     REASON FOR VISIT:  Evaluation of paroxysmal atrial fibrillation.       HISTORY OF PRESENT ILLNESS:  Ms. Sears is a delightful 60-year-old lady with a history of hypertension and paroxysmal atrial fibrillation who is here for routine evaluation.     Patient has had several admissions in the hospital this year.  In February, she was admitted with A. fib with RVR and underwent successful cardioversion.  Later, in March, she was readmitted with a severe GI bleed.  She was found to have duodenal ulcer which was cauterized.  Xarelto was discontinued but later  The medication was resumed.  She was readmitted in April with lower GI bleed.  At that time, colonoscopy and endoscopy were performed and no source of bleeding was noticed.  She is currently off anticoagulation and is scheduled to have a capsule study done in 2 weeks.    Today, she has no complaints.  She denies recurrence of A. fib.  She denies chest pain, shortness of breath, lightheadedness, near-syncope or syncope.    EKG done in March showed normal sinus rhythm with QRS measuring 88 ms. Echocardiogram today in 2017 showed normal LV function and borderline LVH.      ASSESSMENT AND PLAN:   1.  Paroxysmal atrial fibrillation.  No recurrence of A. fib after the episode in February.  Blood pressure is elevated today.  I recommend switching atenolol to Coreg.  We will continue flecainide at current dose.    2.  Hypertension.  Blood pressure is elevated.  Plans as detailed above.  She will follow-up here in 1 to 2 weeks to see an ANALY in clinic to reevaluate blood pressure.  3.  Embolic prevention.  CHADS-VASc score of 2.   Patient had a recent GI bleeds.  We will continue to hold off on anticoagulation to figure out the source.  Will reconsider restarting oral anticoagulation 3 to 6 months after GI bleed.      Constantin Vaughn MD    Physical Exam:  Vitals: Ht 1.626 m (5' 4.02\")   BMI 46.84 kg/m  "     Constitutional:  AAO x3.  Pt is in NAD.  HEAD: normocephalic.  SKIN: Skin normal color, texture and turgor with no lesions or eruptions.  Eyes: PERRL, EOMI.  ENT:  Supple, normal JVP. No lymphadenopathy or thyroid enlargement.  Chest:  CTAB.  Cardiac:   RRR, normal  S1 and S2.  No murmurs rubs or gallop.    Abdomen:  Normal BS.  Soft, non-tender and non-distended.  No rebound or guarding.    Extremities:  Pedious pulses palpable B/L.  No LE edema noticed.   Neurological: Strength and sensation grossly symmetric and intact throughout.       CURRENT MEDICATIONS:  Current Outpatient Medications   Medication Sig Dispense Refill     atenolol (TENORMIN) 50 MG tablet Take 1 tablet (50 mg) by mouth daily 90 tablet 3     ferrous fumarate 65 mg, [place]. FE,-Vitamin C 125 mg (VITRON-C)  MG TABS tablet Take 1 tablet by mouth daily 30 tablet 1     flecainide acetate 150 MG TABS Take 1/2 tab (75 mg) twice daily by mouth 90 tablet 3     gabapentin (NEURONTIN) 300 MG capsule Take 600 mg by mouth At Bedtime 2 capsules       levothyroxine (SYNTHROID/LEVOTHROID) 75 MCG tablet 1 tab daily in addition to Levothyroxine 88mcg daily so total daily dose = 163mcg daily 90 tablet 3     levothyroxine (SYNTHROID/LEVOTHROID) 88 MCG tablet 1 tab daily in addition to Levothyroxine 75mcg daily so total daily dose = 163mcg daily 90 tablet 3     losartan (COZAAR) 100 MG tablet Take 1 tablet (100 mg) by mouth daily 90 tablet 3     order for DME DREAMSTATION  5-15 CM/H20  NASAL AIRFIT N10 HER       pantoprazole (PROTONIX) 40 MG EC tablet TAKE 1 TABLET BY MOUTH TWICE A DAY BEFORE MEALS 60 tablet 6     SUMAtriptan (IMITREX) 100 MG tablet Take 1 tablet (100 mg) by mouth at onset of headache for migraine May repeat in 2 hours if needed: max 2/day; 10 tablet 11       ALLERGIES     Allergies   Allergen Reactions     Morphine Anaphylaxis     Ceclor [Cefaclor] Hives     Diltiazem Hives     Lisinopril Cough     Sertraline Nausea and Vomiting       PAST  MEDICAL HISTORY:  Past Medical History:   Diagnosis Date     Atrial fibrillation and flutter (H) 1/5/15     Depression       Duodenal ulcer 03/04/2019     Essential hypertension       Gastroesophageal reflux disease without esophagitis 11/11/2015     GERD (gastroesophageal reflux disease)      Hiatal hernia      Hyperlipidemia LDL goal <130 3/26/2015     Hypothyroidism      Migraine without aura and without status migrainosus, not intractable  Aug 2016     Morbid obesity, unspecified obesity type (H) 12/9/2015     Obesity      NELY on CPAP      Osteoarthritis      RLS (restless legs syndrome)      Symptomatic menopausal or female climacteric states (aka FLASHES)      TMJ disease        PAST SURGICAL HISTORY:  Past Surgical History:   Procedure Laterality Date     APPENDECTOMY       CHOLECYSTECTOMY       COLONOSCOPY N/A 4/12/2019    Procedure: COLONOSCOPY;  Surgeon: Vahid Cardona MD;  Location:  GI     ESOPHAGOSCOPY, GASTROSCOPY, DUODENOSCOPY (EGD), COMBINED N/A 4/11/2019    Procedure: COMBINED ESOPHAGOSCOPY, GASTROSCOPY, DUODENOSCOPY (EGD);  Surgeon: Ben Ko MD;  Location:  GI     HYSTERECTOMY, DOM       TONSILLECTOMY         FAMILY HISTORY:  Family History   Problem Relation Age of Onset     Myocardial Infarction Mother      Heart Failure Mother      Heart Surgery Father         bypass surgery     Cerebrovascular Disease Father      Heart Surgery Brother      Hyperlipidemia Brother      Hyperlipidemia Sister      Hyperlipidemia Brother      Sleep Apnea Sister        SOCIAL HISTORY:  Social History     Socioeconomic History     Marital status:      Spouse name: Not on file     Number of children: Not on file     Years of education: Not on file     Highest education level: Not on file   Occupational History     Not on file   Social Needs     Financial resource strain: Not on file     Food insecurity:     Worry: Not on file     Inability: Not on file     Transportation needs:      Medical: Not on file     Non-medical: Not on file   Tobacco Use     Smoking status: Former Smoker     Packs/day: 0.25     Types: Cigarettes     Start date:      Last attempt to quit:      Years since quittin.3     Smokeless tobacco: Never Used     Tobacco comment: very minimal smoking history   Substance and Sexual Activity     Alcohol use: No     Alcohol/week: 0.0 oz     Drug use: No     Sexual activity: Never   Lifestyle     Physical activity:     Days per week: Not on file     Minutes per session: Not on file     Stress: Not on file   Relationships     Social connections:     Talks on phone: Not on file     Gets together: Not on file     Attends [employer] service: Not on file     Active member of club or organization: Not on file     Attends meetings of clubs or organizations: Not on file     Relationship status: Not on file     Intimate partner violence:     Fear of current or ex partner: Not on file     Emotionally abused: Not on file     Physically abused: Not on file     Forced sexual activity: Not on file   Other Topics Concern     Parent/sibling w/ CABG, MI or angioplasty before 65F 55M? Not Asked      Service Not Asked     Blood Transfusions Not Asked     Caffeine Concern No     Occupational Exposure Not Asked     Hobby Hazards Not Asked     Sleep Concern Yes     Stress Concern Not Asked     Weight Concern No     Special Diet No     Back Care Not Asked     Exercise No     Bike Helmet Not Asked     Seat Belt Yes     Self-Exams Not Asked   Social History Narrative     Not on file       Review of Systems:  Skin:        Eyes:       ENT:       Respiratory:       Cardiovascular:       Gastroenterology:      Genitourinary:       Musculoskeletal:       Neurologic:       Psychiatric:       Heme/Lymph/Imm:       Endocrine:           Recent Lab Results:  LIPID RESULTS:  Lab Results   Component Value Date    CHOL 172 2019    HDL 45 (L) 2019    LDL 85 2019    TRIG 212 (H)  03/13/2019    CHOLHDLRATIO 4.7 09/24/2015       LIVER ENZYME RESULTS:  Lab Results   Component Value Date    AST 14 04/10/2019    ALT 19 04/10/2019       CBC RESULTS:  Lab Results   Component Value Date    WBC 8.5 04/12/2019    RBC 3.34 (L) 04/12/2019    HGB 10.7 (L) 04/18/2019    HCT 28.3 (L) 04/12/2019    MCV 85 04/12/2019    MCH 26.0 (L) 04/12/2019    MCHC 30.7 (L) 04/12/2019    RDW 16.2 (H) 04/12/2019     04/12/2019       BMP RESULTS:  Lab Results   Component Value Date     04/12/2019    POTASSIUM 3.5 04/12/2019    CHLORIDE 108 04/12/2019    CO2 27 04/12/2019    ANIONGAP 7 04/12/2019     (H) 04/12/2019    BUN 9 04/12/2019    CR 0.73 04/12/2019    GFRESTIMATED 87 04/12/2019    GFRESTBLACK >90 04/12/2019    J CARLOS 8.5 04/12/2019        A1C RESULTS:  Lab Results   Component Value Date    A1C 5.9 (H) 03/13/2019       INR RESULTS:  Lab Results   Component Value Date    INR 1.78 (H) 03/02/2019         ECHOCARDIOGRAM  No results found for this or any previous visit (from the past 8760 hour(s)).      No orders of the defined types were placed in this encounter.    No orders of the defined types were placed in this encounter.    There are no discontinued medications.      No diagnosis found.      CC  Yvual Jama MD  [address] [place] [id number]

## 2019-04-30 NOTE — LETTER
4/30/2019    Yuval Jama MD  [address] [facility] [address]    RE: Maxine Lopezore       Dear Colleague,    I had the pleasure of seeing Maxine Sears in the [facility].    Electrophysiology/ Clinic Note         H&P and Plan:     REASON FOR VISIT:  Evaluation of paroxysmal atrial fibrillation.       HISTORY OF PRESENT ILLNESS:  Ms. Sears is a delightful 60-year-old lady with a history of hypertension and paroxysmal atrial fibrillation who is here for routine evaluation.     Patient has had several admissions in the hospital this year.  In February, she was admitted with A. fib with RVR and underwent successful cardioversion.  Later, in March, she was readmitted with a severe GI bleed.  She was found to have duodenal ulcer which was cauterized.  Xarelto was discontinued but later  The medication was resumed.  She was readmitted in April with lower GI bleed.  At that time, colonoscopy and endoscopy were performed and no source of bleeding was noticed.  She is currently off anticoagulation and is scheduled to have a capsule study done in 2 weeks.    Today, she has no complaints.  She denies recurrence of A. fib.  She denies chest pain, shortness of breath, lightheadedness, near-syncope or syncope.    EKG done in March showed normal sinus rhythm with QRS measuring 88 ms. Echocardiogram today  in 2017 showed normal LV function and borderline LVH.      ASSESSMENT AND PLAN:   1.  Paroxysmal atrial fibrillation.  No recurrence of A. fib after the episode in February.  Blood pressure is elevated today.  I recommend switching atenolol to Coreg.  We will continue flecainide at current dose.    2.  Hypertension.  Blood pressure is elevated.  Plans as detailed above.  She will follow-up here in 1 to 2 weeks to see an ANALY in clinic to reevaluate blood pressure.  3.  Embolic prevention.  CHADS-VASc score of 2.   Patient had a recent GI bleeds.  We will continue to hold off on  "anticoagulation to figure out the source.  Will reconsider restarting oral anticoagulation 3 to 6 months after GI bleed.      Constantin Vaughn MD    Physical Exam:  Vitals: Ht 1.626 m (5' 4.02\")   BMI 46.84 kg/m       Constitutional:  AAO x3.  Pt is in NAD.  HEAD: normocephalic.  SKIN: Skin normal color, texture and turgor with no lesions or eruptions.  Eyes: PERRL, EOMI.  ENT:  Supple, normal JVP. No lymphadenopathy or thyroid enlargement.  Chest:  CTAB.  Cardiac:   RRR, normal  S1 and S2.  No murmurs rubs or gallop.    Abdomen:  Normal BS.  Soft, non-tender and non-distended.  No rebound or guarding.    Extremities:  Pedious pulses palpable B/L.  No LE edema noticed.   Neurological: Strength and sensation grossly symmetric and intact throughout.       CURRENT MEDICATIONS:  Current Outpatient Medications   Medication Sig Dispense Refill     atenolol (TENORMIN) 50 MG tablet Take 1 tablet (50 mg) by mouth daily 90 tablet 3     ferrous fumarate 65 mg, [place]. FE,-Vitamin C 125 mg (VITRON-C)  MG TABS tablet Take 1 tablet by mouth daily 30 tablet 1     flecainide acetate 150 MG TABS Take 1/2 tab (75 mg) twice daily by mouth 90 tablet 3     gabapentin (NEURONTIN) 300 MG capsule Take 600 mg by mouth At Bedtime 2 capsules       levothyroxine (SYNTHROID/LEVOTHROID) 75 MCG tablet 1 tab daily in addition to Levothyroxine 88mcg daily so total daily dose = 163mcg daily 90 tablet 3     levothyroxine (SYNTHROID/LEVOTHROID) 88 MCG tablet 1 tab daily in addition to Levothyroxine 75mcg daily so total daily dose = 163mcg daily 90 tablet 3     losartan (COZAAR) 100 MG tablet Take 1 tablet (100 mg) by mouth daily 90 tablet 3     order for DME DREAMSTATION  5-15 CM/H20  NASAL AIRFIT N10 HER       pantoprazole (PROTONIX) 40 MG EC tablet TAKE 1 TABLET BY MOUTH TWICE A DAY BEFORE MEALS 60 tablet 6     SUMAtriptan (IMITREX) 100 MG tablet Take 1 tablet (100 mg) by mouth at onset of headache for migraine May repeat in 2 hours if needed: " max 2/day; 10 tablet 11       ALLERGIES     Allergies   Allergen Reactions     Morphine Anaphylaxis     Ceclor [Cefaclor] Hives     Diltiazem Hives     Lisinopril Cough     Sertraline Nausea and Vomiting       PAST MEDICAL HISTORY:  Past Medical History:   Diagnosis Date     Atrial fibrillation and flutter (H) 1/5/15     Depression       Duodenal ulcer 03/04/2019     Essential hypertension       Gastroesophageal reflux disease without esophagitis 11/11/2015     GERD (gastroesophageal reflux disease)      Hiatal hernia      Hyperlipidemia LDL goal <130 3/26/2015     Hypothyroidism      Migraine without aura and without status migrainosus, not intractable  Aug 2016     Morbid obesity, unspecified obesity type (H) 12/9/2015     Obesity      NELY on CPAP      Osteoarthritis      RLS (restless legs syndrome)      Symptomatic menopausal or female climacteric states (aka FLASHES)      TMJ disease        PAST SURGICAL HISTORY:  Past Surgical History:   Procedure Laterality Date     APPENDECTOMY       CHOLECYSTECTOMY       COLONOSCOPY N/A 4/12/2019    Procedure: COLONOSCOPY;  Surgeon: Vahid Cardona MD;  Location:  GI     ESOPHAGOSCOPY, GASTROSCOPY, DUODENOSCOPY (EGD), COMBINED N/A 4/11/2019    Procedure: COMBINED ESOPHAGOSCOPY, GASTROSCOPY, DUODENOSCOPY (EGD);  Surgeon: Ben Ko MD;  Location:  GI     HYSTERECTOMY, DOM       TONSILLECTOMY         FAMILY HISTORY:  Family History   Problem Relation Age of Onset     Myocardial Infarction Mother      Heart Failure Mother      Heart Surgery Father         bypass surgery     Cerebrovascular Disease Father      Heart Surgery Brother      Hyperlipidemia Brother      Hyperlipidemia Sister      Hyperlipidemia Brother      Sleep Apnea Sister        SOCIAL HISTORY:  Social History     Socioeconomic History     Marital status:      Spouse name: Not on file     Number of children: Not on file     Years of education: Not on file     Highest education level:  Not on file   Occupational History     Not on file   Social Needs     Financial resource strain: Not on file     Food insecurity:     Worry: Not on file     Inability: Not on file     Transportation needs:     Medical: Not on file     Non-medical: Not on file   Tobacco Use     Smoking status: Former Smoker     Packs/day: 0.25     Types: Cigarettes     Start date:      Last attempt to quit:      Years since quittin.3     Smokeless tobacco: Never Used     Tobacco comment: very minimal smoking history   Substance and Sexual Activity     Alcohol use: No     Alcohol/week: 0.0 oz     Drug use: No     Sexual activity: Never   Lifestyle     Physical activity:     Days per week: Not on file     Minutes per session: Not on file     Stress: Not on file   Relationships     Social connections:     Talks on phone: Not on file     Gets together: Not on file     Attends [employer]alian service: Not on file     Active member of club or organization: Not on file     Attends meetings of clubs or organizations: Not on file     Relationship status: Not on file     Intimate partner violence:     Fear of current or ex partner: Not on file     Emotionally abused: Not on file     Physically abused: Not on file     Forced sexual activity: Not on file   Other Topics Concern     Parent/sibling w/ CABG, MI or angioplasty before 65F 55M? Not Asked      Service Not Asked     Blood Transfusions Not Asked     Caffeine Concern No     Occupational Exposure Not Asked     Hobby Hazards Not Asked     Sleep Concern Yes     Stress Concern Not Asked     Weight Concern No     Special Diet No     Back Care Not Asked     Exercise No     Bike Helmet Not Asked     Seat Belt Yes     Self-Exams Not Asked   Social History Narrative     Not on file       Review of Systems:  Skin:        Eyes:       ENT:       Respiratory:       Cardiovascular:       Gastroenterology:      Genitourinary:       Musculoskeletal:       Neurologic:       Psychiatric:        Heme/Lymph/Imm:       Endocrine:           Recent Lab Results:  LIPID RESULTS:  Lab Results   Component Value Date    CHOL 172 03/13/2019    HDL 45 (L) 03/13/2019    LDL 85 03/13/2019    TRIG 212 (H) 03/13/2019    CHOLHDLRATIO 4.7 09/24/2015       LIVER ENZYME RESULTS:  Lab Results   Component Value Date    AST 14 04/10/2019    ALT 19 04/10/2019       CBC RESULTS:  Lab Results   Component Value Date    WBC 8.5 04/12/2019    RBC 3.34 (L) 04/12/2019    HGB 10.7 (L) 04/18/2019    HCT 28.3 (L) 04/12/2019    MCV 85 04/12/2019    MCH 26.0 (L) 04/12/2019    MCHC 30.7 (L) 04/12/2019    RDW 16.2 (H) 04/12/2019     04/12/2019       BMP RESULTS:  Lab Results   Component Value Date     04/12/2019    POTASSIUM 3.5 04/12/2019    CHLORIDE 108 04/12/2019    CO2 27 04/12/2019    ANIONGAP 7 04/12/2019     (H) 04/12/2019    BUN 9 04/12/2019    CR 0.73 04/12/2019    GFRESTIMATED 87 04/12/2019    GFRESTBLACK >90 04/12/2019    J CARLOS 8.5 04/12/2019        A1C RESULTS:  Lab Results   Component Value Date    A1C 5.9 (H) 03/13/2019       INR RESULTS:  Lab Results   Component Value Date    INR 1.78 (H) 03/02/2019         ECHOCARDIOGRAM  No results found for this or any previous visit (from the past 8760 hour(s)).      No orders of the defined types were placed in this encounter.    No orders of the defined types were placed in this encounter.    There are no discontinued medications.      No diagnosis found.        Thank you for allowing me to participate in the care of your patient.    Sincerely,     Constantin Vaughn MD     [facility]

## 2019-04-30 NOTE — LETTER
4/30/2019    Yuval Jama MD  [address] [facility] [address]    RE: Maxine Lopezore       Dear Colleague,    I had the pleasure of seeing Maxine Sears in the [facility].    Electrophysiology/ Clinic Note         H&P and Plan:     REASON FOR VISIT:  Evaluation of paroxysmal atrial fibrillation.       HISTORY OF PRESENT ILLNESS:  Ms. Sears is a delightful 60-year-old lady with a history of hypertension and paroxysmal atrial fibrillation who is here for routine evaluation.     Patient has had several admissions in the hospital this year.  In February, she was admitted with A. fib with RVR and underwent successful cardioversion.  Later, in March, she was readmitted with a severe GI bleed.  She was found to have duodenal ulcer which was cauterized.  Xarelto was discontinued but later  The medication was resumed.  She was readmitted in April with lower GI bleed.  At that time, colonoscopy and endoscopy were performed and no source of bleeding was noticed.  She is currently off anticoagulation and is scheduled to have a capsule study done in 2 weeks.    Today, she has no complaints.  She denies recurrence of A. fib.  She denies chest pain, shortness of breath, lightheadedness, near-syncope or syncope.    EKG done in March showed normal sinus rhythm with QRS measuring 88 ms. Echocardiogram today  in 2017 showed normal LV function and borderline LVH.      ASSESSMENT AND PLAN:   1.  Paroxysmal atrial fibrillation.  No recurrence of A. fib after the episode in February.  Blood pressure is elevated today.  I recommend switching atenolol to Coreg.  We will continue flecainide at current dose.    2.  Hypertension.  Blood pressure is elevated.  Plans as detailed above.  She will follow-up here in 1 to 2 weeks to see an ANALY in clinic to reevaluate blood pressure.  3.  Embolic prevention.  CHADS-VASc score of 2.   Patient had a recent GI bleeds.  We will continue to hold off on  "anticoagulation to figure out the source.  Will reconsider restarting oral anticoagulation 3 to 6 months after GI bleed.      Constantin Vaughn MD    Physical Exam:  Vitals: Ht 1.626 m (5' 4.02\")   BMI 46.84 kg/m       Constitutional:  AAO x3.  Pt is in NAD.  HEAD: normocephalic.  SKIN: Skin normal color, texture and turgor with no lesions or eruptions.  Eyes: PERRL, EOMI.  ENT:  Supple, normal JVP. No lymphadenopathy or thyroid enlargement.  Chest:  CTAB.  Cardiac:   RRR, normal  S1 and S2.  No murmurs rubs or gallop.    Abdomen:  Normal BS.  Soft, non-tender and non-distended.  No rebound or guarding.    Extremities:  Pedious pulses palpable B/L.  No LE edema noticed.   Neurological: Strength and sensation grossly symmetric and intact throughout.       CURRENT MEDICATIONS:  Current Outpatient Medications   Medication Sig Dispense Refill     atenolol (TENORMIN) 50 MG tablet Take 1 tablet (50 mg) by mouth daily 90 tablet 3     ferrous fumarate 65 mg, [place]. FE,-Vitamin C 125 mg (VITRON-C)  MG TABS tablet Take 1 tablet by mouth daily 30 tablet 1     flecainide acetate 150 MG TABS Take 1/2 tab (75 mg) twice daily by mouth 90 tablet 3     gabapentin (NEURONTIN) 300 MG capsule Take 600 mg by mouth At Bedtime 2 capsules       levothyroxine (SYNTHROID/LEVOTHROID) 75 MCG tablet 1 tab daily in addition to Levothyroxine 88mcg daily so total daily dose = 163mcg daily 90 tablet 3     levothyroxine (SYNTHROID/LEVOTHROID) 88 MCG tablet 1 tab daily in addition to Levothyroxine 75mcg daily so total daily dose = 163mcg daily 90 tablet 3     losartan (COZAAR) 100 MG tablet Take 1 tablet (100 mg) by mouth daily 90 tablet 3     order for DME DREAMSTATION  5-15 CM/H20  NASAL AIRFIT N10 HER       pantoprazole (PROTONIX) 40 MG EC tablet TAKE 1 TABLET BY MOUTH TWICE A DAY BEFORE MEALS 60 tablet 6     SUMAtriptan (IMITREX) 100 MG tablet Take 1 tablet (100 mg) by mouth at onset of headache for migraine May repeat in 2 hours if needed: " max 2/day; 10 tablet 11       ALLERGIES     Allergies   Allergen Reactions     Morphine Anaphylaxis     Ceclor [Cefaclor] Hives     Diltiazem Hives     Lisinopril Cough     Sertraline Nausea and Vomiting       PAST MEDICAL HISTORY:  Past Medical History:   Diagnosis Date     Atrial fibrillation and flutter (H) 1/5/15     Depression       Duodenal ulcer 03/04/2019     Essential hypertension       Gastroesophageal reflux disease without esophagitis 11/11/2015     GERD (gastroesophageal reflux disease)      Hiatal hernia      Hyperlipidemia LDL goal <130 3/26/2015     Hypothyroidism      Migraine without aura and without status migrainosus, not intractable  Aug 2016     Morbid obesity, unspecified obesity type (H) 12/9/2015     Obesity      NELY on CPAP      Osteoarthritis      RLS (restless legs syndrome)      Symptomatic menopausal or female climacteric states (aka FLASHES)      TMJ disease        PAST SURGICAL HISTORY:  Past Surgical History:   Procedure Laterality Date     APPENDECTOMY       CHOLECYSTECTOMY       COLONOSCOPY N/A 4/12/2019    Procedure: COLONOSCOPY;  Surgeon: Vahid Cardona MD;  Location:  GI     ESOPHAGOSCOPY, GASTROSCOPY, DUODENOSCOPY (EGD), COMBINED N/A 4/11/2019    Procedure: COMBINED ESOPHAGOSCOPY, GASTROSCOPY, DUODENOSCOPY (EGD);  Surgeon: Ben Ko MD;  Location:  GI     HYSTERECTOMY, DOM       TONSILLECTOMY         FAMILY HISTORY:  Family History   Problem Relation Age of Onset     Myocardial Infarction Mother      Heart Failure Mother      Heart Surgery Father         bypass surgery     Cerebrovascular Disease Father      Heart Surgery Brother      Hyperlipidemia Brother      Hyperlipidemia Sister      Hyperlipidemia Brother      Sleep Apnea Sister        SOCIAL HISTORY:  Social History     Socioeconomic History     Marital status:      Spouse name: Not on file     Number of children: Not on file     Years of education: Not on file     Highest education level:  Not on file   Occupational History     Not on file   Social Needs     Financial resource strain: Not on file     Food insecurity:     Worry: Not on file     Inability: Not on file     Transportation needs:     Medical: Not on file     Non-medical: Not on file   Tobacco Use     Smoking status: Former Smoker     Packs/day: 0.25     Types: Cigarettes     Start date:      Last attempt to quit:      Years since quittin.3     Smokeless tobacco: Never Used     Tobacco comment: very minimal smoking history   Substance and Sexual Activity     Alcohol use: No     Alcohol/week: 0.0 oz     Drug use: No     Sexual activity: Never   Lifestyle     Physical activity:     Days per week: Not on file     Minutes per session: Not on file     Stress: Not on file   Relationships     Social connections:     Talks on phone: Not on file     Gets together: Not on file     Attends Pentecostalism service: Not on file     Active member of club or organization: Not on file     Attends meetings of clubs or organizations: Not on file     Relationship status: Not on file     Intimate partner violence:     Fear of current or ex partner: Not on file     Emotionally abused: Not on file     Physically abused: Not on file     Forced sexual activity: Not on file   Other Topics Concern     Parent/sibling w/ CABG, MI or angioplasty before 65F 55M? Not Asked      Service Not Asked     Blood Transfusions Not Asked     Caffeine Concern No     Occupational Exposure Not Asked     Hobby Hazards Not Asked     Sleep Concern Yes     Stress Concern Not Asked     Weight Concern No     Special Diet No     Back Care Not Asked     Exercise No     Bike Helmet Not Asked     Seat Belt Yes     Self-Exams Not Asked   Social History Narrative     Not on file       Review of Systems:  Skin:        Eyes:       ENT:       Respiratory:       Cardiovascular:       Gastroenterology:      Genitourinary:       Musculoskeletal:       Neurologic:       Psychiatric:        Heme/Lymph/Imm:       Endocrine:           Recent Lab Results:  LIPID RESULTS:  Lab Results   Component Value Date    CHOL 172 03/13/2019    HDL 45 (L) 03/13/2019    LDL 85 03/13/2019    TRIG 212 (H) 03/13/2019    CHOLHDLRATIO 4.7 09/24/2015       LIVER ENZYME RESULTS:  Lab Results   Component Value Date    AST 14 04/10/2019    ALT 19 04/10/2019       CBC RESULTS:  Lab Results   Component Value Date    WBC 8.5 04/12/2019    RBC 3.34 (L) 04/12/2019    HGB 10.7 (L) 04/18/2019    HCT 28.3 (L) 04/12/2019    MCV 85 04/12/2019    MCH 26.0 (L) 04/12/2019    MCHC 30.7 (L) 04/12/2019    RDW 16.2 (H) 04/12/2019     04/12/2019       BMP RESULTS:  Lab Results   Component Value Date     04/12/2019    POTASSIUM 3.5 04/12/2019    CHLORIDE 108 04/12/2019    CO2 27 04/12/2019    ANIONGAP 7 04/12/2019     (H) 04/12/2019    BUN 9 04/12/2019    CR 0.73 04/12/2019    GFRESTIMATED 87 04/12/2019    GFRESTBLACK >90 04/12/2019    J CARLOS 8.5 04/12/2019        A1C RESULTS:  Lab Results   Component Value Date    A1C 5.9 (H) 03/13/2019       INR RESULTS:  Lab Results   Component Value Date    INR 1.78 (H) 03/02/2019         ECHOCARDIOGRAM  No results found for this or any previous visit (from the past 8760 hour(s)).      No orders of the defined types were placed in this encounter.    No orders of the defined types were placed in this encounter.    There are no discontinued medications.      No diagnosis found.      CC  Yuval Jama MD  [address] [place] [id number]                Thank you for allowing me to participate in the care of your patient.      Sincerely,     Constantin Vaughn MD     [facility]    cc:   Yuval Jama MD  [address] [place] [id number]

## 2019-05-03 ENCOUNTER — PATIENT OUTREACH (OUTPATIENT)
Dept: CARE COORDINATION | Facility: CLINIC | Age: 64
End: 2019-05-03

## 2019-05-03 ASSESSMENT — ACTIVITIES OF DAILY LIVING (ADL): DEPENDENT_IADLS:: CLEANING;TRANSPORTATION

## 2019-05-03 NOTE — Clinical Note
Dr. Jama -- I just spoke with Roxanna to get updates on a few things we're working on and she mentioned that she hadn't heard back about her lab or xray results.  I briefly glanced at her labs and told her that her hemoglobin was better, but let her know I would ask if you/your team would be contacting her about all of the results from 4/18/19.  Thank you!  --Damien CARTAGENA RN Care Coordinator

## 2019-05-03 NOTE — PROGRESS NOTES
Clinic Care Coordination Contact    Clinic Care Coordination Contact  OUTREACH    Referral Information:  Referral Source: IP Report  Primary Diagnosis: GI Disorders  Chief Complaint   Patient presents with     Clinic Care Coordination - Follow-up     goal check-in   Clinical Concerns:  CCC RN call to the patient to get updates on status, assess goal progress, and provide support and additional resources as needed.    The patient reports to be doing fairly well.  She denied any return GI symptoms.  She did state she's had a headache all day today for which she's taken Tylenol and has been drinking plenty of water without much relief; she stated it is not a migraine.  She hopes it will subside after getting some rest tonight.    The patient confirmed she has been attending her follow-up appointments lately.  She stated she never received any call/letter regarding her 4/18/18 lab and xray results; CCC RN will forward request for follow-up to PCP.    Financial/Insurance:   The patient updated that she has not yet contact the [facility] to inquire about [place].  CCC RN encouraged to do so sooner rather than later as it can take some time for the assessment and subsequent approval of services.  The patient agreed to do so within the next week.     Referrals Placed: [facility]    Goals Addressed                 This Visit's Progress       Patient Stated      1. Medical (pt-stated)   On track     Goal Statement: I will work to feel less fatigued and weak by taking my medications as prescribed, getting routine lab-work, and continuing to working with my care teams within the next 2 months.  Measure of Success: The patient will report improved strength and energy and will demonstrate stable labs.  Supportive Steps to Achieve: The patient will continue attending health appointments as recommended, will take her medications as prescribed, and will report any new signs/symptoms to clinic.  CCC RN will continue  routine patient outreaches to provide ongoing support and additional resources as needed.  Barriers: History of GI bleed.  Strengths: Patient is motivated to feel better and states her health as her top priority.  Date to Achieve By: 5/31/19  Patient expressed understanding of goal: Yes        2. Psychosocial (pt-stated)   Not on track     Goal Statement: I will inquire about additional in-home services by applying for Medical Assistance within the next 1 month.  Measure of Success: The patient will confirm contact with the [facility] and [place].  Supportive Steps to Achieve: CCC RN provided the patient with the contact information for [facility].  The patient will contact [facility] to request [place] with goal of obtaining additional in-home assistance.  Barriers: Limited knowledge of available resources.  Strengths: The patient is motivated to obtain additional in-home assistance.  Date to Achieve By: 5/31/19  Patient expressed understanding of goal: Yes          Patient/Caregiver understanding: Yes  Outreach Frequency: 2 weeks  Future Appointments              In 4 days Ilda Sue PA-C [facility]        Plan: CCC RN will forward patient's request for lab and xray results to PCP.  The patient will contact the [facility] to inquire about an [place] for additional resources, if she is eligible.  The patient will continue attending her appointments as scheduled and will contact CCC RN with additional questions or concerns.  CCC RN will outreach to patient in approximately 3 weeks to get updates on patient status, assess goal progress, and offer additional support and resources as indicated.    Damien Person, RN  Clinic Care Coordinator  [facility]  Ph: [phone]

## 2019-05-06 PROBLEM — K92.2 GI BLEED: Status: ACTIVE | Noted: 2019-05-06

## 2019-05-07 ENCOUNTER — OFFICE VISIT (OUTPATIENT)
Dept: CARDIOLOGY | Facility: CLINIC | Age: 64
End: 2019-05-07
Attending: INTERNAL MEDICINE
Payer: COMMERCIAL

## 2019-05-07 VITALS
HEART RATE: 78 BPM | BODY MASS INDEX: 46.78 KG/M2 | SYSTOLIC BLOOD PRESSURE: 138 MMHG | WEIGHT: 274 LBS | HEIGHT: 64 IN | DIASTOLIC BLOOD PRESSURE: 68 MMHG

## 2019-05-07 DIAGNOSIS — I48.91 ATRIAL FIBRILLATION AND FLUTTER (H): Primary | ICD-10-CM

## 2019-05-07 DIAGNOSIS — E78.5 HYPERLIPIDEMIA LDL GOAL <130: ICD-10-CM

## 2019-05-07 DIAGNOSIS — I10 BENIGN ESSENTIAL HYPERTENSION: ICD-10-CM

## 2019-05-07 DIAGNOSIS — I48.92 ATRIAL FIBRILLATION AND FLUTTER (H): Primary | ICD-10-CM

## 2019-05-07 PROCEDURE — 99214 OFFICE O/P EST MOD 30 MIN: CPT | Performed by: PHYSICIAN ASSISTANT

## 2019-05-07 PROCEDURE — 93000 ELECTROCARDIOGRAM COMPLETE: CPT | Performed by: PHYSICIAN ASSISTANT

## 2019-05-07 RX ORDER — CARVEDILOL 12.5 MG/1
12.5 TABLET ORAL 2 TIMES DAILY WITH MEALS
Qty: 60 TABLET | Refills: 5 | Status: SHIPPED | OUTPATIENT
Start: 2019-05-07 | End: 2019-10-09

## 2019-05-07 RX ORDER — TRIAMTERENE AND HYDROCHLOROTHIAZIDE 75; 50 MG/1; MG/1
0.5 TABLET ORAL DAILY
COMMUNITY
Start: 2019-05-07 | End: 2020-02-10 | Stop reason: ALTCHOICE

## 2019-05-07 ASSESSMENT — MIFFLIN-ST. JEOR: SCORE: 1782.86

## 2019-05-07 NOTE — PROGRESS NOTES
HPI:   I had the pleasure of seeing Roxanna when she came for follow up of paroxysmal atrial fibrillation.  She is a 63 year old who sees Dr. Vaughn for her history of:    1.  Paroxysmal atrial fibrillation first diagnosed 9/2015.  She is undergone DC cardioversion, most recently 2/2019.  She has been on flecainide  2.  GI bleeds, with hospitalization 3/2019 with a hemoglobin down to 7.2 g/dL.  EGD revealed duodenal ulcer which was cauterized.  Xarelto was held, but then later restarted.  She was rehospitalized 4/2019 with a lower GI bleed.  Hemoglobin got down to 8.7 g/dL.  Xarelto was discontinued, and Dr. Vaughn recommended not restarting this for at least 3 to 6 months.  Capsule endoscopy has been planned    3.  Benign essential hypertension      Roxanna saw Dr. Vaughn 4/30.  At that time, they reviewed her most recent hospitalizations for upper and lower GI bleed, with plans for a capsule endoscopy.  Due to hypertension, he switched her atenolol 50 mg daily to carvedilol 6.25 mg twice daily.  She is now back for follow-up.    Overall, think she is doing pretty well.  She does not have a blood pressure cuff at home but does not think she is had any problems with the new carvedilol.  She denies any trouble with chest pain, pressure or tightness.  Denies palpitations, lightheadedness or dizziness.  She continues to complain of edema, but denies orthopnea or PND.    It turns out that she was taken off of triamterene/HCTZ for soft blood pressures back in 3/2019 during her hospitalization.  It does not appear that Dr. Jama restarted this, but on her own she decided to restart half tablet daily for her swelling, which is helped immensely.  She has had a BMP since, and electrolytes and renal function were stable.     EKG today, which I overread, showed sinus rhythm at 76 bpm.  QRS duration was 100 ms on flecainide 75 mg twice daily    Echocardiogram 7/2017 showed an EF of 55 to 60% with borderline left ventricular  hypertrophy.  Left atrium was mildly dilated at 4.6 cm with a volume index of 24.2 mL/m .  She was noted to have a dilated IVC with elevated right atrial pressure at 15-20.  No significant valvular abnormalities were noted.  Nuclear stress test 3/2016 was negative for ischemia.    Assessment & Plan:    1.  Benign essential hypertension    Blood pressure is improved but a bit too high    Updated her medication list that she has been taking half of her triamterene HCTZ    PLAN:    Increase carvedilol to 12.5 mg twice daily    See me or Dr. Jama in 1 month    2.  Paroxysmal atrial fibrillation    No recurrence    Continues flecainide, and EKG is as above.  QRS duration is acceptable on flecainide 75 mg twice daily    Remains off Xarelto given upper and lower GI bleed    PLAN:    Continue current medications.    Avoid anticoagulation for at least 3 to 6 months    3.  GI bleed    Upcoming capsule endoscopy    PLAN:    Continue to avoid anticoagulation    Monika Sue PA-C, MSPAS      Orders Placed This Encounter   Procedures     Follow-Up with Cardiac Advanced Practice Provider     EKG 12-lead complete w/read - Clinics (performed today)     Orders Placed This Encounter   Medications     carvedilol (COREG) 12.5 MG tablet     Sig: Take 1 tablet (12.5 mg) by mouth 2 times daily (with meals)     Dispense:  60 tablet     Refill:  5     triamterene-HCTZ (MAXZIDE) 75-50 MG tablet     Sig: Take 0.5 tablets by mouth daily     Medications Discontinued During This Encounter   Medication Reason     carvedilol (COREG) 6.25 MG tablet          Encounter Diagnoses   Name Primary?     Benign essential hypertension      Atrial fibrillation and flutter (H) Yes     Hyperlipidemia LDL goal <130        CURRENT MEDICATIONS:  Current Outpatient Medications   Medication Sig Dispense Refill     carvedilol (COREG) 12.5 MG tablet Take 1 tablet (12.5 mg) by mouth 2 times daily (with meals) 60 tablet 5     ferrous fumarate 65 mg, [place]. FE,-Vitamin C  125 mg (VITRON-C)  MG TABS tablet Take 1 tablet by mouth daily 30 tablet 1     flecainide acetate 150 MG TABS Take 1/2 tab (75 mg) twice daily by mouth 90 tablet 3     gabapentin (NEURONTIN) 300 MG capsule Take 600 mg by mouth At Bedtime 2 capsules       levothyroxine (SYNTHROID/LEVOTHROID) 75 MCG tablet 1 tab daily in addition to Levothyroxine 88mcg daily so total daily dose = 163mcg daily 90 tablet 3     levothyroxine (SYNTHROID/LEVOTHROID) 88 MCG tablet 1 tab daily in addition to Levothyroxine 75mcg daily so total daily dose = 163mcg daily 90 tablet 3     losartan (COZAAR) 100 MG tablet Take 1 tablet (100 mg) by mouth daily 90 tablet 3     pantoprazole (PROTONIX) 40 MG EC tablet TAKE 1 TABLET BY MOUTH TWICE A DAY BEFORE MEALS 60 tablet 6     SUMAtriptan (IMITREX) 100 MG tablet Take 1 tablet (100 mg) by mouth at onset of headache for migraine May repeat in 2 hours if needed: max 2/day; 10 tablet 11     triamterene-HCTZ (MAXZIDE) 75-50 MG tablet Take 0.5 tablets by mouth daily       order for DME DREAMSTATION  5-15 CM/H20  NASAL AIRFIT N10 HER         ALLERGIES     Allergies   Allergen Reactions     Morphine Anaphylaxis     Ceclor [Cefaclor] Hives     Diltiazem Hives     Lisinopril Cough     Sertraline Nausea and Vomiting       PAST MEDICAL HISTORY:  Past Medical History:   Diagnosis Date     Atrial fibrillation and flutter (H) 1/5/15     Depression       Duodenal ulcer 03/04/2019     Essential hypertension       Gastroesophageal reflux disease without esophagitis 11/11/2015     GERD (gastroesophageal reflux disease)      Hiatal hernia      Hyperlipidemia LDL goal <130 3/26/2015     Hypothyroidism      Migraine without aura and without status migrainosus, not intractable  Aug 2016     Morbid obesity, unspecified obesity type (H) 12/9/2015     Obesity      NELY on CPAP      Osteoarthritis      RLS (restless legs syndrome)      Symptomatic menopausal or female climacteric states (aka FLASHES)      TMJ disease         PAST SURGICAL HISTORY:  Past Surgical History:   Procedure Laterality Date     APPENDECTOMY       CHOLECYSTECTOMY       COLONOSCOPY N/A 2019    Procedure: COLONOSCOPY;  Surgeon: Vahid Cardona MD;  Location:  GI     ESOPHAGOSCOPY, GASTROSCOPY, DUODENOSCOPY (EGD), COMBINED N/A 2019    Procedure: COMBINED ESOPHAGOSCOPY, GASTROSCOPY, DUODENOSCOPY (EGD);  Surgeon: Ben Ko MD;  Location:  GI     HYSTERECTOMY, DOM       TONSILLECTOMY         FAMILY HISTORY:  Family History   Problem Relation Age of Onset     Myocardial Infarction Mother      Heart Failure Mother      Heart Surgery Father         bypass surgery     Cerebrovascular Disease Father      Heart Surgery Brother      Hyperlipidemia Brother      Hyperlipidemia Sister      Hyperlipidemia Brother      Sleep Apnea Sister        SOCIAL HISTORY:  Social History     Socioeconomic History     Marital status:      Spouse name: None     Number of children: None     Years of education: None     Highest education level: None   Occupational History     None   Social Needs     Financial resource strain: None     Food insecurity:     Worry: None     Inability: None     Transportation needs:     Medical: None     Non-medical: None   Tobacco Use     Smoking status: Former Smoker     Packs/day: 0.25     Types: Cigarettes     Start date:      Last attempt to quit: 1972     Years since quittin.3     Smokeless tobacco: Never Used     Tobacco comment: very minimal smoking history   Substance and Sexual Activity     Alcohol use: No     Alcohol/week: 0.0 oz     Drug use: No     Sexual activity: Never   Lifestyle     Physical activity:     Days per week: None     Minutes per session: None     Stress: None   Relationships     Social connections:     Talks on phone: None     Gets together: None     Attends [employer] service: None     Active member of club or organization: None     Attends meetings of clubs or organizations: None      "Relationship status: None     Intimate partner violence:     Fear of current or ex partner: None     Emotionally abused: None     Physically abused: None     Forced sexual activity: None   Other Topics Concern     Parent/sibling w/ CABG, MI or angioplasty before 65F 55M? Not Asked      Service Not Asked     Blood Transfusions Not Asked     Caffeine Concern No     Occupational Exposure Not Asked     Hobby Hazards Not Asked     Sleep Concern Yes     Stress Concern Not Asked     Weight Concern No     Special Diet No     Back Care Not Asked     Exercise No     Bike Helmet Not Asked     Seat Belt Yes     Self-Exams Not Asked   Social History Narrative     None       Review of Systems:  Skin:  Positive for lumps or bumps   Eyes:  Positive for glasses  ENT:  Negative    Respiratory:  Positive for dyspnea on exertion;sleep apnea;CPAP  Cardiovascular:  Negative for;syncope or near-syncope;cyanosis;lightheadedness;palpitations;chest pain Positive for;edema  Gastroenterology: Negative for constipation;melena;hematochezia  Genitourinary:  Negative    Musculoskeletal:  Positive for joint pain  Neurologic:  Positive for migraine headaches  Psychiatric:  Positive for sleep disturbances;depression  Heme/Lymph/Imm:  Positive for allergies  Endocrine:  Positive for thyroid disorder    Physical Exam:  Vitals: /68   Pulse 78   Ht 1.626 m (5' 4\")   Wt 124.3 kg (274 lb)   BMI 47.03 kg/m      Constitutional:  cooperative, alert and oriented, well developed, well nourished, in no acute distress        Skin:  warm and dry to the touch, no apparent skin lesions or masses noted        Head:  normocephalic, no masses or lesions        Eyes:  pupils equal and round;conjunctivae and lids unremarkable;sclera white        ENT:  no pallor or cyanosis, dentition good        Neck:  JVP normal        Chest:  normal respiratory excursion        Cardiac: regular rhythm                  Abdomen:  abdomen soft obese      Vascular: pulses " full and equal                                      Extremities and Back:  no deformities, clubbing, cyanosis, erythema observed        Neurological:  no gross motor deficits          Recent Lab Results:  LIPID RESULTS:  Lab Results   Component Value Date    CHOL 172 03/13/2019    HDL 45 (L) 03/13/2019    LDL 85 03/13/2019    TRIG 212 (H) 03/13/2019    CHOLHDLRATIO 4.7 09/24/2015       LIVER ENZYME RESULTS:  Lab Results   Component Value Date    AST 14 04/10/2019    ALT 19 04/10/2019       CBC RESULTS:  Lab Results   Component Value Date    WBC 8.5 04/12/2019    RBC 3.34 (L) 04/12/2019    HGB 10.7 (L) 04/18/2019    HCT 28.3 (L) 04/12/2019    MCV 85 04/12/2019    MCH 26.0 (L) 04/12/2019    MCHC 30.7 (L) 04/12/2019    RDW 16.2 (H) 04/12/2019     04/12/2019       BMP RESULTS:  Lab Results   Component Value Date     04/12/2019    POTASSIUM 3.5 04/12/2019    CHLORIDE 108 04/12/2019    CO2 27 04/12/2019    ANIONGAP 7 04/12/2019     (H) 04/12/2019    BUN 9 04/12/2019    CR 0.73 04/12/2019    GFRESTIMATED 87 04/12/2019    GFRESTBLACK >90 04/12/2019    J CARLOS 8.5 04/12/2019        A1C RESULTS:  Lab Results   Component Value Date    A1C 5.9 (H) 03/13/2019       INR RESULTS:  Lab Results   Component Value Date    INR 1.78 (H) 03/02/2019

## 2019-05-07 NOTE — PATIENT INSTRUCTIONS
1. BP looked much better since switching the atenolol to carvedilol 6.25 mg twice a day.  We're still wanting the BP a bit lower    2. INCREASE carvedilol to 12.5 mg twice daily - new Rx sent in but OK to take 2 of your current 6.25 mg tabs    3. See me back in 1 month for BP check (or Dr. Jama) but CALL if issues prior! [phone]

## 2019-05-07 NOTE — LETTER
5/7/2019    Yuval Jama MD  [address] [facility] [address]    RE: Maxine Sears       Dear Colleague,    I had the pleasure of seeing Maxine Rothmandmore in the [facility].    HPI:   I had the pleasure of seeing Roxanna when she came for follow up of paroxysmal atrial fibrillation.  She is a 63 year old who sees Dr. Vaughn for her history of:    1.  Paroxysmal atrial fibrillation first diagnosed 9/2015.  She is undergone DC cardioversion, most recently 2/2019.  She has been on flecainide  2.  GI bleeds, with hospitalization 3/2019 with a hemoglobin down to 7.2 g/dL.  EGD revealed duodenal ulcer which was cauterized.  Xarelto was held, but then later restarted.  She was rehospitalized 4/2019 with a lower GI bleed.  Hemoglobin got down to 8.7 g/dL.  Xarelto was discontinued, and Dr. Vaughn recommended not restarting this for at least 3 to 6 months.  Capsule endoscopy has been planned    3.  Benign essential hypertension      Roxanna saw Dr. Vaughn 4/30.  At that time, they reviewed her most recent hospitalizations for upper and lower GI bleed, with plans for a capsule endoscopy.  Due to hypertension, he switched her atenolol 50 mg daily to carvedilol 6.25 mg twice daily.  She is now back for follow-up.    Overall, think she is doing pretty well.  She does not have a blood pressure cuff at home but does not think she is had any problems with the new carvedilol.  She denies any trouble with chest pain, pressure or tightness.  Denies palpitations, lightheadedness or dizziness.  She continues to complain of edema, but denies orthopnea or PND.    It turns out that she was taken off of triamterene/HCTZ for soft blood pressures back in 3/2019 during her hospitalization.  It does not appear that Dr. Jama restarted this, but on her own she decided to restart half tablet daily for her swelling, which is helped immensely.  She has had a BMP since, and electrolytes and renal function were  stable.     EKG today, which I overread, showed sinus rhythm at 76 bpm.  QRS duration was 100 ms on flecainide 75 mg twice daily    Echocardiogram 7/2017 showed an EF of 55 to 60% with borderline left ventricular hypertrophy.  Left atrium was mildly dilated at 4.6 cm with a volume index of 24.2 mL/m .  She was noted to have a dilated IVC with elevated right atrial pressure at 15-20.  No significant valvular abnormalities were noted.  Nuclear stress test 3/2016 was negative for ischemia.    Assessment & Plan:    1.  Benign essential hypertension    Blood pressure is improved but a bit too high    Updated her medication list that she has been taking half of her triamterene HCTZ    PLAN:    Increase carvedilol to 12.5 mg twice daily    See me or Dr. Jama in 1 month    2.  Paroxysmal atrial fibrillation    No recurrence    Continues flecainide, and EKG is as above.  QRS duration is acceptable on flecainide 75 mg twice daily    Remains off Xarelto given upper and lower GI bleed    PLAN:    Continue current medications.    Avoid anticoagulation for at least 3 to 6 months    3.  GI bleed    Upcoming capsule endoscopy    PLAN:    Continue to avoid anticoagulation    Monika Sue PA-C, MSPAS      Orders Placed This Encounter   Procedures     Follow-Up with Cardiac Advanced Practice Provider     EKG 12-lead complete w/read - Clinics (performed today)     Orders Placed This Encounter   Medications     carvedilol (COREG) 12.5 MG tablet     Sig: Take 1 tablet (12.5 mg) by mouth 2 times daily (with meals)     Dispense:  60 tablet     Refill:  5     triamterene-HCTZ (MAXZIDE) 75-50 MG tablet     Sig: Take 0.5 tablets by mouth daily     Medications Discontinued During This Encounter   Medication Reason     carvedilol (COREG) 6.25 MG tablet          Encounter Diagnoses   Name Primary?     Benign essential hypertension      Atrial fibrillation and flutter (H) Yes     Hyperlipidemia LDL goal <130        CURRENT MEDICATIONS:  Current  Outpatient Medications   Medication Sig Dispense Refill     carvedilol (COREG) 12.5 MG tablet Take 1 tablet (12.5 mg) by mouth 2 times daily (with meals) 60 tablet 5     ferrous fumarate 65 mg, [place]. FE,-Vitamin C 125 mg (VITRON-C)  MG TABS tablet Take 1 tablet by mouth daily 30 tablet 1     flecainide acetate 150 MG TABS Take 1/2 tab (75 mg) twice daily by mouth 90 tablet 3     gabapentin (NEURONTIN) 300 MG capsule Take 600 mg by mouth At Bedtime 2 capsules       levothyroxine (SYNTHROID/LEVOTHROID) 75 MCG tablet 1 tab daily in addition to Levothyroxine 88mcg daily so total daily dose = 163mcg daily 90 tablet 3     levothyroxine (SYNTHROID/LEVOTHROID) 88 MCG tablet 1 tab daily in addition to Levothyroxine 75mcg daily so total daily dose = 163mcg daily 90 tablet 3     losartan (COZAAR) 100 MG tablet Take 1 tablet (100 mg) by mouth daily 90 tablet 3     pantoprazole (PROTONIX) 40 MG EC tablet TAKE 1 TABLET BY MOUTH TWICE A DAY BEFORE MEALS 60 tablet 6     SUMAtriptan (IMITREX) 100 MG tablet Take 1 tablet (100 mg) by mouth at onset of headache for migraine May repeat in 2 hours if needed: max 2/day; 10 tablet 11     triamterene-HCTZ (MAXZIDE) 75-50 MG tablet Take 0.5 tablets by mouth daily       order for DME DREAMSTATION  5-15 CM/H20  NASAL AIRFIT N10 HER         ALLERGIES     Allergies   Allergen Reactions     Morphine Anaphylaxis     Ceclor [Cefaclor] Hives     Diltiazem Hives     Lisinopril Cough     Sertraline Nausea and Vomiting       PAST MEDICAL HISTORY:  Past Medical History:   Diagnosis Date     Atrial fibrillation and flutter (H) 1/5/15     Depression       Duodenal ulcer 03/04/2019     Essential hypertension       Gastroesophageal reflux disease without esophagitis 11/11/2015     GERD (gastroesophageal reflux disease)      Hiatal hernia      Hyperlipidemia LDL goal <130 3/26/2015     Hypothyroidism      Migraine without aura and without status migrainosus, not intractable  Aug 2016     Morbid  obesity, unspecified obesity type (H) 2015     Obesity      NELY on CPAP      Osteoarthritis      RLS (restless legs syndrome)      Symptomatic menopausal or female climacteric states (aka FLASHES)      TMJ disease        PAST SURGICAL HISTORY:  Past Surgical History:   Procedure Laterality Date     APPENDECTOMY       CHOLECYSTECTOMY       COLONOSCOPY N/A 2019    Procedure: COLONOSCOPY;  Surgeon: Vahid Cardona MD;  Location:  GI     ESOPHAGOSCOPY, GASTROSCOPY, DUODENOSCOPY (EGD), COMBINED N/A 2019    Procedure: COMBINED ESOPHAGOSCOPY, GASTROSCOPY, DUODENOSCOPY (EGD);  Surgeon: Ben Ko MD;  Location:  GI     HYSTERECTOMY, DOM       TONSILLECTOMY         FAMILY HISTORY:  Family History   Problem Relation Age of Onset     Myocardial Infarction Mother      Heart Failure Mother      Heart Surgery Father         bypass surgery     Cerebrovascular Disease Father      Heart Surgery Brother      Hyperlipidemia Brother      Hyperlipidemia Sister      Hyperlipidemia Brother      Sleep Apnea Sister        SOCIAL HISTORY:  Social History     Socioeconomic History     Marital status:      Spouse name: None     Number of children: None     Years of education: None     Highest education level: None   Occupational History     None   Social Needs     Financial resource strain: None     Food insecurity:     Worry: None     Inability: None     Transportation needs:     Medical: None     Non-medical: None   Tobacco Use     Smoking status: Former Smoker     Packs/day: 0.25     Types: Cigarettes     Start date:      Last attempt to quit:      Years since quittin.3     Smokeless tobacco: Never Used     Tobacco comment: very minimal smoking history   Substance and Sexual Activity     Alcohol use: No     Alcohol/week: 0.0 oz     Drug use: No     Sexual activity: Never   Lifestyle     Physical activity:     Days per week: None     Minutes per session: None     Stress: None  "  Relationships     Social connections:     Talks on phone: None     Gets together: None     Attends [employer] service: None     Active member of club or organization: None     Attends meetings of clubs or organizations: None     Relationship status: None     Intimate partner violence:     Fear of current or ex partner: None     Emotionally abused: None     Physically abused: None     Forced sexual activity: None   Other Topics Concern     Parent/sibling w/ CABG, MI or angioplasty before 65F 55M? Not Asked      Service Not Asked     Blood Transfusions Not Asked     Caffeine Concern No     Occupational Exposure Not Asked     Hobby Hazards Not Asked     Sleep Concern Yes     Stress Concern Not Asked     Weight Concern No     Special Diet No     Back Care Not Asked     Exercise No     Bike Helmet Not Asked     Seat Belt Yes     Self-Exams Not Asked   Social History Narrative     None       Review of Systems:  Skin:  Positive for lumps or bumps   Eyes:  Positive for glasses  ENT:  Negative    Respiratory:  Positive for dyspnea on exertion;sleep apnea;CPAP  Cardiovascular:  Negative for;syncope or near-syncope;cyanosis;lightheadedness;palpitations;chest pain Positive for;edema  Gastroenterology: Negative for constipation;melena;hematochezia  Genitourinary:  Negative    Musculoskeletal:  Positive for joint pain  Neurologic:  Positive for migraine headaches  Psychiatric:  Positive for sleep disturbances;depression  Heme/Lymph/Imm:  Positive for allergies  Endocrine:  Positive for thyroid disorder    Physical Exam:  Vitals: /68   Pulse 78   Ht 1.626 m (5' 4\")   Wt 124.3 kg (274 lb)   BMI 47.03 kg/m       Constitutional:  cooperative, alert and oriented, well developed, well nourished, in no acute distress        Skin:  warm and dry to the touch, no apparent skin lesions or masses noted        Head:  normocephalic, no masses or lesions        Eyes:  pupils equal and round;conjunctivae and lids " unremarkable;sclera white        ENT:  no pallor or cyanosis, dentition good        Neck:  JVP normal        Chest:  normal respiratory excursion        Cardiac: regular rhythm                  Abdomen:  abdomen soft obese      Vascular: pulses full and equal                                      Extremities and Back:  no deformities, clubbing, cyanosis, erythema observed        Neurological:  no gross motor deficits          Recent Lab Results:  LIPID RESULTS:  Lab Results   Component Value Date    CHOL 172 03/13/2019    HDL 45 (L) 03/13/2019    LDL 85 03/13/2019    TRIG 212 (H) 03/13/2019    CHOLHDLRATIO 4.7 09/24/2015       LIVER ENZYME RESULTS:  Lab Results   Component Value Date    AST 14 04/10/2019    ALT 19 04/10/2019       CBC RESULTS:  Lab Results   Component Value Date    WBC 8.5 04/12/2019    RBC 3.34 (L) 04/12/2019    HGB 10.7 (L) 04/18/2019    HCT 28.3 (L) 04/12/2019    MCV 85 04/12/2019    MCH 26.0 (L) 04/12/2019    MCHC 30.7 (L) 04/12/2019    RDW 16.2 (H) 04/12/2019     04/12/2019       BMP RESULTS:  Lab Results   Component Value Date     04/12/2019    POTASSIUM 3.5 04/12/2019    CHLORIDE 108 04/12/2019    CO2 27 04/12/2019    ANIONGAP 7 04/12/2019     (H) 04/12/2019    BUN 9 04/12/2019    CR 0.73 04/12/2019    GFRESTIMATED 87 04/12/2019    GFRESTBLACK >90 04/12/2019    J CARLOS 8.5 04/12/2019        A1C RESULTS:  Lab Results   Component Value Date    A1C 5.9 (H) 03/13/2019       INR RESULTS:  Lab Results   Component Value Date    INR 1.78 (H) 03/02/2019                     Thank you for allowing me to participate in the care of your patient.      Sincerely,     Ilda Sue PA-C     [facility]    cc:   Constantin Vaughn MD  [address]  [place] [id number]

## 2019-05-07 NOTE — LETTER
5/7/2019    Yuval Jama MD  [address] [facility] [address]    RE: Maxine Sears       Dear Colleague,    I had the pleasure of seeing Maxine Rothmandmore in the [facility].    HPI:   I had the pleasure of seeing oRxanna when she came for follow up of paroxysmal atrial fibrillation.  She is a 63 year old who sees Dr. Vaughn for her history of:    1.  Paroxysmal atrial fibrillation first diagnosed 9/2015.  She is undergone DC cardioversion, most recently 2/2019.  She has been on flecainide  2.  GI bleeds, with hospitalization 3/2019 with a hemoglobin down to 7.2 g/dL.  EGD revealed duodenal ulcer which was cauterized.  Xarelto was held, but then later restarted.  She was rehospitalized 4/2019 with a lower GI bleed.  Hemoglobin got down to 8.7 g/dL.  Xarelto was discontinued, and Dr. Vaughn recommended not restarting this for at least 3 to 6 months.  Capsule endoscopy has been planned    3.  Benign essential hypertension      Roxanna saw Dr. Vaughn 4/30.  At that time, they reviewed her most recent hospitalizations for upper and lower GI bleed, with plans for a capsule endoscopy.  Due to hypertension, he switched her atenolol 50 mg daily to carvedilol 6.25 mg twice daily.  She is now back for follow-up.    Overall, think she is doing pretty well.  She does not have a blood pressure cuff at home but does not think she is had any problems with the new carvedilol.  She denies any trouble with chest pain, pressure or tightness.  Denies palpitations, lightheadedness or dizziness.  She continues to complain of edema, but denies orthopnea or PND.    It turns out that she was taken off of triamterene/HCTZ for soft blood pressures back in 3/2019 during her hospitalization.  It does not appear that Dr. Jama restarted this, but on her own she decided to restart half tablet daily for her swelling, which is helped immensely.  She has had a BMP since, and electrolytes and renal function were  stable.     EKG today, which I overread, showed sinus rhythm at 76 bpm.  QRS duration was 100 ms on flecainide 75 mg twice daily    Echocardiogram 7/2017 showed an EF of 55 to 60% with borderline left ventricular hypertrophy.  Left atrium was mildly dilated at 4.6 cm with a volume index of 24.2 mL/m .  She was noted to have a dilated IVC with elevated right atrial pressure at 15-20.  No significant valvular abnormalities were noted.  Nuclear stress test 3/2016 was negative for ischemia.    Assessment & Plan:    1.  Benign essential hypertension    Blood pressure is improved but a bit too high    Updated her medication list that she has been taking half of her triamterene HCTZ    PLAN:    Increase carvedilol to 12.5 mg twice daily    See me or Dr. Jama in 1 month    2.  Paroxysmal atrial fibrillation    No recurrence    Continues flecainide, and EKG is as above.  QRS duration is acceptable on flecainide 75 mg twice daily    Remains off Xarelto given upper and lower GI bleed    PLAN:    Continue current medications.    Avoid anticoagulation for at least 3 to 6 months    3.  GI bleed    Upcoming capsule endoscopy    PLAN:    Continue to avoid anticoagulation    Monika Sue PA-C, MSPAS      Orders Placed This Encounter   Procedures     Follow-Up with Cardiac Advanced Practice Provider     EKG 12-lead complete w/read - Clinics (performed today)     Orders Placed This Encounter   Medications     carvedilol (COREG) 12.5 MG tablet     Sig: Take 1 tablet (12.5 mg) by mouth 2 times daily (with meals)     Dispense:  60 tablet     Refill:  5     triamterene-HCTZ (MAXZIDE) 75-50 MG tablet     Sig: Take 0.5 tablets by mouth daily     Medications Discontinued During This Encounter   Medication Reason     carvedilol (COREG) 6.25 MG tablet          Encounter Diagnoses   Name Primary?     Benign essential hypertension      Atrial fibrillation and flutter (H) Yes     Hyperlipidemia LDL goal <130        CURRENT MEDICATIONS:  Current  Outpatient Medications   Medication Sig Dispense Refill     carvedilol (COREG) 12.5 MG tablet Take 1 tablet (12.5 mg) by mouth 2 times daily (with meals) 60 tablet 5     ferrous fumarate 65 mg, [place]. FE,-Vitamin C 125 mg (VITRON-C)  MG TABS tablet Take 1 tablet by mouth daily 30 tablet 1     flecainide acetate 150 MG TABS Take 1/2 tab (75 mg) twice daily by mouth 90 tablet 3     gabapentin (NEURONTIN) 300 MG capsule Take 600 mg by mouth At Bedtime 2 capsules       levothyroxine (SYNTHROID/LEVOTHROID) 75 MCG tablet 1 tab daily in addition to Levothyroxine 88mcg daily so total daily dose = 163mcg daily 90 tablet 3     levothyroxine (SYNTHROID/LEVOTHROID) 88 MCG tablet 1 tab daily in addition to Levothyroxine 75mcg daily so total daily dose = 163mcg daily 90 tablet 3     losartan (COZAAR) 100 MG tablet Take 1 tablet (100 mg) by mouth daily 90 tablet 3     pantoprazole (PROTONIX) 40 MG EC tablet TAKE 1 TABLET BY MOUTH TWICE A DAY BEFORE MEALS 60 tablet 6     SUMAtriptan (IMITREX) 100 MG tablet Take 1 tablet (100 mg) by mouth at onset of headache for migraine May repeat in 2 hours if needed: max 2/day; 10 tablet 11     triamterene-HCTZ (MAXZIDE) 75-50 MG tablet Take 0.5 tablets by mouth daily       order for DME DREAMSTATION  5-15 CM/H20  NASAL AIRFIT N10 HER         ALLERGIES     Allergies   Allergen Reactions     Morphine Anaphylaxis     Ceclor [Cefaclor] Hives     Diltiazem Hives     Lisinopril Cough     Sertraline Nausea and Vomiting       PAST MEDICAL HISTORY:  Past Medical History:   Diagnosis Date     Atrial fibrillation and flutter (H) 1/5/15     Depression       Duodenal ulcer 03/04/2019     Essential hypertension       Gastroesophageal reflux disease without esophagitis 11/11/2015     GERD (gastroesophageal reflux disease)      Hiatal hernia      Hyperlipidemia LDL goal <130 3/26/2015     Hypothyroidism      Migraine without aura and without status migrainosus, not intractable  Aug 2016     Morbid  obesity, unspecified obesity type (H) 2015     Obesity      NELY on CPAP      Osteoarthritis      RLS (restless legs syndrome)      Symptomatic menopausal or female climacteric states (aka FLASHES)      TMJ disease        PAST SURGICAL HISTORY:  Past Surgical History:   Procedure Laterality Date     APPENDECTOMY       CHOLECYSTECTOMY       COLONOSCOPY N/A 2019    Procedure: COLONOSCOPY;  Surgeon: Vaihd Cardona MD;  Location:  GI     ESOPHAGOSCOPY, GASTROSCOPY, DUODENOSCOPY (EGD), COMBINED N/A 2019    Procedure: COMBINED ESOPHAGOSCOPY, GASTROSCOPY, DUODENOSCOPY (EGD);  Surgeon: Ben Ko MD;  Location:  GI     HYSTERECTOMY, DOM       TONSILLECTOMY         FAMILY HISTORY:  Family History   Problem Relation Age of Onset     Myocardial Infarction Mother      Heart Failure Mother      Heart Surgery Father         bypass surgery     Cerebrovascular Disease Father      Heart Surgery Brother      Hyperlipidemia Brother      Hyperlipidemia Sister      Hyperlipidemia Brother      Sleep Apnea Sister        SOCIAL HISTORY:  Social History     Socioeconomic History     Marital status:      Spouse name: None     Number of children: None     Years of education: None     Highest education level: None   Occupational History     None   Social Needs     Financial resource strain: None     Food insecurity:     Worry: None     Inability: None     Transportation needs:     Medical: None     Non-medical: None   Tobacco Use     Smoking status: Former Smoker     Packs/day: 0.25     Types: Cigarettes     Start date:      Last attempt to quit:      Years since quittin.3     Smokeless tobacco: Never Used     Tobacco comment: very minimal smoking history   Substance and Sexual Activity     Alcohol use: No     Alcohol/week: 0.0 oz     Drug use: No     Sexual activity: Never   Lifestyle     Physical activity:     Days per week: None     Minutes per session: None     Stress: None  "  Relationships     Social connections:     Talks on phone: None     Gets together: None     Attends Christianity service: None     Active member of club or organization: None     Attends meetings of clubs or organizations: None     Relationship status: None     Intimate partner violence:     Fear of current or ex partner: None     Emotionally abused: None     Physically abused: None     Forced sexual activity: None   Other Topics Concern     Parent/sibling w/ CABG, MI or angioplasty before 65F 55M? Not Asked      Service Not Asked     Blood Transfusions Not Asked     Caffeine Concern No     Occupational Exposure Not Asked     Hobby Hazards Not Asked     Sleep Concern Yes     Stress Concern Not Asked     Weight Concern No     Special Diet No     Back Care Not Asked     Exercise No     Bike Helmet Not Asked     Seat Belt Yes     Self-Exams Not Asked   Social History Narrative     None       Review of Systems:  Skin:  Positive for lumps or bumps   Eyes:  Positive for glasses  ENT:  Negative    Respiratory:  Positive for dyspnea on exertion;sleep apnea;CPAP  Cardiovascular:  Negative for;syncope or near-syncope;cyanosis;lightheadedness;palpitations;chest pain Positive for;edema  Gastroenterology: Negative for constipation;melena;hematochezia  Genitourinary:  Negative    Musculoskeletal:  Positive for joint pain  Neurologic:  Positive for migraine headaches  Psychiatric:  Positive for sleep disturbances;depression  Heme/Lymph/Imm:  Positive for allergies  Endocrine:  Positive for thyroid disorder    Physical Exam:  Vitals: /68   Pulse 78   Ht 1.626 m (5' 4\")   Wt 124.3 kg (274 lb)   BMI 47.03 kg/m       Constitutional:  cooperative, alert and oriented, well developed, well nourished, in no acute distress        Skin:  warm and dry to the touch, no apparent skin lesions or masses noted        Head:  normocephalic, no masses or lesions        Eyes:  pupils equal and round;conjunctivae and lids " unremarkable;sclera white        ENT:  no pallor or cyanosis, dentition good        Neck:  JVP normal        Chest:  normal respiratory excursion        Cardiac: regular rhythm                  Abdomen:  abdomen soft obese      Vascular: pulses full and equal                                      Extremities and Back:  no deformities, clubbing, cyanosis, erythema observed        Neurological:  no gross motor deficits          Recent Lab Results:  LIPID RESULTS:  Lab Results   Component Value Date    CHOL 172 03/13/2019    HDL 45 (L) 03/13/2019    LDL 85 03/13/2019    TRIG 212 (H) 03/13/2019    CHOLHDLRATIO 4.7 09/24/2015       LIVER ENZYME RESULTS:  Lab Results   Component Value Date    AST 14 04/10/2019    ALT 19 04/10/2019       CBC RESULTS:  Lab Results   Component Value Date    WBC 8.5 04/12/2019    RBC 3.34 (L) 04/12/2019    HGB 10.7 (L) 04/18/2019    HCT 28.3 (L) 04/12/2019    MCV 85 04/12/2019    MCH 26.0 (L) 04/12/2019    MCHC 30.7 (L) 04/12/2019    RDW 16.2 (H) 04/12/2019     04/12/2019       BMP RESULTS:  Lab Results   Component Value Date     04/12/2019    POTASSIUM 3.5 04/12/2019    CHLORIDE 108 04/12/2019    CO2 27 04/12/2019    ANIONGAP 7 04/12/2019     (H) 04/12/2019    BUN 9 04/12/2019    CR 0.73 04/12/2019    GFRESTIMATED 87 04/12/2019    GFRESTBLACK >90 04/12/2019    J CARLOS 8.5 04/12/2019        A1C RESULTS:  Lab Results   Component Value Date    A1C 5.9 (H) 03/13/2019       INR RESULTS:  Lab Results   Component Value Date    INR 1.78 (H) 03/02/2019         Thank you for allowing me to participate in the care of your patient.    Sincerely,     Ilda Sue PA-C     [facility]

## 2019-05-20 ENCOUNTER — TRANSFERRED RECORDS (OUTPATIENT)
Dept: HEALTH INFORMATION MANAGEMENT | Facility: CLINIC | Age: 64
End: 2019-05-20

## 2019-05-23 ENCOUNTER — ANCILLARY PROCEDURE (OUTPATIENT)
Dept: GENERAL RADIOLOGY | Facility: CLINIC | Age: 64
End: 2019-05-23
Attending: INTERNAL MEDICINE
Payer: COMMERCIAL

## 2019-05-23 DIAGNOSIS — Z04.9 ENCOUNTER FOR EXAMINATION AND OBSERVATION FOR UNSPECIFIED REASON: ICD-10-CM

## 2019-05-23 PROCEDURE — 74019 RADEX ABDOMEN 2 VIEWS: CPT | Performed by: INTERNAL MEDICINE

## 2019-06-05 ENCOUNTER — PATIENT OUTREACH (OUTPATIENT)
Dept: CARE COORDINATION | Facility: CLINIC | Age: 64
End: 2019-06-05

## 2019-06-05 ASSESSMENT — ACTIVITIES OF DAILY LIVING (ADL): DEPENDENT_IADLS:: CLEANING;TRANSPORTATION

## 2019-06-05 NOTE — PROGRESS NOTES
Clinic Care Coordination Contact    Clinic Care Coordination Contact  OUTREACH    Referral Information:  Referral Source: IP Report  Primary Diagnosis: GI Disorders  Chief Complaint   Patient presents with     Clinic Care Coordination - Follow-up     goal check-in     Clinical Concerns:  CCC RN outreach to patient to get updates on status, assess goal progress, and provide support and resources as needed.    The patient updated that she's been feeling fairly well lately.  She does continue to have some fatigue, but feels this has been improving.  She stated she has been keeping very busy with multiple doctors appointments.    Unfortunately due to her busy schedule, the patient stated she still has not yet contacted the [facility] to inquire about her eligibility for additional resources; she stated she keeps forgetting.  She just purchased a new dry erase white board to help her keep track of things and will write this on her board to help her remember to call the [facility] as discussed.    The patient stated her son still talks about having her move to [place] where he is so he can support her and care for her as needed.  The patient says she might move, but it wouldn't be until next year.  In the meantime, the patient's son checks in on her frequently and continues providing support and encouragement.    Pain  Chronic back pain    Diet/Exercise/Sleep:  The patient states she has been making sure she is getting up several times per day to walk around and do things around the house.  She's found this to be getting a little easier each week.  Her son is trying to help her sign up for a web-site called [employer] in order to help her track her activity/exercise and encourage her to lose weight.     Psychosocial:  Son lives in [place]; he is very supportive of patient.     [facility]     Goals Addressed                 This Visit's Progress       Patient Stated      1. Medical (pt-stated)   On track      Goal Statement: I will work to feel less fatigued and weak by taking my medications as prescribed, getting routine lab-work, and continuing to working with my care teams within the next 2 months.  Measure of Success: The patient will report improved strength and energy and will demonstrate stable labs.  Supportive Steps to Achieve: The patient will continue attending health appointments as recommended, will take her medications as prescribed, and will report any new signs/symptoms to clinic.  CCC RN will continue routine patient outreaches to provide ongoing support and additional resources as needed.  Barriers: History of GI bleed.  Strengths: Patient is motivated to feel better and states her health as her top priority.  Date to Achieve By: 6/30/19  Patient expressed understanding of goal: Yes        2. Psychosocial (pt-stated)   Not on track     Goal Statement: I will inquire about additional in-home services by applying for Medical Assistance within the next 1 month.  Measure of Success: The patient will confirm contact with the Critical access hospital and [place].  Supportive Steps to Achieve: CCC RN provided the patient with the contact information for [facility].  The patient will contact [facility] to request [place] with goal of obtaining additional in-home assistance.  Barriers: Limited knowledge of available resources.  Strengths: The patient is motivated to obtain additional in-home assistance.  Date to Achieve By: 6/30/19  Patient expressed understanding of goal: Yes          Patient/Caregiver understanding: Yes  Outreach Frequency: monthly  Future Appointments              In 1 week Ilda Sue PA-C [facility]        Plan: The patient will continue following with her care teams as scheduled.  The patient will contact the Critical access hospital to inquire about her eligibility for additional resources and will call CCC RN  with additional questions or concerns.  The patient will continue working on being active daily as discussed.  CCC RN will outreach to patient in approximately 3 weeks to get updates on patient status, assess goal progress, and offer additional support and resources as indicated.    Damien Person RN  Clinic Care Coordinator  [facility]  Ph: [phone]

## 2019-06-24 DIAGNOSIS — I48.0 PAROXYSMAL ATRIAL FIBRILLATION (H): ICD-10-CM

## 2019-06-24 DIAGNOSIS — E03.9 HYPOTHYROIDISM, UNSPECIFIED TYPE: ICD-10-CM

## 2019-06-24 DIAGNOSIS — I10 ESSENTIAL HYPERTENSION: ICD-10-CM

## 2019-06-25 RX ORDER — FLECAINIDE ACETATE 150 MG/1
TABLET ORAL
Qty: 22 TABLET | Refills: 9 | Status: ON HOLD | OUTPATIENT
Start: 2019-06-25 | End: 2020-01-22

## 2019-06-25 RX ORDER — LEVOTHYROXINE SODIUM 88 UG/1
TABLET ORAL
Qty: 22 TABLET | Refills: 5 | Status: SHIPPED | OUTPATIENT
Start: 2019-06-25 | End: 2020-01-21

## 2019-06-25 RX ORDER — TRIAMTERENE AND HYDROCHLOROTHIAZIDE 75; 50 MG/1; MG/1
TABLET ORAL
Qty: 11 TABLET | Refills: 9 | Status: SHIPPED | OUTPATIENT
Start: 2019-06-25 | End: 2019-10-03

## 2019-06-25 RX ORDER — LOSARTAN POTASSIUM 100 MG/1
TABLET ORAL
Qty: 22 TABLET | Refills: 9 | Status: SHIPPED | OUTPATIENT
Start: 2019-06-25 | End: 2019-10-03

## 2019-06-25 RX ORDER — LEVOTHYROXINE SODIUM 75 UG/1
TABLET ORAL
Qty: 22 TABLET | Refills: 5 | Status: SHIPPED | OUTPATIENT
Start: 2019-06-25 | End: 2020-01-21

## 2019-06-25 NOTE — TELEPHONE ENCOUNTER
Levothyroxine,     Prescription approved per [facility] Refill Protocol.    Alice VERA RN, BSN, PHN

## 2019-06-25 NOTE — TELEPHONE ENCOUNTER
"Requested Prescriptions   Pending Prescriptions Disp Refills     levothyroxine (SYNTHROID/LEVOTHROID) 88 MCG tablet [Pharmacy Med Name: LEVOTHYROXIN 88MCG TAB] 22 tablet      Sig: TAKE 1 TABLET BY MOUTH DAILY IN ADDITION TO 75 MCG TOTAL DAILY DOSE 163 MCG   Last Written Prescription Date:  6/29/2018  Last Fill Quantity: 90,  # refills: 3   Last Office Visit: 4/18/2019   Future Office Visit:         Thyroid Protocol Passed - 6/24/2019  8:42 AM        Passed - Patient is 12 years or older        Passed - Recent (12 mo) or future (30 days) visit within the authorizing provider's specialty     Patient had office visit in the last 12 months or has a visit in the next 30 days with authorizing provider or within the authorizing provider's specialty.  See \"Patient Info\" tab in inbasket, or \"Choose Columns\" in Meds & Orders section of the refill encounter.              Passed - Medication is active on med list        Passed - Normal TSH on file in past 12 months     Recent Labs   Lab Test 02/03/19  2317   TSH 0.48              Passed - No active pregnancy on record     If patient is pregnant or has had a positive pregnancy test, please check TSH.          Passed - No positive pregnancy test in past 12 months     If patient is pregnant or has had a positive pregnancy test, please check TSH.          levothyroxine (SYNTHROID/LEVOTHROID) 75 MCG tablet [Pharmacy Med Name: LEVOTHYROXIN 75MCG TAB] 22 tablet      Sig: TAKE 1 TABLET BY MOUTH DAILY IN ADDITION TO 88 MCG TOTAL DAILY DOSE 163MCG   Last Written Prescription Date:  6/29/2018  Last Fill Quantity: 90,  # refills: 3   Last Office Visit: 4/18/2019   Future Office Visit:         Thyroid Protocol Passed - 6/24/2019  8:42 AM        Passed - Patient is 12 years or older        Passed - Recent (12 mo) or future (30 days) visit within the authorizing provider's specialty     Patient had office visit in the last 12 months or has a visit in the next 30 days with authorizing provider " "or within the authorizing provider's specialty.  See \"Patient Info\" tab in inbasket, or \"Choose Columns\" in Meds & Orders section of the refill encounter.              Passed - Medication is active on med list        Passed - Normal TSH on file in past 12 months     Recent Labs   Lab Test 02/03/19  2317   TSH 0.48              Passed - No active pregnancy on record     If patient is pregnant or has had a positive pregnancy test, please check TSH.          Passed - No positive pregnancy test in past 12 months     If patient is pregnant or has had a positive pregnancy test, please check TSH.          triamterene-HCTZ (MAXZIDE) 75-50 MG tablet [Pharmacy Med Name: TRIAMT/HCTZ 75-50MG TAB] 11 tablet      Sig: TAKE 1/2 TABLET BY MOUTH DAILY   Historical    Diuretics (Including Combos) Protocol Passed - 6/24/2019  8:42 AM        Passed - Blood pressure under 140/90 in past 12 months     BP Readings from Last 3 Encounters:   05/07/19 138/68   04/30/19 165/80   04/18/19 144/74                 Passed - Recent (12 mo) or future (30 days) visit within the authorizing provider's specialty     Patient had office visit in the last 12 months or has a visit in the next 30 days with authorizing provider or within the authorizing provider's specialty.  See \"Patient Info\" tab in inbasket, or \"Choose Columns\" in Meds & Orders section of the refill encounter.              Passed - Medication is active on med list        Passed - Patient is age 18 or older        Passed - No active pregancy on record        Passed - Normal serum creatinine on file in past 12 months     Recent Labs   Lab Test 04/12/19  0555   CR 0.73              Passed - Normal serum potassium on file in past 12 months     Recent Labs   Lab Test 04/12/19  0555   POTASSIUM 3.5                    Passed - Normal serum sodium on file in past 12 months     Recent Labs   Lab Test 04/12/19  0555                 Passed - No positive pregnancy test in past 12 months        " "losartan (COZAAR) 100 MG tablet [Pharmacy Med Name: LOSARTAN POT 100MG TAB] 22 tablet      Sig: TAKE 1 TABLET BY MOUTH DAILY   Last Written Prescription Date:  3/5/2019  Last Fill Quantity: 90,  # refills: 3   Last Office Visit: 4/18/2019   Future Office Visit:         Angiotensin-II Receptors Passed - 6/24/2019  8:42 AM        Passed - Blood pressure under 140/90 in past 12 months     BP Readings from Last 3 Encounters:   05/07/19 138/68   04/30/19 165/80   04/18/19 144/74                 Passed - Recent (12 mo) or future (30 days) visit within the authorizing provider's specialty     Patient had office visit in the last 12 months or has a visit in the next 30 days with authorizing provider or within the authorizing provider's specialty.  See \"Patient Info\" tab in inbasket, or \"Choose Columns\" in Meds & Orders section of the refill encounter.              Passed - Medication is active on med list        Passed - Patient is age 18 or older        Passed - No active pregnancy on record        Passed - Normal serum creatinine on file in past 12 months     Recent Labs   Lab Test 04/12/19  0555   CR 0.73             Passed - Normal serum potassium on file in past 12 months     Recent Labs   Lab Test 04/12/19  0555   POTASSIUM 3.5                    Passed - No positive pregnancy test in past 12 months        flecainide (TAMBOCOR) 150 MG tablet [Pharmacy Med Name: FLECAINIDE 150MG TAB] 22 tablet      Sig: TAKE 1/2 TABLET BY MOUTH TWICE A DAY   Last Written Prescription Date:  6/29/2018  Last Fill Quantity: 90,  # refills: 3   Last Office Visit: 4/18/2019   Future Office Visit:         Anti Arrhythmic Agents Protocol Failed - 6/24/2019  8:42 AM        Failed - Medication needs approval from authorizing provider     Please forward this request to the authorizing provider for approval.           Passed - Lipid Panel on file in past year     Recent Labs   Lab Test 03/13/19  0952  09/24/15  1039   CHOL 172   < > 215*   TRIG " "212*   < > 291*   HDL 45*   < > 46*   LDL 85   < > 111   NHDL 127   < >  --    VLDL  --   --  58*   CHOLHDLRATIO  --   --  4.7    < > = values in this interval not displayed.               Passed - CBC on file in past year     Recent Labs   Lab Test 04/18/19  1526 04/12/19  0555   WBC  --  8.5   RBC  --  3.34*   HGB 10.7* 8.7*   HCT  --  28.3*   PLT  --  433                 Passed - ALT on file in past year     Recent Labs   Lab Test 04/10/19  1622   ALT 19             Passed - Serum Creatinine on file in past year     Recent Labs   Lab Test 04/12/19  0555   CR 0.73             Passed - Serum Sodium on file in past year     Recent Labs   Lab Test 04/12/19  0555                 Passed - Serum Potassium on file in past year     Recent Labs   Lab Test 04/12/19  0555   POTASSIUM 3.5             Passed - Patient is 18 years of age or older        Passed - Patient is not pregnant        Passed - No positive pregnancy test on file in past year        Passed - Recent (6 mo) or future (30 days) visit with authorizing provider's specialty     Patient had office visit in the last 6 months or has a visit in the next 30 days with authorizing provider.  See \"Patient Info\" tab in inbasket, or \"Choose Columns\" in Meds & Orders section of the refill encounter.              "

## 2019-06-25 NOTE — TELEPHONE ENCOUNTER
Triamterene-hydrochlorothiazide, Flecainde Acetate, and Losartan    Prescription approved per [facility] Refill Protocol.          Benign essential hypertension    Blood pressure is improved but a bit too high    Updated her medication list that she has been taking half of her triamterene HCTZ     PLAN:    Increase carvedilol to 12.5 mg twice daily    See me or Dr. Jama in 1 month     2.  Paroxysmal atrial fibrillation    No recurrence    Continues flecainide, and EKG is as above.  QRS duration is acceptable on flecainide 75 mg twice daily    Remains off Xarelto given upper and lower GI bleed     PLAN:    Continue current medications.    Avoid anticoagulation for at least 3 to 6 months     Monika uSe PA-C, MSPAS

## 2019-06-26 DIAGNOSIS — M54.41 BILATERAL LOW BACK PAIN WITH BILATERAL SCIATICA, UNSPECIFIED CHRONICITY: ICD-10-CM

## 2019-06-26 DIAGNOSIS — M54.42 BILATERAL LOW BACK PAIN WITH BILATERAL SCIATICA, UNSPECIFIED CHRONICITY: ICD-10-CM

## 2019-06-26 RX ORDER — GABAPENTIN 300 MG/1
CAPSULE ORAL
Qty: 270 CAPSULE | Refills: 3 | Status: SHIPPED | OUTPATIENT
Start: 2019-06-26 | End: 2019-10-03

## 2019-06-26 NOTE — TELEPHONE ENCOUNTER
Requested Prescriptions   Pending Prescriptions Disp Refills     gabapentin (NEURONTIN) 300 MG capsule [Pharmacy Med Name: GABAPENTIN 300MG CAP] 66 capsule      Sig: TAKE 1 CAPSULE BY MOUTH EVERY MORNING AND TAKE 2 CAPSULES BY MOUTH EVERY NIGHT AT BEDTIME       There is no refill protocol information for this order        Last Written Prescription Date:  hist  Last Fill Quantity: hist,  # refills: hist   Last office visit: 4/18/2019 with prescribing provider:  4/18/19   Future Office Visit:

## 2019-09-11 PROBLEM — K92.2 GI BLEED: Status: RESOLVED | Noted: 2019-05-06 | Resolved: 2019-09-11

## 2019-10-02 NOTE — PROGRESS NOTES
HPI:   had the pleasure of seeing Roxanna when she came for follow up of paroxysmal atrial fibrillation.  She is a 64 year old who sees Dr. Vaughn for her history of:     1. Paroxysmal atrial fibrillation first diagnosed 9/2015.  She is undergone DC cardioversion, most recently 2/2019.  She has been on flecainide  2. GI bleeds, with hospitalization 3/2019 with a hemoglobin down to 7.2 g/dL.  EGD revealed duodenal ulcer which was cauterized.  Xarelto was held, but then later restarted.  She was rehospitalized 4/2019 with a lower GI bleed.  Hemoglobin got down to 8.7 g/dL.  Xarelto was discontinued, and Dr. Vaughn recommended not restarting this for at least 3-6 months. Capsule endoscopy 5/2019 showed normal mucosa but did not reach the colon (Dr. Ko)   3. Benign essential hypertension with recent med changes    I saw Roxanna 5/2019 at which time she was doing well from CV perspective. Her triamterene/HCTZ had been adjusted due to hypotension while in the hospital for UGI/LGI.  I increased her Coreg due to elevated BPs.     Interval History:  Overall feeling well. No recurrent atrial fibrillation    BPs at home 120-130s/70-80s (has it checked by RN in her apartment building and [facility]).    We discussed her GI bleeding Spring 2019. She'd been off of her PPI x 2 months b/c insurance company had refused to pay for medication.    No complaints of chest pain, pressure or tightness.  No orthopnea, PND or edema. No change in exercise tolerance or breathing. Overall, she is feeling well. Is eager to start losing weight.    Diagnostic Testing:  EKG today, which I overread, showed SR 73 with QRS duration 102 ms  EKG 5/7/2019 showed SR at 76 bpm.  QRS duration was 100 ms on flecainide 75 mg twice daily     Echocardiogram 7/2017 showed an EF of 55 to 60% with borderline left ventricular hypertrophy.  Left atrium was mildly dilated at 4.6 cm with a volume index of 24.2 mL/m .  She was noted to have a dilated IVC  with elevated right atrial pressure at 15-20.  No significant valvular abnormalities were noted.    Nuclear stress test 3/2016 was negative for ischemia.       Assessment & Plan:    1. Paroxysmal AFib     Last episode 2/2019 with DCCV. On flecainide and EKG as above    No AC since 3/2019 due to UGI, LGI despite CHADSVASc 2 (HTN, sex)    PLAN:    OK to exercise - start with light exercise and build up slowly    CBC at Dr. Jama's office Monday. Start Xarelto 20 mg daily (samples given)and recheck Hgb in 2 weeks again and then again in 1 month to ensure not dropping. We'll call with results and see how she's doing and send Xarelto Rx. She'd prefer this instead of trying something new    Stay on PPI    Continue all other medications      2. Benign Essential HTN    Currently on Coreg 12.5 mg BID, losaratgn 100 mg daily and Maxzide 37.5/25.      BP looks better    PLAN:    Continue current emdciatons    See me 6 months but call if issues sooner!       Monika Sue PA-C, MSPAS      Orders Placed This Encounter   Procedures     Hemoglobin     Hemoglobin     CBC with platelets     Follow-Up with Cardiac Advanced Practice Provider     EKG 12-lead complete w/read - Clinics (performed today)     EKG 12-lead complete w/read - Clinics (to be scheduled)     Orders Placed This Encounter   Medications     rivaroxaban ANTICOAGULANT (XARELTO ANTICOAGULANT) 20 MG TABS tablet     Sig: Take 1 tablet (20 mg) by mouth daily (with dinner)     Medications Discontinued During This Encounter   Medication Reason     gabapentin (NEURONTIN) 300 MG capsule Medication Reconciliation Clean Up     losartan (COZAAR) 100 MG tablet Medication Reconciliation Clean Up     triamterene-HCTZ (MAXZIDE) 75-50 MG tablet Medication Reconciliation Clean Up         Encounter Diagnoses   Name Primary?     Benign essential hypertension      Atrial fibrillation and flutter (H) Yes     Hyperlipidemia LDL goal <130      Gastrointestinal hemorrhage associated with  gastric ulcer        CURRENT MEDICATIONS:  Current Outpatient Medications   Medication Sig Dispense Refill     carvedilol (COREG) 12.5 MG tablet Take 1 tablet (12.5 mg) by mouth 2 times daily (with meals) 60 tablet 5     ferrous fumarate 65 mg, [place]. FE,-Vitamin C 125 mg (VITRON-C)  MG TABS tablet Take 1 tablet by mouth daily 30 tablet 1     flecainide (TAMBOCOR) 150 MG tablet TAKE 1/2 TABLET BY MOUTH TWICE A DAY 22 tablet 9     gabapentin (NEURONTIN) 300 MG capsule Take 900 mg by mouth At Bedtime 3 capsules       levothyroxine (SYNTHROID/LEVOTHROID) 75 MCG tablet TAKE 1 TABLET BY MOUTH DAILY IN ADDITION TO 88 MCG TOTAL DAILY DOSE 163MCG 22 tablet 5     levothyroxine (SYNTHROID/LEVOTHROID) 88 MCG tablet TAKE 1 TABLET BY MOUTH DAILY IN ADDITION TO 75 MCG TOTAL DAILY DOSE 163 MCG 22 tablet 5     losartan (COZAAR) 100 MG tablet Take 1 tablet (100 mg) by mouth daily 90 tablet 3     order for DME DREAMSTATION  5-15 CM/H20  NASAL AIRFIT N10 HER       pantoprazole (PROTONIX) 40 MG EC tablet TAKE 1 TABLET BY MOUTH TWICE A DAY BEFORE MEALS 60 tablet 6     rivaroxaban ANTICOAGULANT (XARELTO ANTICOAGULANT) 20 MG TABS tablet Take 1 tablet (20 mg) by mouth daily (with dinner)       SUMAtriptan (IMITREX) 100 MG tablet Take 1 tablet (100 mg) by mouth at onset of headache for migraine May repeat in 2 hours if needed: max 2/day; 10 tablet 11     triamterene-HCTZ (MAXZIDE) 75-50 MG tablet Take 0.5 tablets by mouth daily         ALLERGIES     Allergies   Allergen Reactions     Morphine Anaphylaxis     Ceclor [Cefaclor] Hives     Diltiazem Hives     Lisinopril Cough     Sertraline Nausea and Vomiting       PAST MEDICAL HISTORY:  Past Medical History:   Diagnosis Date     Atrial fibrillation and flutter (H) 1/5/15     Depression       Duodenal ulcer 03/04/2019     Essential hypertension       Gastroesophageal reflux disease without esophagitis 11/11/2015     GERD (gastroesophageal reflux disease)      Hiatal hernia       Hyperlipidemia LDL goal <130 3/26/2015     Hypothyroidism      Migraine without aura and without status migrainosus, not intractable  Aug 2016     Morbid obesity, unspecified obesity type (H) 2015     Obesity      NELY on CPAP      Osteoarthritis      RLS (restless legs syndrome)      Symptomatic menopausal or female climacteric states (aka FLASHES)      TMJ disease        PAST SURGICAL HISTORY:  Past Surgical History:   Procedure Laterality Date     APPENDECTOMY       CHOLECYSTECTOMY       COLONOSCOPY N/A 2019    Procedure: COLONOSCOPY;  Surgeon: Vahid Cardona MD;  Location:  GI     ESOPHAGOSCOPY, GASTROSCOPY, DUODENOSCOPY (EGD), COMBINED N/A 2019    Procedure: COMBINED ESOPHAGOSCOPY, GASTROSCOPY, DUODENOSCOPY (EGD);  Surgeon: Ben Ko MD;  Location:  GI     HYSTERECTOMY, DOM       TONSILLECTOMY         FAMILY HISTORY:  Family History   Problem Relation Age of Onset     Myocardial Infarction Mother      Heart Failure Mother      Heart Surgery Father         bypass surgery     Cerebrovascular Disease Father      Heart Surgery Brother      Hyperlipidemia Brother      Hyperlipidemia Sister      Hyperlipidemia Brother      Sleep Apnea Sister        SOCIAL HISTORY:  Social History     Socioeconomic History     Marital status:      Spouse name: None     Number of children: None     Years of education: None     Highest education level: None   Occupational History     None   Social Needs     Financial resource strain: None     Food insecurity:     Worry: None     Inability: None     Transportation needs:     Medical: None     Non-medical: None   Tobacco Use     Smoking status: Former Smoker     Packs/day: 0.25     Years: 1.00     Pack years: 0.25     Types: Cigarettes     Start date:      Last attempt to quit:      Years since quittin.7     Smokeless tobacco: Never Used     Tobacco comment: very minimal smoking history   Substance and Sexual Activity     Alcohol  "use: No     Alcohol/week: 0.0 standard drinks     Drug use: No     Sexual activity: Never   Lifestyle     Physical activity:     Days per week: None     Minutes per session: None     Stress: None   Relationships     Social connections:     Talks on phone: None     Gets together: None     Attends Gnosticism service: None     Active member of club or organization: None     Attends meetings of clubs or organizations: None     Relationship status: None     Intimate partner violence:     Fear of current or ex partner: None     Emotionally abused: None     Physically abused: None     Forced sexual activity: None   Other Topics Concern     Parent/sibling w/ CABG, MI or angioplasty before 65F 55M? Not Asked      Service Not Asked     Blood Transfusions Not Asked     Caffeine Concern No     Occupational Exposure Not Asked     Hobby Hazards Not Asked     Sleep Concern Yes     Stress Concern Not Asked     Weight Concern No     Special Diet No     Back Care Not Asked     Exercise No     Bike Helmet Not Asked     Seat Belt Yes     Self-Exams Not Asked   Social History Narrative     None       Review of Systems:  Skin:  Negative     Eyes:  Positive for glasses  ENT:  Negative    Respiratory:  Positive for sleep apnea;CPAP  Cardiovascular:  Negative for;palpitations;chest pain;edema Positive for  Gastroenterology: Negative for melena;hematochezia  Genitourinary:  Negative    Musculoskeletal:  Positive for arthritis  Neurologic:  Negative    Psychiatric:  Positive for depression  Heme/Lymph/Imm:  Positive for allergies  Endocrine:  Positive for thyroid disorder    Physical Exam:  Vitals: /82   Pulse 72   Ht 1.626 m (5' 4\")   Wt 120.9 kg (266 lb 9.6 oz)   BMI 45.76 kg/m      Constitutional:  cooperative, alert and oriented, well developed, well nourished, in no acute distress        Skin:  warm and dry to the touch, no apparent skin lesions or masses noted        Head:  normocephalic, no masses or lesions    "     Eyes:  pupils equal and round;conjunctivae and lids unremarkable;sclera white        ENT:  no pallor or cyanosis, dentition good        Neck:  JVP normal        Chest:  normal respiratory excursion        Cardiac: regular rhythm                  Abdomen:  abdomen soft obese      Vascular: pulses full and equal                                      Extremities and Back:  no deformities, clubbing, cyanosis, erythema observed;no edema        Neurological:  no gross motor deficits          Recent Lab Results:  LIPID RESULTS:  Lab Results   Component Value Date    CHOL 172 03/13/2019    HDL 45 (L) 03/13/2019    LDL 85 03/13/2019    TRIG 212 (H) 03/13/2019    CHOLHDLRATIO 4.7 09/24/2015       LIVER ENZYME RESULTS:  Lab Results   Component Value Date    AST 14 04/10/2019    ALT 19 04/10/2019       CBC RESULTS:  Lab Results   Component Value Date    WBC 8.5 04/12/2019    RBC 3.34 (L) 04/12/2019    HGB 10.7 (L) 04/18/2019    HCT 28.3 (L) 04/12/2019    MCV 85 04/12/2019    MCH 26.0 (L) 04/12/2019    MCHC 30.7 (L) 04/12/2019    RDW 16.2 (H) 04/12/2019     04/12/2019       BMP RESULTS:  Lab Results   Component Value Date     04/12/2019    POTASSIUM 3.5 04/12/2019    CHLORIDE 108 04/12/2019    CO2 27 04/12/2019    ANIONGAP 7 04/12/2019     (H) 04/12/2019    BUN 9 04/12/2019    CR 0.73 04/12/2019    GFRESTIMATED 87 04/12/2019    GFRESTBLACK >90 04/12/2019    J CARLOS 8.5 04/12/2019

## 2019-10-03 ENCOUNTER — OFFICE VISIT (OUTPATIENT)
Dept: CARDIOLOGY | Facility: CLINIC | Age: 64
End: 2019-10-03
Payer: COMMERCIAL

## 2019-10-03 VITALS
HEART RATE: 72 BPM | SYSTOLIC BLOOD PRESSURE: 130 MMHG | BODY MASS INDEX: 45.52 KG/M2 | DIASTOLIC BLOOD PRESSURE: 82 MMHG | WEIGHT: 266.6 LBS | HEIGHT: 64 IN

## 2019-10-03 DIAGNOSIS — I48.91 ATRIAL FIBRILLATION AND FLUTTER (H): Primary | ICD-10-CM

## 2019-10-03 DIAGNOSIS — K25.4 GASTROINTESTINAL HEMORRHAGE ASSOCIATED WITH GASTRIC ULCER: ICD-10-CM

## 2019-10-03 DIAGNOSIS — E78.5 HYPERLIPIDEMIA LDL GOAL <130: ICD-10-CM

## 2019-10-03 DIAGNOSIS — I10 BENIGN ESSENTIAL HYPERTENSION: ICD-10-CM

## 2019-10-03 DIAGNOSIS — I48.92 ATRIAL FIBRILLATION AND FLUTTER (H): Primary | ICD-10-CM

## 2019-10-03 PROCEDURE — 93000 ELECTROCARDIOGRAM COMPLETE: CPT | Performed by: PHYSICIAN ASSISTANT

## 2019-10-03 PROCEDURE — 99214 OFFICE O/P EST MOD 30 MIN: CPT | Performed by: PHYSICIAN ASSISTANT

## 2019-10-03 ASSESSMENT — MIFFLIN-ST. JEOR: SCORE: 1744.29

## 2019-10-03 NOTE — PATIENT INSTRUCTIONS
1. EKG today looked good!    2. Blood work @ Dr. Jama's appt Monday, 2 weeks from now and then another 2 weeks ([facility]'s office OK) to make sure no evidence of bleeding.  Set up with Dr. Jama's office     3. Start Xarelto 20 mg daily. Stay on your Protonix!!    4. My nurses are Krystal Rankin and Cassie: [phone]    5. See me in 6 months but call if issues!

## 2019-10-03 NOTE — LETTER
10/3/2019    Yuval Jama MD  [address] [facility] [address]    RE: Maxine Sears       Dear Colleague,    I had the pleasure of seeing Maxine Sears in the [facility].    HPI:   had the pleasure of seeing Roxanna when she came for follow up of paroxysmal atrial fibrillation.  She is a 64 year old who sees Dr. Vaughn for her history of:     1. Paroxysmal atrial fibrillation first diagnosed 9/2015.  She is undergone DC cardioversion, most recently 2/2019.  She has been on flecainide  2. GI bleeds, with hospitalization 3/2019 with a hemoglobin down to 7.2 g/dL.  EGD revealed duodenal ulcer which was cauterized.  Xarelto was held, but then later restarted.  She was rehospitalized 4/2019 with a lower GI bleed.  Hemoglobin got down to 8.7 g/dL.  Xarelto was discontinued, and Dr. Vaughn recommended not restarting this for at least 3-6 months. Capsule endoscopy 5/2019 showed normal mucosa but did not reach the colon (Dr. Ko)   3. Benign essential hypertension with recent med changes    I saw Roxanna 5/2019 at which time she was doing well from CV perspective. Her triamterene/HCTZ had been adjusted due to hypotension while in the hospital for UGI/LGI.  I increased her Coreg due to elevated BPs.     Interval History:  Overall feeling well. No recurrent atrial fibrillation    BPs at home 120-130s/70-80s (has it checked by RN in her apartment building and [facility]).    We discussed her GI bleeding Spring 2019. She'd been off of her PPI x 2 months b/c insurance company had refused to pay for medication.    No complaints of chest pain, pressure or tightness.  No orthopnea, PND or edema. No change in exercise tolerance or breathing. Overall, she is feeling well. Is eager to start losing weight.    Diagnostic Testing:  EKG today, which I overread, showed SR 73 with QRS duration 102 ms  EKG 5/7/2019 showed SR at 76 bpm.  QRS duration was 100 ms on flecainide 75 mg twice  daily     Echocardiogram 7/2017 showed an EF of 55 to 60% with borderline left ventricular hypertrophy.  Left atrium was mildly dilated at 4.6 cm with a volume index of 24.2 mL/m .  She was noted to have a dilated IVC with elevated right atrial pressure at 15-20.  No significant valvular abnormalities were noted.    Nuclear stress test 3/2016 was negative for ischemia.       Assessment & Plan:    1. Paroxysmal AFib     Last episode 2/2019 with DCCV. On flecainide and EKG as above    No AC since 3/2019 due to UGI, LGI despite CHADSVASc 2 (HTN, sex)    PLAN:    OK to exercise - start with light exercise and build up slowly    CBC at Dr. Jama's office Monday. Start Xarelto 20 mg daily (samples given)and recheck Hgb in 2 weeks again and then again in 1 month to ensure not dropping. We'll call with results and see how she's doing and send Xarelto Rx. She'd prefer this instead of trying something new    Stay on PPI    Continue all other medications      2. Benign Essential HTN    Currently on Coreg 12.5 mg BID, losaratgn 100 mg daily and Maxzide 37.5/25.      BP looks better    PLAN:    Continue current emdciatons    See me 6 months but call if issues sooner!       Monika Sue PA-C, MSPAS      Orders Placed This Encounter   Procedures     Hemoglobin     Hemoglobin     CBC with platelets     Follow-Up with Cardiac Advanced Practice Provider     EKG 12-lead complete w/read - Clinics (performed today)     EKG 12-lead complete w/read - Clinics (to be scheduled)     Orders Placed This Encounter   Medications     rivaroxaban ANTICOAGULANT (XARELTO ANTICOAGULANT) 20 MG TABS tablet     Sig: Take 1 tablet (20 mg) by mouth daily (with dinner)     Medications Discontinued During This Encounter   Medication Reason     gabapentin (NEURONTIN) 300 MG capsule Medication Reconciliation Clean Up     losartan (COZAAR) 100 MG tablet Medication Reconciliation Clean Up     triamterene-HCTZ (MAXZIDE) 75-50 MG tablet Medication Reconciliation  Clean Up         Encounter Diagnoses   Name Primary?     Benign essential hypertension      Atrial fibrillation and flutter (H) Yes     Hyperlipidemia LDL goal <130      Gastrointestinal hemorrhage associated with gastric ulcer        CURRENT MEDICATIONS:  Current Outpatient Medications   Medication Sig Dispense Refill     carvedilol (COREG) 12.5 MG tablet Take 1 tablet (12.5 mg) by mouth 2 times daily (with meals) 60 tablet 5     ferrous fumarate 65 mg, [place]. FE,-Vitamin C 125 mg (VITRON-C)  MG TABS tablet Take 1 tablet by mouth daily 30 tablet 1     flecainide (TAMBOCOR) 150 MG tablet TAKE 1/2 TABLET BY MOUTH TWICE A DAY 22 tablet 9     gabapentin (NEURONTIN) 300 MG capsule Take 900 mg by mouth At Bedtime 3 capsules       levothyroxine (SYNTHROID/LEVOTHROID) 75 MCG tablet TAKE 1 TABLET BY MOUTH DAILY IN ADDITION TO 88 MCG TOTAL DAILY DOSE 163MCG 22 tablet 5     levothyroxine (SYNTHROID/LEVOTHROID) 88 MCG tablet TAKE 1 TABLET BY MOUTH DAILY IN ADDITION TO 75 MCG TOTAL DAILY DOSE 163 MCG 22 tablet 5     losartan (COZAAR) 100 MG tablet Take 1 tablet (100 mg) by mouth daily 90 tablet 3     order for DME DREAMSTATION  5-15 CM/H20  NASAL AIRFIT N10 HER       pantoprazole (PROTONIX) 40 MG EC tablet TAKE 1 TABLET BY MOUTH TWICE A DAY BEFORE MEALS 60 tablet 6     rivaroxaban ANTICOAGULANT (XARELTO ANTICOAGULANT) 20 MG TABS tablet Take 1 tablet (20 mg) by mouth daily (with dinner)       SUMAtriptan (IMITREX) 100 MG tablet Take 1 tablet (100 mg) by mouth at onset of headache for migraine May repeat in 2 hours if needed: max 2/day; 10 tablet 11     triamterene-HCTZ (MAXZIDE) 75-50 MG tablet Take 0.5 tablets by mouth daily         ALLERGIES     Allergies   Allergen Reactions     Morphine Anaphylaxis     Ceclor [Cefaclor] Hives     Diltiazem Hives     Lisinopril Cough     Sertraline Nausea and Vomiting       PAST MEDICAL HISTORY:  Past Medical History:   Diagnosis Date     Atrial fibrillation and flutter (H) 1/5/15      Depression       Duodenal ulcer 03/04/2019     Essential hypertension       Gastroesophageal reflux disease without esophagitis 11/11/2015     GERD (gastroesophageal reflux disease)      Hiatal hernia      Hyperlipidemia LDL goal <130 3/26/2015     Hypothyroidism      Migraine without aura and without status migrainosus, not intractable  Aug 2016     Morbid obesity, unspecified obesity type (H) 12/9/2015     Obesity      NELY on CPAP      Osteoarthritis      RLS (restless legs syndrome)      Symptomatic menopausal or female climacteric states (aka FLASHES)      TMJ disease        PAST SURGICAL HISTORY:  Past Surgical History:   Procedure Laterality Date     APPENDECTOMY       CHOLECYSTECTOMY       COLONOSCOPY N/A 4/12/2019    Procedure: COLONOSCOPY;  Surgeon: Vahid Cardona MD;  Location:  GI     ESOPHAGOSCOPY, GASTROSCOPY, DUODENOSCOPY (EGD), COMBINED N/A 4/11/2019    Procedure: COMBINED ESOPHAGOSCOPY, GASTROSCOPY, DUODENOSCOPY (EGD);  Surgeon: Ben Ko MD;  Location:  GI     HYSTERECTOMY, DOM       TONSILLECTOMY         FAMILY HISTORY:  Family History   Problem Relation Age of Onset     Myocardial Infarction Mother      Heart Failure Mother      Heart Surgery Father         bypass surgery     Cerebrovascular Disease Father      Heart Surgery Brother      Hyperlipidemia Brother      Hyperlipidemia Sister      Hyperlipidemia Brother      Sleep Apnea Sister        SOCIAL HISTORY:  Social History     Socioeconomic History     Marital status:      Spouse name: None     Number of children: None     Years of education: None     Highest education level: None   Occupational History     None   Social Needs     Financial resource strain: None     Food insecurity:     Worry: None     Inability: None     Transportation needs:     Medical: None     Non-medical: None   Tobacco Use     Smoking status: Former Smoker     Packs/day: 0.25     Years: 1.00     Pack years: 0.25     Types: Cigarettes      "Start date:      Last attempt to quit: 1972     Years since quittin.7     Smokeless tobacco: Never Used     Tobacco comment: very minimal smoking history   Substance and Sexual Activity     Alcohol use: No     Alcohol/week: 0.0 standard drinks     Drug use: No     Sexual activity: Never   Lifestyle     Physical activity:     Days per week: None     Minutes per session: None     Stress: None   Relationships     Social connections:     Talks on phone: None     Gets together: None     Attends Tenriism service: None     Active member of club or organization: None     Attends meetings of clubs or organizations: None     Relationship status: None     Intimate partner violence:     Fear of current or ex partner: None     Emotionally abused: None     Physically abused: None     Forced sexual activity: None   Other Topics Concern     Parent/sibling w/ CABG, MI or angioplasty before 65F 55M? Not Asked      Service Not Asked     Blood Transfusions Not Asked     Caffeine Concern No     Occupational Exposure Not Asked     Hobby Hazards Not Asked     Sleep Concern Yes     Stress Concern Not Asked     Weight Concern No     Special Diet No     Back Care Not Asked     Exercise No     Bike Helmet Not Asked     Seat Belt Yes     Self-Exams Not Asked   Social History Narrative     None       Review of Systems:  Skin:  Negative     Eyes:  Positive for glasses  ENT:  Negative    Respiratory:  Positive for sleep apnea;CPAP  Cardiovascular:  Negative for;palpitations;chest pain;edema Positive for  Gastroenterology: Negative for melena;hematochezia  Genitourinary:  Negative    Musculoskeletal:  Positive for arthritis  Neurologic:  Negative    Psychiatric:  Positive for depression  Heme/Lymph/Imm:  Positive for allergies  Endocrine:  Positive for thyroid disorder    Physical Exam:  Vitals: /82   Pulse 72   Ht 1.626 m (5' 4\")   Wt 120.9 kg (266 lb 9.6 oz)   BMI 45.76 kg/m       Constitutional:  cooperative, alert " and oriented, well developed, well nourished, in no acute distress        Skin:  warm and dry to the touch, no apparent skin lesions or masses noted        Head:  normocephalic, no masses or lesions        Eyes:  pupils equal and round;conjunctivae and lids unremarkable;sclera white        ENT:  no pallor or cyanosis, dentition good        Neck:  JVP normal        Chest:  normal respiratory excursion        Cardiac: regular rhythm                  Abdomen:  abdomen soft obese      Vascular: pulses full and equal                                      Extremities and Back:  no deformities, clubbing, cyanosis, erythema observed;no edema        Neurological:  no gross motor deficits          Recent Lab Results:  LIPID RESULTS:  Lab Results   Component Value Date    CHOL 172 03/13/2019    HDL 45 (L) 03/13/2019    LDL 85 03/13/2019    TRIG 212 (H) 03/13/2019    CHOLHDLRATIO 4.7 09/24/2015       LIVER ENZYME RESULTS:  Lab Results   Component Value Date    AST 14 04/10/2019    ALT 19 04/10/2019       CBC RESULTS:  Lab Results   Component Value Date    WBC 8.5 04/12/2019    RBC 3.34 (L) 04/12/2019    HGB 10.7 (L) 04/18/2019    HCT 28.3 (L) 04/12/2019    MCV 85 04/12/2019    MCH 26.0 (L) 04/12/2019    MCHC 30.7 (L) 04/12/2019    RDW 16.2 (H) 04/12/2019     04/12/2019       BMP RESULTS:  Lab Results   Component Value Date     04/12/2019    POTASSIUM 3.5 04/12/2019    CHLORIDE 108 04/12/2019    CO2 27 04/12/2019    ANIONGAP 7 04/12/2019     (H) 04/12/2019    BUN 9 04/12/2019    CR 0.73 04/12/2019    GFRESTIMATED 87 04/12/2019    GFRESTBLACK >90 04/12/2019    J CARLOS 8.5 04/12/2019                Thank you for allowing me to participate in the care of your patient.    Sincerely,     Ilda Sue PA-C     [facility]

## 2019-10-07 ENCOUNTER — OFFICE VISIT (OUTPATIENT)
Dept: INTERNAL MEDICINE | Facility: CLINIC | Age: 64
End: 2019-10-07
Payer: COMMERCIAL

## 2019-10-07 VITALS
HEIGHT: 64 IN | OXYGEN SATURATION: 95 % | BODY MASS INDEX: 45.24 KG/M2 | TEMPERATURE: 98 F | RESPIRATION RATE: 16 BRPM | HEART RATE: 88 BPM | DIASTOLIC BLOOD PRESSURE: 80 MMHG | WEIGHT: 265 LBS | SYSTOLIC BLOOD PRESSURE: 128 MMHG

## 2019-10-07 DIAGNOSIS — Z23 NEED FOR PROPHYLACTIC VACCINATION WITH TETANUS-DIPHTHERIA (TD): ICD-10-CM

## 2019-10-07 DIAGNOSIS — I10 ESSENTIAL HYPERTENSION: ICD-10-CM

## 2019-10-07 DIAGNOSIS — Z00.01 ENCOUNTER FOR ROUTINE ADULT MEDICAL EXAM WITH ABNORMAL FINDINGS: ICD-10-CM

## 2019-10-07 DIAGNOSIS — E03.9 HYPOTHYROIDISM, UNSPECIFIED TYPE: ICD-10-CM

## 2019-10-07 DIAGNOSIS — Z13.820 SCREENING FOR OSTEOPOROSIS: ICD-10-CM

## 2019-10-07 DIAGNOSIS — M54.50 CHRONIC BILATERAL LOW BACK PAIN WITHOUT SCIATICA: ICD-10-CM

## 2019-10-07 DIAGNOSIS — D64.9 ANEMIA, UNSPECIFIED TYPE: ICD-10-CM

## 2019-10-07 DIAGNOSIS — E66.01 MORBID OBESITY, UNSPECIFIED OBESITY TYPE (H): ICD-10-CM

## 2019-10-07 DIAGNOSIS — G89.29 CHRONIC BILATERAL LOW BACK PAIN WITHOUT SCIATICA: ICD-10-CM

## 2019-10-07 DIAGNOSIS — Z23 NEED FOR PROPHYLACTIC VACCINATION AND INOCULATION AGAINST INFLUENZA: ICD-10-CM

## 2019-10-07 LAB
ANION GAP SERPL CALCULATED.3IONS-SCNC: 7 MMOL/L (ref 3–14)
BUN SERPL-MCNC: 9 MG/DL (ref 7–30)
CALCIUM SERPL-MCNC: 9 MG/DL (ref 8.5–10.1)
CHLORIDE SERPL-SCNC: 103 MMOL/L (ref 94–109)
CO2 SERPL-SCNC: 26 MMOL/L (ref 20–32)
CREAT SERPL-MCNC: 0.74 MG/DL (ref 0.52–1.04)
ERYTHROCYTE [DISTWIDTH] IN BLOOD BY AUTOMATED COUNT: 17.7 % (ref 10–15)
FERRITIN SERPL-MCNC: 44 NG/ML (ref 8–252)
GFR SERPL CREATININE-BSD FRML MDRD: 85 ML/MIN/{1.73_M2}
GLUCOSE SERPL-MCNC: 95 MG/DL (ref 70–99)
HCT VFR BLD AUTO: 36.8 % (ref 35–47)
HGB BLD-MCNC: 11.4 G/DL (ref 11.7–15.7)
IRON SATN MFR SERPL: 15 % (ref 15–46)
IRON SERPL-MCNC: 65 UG/DL (ref 35–180)
MCH RBC QN AUTO: 25.5 PG (ref 26.5–33)
MCHC RBC AUTO-ENTMCNC: 31 G/DL (ref 31.5–36.5)
MCV RBC AUTO: 82 FL (ref 78–100)
PLATELET # BLD AUTO: 426 10E9/L (ref 150–450)
POTASSIUM SERPL-SCNC: 3.7 MMOL/L (ref 3.4–5.3)
RBC # BLD AUTO: 4.47 10E12/L (ref 3.8–5.2)
SODIUM SERPL-SCNC: 136 MMOL/L (ref 133–144)
T4 FREE SERPL-MCNC: 1.49 NG/DL (ref 0.76–1.46)
TIBC SERPL-MCNC: 430 UG/DL (ref 240–430)
TSH SERPL DL<=0.005 MIU/L-ACNC: 0.01 MU/L (ref 0.4–4)
WBC # BLD AUTO: 9.5 10E9/L (ref 4–11)

## 2019-10-07 PROCEDURE — 36415 COLL VENOUS BLD VENIPUNCTURE: CPT | Performed by: INTERNAL MEDICINE

## 2019-10-07 PROCEDURE — 90682 RIV4 VACC RECOMBINANT DNA IM: CPT | Performed by: INTERNAL MEDICINE

## 2019-10-07 PROCEDURE — 99213 OFFICE O/P EST LOW 20 MIN: CPT | Mod: 25 | Performed by: INTERNAL MEDICINE

## 2019-10-07 PROCEDURE — 82728 ASSAY OF FERRITIN: CPT | Performed by: INTERNAL MEDICINE

## 2019-10-07 PROCEDURE — 83540 ASSAY OF IRON: CPT | Performed by: INTERNAL MEDICINE

## 2019-10-07 PROCEDURE — 84443 ASSAY THYROID STIM HORMONE: CPT | Performed by: INTERNAL MEDICINE

## 2019-10-07 PROCEDURE — 80048 BASIC METABOLIC PNL TOTAL CA: CPT | Performed by: INTERNAL MEDICINE

## 2019-10-07 PROCEDURE — 84439 ASSAY OF FREE THYROXINE: CPT | Performed by: INTERNAL MEDICINE

## 2019-10-07 PROCEDURE — 99396 PREV VISIT EST AGE 40-64: CPT | Mod: 25 | Performed by: INTERNAL MEDICINE

## 2019-10-07 PROCEDURE — 90714 TD VACC NO PRESV 7 YRS+ IM: CPT | Performed by: INTERNAL MEDICINE

## 2019-10-07 PROCEDURE — 90472 IMMUNIZATION ADMIN EACH ADD: CPT | Performed by: INTERNAL MEDICINE

## 2019-10-07 PROCEDURE — 90471 IMMUNIZATION ADMIN: CPT | Performed by: INTERNAL MEDICINE

## 2019-10-07 PROCEDURE — 85027 COMPLETE CBC AUTOMATED: CPT | Performed by: INTERNAL MEDICINE

## 2019-10-07 PROCEDURE — 83550 IRON BINDING TEST: CPT | Performed by: INTERNAL MEDICINE

## 2019-10-07 RX ORDER — GABAPENTIN 300 MG/1
900 CAPSULE ORAL AT BEDTIME
COMMUNITY
Start: 2019-10-07 | End: 2020-04-17

## 2019-10-07 ASSESSMENT — PATIENT HEALTH QUESTIONNAIRE - PHQ9: SUM OF ALL RESPONSES TO PHQ QUESTIONS 1-9: 0

## 2019-10-07 ASSESSMENT — MIFFLIN-ST. JEOR: SCORE: 1737.03

## 2019-10-07 NOTE — PROGRESS NOTES
SUBJECTIVE:   CC: Maxine Sears is an 64 year old woman who presents for preventive health visit and follow-up regarding hypertension, hypothyroidism and anemia.    Healthy Habits:     Getting at least 3 servings of Calcium per day:  Yes    Bi-annual eye exam:  Yes    Dental care twice a year:  NO    Sleep apnea or symptoms of sleep apnea:  Sleep apnea    Diet:  Regular (no restrictions)    Frequency of exercise:  None    Taking medications regularly:  Yes    Medication side effects:  None    PHQ-2 Total Score: 0    Additional concerns today:  No          Today's PHQ-2 Score:   PHQ-2 (  [employer]) 10/7/2019   Q1: Little interest or pleasure in doing things 0   Q2: Feeling down, depressed or hopeless 0   PHQ-2 Score 0   Q1: Little interest or pleasure in doing things Not at all   Q2: Feeling down, depressed or hopeless Not at all   PHQ-2 Score 0       Abuse: Current or Past(Physical, Sexual or Emotional)- No  Do you feel safe in your environment? Yes    Social History     Tobacco Use     Smoking status: Former Smoker     Packs/day: 0.25     Years: 1.00     Pack years: 0.25     Types: Cigarettes     Start date:      Last attempt to quit: 1972     Years since quittin.7     Smokeless tobacco: Never Used     Tobacco comment: very minimal smoking history   Substance Use Topics     Alcohol use: No     Alcohol/week: 0.0 standard drinks     If you drink alcohol do you typically have >3 drinks per day or >7 drinks per week? No    Alcohol Use 10/7/2019   Prescreen: >3 drinks/day or >7 drinks/week? Not Applicable       Reviewed orders with patient.  Reviewed health maintenance and updated orders accordingly - Yes  Labs reviewed in EPIC    Mammogram Screening: Patient over age 50, mutual decision to screen reflected in health maintenance.    Pertinent mammograms are reviewed under the imaging tab.  History of abnormal Pap smear: Status post benign hysterectomy. Health Maintenance and Surgical History updated.    "  Reviewed and updated as needed this visit by clinical staff         Reviewed and updated as needed this visit by Provider            Review of Systems  CONSTITUTIONAL: NEGATIVE for fever, chills. Weight down 12 pounds in 6 mos without much effort  INTEGUMENTARY/SKIN: NEGATIVE for worrisome rashes, moles or lesions  EYES: NEGATIVE for vision changes or irritation. Has glasses. Eye exam earlier this year  ENT: NEGATIVE for ear, mouth and throat problems  RESP: NEGATIVE for significant cough or new SOB. On CPAP  BREAST: NEGATIVE for masses, tenderness or discharge  CV: NEGATIVE for chest pain, palpitations or peripheral edema with meds. No orthopnea, PND. On Flecainide. Recent cardiology note reviewed.   GI: NEGATIVE for nausea, abdominal pain, heartburn. Rare constipation. BMs normal past few days. Colonoscopy UTD  : NEGATIVE for unusual urinary or vaginal symptoms. No vaginal bleeding.  MUSCULOSKELETAL:  POSITIVE for stable LBP. No radiation to LEs currently. Mild scoliosis and DJD lumbar spine   NEURO: NEGATIVE for weakness, dizziness or paresthesias. Stable RLS with Gabapentin at night only   PSYCHIATRIC: NEGATIVE for changes in mood or affect   HEME: hx anemia after prior GI bleeding when on NSAIDS. No longer on them. Needs lab f/u. Denies seeing any blood in stools now     OBJECTIVE:   /80   Pulse 88   Temp 98  F (36.7  C) (Temporal)   Resp 16   Ht 1.626 m (5' 4\")   Wt 120.2 kg (265 lb)   SpO2 95%   BMI 45.49 kg/m    Physical Exam    General appearance -   alert, no distress  Skin - No rashes or lesions.  Head - normocephalic, atraumatic  Eyes - TYRONE, EOMI, fundi exam with nondilated pupils negative.  Ears - External ears normal. Canals clear. TM's normal.  Nose/Sinuses - Nares normal. Septum midline. Mucosa normal. No drainage or sinus tenderness.  Oropharynx - No erythema, no adenopathy, no exudates.  Neck - Supple without adenopathy or thyromegaly. No bruits.  Lungs - Clear to auscultation " without wheezes/rhonchi.  Heart - Regular rate and rhythm without murmurs, clicks, or gallops.  Nodes - No supraclavicular, axillary, or inguinal adenopathy palpable.  Breasts - deferred  Abdomen - Abdomen obese, soft, non-tender. BS normal. No masses or hepatosplenomegaly palpable. No bruits.  Extremities -No cyanosis, clubbing. Minimal BLE edema.    Musculoskeletal -   Muscular strength intact.. Mild lumbar scoliosis.  Mild tenderness palpation bilateral paralumbar musculature.  Negative straight leg raise test bilaterally.  Peripheral pulses - radial=4/4, femoral=4/4, posterior tibial=4/4, dorsalis pedis=4/4,  Neuro - Gait normal. Reflexes normal and symmetric. Sensation grossly WNL.  Genital/Rectal - deferred        ASSESSMENT/PLAN:   1. Encounter for routine adult medical exam with abnormal findings  See below for healthcare maintenance.  Has mammogram scheduled be done in 3 days.  Otherwise up-to-date    2. Morbid obesity, unspecified obesity type (H)  Weight down.  See counseling below regarding further improvement in diet and increasing exercise for further weight loss    3. Anemia, unspecified type  Previous GI blood loss from NSAIDs use.  Denies seeing any blood in stools currently on off of NSAIDs.  Due for follow-up labs  - CBC with platelets  - Iron and iron binding capacity  - Ferritin  - OFFICE/OUTPT VISIT,EST,LEVL III    4. Essential hypertension  Controlled.  Continue current medication.  Labs ordered  - Basic metabolic panel  - OFFICE/OUTPT VISIT,EST,LEVL III    5. Hypothyroidism, unspecified type  Overdue for thyroid function labs.  Continue current levothyroxine dose for now pending lab results  - TSH with free T4 reflex  - T4 free  - T4 free  - OFFICE/OUTPT VISIT,EST,LEVL III    6. Need for prophylactic vaccination and inoculation against influenza  - INFLUENZA QUAD, RECOMBINANT, P-FREE (RIV4) (FLUBLOCK) [88645]  - Vaccine Administration, Initial [90336]    7. Need for prophylactic vaccination  "with tetanus-diphtheria (Td)  - TD (ADULT, 7+) PRESERVE FREE    8. Screening for osteoporosis  - DX Hip/Pelvis/Spine; Future    9. Chronic bilateral low back pain without sciatica  Patient declined physical therapy at this time.  Will monitor.  Tylenol as needed for pain.  Patient will visit if she wishes to have future referral ordered        COUNSELING:  Reviewed preventive health counseling, as reflected in patient instructions    Estimated body mass index is 45.76 kg/m  as calculated from the following:    Height as of 10/3/19: 1.626 m (5' 4\").    Weight as of 10/3/19: 120.9 kg (266 lb 9.6 oz).    Weight management plan: Discussed healthy diet and exercise guidelines     reports that she quit smoking about 47 years ago. Her smoking use included cigarettes. She started smoking about 48 years ago. She has a 0.25 pack-year smoking history. She has never used smokeless tobacco.      Counseling Resources:  ATP IV Guidelines  Pooled Cohorts Equation Calculator  Breast Cancer Risk Calculator  FRAX Risk Assessment  ICSI Preventive Guidelines  Dietary Guidelines for Americans, 2010  USDA's MyPlate  ASA Prophylaxis  Lung CA Screening      PLAN:  Mammogram and Bone density test - [facility].   Tetanus   (Td) vaccine   Flu vaccine  Ffasting labs today as ordered  Continue current medications  Reduce calorie/carbohydrate (sugar, bread, potato, pasta, rice, alcohol etc)  intake in diet.  Increase color on your plate with fruits and vegetables. Increase  frequency of walking or other aerobic exercise as able (goal is daily)  If willing to start physical therapy for your back, let me know and I will place referral   Pt was informed regarding extra E&M billing for management of new or established medical issues not related to today's wellness visit      Yuval Jama MD  [facility]       "

## 2019-10-09 DIAGNOSIS — I10 BENIGN ESSENTIAL HYPERTENSION: ICD-10-CM

## 2019-10-09 RX ORDER — CARVEDILOL 12.5 MG/1
12.5 TABLET ORAL 2 TIMES DAILY WITH MEALS
Qty: 180 TABLET | Refills: 3 | Status: ON HOLD | OUTPATIENT
Start: 2019-10-09 | End: 2020-01-22

## 2019-10-10 ENCOUNTER — ANCILLARY PROCEDURE (OUTPATIENT)
Dept: BONE DENSITY | Facility: CLINIC | Age: 64
End: 2019-10-10
Attending: INTERNAL MEDICINE
Payer: COMMERCIAL

## 2019-10-10 ENCOUNTER — ANCILLARY PROCEDURE (OUTPATIENT)
Dept: MAMMOGRAPHY | Facility: CLINIC | Age: 64
End: 2019-10-10
Attending: INTERNAL MEDICINE
Payer: COMMERCIAL

## 2019-10-10 ENCOUNTER — PATIENT OUTREACH (OUTPATIENT)
Dept: CARE COORDINATION | Facility: CLINIC | Age: 64
End: 2019-10-10

## 2019-10-10 DIAGNOSIS — Z12.31 VISIT FOR SCREENING MAMMOGRAM: ICD-10-CM

## 2019-10-10 DIAGNOSIS — Z13.820 SCREENING FOR OSTEOPOROSIS: ICD-10-CM

## 2019-10-10 PROCEDURE — 77080 DXA BONE DENSITY AXIAL: CPT | Performed by: INTERNAL MEDICINE

## 2019-10-10 PROCEDURE — 77067 SCR MAMMO BI INCL CAD: CPT | Mod: TC

## 2019-10-10 ASSESSMENT — ACTIVITIES OF DAILY LIVING (ADL): DEPENDENT_IADLS:: CLEANING;TRANSPORTATION

## 2019-10-10 NOTE — LETTER
[place] CARE COORDINATION  [facility]  [address], [place] [address]    October 10, 2019        Maxine Velasquez  [address]  [facility] [address]                                                                          Critical access hospital  Complex Care Plan  About Me:    Patient Name:  Maxine Velasquez    YOB: 1955  Age:         64 year old   [place] MRN:    [id number] Telephone Information:  Home Phone [phone]   Mobile [phone]       Address:  [address]  [facility] [address] Email address:  No e-mail address on record      Emergency Contact(s)    Name Relationship Lgl Grd Work Phone Home Phone Mobile Phone   1. SEVERSON,VIVIAN Sister   [phone]   2. BRANDI GEORGES Daughter  none [phone]   3. PRATIMA VELASQUEZ Son   [phone]           Primary language:  English     needed? No   [place] [facility]:  [phone] op. 1  Other communication barriers: None  Preferred Method of Communication:  Phone  Current living arrangement: I live alone  Mobility Status/ Medical Equipment: Independent w/Device    Health Maintenance  Health Maintenance Reviewed: Due/Overdue   Health Maintenance Due   Topic Date Due     PREVENTIVE CARE VISIT  1955     ADVANCE CARE PLANNING  1955     MIGRAINE ACTION PLAN  1955     PNEUMOCOCCAL IMMUNIZATION 19-64 MEDIUM RISK (1 of 1 - PPSV23) 08/23/1974     HPV  08/23/1976     ZOSTER IMMUNIZATION (1 of 2) 08/23/2005     MAMMO SCREENING  09/15/2017     DTAP/TDAP/TD IMMUNIZATION (2 - Td) 10/14/2019     My Access Plan  Medical Emergency 911   Primary Clinic Line [facility] - [phone]   24 Hour Appointment Line [phone] or  [phone]) (toll-free)   24 Hour Nurse Line [phone] (toll-free)   Preferred Urgent Care [facility], [phone]    Preferred Hospital [facility]  [phone]   Preferred Pharmacy [facility] [place], [place] - [address]     Behavioral Health Crisis Line The National Suicide Prevention Lifeline at 1-570.343.8979 or 913       My Care Team Members  Patient Care Team       Relationship Specialty Notifications Start End    Yuval Jama MD PCP - General Internal Medicine  10/20/14     Merged    Phone: [phone] Fax: [phone]         [address] [place] [address]    Yuval Jama MD  Internal Medicine  10/20/14     Merged    Phone: [phone] Fax: [phone]         [address] [place] [address]    Yuval Jama MD Assigned PCP   12/19/14     Phone: [phone] Fax: [phone]         [address] [address]    Damien Person, RN Lead Care Coordinator Primary Care -   3/6/19     Phone: [phone]                 My Care Plans  Self Management and Treatment Plan  Goals and (Comments)  Goals        General    1. Being Active (pt-stated)     Notes - Note created  10/10/2019  1:59 PM by Damien Person, RN    Goal Statement: I will increase my activity level during the next 3 months to help with weight loss.  Measure of Success: The patient will report an increase in physical activity during the next 3 months as compared to current activity level.  Supportive Steps to Achieve: The patient will continue walking as much as possible and will explore options of swimming at her local [employer].  She will also explore the small gym that is within her apartment.  CCC RN will continue routine patient outreaches for ongoing support and additional resources as needed.  Barriers: The patient reports ongoing fatigue.  Strengths: The patient understands the importance of increased physical activity for weight loss.  Date to Achieve By: 1/31/20  Patient expressed understanding of goal: Yes             Action Plans on File:    Depression       My Medical and Care  Information  Problem List   Patient Active Problem List   Diagnosis     Osteoarthritis     TMJ disease     Atrial fibrillation and flutter (H)     Health Care Home     Hyperlipidemia LDL goal <130     Hypothyroidism     Morbid obesity, unspecified obesity type (H)     Migraine without aura and without status migrainosus, not intractable     Essential hypertension     NELY on CPAP     RLS (restless legs syndrome)     Severe episode of recurrent major depressive disorder, without psychotic features (H)     Gastroesophageal reflux disease, esophagitis presence not specified      Current Medications:  Current Outpatient Medications   Medication     carvedilol (COREG) 12.5 MG tablet     ferrous fumarate 65 mg, [place]. FE,-Vitamin C 125 mg (VITRON-C)  MG TABS tablet     flecainide (TAMBOCOR) 150 MG tablet     gabapentin (NEURONTIN) 300 MG capsule     levothyroxine (SYNTHROID/LEVOTHROID) 75 MCG tablet     levothyroxine (SYNTHROID/LEVOTHROID) 88 MCG tablet     losartan (COZAAR) 100 MG tablet     order for DME     pantoprazole (PROTONIX) 40 MG EC tablet     rivaroxaban ANTICOAGULANT (XARELTO ANTICOAGULANT) 20 MG TABS tablet     SUMAtriptan (IMITREX) 100 MG tablet     triamterene-HCTZ (MAXZIDE) 75-50 MG tablet     No current facility-administered medications for this visit.      Care Coordination Start Date: 3/15/2019   Frequency of Care Coordination: monthly   Form Last Updated: 10/10/2019

## 2019-10-10 NOTE — PROGRESS NOTES
Clinic Care Coordination Contact    Follow Up Progress Note      Assessment: The patient saw both PCP and Cardiology within the past week.  She reports both appointments went fairly well.  She updated that she continues to feel tired but feels she has been more active as compared to a few months ago.  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  She hasn't been doing much exercise but was told by Cardiology she can increase her physical activity and both PCP and Cardiology encouraged weight-loss.    Goals addressed this encounter:   Goals Addressed                 This Visit's Progress       Patient Stated      1. Being Active (pt-stated)        Goal Statement: I will increase my activity level during the next 3 months to help with weight loss.  Measure of Success: The patient will report an increase in physical activity during the next 3 months as compared to current activity level.  Supportive Steps to Achieve: The patient will continue walking as much as possible and will explore options of swimming at her local [employer].  She will also explore the small gym that is within her apartment.  CCC RN will continue routine patient outreaches for ongoing support and additional resources as needed.  Barriers: The patient reports ongoing fatigue.  Strengths: The patient understands the importance of increased physical activity for weight loss.  Date to Achieve By: 1/31/20  Patient expressed understanding of goal: Yes        COMPLETED: 1. Medical (pt-stated)        Goal Statement: I will work to feel less fatigued and weak by taking my medications as prescribed, getting routine lab-work, and continuing to working with my care teams within the next 2 months.  Measure of Success: The patient will report improved strength and energy and will demonstrate stable labs.  Supportive Steps to Achieve: The patient will continue attending health appointments as recommended, will take  her medications as prescribed, and will report any new signs/symptoms to clinic.  CCC RN will continue routine patient outreaches to provide ongoing support and additional resources as needed.  Barriers: History of GI bleed.  Strengths: Patient is motivated to feel better and states her health as her top priority.  Date to Achieve By: 6/30/19  Patient expressed understanding of goal: Yes        COMPLETED: 2. Psychosocial (pt-stated)        Goal Statement: I will inquire about additional in-home services by applying for Medical Assistance within the next 1 month.  Measure of Success: The patient will confirm contact with the Critical access hospital and [facility] Assessment completed.  Supportive Steps to Achieve: CCC RN provided the patient with the contact information for [facility].  The patient will contact [facility] to request [facility] assessment with goal of obtaining additional in-home assistance.  Barriers: Limited knowledge of available resources.  Strengths: The patient is motivated to obtain additional in-home assistance.  Date to Achieve By: 6/30/19  Patient expressed understanding of goal: Yes        Intervention/Education provided during outreach: The patient was agreeable to working toward goal of increased physical activity.  She turns 65 next year so is currently looking into options she can enroll in that will include Silver Sneakers so she can go to the gym for much cheaper.  She still may explore her local [employer] as she would like to swim there.  Additionally, she stated her apartment building does have a small gym that she will explore.     Outreach Frequency: monthly    Plan: The patient will work on increasing her physical activity to help with weight loss as discussed.  The patient will continue following with her care teams as recommended.  She will contact CCC RN with additional questions/concerns.  CCC RN will outreach to patient in approximately 1 month to get updates on patient status, assess  goal progress, and offer additional support and resources as indicated.    Damien Person, RN  Clinic Care Coordinator  [facility]  Ph: [phone]

## 2019-11-04 DIAGNOSIS — K92.2 ACUTE GI BLEEDING: ICD-10-CM

## 2019-11-04 RX ORDER — PANTOPRAZOLE SODIUM 40 MG/1
TABLET, DELAYED RELEASE ORAL
Qty: 60 TABLET | Refills: 5 | Status: SHIPPED | OUTPATIENT
Start: 2019-11-04 | End: 2020-04-17

## 2019-11-18 ENCOUNTER — PATIENT OUTREACH (OUTPATIENT)
Dept: CARE COORDINATION | Facility: CLINIC | Age: 64
End: 2019-11-18

## 2019-11-18 ASSESSMENT — ACTIVITIES OF DAILY LIVING (ADL): DEPENDENT_IADLS:: CLEANING;TRANSPORTATION

## 2019-11-18 NOTE — PROGRESS NOTES
Clinic Care Coordination Contact    Follow Up Progress Note      Assessment: The patient updated that she's been doing fairly well.  She has continued to be quite active at [employer] several days a week to help with quilting which she really enjoys.  She also has been trying to be more active in general.  She decided to wait until she turns 66 yo to pursue Silver Sneakers and hasn't made any moves to get a membership at the [facility] due to costs.  She is working to pay off some bills first and then still may explore getting a gym membership.  The patient feels proud of having lost about 12 lbs in the span of about 6 months, which she states was partially intentional, partially not.  She updated that she was able to find a new  so she again has support with things around her apartment.  She denied outstanding questions/concerns at this time.    Goals addressed this encounter:   Goals Addressed                 This Visit's Progress       Patient Stated      1. Being Active (pt-stated)   On track     Goal Statement: I will increase my activity level during the next 3 months to help with weight loss.  Measure of Success: The patient will report an increase in physical activity during the next 3 months as compared to current activity level.  Supportive Steps to Achieve: The patient will continue walking as much as possible and will explore options of swimming at her local [facility].  She will also explore the small gym that is within her apartment.  CCC RN will continue routine patient outreaches for ongoing support and additional resources as needed.  Barriers: The patient reports ongoing fatigue.  Strengths: The patient understands the importance of increased physical activity for weight loss.  Date to Achieve By: 1/31/20  Patient expressed understanding of goal: Yes        Intervention/Education provided during outreach: CCC RN provided encouragement to the patient for continuing to be active.  Discussed trying to  increase her physical activity even in small ways by walking around her apartment more, which she agreed to do.  Her son continues to be a source of motivation for her pursuit of weight-loss, exercise, and eating healthy.     Outreach Frequency: monthly    Plan: The patient will continue to work on weight loss by being active per her goal.  She will contact CCC RN with additional questions or concerns.  CCC RN will outreach to patient in approximately 1 month to get updates on patient status, assess goal progress, and offer additional support and resources as indicated.    Damien Person RN  Clinic Care Coordinator  [facility]  Ph: [phone]

## 2019-11-19 DIAGNOSIS — K25.4 GASTROINTESTINAL HEMORRHAGE ASSOCIATED WITH GASTRIC ULCER: ICD-10-CM

## 2019-11-19 LAB — HGB BLD-MCNC: 10.5 G/DL (ref 11.7–15.7)

## 2019-11-19 PROCEDURE — 36415 COLL VENOUS BLD VENIPUNCTURE: CPT | Performed by: PHYSICIAN ASSISTANT

## 2019-11-19 PROCEDURE — 85018 HEMOGLOBIN: CPT | Performed by: PHYSICIAN ASSISTANT

## 2019-11-20 ENCOUNTER — DOCUMENTATION ONLY (OUTPATIENT)
Dept: CARDIOLOGY | Facility: CLINIC | Age: 64
End: 2019-11-20

## 2019-11-20 DIAGNOSIS — I48.92 ATRIAL FIBRILLATION AND FLUTTER (H): ICD-10-CM

## 2019-11-20 DIAGNOSIS — K21.9 GASTROESOPHAGEAL REFLUX DISEASE, ESOPHAGITIS PRESENCE NOT SPECIFIED: Primary | ICD-10-CM

## 2019-11-20 DIAGNOSIS — I48.91 ATRIAL FIBRILLATION AND FLUTTER (H): ICD-10-CM

## 2019-11-20 DIAGNOSIS — D64.9 ANEMIA, UNSPECIFIED TYPE: ICD-10-CM

## 2019-11-20 NOTE — PROGRESS NOTES
Pls let pt know I got results of recent CBC. We had restarted Xarelto 20 mg daily on 10/3 after her GI bleed in 4/2019 and I asked that she get routine CBCs to make sure no bleeding    Her Hgb has dropped from 11.4 to 10.5 g/dL.  1) STOP Xarelto 20 mg daily. Pls update EPIC  2) I'll let Dr. Jama know we stopped Xarelto  3) Repeat Hgb in ~3-4 weeks to make sure it's stable/coming up. I've ordered.       Component Hemoglobin   Latest Ref Rng & Units 11.7 - 15.7 g/dL   4/18/2019 10.7 (L)   10/7/2019 11.4 (L)   11/19/2019 10.5 (L)

## 2019-11-20 NOTE — PROGRESS NOTES
Kostas Jama!  KASI.    I started Roxanna back on Xarelto 20 mg daily 10/3.  She has a h/o GI bleed 4/2019 but was on PPI so we thought we'd retry it for her high CHADSVASc.     Her follow-up Hgb has dropped ~1 gram and I'm going to ask her to stop this and repeat Hgb in 3-4 weeks.    Pls let me know if you'd like anything different.    SAMMI Frank

## 2019-11-21 NOTE — PROGRESS NOTES
Pt made aware of CBC and the drop in her hgb from 11.4 to 10.5.  Explained that Monika would like pt to stop the Xarelto and that Monika will make Dr Jama aware that the Xarelto is being stopped.  Pt will have a repeat hgb in 3-4 weeks at Dr Jama office as this is closer to her home. Med list updated. JNelsonRN

## 2019-12-04 DIAGNOSIS — D64.9 ANEMIA, UNSPECIFIED TYPE: Primary | ICD-10-CM

## 2019-12-04 DIAGNOSIS — E03.9 HYPOTHYROIDISM, UNSPECIFIED TYPE: ICD-10-CM

## 2019-12-04 RX ORDER — LEVOTHYROXINE SODIUM 75 UG/1
TABLET ORAL
Qty: 30 TABLET | OUTPATIENT
Start: 2019-12-04

## 2019-12-04 RX ORDER — LEVOTHYROXINE SODIUM 150 UG/1
150 TABLET ORAL DAILY
Qty: 30 TABLET | Refills: 6 | Status: SHIPPED | OUTPATIENT
Start: 2019-12-04 | End: 2020-02-10 | Stop reason: DRUGHIGH

## 2019-12-04 RX ORDER — LEVOTHYROXINE SODIUM 88 UG/1
TABLET ORAL
Qty: 30 TABLET | OUTPATIENT
Start: 2019-12-04

## 2019-12-04 NOTE — TELEPHONE ENCOUNTER
"Requested Prescriptions   Pending Prescriptions Disp Refills     levothyroxine (SYNTHROID/LEVOTHROID) 88 MCG tablet [Pharmacy Med Name: LEVOTHYROXIN 88MCG TAB] 30 tablet      Sig: TAKE 1 TABLET BY MOUTH DAILY IN ADDITION TO 75 MCG TOTAL DAILY DOSE 163 MCG       Thyroid Protocol Failed - 12/4/2019  9:13 AM        Failed - Normal TSH on file in past 12 months     Recent Labs   Lab Test 10/07/19  1526   TSH 0.01*              Passed - Patient is 12 years or older        Passed - Recent (12 mo) or future (30 days) visit within the authorizing provider's specialty     Patient has had an office visit with the authorizing provider or a provider within the authorizing providers department within the previous 12 mos or has a future within next 30 days. See \"Patient Info\" tab in inbasket, or \"Choose Columns\" in Meds & Orders section of the refill encounter.              Passed - Medication is active on med list        Passed - No active pregnancy on record     If patient is pregnant or has had a positive pregnancy test, please check TSH.          Passed - No positive pregnancy test in past 12 months     If patient is pregnant or has had a positive pregnancy test, please check TSH.          levothyroxine (SYNTHROID/LEVOTHROID) 75 MCG tablet [Pharmacy Med Name: LEVOTHYROXIN 75MCG TAB] 30 tablet      Sig: TAKE 1 TABLET BY MOUTH DAILY IN ADDITION TO 88 MCG TOTAL DAILY DOSE 163MCG       Thyroid Protocol Failed - 12/4/2019  9:13 AM        Failed - Normal TSH on file in past 12 months     Recent Labs   Lab Test 10/07/19  1526   TSH 0.01*              Passed - Patient is 12 years or older        Passed - Recent (12 mo) or future (30 days) visit within the authorizing provider's specialty     Patient has had an office visit with the authorizing provider or a provider within the authorizing providers department within the previous 12 mos or has a future within next 30 days. See \"Patient Info\" tab in inbasket, or \"Choose Columns\" in " Meds & Orders section of the refill encounter.              Passed - Medication is active on med list        Passed - No active pregnancy on record     If patient is pregnant or has had a positive pregnancy test, please check TSH.          Passed - No positive pregnancy test in past 12 months     If patient is pregnant or has had a positive pregnancy test, please check TSH.          Routing refill request to provider for review/approval because:  Labs out of range:  tsh

## 2019-12-04 NOTE — TELEPHONE ENCOUNTER
Call pt. Recent lab test shows that with  Weight loss over the past year, pt's thyroid function is now running a little high with taking 163mcg daily (75mcg and 88mcg tab together daily). Will therefore have p[t stop taking the two combined pills and change to levothyroxine 150mcg tab, 1 tab daily in AM for thyroid. rx sent to pharmacy. Pt to have a nonfasting TSH lab repeated in 6 weeks (mid/late January 2020). Lab ordered. Assist pt with scheduling lab appt

## 2019-12-16 NOTE — PROGRESS NOTES
Previously reviewed. Pt had last A fib in early 2019. On Flecainide and has been in NSR on EKGs since then. Hgb had been rising before Xarelto and then mild drop with Xarelto. HAs sarita neg colonoscopy, EGD and SB pill cam study. Agree with pt being off of Xarelto with Flecainide use keeping pt in NSR and repeat future Hgb and iron labs as ordered

## 2020-01-14 ENCOUNTER — PATIENT OUTREACH (OUTPATIENT)
Dept: CARE COORDINATION | Facility: CLINIC | Age: 65
End: 2020-01-14

## 2020-01-14 ASSESSMENT — ACTIVITIES OF DAILY LIVING (ADL): DEPENDENT_IADLS:: CLEANING;TRANSPORTATION

## 2020-01-14 NOTE — PROGRESS NOTES
Clinic Care Coordination Contact  [facility]/Voicemail    Referral Source: IP Report  CCC RN outreach to get updates on patient status, assess goal progress, and provide support and additional resources as needed.    Clinical Data: Care Coordinator Outreach  Outreach attempted x 1.  Left message on patient's voicemail with call back information and requested return call.  Plan: Care Coordinator will try to reach patient again in 1-2 business days.    Damien Person RN  Clinic Care Coordinator  [facility]  Ph: [phone]

## 2020-01-14 NOTE — LETTER
[place] CARE COORDINATION  [facility]  [address], [place] [address]    January 17, 2020    Maxine Sears  [address]  [facility] [address]      Dear Maxine,    I have been attempting to reach you since our last contact. I would like to continue to work with you and provide any additional support you may need on achieving your health care related goals. I would appreciate if you would give me a call at [phone] to let me know if you would like to continue working together. I know that there are many things that can affect our ability to communicate and I hope we can continue to work together.    All of us at the [facility] are invested in your health and are here to assist you in meeting your goals.     Sincerely,    Damien Person RN

## 2020-01-17 ASSESSMENT — ACTIVITIES OF DAILY LIVING (ADL): DEPENDENT_IADLS:: CLEANING;TRANSPORTATION

## 2020-01-17 NOTE — PROGRESS NOTES
Clinic Care Coordination Contact  [facility]/Voicemail    Referral Source: IP Report  CCC RN outreach to get updates on patient status, assess goal progress, and provide support and additional resources as needed.    Clinical Data: Care Coordinator Outreach  Outreach attempted x 2.  Left message on patient's voicemail with call back information and requested return call.  Plan: Care Coordinator will send unable to contact letter with care coordinator contact information via mail. Care Coordinator will try to reach patient again in 1 month.    Damien Person RN  Clinic Care Coordinator  [facility]  Ph: [phone]

## 2020-01-21 ENCOUNTER — APPOINTMENT (OUTPATIENT)
Dept: CARDIOLOGY | Facility: CLINIC | Age: 65
End: 2020-01-21
Attending: PHYSICIAN ASSISTANT
Payer: COMMERCIAL

## 2020-01-21 ENCOUNTER — HOSPITAL ENCOUNTER (OUTPATIENT)
Facility: CLINIC | Age: 65
Setting detail: OBSERVATION
Discharge: HOME | End: 2020-01-22
Attending: EMERGENCY MEDICINE | Admitting: INTERNAL MEDICINE
Payer: COMMERCIAL

## 2020-01-21 DIAGNOSIS — I48.91 ATRIAL FIBRILLATION WITH RVR (H): Primary | ICD-10-CM

## 2020-01-21 DIAGNOSIS — I48.0 PAROXYSMAL ATRIAL FIBRILLATION (H): ICD-10-CM

## 2020-01-21 LAB
ALBUMIN SERPL-MCNC: 3.6 G/DL (ref 3.4–5)
ALP SERPL-CCNC: 113 U/L (ref 40–150)
ALT SERPL W P-5'-P-CCNC: 18 U/L (ref 0–50)
ANION GAP SERPL CALCULATED.3IONS-SCNC: 6 MMOL/L (ref 3–14)
AST SERPL W P-5'-P-CCNC: 19 U/L (ref 0–45)
BASOPHILS # BLD AUTO: 0 10E9/L (ref 0–0.2)
BASOPHILS NFR BLD AUTO: 0.4 %
BILIRUB SERPL-MCNC: 0.4 MG/DL (ref 0.2–1.3)
BUN SERPL-MCNC: 11 MG/DL (ref 7–30)
CALCIUM SERPL-MCNC: 9.4 MG/DL (ref 8.5–10.1)
CHLORIDE SERPL-SCNC: 104 MMOL/L (ref 94–109)
CO2 SERPL-SCNC: 28 MMOL/L (ref 20–32)
CREAT SERPL-MCNC: 0.7 MG/DL (ref 0.52–1.04)
DIFFERENTIAL METHOD BLD: ABNORMAL
EOSINOPHIL # BLD AUTO: 0.2 10E9/L (ref 0–0.7)
EOSINOPHIL NFR BLD AUTO: 2.4 %
ERYTHROCYTE [DISTWIDTH] IN BLOOD BY AUTOMATED COUNT: 16.4 % (ref 10–15)
GFR SERPL CREATININE-BSD FRML MDRD: >90 ML/MIN/{1.73_M2}
GLUCOSE SERPL-MCNC: 118 MG/DL (ref 70–99)
HCT VFR BLD AUTO: 39 % (ref 35–47)
HGB BLD-MCNC: 12.4 G/DL (ref 11.7–15.7)
IMM GRANULOCYTES # BLD: 0 10E9/L (ref 0–0.4)
IMM GRANULOCYTES NFR BLD: 0.1 %
INTERPRETATION ECG - MUSE: NORMAL
LMWH PPP CHRO-ACNC: 0.35 IU/ML
LYMPHOCYTES # BLD AUTO: 4 10E9/L (ref 0.8–5.3)
LYMPHOCYTES NFR BLD AUTO: 44.7 %
MAGNESIUM SERPL-MCNC: 2.1 MG/DL (ref 1.6–2.3)
MCH RBC QN AUTO: 26.1 PG (ref 26.5–33)
MCHC RBC AUTO-ENTMCNC: 31.8 G/DL (ref 31.5–36.5)
MCV RBC AUTO: 82 FL (ref 78–100)
MONOCYTES # BLD AUTO: 0.6 10E9/L (ref 0–1.3)
MONOCYTES NFR BLD AUTO: 6.9 %
NEUTROPHILS # BLD AUTO: 4.1 10E9/L (ref 1.6–8.3)
NEUTROPHILS NFR BLD AUTO: 45.5 %
NRBC # BLD AUTO: 0 10*3/UL
NRBC BLD AUTO-RTO: 0 /100
PLATELET # BLD AUTO: 476 10E9/L (ref 150–450)
POTASSIUM SERPL-SCNC: 3.7 MMOL/L (ref 3.4–5.3)
PROT SERPL-MCNC: 7.7 G/DL (ref 6.8–8.8)
RBC # BLD AUTO: 4.75 10E12/L (ref 3.8–5.2)
SODIUM SERPL-SCNC: 137 MMOL/L (ref 133–144)
T4 FREE SERPL-MCNC: 1.3 NG/DL (ref 0.76–1.46)
TROPONIN I SERPL-MCNC: <0.015 UG/L (ref 0–0.04)
TSH SERPL DL<=0.005 MIU/L-ACNC: 0.08 MU/L (ref 0.4–4)
WBC # BLD AUTO: 9 10E9/L (ref 4–11)

## 2020-01-21 PROCEDURE — G0378 HOSPITAL OBSERVATION PER HR: HCPCS

## 2020-01-21 PROCEDURE — 36415 COLL VENOUS BLD VENIPUNCTURE: CPT | Performed by: PHYSICIAN ASSISTANT

## 2020-01-21 PROCEDURE — 96365 THER/PROPH/DIAG IV INF INIT: CPT

## 2020-01-21 PROCEDURE — 84443 ASSAY THYROID STIM HORMONE: CPT | Performed by: EMERGENCY MEDICINE

## 2020-01-21 PROCEDURE — 84484 ASSAY OF TROPONIN QUANT: CPT | Performed by: EMERGENCY MEDICINE

## 2020-01-21 PROCEDURE — 99285 EMERGENCY DEPT VISIT HI MDM: CPT | Mod: 25

## 2020-01-21 PROCEDURE — 99207 ZZC CDG-CODE CATEGORY CHANGED: CPT | Performed by: PHYSICIAN ASSISTANT

## 2020-01-21 PROCEDURE — 93306 TTE W/DOPPLER COMPLETE: CPT

## 2020-01-21 PROCEDURE — 93005 ELECTROCARDIOGRAM TRACING: CPT

## 2020-01-21 PROCEDURE — 96361 HYDRATE IV INFUSION ADD-ON: CPT

## 2020-01-21 PROCEDURE — 99214 OFFICE O/P EST MOD 30 MIN: CPT | Mod: 25 | Performed by: INTERNAL MEDICINE

## 2020-01-21 PROCEDURE — 85520 HEPARIN ASSAY: CPT | Performed by: PHYSICIAN ASSISTANT

## 2020-01-21 PROCEDURE — 25000132 ZZH RX MED GY IP 250 OP 250 PS 637: Performed by: INTERNAL MEDICINE

## 2020-01-21 PROCEDURE — 25500064 ZZH RX 255 OP 636: Performed by: INTERNAL MEDICINE

## 2020-01-21 PROCEDURE — 84484 ASSAY OF TROPONIN QUANT: CPT | Performed by: PHYSICIAN ASSISTANT

## 2020-01-21 PROCEDURE — 25800030 ZZH RX IP 258 OP 636: Performed by: EMERGENCY MEDICINE

## 2020-01-21 PROCEDURE — 99207 ZZC APP CREDIT; MD BILLING SHARED VISIT: CPT | Performed by: INTERNAL MEDICINE

## 2020-01-21 PROCEDURE — 25000132 ZZH RX MED GY IP 250 OP 250 PS 637: Performed by: PHYSICIAN ASSISTANT

## 2020-01-21 PROCEDURE — 85025 COMPLETE CBC W/AUTO DIFF WBC: CPT | Performed by: EMERGENCY MEDICINE

## 2020-01-21 PROCEDURE — 99220 ZZC INITIAL OBSERVATION CARE,LEVL III: CPT | Performed by: PHYSICIAN ASSISTANT

## 2020-01-21 PROCEDURE — 96366 THER/PROPH/DIAG IV INF ADDON: CPT

## 2020-01-21 PROCEDURE — 83735 ASSAY OF MAGNESIUM: CPT | Performed by: EMERGENCY MEDICINE

## 2020-01-21 PROCEDURE — 93306 TTE W/DOPPLER COMPLETE: CPT | Mod: 26 | Performed by: INTERNAL MEDICINE

## 2020-01-21 PROCEDURE — 84439 ASSAY OF FREE THYROXINE: CPT | Performed by: EMERGENCY MEDICINE

## 2020-01-21 PROCEDURE — 25000128 H RX IP 250 OP 636

## 2020-01-21 PROCEDURE — 80053 COMPREHEN METABOLIC PANEL: CPT | Performed by: EMERGENCY MEDICINE

## 2020-01-21 PROCEDURE — 96376 TX/PRO/DX INJ SAME DRUG ADON: CPT

## 2020-01-21 RX ORDER — POTASSIUM CHLORIDE 29.8 MG/ML
20 INJECTION INTRAVENOUS
Status: DISCONTINUED | OUTPATIENT
Start: 2020-01-21 | End: 2020-01-22 | Stop reason: HOSPADM

## 2020-01-21 RX ORDER — NALOXONE HYDROCHLORIDE 0.4 MG/ML
.1-.4 INJECTION, SOLUTION INTRAMUSCULAR; INTRAVENOUS; SUBCUTANEOUS
Status: DISCONTINUED | OUTPATIENT
Start: 2020-01-21 | End: 2020-01-22 | Stop reason: HOSPADM

## 2020-01-21 RX ORDER — METOPROLOL TARTRATE 25 MG/1
25 TABLET, FILM COATED ORAL ONCE
Status: COMPLETED | OUTPATIENT
Start: 2020-01-21 | End: 2020-01-21

## 2020-01-21 RX ORDER — LEVOTHYROXINE SODIUM 75 UG/1
150 TABLET ORAL DAILY
Status: DISCONTINUED | OUTPATIENT
Start: 2020-01-22 | End: 2020-01-22 | Stop reason: HOSPADM

## 2020-01-21 RX ORDER — METOPROLOL TARTRATE 1 MG/ML
5 INJECTION, SOLUTION INTRAVENOUS EVERY 5 MIN PRN
Status: DISCONTINUED | OUTPATIENT
Start: 2020-01-21 | End: 2020-01-22 | Stop reason: HOSPADM

## 2020-01-21 RX ORDER — PANTOPRAZOLE SODIUM 40 MG/1
40 TABLET, DELAYED RELEASE ORAL
Status: DISCONTINUED | OUTPATIENT
Start: 2020-01-21 | End: 2020-01-22 | Stop reason: HOSPADM

## 2020-01-21 RX ORDER — METOPROLOL TARTRATE 50 MG
50 TABLET ORAL 2 TIMES DAILY
Status: DISCONTINUED | OUTPATIENT
Start: 2020-01-21 | End: 2020-01-22 | Stop reason: HOSPADM

## 2020-01-21 RX ORDER — ACETAMINOPHEN 650 MG/1
650 SUPPOSITORY RECTAL EVERY 4 HOURS PRN
Status: DISCONTINUED | OUTPATIENT
Start: 2020-01-21 | End: 2020-01-22 | Stop reason: HOSPADM

## 2020-01-21 RX ORDER — ONDANSETRON 4 MG/1
4 TABLET, ORALLY DISINTEGRATING ORAL EVERY 6 HOURS PRN
Status: DISCONTINUED | OUTPATIENT
Start: 2020-01-21 | End: 2020-01-22 | Stop reason: HOSPADM

## 2020-01-21 RX ORDER — ACETAMINOPHEN 325 MG/1
650 TABLET ORAL EVERY 4 HOURS PRN
Status: DISCONTINUED | OUTPATIENT
Start: 2020-01-21 | End: 2020-01-22 | Stop reason: HOSPADM

## 2020-01-21 RX ORDER — LIDOCAINE 40 MG/G
CREAM TOPICAL
Status: DISCONTINUED | OUTPATIENT
Start: 2020-01-21 | End: 2020-01-22 | Stop reason: HOSPADM

## 2020-01-21 RX ORDER — LOSARTAN POTASSIUM 100 MG/1
100 TABLET ORAL DAILY
Status: DISCONTINUED | OUTPATIENT
Start: 2020-01-22 | End: 2020-01-22 | Stop reason: HOSPADM

## 2020-01-21 RX ORDER — POTASSIUM CL/LIDO/0.9 % NACL 10MEQ/0.1L
10 INTRAVENOUS SOLUTION, PIGGYBACK (ML) INTRAVENOUS
Status: DISCONTINUED | OUTPATIENT
Start: 2020-01-21 | End: 2020-01-22 | Stop reason: HOSPADM

## 2020-01-21 RX ORDER — GABAPENTIN 300 MG/1
900 CAPSULE ORAL AT BEDTIME
Status: DISCONTINUED | OUTPATIENT
Start: 2020-01-21 | End: 2020-01-21

## 2020-01-21 RX ORDER — POTASSIUM CHLORIDE 7.45 MG/ML
10 INJECTION INTRAVENOUS
Status: DISCONTINUED | OUTPATIENT
Start: 2020-01-21 | End: 2020-01-22 | Stop reason: HOSPADM

## 2020-01-21 RX ORDER — ONDANSETRON 2 MG/ML
4 INJECTION INTRAMUSCULAR; INTRAVENOUS EVERY 6 HOURS PRN
Status: DISCONTINUED | OUTPATIENT
Start: 2020-01-21 | End: 2020-01-22 | Stop reason: HOSPADM

## 2020-01-21 RX ORDER — HEPARIN SODIUM 10000 [USP'U]/100ML
800 INJECTION, SOLUTION INTRAVENOUS CONTINUOUS
Status: DISCONTINUED | OUTPATIENT
Start: 2020-01-21 | End: 2020-01-22

## 2020-01-21 RX ORDER — MAGNESIUM SULFATE HEPTAHYDRATE 40 MG/ML
4 INJECTION, SOLUTION INTRAVENOUS EVERY 4 HOURS PRN
Status: DISCONTINUED | OUTPATIENT
Start: 2020-01-21 | End: 2020-01-22 | Stop reason: HOSPADM

## 2020-01-21 RX ORDER — GABAPENTIN 300 MG/1
900 CAPSULE ORAL DAILY
Status: DISCONTINUED | OUTPATIENT
Start: 2020-01-21 | End: 2020-01-22 | Stop reason: HOSPADM

## 2020-01-21 RX ORDER — POTASSIUM CHLORIDE 1.5 G/1.58G
20-40 POWDER, FOR SOLUTION ORAL
Status: DISCONTINUED | OUTPATIENT
Start: 2020-01-21 | End: 2020-01-22 | Stop reason: HOSPADM

## 2020-01-21 RX ORDER — POTASSIUM CHLORIDE 1500 MG/1
20-40 TABLET, EXTENDED RELEASE ORAL
Status: DISCONTINUED | OUTPATIENT
Start: 2020-01-21 | End: 2020-01-22 | Stop reason: HOSPADM

## 2020-01-21 RX ADMIN — METOPROLOL TARTRATE 50 MG: 50 TABLET, FILM COATED ORAL at 19:24

## 2020-01-21 RX ADMIN — POTASSIUM CHLORIDE 20 MEQ: 1500 TABLET, EXTENDED RELEASE ORAL at 15:30

## 2020-01-21 RX ADMIN — HUMAN ALBUMIN MICROSPHERES AND PERFLUTREN 9 ML: 10; .22 INJECTION, SOLUTION INTRAVENOUS at 15:38

## 2020-01-21 RX ADMIN — SODIUM CHLORIDE 1000 ML: 9 INJECTION, SOLUTION INTRAVENOUS at 11:28

## 2020-01-21 RX ADMIN — ACETAMINOPHEN 650 MG: 325 TABLET, FILM COATED ORAL at 22:43

## 2020-01-21 RX ADMIN — GABAPENTIN 900 MG: 300 CAPSULE ORAL at 19:33

## 2020-01-21 RX ADMIN — ACETAMINOPHEN 650 MG: 325 TABLET, FILM COATED ORAL at 15:30

## 2020-01-21 RX ADMIN — HEPARIN SODIUM 950 UNITS/HR: 10000 INJECTION, SOLUTION INTRAVENOUS at 12:44

## 2020-01-21 RX ADMIN — METOPROLOL TARTRATE 25 MG: 25 TABLET ORAL at 15:31

## 2020-01-21 RX ADMIN — FLECAINIDE ACETATE 75 MG: 50 TABLET ORAL at 19:24

## 2020-01-21 RX ADMIN — PANTOPRAZOLE SODIUM 40 MG: 40 TABLET, DELAYED RELEASE ORAL at 17:06

## 2020-01-21 RX ADMIN — Medication 4750 UNITS: at 12:45

## 2020-01-21 ASSESSMENT — MIFFLIN-ST. JEOR: SCORE: 1721.16

## 2020-01-21 ASSESSMENT — ENCOUNTER SYMPTOMS: PALPITATIONS: 1

## 2020-01-21 NOTE — PROGRESS NOTES
RECEIVING UNIT ED HANDOFF REVIEW    ED Nurse Handoff Report was reviewed by: Sagrario Bro RN on January 21, 2020 at 2:27 PM

## 2020-01-21 NOTE — CONSULTS
[facility]    Cardiology Consultation     Date of Admission:  1/21/2020  Date of Consult (When I saw the patient): 01/21/20  Requesting Physician: Dr. Brown  Primary care physician: Yuval Jama    Primary cardiologist: Dr. Vaughn  Staff Cardiologist:  Dr. Jackson    Reason for consult/ Chief Complaint: atrial fibrillation with RVR    History is obtained from the patient.    History of Present Illness   Maxine Sears is a 64 year old female with PMH significant for paroxsymal atrial fibrillation, NELY, hyperlipidemia, obesity, hypothyroidism, hypertension and history of GIB while on xarelto.     Patient follows with our electrophysiology team (Dr. Vaughn and Monika Sue PA-C) for paroxsymal atrial fibrillation which was first diagnosed in 9/2015. Monicat has underwent cardioversions in the past, most recently 2/2019. Patient is on flecainide and carvedilol. Not on anticoagulation due to recurrent GI bleed while on xarelto March and April 2019.  Patient was most recently seen by Monika Sue PA-C in October 2019, at which point patient was retrialed on anticoagulation, xarelto was again ultimately stopped in November due to drop in hemoglobin from 11.4-> 10.5.     Patient presented to the ED today with palpitations, found to be in atrial fibrillation with RVR. She also notes some episodes earlier this month. ED provider notes speaking with primary cardiologist. Carvedilol switched to metoprolol tartrate 50mg BID. Started on a heparin gtt for anticoagulation.     Upon presentation labs show stable kidney function, creatinine 0.7, stable electrolytes, negative troponin, hemoglobin 12.4. EKG showed atrial fibrillation with RVR, rate 144bpm.     Patient seen at bedside, echocardiogram being completed. Patient noted to have converted into sinus rhythm with rates in the 70s. Patient reports symptoms of palpitations started this morning. She tried coughing which can sometimes resolve symptoms but didn't  "help. Patient then got lightheaded with bending over which prompted her to call 911. Patient reports resolution of symptoms with restoration of sinus rhythm. Patient denies chest pain or chest tightness. Notes some occasional shortness of breath with the palpitations, \"can't get a deep breath\". Denies orthopnea or PND. Notes some stress yesterday with having to put her sisters cat to sleep. Denies caffeine use. Has sleep apnea and reports compliance with CPAP most nights. Denies alcohol use. Denies tobacco use.     Code Status    Prior    Past Medical History   I have reviewed this patient's medical history and updated it with pertinent information if needed.   Past Medical History:   Diagnosis Date     Atrial fibrillation and flutter (H) 1/5/15     Depression       Duodenal ulcer 03/04/2019     Essential hypertension       Gastroesophageal reflux disease without esophagitis 11/11/2015     GERD (gastroesophageal reflux disease)      Hiatal hernia      Hyperlipidemia LDL goal <130 3/26/2015     Hypothyroidism      Migraine without aura and without status migrainosus, not intractable  Aug 2016     Morbid obesity, unspecified obesity type (H) 12/9/2015     Obesity      NELY on CPAP      Osteoarthritis      RLS (restless legs syndrome)      Symptomatic menopausal or female climacteric states (aka FLASHES)      TMJ disease        Past Surgical History   I have reviewed this patient's surgical history and updated it with pertinent information if needed.  Past Surgical History:   Procedure Laterality Date     APPENDECTOMY       CHOLECYSTECTOMY       COLONOSCOPY N/A 4/12/2019    Procedure: COLONOSCOPY;  Surgeon: Vahid Cardona MD;  Location:  GI     ESOPHAGOSCOPY, GASTROSCOPY, DUODENOSCOPY (EGD), COMBINED N/A 4/11/2019    Procedure: COMBINED ESOPHAGOSCOPY, GASTROSCOPY, DUODENOSCOPY (EGD);  Surgeon: Ben Ko MD;  Location:  GI     HYSTERECTOMY, [facility]  08/1999     TONSILLECTOMY         Prior to Admission " Medications   Prior to Admission Medications   Prescriptions Last Dose Informant Patient Reported? Taking?   SUMAtriptan (IMITREX) 100 MG tablet prn Self No Yes   Sig: Take 1 tablet (100 mg) by mouth at onset of headache for migraine May repeat in 2 hours if needed: max 2/day;   carvedilol (COREG) 12.5 MG tablet 1/21/2020 at am  No Yes   Sig: Take 1 tablet (12.5 mg) by mouth 2 times daily (with meals)   flecainide (TAMBOCOR) 150 MG tablet 1/21/2020 at am  No Yes   Sig: TAKE 1/2 TABLET BY MOUTH TWICE A DAY   gabapentin (NEURONTIN) 300 MG capsule 1/20/2020 at hs  Yes Yes   Sig: Take 900 mg by mouth At Bedtime For restless legs syndrome   levothyroxine (SYNTHROID/LEVOTHROID) 150 MCG tablet 1/21/2020 at am  No Yes   Sig: Take 1 tablet (150 mcg) by mouth daily   losartan (COZAAR) 100 MG tablet 1/21/2020 at am Self No Yes   Sig: Take 1 tablet (100 mg) by mouth daily   order for DME  Self Yes No   Sig: DREAMSTATION  5-15 CM/H20  NASAL AIRFIT N10 HER   pantoprazole (PROTONIX) 40 MG EC tablet 1/21/2020 at am  No Yes   Sig: TAKE 1 TABLET BY MOUTH TWICE A DAY BEFORE MEALS   triamterene-HCTZ (MAXZIDE) 75-50 MG tablet 1/21/2020 at am  Yes Yes   Sig: Take 0.5 tablets by mouth daily      Facility-Administered Medications: None     Allergies   Allergies   Allergen Reactions     Morphine Anaphylaxis     Ceclor [Cefaclor] Hives     Diltiazem Hives     Lisinopril Cough     Sertraline Nausea and Vomiting       Social History   I have reviewed this patient's social history and updated it with pertinent information if needed. Maxine Sears  reports that she quit smoking about 48 years ago. Her smoking use included cigarettes. She started smoking about 49 years ago. She has a 0.25 pack-year smoking history. She has never used smokeless tobacco. She reports that she does not drink alcohol or use drugs.    Family History   I have reviewed this patient's family history and updated it with pertinent information if needed.   Family  History   Problem Relation Age of Onset     Myocardial Infarction Mother      Heart Failure Mother      Heart Surgery Father         bypass surgery     Cerebrovascular Disease Father      Heart Surgery Brother      Hyperlipidemia Brother      Hyperlipidemia Sister      Hyperlipidemia Brother      Sleep Apnea Sister        Review of Systems   The 10 point Review of Systems is negative other than noted in the HPI or here.     Physical Exam   Temp: 97.2  F (36.2  C) Temp src: Oral BP: (!) 132/92 Pulse: 105 Heart Rate: 126 Resp: 20 SpO2: 97 % O2 Device: None (Room air)    Vital Signs with Ranges  Temp:  [97.2  F (36.2  C)-98.2  F (36.8  C)] 97.2  F (36.2  C)  Pulse:  [104-134] 105  Heart Rate:  [117-134] 126  Resp:  [9-20] 20  BP: (113-209)/() 132/92  SpO2:  [97 %-100 %] 97 %  265 lbs 0 oz  GEN: well nourished, in no acute distress.  HEENT:  Pupils equal, round. Sclerae nonicteric.   NECK: Supple, no masses appreciated.   C/V:  Regular rate and rhythm, no murmur, rub or gallop.   RESP: Respirations are unlabored. Clear to auscultation bilaterally without wheezing, rales, or rhonchi.  GI: Abdomen soft, obese, nontender.  EXTREM: No LE edema.  NEURO: Alert and oriented, cooperative.  SKIN: Warm and dry.    Data   Most Recent 3 CBC's:  Recent Labs   Lab Test 01/21/20  1125 11/19/19  1302 10/07/19  1526  04/12/19  0555   WBC 9.0  --  9.5  --  8.5   HGB 12.4 10.5* 11.4*   < > 8.7*   MCV 82  --  82  --  85   *  --  426  --  433    < > = values in this interval not displayed.     Most Recent 3 BMP's:  Recent Labs   Lab Test 01/21/20  1125 10/07/19  1526 04/12/19  0555    136 142   POTASSIUM 3.7 3.7 3.5   CHLORIDE 104 103 108   CO2 28 26 27   BUN 11 9 9   CR 0.70 0.74 0.73   ANIONGAP 6 7 7   J CARLOS 9.4 9.0 8.5   * 95 104*     Most Recent 3 Troponin's:  Recent Labs   Lab Test 01/21/20  1125 03/02/19  1610 07/13/15  1840   TROPI <0.015 <0.015 <0.015  The 99th percentile for upper reference range is 0.045  ug/L.  Troponin values in   the range of 0.045 - 0.120 ug/L may be associated with risks of adverse   clinical events.         Assessment & Plan   Maxine Sears is a 64 year old female with PMH significant for paroxsymal atrial fibrillation, NELY, hyperlipidemia, obesity, hypothyroidism, hypertension and history of GIB while on xarelto. Patient presented today with palpitations and found to be in atrial fibrillation with RVR.     1. Atrial fibrillation with RVR - patient presented with atrial fibrillation with RVR, spontaneously converted this afternoon after dosage of oral metoprolol. Symptoms resolved with restoration of sinus rhythm. Patient has history of paroxsymal atrial fibrillation for which she has been on flecainide. She follows with our electrophysiology team outpatient.    - [id number] score 2 (HTN, female)   - Not on oral anticoagulation given recurrent GI bleeds, see #2   - Currently on heparin gtt   - Continue flecainide   - Continue metoprolol tartrate 50mg BID (changed this admission from carvedilol -> metoprolol tartrate).    - EP consultation given recurrent atrial fibrillation while on flecainide.   2. History of GI bleeds on anticoagulation - Not on anticoagulation due to recurrent GI bleed while on xarelto March and April 2019.  Patient was retrialed on anticoagulation in October 2019, xarelto was again ultimately stopped in November due to drop in hemoglobin from 11.4-> 10.5. Currently on heparin gtt. Continue PPI.  3. Hypertension - controlled   4. Obstructive sleep apnea - compliant with CPAP most nights.       RECOMMENDATIONS  1. Electrophysiology consultation to see patient tomorrow given recurrent atrial fibrillation on flecainide  2. Echocardiogram pending  3. Consideration for watchman implantation   4. Continue metoprolol tartrate 50mg BID  5. Continue flecainide    Thank you for this consultation.  This patient was discussed with Dr. Jackson.  Final recommendations are pending  his documentation.      LUPE Asif [facility]  Pager: [phone]

## 2020-01-21 NOTE — ED NOTES
"[facility]  ED Nurse Handoff Report    ED Chief complaint: Palpitations      ED Diagnosis:   Final diagnoses:   Paroxysmal atrial fibrillation (H)       Code Status: Full Code    Allergies:   Allergies   Allergen Reactions     Morphine Anaphylaxis     Ceclor [Cefaclor] Hives     Diltiazem Hives     Lisinopril Cough     Sertraline Nausea and Vomiting       Patient Story: Palpitations  Focused Assessment:  Pt lives in a home independent. At approx 730 pt noticed palpitations. Pt denies CP. Pt is currently not anticoagulated because she had a gi bleed.     Treatments and/or interventions provided: fluids  Patient's response to treatments and/or interventions:     To be done/followed up on inpatient unit:      Does this patient have any cognitive concerns?:     Activity level - Baseline/Home:  Independent  Activity Level - Current:   Independent    Patient's Preferred language: English   Needed?: No    Isolation: None  Infection: Not Applicable  Bariatric?: No    Vital Signs:   Vitals:    01/21/20 1145 01/21/20 1200 01/21/20 1215 01/21/20 1225   BP: (!) 145/120 (!) 159/95     Pulse: 133 131 128    Resp: 18 12 19    Temp:       TempSrc:       SpO2: 97% 98% 97%    Weight:    120.2 kg (265 lb)   Height:    1.6 m (5' 3\")       Cardiac Rhythm:     Was the PSS-3 completed:   Yes  What interventions are required if any?               Family Comments: no family here  OBS brochure/video discussed/provided to patient/family: Yes              Name of person given brochure if not patient:               Relationship to patient:     For the majority of the shift this patient's behavior was Green.   Behavioral interventions performed were .    ED NURSE PHONE NUMBER: [phone]         "

## 2020-01-21 NOTE — PLAN OF CARE
Pt arrived from Ed today for afib with RVR. Converted back to SR. A/o. VSS. No pain. Some SOB with exertion. Tolerating diet. Up adlib. IV heparin gtt infusing. Next Xa level check at 1900. Echo done. Cards consult done. EP to see now. Will continue to monitor.

## 2020-01-21 NOTE — ED PROVIDER NOTES
History     Chief Complaint:  Palpitations       HPI   Maxine Sears is a 64 year old female who presents with palpitations that started this morning around 7:30 AM.  She states she has paroxysmal A. fib, takes flecainide and Coreg, does not take any blood thinners.  She does have A. fib occasionally, but states she spends most of her time in sinus tach.  She has no chest pain, no new activities.  When questioned about what her last week was like, she states that she may or may not have had some palpations, and is reasonably sure that this morning was the first time she had palpitations in late last month.  No syncope, no fainting, no lightheadedness.  No chest pain or arm pain.    Allergies:  Morphine  Ceclor [Cefaclor]  Diltiazem  Lisinopril  Sertraline     Medications:    carvedilol (COREG) 12.5 MG tablet  flecainide (TAMBOCOR) 150 MG tablet  gabapentin (NEURONTIN) 300 MG capsule  levothyroxine (SYNTHROID/LEVOTHROID) 150 MCG tablet  losartan (COZAAR) 100 MG tablet  pantoprazole (PROTONIX) 40 MG EC tablet  SUMAtriptan (IMITREX) 100 MG tablet  triamterene-HCTZ (MAXZIDE) 75-50 MG tablet  order for DME        Past Medical History:    Past Medical History:   Diagnosis Date     Atrial fibrillation and flutter (H) 1/5/15     Depression       Duodenal ulcer 03/04/2019     Essential hypertension       Gastroesophageal reflux disease without esophagitis 11/11/2015     GERD (gastroesophageal reflux disease)      Hiatal hernia      Hyperlipidemia LDL goal <130 3/26/2015     Hypothyroidism      Migraine without aura and without status migrainosus, not intractable  Aug 2016     Morbid obesity, unspecified obesity type (H) 12/9/2015     Obesity      NELY on CPAP      Osteoarthritis      RLS (restless legs syndrome)      Symptomatic menopausal or female climacteric states (aka FLASHES)      TMJ disease        Patient Active Problem List    Diagnosis Date Noted     Gastroesophageal reflux disease, esophagitis presence not  specified 01/10/2018     Priority: Medium     Severe episode of recurrent major depressive disorder, without psychotic features (H) 04/24/2017     Priority: Medium     NELY on CPAP      Priority: Medium     RLS (restless legs syndrome)      Priority: Medium     Essential hypertension 06/29/2016     Priority: Medium     Migraine without aura and without status migrainosus, not intractable 03/19/2016     Priority: Medium     Morbid obesity, unspecified obesity type (H) 12/09/2015     Priority: Medium     Hypothyroidism 12/03/2015     Priority: Medium     Hyperlipidemia LDL goal <130 03/26/2015     Priority: Medium     Health Care Home 01/19/2015     Priority: Medium     Status:  CLOSED  Care Coordinator:  Jovanna Abdalla RN    See Letters for [facility]  Date:  January 19, 2015           Atrial fibrillation and flutter (H)      Priority: Medium     Osteoarthritis      Priority: Medium     TMJ disease      Priority: Medium        Past Surgical History:    Past Surgical History:   Procedure Laterality Date     APPENDECTOMY       CHOLECYSTECTOMY       COLONOSCOPY N/A 4/12/2019    Procedure: COLONOSCOPY;  Surgeon: Vahid Cardona MD;  Location:  GI     ESOPHAGOSCOPY, GASTROSCOPY, DUODENOSCOPY (EGD), COMBINED N/A 4/11/2019    Procedure: COMBINED ESOPHAGOSCOPY, GASTROSCOPY, DUODENOSCOPY (EGD);  Surgeon: Ben Ko MD;  Location:  GI     HYSTERECTOMY, [facility]  08/1999     TONSILLECTOMY          Family History:    family history includes Cerebrovascular Disease in her father; Heart Failure in her mother; Heart Surgery in her brother and father; Hyperlipidemia in her brother, brother, and sister; Myocardial Infarction in her mother; Sleep Apnea in her sister.    Social History:   reports that she quit smoking about 48 years ago. Her smoking use included cigarettes. She started smoking about 49 years ago. She has a 0.25 pack-year smoking history. She has never used smokeless tobacco. She reports that she does  "not drink alcohol or use drugs.    PCP: Yuval Jama     Review of Systems   Cardiovascular: Positive for palpitations. Negative for chest pain.   All other systems reviewed and are negative.        Physical Exam     Patient Vitals for the past 24 hrs:   BP Temp Temp src Pulse Heart Rate Resp SpO2 Height Weight   01/21/20 1225 -- -- -- -- -- -- -- 1.6 m (5' 3\") 120.2 kg (265 lb)   01/21/20 1215 -- -- -- 128 125 19 97 % -- --   01/21/20 1200 (!) 159/95 -- -- 131 134 12 98 % -- --   01/21/20 1145 (!) 145/120 -- -- 133 121 18 97 % -- --   01/21/20 1130 (!) 140/95 -- -- 124 120 12 97 % -- --   01/21/20 1120 130/70 98.2  F (36.8  C) Oral 121 121 16 97 % -- --   01/21/20 1115 130/70 -- -- -- -- -- 97 % -- --        Physical Exam  Vitals: reviewed by me  General: Pt seen on [facility], cooperative, and alert to conversation, nontoxic, nondiaphoretic.  Eyes: Tracking well, clear conjunctiva BL  ENT: MMM, midline trachea.   Lungs: No tachypnea, no accessory muscle use. No respiratory distress.   CV: Rate as above, irregularly irregular rhythm.    Abd: Soft, non tender, no guarding, no rebound. Non distended  MSK: no peripheral edema or joint effusion.  No evidence of trauma  Skin: No rash, normal turgor and temperature  Neuro: Clear speech and no facial droop.  Psych: Not RIS, no e/o AH/VH      Emergency Department Course     ECG   Irregularly irregular rhythm with a rate of 144, does have some ST depression in the lateral leads, normal intervals, no ectopy.    Laboratory:  Labs Ordered and Resulted from Time of ED Arrival Up to the Time of Departure from the ED   CBC WITH PLATELETS DIFFERENTIAL - Abnormal; Notable for the following components:       Result Value    MCH 26.1 (*)     RDW 16.4 (*)     Platelet Count 476 (*)     All other components within normal limits   COMPREHENSIVE METABOLIC PANEL - Abnormal; Notable for the following components:    Glucose 118 (*)     All other components within normal " "limits   TROPONIN I   PLATELETS MONITORED PER HEPARIN TREATMENT PROTOCOL (FOR MEANINGFUL USE   MEASURE WEIGHT   NOTIFY PHYSICIAN   NOTIFY PHYSICIAN      Interventions:  Medications   heparin 25,000 units in 0.45% NaCl 250 mL ANTICOAGULANT  infusion (950 Units/hr Intravenous New Bag 1/21/20 1244)   0.9% sodium chloride BOLUS (1,000 mLs Intravenous New Bag 1/21/20 1128)   heparin ANTICOAGULANT  Loading Dose bolus dose from infusion pump 4,750 Units (4,750 Units Intravenous Given 1/21/20 1245)        Emergency Department Course:  Past medical records, nursing notes, and vitals reviewed.  I performed an exam of the patient and obtained history, as documented above.    Impression & Plan      Medical Decision Making:  This is a very pleasant 64-year-old female who presents the emergency room with atrial fibrillation.  She has paroxysmal A. fib, and is meant to be on anticoagulation, but has recently had this stopped, back in November.  Here in the ER, she states that she is \"pretty sure\" that she has had no palpitations throughout the week, however given her paroxysmal nature and longstanding A. fib, I am uncomfortable simply cardioverting her and sending her home.  I did speak with her cardiologist, who seem to share, at least in part, my concern, and together as a team we decided to admit the patient to the care of Dr. Hauser, while she is being anticoagulated.  She has no chest pain here, EKG changes likely due to demand ischemia, and regardless is being treated with heparin.  Will plan for admission as above, patient resting comfortably here again no pain at this time.    Diagnosis:    ICD-10-CM    1. Paroxysmal atrial fibrillation (H) I48.0         1/21/2020   Chad Mathews*        Chad Mathews MD  01/21/20 1322       Chad Mathews MD  01/21/20 1323    "

## 2020-01-21 NOTE — PLAN OF CARE
Observation goals    Cardiology consult complete : not met    Rate controlled or in sinus rhythm : not  met    Nurse to notify provider when observation goals have been met and patient is ready for discharge

## 2020-01-21 NOTE — ED NOTES
Bed: ED20  Expected date:   Expected time:   Means of arrival:   Comments:  516 48F chest discomfort anxiety ETA 1117

## 2020-01-21 NOTE — H&P
[facility]    History and Physical  Hospitalist       Date of Admission:  1/21/2020    Assessment & Plan   Maxine Sears is a 64 year old female with a past medical  History significant for NELY, HLD, obesity, hypothyroidism, GERD, history of GIB while on Xarelto, HTN, and hx paroxysmal atrial fibrillation who presented to the Emergency Department for evaluation of palpitations.     Atrial fibrillation with RVR  Patient has history of paroxysmal afib and follows with Dr. Vaughn in outpatient setting. She has previously undergone cardioversion, most recently 2/2019. She is on flecainide PTA as well as Coreg for HTN. Presents with onset of palpitations and mild chest pressure this am. Reports some self limiting episodes earlier this month as well. EKG shows afib with RVR. HR in the -140s. Not on AC PTA due to recurrent GI bleeds while on Xarelto March and April 2019. Discussed with Cardiology in ED, possible FRIDA/cardioversion.  --Cardiology consulted, appreciate assistance  --continue heparin started in the ED  --continue PTA flecainide- defer modifications to Cardiology   --will switch Coreg to Metoprolol tartrate and give 25mg now followed by 50mg bid starting tonight   --telemetry  --last ECHO 2017, will repeat   --check mag, TSH  --high electrolyte replacement protocol     Hx GIB secondary to duodenal ulcer   Patient previously on Xarelto for afib, however admitted in March with anemia and found to have duodenal ulcer on EGD. Following resumption of Xarelto she was again admitted in April with GI bleed. Retrialed Xarelto October 2019, ultimately stopped in Nov due to drop in hgb from 11.4>10.5. denies recent bleeding. Taking PPI bid. Has not followed up with GI since May 2019.   --continue PTA PPI    NELY. Continue CPAP per home settings    Hypothyroidism. Patient on levothyroxine PTA. Dose changed from 163mcg to 150mcg in December 2019 for TSH of 0.01.   --check TSH with reflex to  "FT4  --continue levothyroxine     HTN. PTA is on Coreg 12.5mg bid, losartan 100mg daily, and triamterene-hydrochlorothiazide   --change Coreg to Metoprolol as above   --continue PTA losartan  --while hold diuretic while NPO and titrating rate control agents     Obesity. Encouraged continued weight loss    RLS. Continue gabapentin at bedtime      DVT Prophylaxis: Heparin  Code Status: Full Code    Disposition: Expected discharge 1-2 days pending Cardiology plan.     This patient was seen and examined with Dr. Hauser who agrees with the above plan.    Ruma Brown PA-C    Primary Care Physician   Yuval Jama    Chief Complaint   Palpitations     History is obtained from the patient    History of Present Illness   Maxine Sears is a 64 year old female  with a past medical  History significant for NELY, HLD, obesity, hypothyroidism, GERD, history of GIB while on Xarelto, HTN, and hx paroxysmal atrial fibrillation who presented to the Emergency Department for evaluation of palpitations. The patient reports she was in her usual state of health when she woke up the day of admission. Around 0730 she began to feel \"funny\" in her chest similar to previous episodes of afib. She coughed several times as this has helped \"keep her heart from going fast\" in the past but after some time she developed palpitations, sensation of racing heart, dyspnea, and mild chest pressure. She bent over to pick something up and upon standing became very light headed. She ultimately called 911 and was brought to the ED for evaluation. EKG showed atrial fibrillation with RVR and some ST depression in lateral leads. Cardioversion was considered and case discussed with Cardiology who recommended initiation of heparin and admission for consideration of FRIDA. The patient is presently evaluated in the ED by me. She reports she still feels occasional palpitations but is currently without chest pain or dyspnea. She says she has had a few " episodes of palpitations since the first of the year but these have been self limiting previously. She has been off anticoagulation since re trialing Xarelto 10/2019 with subsequent small hgb drop. She denies recent melena, hematochezia, or any other evidence of bleeding. She has been compliant with PPI and all cardiac medications. Denies recent med changes. She has had no recent illnesses and reports normal po intake.       Past Medical History    Past Medical History:   Diagnosis Date     Atrial fibrillation and flutter (H) 1/5/15     Depression       Duodenal ulcer 03/04/2019     Essential hypertension       Gastroesophageal reflux disease without esophagitis 11/11/2015     GERD (gastroesophageal reflux disease)      Hiatal hernia      Hyperlipidemia LDL goal <130 3/26/2015     Hypothyroidism      Migraine without aura and without status migrainosus, not intractable  Aug 2016     Morbid obesity, unspecified obesity type (H) 12/9/2015     Obesity      NELY on CPAP      Osteoarthritis      RLS (restless legs syndrome)      Symptomatic menopausal or female climacteric states (aka FLASHES)      TMJ disease        Past Surgical History   Past Surgical History:   Procedure Laterality Date     APPENDECTOMY       CHOLECYSTECTOMY       COLONOSCOPY N/A 4/12/2019    Procedure: COLONOSCOPY;  Surgeon: Vahid Cardona MD;  Location:  GI     ESOPHAGOSCOPY, GASTROSCOPY, DUODENOSCOPY (EGD), COMBINED N/A 4/11/2019    Procedure: COMBINED ESOPHAGOSCOPY, GASTROSCOPY, DUODENOSCOPY (EGD);  Surgeon: Ben Ko MD;  Location:  GI     HYSTERECTOMY, [facility]  08/1999     TONSILLECTOMY         Prior to Admission Medications   Prior to Admission Medications   Prescriptions Last Dose Informant Patient Reported? Taking?   SUMAtriptan (IMITREX) 100 MG tablet prn Self No Yes   Sig: Take 1 tablet (100 mg) by mouth at onset of headache for migraine May repeat in 2 hours if needed: max 2/day;   carvedilol (COREG) 12.5 MG tablet  2020 at am  No Yes   Sig: Take 1 tablet (12.5 mg) by mouth 2 times daily (with meals)   flecainide (TAMBOCOR) 150 MG tablet 2020 at am  No Yes   Sig: TAKE 1/2 TABLET BY MOUTH TWICE A DAY   gabapentin (NEURONTIN) 300 MG capsule 2020 at hs  Yes Yes   Sig: Take 900 mg by mouth At Bedtime For restless legs syndrome   levothyroxine (SYNTHROID/LEVOTHROID) 150 MCG tablet 2020 at am  No Yes   Sig: Take 1 tablet (150 mcg) by mouth daily   losartan (COZAAR) 100 MG tablet 2020 at am Self No Yes   Sig: Take 1 tablet (100 mg) by mouth daily   order for DME  Self Yes No   Sig: DREAMSTATION  5-15 CM/H20  NASAL AIRFIT N10 HER   pantoprazole (PROTONIX) 40 MG EC tablet 2020 at am  No Yes   Sig: TAKE 1 TABLET BY MOUTH TWICE A DAY BEFORE MEALS   triamterene-HCTZ (MAXZIDE) 75-50 MG tablet 2020 at am  Yes Yes   Sig: Take 0.5 tablets by mouth daily      Facility-Administered Medications: None     Allergies   Allergies   Allergen Reactions     Morphine Anaphylaxis     Ceclor [Cefaclor] Hives     Diltiazem Hives     Lisinopril Cough     Sertraline Nausea and Vomiting       Social History   Denies alcohol use. Former smoker, quit >40 years ago.     Family History   Father  of CVA at age 54. Mother  of MI at age 69.     Review of Systems   The 10 point Review of Systems is negative other than noted in the HPI or here.     Physical Exam   Temp: 98.2  F (36.8  C) Temp src: Oral BP: (!) 159/95 Pulse: 128 Heart Rate: 125 Resp: 19 SpO2: 97 %      Vital Signs with Ranges  Temp:  [98.2  F (36.8  C)] 98.2  F (36.8  C)  Pulse:  [121-133] 128  Heart Rate:  [120-134] 125  Resp:  [12-19] 19  BP: (130-159)/() 159/95  SpO2:  [97 %-98 %] 97 %  265 lbs 0 oz    Temp: 98.2  F (36.8  C) Temp src: Oral BP: (!) 159/95 Pulse: 128 Heart Rate: 125 Resp: 19 SpO2: 97 %      Vitals:    20 1225   Weight: 120.2 kg (265 lb)     Vital Signs with Ranges  Temp:  [98.2  F (36.8  C)] 98.2  F (36.8  C)  Pulse:  [121-133]  128  Heart Rate:  [120-134] 125  Resp:  [12-19] 19  BP: (130-159)/() 159/95  SpO2:  [97 %-98 %] 97 %  No intake/output data recorded.    Constitutional: Alert and oriented, laying down in bed. Appears comfortable and is pleasantly conversant   ENT: normal cephalic, moist mucous membranes  Eyes:  Sclera anicteric. EOMI  Respiratory: Lungs clear to auscultation bilaterally, no increased work of breathing or wheezing   Cardiovascular: irregularly irregular rhythm with mild tachycardia, no murmur, no significant LE edema, distal pulses +2/4  GI:  active bowel sounds, abdomen is obese, soft, non-tender  MSK: moves all four extremities. Normal tone   Neuro:  Speech is clear. Face symmetric. No focal deficits.      Data     Recent Labs   Lab 01/21/20  1125   WBC 9.0   HGB 12.4   MCV 82   *      POTASSIUM 3.7   CHLORIDE 104   CO2 28   BUN 11   CR 0.70   ANIONGAP 6   J CARLOS 9.4   *   ALBUMIN 3.6   PROTTOTAL 7.7   BILITOTAL 0.4   ALKPHOS 113   ALT 18   AST 19   TROPI <0.015       No results found for this or any previous visit (from the past 24 hour(s)).

## 2020-01-22 VITALS
WEIGHT: 265 LBS | TEMPERATURE: 97.8 F | HEART RATE: 58 BPM | HEIGHT: 63 IN | BODY MASS INDEX: 46.95 KG/M2 | RESPIRATION RATE: 18 BRPM | DIASTOLIC BLOOD PRESSURE: 56 MMHG | SYSTOLIC BLOOD PRESSURE: 129 MMHG | OXYGEN SATURATION: 97 %

## 2020-01-22 LAB
ANION GAP SERPL CALCULATED.3IONS-SCNC: 7 MMOL/L (ref 3–14)
BUN SERPL-MCNC: 14 MG/DL (ref 7–30)
CALCIUM SERPL-MCNC: 8.8 MG/DL (ref 8.5–10.1)
CHLORIDE SERPL-SCNC: 105 MMOL/L (ref 94–109)
CO2 SERPL-SCNC: 24 MMOL/L (ref 20–32)
CREAT SERPL-MCNC: 0.72 MG/DL (ref 0.52–1.04)
GFR SERPL CREATININE-BSD FRML MDRD: 88 ML/MIN/{1.73_M2}
GLUCOSE SERPL-MCNC: 130 MG/DL (ref 70–99)
LMWH PPP CHRO-ACNC: 0.47 IU/ML
POTASSIUM SERPL-SCNC: 3.2 MMOL/L (ref 3.4–5.3)
SODIUM SERPL-SCNC: 136 MMOL/L (ref 133–144)

## 2020-01-22 PROCEDURE — G0378 HOSPITAL OBSERVATION PER HR: HCPCS

## 2020-01-22 PROCEDURE — 25000128 H RX IP 250 OP 636: Performed by: INTERNAL MEDICINE

## 2020-01-22 PROCEDURE — 25000132 ZZH RX MED GY IP 250 OP 250 PS 637: Performed by: PHYSICIAN ASSISTANT

## 2020-01-22 PROCEDURE — 96366 THER/PROPH/DIAG IV INF ADDON: CPT

## 2020-01-22 PROCEDURE — 99217 ZZC OBSERVATION CARE DISCHARGE: CPT | Performed by: PHYSICIAN ASSISTANT

## 2020-01-22 PROCEDURE — 25000132 ZZH RX MED GY IP 250 OP 250 PS 637: Performed by: INTERNAL MEDICINE

## 2020-01-22 PROCEDURE — 80048 BASIC METABOLIC PNL TOTAL CA: CPT | Performed by: PHYSICIAN ASSISTANT

## 2020-01-22 PROCEDURE — 85520 HEPARIN ASSAY: CPT | Performed by: PHYSICIAN ASSISTANT

## 2020-01-22 PROCEDURE — 99214 OFFICE O/P EST MOD 30 MIN: CPT | Performed by: INTERNAL MEDICINE

## 2020-01-22 PROCEDURE — 36415 COLL VENOUS BLD VENIPUNCTURE: CPT | Performed by: PHYSICIAN ASSISTANT

## 2020-01-22 RX ORDER — FLECAINIDE ACETATE 150 MG/1
150 TABLET ORAL EVERY 12 HOURS
Qty: 22 TABLET | Refills: 9 | Status: SHIPPED | OUTPATIENT
Start: 2020-01-22 | End: 2020-02-18

## 2020-01-22 RX ORDER — METOPROLOL TARTRATE 50 MG
50 TABLET ORAL 2 TIMES DAILY
Qty: 60 TABLET | Refills: 0 | Status: SHIPPED | OUTPATIENT
Start: 2020-01-22 | End: 2020-01-29

## 2020-01-22 RX ADMIN — HEPARIN SODIUM 950 UNITS/HR: 10000 INJECTION, SOLUTION INTRAVENOUS at 08:08

## 2020-01-22 RX ADMIN — LEVOTHYROXINE SODIUM 150 MCG: 75 TABLET ORAL at 06:35

## 2020-01-22 RX ADMIN — LOSARTAN POTASSIUM 100 MG: 100 TABLET, FILM COATED ORAL at 08:03

## 2020-01-22 RX ADMIN — PANTOPRAZOLE SODIUM 40 MG: 40 TABLET, DELAYED RELEASE ORAL at 08:03

## 2020-01-22 RX ADMIN — POTASSIUM CHLORIDE 40 MEQ: 1500 TABLET, EXTENDED RELEASE ORAL at 08:53

## 2020-01-22 RX ADMIN — FLECAINIDE ACETATE 75 MG: 50 TABLET ORAL at 08:03

## 2020-01-22 NOTE — PLAN OF CARE
Observation goals    Cardiology consult complete : met, EP consulted  Rate controlled or in sinus rhythm : met    Nurse to notify provider when observation goals have been met and patient is ready for discharge

## 2020-01-22 NOTE — PLAN OF CARE
Observation goals    Cardiology consult complete : met, EP consult pending.     Rate controlled or in sinus rhythm :   met    Nurse to notify provider when observation goals have been met and patient is ready for discharge

## 2020-01-22 NOTE — CONSULTS
Consult Date:  01/22/2020      REQUESTING PHYSICIAN:  Physician assistant, Ruma Brown.       REASON FOR CONSULTATION:  Atrial fibrillation.      HISTORY OF PRESENT ILLNESS:  Mrs. Sears is a 64-year-old white female with a history of recurrent symptomatic paroxysmal atrial fibrillation.  She was previously treated with flecainide 75 mg p.o. b.i.d.  She was followed by Dr. Vaughn and Ms. Monika Sue with the last clinic visit in 10/2018.  She was doing well until yesterday morning around 7 a.m., when she started to have recurrent heart racing.  She called the ambulance and her arrhythmia converted to sinus rhythm spontaneously around 10 a.m. while in the emergency room for echocardiography.  She has been observed over last night without recurrent atrial fibrillation.  At the present time, she has no complaints.      PAST MEDICAL HISTORY:  Hypertension, obesity, depression, recurrent GI bleeding on anticoagulation, hypothyroidism, hiatal hernia, migraine and restless leg syndrome.      FAMILY HISTORY:  Noncontributory to atrial fibrillation.      SOCIAL HISTORY:  She is full code.  She does not smoke or abuse alcohol.      REVIEW OF SYSTEMS:  Comprehensive review of the systems showed no fever, cough or diarrhea.      CURRENT MEDICATIONS:   1.  Lopressor 50 mg p.o. b.i.d.   2.  IV heparin.   3.  Flecainide 75 mg p.o. b.i.d.   4.  Neurontin 900 mg p.o. daily.   5.  Levothyroxine 150 mcg p.o. daily.   6.  Losartan 100 mg p.o. daily.   7.  Protonix 40 mg p.o. daily.      ALLERGIES:  CEFACLOR, DILTIAZEM, LISINOPRIL AND SERTRALINE.      PHYSICAL EXAMINATION:   GENERAL:  She is alert and oriented without acute distress.   VITAL SIGNS:  Blood pressure 113/39, heart rate 58 beats per minute, temperature 97.2 degrees, oxygen saturation 95% on room air.   HEENT:  Eyes and ENT were unremarkable.   LUNGS:  Clear.   HEART:  Rhythm was regular, and heart sounds were normal with no murmur.   ABDOMEN:  Examination showed no  hepatomegaly.   EXTREMITIES:  There was no pedal edema.   NEUROLOGIC:  Showed no focal deficit.      Her initial EKG showed atrial fibrillation with a heart rate in the 140s and narrow QRS complex.      ASSESSMENT AND RECOMMENDATIONS:  Mrs. Velasquez is a 64-year-old white female with a history of symptomatic paroxysmal atrial fibrillation.  She had recurrent symptomatic atrial fibrillation on flecainide 75 mg p.o. b.i.d.  The echocardiography this morning showed normal LV ejection fraction.  She had no history of myocardial infarct.  I recommended increasing the dose of flecainide from 75 to 150 mg p.o. b.i.d.  She will have an EKG for QRS duration monitoring in 1 week.  She will be back to the Cardiology Clinic for followup in 1 month with Vira Monika Morrelly.  If she has severe bradycardia, we may reduce the dose of metoprolol.  If she does not do well with medical therapy for atrial fibrillation, she may need catheter ablation of atrial fibrillation.      She has a clear contraindication for anticoagulation.  I therefore recommended discontinuation of IV heparin.  I would not put her on oral anticoagulant.         ALDO RENTERIA MD             D: 2020   T: 2020   MT: DEVON      Name:     DANIELLE VELASQUEZ   MRN:      [id number]        Account:       [id number]   :      1955           Consult Date:  2020      Document: [id number]

## 2020-01-22 NOTE — PROGRESS NOTES
Care Coordination:    Following for discharge planning.  Per notes patient needs follow up with ECG 1 week, ANALY 1 month.  Met with patient and explained role.  Pt prefers M-T-W am appointments.  Following appointments made for patient and on AVS.    Jan 29, 2020  9:30 AM CST  ecg with [facility]  [facility] [address] [place] [id number] [phone] OPT 2      Feb 18, 2020  8:00 AM CST  Return Discharge with Ilda Sue PA-C  [facility] ([facility]) [facility] [id number]  [facility] [id number] [phone] OPT 2            Pt voices no other needs for discharge. Sister to come pick patient up.         Amanda Wu RN BSN  Inpatient Care Coordination  [facility]  [phone]

## 2020-01-22 NOTE — PROGRESS NOTES
Recurrent AF with RVR 140s while on flecainide 75 mg bid, converted to sinus in about 2 hours. Now in sinus without complaints.  Hypertension and obesity. Normal EF.  Recurrent GI bleeding on anticoagulation.    Recommend:  Stop iv heparin.  Ok to continue metoprolol 50 mg bid.  Increase flecainide from 75 to 150 mg bid. ECG in 1 week, follow up with Monika Sue in 1 month.  Ok for discharge today.

## 2020-01-22 NOTE — PLAN OF CARE
Pt a/o. VSS. No pain. No SOB. Tolerating reg diet. Up adlb. SR on tele. Discharge paper work reviewed with pt. Verbalizes understanding. PIV removed. Follow up aware. Take home med's given. Verified 5 rights  Of med.

## 2020-01-22 NOTE — PROGRESS NOTES
A&Ox4. VSS on RA. Tele-SR. No pain. Some SOB with exertion. Tolerating diet. Up IND. IV heparin gtt infusing. Next Xa level check at 0600. Trops negative x3. Echo done. Cards consult done. NPO since MN, EP consulted. Will continue to monitor.

## 2020-01-22 NOTE — DISCHARGE SUMMARY
"[facility]    Discharge Summary  Hospitalist    Date of Admission:  1/21/2020  Date of Discharge:  1/22/2020  Discharging Provider: Damine Freed PA-C    Discharge Diagnoses      Paroxysmal atrial fibrillation (H)  Atrial fibrillation with RVR (H)    History of Present Illness   Maxine Sears is an 64 year old female who presented with palpitations, identified to be in afib with RVR.    HPI from admission H&P:  Maxine Sears is a 64 year old female  with a past medical  History significant for NELY, HLD, obesity, hypothyroidism, GERD, history of GIB while on Xarelto, HTN, and hx paroxysmal atrial fibrillation who presented to the Emergency Department for evaluation of palpitations. The patient reports she was in her usual state of health when she woke up the day of admission. Around 0730 she began to feel \"funny\" in her chest similar to previous episodes of afib. She coughed several times as this has helped \"keep her heart from going fast\" in the past but after some time she developed palpitations, sensation of racing heart, dyspnea, and mild chest pressure. She bent over to pick something up and upon standing became very light headed. She ultimately called 911 and was brought to the ED for evaluation. EKG showed atrial fibrillation with RVR and some ST depression in lateral leads. Cardioversion was considered and case discussed with Cardiology who recommended initiation of heparin and admission for consideration of FRIDA. The patient is presently evaluated in the ED by me. She reports she still feels occasional palpitations but is currently without chest pain or dyspnea. She says she has had a few episodes of palpitations since the first of the year but these have been self limiting previously. She has been off anticoagulation since re trialing Xarelto 10/2019 with subsequent small hgb drop. She denies recent melena, hematochezia, or any other evidence of bleeding. She has been compliant " with PPI and all cardiac medications. Denies recent med changes. She has had no recent illnesses and reports normal po intake.     Hospital Course   Maxine Sears was admitted on 1/21/2020.  The following problems were addressed during her hospitalization:     Atrial fibrillation with RVR  Patient has history of paroxysmal afib and follows with Dr. Vaughn in outpatient setting. She has previously undergone cardioversion, most recently 2/2019. She is on flecainide PTA as well as Coreg for HTN. Presents with onset of palpitations and mild chest pressure this am. Reports some self limiting episodes earlier this month as well. EKG shows afib with RVR. HR in the -140s. Not on AC PTA due to recurrent GI bleeds while on Xarelto March and April 2019. Following presentation and admission, pt auto-converted to NSR and remained for >12h.  - Cardiology/EP evaluated patient, increased PTA flecainide to 150mg BID, recommended outpatient EKG in one week and follow-up with EP ANALY in 1 month  - Started Metoprolol tartrate in favor of Coreg at 50mg BID; NOTE patient unsure if she was taking Coreg PTA; have recommended medication compliance with metoprolol  - Remains in NSR at discharge  - Not continued on anticoagulation as detailed above     Hx GIB secondary to duodenal ulcer   Patient previously on Xarelto for afib, however admitted in March with anemia and found to have duodenal ulcer on EGD. Following resumption of Xarelto she was again admitted in April with GI bleed. Retrialed Xarelto October 2019, ultimately stopped in Nov due to drop in hgb from 11.4>10.5. denies recent bleeding. Taking PPI bid. Has not followed up with GI since May 2019.   --continue PTA PPI     NELY. Continue CPAP per home settings     Hypothyroidism. Patient on levothyroxine PTA. Dose changed from 163mcg to 150mcg in December 2019 for TSH of 0.01. repeat TFTs this admission with TSH 0.08, free T4 wnl at 1.3.  - Continue levothyroxine at current  dose     HTN. PTA is on Coreg 12.5mg bid, losartan 100mg daily, and triamterene-hydrochlorothiazide   - Change Coreg to Metoprolol as above      Obesity. Encouraged continued weight loss     RLS. Continue gabapentin at bedtime    Damien Freed PA-C    Significant Results and Procedures   As noted    Pending Results   These results will be followed up by n/a  Unresulted Labs Ordered in the Past 30 Days of this Admission     Date and Time Order Name Status Description    1/21/2020 1125 T4 free In process           Code Status   Full Code       Primary Care Physician   Yuval Jama    Physical Exam   Temp: 97.2  F (36.2  C) Temp src: Oral BP: (!) 113/39 Pulse: 58 Heart Rate: 59 Resp: 20 SpO2: 95 % O2 Device: None (Room air)    Vitals:    01/21/20 1225   Weight: 120.2 kg (265 lb)     Vital Signs with Ranges  Temp:  [96.9  F (36.1  C)-98.2  F (36.8  C)] 97.2  F (36.2  C)  Pulse:  [] 58  Heart Rate:  [] 59  Resp:  [9-20] 20  BP: (113-209)/() 113/39  SpO2:  [95 %-100 %] 95 %  I/O last 3 completed shifts:  In: 240 [P.O.:240]  Out: -     GEN: well-developed, well-nourished, appears comfortable  PULM: lungs CTA bilaterally, no increased work of breathing, no wheeze, rales, rhonchi  CV: RRR, S1 & S2, no murmur  GI: soft, nontender, nondistended, no guarding or rigidity, +BS in all 4 quadrants  SKIN: warm & dry without rash, wound, or pedal edema    Discharge Disposition   Discharged to home  Condition at discharge: Stable    Consultations This Hospital Stay   PHARMACY TO DOSE HEPARIN  CARDIOLOGY IP CONSULT  ELECTROPHYSIOLOGY IP CONSULT  PHARMACY LIAISON FOR MEDICATION COVERAGE CONSULT    Time Spent on this Encounter   IDamien PA-C, personally saw the patient today and spent less than or equal to 30 minutes discharging this patient.    Discharge Orders      Discharge Order: F/U with Cardiac  ANALY      EKG 12-lead complete w/read  (to be scheduled)     Reason for your hospital stay    afib with RVR      Follow-up and recommended labs and tests     Follow up with Cardiac ANALY as scheduled in one month and in Cardiology clinic within one week for repeat EKG.     Activity    Your activity upon discharge: activity as tolerated     Diet    Follow this diet upon discharge: Orders Placed This Encounter      Low Saturated Fat Diet     Discharge Medications   Current Discharge Medication List      START taking these medications    Details   metoprolol tartrate (LOPRESSOR) 50 MG tablet Take 1 tablet (50 mg) by mouth 2 times daily  Qty: 60 tablet, Refills: 0    Associated Diagnoses: Atrial fibrillation with RVR (H)         CONTINUE these medications which have CHANGED    Details   flecainide (TAMBOCOR) 150 MG tablet Take 1 tablet (150 mg) by mouth every 12 hours  Qty: 22 tablet, Refills: 9    Associated Diagnoses: Paroxysmal atrial fibrillation (H)         CONTINUE these medications which have NOT CHANGED    Details   carvedilol (COREG) 12.5 MG tablet Take 1 tablet (12.5 mg) by mouth 2 times daily (with meals)  Qty: 180 tablet, Refills: 3    Associated Diagnoses: Benign essential hypertension      gabapentin (NEURONTIN) 300 MG capsule Take 900 mg by mouth At Bedtime For restless legs syndrome    Comments: Prescribed by sleep clinic      levothyroxine (SYNTHROID/LEVOTHROID) 150 MCG tablet Take 1 tablet (150 mcg) by mouth daily  Qty: 30 tablet, Refills: 6    Associated Diagnoses: Hypothyroidism, unspecified type      losartan (COZAAR) 100 MG tablet Take 1 tablet (100 mg) by mouth daily  Qty: 90 tablet, Refills: 3    Comments: Resume 3/6 if BP stable  Associated Diagnoses: Essential hypertension      pantoprazole (PROTONIX) 40 MG EC tablet TAKE 1 TABLET BY MOUTH TWICE A DAY BEFORE MEALS  Qty: 60 tablet, Refills: 5    Comments: Maximum Refills Reached  Associated Diagnoses: Acute GI bleeding      SUMAtriptan (IMITREX) 100 MG tablet Take 1 tablet (100 mg) by mouth at onset of headache for migraine May repeat in 2 hours if  needed: max 2/day;  Qty: 10 tablet, Refills: 11    Comments: Profile only. Pt doesn't require refill at this time  Associated Diagnoses: Migraine without aura and without status migrainosus, not intractable      triamterene-HCTZ (MAXZIDE) 75-50 MG tablet Take 0.5 tablets by mouth daily      order for DME DREAMSTATION  5-15 CM/H20  NASAL AIRFIT N10 HER           Allergies   Allergies   Allergen Reactions     Morphine Anaphylaxis     Ceclor [Cefaclor] Hives     Diltiazem Hives     Lisinopril Cough     Sertraline Nausea and Vomiting     Data   Most Recent 3 CBC's:  Recent Labs   Lab Test 01/21/20  1125 11/19/19  1302 10/07/19  1526  04/12/19  0555   WBC 9.0  --  9.5  --  8.5   HGB 12.4 10.5* 11.4*   < > 8.7*   MCV 82  --  82  --  85   *  --  426  --  433    < > = values in this interval not displayed.      Most Recent 3 BMP's:  Recent Labs   Lab Test 01/22/20  0701 01/21/20  1125 10/07/19  1526    137 136   POTASSIUM 3.2* 3.7 3.7   CHLORIDE 105 104 103   CO2 24 28 26   BUN 14 11 9   CR 0.72 0.70 0.74   ANIONGAP 7 6 7   J CARLOS 8.8 9.4 9.0   * 118* 95     Most Recent 2 LFT's:  Recent Labs   Lab Test 01/21/20  1125 04/10/19  1622   AST 19 14   ALT 18 19   ALKPHOS 113 101   BILITOTAL 0.4 0.2     Most Recent INR's and Anticoagulation Dosing History:  Anticoagulation Dose History     Recent Dosing and Labs Latest Ref Rng & Units 3/2/2019    INR 0.86 - 1.14 1.78(H)        Most Recent 3 Troponin's:  Recent Labs   Lab Test 01/21/20  2007 01/21/20  1545 01/21/20  1125   TROPI <0.015 <0.015 <0.015     Most Recent Cholesterol Panel:  Recent Labs   Lab Test 03/13/19  0952   CHOL 172   LDL 85   HDL 45*   TRIG 212*     Most Recent 6 Bacteria Isolates From Any Culture (See EPIC Reports for Culture Details):No lab results found.  Most Recent TSH, T4 and A1c Labs:  Recent Labs   Lab Test 01/21/20  1125  03/13/19  0952   TSH 0.08*   < >  --    T4 1.30   < >  --    A1C  --   --  5.9*    < > = values in this interval not  displayed.     Results for orders placed or performed during the hospital encounter of 20   Echocardiogram Complete    Narrative    [id number]  [id number]  [id number]^CHRISTINE^JANE^AGUSTIN           [facility]  Echocardiography Laboratory  [address] [facility] [place] [address]        Name: DANIELLE VELASQUEZ  MRN: [id number]  : 1955  Study Date: 2020 03:07 PM  Age: 64 yrs  Gender: Female  Patient Location: [facility]  Reason For Study: Afib  Ordering Physician: JANE KING  Referring Physician: Yuval Jama  Performed By: Doron Suarez RDCS     BSA: 2.2 m2  Height: 63 in  Weight: 265 lb  HR: 73  BP: 136/81 mmHg  _____________________________________________________________________________  __        Procedure  Complete Portable Echo Adult. Optison (NDC #[id number]) given intravenously.  _____________________________________________________________________________  __        Interpretation Summary     Left ventricular systolic function is normal.  The visual ejection fraction is estimated at 55-60%.  The study was technically difficult. Compared to the prior study dated ,  there have been no changes.  _____________________________________________________________________________  __        Left Ventricle  The left ventricle is normal in size. There is concentric remodeling present.  Left ventricular systolic function is normal. The visual ejection fraction is  estimated at 55-60%. Left ventricular diastolic function is indeterminate. No  regional wall motion abnormalities noted.     Right Ventricle  The right ventricle is normal size. The right ventricular systolic function is  normal.     Atria  The left atrium is not well visualized. Right atrium not well visualized.     Mitral Valve  There is mild mitral annular calcification. There is no mitral regurgitation  noted.        Tricuspid Valve  There is trace tricuspid regurgitation. Right ventricular systolic pressure  could  not be approximated due to inadequate tricuspid regurgitation. IVC  diameter <2.1 cm collapsing >50% with sniff suggests a normal RA pressure of 3  mmHg.     Aortic Valve  The aortic valve is trileaflet. No aortic regurgitation is present. No aortic  stenosis is present.     Pulmonic Valve  The pulmonic valve is not well visualized.     Vessels  The aortic root is normal size.     Pericardium  There is no pericardial effusion.        Rhythm  Sinus rhythm was noted.  _____________________________________________________________________________  __  MMode/2D Measurements & Calculations  IVSd: 1.3 cm     LVIDd: 4.1 cm  LVIDs: 2.8 cm  LVPWd: 1.1 cm  FS: 31.1 %  LV mass(C)d: 182.8 grams  LV mass(C)dI: 83.9 grams/m2  Ao root diam: 3.1 cm  LA dimension: 3.3 cm  asc Aorta Diam: 3.1 cm  LA/Ao: 1.1  RWT: 0.56        Doppler Measurements & Calculations  MV E max leta: 58.2 cm/sec  MV A max leta: 47.6 cm/sec  MV E/A: 1.2  MV dec time: 0.26 sec  PA acc time: 0.08 sec  TR max leta: 218.0 cm/sec  TR max P.0 mmHg              _____________________________________________________________________________  __        Report approved by: Markos Cerda 2020 04:51 PM

## 2020-01-22 NOTE — PLAN OF CARE
Observation goals    Cardiology consult complete : partially met, EP consulted  Rate controlled or in sinus rhythm : met    Nurse to notify provider when observation goals have been met and patient is ready for discharge

## 2020-01-23 ENCOUNTER — TELEPHONE (OUTPATIENT)
Dept: CARDIOLOGY | Facility: CLINIC | Age: 65
End: 2020-01-23

## 2020-01-23 ENCOUNTER — TELEPHONE (OUTPATIENT)
Dept: INTERNAL MEDICINE | Facility: CLINIC | Age: 65
End: 2020-01-23

## 2020-01-23 DIAGNOSIS — Z71.89 OTHER SPECIFIED COUNSELING: ICD-10-CM

## 2020-01-23 NOTE — TELEPHONE ENCOUNTER
Patient was evaluated by cardiology while inpatient for symptomatic A. Fib with RVR-palpitations while taking Flecainide. PMH of PAF in 2015 and previous DCCV. Spontaneously converted to NSR in ED. Started on Metoprolol and Flecainide increased. No OAC secondary to past hx of GIB. Writer attempted to call patient to discuss any post hospital d/c questions she may have, review medication changes, and confirm f/u appts, but no answer. VM left instructing pt to call back with any chest pain, palpitations, SOB or lightheadedness, or with any medication questions. RN left reminder for patient that she has an apt scheduled on 2/18/20 with SINDY Monika Sue. Patient advised to call clinic with any cardiac related questions or concerns prior to this sindy'gabi.  ILIR Garnica RN.

## 2020-01-24 ENCOUNTER — PATIENT OUTREACH (OUTPATIENT)
Dept: CARE COORDINATION | Facility: CLINIC | Age: 65
End: 2020-01-24

## 2020-01-24 ASSESSMENT — ACTIVITIES OF DAILY LIVING (ADL): DEPENDENT_IADLS:: CLEANING;TRANSPORTATION

## 2020-01-24 NOTE — LETTER
[place] CARE COORDINATION  [facility]  [address], [place] [address]    January 28, 2020        Maxine Velasquez  [address]  [facility][1] [id number]                                                                          Counts include 234 beds at the [facility]  Complex Care Plan  About Me:    Patient Name:  Maxine Velasquez    YOB: 1955  Age:         64 year old   Anita MRN:    [id number] Telephone Information:  Home Phone [phone]   Mobile [phone]       Address:  [address]  [facility][1] [id number] Email address:  No e-mail address on record      Emergency Contact(s)    Name Relationship Lgl Grd Work Phone Home Phone Mobile Phone   1. SEVERSON,VIVIAN Sister   [phone]   2. BRANDI GEORGES Daughter  none [phone]   3. PRATIMA VELASQUEZ Son   [phone]           Primary language:  English     needed? No   [employer] [employer]:  [phone] op. 1  Other communication barriers: None  Preferred Method of Communication:  Phone  Current living arrangement: I live alone  Mobility Status/ Medical Equipment: Independent w/Device    Health Maintenance  Health Maintenance Reviewed: Due/Overdue   Health Maintenance Due   Topic Date Due     ADVANCE CARE PLANNING  1955     MIGRAINE ACTION PLAN  1955     ZOSTER IMMUNIZATION (1 of 2) 08/23/2005     PAP  05/26/2020     My Access Plan  Medical Emergency 911   Primary Clinic Line [facility] - [phone]   24 Hour Appointment Line [phone] or  [phone]) (toll-free)   24 Hour Nurse Line [phone] (toll-free)   Preferred Urgent Care [facility], [phone]   Preferred Hospital [facility]  [phone]   Preferred Pharmacy [facility] [place], [place] - [address]     Behavioral Health Crisis Line The National  Suicide Prevention Lifeline at 1-211.111.2655 or 911       My Care Team Members  Patient Care Team       Relationship Specialty Notifications Start End    Yuval Jama MD PCP - General Internal Medicine  10/20/14     Merged    Phone: [phone] Fax: [phone]         [address] [place] [address]    Yuval Jama MD  Internal Medicine  10/20/14     Merged    Phone: [phone] Fax: [phone]         [address] [place] [address]    Yuval Jama MD Assigned PCP   12/19/14     Phone: [phone] Fax: [phone] [address] [place] [address]    Damien Person, RN Lead Care Coordinator Primary Care - CC  3/6/19     Phone: [phone]         Carmen Mims MA Community Health Worker Primary Care - [facility]  1/22/20             My Care Plans  Self Management and Treatment Plan  Goals and (Comments)  Goals        General    1. Being Active (pt-stated)     Notes - Note edited  1/28/2020  3:12 PM by Damien Person, RN    Goal Statement: I will work to lose 15 lbs in 6 months by increasing my daily physical activity.  Date Goal set: 10/10/19  Date to Achieve By: 7/31/20  Patient expressed understanding of goal: Yes  Action steps to achieve this goal:  1. I will use the gym within my apartment to exercise.  2. I will increase the amount of walking I do on a daily basis.  3. I will continue going to [facility] and participating in activities to stay active.           Action Plans on File:    Depression       My Medical and Care Information  Problem List   Patient Active Problem List   Diagnosis     Osteoarthritis     TMJ disease     Atrial fibrillation and flutter (H)     Health Care Home     Hyperlipidemia LDL goal <130     Hypothyroidism     Morbid obesity, unspecified obesity type (H)     Migraine without aura and without status migrainosus, not intractable     Essential hypertension     NELY on CPAP     RLS (restless legs syndrome)     Severe episode of recurrent major  depressive disorder, without psychotic features (H)     Gastroesophageal reflux disease, esophagitis presence not specified     Atrial fibrillation with RVR (H)      Current Medications:  Current Outpatient Medications   Medication     flecainide (TAMBOCOR) 150 MG tablet     gabapentin (NEURONTIN) 300 MG capsule     levothyroxine (SYNTHROID/LEVOTHROID) 150 MCG tablet     losartan (COZAAR) 100 MG tablet     metoprolol tartrate (LOPRESSOR) 50 MG tablet     order for DME     pantoprazole (PROTONIX) 40 MG EC tablet     SUMAtriptan (IMITREX) 100 MG tablet     triamterene-HCTZ (MAXZIDE) 75-50 MG tablet     No current facility-administered medications for this visit.      Care Coordination Start Date: 3/15/2019   Frequency of Care Coordination: monthly   Form Last Updated: 01/28/2020

## 2020-01-24 NOTE — PROGRESS NOTES
Clinic Care Coordination Contact  [facility]/Voicemail    Referral Source: IP Report  Atrial fibrillation with RVR    Clinical Data: Care Coordinator Outreach  Outreach attempted x 1.  Left message on patient's voicemail with call back information and requested return call.  Plan: Care Coordinator will try to reach patient again in 1-2 business days.    Damien Person RN  Clinic Care Coordinator  [facility]  Ph: [phone]

## 2020-01-28 ENCOUNTER — TELEPHONE (OUTPATIENT)
Dept: INTERNAL MEDICINE | Facility: CLINIC | Age: 65
End: 2020-01-28

## 2020-01-28 DIAGNOSIS — E87.6 HYPOKALEMIA: Primary | ICD-10-CM

## 2020-01-28 ASSESSMENT — ACTIVITIES OF DAILY LIVING (ADL): DEPENDENT_IADLS:: CLEANING;TRANSPORTATION

## 2020-01-28 NOTE — PROGRESS NOTES
Clinic Care Coordination Contact    Clinic Care Coordination Contact  OUTREACH    Referral Information:  Referral Source: IP Report  Primary Diagnosis: Cardiovascular - other  Chief Complaint   Patient presents with     Clinic Care Coordination - Post Hospital     afib w/RVR   Clinical Concerns:  CCC RN outreach to patient following recent hospitalization for atrial fibrillation with RVR.  CCC RN to also get updates on patient's progress with previously establish goal with Care Coordination.    The patient reports to be feeling a bit better following hospital discharge but does still have times when it is hard to catch her breath. She reports these episodes as being random and it doesn't matter if she is up moving around or at rest.  She denied persistent symptoms similar to those for which she went to the ED but agreed to call her care team and/or return to ED should she experience the same.    The patient doesn't have a BP cuff at home but agreed to check her BP at [facility] when she goes either tomorrow or Thursday.  CCC RN reviewed BP parameters and instructed patient to call if BP outside of discussed parameters.    The patient does currently have an appointment scheduled for tomorrow for an EKG and Cardiology follow-up scheduled 2/18/20.  She doesn't currently have PCP follow-up scheduled but she was interested in doing so related to not having seen PCP for several months and being due for labs.  CCC RN assisted patient to schedule lab and PCP appointment accordingly.  Will inquire with PCP if patient needs potassium rechecked in addition to other ordered labs related to potassium being low during hospitalization.    Pain  Pain (GOAL): Yes  Type: Chronic (>3mo)  Location of chronic pain: back    Medication Management:  Post-discharge medication reconciliation status: Discharge medications reviewed and reconciled.  Changed medications per note/orders (see AVS).  The patient manages her  medications independently and denied current questions or concerns related to her medications.    Functional Status:  Dependent ADLs: Independent  Dependent IADLs: Cleaning, Transportation  Mobility Status: Independent w/Device    Living Situation:  Current living arrangement: I live alone  Type of residence: Independent Senior Living    Lifestyle & Psychosocial Needs:  Diet: Regular  Inadequate nutrition (GOAL): No  Inadequate activity/exercise (GOAL): Yes  Significant changes in sleep pattern (GOAL): No  Regular car, Family, Friend  Financial/Insurance concerns (GOAL): No  Baptist or spiritual beliefs that impact treatment: No  Mental health DX: Yes  Mental health management concern (GOAL): No  Informal Support system: Family, Friends, Debby based   Socioeconomic History     Marital status:      Spouse name: Not on file     Number of children: Not on file     Years of education: Not on file     Highest education level: Not on file     Tobacco Use     Smoking status: Former Smoker     Packs/day: 0.25     Years: 1.00     Pack years: 0.25     Types: Cigarettes     Start date:      Last attempt to quit:      Years since quittin.1     Smokeless tobacco: Never Used     Tobacco comment: very minimal smoking history   Substance and Sexual Activity     Alcohol use: No     Alcohol/week: 0.0 standard drinks     Drug use: No     Sexual activity: Never     Resources and Interventions:  Community Resources: Housekeeping/Chore Agency  Equipment Currently Used at Home: walker, rolling     Goals:   Goals        General    1. Being Active (pt-stated)     Notes - Note edited  2020  3:12 PM by Damien Person RN    Goal Statement: I will work to lose 15 lbs in 6 months by increasing my daily physical activity.  Date Goal set: 10/10/19  Date to Achieve By: 20  Patient expressed understanding of goal: Yes  Action steps to achieve this goal:  1. I will use the gym within my apartment to exercise.  2. I  will increase the amount of walking I do on a daily basis.  3. I will continue going to [facility] and participating in activities to stay active.    As of today's date 1/28/2020 goal is met at 0 - 25%.   Goal Status:  Active -- The patient reports most recent weight of 265 lbs.  Activity has been limited due to recent health complications.        Barriers: Chronic back pain. Symptoms from atrial fibrillation  Strengths: Patient motivated to lose weight.    CCC RN discussed patient's goal of weight loss through physical activity.  The patient would like to continue focusing on this goal as she knows how important weight loss is for her overall health.  CCC RN revised patient's goal per discussion.  She hasn't checked her weight recently; last weight in clinic was 265 lbs.  She will replace the batteries in her home scale so she can more consistently be monitoring her weight.    Patient/Caregiver understanding: Yes    Outreach Frequency: monthly  Future Appointments              Tomorrow [facility]    Tomorrow [facility]    In 1 week Yuval Jama MD [facility]    In 3 weeks Ilda Sue PA-C [facility]        Plan: The patient will continue taking her medications daily as prescribed.  She will check her blood pressure and report readings to Cardiology if outside of parameters as discussed.  The patient will attend her upcoming appointments as scheduled and will contact CCC RN with additional questions or concerns.  She will continue to work on weight loss through physical activity per her goal.  Care Coordination will outreach to patient in approximately 1 month to get updates on patient status, inquire about patient's goal progress, and offer additional support and resources as  indicated.    CHW delegation:    Patient outreach in approximately 1 month to assess patient's progress with goal.    Damien Person, RN  Clinic Care Coordinator  [facility]  Ph: [phone]

## 2020-01-28 NOTE — TELEPHONE ENCOUNTER
Per Care Coordinations Inpatient out reach, patient coming into clinic to complete labs tomorrow morning.    While in Hospital, Potassium low. No future orders for BMP. PCP ok to add to already futured orders: CBC, Iron, Ferritin, Reticulocyte count, and TSH with Free T4 reflex?      FYI,  Future Office Visit:   Next 5 appointments (look out 90 days)    Feb 10, 2020  7:30 AM CST  Office Visit with Yuval Jama MD  [facility] ([facility] [address] [place] [id number] [phone]           Order td'up. Please review, edit/add, and sign if appropriate.     Alice DANIELSN, RN, PHN

## 2020-01-29 ENCOUNTER — TELEPHONE (OUTPATIENT)
Dept: CARDIOLOGY | Facility: CLINIC | Age: 65
End: 2020-01-29

## 2020-01-29 DIAGNOSIS — I48.91 ATRIAL FIBRILLATION WITH RVR (H): ICD-10-CM

## 2020-01-29 DIAGNOSIS — E03.9 HYPOTHYROIDISM, UNSPECIFIED TYPE: ICD-10-CM

## 2020-01-29 DIAGNOSIS — E87.6 HYPOKALEMIA: ICD-10-CM

## 2020-01-29 DIAGNOSIS — D64.9 ANEMIA, UNSPECIFIED TYPE: ICD-10-CM

## 2020-01-29 LAB
ANION GAP SERPL CALCULATED.3IONS-SCNC: 7 MMOL/L (ref 3–14)
BUN SERPL-MCNC: 19 MG/DL (ref 7–30)
CALCIUM SERPL-MCNC: 8.8 MG/DL (ref 8.5–10.1)
CHLORIDE SERPL-SCNC: 104 MMOL/L (ref 94–109)
CO2 SERPL-SCNC: 25 MMOL/L (ref 20–32)
CREAT SERPL-MCNC: 0.79 MG/DL (ref 0.52–1.04)
ERYTHROCYTE [DISTWIDTH] IN BLOOD BY AUTOMATED COUNT: 17.1 % (ref 10–15)
FERRITIN SERPL-MCNC: 40 NG/ML (ref 8–252)
GFR SERPL CREATININE-BSD FRML MDRD: 79 ML/MIN/{1.73_M2}
GLUCOSE SERPL-MCNC: 109 MG/DL (ref 70–99)
HCT VFR BLD AUTO: 38.8 % (ref 35–47)
HGB BLD-MCNC: 12 G/DL (ref 11.7–15.7)
IRON SATN MFR SERPL: 5 % (ref 15–46)
IRON SERPL-MCNC: 24 UG/DL (ref 35–180)
MCH RBC QN AUTO: 26 PG (ref 26.5–33)
MCHC RBC AUTO-ENTMCNC: 30.9 G/DL (ref 31.5–36.5)
MCV RBC AUTO: 84 FL (ref 78–100)
PLATELET # BLD AUTO: 456 10E9/L (ref 150–450)
POTASSIUM SERPL-SCNC: 4.3 MMOL/L (ref 3.4–5.3)
RBC # BLD AUTO: 4.62 10E12/L (ref 3.8–5.2)
RETICS # AUTO: 91.2 10E9/L (ref 25–95)
RETICS/RBC NFR AUTO: 2 % (ref 0.5–2)
SODIUM SERPL-SCNC: 136 MMOL/L (ref 133–144)
T4 FREE SERPL-MCNC: 1.15 NG/DL (ref 0.76–1.46)
TIBC SERPL-MCNC: 440 UG/DL (ref 240–430)
TSH SERPL DL<=0.005 MIU/L-ACNC: 0.09 MU/L (ref 0.4–4)
WBC # BLD AUTO: 11.1 10E9/L (ref 4–11)

## 2020-01-29 PROCEDURE — 80048 BASIC METABOLIC PNL TOTAL CA: CPT | Performed by: INTERNAL MEDICINE

## 2020-01-29 PROCEDURE — 85027 COMPLETE CBC AUTOMATED: CPT | Performed by: INTERNAL MEDICINE

## 2020-01-29 PROCEDURE — 84439 ASSAY OF FREE THYROXINE: CPT | Performed by: INTERNAL MEDICINE

## 2020-01-29 PROCEDURE — 36415 COLL VENOUS BLD VENIPUNCTURE: CPT | Performed by: INTERNAL MEDICINE

## 2020-01-29 PROCEDURE — 83550 IRON BINDING TEST: CPT | Performed by: INTERNAL MEDICINE

## 2020-01-29 PROCEDURE — 93000 ELECTROCARDIOGRAM COMPLETE: CPT | Performed by: INTERNAL MEDICINE

## 2020-01-29 PROCEDURE — 83540 ASSAY OF IRON: CPT | Performed by: INTERNAL MEDICINE

## 2020-01-29 PROCEDURE — 85045 AUTOMATED RETICULOCYTE COUNT: CPT | Performed by: INTERNAL MEDICINE

## 2020-01-29 PROCEDURE — 84443 ASSAY THYROID STIM HORMONE: CPT | Performed by: INTERNAL MEDICINE

## 2020-01-29 PROCEDURE — 82728 ASSAY OF FERRITIN: CPT | Performed by: INTERNAL MEDICINE

## 2020-01-29 NOTE — TELEPHONE ENCOUNTER
Pt came in for EKG after having Flecainide increased on discharge from hospital.  Pt is now on Flecainide 150 mg bid.  EKG reviewed by Dr Santiago.  NSR HR 58.  Pt BP was 100/80 and pt is feeling dizziness, SOB and very tired.  Per Dr Santiago pt is to stop Metoprolol 50 mg bid.  Pt will see Dr Jama on 2/10 and a BP will be checked at that. Pt will call with any issues or no changes since stopping the Metoprolol. Med list updated. JNelsonRN

## 2020-02-02 PROBLEM — E61.1 IRON DEFICIENCY: Status: ACTIVE | Noted: 2020-02-02

## 2020-02-10 ENCOUNTER — OFFICE VISIT (OUTPATIENT)
Dept: INTERNAL MEDICINE | Facility: CLINIC | Age: 65
End: 2020-02-10
Payer: COMMERCIAL

## 2020-02-10 VITALS
SYSTOLIC BLOOD PRESSURE: 126 MMHG | DIASTOLIC BLOOD PRESSURE: 78 MMHG | BODY MASS INDEX: 46.06 KG/M2 | TEMPERATURE: 98.3 F | RESPIRATION RATE: 16 BRPM | HEART RATE: 90 BPM | WEIGHT: 260 LBS | OXYGEN SATURATION: 98 %

## 2020-02-10 DIAGNOSIS — I48.91 ATRIAL FIBRILLATION WITH RVR (H): ICD-10-CM

## 2020-02-10 DIAGNOSIS — E61.1 IRON DEFICIENCY: ICD-10-CM

## 2020-02-10 DIAGNOSIS — E66.01 MORBID OBESITY, UNSPECIFIED OBESITY TYPE (H): ICD-10-CM

## 2020-02-10 DIAGNOSIS — G47.33 OSA ON CPAP: ICD-10-CM

## 2020-02-10 DIAGNOSIS — E03.9 HYPOTHYROIDISM, UNSPECIFIED TYPE: Primary | ICD-10-CM

## 2020-02-10 DIAGNOSIS — I10 ESSENTIAL HYPERTENSION: ICD-10-CM

## 2020-02-10 PROCEDURE — 99214 OFFICE O/P EST MOD 30 MIN: CPT | Performed by: INTERNAL MEDICINE

## 2020-02-10 RX ORDER — TRIAMTERENE/HYDROCHLOROTHIAZID 37.5-25 MG
1 TABLET ORAL DAILY
Qty: 90 TABLET | Refills: 3 | Status: SHIPPED | OUTPATIENT
Start: 2020-02-10 | End: 2020-12-31

## 2020-02-10 RX ORDER — LEVOTHYROXINE SODIUM 137 UG/1
137 TABLET ORAL DAILY
Qty: 30 TABLET | Refills: 5 | Status: SHIPPED | OUTPATIENT
Start: 2020-02-10 | End: 2020-07-21

## 2020-02-10 NOTE — PATIENT INSTRUCTIONS
Stop Levothyroxine 150mcg tabs.  Start lower dose Levothyroxine 137mcg tab, 1 tab castillo for thyroid function   Restart Vitron-C (iron with Vit C), 1 tab daily for iron deficiency   Change  Triam/hydrochlorathiazide 75/50mg tabs (1/2 tab daily) to Triam/hydrochlorathiazide 37.5/25 mg tabs, 1 tab daily for blood pressure   See cardiology later this month as scheduled. Talk with them about changing quantity with Flecainide prescription  Nonfasting labs in late March (about 6 weeks from now) for thyroid and iron   Use Prune juice as needed for constipation. If not helping, then may use OTC Colace/Ducosate 1 tab once or twice a day as needed for constipaiton   Get new CPAP supplies  From  Home Medical at [facility]   Appt with  Sleep clinic for follow-up. Call [phone] for appt

## 2020-02-10 NOTE — PROGRESS NOTES
Subjective     Maxine Sears is a 64 year old female who presents to clinic today for the following health issues:    HPI   Hypertension Follow-up      Do you check your blood pressure regularly outside of the clinic? Yes     Are you following a low salt diet? Yes    Are your blood pressures ever more than 140 on the top number (systolic) OR more   than 90 on the bottom number (diastolic), for example 140/90? No    Hypothyroidism Follow-up      Since last visit, patient describes the following symptoms: Weight stable, no hair loss, no skin changes, no constipation, no loose stools      How many servings of fruits and vegetables do you eat daily?  4 or more    On average, how many sweetened beverages do you drink each day (Examples: soda, juice, sweet tea, etc.  Do NOT count diet or artificially sweetened beverages)?   0    How many days per week do you exercise enough to make your heart beat faster? 3 or less    How many minutes a day do you exercise enough to make your heart beat faster? 9 or less    How many days per week do you miss taking your medication? 0    Pt's past medical history, family history, habits, medications and allergies were reviewed with the patient today.  See snap shot for  HCM status. Most recent lab results reviewed with pt. Problem list and histories reviewed & adjusted, as indicated.  Additional history as below:    Component      Latest Ref Rng & Units 1/21/2020 1/29/2020   WBC      4.0 - 11.0 10e9/L 9.0 11.1 (H)   RBC Count      3.8 - 5.2 10e12/L 4.75 4.62   Hemoglobin      11.7 - 15.7 g/dL 12.4 12.0   Hematocrit      35.0 - 47.0 % 39.0 38.8   MCV      78 - 100 fl 82 84   MCH      26.5 - 33.0 pg 26.1 (L) 26.0 (L)   MCHC      31.5 - 36.5 g/dL 31.8 30.9 (L)   RDW      10.0 - 15.0 % 16.4 (H) 17.1 (H)   Platelet Count      150 - 450 10e9/L 476 (H) 456 (H)   Diff Method       Automated Method    % Neutrophils      % 45.5    % Lymphocytes      % 44.7    % Monocytes      % 6.9    %  Eosinophils      % 2.4    % Basophils      % 0.4    % Immature Granulocytes      % 0.1    Nucleated RBCs      0 /100 0    Absolute Neutrophil      1.6 - 8.3 10e9/L 4.1    Absolute Lymphocytes      0.8 - 5.3 10e9/L 4.0    Absolute Monocytes      0.0 - 1.3 10e9/L 0.6    Absolute Eosinophils      0.0 - 0.7 10e9/L 0.2    Absolute Basophils      0.0 - 0.2 10e9/L 0.0    Abs Immature Granulocytes      0 - 0.4 10e9/L 0.0    Absolute Nucleated RBC       0.0    Sodium      133 - 144 mmol/L  136   Potassium      3.4 - 5.3 mmol/L  4.3   Chloride      94 - 109 mmol/L  104   Carbon Dioxide      20 - 32 mmol/L  25   Anion Gap      3 - 14 mmol/L  7   Glucose      70 - 99 mg/dL  109 (H)   Urea Nitrogen      7 - 30 mg/dL  19   Creatinine      0.52 - 1.04 mg/dL  0.79   GFR Estimate      >60 mL/min/1.73:m2  79   GFR Estimate If Black      >60 mL/min/1.73:m2  >90   Calcium      8.5 - 10.1 mg/dL  8.8   Iron      35 - 180 ug/dL  24 (L)   Iron Binding Cap      240 - 430 ug/dL  440 (H)   Iron Saturation Index      15 - 46 %  5 (L)   % Retic      0.5 - 2.0 %  2.0   Absolute Retic      25 - 95 10e9/L  91.2   TSH      0.40 - 4.00 mU/L 0.08 (L) 0.09 (L)   T4 Free      0.76 - 1.46 ng/dL 1.30 1.15   Free T3      2.3 - 4.2 pg/mL     Ferritin      8 - 252 ng/mL  40     Weight down 17 pounds. Denies chest pain, shortness of breath, abdominal pain, headache, vision changes or side effects with medications.   Thyroid function a little too high. Hgb OK but low iron levels now.    Not using CPAP  As supplies too old including tubing. Overdue for  F/u with sleep clinic also   On PPI BID. Gets abd pains if lowers dose to daily dosing. Previous hx GI bleed when on lower dose. Denies NSAIDS use.   Sepideh seeing any blood in stools. Not taking iron now since  Caused some constipation. At risk for lower iron absorption with PPI. Hgb still normal  Mood OK per pt     Additional ROS:   Constitutional, HEENT, Cardiovascular, Pulmonary, GI and , Neuro, MSK and  "Psych review of systems/symptoms are otherwise negative or unchanged from previous, except as noted above.      OBJECTIVE:  /78   Pulse 90   Temp 98.3  F (36.8  C) (Temporal)   Resp 16   Wt 117.9 kg (260 lb)   SpO2 98%   BMI 46.06 kg/m     Estimated body mass index is 46.06 kg/m  as calculated from the following:    Height as of 1/21/20: 1.6 m (5' 3\").    Weight as of this encounter: 117.9 kg (260 lb).     Neck: no adenopathy. Thyroid normal to palpation. No bruits  Pulm: Lungs clear to auscultation   CV: Regular rates and rhythm  GI: Soft, obese, nontender, Normal active bowel sounds, No hepatosplenomegaly or masses palpable  Ext: Peripheral pulses intact. Minimal BLE edema.     Assessment/Plan: (See plan discussion below for further details)    1. Hypothyroidism, unspecified type  Thyroid function too high. Will lower dose as below and recheck 6 weeks  - levothyroxine (SYNTHROID/LEVOTHROID) 137 MCG tablet; Take 1 tablet (137 mcg) by mouth daily  Dispense: 30 tablet; Refill: 5  - TSH with free T4 reflex; Future    2. Essential hypertension  Controlled. Will change Mazxide dose so not having to cut higher dose tab in half  - triamterene-HCTZ (MAXZIDE-25) 37.5-25 MG tablet; Take 1 tablet by mouth daily  Dispense: 90 tablet; Refill: 3    3. Atrial fibrillation with RVR (H)   remains in NSR with Flecainide. Will get pt back on  CPAP as below to also reduce risk recurrence    4. Morbid obesity, unspecified obesity type (H)  Weight improved  17 pounds. Encouraged continued lower carb/calorie intake. Activity as able    5. Iron deficiency   Denies seeing blood in stools. Hgb still OK. Likely related to need for higher dose PPI. Will add back Vitron-C and pt will use prunes or Colace as needed for constipation if reoccurs  - ferrous fumarate 65 mg, [place]. FE,-Vitamin C 125 mg (VITRON-C)  MG TABS tablet; Take 1 tablet by mouth daily  Dispense: 90 tablet; Refill: 3  - CBC with platelets; Future  - Iron and " iron binding capacity; Future  - Ferritin; Future    6. NELY on CPAP   Not using now. rx done for supplies so can restart and pt to f/u with Sleep clinic to recheck sleep data after restarting. Referral done  - SLEEP EVALUATION & MANAGEMENT REFERRAL - ADULT -[facility] [phone]  (Age 18 and up); Future  - Sleep Order for DME - ONLY FOR DME      Plan discussion:   Stop Levothyroxine 150mcg tabs.  Start lower dose Levothyroxine 137mcg tab, 1 tab castillo for thyroid function   Restart Vitron-C (iron with Vit C), 1 tab daily for iron deficiency   Change  Triam/hydrochlorathiazide 75/50mg tabs (1/2 tab daily) to Triam/hydrochlorathiazide 37.5/25 mg tabs, 1 tab daily for blood pressure   See cardiology later this month as scheduled. Talk with them about changing quantity with Flecainide prescription  Nonfasting labs in late March (about 6 weeks from now) for thyroid and iron   Use Prune juice as needed for constipation. If not helping, then may use OTC Colace/Ducosate 1 tab once or twice a day as needed for constipaiton   Get new CPAP supplies  From  Home Medical at [facility]   Appt with  Sleep clinic for follow-up. Call [phone] for appt    Yuval Jama MD  Internal Medicine Department  [facility]    (Chart documentation was completed, in part, with [employer] voice-recognition software. Even though reviewed, some grammatical, spelling, and word errors may remain.)

## 2020-02-18 ENCOUNTER — OFFICE VISIT (OUTPATIENT)
Dept: CARDIOLOGY | Facility: CLINIC | Age: 65
End: 2020-02-18
Attending: INTERNAL MEDICINE
Payer: COMMERCIAL

## 2020-02-18 VITALS
DIASTOLIC BLOOD PRESSURE: 83 MMHG | HEIGHT: 64 IN | BODY MASS INDEX: 44.39 KG/M2 | SYSTOLIC BLOOD PRESSURE: 138 MMHG | HEART RATE: 86 BPM | WEIGHT: 260 LBS

## 2020-02-18 DIAGNOSIS — I10 BENIGN ESSENTIAL HYPERTENSION: ICD-10-CM

## 2020-02-18 DIAGNOSIS — I48.0 PAROXYSMAL ATRIAL FIBRILLATION (H): ICD-10-CM

## 2020-02-18 DIAGNOSIS — I48.91 ATRIAL FIBRILLATION WITH RVR (H): ICD-10-CM

## 2020-02-18 DIAGNOSIS — K25.4 GASTROINTESTINAL HEMORRHAGE ASSOCIATED WITH GASTRIC ULCER: ICD-10-CM

## 2020-02-18 PROCEDURE — 93000 ELECTROCARDIOGRAM COMPLETE: CPT | Performed by: PHYSICIAN ASSISTANT

## 2020-02-18 PROCEDURE — 99214 OFFICE O/P EST MOD 30 MIN: CPT | Performed by: PHYSICIAN ASSISTANT

## 2020-02-18 RX ORDER — FLECAINIDE ACETATE 150 MG/1
150 TABLET ORAL EVERY 12 HOURS
Qty: 60 TABLET | Refills: 11 | Status: SHIPPED | OUTPATIENT
Start: 2020-02-18 | End: 2021-01-04

## 2020-02-18 ASSESSMENT — MIFFLIN-ST. JEOR: SCORE: 1714.35

## 2020-02-18 NOTE — PATIENT INSTRUCTIONS
1. EKG today showed normal rhythm    2. Make sure you're not taking carvedilol 12.5 BID or metoprolol 50 mg BID (there was some discrepancy in the discharge summary).  If this is wrong, CALL and we'll update your med list - [phone]     3. Stay off of anticoagulation (Xarelto) as your hemoglobin dropped when you were on it even a short time.    4. See Dr. Vaughn ~3 months but CALL if issues prior! [phone]    5. CALL if  >1 hour of AFib as we may be able to set up an outpatient FRIDA/cardioversion so you don't have to go into hospital

## 2020-02-18 NOTE — PROGRESS NOTES
HPI:   I had the pleasure of seeing Roxanna when she came for follow up of paroxysmal atrial fibrillation.  She is a 64 year old who sees Dr. Vaughn for her history of:     1. Paroxysmal AFib first diagnosed 9/2015.  She is undergone DC cardioversion, most recently 2/2019.  She has been on flecainide  2. GI bleeds. Had Hgb 7.2 g/dL 3/2019. EGD revealed duodenal ulcer which was cauterized.  Xarelto was held, but then later restarted.  She was rehospitalized 4/2019 with a lower GI bleed.  Hemoglobin got down to 8.7 g/dL.  Xarelto was discontinued, and Dr. Vaughn recommended not restarting this for at least 3-6 months. Capsule endoscopy 5/2019 showed normal mucosa but did not reach the colon (Dr. Ko). We restarted Xarelto 20 mg daily 10/2019 but Hgb dropped >1 gram in ~1 month. No anticoagulation despite CHADSVASc 2 (HTN, sex)  3. Benign essential hypertension with recent med changes      I saw Roxanna 10/2019 at which time she was feeling good. We checked a CBC with hopes to restart Xarelto 20 mg daily, following CBCs closely.     Baseline Hgb on 10/7/2019 was 11.4 g/dL, then dropped to 10.5 g/dL on 11/19 after starting Xarelto 20 mg daily. On 1/21/2020, Hgb was back up to 12.4 g/dL.    Roxanna was in the hospital 1/21-1/22/2020 with palpitations and noted to be back in AFib with -140s. She spontaneously converted and was seen by Dr. Santiago. Flecainide was increased from 75 mg BID to 150 mg BID and follow-up was recommended.  Of note, her discharge summary indicated she was to start lopressor 50 mg BID but carvedilol 12.5 mg BID was continued.     On 1/29, she came in for follow-up EKG. She was in SR at 58 bpm, and BP was only 100/80, c/o dizziness, SOB and fatigue. Metoprolol tartrate 50mg BID was stopped.      She saw Dr. Jama 2/10. Follow-up with Sleep MD recommended. No changes were made. BP was 126/78 and HR 90 bpm.    Interval History:  Thinks that the higher dose of flecainide 150 mg BID has helped. No recurrent  AFib.  She's not taking any BB (not taking Coreg OR metoprolol) and does feel better.    BPs are better at home and at Dr. Jama's office.    No complaints of chest pain, pressure or tightness.  No orthopnea, PND or edema. No change in exercise tolerance or breathing. Overall, she is feeling well.     Recent Diagnostic Testing:  EKG today, which I overread, showed SR 78 bpm with  ms on flecainide 150 mg BID  Echocardiogam 1/2020 showed EF 55-60%. No RWMA. RV OK. Mild MAC. Trace TR.   Echocardiogram 7/2017 showed an EF of 55-60% with borderline LVH.  LA was mildly dilated at 4.6 cm with a volume index of 24.2 mL/m .  She was noted to have a dilated IVC with elevated right atrial pressure at 15-20.  No significant valvular abnormalities were noted.     Nuclear stress test 3/2016 was negative for ischemia.    Assessment & Plan:    1. Paroxysmal AFib    Had been doing well until 1/21/2020 hospitalization    Flecainide increased; metoprolol subsequently stopped. She had not been taking carvedilol 12.5 mg BID though it was on her discharge summary    No AC given recurrent anemia when on it even for <1 m. CHADSVASc 2 (HTN, sex)    PLAN:    Check meds at home - make sure not taking BB therapy    Continue flecainide 150 mg BID    3 m follow-up with Dr. Vaughn. May need to discuss AFib ablation if recurrent AFib despite high dose flecainide    2. Benign Essential HTN    On 1/29 BP was quite low on metoprolol tartrate 50 mg BID and she was sx'c. Metoprolol was stopped    Coreg was on her discharge med list but she's not taking it    PLAN:    Continue current meds      Monika Sue PA-C, MSPAS      Orders Placed This Encounter   Procedures     Follow-Up with Electrophysiologist     EKG 12-lead complete w/read - Clinics     Orders Placed This Encounter   Medications     flecainide (TAMBOCOR) 150 MG tablet     Sig: Take 1 tablet (150 mg) by mouth every 12 hours     Dispense:  60 tablet     Refill:  11     Replaces previous Rx  for flecainide     Medications Discontinued During This Encounter   Medication Reason     flecainide (TAMBOCOR) 150 MG tablet Reorder         Encounter Diagnoses   Name Primary?     Atrial fibrillation with RVR (H)      Benign essential hypertension      Gastrointestinal hemorrhage associated with gastric ulcer      Paroxysmal atrial fibrillation (H)        CURRENT MEDICATIONS:  Current Outpatient Medications   Medication Sig Dispense Refill     ferrous fumarate 65 mg, [place]. FE,-Vitamin C 125 mg (VITRON-C)  MG TABS tablet Take 1 tablet by mouth daily 90 tablet 3     flecainide (TAMBOCOR) 150 MG tablet Take 1 tablet (150 mg) by mouth every 12 hours 60 tablet 11     gabapentin (NEURONTIN) 300 MG capsule Take 900 mg by mouth At Bedtime For restless legs syndrome       levothyroxine (SYNTHROID/LEVOTHROID) 137 MCG tablet Take 1 tablet (137 mcg) by mouth daily 30 tablet 5     losartan (COZAAR) 100 MG tablet Take 1 tablet (100 mg) by mouth daily 90 tablet 3     order for DME DREAMSTATION  5-15 CM/H20  NASAL AIRFIT N10 HER       pantoprazole (PROTONIX) 40 MG EC tablet TAKE 1 TABLET BY MOUTH TWICE A DAY BEFORE MEALS 60 tablet 5     SUMAtriptan (IMITREX) 100 MG tablet Take 1 tablet (100 mg) by mouth at onset of headache for migraine May repeat in 2 hours if needed: max 2/day; 10 tablet 11     triamterene-HCTZ (MAXZIDE-25) 37.5-25 MG tablet Take 1 tablet by mouth daily 90 tablet 3       ALLERGIES     Allergies   Allergen Reactions     Morphine Anaphylaxis     Ceclor [Cefaclor] Hives     Diltiazem Hives     Lisinopril Cough     Sertraline Nausea and Vomiting       PAST MEDICAL HISTORY:  Past Medical History:   Diagnosis Date     Depression       Duodenal ulcer 03/04/2019     Essential hypertension       GERD (gastroesophageal reflux disease)      Hiatal hernia      Hypothyroidism      Migraine without aura and without status migrainosus, not intractable  Aug 2016     Obesity      NELY on CPAP      Osteoarthritis       Paroxysmal atrial fibrillation (H) 2015     RLS (restless legs syndrome)      Symptomatic menopausal or female climacteric states (aka FLASHES)      TMJ disease        PAST SURGICAL HISTORY:  Past Surgical History:   Procedure Laterality Date     APPENDECTOMY       CHOLECYSTECTOMY       COLONOSCOPY N/A 2019    Procedure: COLONOSCOPY;  Surgeon: Vahid Cardona MD;  Location:  GI     ESOPHAGOSCOPY, GASTROSCOPY, DUODENOSCOPY (EGD), COMBINED N/A 2019    Procedure: COMBINED ESOPHAGOSCOPY, GASTROSCOPY, DUODENOSCOPY (EGD);  Surgeon: Ben Ko MD;  Location:  GI     HYSTERECTOMY, [facility]  1999     TONSILLECTOMY         FAMILY HISTORY:  Family History   Problem Relation Age of Onset     Myocardial Infarction Mother      Heart Failure Mother      Heart Surgery Father         bypass surgery     Cerebrovascular Disease Father      Heart Surgery Brother      Hyperlipidemia Brother      Hyperlipidemia Sister      Hyperlipidemia Brother      Sleep Apnea Sister        SOCIAL HISTORY:  Social History     Socioeconomic History     Marital status:      Spouse name: None     Number of children: None     Years of education: None     Highest education level: None   Occupational History     None   Social Needs     Financial resource strain: None     Food insecurity:     Worry: None     Inability: None     Transportation needs:     Medical: None     Non-medical: None   Tobacco Use     Smoking status: Former Smoker     Packs/day: 0.25     Years: 1.00     Pack years: 0.25     Types: Cigarettes     Start date:      Last attempt to quit:      Years since quittin.1     Smokeless tobacco: Never Used     Tobacco comment: very minimal smoking history   Substance and Sexual Activity     Alcohol use: No     Alcohol/week: 0.0 standard drinks     Drug use: No     Sexual activity: Never   Lifestyle     Physical activity:     Days per week: None     Minutes per session: None     Stress: None  "  Relationships     Social connections:     Talks on phone: None     Gets together: None     Attends Hindu service: None     Active member of club or organization: None     Attends meetings of clubs or organizations: None     Relationship status: None     Intimate partner violence:     Fear of current or ex partner: None     Emotionally abused: None     Physically abused: None     Forced sexual activity: None   Other Topics Concern     Parent/sibling w/ CABG, MI or angioplasty before 65F 55M? Not Asked      Service Not Asked     Blood Transfusions Not Asked     Caffeine Concern No     Occupational Exposure Not Asked     Hobby Hazards Not Asked     Sleep Concern Yes     Stress Concern Not Asked     Weight Concern No     Special Diet No     Back Care Not Asked     Exercise No     Bike Helmet Not Asked     Seat Belt Yes     Self-Exams Not Asked   Social History Narrative     None       Review of Systems:  Skin:  Negative     Eyes:  Positive for glasses  ENT:  Negative    Respiratory:  Positive for sleep apnea;CPAP  Cardiovascular:  Negative for;palpitations;chest pain;edema    Gastroenterology: Negative for melena;hematochezia  Genitourinary:  Negative    Musculoskeletal:  Positive for arthritis  Neurologic:  Negative    Psychiatric:  Positive for depression  Heme/Lymph/Imm:  Positive for allergies  Endocrine:  Positive for thyroid disorder    Physical Exam:  Vitals: /83   Pulse 86   Ht 1.626 m (5' 4\")   Wt 117.9 kg (260 lb)   BMI 44.63 kg/m      Constitutional:  cooperative, alert and oriented, well developed, well nourished, in no acute distress        Skin:  not assessed this visit        Head:  not assessed this visit        Eyes:  not assessed this visit        ENT:  not assessed this visit        Neck:  JVP normal        Chest:  normal respiratory excursion        Cardiac: regular rhythm                  Abdomen:  not assessed this visit        Vascular: pulses full and equal                 "                      Extremities and Back:  no deformities, clubbing, cyanosis, erythema observed;no edema        Neurological:  no gross motor deficits        Recent Lab Results:  LIPID RESULTS:  Lab Results   Component Value Date    CHOL 172 03/13/2019    HDL 45 (L) 03/13/2019    LDL 85 03/13/2019    TRIG 212 (H) 03/13/2019    CHOLHDLRATIO 4.7 09/24/2015       LIVER ENZYME RESULTS:  Lab Results   Component Value Date    AST 19 01/21/2020    ALT 18 01/21/2020       CBC RESULTS:  Lab Results   Component Value Date    WBC 11.1 (H) 01/29/2020    RBC 4.62 01/29/2020    HGB 12.0 01/29/2020    HCT 38.8 01/29/2020    MCV 84 01/29/2020    MCH 26.0 (L) 01/29/2020    MCHC 30.9 (L) 01/29/2020    RDW 17.1 (H) 01/29/2020     (H) 01/29/2020       BMP RESULTS:  Lab Results   Component Value Date     01/29/2020    POTASSIUM 4.3 01/29/2020    CHLORIDE 104 01/29/2020    CO2 25 01/29/2020    ANIONGAP 7 01/29/2020     (H) 01/29/2020    BUN 19 01/29/2020    CR 0.79 01/29/2020    GFRESTIMATED 79 01/29/2020    GFRESTBLACK >90 01/29/2020    J CARLOS 8.8 01/29/2020          CC  Yuval Jama MD  [address] [place] [address]

## 2020-02-18 NOTE — LETTER
2/18/2020    Yuval Jama MD  [address] [facility] [address]    RE: Maxine Sears       Dear Colleague,    I had the pleasure of seeing Maxine KOWALSKI Orion in the [facility].    HPI:   I had the pleasure of seeing Roxanna when she came for follow up of paroxysmal atrial fibrillation.  She is a 64 year old who sees Dr. Vaughn for her history of:     1. Paroxysmal AFib first diagnosed 9/2015.  She is undergone DC cardioversion, most recently 2/2019.  She has been on flecainide  2. GI bleeds. Had Hgb 7.2 g/dL 3/2019. EGD revealed duodenal ulcer which was cauterized.  Xarelto was held, but then later restarted.  She was rehospitalized 4/2019 with a lower GI bleed.  Hemoglobin got down to 8.7 g/dL.  Xarelto was discontinued, and Dr. Vaughn recommended not restarting this for at least 3-6 months. Capsule endoscopy 5/2019 showed normal mucosa but did not reach the colon (Dr. Ko). We restarted Xarelto 20 mg daily 10/2019 but Hgb dropped >1 gram in ~1 month. No anticoagulation despite CHADSVASc 2 (HTN, sex)  3. Benign essential hypertension with recent med changes      I saw Roxanna 10/2019 at which time she was feeling good. We checked a CBC with hopes to restart Xarelto 20 mg daily, following CBCs closely.     Baseline Hgb on 10/7/2019 was 11.4 g/dL, then dropped to 10.5 g/dL on 11/19 after starting Xarelto 20 mg daily. On 1/21/2020, Hgb was back up to 12.4 g/dL.    Roxanna was in the hospital 1/21-1/22/2020 with palpitations and noted to be back in AFib with -140s. She spontaneously converted and was seen by Dr. Santiago. Flecainide was increased from 75 mg BID to 150 mg BID and follow-up was recommended.  Of note, her discharge summary indicated she was to start lopressor 50 mg BID but carvedilol 12.5 mg BID was continued.     On 1/29, she came in for follow-up EKG. She was in SR at 58 bpm, and BP was only 100/80, c/o dizziness, SOB and fatigue. Metoprolol tartrate 50mg BID was  stopped.      She saw Dr. Jama 2/10. Follow-up with Sleep MD recommended. No changes were made. BP was 126/78 and HR 90 bpm.    Interval History:  Thinks that the higher dose of flecainide 150 mg BID has helped. No recurrent AFib.  She's not taking any BB (not taking Coreg OR metoprolol) and does feel better.    BPs are better at home and at Dr. Jama's office.    No complaints of chest pain, pressure or tightness.  No orthopnea, PND or edema. No change in exercise tolerance or breathing. Overall, she is feeling well.     Recent Diagnostic Testing:  EKG today, which I overread, showed SR 78 bpm with  ms on flecainide 150 mg BID  Echocardiogam 1/2020 showed EF 55-60%. No RWMA. RV OK. Mild MAC. Trace TR.   Echocardiogram 7/2017 showed an EF of 55-60% with borderline LVH.  LA was mildly dilated at 4.6 cm with a volume index of 24.2 mL/m .  She was noted to have a dilated IVC with elevated right atrial pressure at 15-20.  No significant valvular abnormalities were noted.     Nuclear stress test 3/2016 was negative for ischemia.    Assessment & Plan:    1. Paroxysmal AFib    Had been doing well until 1/21/2020 hospitalization    Flecainide increased; metoprolol subsequently stopped. She had not been taking carvedilol 12.5 mg BID though it was on her discharge summary    No AC given recurrent anemia when on it even for <1 m. CHADSVASc 2 (HTN, sex)    PLAN:    Check meds at home - make sure not taking BB therapy    Continue flecainide 150 mg BID    3 m follow-up with Dr. Vaughn. May need to discuss AFib ablation if recurrent AFib despite high dose flecainide    2. Benign Essential HTN    On 1/29 BP was quite low on metoprolol tartrate 50 mg BID and she was sx'c. Metoprolol was stopped    Coreg was on her discharge med list but she's not taking it    PLAN:    Continue current meds      Monika Sue PA-C, MSPAS      Orders Placed This Encounter   Procedures     Follow-Up with Electrophysiologist     EKG 12-lead complete  w/read - Clinics     Orders Placed This Encounter   Medications     flecainide (TAMBOCOR) 150 MG tablet     Sig: Take 1 tablet (150 mg) by mouth every 12 hours     Dispense:  60 tablet     Refill:  11     Replaces previous Rx for flecainide     Medications Discontinued During This Encounter   Medication Reason     flecainide (TAMBOCOR) 150 MG tablet Reorder         Encounter Diagnoses   Name Primary?     Atrial fibrillation with RVR (H)      Benign essential hypertension      Gastrointestinal hemorrhage associated with gastric ulcer      Paroxysmal atrial fibrillation (H)        CURRENT MEDICATIONS:  Current Outpatient Medications   Medication Sig Dispense Refill     ferrous fumarate 65 mg, [place]. FE,-Vitamin C 125 mg (VITRON-C)  MG TABS tablet Take 1 tablet by mouth daily 90 tablet 3     flecainide (TAMBOCOR) 150 MG tablet Take 1 tablet (150 mg) by mouth every 12 hours 60 tablet 11     gabapentin (NEURONTIN) 300 MG capsule Take 900 mg by mouth At Bedtime For restless legs syndrome       levothyroxine (SYNTHROID/LEVOTHROID) 137 MCG tablet Take 1 tablet (137 mcg) by mouth daily 30 tablet 5     losartan (COZAAR) 100 MG tablet Take 1 tablet (100 mg) by mouth daily 90 tablet 3     order for DME DREAMSTATION  5-15 CM/H20  NASAL AIRFIT N10 HER       pantoprazole (PROTONIX) 40 MG EC tablet TAKE 1 TABLET BY MOUTH TWICE A DAY BEFORE MEALS 60 tablet 5     SUMAtriptan (IMITREX) 100 MG tablet Take 1 tablet (100 mg) by mouth at onset of headache for migraine May repeat in 2 hours if needed: max 2/day; 10 tablet 11     triamterene-HCTZ (MAXZIDE-25) 37.5-25 MG tablet Take 1 tablet by mouth daily 90 tablet 3       ALLERGIES     Allergies   Allergen Reactions     Morphine Anaphylaxis     Ceclor [Cefaclor] Hives     Diltiazem Hives     Lisinopril Cough     Sertraline Nausea and Vomiting       PAST MEDICAL HISTORY:  Past Medical History:   Diagnosis Date     Depression       Duodenal ulcer 03/04/2019     Essential hypertension        GERD (gastroesophageal reflux disease)      Hiatal hernia      Hypothyroidism      Migraine without aura and without status migrainosus, not intractable  Aug 2016     Obesity      NELY on CPAP      Osteoarthritis      Paroxysmal atrial fibrillation (H) 2015     RLS (restless legs syndrome)      Symptomatic menopausal or female climacteric states (aka FLASHES)      TMJ disease        PAST SURGICAL HISTORY:  Past Surgical History:   Procedure Laterality Date     APPENDECTOMY       CHOLECYSTECTOMY       COLONOSCOPY N/A 2019    Procedure: COLONOSCOPY;  Surgeon: Vahid Cardona MD;  Location:  GI     ESOPHAGOSCOPY, GASTROSCOPY, DUODENOSCOPY (EGD), COMBINED N/A 2019    Procedure: COMBINED ESOPHAGOSCOPY, GASTROSCOPY, DUODENOSCOPY (EGD);  Surgeon: Ben Ko MD;  Location:  GI     HYSTERECTOMY, [facility]  1999     TONSILLECTOMY         FAMILY HISTORY:  Family History   Problem Relation Age of Onset     Myocardial Infarction Mother      Heart Failure Mother      Heart Surgery Father         bypass surgery     Cerebrovascular Disease Father      Heart Surgery Brother      Hyperlipidemia Brother      Hyperlipidemia Sister      Hyperlipidemia Brother      Sleep Apnea Sister        SOCIAL HISTORY:  Social History     Socioeconomic History     Marital status:      Spouse name: None     Number of children: None     Years of education: None     Highest education level: None   Occupational History     None   Social Needs     Financial resource strain: None     Food insecurity:     Worry: None     Inability: None     Transportation needs:     Medical: None     Non-medical: None   Tobacco Use     Smoking status: Former Smoker     Packs/day: 0.25     Years: 1.00     Pack years: 0.25     Types: Cigarettes     Start date:      Last attempt to quit:      Years since quittin.1     Smokeless tobacco: Never Used     Tobacco comment: very minimal smoking history   Substance and Sexual  "Activity     Alcohol use: No     Alcohol/week: 0.0 standard drinks     Drug use: No     Sexual activity: Never   Lifestyle     Physical activity:     Days per week: None     Minutes per session: None     Stress: None   Relationships     Social connections:     Talks on phone: None     Gets together: None     Attends Buddhism service: None     Active member of club or organization: None     Attends meetings of clubs or organizations: None     Relationship status: None     Intimate partner violence:     Fear of current or ex partner: None     Emotionally abused: None     Physically abused: None     Forced sexual activity: None   Other Topics Concern     Parent/sibling w/ CABG, MI or angioplasty before 65F 55M? Not Asked      Service Not Asked     Blood Transfusions Not Asked     Caffeine Concern No     Occupational Exposure Not Asked     Hobby Hazards Not Asked     Sleep Concern Yes     Stress Concern Not Asked     Weight Concern No     Special Diet No     Back Care Not Asked     Exercise No     Bike Helmet Not Asked     Seat Belt Yes     Self-Exams Not Asked   Social History Narrative     None       Review of Systems:  Skin:  Negative     Eyes:  Positive for glasses  ENT:  Negative    Respiratory:  Positive for sleep apnea;CPAP  Cardiovascular:  Negative for;palpitations;chest pain;edema    Gastroenterology: Negative for melena;hematochezia  Genitourinary:  Negative    Musculoskeletal:  Positive for arthritis  Neurologic:  Negative    Psychiatric:  Positive for depression  Heme/Lymph/Imm:  Positive for allergies  Endocrine:  Positive for thyroid disorder    Physical Exam:  Vitals: /83   Pulse 86   Ht 1.626 m (5' 4\")   Wt 117.9 kg (260 lb)   BMI 44.63 kg/m       Constitutional:  cooperative, alert and oriented, well developed, well nourished, in no acute distress        Skin:  not assessed this visit        Head:  not assessed this visit        Eyes:  not assessed this visit        ENT:  not " assessed this visit        Neck:  JVP normal        Chest:  normal respiratory excursion        Cardiac: regular rhythm                  Abdomen:  not assessed this visit        Vascular: pulses full and equal                                      Extremities and Back:  no deformities, clubbing, cyanosis, erythema observed;no edema        Neurological:  no gross motor deficits        Recent Lab Results:  LIPID RESULTS:  Lab Results   Component Value Date    CHOL 172 03/13/2019    HDL 45 (L) 03/13/2019    LDL 85 03/13/2019    TRIG 212 (H) 03/13/2019    CHOLHDLRATIO 4.7 09/24/2015       LIVER ENZYME RESULTS:  Lab Results   Component Value Date    AST 19 01/21/2020    ALT 18 01/21/2020       CBC RESULTS:  Lab Results   Component Value Date    WBC 11.1 (H) 01/29/2020    RBC 4.62 01/29/2020    HGB 12.0 01/29/2020    HCT 38.8 01/29/2020    MCV 84 01/29/2020    MCH 26.0 (L) 01/29/2020    MCHC 30.9 (L) 01/29/2020    RDW 17.1 (H) 01/29/2020     (H) 01/29/2020       BMP RESULTS:  Lab Results   Component Value Date     01/29/2020    POTASSIUM 4.3 01/29/2020    CHLORIDE 104 01/29/2020    CO2 25 01/29/2020    ANIONGAP 7 01/29/2020     (H) 01/29/2020    BUN 19 01/29/2020    CR 0.79 01/29/2020    GFRESTIMATED 79 01/29/2020    GFRESTBLACK >90 01/29/2020    J CARLOS 8.8 01/29/2020            Thank you for allowing me to participate in the care of your patient.    Sincerely,     Ilda Sue PA-C     [facility]

## 2020-02-21 DIAGNOSIS — I10 ESSENTIAL HYPERTENSION: ICD-10-CM

## 2020-02-21 RX ORDER — LOSARTAN POTASSIUM 100 MG/1
TABLET ORAL
Qty: 30 TABLET | Refills: 3 | Status: SHIPPED | OUTPATIENT
Start: 2020-02-21 | End: 2020-06-23

## 2020-02-21 NOTE — TELEPHONE ENCOUNTER
"    Requested Prescriptions   Pending Prescriptions Disp Refills     losartan (COZAAR) 100 MG tablet [Pharmacy Med Name: LOSARTAN POT 100MG TAB] 30 tablet      Sig: TAKE 1 TABLET BY MOUTH DAILY       Angiotensin-II Receptors Passed - 2/21/2020  4:33 PM        Passed - Last blood pressure under 140/90 in past 12 months     BP Readings from Last 3 Encounters:   02/18/20 138/83   02/10/20 126/78   01/22/20 129/56                 Passed - Recent (12 mo) or future (30 days) visit within the authorizing provider's specialty     Patient has had an office visit with the authorizing provider or a provider within the authorizing providers department within the previous 12 mos or has a future within next 30 days. See \"Patient Info\" tab in inbasket, or \"Choose Columns\" in Meds & Orders section of the refill encounter.              Passed - Medication is active on med list        Passed - Patient is age 18 or older        Passed - No active pregnancy on record        Passed - Normal serum creatinine on file in past 12 months     Recent Labs   Lab Test 01/29/20  0953   CR 0.79             Passed - Normal serum potassium on file in past 12 months     Recent Labs   Lab Test 01/29/20  0953   POTASSIUM 4.3                    Passed - No positive pregnancy test in past 12 months          "

## 2020-02-26 ENCOUNTER — PATIENT OUTREACH (OUTPATIENT)
Dept: CARE COORDINATION | Facility: CLINIC | Age: 65
End: 2020-02-26

## 2020-02-26 NOTE — PROGRESS NOTES
03/02/20- Clinic Care Coordination Contact    Follow Up Progress Note   The  Community Health Worker called patient to provide information on the [employer] membership scholarship program.  The scholarship is based on income. It is on a sliding scale fee up to 40% off the monthly membership. Patient will need to bring her recent 1040 tax form with her to apply.  Patient stated that she will looked into this.    Care Coordinator will follow up on 03/27/20.  _______________________________________________           02/26/20-Clinic Care Coordination Contact    Clinic Care Coordination Contact  OUTREACH    Discussed:    Patient stated that her physical activity is pretty slow at the moment.  Patient is looking for a gym / pool that offers open swimming that is reasonably priced.  Cost is a concern. Patient will be turning 65 in August and will look into a [employer] plan with Medicare, but hoping to find a pool to exercise before then.  Patient enjoys swimming. Patient is eating healthier; more salads fruits and vegetables.  Patient joined [employer] to help lose weight.  Patient is reducing her bread and pasta intake. Patient continues going to [facility] participating in activities.  Patient is currently in a quilt group at [place].  _______________________________________________    Delegation from VICKIE Handy to W:    Patient outreach in approximately 1 month to assess patient's progress with goal.   Delegation Complete  _______________________________________________       Goals:    Goal Statement: I will work to lose 15 lbs in 6 months by increasing my daily physical activity.  Date Goal set: 10/10/19  Date to Achieve By: 7/31/20  Patient expressed understanding of goal: Yes  Action steps to achieve this goal:  1. I will use the gym within my apartment to exercise.  2. I will increase the amount of walking I do on a daily basis.  3. I will continue going to [facility] and  participating in activities to stay active.  _______________________________________________    Next Outreach: 03/27/20  Planned Outreach Frequency: Monthly  Preferred Phone Number: [phone]     Enrollment Date: 03/06/2019  Last Care Plan Assessment Date: 01/24/20

## 2020-03-27 ENCOUNTER — PATIENT OUTREACH (OUTPATIENT)
Dept: CARE COORDINATION | Facility: CLINIC | Age: 65
End: 2020-03-27

## 2020-03-27 NOTE — PROGRESS NOTES
Clinic Care Coordination Contact    Follow Up Progress Note   The Community Health Worker called patient for outreach to follow up on goals and action steps.  Spoke hans Mcknight     Discussed:    Patient stated that she has been home for 5 weeks and is not going anywhere due to the Coronavirus.    Patient has someone to do her grocery shopping.    Patient is walking around her bedroom and the outlines of her apartment 2 times each walk 2 -3 times a day, patient is getting her walking in.    Patient is eating more fruit, today has had 2 oranges.  Yesterday, raisin bread with a banana oranges, and made a smoothie.    Patient has not weighed herself recently.    Patient has kept busy cleaning up, dishes,and catching up on shows.    Patient stated that she is going to start sewing patch pieces together next week.    Patient stated that her medications are being mailed to her at home.    Patient opens her windows for fresh air, since she is not going outside.    Patient stated that she is taking her blood pressure and pulse daily and recording in a book.  Patient started this on March 15th 2020.    Goals addressed this encounter:   Goals Addressed                 This Visit's Progress       Patient Stated      1. Being Active (pt-stated)        Goal Statement: I will work to lose 15 lbs in 6 months by increasing my daily physical activity.  Date Goal set: 10/10/19  Date to Achieve By: 7/31/20  Patient expressed understanding of goal: Yes  Action steps to achieve this goal:  1. I will use the gym within my apartment to exercise.  2. I will increase the amount of walking I do on a daily basis.  3. I will continue going to [facility] and participating in activities to stay active.    As of today's date 1/28/2020 goal is met at 0 - 25%.   Goal Status:  Active -- The patient reports most recent weight of 265 lbs.  Activity has been limited due to recent health complications.  As of today's date 3/27/2020 goal is  met at 0 - 25%.   Goal Status:  Active              Intervention/Education provided during outreach:CHW offered positive encouragement for eating healthier and for creating a walking routine.      Care Coordinator will follow up in one month.    Next Outreach: 04/27/20       Carmen Mims CHW  Clinic Care Coordination  [facility]  Phone: [phone]

## 2020-03-30 ENCOUNTER — PATIENT OUTREACH (OUTPATIENT)
Dept: CARE COORDINATION | Facility: CLINIC | Age: 65
End: 2020-03-30

## 2020-03-30 ASSESSMENT — ACTIVITIES OF DAILY LIVING (ADL): DEPENDENT_IADLS:: CLEANING;TRANSPORTATION

## 2020-03-30 NOTE — PROGRESS NOTES
Clinic Care Coordination Contact    Situation: Patient chart reviewed by care coordinator.     Background: Care Coordination following patient to assist with Goal as below.     Assessment: Per chart review, patient has been actively working with CC CHW to accomplish Goal.  Patient's goal remains appropriate and relevant at this time.    Goals        Patient Stated      1. Being Active (pt-stated)      Goal Statement: I will work to lose 15 lbs in 6 months by increasing my daily physical activity.  Date Goal set: 10/10/19  Date to Achieve By: 7/31/20  Patient expressed understanding of goal: Yes  Action steps to achieve this goal:  1. I will use the gym within my apartment to exercise.  2. I will increase the amount of walking I do on a daily basis.  3. I will continue going to [facility] and participating in activities to stay active.    As of today's date 1/28/2020 goal is met at 0 - 25%.   Goal Status:  Active -- The patient reports most recent weight of 265 lbs.  Activity has been limited due to recent health complications.  As of today's date 3/27/2020 goal is met at 0 - 25%.   Goal Status:  Active         Plan/Recommendations: The patient will continue working with Care Coordination to achieve Goal as above.  CC RN will review patient chart again in approximately 6 weeks to assess patient status and goal progress with outreach to patient as indicated.    Damien Person RN  Clinic Care Coordinator  [facility]  Ph: [phone]

## 2020-04-17 DIAGNOSIS — G25.81 RLS (RESTLESS LEGS SYNDROME): Primary | ICD-10-CM

## 2020-04-17 DIAGNOSIS — K92.2 ACUTE GI BLEEDING: ICD-10-CM

## 2020-04-17 RX ORDER — GABAPENTIN 300 MG/1
CAPSULE ORAL
Qty: 270 CAPSULE | Refills: 1 | Status: SHIPPED | OUTPATIENT
Start: 2020-04-17 | End: 2020-10-14

## 2020-04-17 RX ORDER — PANTOPRAZOLE SODIUM 40 MG/1
TABLET, DELAYED RELEASE ORAL
Qty: 56 TABLET | Refills: 0 | Status: SHIPPED | OUTPATIENT
Start: 2020-04-17 | End: 2020-05-23

## 2020-04-17 NOTE — TELEPHONE ENCOUNTER
"Requested Prescriptions   Pending Prescriptions Disp Refills     pantoprazole (PROTONIX) 40 MG EC tablet [Pharmacy Med Name: PANTOPRAZOLE 40MG TAB] 56 tablet      Sig: TAKE 1 TABLET BY MOUTH TWICE A DAY BEFORE MEALS       PPI Protocol Passed - 4/17/2020  4:08 PM        Passed - Not on Clopidogrel (unless Pantoprazole ordered)        Passed - No diagnosis of osteoporosis on record        Passed - Recent (12 mo) or future (30 days) visit within the authorizing provider's specialty     Patient has had an office visit with the authorizing provider or a provider within the authorizing providers department within the previous 12 mos or has a future within next 30 days. See \"Patient Info\" tab in inbasket, or \"Choose Columns\" in Meds & Orders section of the refill encounter.              Passed - Medication is active on med list        Passed - Patient is age 18 or older        Passed - No active pregnacy on record        Passed - No positive pregnancy test in past 12 months           gabapentin (NEURONTIN) 300 MG capsule [Pharmacy Med Name: GABAPENTIN 300MG CAP] 84 capsule      Sig: TAKE 1 CAPSULE BY MOUTH EVERY MORNING AND TAKE 2 CAPSULES BY MOUTH EVERY NIGHT AT BEDTIME       There is no refill protocol information for this order        "

## 2020-05-12 ENCOUNTER — VIRTUAL VISIT (OUTPATIENT)
Dept: CARDIOLOGY | Facility: CLINIC | Age: 65
End: 2020-05-12
Attending: PHYSICIAN ASSISTANT
Payer: COMMERCIAL

## 2020-05-12 VITALS
BODY MASS INDEX: 44.29 KG/M2 | WEIGHT: 258 LBS | SYSTOLIC BLOOD PRESSURE: 138 MMHG | DIASTOLIC BLOOD PRESSURE: 91 MMHG | HEART RATE: 69 BPM

## 2020-05-12 DIAGNOSIS — I48.91 ATRIAL FIBRILLATION WITH RVR (H): ICD-10-CM

## 2020-05-12 PROCEDURE — 99213 OFFICE O/P EST LOW 20 MIN: CPT | Mod: GT | Performed by: INTERNAL MEDICINE

## 2020-05-12 RX ORDER — METOPROLOL SUCCINATE 25 MG/1
12.5 TABLET, EXTENDED RELEASE ORAL DAILY
Qty: 30 TABLET | Refills: 3 | Status: SHIPPED | OUTPATIENT
Start: 2020-05-12 | End: 2020-12-08

## 2020-05-12 NOTE — PROGRESS NOTES
Electrophysiology/ Virtual Clinic Note         H&P and Plan:   Patient opted to have a virtual visit due to ongoing issues related to ongoing COVID-19 virus pandemic and to minimize social interaction (based on CDC guidelines).      Visit details:  Patient has given verbal consent for this visit.    Interview was done talking and seeing patient via Internet.   Mode of transmission: [employer].  Visit start time: 9:54 AM  Visit stop time: 10:11 AM  Provider location: Office.  Patient location: Home.       REASON FOR VISIT:  Evaluation of paroxysmal atrial fibrillation.       HISTORY OF PRESENT ILLNESS:  Ms. Sears is a delightful 64-year-old lady with a history of hypertension recurrent GI bleed,  and paroxysmal atrial fibrillation who is here for routine evaluation.    I last saw her last year.  At the time,  She was on a combination of flecainide and Coreg, and we attempt to restart oral anticoagulation.  Today, she informs she did not tolerate blood thinners as she had recurrence of GI bleed.  She has been off any blood thinners including aspirin for the last year.  Additionally, her PCP discontinued beta-blockers and she is currently taking flecainide (150 mg twice daily) only.      Regarding A. fib control, she has been doing well.  She denies any breakthroughs of atrial fibrillation.  She denies any symptoms during this visit such as chest pain, shortness of breath, lightheadedness, near-syncope or syncope.       EKG done February of this year showed normal sinus rhythm with QRS motion 114 ms.      Previous studies:  -Echocardiogram (01/21/2020): Normal LV function.  EF estimated 55-60%.  No significant LVH noticed.  - EGD (04/3/2019): - Normal esophagus. Mild Schatzki ring. Small hiatal hernia. Scar in the gastric antrum (previous ulcer).                         ASSESSMENT AND PLAN:   1.  Paroxysmal atrial fibrillation.  No recurrence of A. Fib.  She seems to be tolerating high dose of flecainide well.  I  recommend adding a small dose of metoprolol XL (12.5 mg daily).  2.  Hypertension.  She affirms blood pressures well controlled.  Continue current medical therapy.  3.  Embolic prevention.  CHADS-VASc score of 2.   Patient had a recent GI bleeds and is unable to take oral anticoagulation.  Today, we discussed possibility of watchman procedure.  I explained the procedure in details.  Understand there is a 3% risk comp cases here procedure and not every patient has a favorable anatomy.  After long discussion, I recommend following up with GI physicians to evaluate whether or not she would tolerate perioperatively anticoagulation associated with the watchman procedure.  Also, we will send more information about the watchman procedure and we will follow-up with her over the phone to finalize the plan.     Constantin Vaughn MD    Physical Exam:  Vitals: BP (!) 138/91   Pulse 69   Wt 117 kg (258 lb)   BMI 44.29 kg/m      Constitutional:  Pt is in no acute distress.  Normal body habitus, upright.  HEAD: Normocephalic. No evidence of injury or laceration.   SKIN: Skin normal color with no lesions or eruptions. No xanthelasma.  ENT/Mouth:  Membranes moist. No nasal discharge or bleeding gums.   Neck:  Supple, normal JVP, no evidence of thyromegaly.  Chest/Lungs: No audible wheezing or labored breathing. Normal chest wall expansion. No cough.   Cardiovascular: Normal jugular venous pressure. No evidence of pitting edema bilaterally.   Abdomen: No evidence of abdominal distention. No observed jaundice.  Eyes: PERRL, EOMI. No scleral icterus, normal conjunctivae.  Extremities:  No lower extremity edema noticed.  No cyanosis or clubbing noted.   Neurologic: Normal arm motion bilateral.  No tremors. No evidence of focal defect.   Psychiatric: Alert and oriented x3, calm.         CURRENT MEDICATIONS:  Current Outpatient Medications   Medication Sig Dispense Refill     Acetaminophen 325 MG CAPS Take 325-650 mg by mouth every 4  hours as needed       ferrous fumarate 65 mg, [place]. FE,-Vitamin C 125 mg (VITRON-C)  MG TABS tablet Take 1 tablet by mouth daily 90 tablet 3     flecainide (TAMBOCOR) 150 MG tablet Take 1 tablet (150 mg) by mouth every 12 hours 60 tablet 11     gabapentin (NEURONTIN) 300 MG capsule TAKE 1 CAPSULE BY MOUTH EVERY MORNING AND TAKE 2 CAPSULES BY MOUTH EVERY NIGHT AT BEDTIME 270 capsule 1     levothyroxine (SYNTHROID/LEVOTHROID) 137 MCG tablet Take 1 tablet (137 mcg) by mouth daily 30 tablet 5     losartan (COZAAR) 100 MG tablet TAKE 1 TABLET BY MOUTH DAILY 30 tablet 3     pantoprazole (PROTONIX) 40 MG EC tablet TAKE 1 TABLET BY MOUTH TWICE A DAY BEFORE MEALS 56 tablet 0     SUMAtriptan (IMITREX) 100 MG tablet Take 1 tablet (100 mg) by mouth at onset of headache for migraine May repeat in 2 hours if needed: max 2/day; 10 tablet 11     triamterene-HCTZ (MAXZIDE-25) 37.5-25 MG tablet Take 1 tablet by mouth daily 90 tablet 3     order for DME DREAMSTATION  5-15 CM/H20  NASAL AIRFIT N10 HER         ALLERGIES     Allergies   Allergen Reactions     Morphine Anaphylaxis     Ceclor [Cefaclor] Hives     Diltiazem Hives     Lisinopril Cough     Sertraline Nausea and Vomiting       PAST MEDICAL HISTORY:  Past Medical History:   Diagnosis Date     Depression       Duodenal ulcer 03/04/2019     Essential hypertension       GERD (gastroesophageal reflux disease)      Hiatal hernia      Hypothyroidism      Migraine without aura and without status migrainosus, not intractable  Aug 2016     Obesity      NELY on CPAP      Osteoarthritis      Paroxysmal atrial fibrillation (H) 01/05/2015     RLS (restless legs syndrome)      Symptomatic menopausal or female climacteric states (aka FLASHES)      TMJ disease        PAST SURGICAL HISTORY:  Past Surgical History:   Procedure Laterality Date     APPENDECTOMY       CHOLECYSTECTOMY       COLONOSCOPY N/A 4/12/2019    Procedure: COLONOSCOPY;  Surgeon: Vahid Cardona MD;  Location:  GI      ESOPHAGOSCOPY, GASTROSCOPY, DUODENOSCOPY (EGD), COMBINED N/A 2019    Procedure: COMBINED ESOPHAGOSCOPY, GASTROSCOPY, DUODENOSCOPY (EGD);  Surgeon: Ben Ko MD;  Location:  GI     HYSTERECTOMY, [facility]  1999     TONSILLECTOMY         FAMILY HISTORY:  Family History   Problem Relation Age of Onset     Myocardial Infarction Mother      Heart Failure Mother      Heart Surgery Father         bypass surgery     Cerebrovascular Disease Father      Heart Surgery Brother      Hyperlipidemia Brother      Hyperlipidemia Sister      Hyperlipidemia Brother      Sleep Apnea Sister        SOCIAL HISTORY:  Social History     Socioeconomic History     Marital status:      Spouse name: None     Number of children: None     Years of education: None     Highest education level: None   Occupational History     None   Social Needs     Financial resource strain: None     Food insecurity     Worry: None     Inability: None     Transportation needs     Medical: None     Non-medical: None   Tobacco Use     Smoking status: Former Smoker     Packs/day: 0.25     Years: 1.00     Pack years: 0.25     Types: Cigarettes     Start date:      Last attempt to quit:      Years since quittin.3     Smokeless tobacco: Never Used     Tobacco comment: very minimal smoking history   Substance and Sexual Activity     Alcohol use: No     Alcohol/week: 0.0 standard drinks     Drug use: No     Sexual activity: Never   Lifestyle     Physical activity     Days per week: None     Minutes per session: None     Stress: None   Relationships     Social connections     Talks on phone: None     Gets together: None     Attends Zoroastrianism service: None     Active member of club or organization: None     Attends meetings of clubs or organizations: None     Relationship status: None     Intimate partner violence     Fear of current or ex partner: None     Emotionally abused: None     Physically abused: None     Forced sexual  activity: None   Other Topics Concern     Parent/sibling w/ CABG, MI or angioplasty before 65F 55M? Not Asked      Service Not Asked     Blood Transfusions Not Asked     Caffeine Concern No     Occupational Exposure Not Asked     Hobby Hazards Not Asked     Sleep Concern Yes     Stress Concern Not Asked     Weight Concern No     Special Diet No     Back Care Not Asked     Exercise No     Bike Helmet Not Asked     Seat Belt Yes     Self-Exams Not Asked   Social History Narrative     None       Review of Systems:  Skin:        Eyes:       ENT:       Respiratory:       Cardiovascular:       Gastroenterology:      Genitourinary:       Musculoskeletal:       Neurologic:       Psychiatric:       Heme/Lymph/Imm:       Endocrine:           Recent Lab Results:  LIPID RESULTS:  Lab Results   Component Value Date    CHOL 172 03/13/2019    HDL 45 (L) 03/13/2019    LDL 85 03/13/2019    TRIG 212 (H) 03/13/2019    CHOLHDLRATIO 4.7 09/24/2015       LIVER ENZYME RESULTS:  Lab Results   Component Value Date    AST 19 01/21/2020    ALT 18 01/21/2020       CBC RESULTS:  Lab Results   Component Value Date    WBC 11.1 (H) 01/29/2020    RBC 4.62 01/29/2020    HGB 12.0 01/29/2020    HCT 38.8 01/29/2020    MCV 84 01/29/2020    MCH 26.0 (L) 01/29/2020    MCHC 30.9 (L) 01/29/2020    RDW 17.1 (H) 01/29/2020     (H) 01/29/2020       BMP RESULTS:  Lab Results   Component Value Date     01/29/2020    POTASSIUM 4.3 01/29/2020    CHLORIDE 104 01/29/2020    CO2 25 01/29/2020    ANIONGAP 7 01/29/2020     (H) 01/29/2020    BUN 19 01/29/2020    CR 0.79 01/29/2020    GFRESTIMATED 79 01/29/2020    GFRESTBLACK >90 01/29/2020    J CARLOS 8.8 01/29/2020        A1C RESULTS:  Lab Results   Component Value Date    A1C 5.9 (H) 03/13/2019       INR RESULTS:  Lab Results   Component Value Date    INR 1.78 (H) 03/02/2019         ECHOCARDIOGRAM  No results found for this or any previous visit (from the past 8760 hour(s)).      No orders of  "the defined types were placed in this encounter.    Orders Placed This Encounter   Medications     Acetaminophen 325 MG CAPS     Sig: Take 325-650 mg by mouth every 4 hours as needed     There are no discontinued medications.      Encounter Diagnosis   Name Primary?     Atrial fibrillation with RVR (H)          CC  Ilda Sue PA-C  [address]  [place] [address]                Review Of Systems:  Skin:   Eyes:Ears/Nose/Throat:   Respiratory: NEGATIVE  Cardiovascular:NEGATIVE  Gastrointestinal: NEGATIVE  Genitourinary:NEGATIVE   Musculoskeletal: NEGATIVE  Neurologic: NEGATIVE  Psychiatric: NEGATIVE  Hematologic/Lymphatic/Immunologic:   Endocrine:      Reviewed:  SHANIQUE Connelly  05/12/20    Maxine Sears is a 64 year old female who is being evaluated via a billable video visit.      The patient has been notified of following:     \"This video visit will be conducted via a call between you and your physician/provider. We have found that certain health care needs can be provided without the need for an in-person physical exam.  This service lets us provide the care you need with a video conversation.  If a prescription is necessary we can send it directly to your pharmacy.  If lab work is needed we can place an order for that and you can then stop by our lab to have the test done at a later time.    Video visits are billed at different rates depending on your insurance coverage.  Please reach out to your insurance provider with any questions.    If during the course of the call the physician/provider feels a video visit is not appropriate, you will not be charged for this service.\"    Patient has given verbal consent for Video visit? Yes  05/12/20  - Writer spoke w/pt via phone.  She is expecting the video visit w/Dr. Vaughn  - BP:  138/91  - P:  69  - Wt.: 258.0Lbs    SHANIQUE Connelyl    How would you like to obtain your AVS? E-Mail (inform patient AVS not encrypted)    [email or web address]  Patient " would like the video invitation sent by: Text to cell phone: [phone]    Will anyone else be joining your video visit? No      Video-Visit Details    Type of service:  Video Visit    Video Start Time:   Video End Time:     Originating Location (pt. Location):     Distant Location (provider location):  [facility]     Platform used for Video Visit:

## 2020-05-12 NOTE — LETTER
5/12/2020      RE: Maxine Sears  [address]  [place] [address]       Dear Colleague,    Thank you for the opportunity to participate in the care of your patient, Maxine Sears, at the [facility]. Please see a copy of my visit note below.    REASON FOR VISIT:  Evaluation of paroxysmal atrial fibrillation.       HISTORY OF PRESENT ILLNESS:  Ms. Sears is a delightful 64-year-old lady with a history of hypertension recurrent GI bleed,  and paroxysmal atrial fibrillation who is here for routine evaluation.    I last saw her last year.  At the time,  She was on a combination of flecainide and Coreg, and we attempt to restart oral anticoagulation.  Today, she informs she did not tolerate blood thinners as she had recurrence of GI bleed.  She has been off any blood thinners including aspirin for the last year.  Additionally, her PCP discontinued beta-blockers and she is currently taking flecainide (150 mg twice daily) only.      Regarding A. fib control, she has been doing well.  She denies any breakthroughs of atrial fibrillation.  She denies any symptoms during this visit such as chest pain, shortness of breath, lightheadedness, near-syncope or syncope.       EKG done February of this year showed normal sinus rhythm with QRS motion 114 ms.      Previous studies:  -Echocardiogram (01/21/2020): Normal LV function.  EF estimated 55-60%.  No significant LVH noticed.  - EGD (04/3/2019): - Normal esophagus. Mild Schatzki ring. Small hiatal hernia. Scar in the gastric antrum (previous ulcer).                         ASSESSMENT AND PLAN:   1.  Paroxysmal atrial fibrillation.  No recurrence of A. Fib.  She seems to be tolerating high dose of flecainide well.  I recommend adding a small dose of metoprolol XL (12.5 mg daily).  2.  Hypertension.  She affirms blood pressures well controlled.  Continue current medical  therapy.  3.  Embolic prevention.  CHADS-VASc score of 2.   Patient had a recent GI bleeds and is unable to take oral anticoagulation.  Today, we discussed possibility of watchman procedure.  I explained the procedure in details.  Understand there is a 3% risk comp cases here procedure and not every patient has a favorable anatomy.  After long discussion, I recommend following up with GI physicians to evaluate whether or not she would tolerate perioperatively anticoagulation associated with the watchman procedure.  Also, we will send more information about the watchman procedure and we will follow-up with her over the phone to finalize the plan.    Please do not hesitate to contact me if you have any questions/concerns.     Sincerely,     Constantin Vaughn MD

## 2020-05-14 ENCOUNTER — PATIENT OUTREACH (OUTPATIENT)
Dept: CARE COORDINATION | Facility: CLINIC | Age: 65
End: 2020-05-14

## 2020-05-14 NOTE — PROGRESS NOTES
Clinic Care Coordination Contact  Community Health Worker Follow Up    Patient Reports:    Patient stated that she is doing okay.    Patient stated that she has been indoors most of the time staying safe.  She does go to [facility] early in the morning around 6am to avoid crowds.    Patient wears a mask and gloves all the time when she is out.    Patient has an order for  at [employer] tomorrow, and will be available curbside, so she will not go into the store.    Patient stated that she is trying to walk, but sometimes she is depressed, so she doesn't exercise.    Patient stated that she eats a lot of fresh fruits and makes smoothies with 1% milk    Patient stated that she is going to start walking outside around the parking lot to get some fresh air.      Goals:   Goals Addressed as of 5/14/2020 at 11:28 AM                 1/24/20 11/18/19       Patient Stated      1. Being Active (pt-stated)   20%  On track    Added 10/10/19 by Damien Person RN     Goal Statement: I will work to lose 15 lbs in 6 months by increasing my daily physical activity.  Date Goal set: 10/10/19  Date to Achieve By: 7/31/20  Patient expressed understanding of goal: Yes  Action steps to achieve this goal:  1. I will use the gym within my apartment to exercise.  2. I will increase the amount of walking I do on a daily basis.  3. I will continue going to [facility] and participating in activities to stay active.    As of today's date 1/28/2020 goal is met at 0 - 25%.   Goal Status:  Active -- The patient reports most recent weight of 265 lbs.  Activity has been limited due to recent health complications.  As of today's date 3/27/2020 goal is met at 0 - 25%.   Goal Status:  Active   As of today's date 5/14/2020 goal is met at 0 - 25%.   Goal Status:  Active             CHW Next Follow-up: 06/11/20    CHW used Empathy and Active listening to understand the patients barriers and struggles.    Next Outreach: 06/11/20    Carmen  TRINIDAD Mims  Clinic Care Coordination  [facility], [place], [facility]  Phone: [phone]

## 2020-05-15 ENCOUNTER — PATIENT OUTREACH (OUTPATIENT)
Dept: CARE COORDINATION | Facility: CLINIC | Age: 65
End: 2020-05-15

## 2020-05-15 ASSESSMENT — ACTIVITIES OF DAILY LIVING (ADL): DEPENDENT_IADLS:: CLEANING;TRANSPORTATION

## 2020-05-15 NOTE — PROGRESS NOTES
Clinic Care Coordination Contact    Situation: Patient chart reviewed by care coordinator.     Background: Care Coordination following patient to assist with Goal as below.    Assessment: Per chart review, patient has been actively working with CC CHW to accomplish Goal.  Patient's goal remains appropriate and relevant at this time.    Goals Addressed                 This Visit's Progress       Patient Stated      1. Being Active (pt-stated)   20%     Goal Statement: I will work to lose 15 lbs in 6 months by increasing my daily physical activity.  Date Goal set: 10/10/19  Date to Achieve By: 7/31/20  Patient expressed understanding of goal: Yes  Action steps to achieve this goal:  1. I will use the gym within my apartment to exercise.  2. I will increase the amount of walking I do on a daily basis.  3. I will continue going to [facility] and participating in activities to stay active.    As of today's date 1/28/2020 goal is met at 0 - 25%.   Goal Status:  Active -- The patient reports most recent weight of 265 lbs.  Activity has been limited due to recent health complications.  As of today's date 3/27/2020 goal is met at 0 - 25%.   Goal Status:  Active   As of today's date 5/14/2020 goal is met at 0 - 25%.   Goal Status:  Active       Plan/Recommendations: The patient will continue working with Care Coordination to achieve Goal as above.  CC RN will review patient chart again in approximately 6 weeks to assess patient status and goal progress with outreach to patient as indicated.    Damien Person RN  Clinic Care Coordinator  [facility]  Ph: [phone]

## 2020-05-15 NOTE — LETTER
[facility] CARE COORDINATION  [facility]  [address], [place] [address]    May 15, 2020        Maxine Velasquez  [address]  [place] [address]          Dear Alondra Goodman is an updated Complex Care Plan for your continued enrollment in Care Coordination. Please let us know if you have additional questions, concerns, or goals that we can assist with.    Sincerely,    Damien Person RN        [facility]  Complex Care Plan  About Me:    Patient Name:  Maxine Velasquez    YOB: 1955  Age:         64 year old   [facility] MRN:    [id number] Telephone Information:  Home Phone [phone]   Mobile [phone]       Address:  [address]  [place] [address] Email address:  No e-mail address on record      Emergency Contact(s)    Name Relationship Lgl Grd Work Phone Home Phone Mobile Phone   1. SEVERSON,VIVIAN Sister   [phone]   2. BRANDI GEORGES Daughter  none [phone]   3. PRATIMA VELASQUEZ Son   [phone]           Primary language:  English     needed? No   [facility]:  [phone] op. 1  Other communication barriers: None  Preferred Method of Communication:  Phone  Current living arrangement: I live alone  Mobility Status/ Medical Equipment: Independent w/Device    Health Maintenance  Health Maintenance Reviewed: Due/Overdue   Health Maintenance Due   Topic Date Due     ADVANCE CARE PLANNING  1955     MIGRAINE ACTION PLAN  1955     ZOSTER IMMUNIZATION (1 of 2) 08/23/2005     PHQ-9  04/07/2020     PAP  05/26/2020     My Access Plan  Medical Emergency 911   Primary Clinic Line [facility] - [phone]   24 Hour Appointment Line [phone] or  [phone]) (toll-free)   24 Hour Nurse Line [phone] (toll-free)   Preferred Urgent Care [facility]  [phone]   Preferred Hospital [facility]  [phone]   Preferred Pharmacy [facility] [address]     Behavioral Health Crisis Line The National Suicide Prevention Lifeline at 1-899.669.2358 or 918     My Care Team Members  Patient Care Team       Relationship Specialty Notifications Start End    Yuval Jama MD PCP - General Internal Medicine  10/20/14     Merged    Phone: [phone] Fax: [phone]         [address] [place] [address]    Yuval Jama MD  Internal Medicine  10/20/14     Merged    Phone: [phone] Fax: [phone]         [address] [place] [address]    Yuval Jama MD Assigned PCP   12/19/14     Phone: [phone] Fax: [phone]         [address] [place] [address]    Damien Person RN Lead Care Coordinator Primary Care - CC  3/6/19     Phone: [phone]         Carmen Mims MA Community Health Worker Primary Care - CC  1/22/20             My Care Plans  Self Management and Treatment Plan  Goals and (Comments)  Goals        General    1. Being Active (pt-stated)     Notes - Note edited  5/14/2020 11:26 AM by Carmen Mims MA    Goal Statement: I will work to lose 15 lbs in 6 months by increasing my daily physical activity.  Date Goal set: 10/10/19  Date to Achieve By: 7/31/20  Patient expressed understanding of goal: Yes  Action steps to achieve this goal:  1. I will use the gym within my apartment to exercise.  2. I will increase the amount of walking I do on a daily basis.  3. I will continue going to [facility] and participating in activities to stay active.              Action Plans on File:   Depression       My Medical and Care Information  Problem List   Patient Active Problem List   Diagnosis     Osteoarthritis     TMJ disease     Atrial fibrillation and flutter (H)     Health Care Home     Hyperlipidemia LDL goal <130     Hypothyroidism     Morbid obesity,  unspecified obesity type (H)     Migraine without aura and without status migrainosus, not intractable     Essential hypertension     NELY on CPAP     RLS (restless legs syndrome)     Severe episode of recurrent major depressive disorder, without psychotic features (H)     Gastroesophageal reflux disease, esophagitis presence not specified     Atrial fibrillation with RVR (H)     Iron deficiency      Current Medications:  Current Outpatient Medications   Medication     Acetaminophen 325 MG CAPS     ferrous fumarate 65 mg, [place]. FE,-Vitamin C 125 mg (VITRON-C)  MG TABS tablet     flecainide (TAMBOCOR) 150 MG tablet     gabapentin (NEURONTIN) 300 MG capsule     levothyroxine (SYNTHROID/LEVOTHROID) 137 MCG tablet     losartan (COZAAR) 100 MG tablet     metoprolol succinate ER (TOPROL-XL) 25 MG 24 hr tablet     order for DME     pantoprazole (PROTONIX) 40 MG EC tablet     SUMAtriptan (IMITREX) 100 MG tablet     triamterene-HCTZ (MAXZIDE-25) 37.5-25 MG tablet     No current facility-administered medications for this visit.      Care Coordination Start Date: 3/15/2019   Frequency of Care Coordination: monthly   Form Last Updated: 05/15/2020

## 2020-05-22 DIAGNOSIS — K92.2 ACUTE GI BLEEDING: ICD-10-CM

## 2020-05-22 NOTE — TELEPHONE ENCOUNTER
Routing refill request to provider for review/approval because:  To pharmacy: Maximum Refills Reached

## 2020-05-23 RX ORDER — PANTOPRAZOLE SODIUM 40 MG/1
TABLET, DELAYED RELEASE ORAL
Qty: 180 TABLET | Refills: 1 | Status: SHIPPED | OUTPATIENT
Start: 2020-05-23 | End: 2020-11-06

## 2020-06-03 ENCOUNTER — TELEPHONE (OUTPATIENT)
Dept: CARDIOLOGY | Facility: CLINIC | Age: 65
End: 2020-06-03

## 2020-06-03 DIAGNOSIS — I48.0 PAROXYSMAL ATRIAL FIBRILLATION (H): Primary | ICD-10-CM

## 2020-06-03 NOTE — TELEPHONE ENCOUNTER
Spoke to patient in follow up to virtual visit dated 5/12 with Dr Vaughn.    Per note:  ASSESSMENT AND PLAN:   1.  Paroxysmal atrial fibrillation.  No recurrence of A. Fib.  She seems to be tolerating high dose of flecainide well.  I recommend adding a small dose of metoprolol XL (12.5 mg daily).  2.  Hypertension.  She affirms blood pressures well controlled.  Continue current medical therapy.  3.  Embolic prevention.  CHADS-VASc score of 2.   Patient had a recent GI bleeds and is unable to take oral anticoagulation.  Today, we discussed possibility of watchman procedure.  I explained the procedure in details.  Understand there is a 3% risk comp cases here procedure and not every patient has a favorable anatomy.  After long discussion, I recommend following up with GI physicians to evaluate whether or not she would tolerate perioperatively anticoagulation associated with the watchman procedure.  Also, we will send more information about the watchman procedure and we will follow-up with her over the phone to finalize the plan.  Patient reports she has not received any information on the Watchman and is requesting this.  Informed patient that we would get information out to her.   Also has not reached out to her GI doctor to discuss it either.  Reviewed with patient in detail Watchman procedure and post care.  Patient will reach out to her GI doctor to discuss further as recommended at visit with Dr Vaughn on 5/12..  Provided patient with clinic number for her GI team to call us for more information.  patient did not have the name of the doctor she sees at this time.  Patient will follow up with us after she gets contacts them.  KRISTOPHER Salas

## 2020-06-04 NOTE — TELEPHONE ENCOUNTER
Lio called back and pt will go in and have an OV with Dr Ko next week, as it has been a year.  Labs will be completed.  Labs  Dr Ko note and his ok will be faxed over to [facility] side fax. Mary

## 2020-06-04 NOTE — TELEPHONE ENCOUNTER
Lio nurse from Dr Ko office () called back and was given the reason for pt call to their clinic.  Explained that pt is wanting to have a Watchman procedure done and will need to be on a blood thinner for 45 days.  Lio will message Dr Ko, who is in the hospital today for his recommendation and then will call A Fib nurse line with response. JNelsonKRISTOPHER

## 2020-06-12 ENCOUNTER — TELEPHONE (OUTPATIENT)
Dept: CARDIOLOGY | Facility: CLINIC | Age: 65
End: 2020-06-12

## 2020-06-12 ENCOUNTER — ANCILLARY PROCEDURE (OUTPATIENT)
Dept: GENERAL RADIOLOGY | Facility: CLINIC | Age: 65
End: 2020-06-12
Attending: INTERNAL MEDICINE
Payer: COMMERCIAL

## 2020-06-12 ENCOUNTER — OFFICE VISIT (OUTPATIENT)
Dept: INTERNAL MEDICINE | Facility: CLINIC | Age: 65
End: 2020-06-12
Payer: COMMERCIAL

## 2020-06-12 VITALS
SYSTOLIC BLOOD PRESSURE: 130 MMHG | HEART RATE: 83 BPM | TEMPERATURE: 98.3 F | RESPIRATION RATE: 16 BRPM | WEIGHT: 268 LBS | OXYGEN SATURATION: 95 % | DIASTOLIC BLOOD PRESSURE: 78 MMHG | BODY MASS INDEX: 46 KG/M2

## 2020-06-12 DIAGNOSIS — M54.16 LUMBAR RADICULOPATHY: Primary | ICD-10-CM

## 2020-06-12 DIAGNOSIS — I48.92 ATRIAL FIBRILLATION AND FLUTTER (H): ICD-10-CM

## 2020-06-12 DIAGNOSIS — I48.91 ATRIAL FIBRILLATION AND FLUTTER (H): ICD-10-CM

## 2020-06-12 DIAGNOSIS — M25.562 CHRONIC PAIN OF LEFT KNEE: ICD-10-CM

## 2020-06-12 DIAGNOSIS — G89.29 CHRONIC PAIN OF LEFT KNEE: ICD-10-CM

## 2020-06-12 DIAGNOSIS — E61.1 IRON DEFICIENCY: ICD-10-CM

## 2020-06-12 DIAGNOSIS — E66.01 MORBID OBESITY, UNSPECIFIED OBESITY TYPE (H): ICD-10-CM

## 2020-06-12 DIAGNOSIS — E03.9 HYPOTHYROIDISM, UNSPECIFIED TYPE: ICD-10-CM

## 2020-06-12 LAB
ERYTHROCYTE [DISTWIDTH] IN BLOOD BY AUTOMATED COUNT: 16.8 % (ref 10–15)
FERRITIN SERPL-MCNC: 49 NG/ML (ref 8–252)
HCT VFR BLD AUTO: 38.9 % (ref 35–47)
HGB BLD-MCNC: 11.9 G/DL (ref 11.7–15.7)
IRON SATN MFR SERPL: 28 % (ref 15–46)
IRON SERPL-MCNC: 130 UG/DL (ref 35–180)
MCH RBC QN AUTO: 25.6 PG (ref 26.5–33)
MCHC RBC AUTO-ENTMCNC: 30.6 G/DL (ref 31.5–36.5)
MCV RBC AUTO: 84 FL (ref 78–100)
PLATELET # BLD AUTO: 468 10E9/L (ref 150–450)
RBC # BLD AUTO: 4.64 10E12/L (ref 3.8–5.2)
TIBC SERPL-MCNC: 468 UG/DL (ref 240–430)
TSH SERPL DL<=0.005 MIU/L-ACNC: 0.79 MU/L (ref 0.4–4)
WBC # BLD AUTO: 9.6 10E9/L (ref 4–11)

## 2020-06-12 PROCEDURE — 82728 ASSAY OF FERRITIN: CPT | Performed by: INTERNAL MEDICINE

## 2020-06-12 PROCEDURE — 83550 IRON BINDING TEST: CPT | Performed by: INTERNAL MEDICINE

## 2020-06-12 PROCEDURE — 36415 COLL VENOUS BLD VENIPUNCTURE: CPT | Performed by: INTERNAL MEDICINE

## 2020-06-12 PROCEDURE — 73562 X-RAY EXAM OF KNEE 3: CPT | Mod: LT

## 2020-06-12 PROCEDURE — 84443 ASSAY THYROID STIM HORMONE: CPT | Performed by: INTERNAL MEDICINE

## 2020-06-12 PROCEDURE — 83540 ASSAY OF IRON: CPT | Performed by: INTERNAL MEDICINE

## 2020-06-12 PROCEDURE — 85027 COMPLETE CBC AUTOMATED: CPT | Performed by: INTERNAL MEDICINE

## 2020-06-12 PROCEDURE — 99214 OFFICE O/P EST MOD 30 MIN: CPT | Performed by: INTERNAL MEDICINE

## 2020-06-12 NOTE — TELEPHONE ENCOUNTER
Called Dr Ko office in follow up as we have not received OV notes from visit.  Spoke to L[facility] who will work on getting the OV note to us.  Provided fax number [phone].  KRISTOPHER Salas

## 2020-06-12 NOTE — PROGRESS NOTES
"Subjective     Maxine Sears is a 64 year old female who presents to clinic today for the following health issues:    HPI   Musculoskeletal problem/pain      Duration: for some time-worse within the last 3 months    Description  Location: left lumbar/buttock area start    Intensity:  moderate    Accompanying signs and symptoms: radiation of pain to left leg    History  Previous similar problem: no   Previous evaluation:  none    Precipitating or alleviating factors:  Trauma or overuse: no   Aggravating factors include: sitting and walking    Therapies tried and outcome: rest/inactivity-Relief     Pt's past medical history, family history, habits, medications and allergies were reviewed with the patient today.  See snap shot for  HCM status. Most recent lab results reviewed with pt. Problem list and histories reviewed & adjusted, as indicated.  Additional history as below:    Pain radiation worse with prolonged sitting and walking. Better with lying down. No numbness. Just intermittent \"severe pain\". On Gabapentin. No B/B incontinence. Prior LS spine Xray reviewed   No blood seen in stools. Hx anemia.   Saw GI yesterday and planning on having EGD  next week to be sure things OK prior to  Possible Watchman procedure 1 month from now.  Has localized pain also with left knee. Hx previous patella osteophyte and some medial meniscus area DJD on Xray  Weight up 10 pounds after [previous 17 pound weight loss. Less activity with joint issues. Ambulating with walker     Additional ROS:   Constitutional, HEENT, Cardiovascular, Pulmonary, GI and , Neuro, MSK and Psych review of systems/symptoms are otherwise negative or unchanged from previous, except as noted above.      OBJECTIVE:  /78   Pulse 83   Temp 98.3  F (36.8  C) (Temporal)   Resp 16   Wt 121.6 kg (268 lb)   SpO2 95%   BMI 46.00 kg/m     Estimated body mass index is 46 kg/m  as calculated from the following:    Height as of 2/18/20: 1.626 m (5' " "4\").    Weight as of this encounter: 121.6 kg (268 lb).     Neck: no adenopathy. Thyroid normal to palpation. No bruits  Pulm: Lungs clear to auscultation   CV: Regular rates and rhythm  GI: Soft, obese, nontender, Normal active bowel sounds, No hepatosplenomegaly or masses palpable  Ext: Peripheral pulses intact.  Minimal BLE edema. Tenderness to palpation left knee medical joint line. No effusion. Ligament exam of knee grossly intact to resistance testing  Back: Tenderness to palpation left paralumbar area. Neg SLRT bilaterally.    Neuro: Normal strength and tone, sensory exam grossly normal      Assessment/Plan: (See plan discussion below for further details)  1. Lumbar radiculopathy   Given persistence of sx despite conservative treatments and previous  Xray findings, will get MRI LS spine to see if LESI option vs PT vs surgical referral  - MR Lumbar Spine w/o Contrast; Future    2. Chronic pain of left knee   Xray done today shows worsening medial joint space findings.  Patella spur still present. Pt to see ortho for possible steroid injection and/or referral to [facility] for PT  - XR Knee Left 3 Views; Future  - Orthopedic & Spine  Referral; Future    3. Atrial fibrillation and flutter (H)  No recurrence with Flecainide. Not able to use longterm AC due to previous hx GI bleeding. Cardiology recommend Watchman and scheduled for future per pt. Support plan for  Watchamn procedure to reduce risk of stroke with  A fib history if cardiology feels pt is a candidate for the procedure otherwise and future EGD looks OK since would need to be on Plavix shortterm after procedure    4. Morbid obesity, unspecified obesity type (H)  Weight up with reduced activity. Counseled re: carb/calorie reduction. Increase walking exercise once back and knee addressed    5. Iron deficiency   With previous GI bleeding. Not on AC now but will need to be on Plavix for few months after Watchman so has EGD scheduled with  [facility]. Labs as " ordered to assess curent Hgb, iron state. ON PPI  - Ferritin  - Iron and iron binding capacity  - CBC with platelets    6. Hypothyroidism, unspecified type  On replacement. Weight up some. Lab as ordered  - TSH with free T4 reflex    Plan discussion:    Labs as ordered  Xray left knee    Referral to [facility]. They will call to schedule. Possible knee injection vs other. [facility] or [facility]  MRI Lumbar spine. [facility] will call to schedule or may call [phone]  Endoscopy next week as scheduled Wednesday with PATRICIO Jama MD  Internal Medicine Department  [facility]    (Chart documentation was completed, in part, with [employer] voice-recognition software. Even though reviewed, some grammatical, spelling, and word errors may remain.)

## 2020-06-12 NOTE — PATIENT INSTRUCTIONS
Labs as ordered  Xray left knee Will call you alfredo with report  Referral to [facility]. They will call to schedule. Possible knee injection vs other. [facility] or [facility]  MRI Lumbar spine. [facility] will call to schedule or may call [phone]  Endoscopy next week as scheduled Wednesday with PATRICIO HARMON  
[FreeTextEntry1] : The patient is being treated for inclusion myositis. Getting infusions every 6 weeks. She is on A./C No chest pain or palpitations. She has DM and is concerned about her risk of MI . The patient had a nuclear stress test which was negative  for ischemia . The patient injured /fractured her left knee cap . This was treated with a cast . Did not need surgery . 
[FreeTextEntry1] : The patient is being treated for inclusion myositis. Getting infusions every 6 weeks. She is on A./C No chest pain or palpitations. She has DM and is concerned about her risk of MI . The patient had a nuclear stress test which was negative  for ischemia . The patient injured /fractured her left knee cap . This was treated with a cast . Did not need surgery .

## 2020-06-12 NOTE — TELEPHONE ENCOUNTER
"6/12/20 Msg left on Afib line from \"Lio\" from [facility] stating pt was having a repeat EGD and labs done on 6/17 in preparation for Watchman procedure in July. Lio stated she had nothing to fax presently but will after pts appt on 6/17. Any questions call [phone]. Will forward to Charis Rivera RN for Dr Toth. SiomaraoRN 420 pm  "

## 2020-06-14 PROBLEM — M25.562 CHRONIC PAIN OF LEFT KNEE: Status: ACTIVE | Noted: 2020-06-14

## 2020-06-14 PROBLEM — M54.16 LUMBAR RADICULOPATHY: Status: ACTIVE | Noted: 2020-06-14

## 2020-06-14 PROBLEM — G89.29 CHRONIC PAIN OF LEFT KNEE: Status: ACTIVE | Noted: 2020-06-14

## 2020-06-15 ENCOUNTER — PATIENT OUTREACH (OUTPATIENT)
Dept: CARE COORDINATION | Facility: CLINIC | Age: 65
End: 2020-06-15

## 2020-06-15 NOTE — PROGRESS NOTES
"Clinic Care Coordination Contact  Community Health Worker Follow Up    Patient Reports:    Patient stated that she is \"okay\".    Patient stated that she has an Upper GI and an Orthopedic appointment this week.    Patient stated that she has not been exercising due to the pain in her knee.  Walking for any prolong time is almost impossible.    Patient stated that next month she is having a heart surgery; watchman put in to prevent blood clots.      Patient stated that she does not go out anywhere except to doctor's appointments and grocery shopping, but only if an electric cart is available at that store.    Patient stated that she has been busy at home with catching up on some shows, doing puzzles, and a some sewing.      Patient stated that [facility] is closed until December.    Patient stated that she continues to eat healthier with fresh fruits.    Goals:   Goals Addressed as of 6/15/2020 at 12:32 PM                 Today    5/15/20       Patient Stated      1. Being Active (pt-stated)   20%  20%    Added 10/10/19 by Damien Person RN     Goal Statement: I will work to lose 15 lbs in 6 months by increasing my daily physical activity.  Date Goal set: 10/10/19  Date to Achieve By: 7/31/20  Patient expressed understanding of goal: Yes  Action steps to achieve this goal:  1. I will use the gym within my apartment to exercise.  2. I will increase the amount of walking I do on a daily basis.  3. I will continue going to [facility] and participating in activities to stay active.    As of today's date 1/28/2020 goal is met at 0 - 25%.   Goal Status:  Active -- The patient reports most recent weight of 265 lbs.  Activity has been limited due to recent health complications.  As of today's date 3/27/2020 goal is met at 0 - 25%.   Goal Status:  Active   As of today's date 5/14/2020 goal is met at 0 - 25%.   Goal Status:  Active             Intervention and Education during outreach: CHW " used Empathy and Active listening to understand the patients barriers and struggles.    CHW Plan: Patient will continue to add fresh fruits and vegetables to her daily meals.    Next Outreach: 07/15/20       TRINIDAD Li  Clinic Care Coordination  [facility] Clinics: [place], and [facility]  Phone: [phone]

## 2020-06-17 ENCOUNTER — TRANSFERRED RECORDS (OUTPATIENT)
Dept: HEALTH INFORMATION MANAGEMENT | Facility: CLINIC | Age: 65
End: 2020-06-17

## 2020-06-17 NOTE — PROGRESS NOTES
"[facility]   Clinic Visit s Jun 18, 2020    PCP: Yuval Jama    ASSESSMENT/PLAN    ICD-10-CM    1. Primary osteoarthritis of left knee  M17.12 Orthopedic & Spine  Referral   2. Chronic pain of left knee  M25.562 Orthopedic & Spine  Referral    G89.29      End-stage arthritis of the left knee with bone-on-bone medially.  Discussed options including physical therapy, cortisone injections, bracing but she is functionally struggling and is fearful of needles therefore refuses cortisone today.  She desires to move on to more definitive treatment and this is reasonable given the advanced degeneration of the knee.  She will try over-the-counter Voltaren gel in addition to Tylenol.  Heat or cold as needed also advised.  May return for injection if she changes her mind but otherwise we will request a surgical consultation.  She is aware this may be delayed due to her heart procedure coming up first.    Today's Visit:  Maxine is a 64 year old female who is seen in consultation at the request of Dr. Jmaa for   Chief Complaint   Patient presents with     Left Knee - Pain       Injury: Reports insidious onset without acute precipitating event. She reports chronic knee pain for 10 years. She states the pain has increased over the last 2 months. She did do PT in the past with no improvement.       Location of Pain: left knee posterior, nonradiating   Duration of Pain: chronic, worsening, 2 month(s),   Rating of Pain at worst: 10/10  Rating of Pain Currently: 5/10  Pain is better with: hot bath and rest  Pain is worse with: walking   Treatment so far consists of: physical therapy and rest, 9 years ago,  Associated symptoms: no distal numbness or tingling; denies swelling or warmth  Recent imaging completed: X-rays completed 6/18/20.  Prior History of related problems: Chronic knee pain    Upcoming cardiac procedure, \"watchman\" device    Heat helps, cold hurts, doesn't want " injections.   Social History: retired     Past Medical History:   Diagnosis Date     Depression       Duodenal ulcer 03/04/2019     Essential hypertension       GERD (gastroesophageal reflux disease)      Hiatal hernia      Hypothyroidism      Migraine without aura and without status migrainosus, not intractable  Aug 2016     Obesity      NELY on CPAP      Osteoarthritis      Paroxysmal atrial fibrillation (H) 01/05/2015     RLS (restless legs syndrome)      Symptomatic menopausal or female climacteric states (aka FLASHES)      TMJ disease        Patient Active Problem List    Diagnosis Date Noted     Chronic pain of left knee 06/14/2020     Priority: Medium     Lumbar radiculopathy 06/14/2020     Priority: Medium     Iron deficiency 02/02/2020     Priority: Medium     Atrial fibrillation with RVR (H) 01/21/2020     Priority: Medium     Gastroesophageal reflux disease, esophagitis presence not specified 01/10/2018     Priority: Medium     Severe episode of recurrent major depressive disorder, without psychotic features (H) 04/24/2017     Priority: Medium     NELY on CPAP      Priority: Medium     RLS (restless legs syndrome)      Priority: Medium     Essential hypertension 06/29/2016     Priority: Medium     Migraine without aura and without status migrainosus, not intractable 03/19/2016     Priority: Medium     Morbid obesity, unspecified obesity type (H) 12/09/2015     Priority: Medium     Hypothyroidism 12/03/2015     Priority: Medium     Hyperlipidemia LDL goal <130 03/26/2015     Priority: Medium     Health Care Home 01/19/2015     Priority: Medium     Status:  CLOSED  Care Coordinator:  Jovanna Abdalla RN    See Letters for [facility] Care Plan  Date:  January 19, 2015           Atrial fibrillation and flutter (H)      Priority: Medium     Osteoarthritis      Priority: Medium     TMJ disease      Priority: Medium       Family History   Problem Relation Age of Onset     Myocardial Infarction Mother      Heart Failure  "Mother      Heart Surgery Father         bypass surgery     Cerebrovascular Disease Father      Heart Surgery Brother      Hyperlipidemia Brother      Hyperlipidemia Sister      Hyperlipidemia Brother      Sleep Apnea Sister        Social History     Socioeconomic History     Marital status:      Spouse name: Not on file     Number of children: Not on file     Years of education: Not on file     Highest education level: Not on file   Occupational History     Not on file   Social Needs     Financial resource strain: Not on file     Food insecurity     Worry: Not on file     Inability: Not on file     Transportation needs     Medical: Not on file     Non-medical: Not on file   Tobacco Use     Smoking status: Former Smoker     Packs/day: 0.25     Years: 1.00     Pack years: 0.25     Types: Cigarettes     Start date:      Last attempt to quit:      Years since quittin.4     Smokeless tobacco: Never Used     Tobacco comment: very minimal smoking history   Substance and Sexual Activity     Alcohol use: No     Alcohol/week: 0.0 standard drinks     Drug use: No       Past Surgical History:   Procedure Laterality Date     APPENDECTOMY       CHOLECYSTECTOMY       COLONOSCOPY N/A 2019    Procedure: COLONOSCOPY;  Surgeon: Vahid Cardona MD;  Location:  GI     ESOPHAGOSCOPY, GASTROSCOPY, DUODENOSCOPY (EGD), COMBINED N/A 2019    Procedure: COMBINED ESOPHAGOSCOPY, GASTROSCOPY, DUODENOSCOPY (EGD);  Surgeon: Ben Ko MD;  Location: [facility]     HYSTERECTOMY, [facility]  1999     TONSILLECTOMY               Review of Systems   Musculoskeletal: Positive for back pain and joint pain.   All other systems reviewed and are negative.        Physical Exam  Musculoskeletal:      Left knee: She exhibits effusion.         /80   Ht 1.626 m (5' 4\")   Wt 122.5 kg (270 lb)   BMI 46.35 kg/m    Constitutional:well-developed, well-nourished, and in no distress.   Cardiovascular: Intact distal " pulses.    Neurological: alert. Gait Abnormal:   Antalgic gait  using walker  Skin: Skin is warm and dry.   Psychiatric: Mood and affect normal.   Respiratory: unlabored, speaks in full sentences  Lymph: no LAD, no lymphangitis          Left Knee Exam     Tenderness   The patient is experiencing tenderness in the medial joint line.    Range of Motion   Extension: normal   Flexion: 90     Tests   Ariella:  Medial - positive Lateral - negative  Varus: negative Valgus: negative  Drawer:  Anterior - negative     Posterior - negative  Patellar apprehension: negative    Other   Erythema: absent  Sensation: normal  Pulse: present  Swelling: mild  Effusion: effusion present            X-ray images Ordered and independently reviewed by me in the office today with the patient. X-ray shows: LEFT KNEE THREE VIEWS  6/12/2020 10:53 AM      HISTORY: Chronic pain of left knee.     COMPARISON: 9/13/2017.                                                                      IMPRESSION: Advanced medial joint space narrowing has progressed since  the prior exam. Associated medial compartment marginal bony spurring  is again noted. Patellofemoral marginal bony spurring is probably  unchanged. No acute bony abnormality or joint space effusion.     MELISA MONTAÑO MD

## 2020-06-18 ENCOUNTER — OFFICE VISIT (OUTPATIENT)
Dept: ORTHOPEDICS | Facility: CLINIC | Age: 65
End: 2020-06-18
Payer: COMMERCIAL

## 2020-06-18 VITALS
DIASTOLIC BLOOD PRESSURE: 80 MMHG | HEIGHT: 64 IN | WEIGHT: 270 LBS | SYSTOLIC BLOOD PRESSURE: 128 MMHG | BODY MASS INDEX: 46.1 KG/M2

## 2020-06-18 DIAGNOSIS — M17.12 PRIMARY OSTEOARTHRITIS OF LEFT KNEE: Primary | ICD-10-CM

## 2020-06-18 DIAGNOSIS — G89.29 CHRONIC PAIN OF LEFT KNEE: ICD-10-CM

## 2020-06-18 DIAGNOSIS — M25.562 CHRONIC PAIN OF LEFT KNEE: ICD-10-CM

## 2020-06-18 PROCEDURE — 99203 OFFICE O/P NEW LOW 30 MIN: CPT | Performed by: FAMILY MEDICINE

## 2020-06-18 ASSESSMENT — MIFFLIN-ST. JEOR: SCORE: 1759.71

## 2020-06-18 ASSESSMENT — ENCOUNTER SYMPTOMS: BACK PAIN: 1

## 2020-06-18 NOTE — PATIENT INSTRUCTIONS
Try Voltaren gel, over the counter, apply to affected area 3-4 x per day.     Surgical referral placed     May return for a knee injection if desired.

## 2020-06-18 NOTE — LETTER
6/18/2020         RE: Maxine Sears  [address]  [place] [address]        Dear Colleague,    Thank you for referring your patient, Maxine Sears, to the  SPORTS MEDICINE. Please see a copy of my visit note below.    [facility]   Clinic Visit s Jun 18, 2020    PCP: Yuval Jama    ASSESSMENT/PLAN    ICD-10-CM    1. Primary osteoarthritis of left knee  M17.12 Orthopedic & Spine  Referral   2. Chronic pain of left knee  M25.562 Orthopedic & Spine  Referral    G89.29      End-stage arthritis of the left knee with bone-on-bone medially.  Discussed options including physical therapy, cortisone injections, bracing but she is functionally struggling and is fearful of needles therefore refuses cortisone today.  She desires to move on to more definitive treatment and this is reasonable given the advanced degeneration of the knee.  She will try over-the-counter Voltaren gel in addition to Tylenol.  Heat or cold as needed also advised.  May return for injection if she changes her mind but otherwise we will request a surgical consultation.  She is aware this may be delayed due to her heart procedure coming up first.    Today's Visit:  Maxine is a 64 year old female who is seen in consultation at the request of Dr. Jama for   Chief Complaint   Patient presents with     Left Knee - Pain       Injury: Reports insidious onset without acute precipitating event. She reports chronic knee pain for 10 years. She states the pain has increased over the last 2 months. She did do PT in the past with no improvement.       Location of Pain: left knee posterior, nonradiating   Duration of Pain: chronic, worsening, 2 month(s),   Rating of Pain at worst: 10/10  Rating of Pain Currently: 5/10  Pain is better with: hot bath and rest  Pain is worse with: walking   Treatment so far consists of: physical therapy and rest, 9 years ago,  Associated symptoms: no distal  "numbness or tingling; denies swelling or warmth  Recent imaging completed: X-rays completed 6/18/20.  Prior History of related problems: Chronic knee pain    Upcoming cardiac procedure, \"watchman\" device    Heat helps, cold hurts, doesn't want injections.   Social History: retired     Past Medical History:   Diagnosis Date     Depression       Duodenal ulcer 03/04/2019     Essential hypertension       GERD (gastroesophageal reflux disease)      Hiatal hernia      Hypothyroidism      Migraine without aura and without status migrainosus, not intractable  Aug 2016     Obesity      NELY on CPAP      Osteoarthritis      Paroxysmal atrial fibrillation (H) 01/05/2015     RLS (restless legs syndrome)      Symptomatic menopausal or female climacteric states (aka FLASHES)      TMJ disease        Patient Active Problem List    Diagnosis Date Noted     Chronic pain of left knee 06/14/2020     Priority: Medium     Lumbar radiculopathy 06/14/2020     Priority: Medium     Iron deficiency 02/02/2020     Priority: Medium     Atrial fibrillation with RVR (H) 01/21/2020     Priority: Medium     Gastroesophageal reflux disease, esophagitis presence not specified 01/10/2018     Priority: Medium     Severe episode of recurrent major depressive disorder, without psychotic features (H) 04/24/2017     Priority: Medium     NELY on CPAP      Priority: Medium     RLS (restless legs syndrome)      Priority: Medium     Essential hypertension 06/29/2016     Priority: Medium     Migraine without aura and without status migrainosus, not intractable 03/19/2016     Priority: Medium     Morbid obesity, unspecified obesity type (H) 12/09/2015     Priority: Medium     Hypothyroidism 12/03/2015     Priority: Medium     Hyperlipidemia LDL goal <130 03/26/2015     Priority: Medium     Health Care Home 01/19/2015     Priority: Medium     Status:  CLOSED  Care Coordinator:  Jovanna Abdalla RN    See Letters for [facility] Care Plan  Date:  January 19, 2015           " Atrial fibrillation and flutter (H)      Priority: Medium     Osteoarthritis      Priority: Medium     TMJ disease      Priority: Medium       Family History   Problem Relation Age of Onset     Myocardial Infarction Mother      Heart Failure Mother      Heart Surgery Father         bypass surgery     Cerebrovascular Disease Father      Heart Surgery Brother      Hyperlipidemia Brother      Hyperlipidemia Sister      Hyperlipidemia Brother      Sleep Apnea Sister        Social History     Socioeconomic History     Marital status:      Spouse name: Not on file     Number of children: Not on file     Years of education: Not on file     Highest education level: Not on file   Occupational History     Not on file   Social Needs     Financial resource strain: Not on file     Food insecurity     Worry: Not on file     Inability: Not on file     Transportation needs     Medical: Not on file     Non-medical: Not on file   Tobacco Use     Smoking status: Former Smoker     Packs/day: 0.25     Years: 1.00     Pack years: 0.25     Types: Cigarettes     Start date:      Last attempt to quit:      Years since quittin.4     Smokeless tobacco: Never Used     Tobacco comment: very minimal smoking history   Substance and Sexual Activity     Alcohol use: No     Alcohol/week: 0.0 standard drinks     Drug use: No       Past Surgical History:   Procedure Laterality Date     APPENDECTOMY       CHOLECYSTECTOMY       COLONOSCOPY N/A 2019    Procedure: COLONOSCOPY;  Surgeon: Vahid Cardona MD;  Location:  GI     ESOPHAGOSCOPY, GASTROSCOPY, DUODENOSCOPY (EGD), COMBINED N/A 2019    Procedure: COMBINED ESOPHAGOSCOPY, GASTROSCOPY, DUODENOSCOPY (EGD);  Surgeon: Ben Ko MD;  Location: [facility]     HYSTERECTOMY, [facility]  1999     TONSILLECTOMY               Review of Systems   Musculoskeletal: Positive for back pain and joint pain.   All other systems reviewed and are negative.        Physical  "Exam  Musculoskeletal:      Left knee: She exhibits effusion.         /80   Ht 1.626 m (5' 4\")   Wt 122.5 kg (270 lb)   BMI 46.35 kg/m    Constitutional:well-developed, well-nourished, and in no distress.   Cardiovascular: Intact distal pulses.    Neurological: alert. Gait Abnormal:   Antalgic gait  using walker  Skin: Skin is warm and dry.   Psychiatric: Mood and affect normal.   Respiratory: unlabored, speaks in full sentences  Lymph: no LAD, no lymphangitis          Left Knee Exam     Tenderness   The patient is experiencing tenderness in the medial joint line.    Range of Motion   Extension: normal   Flexion: 90     Tests   Ariella:  Medial - positive Lateral - negative  Varus: negative Valgus: negative  Drawer:  Anterior - negative     Posterior - negative  Patellar apprehension: negative    Other   Erythema: absent  Sensation: normal  Pulse: present  Swelling: mild  Effusion: effusion present            X-ray images Ordered and independently reviewed by me in the office today with the patient. X-ray shows: LEFT KNEE THREE VIEWS  6/12/2020 10:53 AM      HISTORY: Chronic pain of left knee.     COMPARISON: 9/13/2017.                                                                      IMPRESSION: Advanced medial joint space narrowing has progressed since  the prior exam. Associated medial compartment marginal bony spurring  is again noted. Patellofemoral marginal bony spurring is probably  unchanged. No acute bony abnormality or joint space effusion.     MELISA MONTAÑO MD    Again, thank you for allowing me to participate in the care of your patient.        Sincerely,        Kevin Mcdaniels MD    "

## 2020-06-23 DIAGNOSIS — I10 ESSENTIAL HYPERTENSION: ICD-10-CM

## 2020-06-23 RX ORDER — LOSARTAN POTASSIUM 100 MG/1
TABLET ORAL
Qty: 90 TABLET | Refills: 3 | Status: SHIPPED | OUTPATIENT
Start: 2020-06-23 | End: 2021-04-28

## 2020-06-24 ENCOUNTER — TRANSFERRED RECORDS (OUTPATIENT)
Dept: HEALTH INFORMATION MANAGEMENT | Facility: CLINIC | Age: 65
End: 2020-06-24

## 2020-06-30 ENCOUNTER — TELEPHONE (OUTPATIENT)
Dept: CARDIOLOGY | Facility: CLINIC | Age: 65
End: 2020-06-30

## 2020-06-30 NOTE — TELEPHONE ENCOUNTER
Received OV note dated 6/17/20 (EDG) procedure and results.  Sent to HIM to scan.  KRISTOPHER Salas

## 2020-06-30 NOTE — TELEPHONE ENCOUNTER
Received document from GI  (Dr Ko) dated 6/17.  Note did not have documentation supporting watchman placement.  Spoke to patient to let her know and recommended she reach out to her PCP to discuss this.  She will call Dr Jama to discuss this and get back to me.  KRISTOPHER Salas

## 2020-07-02 ENCOUNTER — TELEPHONE (OUTPATIENT)
Dept: INTERNAL MEDICINE | Facility: CLINIC | Age: 65
End: 2020-07-02

## 2020-07-02 DIAGNOSIS — I48.91 ATRIAL FIBRILLATION WITH RVR (H): Primary | ICD-10-CM

## 2020-07-02 NOTE — TELEPHONE ENCOUNTER
Roxanna is calling to request orders/referral to Dr Constantin Vaughn at the [facility] in [place].  Looks like Ilda Sue placed orders on 5-12-20 but Roxanna said this needs to come from her primary physician.

## 2020-07-03 ENCOUNTER — PATIENT OUTREACH (OUTPATIENT)
Dept: CARE COORDINATION | Facility: CLINIC | Age: 65
End: 2020-07-03

## 2020-07-03 ASSESSMENT — ACTIVITIES OF DAILY LIVING (ADL): DEPENDENT_IADLS:: CLEANING;TRANSPORTATION

## 2020-07-03 NOTE — PROGRESS NOTES
Clinic Care Coordination Contact    Situation: Patient chart reviewed by care coordinator.     Background: Care Coordination following patient to assist with Goal as below; CC CHW patient outreach on 6/15/20.     Assessment: Per chart review, patient has been unable to actively work toward Goal due to knee pain.  She will be following up with Orthopedics for further assessment/recommendations.    Goals        Patient Stated      1. Being Active (pt-stated)      Goal Statement: I will work to lose 15 lbs in 6 months by increasing my daily physical activity.  Date Goal set: 10/10/19  Date to Achieve By: 7/31/20  Patient expressed understanding of goal: Yes  Action steps to achieve this goal:  1. I will use the gym within my apartment to exercise.  2. I will increase the amount of walking I do on a daily basis.  3. I will continue going to [facility] and participating in activities to stay active.        Plan/Recommendations: Due to current barriers preventing patient from being able to actively work toward Goal as above, CC RN will request that CC CHW inquire with patient on next outreach whether or not she would like to continue with current goal or establish new one.  CC RN will await CC CHW updates following patient outreach to determine plan moving forward.    Damien Person, RN  Clinic Care Coordinator  [facility], & [facility]  Ph: [phone]

## 2020-07-15 NOTE — TELEPHONE ENCOUNTER
Unclear if referral needed if patient has been already followed by cardiology with history of A. Fib by Dr Vaughn.  Per chart, Watchman procedure being considered.  Patient recently underwent endoscopy that showed normal stomach/esophagus and H. pylori negative.  Referral put in again for cardiology.  Patient to contact them at [phone] to schedule virtual appointment to further discuss cardiology plans regarding possible Watchman procedure for A fib hx

## 2020-07-15 NOTE — TELEPHONE ENCOUNTER
Spoke with Cardio and they need a referral stating that it would be a good thing to have this and a rational for this procedure. The provider from Cardiology Dr Vaughn is the one placing the device so it needs to come form an outside source. They would like you to place this documentation     If you can addend the June 12th if you can put the reason for no anticoagulation is because of GI bleeds. Just put more notes in regarding this and they can use this. They can use this as the source as long as there is more documentation regarding the afib.    Needs documentation for the need to have the procedure done. It needs to come from someone else as they are the providers for placement. They need to have an outside source to recommend this to be done.

## 2020-07-17 ENCOUNTER — HOSPITAL ENCOUNTER (OUTPATIENT)
Dept: MRI IMAGING | Facility: CLINIC | Age: 65
Discharge: HOME | End: 2020-07-17
Attending: INTERNAL MEDICINE | Admitting: INTERNAL MEDICINE
Payer: COMMERCIAL

## 2020-07-17 DIAGNOSIS — M54.16 LUMBAR RADICULOPATHY: ICD-10-CM

## 2020-07-17 PROCEDURE — 72148 MRI LUMBAR SPINE W/O DYE: CPT

## 2020-07-20 DIAGNOSIS — E03.9 HYPOTHYROIDISM, UNSPECIFIED TYPE: ICD-10-CM

## 2020-07-21 ENCOUNTER — PATIENT OUTREACH (OUTPATIENT)
Dept: CARE COORDINATION | Facility: CLINIC | Age: 65
End: 2020-07-21

## 2020-07-21 RX ORDER — LEVOTHYROXINE SODIUM 137 UG/1
137 TABLET ORAL DAILY
Qty: 28 TABLET | Refills: 10 | Status: SHIPPED | OUTPATIENT
Start: 2020-07-21 | End: 2021-04-28

## 2020-07-21 NOTE — PROGRESS NOTES
Clinic Care Coordination Contact  [facility]/Voicemail       Clinical Data: Care Coordinator Outreach  Outreach attempted x 1.  Left message on patient's voicemail with call back information and requested return call.  Plan: Care Coordinator will try to reach patient again in 10 business days.    Next Outreach: 8/4/20    TRINIDAD Li  Clinic Care Coordination  [facility]: [facility]  Phone: [phone]

## 2020-07-24 ENCOUNTER — TELEPHONE (OUTPATIENT)
Dept: INTERNAL MEDICINE | Facility: CLINIC | Age: 65
End: 2020-07-24

## 2020-07-24 NOTE — TELEPHONE ENCOUNTER
Prior Authorization Approval    Authorization Effective Date: 4/24/2020  Authorization Expiration Date: 7/24/2021  Medication: pantoprazole (PROTONIX) 40 MG EC tablet   Approved Dose/Quantity:    Reference #:     Insurance Company: [employer] [place] - Phone [phone] Fax [phone]  Expected CoPay:       CoPay Card Available:      Foundation Assistance Needed:    Which Pharmacy is filling the prescription (Not needed for infusion/clinic administered): [facility] [place] [place] [id number] - [place], [place] - [address]  Pharmacy Notified: Yes  Patient Notified: Yes  **Instructed pharmacy to notify patient when script is ready to /ship.**

## 2020-07-24 NOTE — TELEPHONE ENCOUNTER
Prior Authorization Retail Medication Request    Medication/Dose: pantoprazole (PROTONIX) 40 MG EC tablet   ICD code (if different than what is on RX):  K92.2  Previously Tried and Failed:    Rationale:      Insurance Name:  [employer] Plus  Insurance ID:  [id number]  Key: [id number]      Pharmacy Information (if different than what is on RX)  Name:  [facility]  Phone:  [phone]

## 2020-07-24 NOTE — TELEPHONE ENCOUNTER
Central Prior Authorization Team   Phone: [phone]      PA Initiation    Medication: pantoprazole (PROTONIX) 40 MG EC tablet   Insurance Company: [employer] [place] - Phone [phone] Fax [phone]  Pharmacy Filling the Rx: [facility] [id number] - [place], [place] - [facility] [address]  Filling Pharmacy Phone: [phone]  Filling Pharmacy Fax:    Start Date: 7/24/2020

## 2020-07-30 ENCOUNTER — TELEPHONE (OUTPATIENT)
Dept: INTERNAL MEDICINE | Facility: CLINIC | Age: 65
End: 2020-07-30

## 2020-07-30 NOTE — TELEPHONE ENCOUNTER
Nurse  calling from [place] [facility] [phone] ext [phone] Courtesy call to let MD know that pt has joined weight management program through insurance and provider can find more about this is the provider portal through [facility].Fawn Rosario RN

## 2020-08-10 ENCOUNTER — VIRTUAL VISIT (OUTPATIENT)
Dept: CARDIOLOGY | Facility: CLINIC | Age: 65
End: 2020-08-10
Attending: INTERNAL MEDICINE
Payer: COMMERCIAL

## 2020-08-10 VITALS — WEIGHT: 270 LBS | BODY MASS INDEX: 46.35 KG/M2

## 2020-08-10 DIAGNOSIS — I48.91 ATRIAL FIBRILLATION WITH RVR (H): ICD-10-CM

## 2020-08-10 PROCEDURE — 99214 OFFICE O/P EST MOD 30 MIN: CPT | Mod: 95 | Performed by: INTERNAL MEDICINE

## 2020-08-10 NOTE — PROGRESS NOTES
Electrophysiology/ Virtual Clinic Note         H&P and Plan:   Patient opted to have a virtual visit due to ongoing issues related to ongoing COVID-19 virus pandemic and to minimize social interaction (based on CDC guidelines).        Visit details:  Patient has given verbal consent for this visit.    Interview was done talking and seeing patient via Internet.   Mode of transmission: [employer]  Visit start time: 10:46 AM  Visit stop time: 10:54 AM  Provider location: Office.  Patient location: Home.       REASON FOR VISIT: Electrophysiology evaluation.      HISTORY OF PRESENT ILLNESS: Ms. Sears is a delightful 64-year-old lady with a history of hypertension, recurrent GI bleed and paroxysmal atrial fibrillation who is here for duration for watchman procedure.      Patient has been on chronic therapy with flecainide for rhythm control strategy.  I saw her in February of this year and that time I added metoprolol in addition to flecainide.  Additionally, she was having issues with frequent GI bleeds and did not tolerate blood thinners.  She has been off blood thinners and during previous visit we discussed about possibly a watchman procedure.  When her to follow-up with a GI doctor to evaluate feasibility of taking blood thinners perioperatively.    Today, she informs she is doing well.  She affirms the A. fib seems to be well controlled.  She denies any major breakthroughs of atrial fibrillation.  She also follow-up with her GI physician, and apparently things are going well.  She affirms that his lesions are well-healed and she is clear for perioperatively on anticoagulation.  Although, I do not have the notes from GI physicians available just.    She denies any other symptoms such chest pain, shortness of breath, lightheadedness, near-syncope or syncope.     EKG done February of this year showed normal sinus rhythm with QRS motion 114 ms.       Previous studies:  -Echocardiogram (01/21/2020): Normal LV  function.  EF estimated 55-60%.  No significant LVH noticed.  - EGD (04/3/2019): - Normal esophagus. Mild Schatzki ring. Small hiatal hernia. Scar in the gastric antrum (previous ulcer).                          ASSESSMENT AND PLAN:   1.  Paroxysmal atrial fibrillation.  No recurrence of A. Fib.    Continue therapy with flecainide and metoprolol.  2.  Hypertension.  She affirms blood pressures well controlled.  Continue current medical therapy.  3.  Embolic prevention.  CHADS-VASc score of 2.   She appears to be cleared by GI group to be started on monitor regulation for 45 days and aspirin/Plavix for 6 months upper after procedure.  We will obtain records from the GI group to confirm findings.  If patient is clear, will obtain a CT to evaluate ADDISON anatomy.     Constantin Vaughn MD    Physical Exam:  Vitals: Wt 122.5 kg (270 lb)   BMI 46.35 kg/m      Constitutional:  Pt is in no acute distress.  Normal body habitus, upright.  HEAD: Normocephalic. No evidence of injury or laceration.   SKIN: Skin normal color with no lesions or eruptions. No xanthelasma.  ENT/Mouth:  Membranes moist. No nasal discharge or bleeding gums.   Neck:  Supple, normal JVP, no evidence of thyromegaly.  Chest/Lungs: No audible wheezing or labored breathing. Normal chest wall expansion. No cough.   Cardiovascular: Normal jugular venous pressure. No evidence of pitting edema bilaterally.   Abdomen: No evidence of abdominal distention. No observed jaundice.  Eyes: PERRL, EOMI. No scleral icterus, normal conjunctivae.  Extremities:  No lower extremity edema noticed.  No cyanosis or clubbing noted.   Neurologic: Normal arm motion bilateral.  No tremors. No evidence of focal defect.   Psychiatric: Alert and oriented x3, calm.         CURRENT MEDICATIONS:  Current Outpatient Medications   Medication Sig Dispense Refill     Acetaminophen 325 MG CAPS Take 325-650 mg by mouth every 4 hours as needed       ferrous fumarate 65 mg, [place]. FE,-Vitamin C 125 mg  (VITRON-C)  MG TABS tablet Take 1 tablet by mouth daily 90 tablet 3     flecainide (TAMBOCOR) 150 MG tablet Take 1 tablet (150 mg) by mouth every 12 hours 60 tablet 11     gabapentin (NEURONTIN) 300 MG capsule TAKE 1 CAPSULE BY MOUTH EVERY MORNING AND TAKE 2 CAPSULES BY MOUTH EVERY NIGHT AT BEDTIME 270 capsule 1     levothyroxine (SYNTHROID/LEVOTHROID) 137 MCG tablet TAKE 1 TABLET (137 MCG) BY MOUTH DAILY 28 tablet 10     losartan (COZAAR) 100 MG tablet TAKE 1 TABLET BY MOUTH DAILY 90 tablet 3     metoprolol succinate ER (TOPROL-XL) 25 MG 24 hr tablet Take 0.5 tablets (12.5 mg) by mouth daily 30 tablet 3     order for DME DREAMSTATION  5-15 CM/H20  NASAL AIRFIT N10 HER       pantoprazole (PROTONIX) 40 MG EC tablet TAKE 1 TABLET BY MOUTH TWICE A DAY BEFORE MEALS 180 tablet 1     SUMAtriptan (IMITREX) 100 MG tablet Take 1 tablet (100 mg) by mouth at onset of headache for migraine May repeat in 2 hours if needed: max 2/day; 10 tablet 11     triamterene-HCTZ (MAXZIDE-25) 37.5-25 MG tablet Take 1 tablet by mouth daily 90 tablet 3       ALLERGIES     Allergies   Allergen Reactions     Morphine Anaphylaxis     Ceclor [Cefaclor] Hives     Diltiazem Hives     Lisinopril Cough     Sertraline Nausea and Vomiting       PAST MEDICAL HISTORY:  Past Medical History:   Diagnosis Date     Depression       Duodenal ulcer 03/04/2019     Essential hypertension       GERD (gastroesophageal reflux disease)      Hiatal hernia      Hypothyroidism      Migraine without aura and without status migrainosus, not intractable  Aug 2016     Obesity      NELY on CPAP      Osteoarthritis      Paroxysmal atrial fibrillation (H) 01/05/2015     RLS (restless legs syndrome)      Symptomatic menopausal or female climacteric states (aka FLASHES)      TMJ disease        PAST SURGICAL HISTORY:  Past Surgical History:   Procedure Laterality Date     APPENDECTOMY       CHOLECYSTECTOMY       COLONOSCOPY N/A 4/12/2019    Procedure: COLONOSCOPY;  Surgeon:  Vahid Cardona MD;  Location:  GI     ESOPHAGOSCOPY, GASTROSCOPY, DUODENOSCOPY (EGD), COMBINED N/A 2019    Procedure: COMBINED ESOPHAGOSCOPY, GASTROSCOPY, DUODENOSCOPY (EGD);  Surgeon: Ben Ko MD;  Location:  GI     HYSTERECTOMY, [facility]  1999     TONSILLECTOMY         FAMILY HISTORY:  Family History   Problem Relation Age of Onset     Myocardial Infarction Mother      Heart Failure Mother      Heart Surgery Father         bypass surgery     Cerebrovascular Disease Father      Heart Surgery Brother      Hyperlipidemia Brother      Hyperlipidemia Sister      Hyperlipidemia Brother      Sleep Apnea Sister        SOCIAL HISTORY:  Social History     Socioeconomic History     Marital status:      Spouse name: None     Number of children: None     Years of education: None     Highest education level: None   Occupational History     None   Social Needs     Financial resource strain: None     Food insecurity     Worry: None     Inability: None     Transportation needs     Medical: None     Non-medical: None   Tobacco Use     Smoking status: Former Smoker     Packs/day: 0.25     Years: 1.00     Pack years: 0.25     Types: Cigarettes     Start date:      Last attempt to quit: 1972     Years since quittin.6     Smokeless tobacco: Never Used     Tobacco comment: very minimal smoking history   Substance and Sexual Activity     Alcohol use: No     Alcohol/week: 0.0 standard drinks     Drug use: No     Sexual activity: Never   Lifestyle     Physical activity     Days per week: None     Minutes per session: None     Stress: None   Relationships     Social connections     Talks on phone: None     Gets together: None     Attends Pent[employer]alism service: None     Active member of club or organization: None     Attends meetings of clubs or organizations: None     Relationship status: None     Intimate partner violence     Fear of current or ex partner: None     Emotionally abused: None      Physically abused: None     Forced sexual activity: None   Other Topics Concern     Parent/sibling w/ CABG, MI or angioplasty before 65F 55M? Not Asked      Service Not Asked     Blood Transfusions Not Asked     Caffeine Concern No     Occupational Exposure Not Asked     Hobby Hazards Not Asked     Sleep Concern Yes     Stress Concern Not Asked     Weight Concern No     Special Diet No     Back Care Not Asked     Exercise No     Bike Helmet Not Asked     Seat Belt Yes     Self-Exams Not Asked   Social History Narrative     None       Review of Systems:  Skin:        Eyes:       ENT:       Respiratory:       Cardiovascular:       Gastroenterology:      Genitourinary:       Musculoskeletal:       Neurologic:       Psychiatric:       Heme/Lymph/Imm:       Endocrine:           Recent Lab Results:  LIPID RESULTS:  Lab Results   Component Value Date    CHOL 172 03/13/2019    HDL 45 (L) 03/13/2019    LDL 85 03/13/2019    TRIG 212 (H) 03/13/2019    CHOLHDLRATIO 4.7 09/24/2015       LIVER ENZYME RESULTS:  Lab Results   Component Value Date    AST 19 01/21/2020    ALT 18 01/21/2020       CBC RESULTS:  Lab Results   Component Value Date    WBC 9.6 06/12/2020    RBC 4.64 06/12/2020    HGB 11.9 06/12/2020    HCT 38.9 06/12/2020    MCV 84 06/12/2020    MCH 25.6 (L) 06/12/2020    MCHC 30.6 (L) 06/12/2020    RDW 16.8 (H) 06/12/2020     (H) 06/12/2020       BMP RESULTS:  Lab Results   Component Value Date     01/29/2020    POTASSIUM 4.3 01/29/2020    CHLORIDE 104 01/29/2020    CO2 25 01/29/2020    ANIONGAP 7 01/29/2020     (H) 01/29/2020    BUN 19 01/29/2020    CR 0.79 01/29/2020    GFRESTIMATED 79 01/29/2020    GFRESTBLACK >90 01/29/2020    J CARLOS 8.8 01/29/2020        A1C RESULTS:  Lab Results   Component Value Date    A1C 5.9 (H) 03/13/2019       INR RESULTS:  Lab Results   Component Value Date    INR 1.78 (H) 03/02/2019         ECHOCARDIOGRAM  No results found for this or any previous visit (from the  "past 8760 hour(s)).      No orders of the defined types were placed in this encounter.    No orders of the defined types were placed in this encounter.    There are no discontinued medications.      Encounter Diagnosis   Name Primary?     Atrial fibrillation with RVR (H)          CC  Yuval Jama MD  [address] [place] [address]                Maxine Sears is a 64 year old female who is being evaluated via a billable video visit.      The patient has been notified of following:     \"This video visit will be conducted via a call between you and your physician/provider. We have found that certain health care needs can be provided without the need for an in-person physical exam.  This service lets us provide the care you need with a video conversation.  If a prescription is necessary we can send it directly to your pharmacy.  If lab work is needed we can place an order for that and you can then stop by our lab to have the test done at a later time.    Video visits are billed at different rates depending on your insurance coverage.  Please reach out to your insurance provider with any questions.    If during the course of the call the physician/provider feels a video visit is not appropriate, you will not be charged for this service.\"    [phone]  Pt reported wt today 270#  Donita [place], [place]    Patient has given verbal consent for Video visit? Yes  How would you like to obtain your AVS? Mail a copy  If you are dropped from the video visit, the video invite should be resent to: Text to cell phone: [phone]  Will anyone else be joining your video visit? No      Video-Visit Details    Type of service:  Video Visit    Video Start Time:   Video End Time:     Originating Location (pt. Location):     Distant Location (provider location):  [facility]     Platform used for Video Visit:               "

## 2020-08-10 NOTE — LETTER
8/10/2020    Yuval Jama MD  [address] [facility] [address]    RE: Maxine Sears       Dear Colleague,    I had the pleasure of seeing Maxine Sears in the [facility].    Electrophysiology/ Virtual Clinic Note         H&P and Plan:   Patient opted to have a virtual visit due to ongoing issues related to ongoing COVID-19 virus pandemic and to minimize social interaction (based on CDC guidelines).        REASON FOR VISIT: Electrophysiology evaluation.      HISTORY OF PRESENT ILLNESS: Ms. Sears is a delightful 64-year-old lady with a history of hypertension, recurrent GI bleed and paroxysmal atrial fibrillation who is here for duration for watchman procedure.      Patient has been on chronic therapy with flecainide for rhythm control strategy.  I saw her in February of this year and that time I added metoprolol in addition to flecainide.  Additionally, she was having issues with frequent GI bleeds and did not tolerate blood thinners.  She has been off blood thinners and during previous visit we discussed about possibly a watchman procedure.  When her to follow-up with a GI doctor to evaluate feasibility of taking blood thinners perioperatively.    Today, she informs she is doing well.  She affirms the A. fib seems to be well controlled.  She denies any major breakthroughs of atrial fibrillation.  She also follow-up with her GI physician, and apparently things are going well.  She affirms that his lesions are well-healed and she is clear for perioperatively on anticoagulation.  Although, I do not have the notes from GI physicians available just.    She denies any other symptoms such chest pain, shortness of breath, lightheadedness, near-syncope or syncope.     EKG done February of this year showed normal sinus rhythm with QRS motion 114 ms.       Previous studies:  -Echocardiogram (01/21/2020): Normal LV function.  EF estimated 55-60%.  No significant LVH noticed.  - EGD  (04/3/2019): - Normal esophagus. Mild Schatzki ring. Small hiatal hernia. Scar in the gastric antrum (previous ulcer).                          ASSESSMENT AND PLAN:   1.  Paroxysmal atrial fibrillation.  No recurrence of A. Fib.    Continue therapy with flecainide and metoprolol.  2.  Hypertension.  She affirms blood pressures well controlled.  Continue current medical therapy.  3.  Embolic prevention.  CHADS-VASc score of 2.   She appears to be cleared by GI group to be started on monitor regulation for 45 days and aspirin/Plavix for 6 months upper after procedure.  We will obtain records from the GI group to confirm findings.  If patient is clear, will obtain a CT to evaluate ADDISON anatomy.     Constantin Vauhgn MD    Physical Exam:  Vitals: Wt 122.5 kg (270 lb)   BMI 46.35 kg/m      Constitutional:  Pt is in no acute distress.  Normal body habitus, upright.  HEAD: Normocephalic. No evidence of injury or laceration.   SKIN: Skin normal color with no lesions or eruptions. No xanthelasma.  ENT/Mouth:  Membranes moist. No nasal discharge or bleeding gums.   Neck:  Supple, normal JVP, no evidence of thyromegaly.  Chest/Lungs: No audible wheezing or labored breathing. Normal chest wall expansion. No cough.   Cardiovascular: Normal jugular venous pressure. No evidence of pitting edema bilaterally.   Abdomen: No evidence of abdominal distention. No observed jaundice.  Eyes: PERRL, EOMI. No scleral icterus, normal conjunctivae.  Extremities:  No lower extremity edema noticed.  No cyanosis or clubbing noted.   Neurologic: Normal arm motion bilateral.  No tremors. No evidence of focal defect.   Psychiatric: Alert and oriented x3, calm.         CURRENT MEDICATIONS:  Current Outpatient Medications   Medication Sig Dispense Refill     Acetaminophen 325 MG CAPS Take 325-650 mg by mouth every 4 hours as needed       ferrous fumarate 65 mg, [place]. FE,-Vitamin C 125 mg (VITRON-C)  MG TABS tablet Take 1 tablet by mouth daily 90  tablet 3     flecainide (TAMBOCOR) 150 MG tablet Take 1 tablet (150 mg) by mouth every 12 hours 60 tablet 11     gabapentin (NEURONTIN) 300 MG capsule TAKE 1 CAPSULE BY MOUTH EVERY MORNING AND TAKE 2 CAPSULES BY MOUTH EVERY NIGHT AT BEDTIME 270 capsule 1     levothyroxine (SYNTHROID/LEVOTHROID) 137 MCG tablet TAKE 1 TABLET (137 MCG) BY MOUTH DAILY 28 tablet 10     losartan (COZAAR) 100 MG tablet TAKE 1 TABLET BY MOUTH DAILY 90 tablet 3     metoprolol succinate ER (TOPROL-XL) 25 MG 24 hr tablet Take 0.5 tablets (12.5 mg) by mouth daily 30 tablet 3     order for DME DREAMSTATION  5-15 CM/H20  NASAL AIRFIT N10 HER       pantoprazole (PROTONIX) 40 MG EC tablet TAKE 1 TABLET BY MOUTH TWICE A DAY BEFORE MEALS 180 tablet 1     SUMAtriptan (IMITREX) 100 MG tablet Take 1 tablet (100 mg) by mouth at onset of headache for migraine May repeat in 2 hours if needed: max 2/day; 10 tablet 11     triamterene-HCTZ (MAXZIDE-25) 37.5-25 MG tablet Take 1 tablet by mouth daily 90 tablet 3       ALLERGIES     Allergies   Allergen Reactions     Morphine Anaphylaxis     Ceclor [Cefaclor] Hives     Diltiazem Hives     Lisinopril Cough     Sertraline Nausea and Vomiting       PAST MEDICAL HISTORY:  Past Medical History:   Diagnosis Date     Depression       Duodenal ulcer 03/04/2019     Essential hypertension       GERD (gastroesophageal reflux disease)      Hiatal hernia      Hypothyroidism      Migraine without aura and without status migrainosus, not intractable  Aug 2016     Obesity      NELY on CPAP      Osteoarthritis      Paroxysmal atrial fibrillation (H) 01/05/2015     RLS (restless legs syndrome)      Symptomatic menopausal or female climacteric states (aka FLASHES)      TMJ disease        PAST SURGICAL HISTORY:  Past Surgical History:   Procedure Laterality Date     APPENDECTOMY       CHOLECYSTECTOMY       COLONOSCOPY N/A 4/12/2019    Procedure: COLONOSCOPY;  Surgeon: Vahid Cardona MD;  Location:  GI     ESOPHAGOSCOPY,  GASTROSCOPY, DUODENOSCOPY (EGD), COMBINED N/A 2019    Procedure: COMBINED ESOPHAGOSCOPY, GASTROSCOPY, DUODENOSCOPY (EGD);  Surgeon: Ben Ko MD;  Location:  GI     HYSTERECTOMY, [facility]  1999     TONSILLECTOMY         FAMILY HISTORY:  Family History   Problem Relation Age of Onset     Myocardial Infarction Mother      Heart Failure Mother      Heart Surgery Father         bypass surgery     Cerebrovascular Disease Father      Heart Surgery Brother      Hyperlipidemia Brother      Hyperlipidemia Sister      Hyperlipidemia Brother      Sleep Apnea Sister        SOCIAL HISTORY:  Social History     Socioeconomic History     Marital status:      Spouse name: None     Number of children: None     Years of education: None     Highest education level: None   Occupational History     None   Social Needs     Financial resource strain: None     Food insecurity     Worry: None     Inability: None     Transportation needs     Medical: None     Non-medical: None   Tobacco Use     Smoking status: Former Smoker     Packs/day: 0.25     Years: 1.00     Pack years: 0.25     Types: Cigarettes     Start date:      Last attempt to quit:      Years since quittin.6     Smokeless tobacco: Never Used     Tobacco comment: very minimal smoking history   Substance and Sexual Activity     Alcohol use: No     Alcohol/week: 0.0 standard drinks     Drug use: No     Sexual activity: Never   Lifestyle     Physical activity     Days per week: None     Minutes per session: None     Stress: None   Relationships     Social connections     Talks on phone: None     Gets together: None     Attends Quaker service: None     Active member of club or organization: None     Attends meetings of clubs or organizations: None     Relationship status: None     Intimate partner violence     Fear of current or ex partner: None     Emotionally abused: None     Physically abused: None     Forced sexual activity: None   Other  Topics Concern     Parent/sibling w/ CABG, MI or angioplasty before 65F 55M? Not Asked      Service Not Asked     Blood Transfusions Not Asked     Caffeine Concern No     Occupational Exposure Not Asked     Hobby Hazards Not Asked     Sleep Concern Yes     Stress Concern Not Asked     Weight Concern No     Special Diet No     Back Care Not Asked     Exercise No     Bike Helmet Not Asked     Seat Belt Yes     Self-Exams Not Asked   Social History Narrative     None       Review of Systems:  Skin:        Eyes:       ENT:       Respiratory:       Cardiovascular:       Gastroenterology:      Genitourinary:       Musculoskeletal:       Neurologic:       Psychiatric:       Heme/Lymph/Imm:       Endocrine:           Recent Lab Results:  LIPID RESULTS:  Lab Results   Component Value Date    CHOL 172 03/13/2019    HDL 45 (L) 03/13/2019    LDL 85 03/13/2019    TRIG 212 (H) 03/13/2019    CHOLHDLRATIO 4.7 09/24/2015       LIVER ENZYME RESULTS:  Lab Results   Component Value Date    AST 19 01/21/2020    ALT 18 01/21/2020       CBC RESULTS:  Lab Results   Component Value Date    WBC 9.6 06/12/2020    RBC 4.64 06/12/2020    HGB 11.9 06/12/2020    HCT 38.9 06/12/2020    MCV 84 06/12/2020    MCH 25.6 (L) 06/12/2020    MCHC 30.6 (L) 06/12/2020    RDW 16.8 (H) 06/12/2020     (H) 06/12/2020       BMP RESULTS:  Lab Results   Component Value Date     01/29/2020    POTASSIUM 4.3 01/29/2020    CHLORIDE 104 01/29/2020    CO2 25 01/29/2020    ANIONGAP 7 01/29/2020     (H) 01/29/2020    BUN 19 01/29/2020    CR 0.79 01/29/2020    GFRESTIMATED 79 01/29/2020    GFRESTBLACK >90 01/29/2020    J CARLOS 8.8 01/29/2020        A1C RESULTS:  Lab Results   Component Value Date    A1C 5.9 (H) 03/13/2019       INR RESULTS:  Lab Results   Component Value Date    INR 1.78 (H) 03/02/2019         ECHOCARDIOGRAM  No results found for this or any previous visit (from the past 8760 hour(s)).      No orders of the defined types were placed  in this encounter.    No orders of the defined types were placed in this encounter.    There are no discontinued medications.      Encounter Diagnosis   Name Primary?     Atrial fibrillation with RVR (H)          Thank you for allowing me to participate in the care of your patient.    Sincerely,     Constantin Vaughn MD     [facility]

## 2020-08-11 ENCOUNTER — TELEPHONE (OUTPATIENT)
Dept: CARDIOLOGY | Facility: CLINIC | Age: 65
End: 2020-08-11

## 2020-08-11 DIAGNOSIS — I48.91 ATRIAL FIBRILLATION WITH RVR (H): Primary | ICD-10-CM

## 2020-08-11 NOTE — TELEPHONE ENCOUNTER
Message left with Beth who will reach out to Dr Ko to obtain documentation that it is ok for patient to be on AC for 45 days post WATCHMAN procedure.  Awaiting documentation for GI.  KRISTOPHER Salas

## 2020-08-12 ENCOUNTER — PATIENT OUTREACH (OUTPATIENT)
Dept: NURSING | Facility: CLINIC | Age: 65
End: 2020-08-12
Payer: MEDICARE

## 2020-08-12 NOTE — PROGRESS NOTES
"Clinic Care Coordination Contact  Community Health Worker Follow Up    Goals:   Goals Addressed as of 8/12/2020 at 1:04 PM                 Today    6/15/20       Patient Stated      1. Being Active (pt-stated)   30%  20%    Added 10/10/19 by Damien Person RN     Goal Statement: I will work to lose 15 lbs in 6 months by increasing my daily physical activity.  Date Goal set: 10/10/19  Date to Achieve By: 7/31/20  Patient expressed understanding of goal: Yes  Action steps to achieve this goal:  1. I will use the gym within my apartment to exercise.  2. I will increase the amount of walking I do on a daily basis.  3. I will continue going to [facility] and participating in activities to stay active.    As of today's date 1/28/2020 goal is met at 0 - 25%.   Goal Status:  Active -- The patient reports most recent weight of 265 lbs.  Activity has been limited due to recent health complications.  As of today's date 3/27/2020 goal is met at 0 - 25%.   Goal Status:  Active   As of today's date 5/14/2020 goal is met at 0 - 25%.   Goal Status:  Active   8/12/20-CHW    Patient stated that she is currently walking from her front door to the window, which is approximately 100 feet one way- 200 feet roundtrip.  Patient stated that she is currently walking 600 feet per day at 6:00am, 12:00pm, and 6:00pm.  Three times a day.    Patient stated that she started this routine two weeks ago, and is planning on increasing her walking to five times a day for a total of 1,000 feet.    Patient stated that she is building up her strength and is able to stand longer.  Patient noticed that her back pain is \"pretty good\".    Patient stated that she hasn't noticed any weight loss yet.    Patient stated that she would like to continue with this goal.        Discussed:  Patient is looking for low-calorie recipes without meat.      Intervention and Education during outreach: offered encouragement and praise for the walking " exercises that the patient has been doing.  CHW encouraged patient to contact CC if there are any other changes or resources needed.  Patient acknowledged understanding     CHW Plan: will route message to CCRN on goal continuation.  Will look for links that offer low-calorie meatless recipes and send to patient via email.    Next Outreach: 9/14/20  Planned Outreach Frequency: Monthly    TRINIDAD Li  Clinic Care Coordination  [facility] Clinics: [facility], [facility], and [facility]  Phone: [phone]

## 2020-08-13 NOTE — TELEPHONE ENCOUNTER
6/12/20 note addended to reflect need and  support for pt to have  Watchman procedure if cleared from GI standpoint given hx GI bleeding when AC used in the past for stroke prevention

## 2020-08-14 ENCOUNTER — MEDICAL CORRESPONDENCE (OUTPATIENT)
Dept: HEALTH INFORMATION MANAGEMENT | Facility: CLINIC | Age: 65
End: 2020-08-14

## 2020-08-14 ENCOUNTER — PATIENT OUTREACH (OUTPATIENT)
Dept: CARE COORDINATION | Facility: CLINIC | Age: 65
End: 2020-08-14

## 2020-08-14 SDOH — HEALTH STABILITY: PHYSICAL HEALTH: ON AVERAGE, HOW MANY MINUTES DO YOU ENGAGE IN EXERCISE AT THIS LEVEL?: 10 MIN

## 2020-08-14 SDOH — HEALTH STABILITY: PHYSICAL HEALTH: ON AVERAGE, HOW MANY DAYS PER WEEK DO YOU ENGAGE IN MODERATE TO STRENUOUS EXERCISE (LIKE A BRISK WALK)?: 3 DAYS

## 2020-08-14 ASSESSMENT — ACTIVITIES OF DAILY LIVING (ADL): DEPENDENT_IADLS:: CLEANING;TRANSPORTATION

## 2020-08-14 NOTE — TELEPHONE ENCOUNTER
Received note from Dr Ko indicating patient can be on AC for 45 days after WATCHMAN procedure.  Updated patient regarding this.  Will have scheduling call patient to set up CT for assess her ADDISON.  Currently patient is transitioning to medicare on her bday this month and will not be ready to do procedure in September.  She is opting to have WATCHMAN procedure on 10/8.  Orders placed in epic for CT. Documentation sent to HIM to scan.   Will update Dr Vaughn.   Josue RN

## 2020-08-14 NOTE — LETTER
[facility] CARE COORDINATION  [facility]  [address], [place] [address]    August 14, 2020        Maxine Velasquez  [address]  [place] [address]          Dear Alondra Goodman is an updated Complex Care Plan for your continued enrollment in Care Coordination.  Also enclosed is a list of seated/low-impact exercises you can try.  Please let us know if you have additional questions, concerns, or goals that we can assist with.    Sincerely,    Damien Person RN        [facility]  Complex Care Plan  About Me:    Patient Name:  Maxine Velasquez    YOB: 1955  Age:         64 year old   [facility] MRN:    [id number] Telephone Information:  Home Phone [phone]   Mobile [phone]       Address:  [address]  [place] [address] Email address:  No e-mail address on record      Emergency Contact(s)    Name Relationship Lgl Grd Work Phone Home Phone Mobile Phone   1. SEVERSON,VIVIAN Sister   [phone]   2. BRANDI GEORGES Daughter  none [phone]   3. PRATIMA VELASQUEZ Son   [phone]           Primary language:  English     needed? No   [employer]:  [phone] op. 1  Other communication barriers: None  Preferred Method of Communication:  Phone  Current living arrangement: I live alone  Mobility Status/ Medical Equipment: Independent w/Device    Health Maintenance  Health Maintenance Reviewed: Due/Overdue   Health Maintenance Due   Topic Date Due     ADVANCE CARE PLANNING  1955     MIGRAINE ACTION PLAN  1955     ZOSTER IMMUNIZATION (1 of 2) 08/23/2005     PHQ-9  04/07/2020     PAP  05/26/2020     My Access Plan  Medical Emergency 911   Primary Clinic Line [facility] - [phone]   24 Hour Appointment Line [phone] or  [phone]) (toll-free)   24 Hour Nurse Line [phone] (toll-free)    Preferred Urgent Care [facility], [phone]   Preferred Hospital [facility]  [phone]   Preferred Pharmacy [facility] [place], [place] - [address]     Behavioral Health Crisis Line The National Suicide Prevention Lifeline at 1-245.992.1519 or 911       My Care Team Members  Patient Care Team       Relationship Specialty Notifications Start End    Yuval Jama MD PCP - General Internal Medicine  10/20/14     Merged    Phone: [phone] Fax: [phone]         [address] [place] [address]    Yuval Jama MD  Internal Medicine  10/20/14     Merged    Phone: [phone] Fax: [phone]         [address] [address]    Yuval Jama MD Assigned PCP   12/19/14     Phone: [phone] Fax: [phone]         [address] [address]    Damien Person, RN Lead Care Coordinator Primary Care - CC  3/6/19     Phone: [phone]         Carmen Mims MA Community Health Worker Primary Care - CC  1/22/20             My Care Plans  Self Management and Treatment Plan  Goals and (Comments)  Goals        General    1. Being Active (pt-stated)     Notes - Note edited  8/14/2020 10:36 AM by Damien Person, RN    Goal Statement: I will work to lose 15 lbs.  Date Goal set: 10/10/19 // revised on 8/14/20  Date to Achieve By: 7/31/20 // extended to 11/30/20  Patient expressed understanding of goal: Yes    Action steps to achieve this goal:  1. I will increase the amount of walking I do on a daily basis.  2. I will consider doing seated or low-impact exercises.  3. I will explore low-calorie recipes/meal options.  4. When available, I will return to [facility] and participating in activities to stay active.             Action Plans on File:    Depression       My Medical and Care Information  Problem List   Patient Active Problem List   Diagnosis     Osteoarthritis     TMJ disease     Atrial  fibrillation and flutter (H)     Health Care Home     Hyperlipidemia LDL goal <130     Hypothyroidism     Morbid obesity, unspecified obesity type (H)     Migraine without aura and without status migrainosus, not intractable     Essential hypertension     NELY on CPAP     RLS (restless legs syndrome)     Severe episode of recurrent major depressive disorder, without psychotic features (H)     Gastroesophageal reflux disease, esophagitis presence not specified     Atrial fibrillation with RVR (H)     Iron deficiency     Chronic pain of left knee     Lumbar radiculopathy      Current Medications:  Current Outpatient Medications   Medication     Acetaminophen 325 MG CAPS     ferrous fumarate 65 mg, [place]. FE,-Vitamin C 125 mg (VITRON-C)  MG TABS tablet     flecainide (TAMBOCOR) 150 MG tablet     gabapentin (NEURONTIN) 300 MG capsule     levothyroxine (SYNTHROID/LEVOTHROID) 137 MCG tablet     losartan (COZAAR) 100 MG tablet     metoprolol succinate ER (TOPROL-XL) 25 MG 24 hr tablet     order for DME     pantoprazole (PROTONIX) 40 MG EC tablet     SUMAtriptan (IMITREX) 100 MG tablet     triamterene-HCTZ (MAXZIDE-25) 37.5-25 MG tablet     No current facility-administered medications for this visit.      Care Coordination Start Date: 3/15/2019   Frequency of Care Coordination: monthly   Form Last Updated: 08/14/2020                                         11 Chair Exercises for Seniors  By Radha Garcia, [facility]-CEP Paulina Saucedo CPT Updated on March 25, 2020    Physical activity and exercise in older adults can reduce the risk of chronic disease, increase life  expectancy, preserve functional capacities and the ability to perform activities of daily living,  such as cooking and cleaning, and improve measures of physical health that combat the effects  of aging.    It's never too late to start an exercise program and reap the benefits of physical activity.  According to the American College of Sports Medicine,  "the positive benefits of exercise can be  seen across all populations of older adults--active and inactive, those in good health and those  managing chronic health conditions--as long as fitness level is taken into account when  developing a program.    For those who haven't maintained an exercise routine over the years or are battling the effects of chronic pain or disability due to injury or health condition, there are accessible exercise options that can improve strength, cardiovascular health, mobility, and balance, all from the comfort of a sturdy chair. Here are some to get you started.    Ankle and Wrist Rolls  Many senior citizens struggle with poor circulation through the extremities, which can contribute  to challenges with balance and mobility. DARIAN Victoria, a , and wellness workshop  facilitator suggests \"waking up\" the hands and feet through a series of lower-intensity moves  before diving into more rigorous exercises.    1. Sit tall on a sturdy chair, so your back is straight and is not leaning against the chair back.  2. Flex your fingers, opening and closing your fists several times before making fists and  rolling your wrists 10 times in each direction.  3. Perform the same exercises with your feet. First, flex and point each foot independently as you simultaneously curl and straighten your toes.  4. One at a time, roll each ankle to the outside 10 times, then one at a time, roll each ankle to the inside 10 times.    Single-Leg Calf Raises  Engage both the core and the calf muscles while coming up as high as you can onto your toes (while seated).    Sit-and-Stands  For older adults who may struggle to stand up from low chairs or from soft couches. Sit-andstands--a precursor to squats--can help seniors gain or maintain the ability to get in and out of chairs independently, improving leg strength, functional balance, and control, according to Elis Nathan, the  of [employer].    1. Start seated in a sturdy chair, feet planted on the floor about hip-distance apart.  2. Using as little assistance from hands or arms as possible, engage your core, and tip forward from the hips.  3. Press your weight through all four corners of your feet and push yourself to stand,  extending your knees and hips fully.  4. Reverse the movement, pressing your hips back and bending your knees to carefully lower yourself to the seated position.    Modification  If you can't press all the way to a standing position, simply shift your weight forward and lift you  glutes an inch or two from the chair seat and hold for a second before lowering back down. Ove  time, work on developing the strength and balance necessary to come to a standing position.    Seated Hip Marches  For those who need to improve flexibility and mobility through the hips, or who need a modified  option for performing cardiovascular exercise, seated hip marches are a good choice. Cata Leigh, an ACE- and the fitness lead at [facility], offers the  following tips for performing the exercise.  1. Sit tall on a sturdy chair, your feet flat on the floor, hip-distance apart.  2. Grasp the edges or armrests of the chair with both hands and engage your abdominal  muscles to help keep your torso tall.  3. Lift your right leg with your knee bent as high as you comfortably can, as though doing a  high-knee march.  4. Lower your right foot to the floor with control.  5. Repeat to the opposite side.  Perform at least 20 alternating marches in succession. Take a break, then repeat two to three  more times. This exercise can be continued for a more cardiovascular effect, or it can be  incorporated into a warm-up to help raise the heart rate and get the blood flowing before  performing more strength-focused movements.    Heel Slides  Heel slides are a type of modified hamstring curl designed to help strengthen the large  muscles  spanning the back of the thigh between the glutes and the knees. Because core engagement isrequired, the exercise can also develop abdominal strength.    1. Sit tall in a sturdy chair, with knees bent and feet flat on the floor about hip-distance apart.  2. Extend the right leg and flex the right foot, so the heel remains in contact with the ground,but the toes are pointing up toward the ceiling.  3. Engage your glutes and hamstrings, using these muscle groups to drag your right heel  back toward the chair while it remains in contact with the floor.   4. Reverse the movement and slide your heel away from you, extending your right knee.  Perform 10 to 12 repetitions on one side before switching legs.  5. Complete two to three sets per leg.  While this exercise can be done without any special equipment, you may want to use a paperplate or a small towel to make it easier for the heel to slide across the floor.    Seated Shoulder Press  Venita points out that it's important to incorporate strength-training exercises that easily  translate to functional daily activities.    Venita says that overhead arm raises with or without weights are a great way to practice puttin  items away on shelves or in overhead bins.    Seated Torso Twists  According to Diego Barrera, a  at [facility], the seated torso twist  engages the core, particularly the obliques, while also encouraging spinal mobility.    1. Sit tall, your feet flat on the ground about hip-distance apart. Make sure you don't lean  back in the chair.  2. Place your hands lightly behind your head, your elbows bent and pointing out toward the  sides of the room.  3. Keeping your pelvis steady, exhale and twist your torso to the right as far as you  comfortably can.  4. Inhale and return to the center, keeping your hips stable.  5. Exhale and twist your torso to the left as far as you comfortably can.  6. Inhale and return to the  "center.  7. Continue until you've twisted to each side between six and eight times. Rest, then perform  a second set.    Modified Leg Lifts  A chair-based modified leg lift can help you improve core strength. While it's best to use a sturdy  chair with armrests for this move, you can also perform the exercise while gripping the edges of  the chair beside your hips.    1. Sit tall in a chair, your core engaged, your feet together and flat on the floor. Roll  your shoulders back to maintain perfect posture.  2. Hold the chair's armrests or  the chair's seat. Keeping your feet and knees together, lift  both legs as high as you can (with knees bent) as you exhale.  3. Hold for 5 seconds, then lower your feet back to the floor.  4. Perform 10 to 12 repetitions and complete a total of three to five sets.    Modified Planks  Planks develop core stability and strength through the entire front half of the body. However,  effectively supporting your full body weight can be too challenging. Fortunately, a simple chair  modification can make the move accessible.    Position the chair in front of a wall so it is stable and won't move as you're performing the plank.    You can position the chair so the seat is facing the wall, providing you with access to the back of  the chair for support, or you can position the chair so the back is facing the wall, providing you  with access to the seat of the chair for support.    Adults with lower levels of strength or mobility should start by using the back of the chair for  Support.    Modified Burpees  \"Yes, I have 70-year olds doing burpees!\" says Venita, who firmly believes in keeping her clients of all ages challenged. The trick, of course, is making ability-appropriate modifications. Strict burpees may not be accessible to most older adults, but depending on strength and mobility, they may be perfectly safe with modifications. For instance, consider working through a burpee as " follows:     1. Push a sturdy chair against a wall so the back is to the wall and the chair isn't at risk of  sliding or moving.  2. Stand facing the chair, feet roughly shoulder-distance apart.  3. Press your hips back and bend your knees to enter a half-squat position.  4. Place both hands firmly on the chair's seat, arms fully extended and palms aligned under  the shoulders.  5. Step one foot, then the other, behind you, so your body forms a straight line from heels to  head in a modified chair plank position.  6. Reverse the movement and step each foot forward to their starting position.  7. Press through your feet and extend your knees and hips as you rise to stand. As you do,  lift your arms over your head, clapping your hands together.  This counts as a single modified chair burpee. Perform as many as you can (aim for six to 10)  with perfect form. Complete two to three sets.    Band Pull-Apart  Many seniors have rounded backs, so it's important to work the posterior chain to keep chest  open and back strong. The band pull-apart exercise is helpful for correcting posture.    1. Hold a mini resistance band in front of you with both of your hands  2. Draw your elbows out wide and pull the band.  3. As you pull the band, squeeze your shoulder blades together to engage your lats and  rhomboids.  4. Slowly return your arms to the starting position.      Article Sources  [employer] uses only high-quality sources, including peer-reviewed studies, to support the facts within our articles. Read our editorial process to learn more about how we fact-check and keep our content accurate, reliable, and trustworthy.    8/14/2020 https://www.[employer]/chair-exercises-for-seniors-7769497?print  https://www.[employer]/chair-exercises-for-seniors-2480616?print 9/9  1. American College of Sports Medicine, Eliana FINNEGAN, Joby GUDINO, Duran Hamilton MA, et al. American College of Sports Medicine position stand.  Exercise and physical activity for older adults. Med Sci Sports Exerc. 2009;41(7):1510-30. doi:10.1249/SAIDA.2v479y8105s9p78g.

## 2020-08-14 NOTE — PROGRESS NOTES
Clinic Care Coordination Contact    Situation: Patient chart reviewed by care coordinator.     Background: Care Coordination following patient to assist with goal as below.     Assessment: Per chart review, patient outreach completed by CC CHW on 8/12/20.  Patient is actively working to accomplish goal.  Patient indicated that she has been walking 3 times per day and plans to increase this to 5 times per day.  Patient is working on building her strength and has noted her ability to stand for longer periods of time.  Patient reported no noted weight loss at this time.  Patient's goal remains appropriate and relevant at this time; patient stated desire to continue working toward current goal.    Goals        Patient Stated      1. Being Active (pt-stated)      Goal Statement: I will work to lose 15 lbs.  Date Goal set: 10/10/19 // revised on 8/14/20  Date to Achieve By: 7/31/20 // extended to 11/30/20  Patient expressed understanding of goal: Yes    Action steps to achieve this goal:  1. I will increase the amount of walking I do on a daily basis.  2. I will consider doing seated or low-impact exercises.  3. I will explore low-calorie recipes/meal options.  4. When available, I will return to [facility] and participating in activities to stay active.        Plan/Recommendations: The patient will continue working with Care Coordination to achieve goal as above.  CC RN will send patient an updated Complex Care Plan and will include instructions for some seated/low-impact exercises for patient to try.  CC CHW will provide patient with low-calorie meal recipes as discussed. CC RN will review patient chart again in approximately 6 weeks to assess patient status and goal progress with outreach to patient as indicated.    Damien Person RN  Clinic Care Coordinator  [facility]  Ph: [phone]

## 2020-08-24 ENCOUNTER — TELEPHONE (OUTPATIENT)
Dept: INTERNAL MEDICINE | Facility: CLINIC | Age: 65
End: 2020-08-24

## 2020-08-24 NOTE — TELEPHONE ENCOUNTER
Patient Quality Outreach      Summary:    Patient is due/failing the following:   Annual wellness, date due: Patient is not due for Medicare    Type of outreach:    N/A    Questions for provider review:    None                                                                                   **Start Working phrase here:**       Patient has the following on her problem list/HM: None                                                Yajaira Kennedy CMA       Chart routed to n/a.

## 2020-09-01 DIAGNOSIS — Z11.59 ENCOUNTER FOR SCREENING FOR OTHER VIRAL DISEASES: Primary | ICD-10-CM

## 2020-09-01 PROBLEM — I48.0 PAROXYSMAL ATRIAL FIBRILLATION (H): Status: ACTIVE | Noted: 2020-09-01

## 2020-09-01 NOTE — TELEPHONE ENCOUNTER
Received statement from Dr Ko's office on 8/17 that patient can be on AC post procedure.  Patient looking to have WATCHMAN placed in October.  Statement scanned into medical records. Orders placed in epic for procedure and labs prior to procedure.   KRISTOPHER Salas

## 2020-09-08 ENCOUNTER — HOSPITAL ENCOUNTER (OUTPATIENT)
Dept: CARDIOLOGY | Facility: CLINIC | Age: 65
Discharge: HOME | End: 2020-09-08
Attending: INTERNAL MEDICINE | Admitting: INTERNAL MEDICINE
Payer: MEDICARE

## 2020-09-08 VITALS — SYSTOLIC BLOOD PRESSURE: 159 MMHG | HEART RATE: 59 BPM | DIASTOLIC BLOOD PRESSURE: 84 MMHG

## 2020-09-08 DIAGNOSIS — I48.91 ATRIAL FIBRILLATION WITH RVR (H): ICD-10-CM

## 2020-09-08 LAB
CREAT BLD-MCNC: 0.9 MG/DL (ref 0.52–1.04)
GFR SERPL CREATININE-BSD FRML MDRD: 63 ML/MIN/{1.73_M2}

## 2020-09-08 PROCEDURE — 25000128 H RX IP 250 OP 636: Performed by: INTERNAL MEDICINE

## 2020-09-08 PROCEDURE — 75572 CT HRT W/3D IMAGE: CPT | Mod: 26 | Performed by: INTERNAL MEDICINE

## 2020-09-08 PROCEDURE — 82565 ASSAY OF CREATININE: CPT | Performed by: INTERNAL MEDICINE

## 2020-09-08 PROCEDURE — 75572 CT HRT W/3D IMAGE: CPT

## 2020-09-08 RX ORDER — METHYLPREDNISOLONE SODIUM SUCCINATE 125 MG/2ML
125 INJECTION, POWDER, LYOPHILIZED, FOR SOLUTION INTRAMUSCULAR; INTRAVENOUS
Status: DISCONTINUED | OUTPATIENT
Start: 2020-09-08 | End: 2020-09-09 | Stop reason: HOSPADM

## 2020-09-08 RX ORDER — DIPHENHYDRAMINE HCL 25 MG
25 CAPSULE ORAL
Status: DISCONTINUED | OUTPATIENT
Start: 2020-09-08 | End: 2020-09-09 | Stop reason: HOSPADM

## 2020-09-08 RX ORDER — DIPHENHYDRAMINE HYDROCHLORIDE 50 MG/ML
25-50 INJECTION INTRAMUSCULAR; INTRAVENOUS
Status: DISCONTINUED | OUTPATIENT
Start: 2020-09-08 | End: 2020-09-09 | Stop reason: HOSPADM

## 2020-09-08 RX ORDER — ONDANSETRON 2 MG/ML
4 INJECTION INTRAMUSCULAR; INTRAVENOUS
Status: DISCONTINUED | OUTPATIENT
Start: 2020-09-08 | End: 2020-09-09 | Stop reason: HOSPADM

## 2020-09-08 RX ORDER — IOPAMIDOL 755 MG/ML
500 INJECTION, SOLUTION INTRAVASCULAR ONCE
Status: COMPLETED | OUTPATIENT
Start: 2020-09-08 | End: 2020-09-08

## 2020-09-08 RX ORDER — ACYCLOVIR 200 MG/1
0-1 CAPSULE ORAL
Status: DISCONTINUED | OUTPATIENT
Start: 2020-09-08 | End: 2020-09-09 | Stop reason: HOSPADM

## 2020-09-08 RX ADMIN — IOPAMIDOL 100 ML: 755 INJECTION, SOLUTION INTRAVENOUS at 13:19

## 2020-09-23 ENCOUNTER — TELEPHONE (OUTPATIENT)
Dept: CARDIOLOGY | Facility: CLINIC | Age: 65
End: 2020-09-23

## 2020-09-23 ENCOUNTER — PATIENT OUTREACH (OUTPATIENT)
Dept: NURSING | Facility: CLINIC | Age: 65
End: 2020-09-23
Payer: MEDICARE

## 2020-09-23 NOTE — PROGRESS NOTES
"Clinic Care Coordination Contact  Community Health Worker Follow Up    Goals:   Goals Addressed as of 9/23/2020 at 3:27 PM                 Today    8/12/20       Patient Stated      1. Being Active (pt-stated)   30%  30%    Added 10/10/19 by Damien Person RN     Goal Statement: I will work to lose 15 lbs.  Date Goal set: 10/10/19 // revised on 8/14/20  Date to Achieve By: 7/31/20 // extended to 11/30/20  Patient expressed understanding of goal: Yes    Action steps to achieve this goal:  1. I will increase the amount of walking I do on a daily basis.  2. I will consider doing seated or low-impact exercises.  3. I will explore low-calorie recipes/meal options.  4. When available, I will return to [facility] and participating in activities to stay active.    9/23/20-CHW    Patient stated that her exercising has been slow.  Patient stated that she has had some hip pain, which has caused her not to do her walking.  Patient is trying to walk a little bit.    Patient stated that her hip pain has been better the last few days.    Patient stated that she has lost 1 - 2 lbs, but isn't exactly sure how much weight she has lost.    Patient stated that she is eating healthier.  Patient doesn't have any candy or chips in the house.      Patient stated that she made a few of the recipes that were sent to her, and some of them are \"pretty good\".    Patient stated that she plans to call the heart clinic to confirm her surgery that is scheduled on 10/8/20.    Patient stated that she will call the clinic if her hip pain returns.    Barrier- Hip Pain            Intervention and Education during outreach: CHW encouraged patient to contact CC if there are any other changes or resources needed.  Patient acknowledged understanding.       CHW Plan:will follow up in one month    Next Outreach: 10/23/20  Planned Outreach Frequency: Monthly       TRINIDAD Li  Clinic Care Coordination  [facility]: " [facility], and [facility]  Phone: [phone]

## 2020-09-23 NOTE — TELEPHONE ENCOUNTER
Patient called to review upcoming appt and watchman procedure.  Requesting that paperwork be resent to her with all details.  Sent to scheduling to set up COVID testing prior to WATCHMAN.  Patient had no further questions.  KRISTOPHER Salas

## 2020-09-24 ENCOUNTER — TELEPHONE (OUTPATIENT)
Dept: INTERNAL MEDICINE | Facility: CLINIC | Age: 65
End: 2020-09-24

## 2020-09-24 NOTE — TELEPHONE ENCOUNTER
"This is a \"courtesy call\" from Shannan with [employer]. # [phone], Ext# [phone].  This member (Maxine) was previously enrolled in a weight management program.  However, [employer] staff have reached out to pt 3 times, never hearing back from her. So they had to \"un-enroll\" her in this program.   If she does want to re-enroll, she is going to have to re-apply.  "

## 2020-09-30 ENCOUNTER — TELEPHONE (OUTPATIENT)
Dept: CARDIOLOGY | Facility: CLINIC | Age: 65
End: 2020-09-30

## 2020-09-30 NOTE — PROGRESS NOTES
"HPI:   I had the pleasure of seeing Roxanna when she came for preoperative evaluation prior to planned left atrial appendage occluder/Watchman device.  This 65 year old sees Dr. Vaughn for her history of:    1. Paroxysmal AFib first diagnosed 9/2015.  She is undergone DC cardioversion, most recently 2/2019.  Hospitalized 1/2020 and flecainide increased.  2. GI bleeds. Had Hgb 7.2 g/dL 3/2019. EGD revealed duodenal ulcer which was cauterized.  Xarelto was held, but then later restarted.  She was rehospitalized 4/2019 with a lower GI bleed.  Hemoglobin got down to 8.7 g/dL.  Xarelto was discontinued, and Dr. Vaughn recommended not restarting this for at least 3-6 months. Capsule endoscopy 5/2019 showed normal mucosa but did not reach the colon (Dr. Ko). We restarted Xarelto 20 mg daily 10/2019 but Hgb dropped >1 gram in ~1 month. No anticoagulation despite CHADSVASc 2 (HTN, sex)  3. Benign essential hypertension with recent med changes   4. Coronary Calcification noted on CTA Heart 9/2020. Stress Test 3/2016 without ischemia  5. Pulmonary Nodules noted on CTA Heart 9/2020 - low risk - no Follow-up; high risk - optional 12 m CT follow-up. Roxanna states she considers herself low risk as smoked for <6 months    I saw Roxanna 2/2020 at which time we reviewed the discontinuation of her Xarelto therapy given her anemia. Hgb dropped from 11.4 to 10.5 g/dL in ~1m Fall 2019.     She met with Dr. Vaughn most recently 8/2020 and subsequently we received the OK from GI to proceed with Watchman with plans for AC x 45 days following.    Interval History:  Overall is doing well.  No c/o palpitations - feels like it \"might go into something\" but nothing has.  She's really pleased about this and thinks flecainide is working well.    No c/o exertional CP. No c/o change in mild JAUREGUI. No edema, orthopnea, or PND.      Recent Diagnostic Testing:  CT A Heart 9/8/2020 with normal aorta. Coronary calcification noted. Non-cardiac portion with 4 mm " non-calcified medial RML nodule, indeterminate 4 mm RUL nodule anterior and 3 mm lingular nodule noted. Low risk - no follow-up needed; High-risk - optional CT follow-up 12 m  Echocardiogam 1/2020 showed EF 55-60%. No RWMA. RV OK. Mild MAC. Trace TR.   Echocardiogram 7/2017 showed an EF of 55-60% with borderline LVH.  LA was mildly dilated at 4.6 cm with a volume index of 24.2 mL/m .  She was noted to have a dilated IVC with elevated right atrial pressure at 15-20.  No significant valvular abnormalities were noted.  Nuclear stress test 3/2016 was negative for ischemia    Component      Latest Ref Rng & Units 5/25/2018 3/13/2019   Cholesterol      <200 mg/dL 213 (H) 172   Triglycerides      <150 mg/dL 297 (H) 212 (H)   HDL Cholesterol      >49 mg/dL 51 45 (L)   LDL Cholesterol Calculated      <100 mg/dL 103 (H) 85   Non HDL Cholesterol      <130 mg/dL 162 (H) 127     Component      Latest Ref Rng & Units 1/29/2020 6/12/2020   WBC      4.0 - 11.0 10e9/L 11.1 (H) 9.6   RBC Count      3.8 - 5.2 10e12/L 4.62 4.64   Hemoglobin      11.7 - 15.7 g/dL 12.0 11.9   Hematocrit      35.0 - 47.0 % 38.8 38.9   MCV      78 - 100 fl 84 84   MCH      26.5 - 33.0 pg 26.0 (L) 25.6 (L)   MCHC      31.5 - 36.5 g/dL 30.9 (L) 30.6 (L)   RDW      10.0 - 15.0 % 17.1 (H) 16.8 (H)   Platelet Count      150 - 450 10e9/L 456 (H) 468 (H)     Component      Latest Ref Rng & Units 1/29/2020   Sodium      133 - 144 mmol/L 136   Potassium      3.4 - 5.3 mmol/L 4.3   Chloride      94 - 109 mmol/L 104   Carbon Dioxide      20 - 32 mmol/L 25   Anion Gap      3 - 14 mmol/L 7   Glucose      70 - 99 mg/dL 109 (H)   Urea Nitrogen      7 - 30 mg/dL 19   Creatinine      0.52 - 1.04 mg/dL 0.79   GFR Estimate      >60 mL/min/1.73:m2 79   GFR Estimate If Black      >60 mL/min/1.73:m2 >90   Calcium      8.5 - 10.1 mg/dL 8.8     Assessment & Plan:    1. Paroxysmal AFib    On flecainide 150 mg q 12 hours    No AC as above. CHADSVASc  3 (HTN, age, sex). HASBLED 2  (major bleed, age), 4.1% - moderate risk of bleeding    PLAN:    Left Atrial Appendage Occluder device 10/8/2020    Arrive 0800    COVID testing 10/5    Will HOLD Maxzide day of procedure      Today we discussed the risks, benefits and indication of left atrial appendage occluder device, including but not limited to: use of general anesthesia, peripheral vessel injury including bruising and bleeding, discomfort, heart attack, stroke, esophageal injury, cardiac puncture/tamponade and even death. Discussed that blood transfusion may be necessary. We also discussed anatomy of heart may not allow for deployment of device despite imaging findings. Discussed possibility that it may not fully occlude the appendage and anticoagulation would be continued despite procedure.  Recommended SBE prophylaxis x 6 months following procedure. Follow-up plans were discussed, including limitations and plans for repeat imaging.  Finally, discussed use of both anticoagulation and dual antiplatelet therapy, and aspirin will be recommended indefinitely unless directed otherwise. The patient voiced understanding and is willing to proceed. A consent form will be signed by the procedural physician.       2. HTN    Remains on metprolol XL 12.5 mg daily, Maxzide 37.5/25 and losartan 100 mg daily.     BPs OK today    PLAN:    Continue meds      3. Coronary Calcification    Noted on CT Scan    Last stress test 2016 without ischemia. Echo 1/2020 with normal EF    Remains on BB & ARB    Lipids last checked 3/2019 as above with LDL 85    Has controlled BP. No tobacco for years. Strong FHx for CAD/CVA    PLAN:    Will try to get updated lipids when in for Watchman    Continue BP and cholesterol control    4. Pulmonary Nodules    Scattered, <4 mm.  Optional 12 m follow-up and she doesn't think she'll do this as she smoke <6 months and considers herself low risk    PLAN:    Will FYI Dr. Wiley Sue PA-C, MSPAS      No orders of the defined types  were placed in this encounter.    No orders of the defined types were placed in this encounter.    Medications Discontinued During This Encounter   Medication Reason     ferrous fumarate 65 mg, [place]. FE,-Vitamin C 125 mg (VITRON-C)  MG TABS tablet Stopped by Patient         Encounter Diagnoses   Name Primary?     Atrial fibrillation with RVR (H) Yes     Benign essential hypertension      Hyperlipidemia LDL goal <130        CURRENT MEDICATIONS:  Current Outpatient Medications   Medication Sig Dispense Refill     Acetaminophen 325 MG CAPS Take 325-650 mg by mouth every 4 hours as needed       flecainide (TAMBOCOR) 150 MG tablet Take 1 tablet (150 mg) by mouth every 12 hours 60 tablet 11     gabapentin (NEURONTIN) 300 MG capsule TAKE 1 CAPSULE BY MOUTH EVERY MORNING AND TAKE 2 CAPSULES BY MOUTH EVERY NIGHT AT BEDTIME 270 capsule 1     levothyroxine (SYNTHROID/LEVOTHROID) 137 MCG tablet TAKE 1 TABLET (137 MCG) BY MOUTH DAILY 28 tablet 10     losartan (COZAAR) 100 MG tablet TAKE 1 TABLET BY MOUTH DAILY 90 tablet 3     metoprolol succinate ER (TOPROL-XL) 25 MG 24 hr tablet Take 0.5 tablets (12.5 mg) by mouth daily 30 tablet 3     order for DME DREAMSTATION  5-15 CM/H20  NASAL AIRFIT N10 HER       pantoprazole (PROTONIX) 40 MG EC tablet TAKE 1 TABLET BY MOUTH TWICE A DAY BEFORE MEALS 180 tablet 1     SUMAtriptan (IMITREX) 100 MG tablet Take 1 tablet (100 mg) by mouth at onset of headache for migraine May repeat in 2 hours if needed: max 2/day; 10 tablet 11     triamterene-HCTZ (MAXZIDE-25) 37.5-25 MG tablet Take 1 tablet by mouth daily 90 tablet 3       ALLERGIES     Allergies   Allergen Reactions     Morphine Anaphylaxis     Ceclor [Cefaclor] Hives     Diltiazem Hives     Lisinopril Cough     Sertraline Nausea and Vomiting       PAST MEDICAL HISTORY:  Past Medical History:   Diagnosis Date     Depression       Duodenal ulcer 03/04/2019     Essential hypertension       GERD (gastroesophageal reflux disease)       Hiatal hernia      Hypothyroidism      Migraine without aura and without status migrainosus, not intractable  Aug 2016     Obesity      NELY on CPAP      Osteoarthritis      Paroxysmal atrial fibrillation (H) 2015     RLS (restless legs syndrome)      Symptomatic menopausal or female climacteric states (aka FLASHES)      TMJ disease        PAST SURGICAL HISTORY:  Past Surgical History:   Procedure Laterality Date     APPENDECTOMY       CHOLECYSTECTOMY       COLONOSCOPY N/A 2019    Procedure: COLONOSCOPY;  Surgeon: Vahid Cardona MD;  Location:  GI     ESOPHAGOSCOPY, GASTROSCOPY, DUODENOSCOPY (EGD), COMBINED N/A 2019    Procedure: COMBINED ESOPHAGOSCOPY, GASTROSCOPY, DUODENOSCOPY (EGD);  Surgeon: Ben Ko MD;  Location:  GI     HYSTERECTOMY, [facility]  1999     TONSILLECTOMY         FAMILY HISTORY:  Family History   Problem Relation Age of Onset     Myocardial Infarction Mother      Heart Failure Mother      Heart Surgery Father         bypass surgery     Cerebrovascular Disease Father      Heart Surgery Brother      Hyperlipidemia Brother      Hyperlipidemia Sister      Hyperlipidemia Brother      Sleep Apnea Sister        SOCIAL HISTORY:  Social History     Socioeconomic History     Marital status:      Spouse name: None     Number of children: None     Years of education: None     Highest education level: None   Occupational History     None   Social Needs     Financial resource strain: None     Food insecurity     Worry: None     Inability: None     Transportation needs     Medical: None     Non-medical: None   Tobacco Use     Smoking status: Former Smoker     Packs/day: 0.25     Years: 1.00     Pack years: 0.25     Types: Cigarettes     Start date:      Quit date:      Years since quittin.7     Smokeless tobacco: Never Used     Tobacco comment: very minimal smoking history   Substance and Sexual Activity     Alcohol use: No     Alcohol/week: 0.0 standard  "drinks     Drug use: No     Sexual activity: Never   Lifestyle     Physical activity     Days per week: 3 days     Minutes per session: 10 min     Stress: None   Relationships     Social connections     Talks on phone: None     Gets together: None     Attends Tenriism service: None     Active member of club or organization: None     Attends meetings of clubs or organizations: None     Relationship status: None     Intimate partner violence     Fear of current or ex partner: None     Emotionally abused: None     Physically abused: None     Forced sexual activity: None   Other Topics Concern     Parent/sibling w/ CABG, MI or angioplasty before 65F 55M? Not Asked      Service Not Asked     Blood Transfusions Not Asked     Caffeine Concern No     Occupational Exposure Not Asked     Hobby Hazards Not Asked     Sleep Concern Yes     Stress Concern Not Asked     Weight Concern No     Special Diet No     Back Care Not Asked     Exercise No     Bike Helmet Not Asked     Seat Belt Yes     Self-Exams Not Asked   Social History Narrative     None       Review of Systems:  Skin:  Negative     Eyes:  Positive for glasses  ENT:  Negative    Respiratory:  Positive for sleep apnea;CPAP  Cardiovascular:  Negative for;palpitations;chest pain;edema    Gastroenterology: Negative for melena;hematochezia  Genitourinary:  Negative    Musculoskeletal:  Positive for arthritis  Neurologic:  Negative    Psychiatric:  Positive for depression  Heme/Lymph/Imm:  Positive for allergies  Endocrine:  Positive for thyroid disorder    Physical Exam:  Vitals: /76   Pulse 61   Ht 1.626 m (5' 4\")   Wt 120.5 kg (265 lb 9.6 oz)   SpO2 97%   BMI 45.59 kg/m      Constitutional:  cooperative, alert and oriented, well developed, well nourished, in no acute distress        Skin:  warm and dry to the touch, no apparent skin lesions or masses noted        Head:  normocephalic, no masses or lesions        Eyes:  pupils equal and " round;conjunctivae and lids unremarkable;sclera white        ENT:  no pallor or cyanosis, dentition good        Neck:  JVP normal;no carotid bruit        Chest:  normal respiratory excursion        Cardiac: regular rhythm                  Abdomen:  abdomen soft obese      Vascular: pulses full and equal                                      Extremities and Back:  no deformities, clubbing, cyanosis, erythema observed;no edema        Neurological:  no gross motor deficits        Recent Lab Results:  LIPID RESULTS:  Lab Results   Component Value Date    CHOL 172 03/13/2019    HDL 45 (L) 03/13/2019    LDL 85 03/13/2019    TRIG 212 (H) 03/13/2019    CHOLHDLRATIO 4.7 09/24/2015       LIVER ENZYME RESULTS:  Lab Results   Component Value Date    AST 19 01/21/2020    ALT 18 01/21/2020       CBC RESULTS:  Lab Results   Component Value Date    WBC 9.6 06/12/2020    RBC 4.64 06/12/2020    HGB 11.9 06/12/2020    HCT 38.9 06/12/2020    MCV 84 06/12/2020    MCH 25.6 (L) 06/12/2020    MCHC 30.6 (L) 06/12/2020    RDW 16.8 (H) 06/12/2020     (H) 06/12/2020       BMP RESULTS:  Lab Results   Component Value Date     01/29/2020    POTASSIUM 4.3 01/29/2020    CHLORIDE 104 01/29/2020    CO2 25 01/29/2020    ANIONGAP 7 01/29/2020     (H) 01/29/2020    BUN 19 01/29/2020    CR 0.79 01/29/2020    GFRESTIMATED 63 09/08/2020    GFRESTBLACK 76 09/08/2020    J CARLOS 8.8 01/29/2020

## 2020-09-30 NOTE — TELEPHONE ENCOUNTER
Wellness Screening Tool    Symptom Screening:    Do you have one of the following NEW symptoms:      Fever (subjective or >100.0)?  No    New cough? No    Shortness of breath? No    Chills? No    New loss of taste or smell? No    Generalized body aches? No    New persistent headache? No    New sore throat? No    Nausea, vomiting or diarrhea? No    Within the past 2 weeks, have you been exposed to someone with a known positive illness below?      COVID - 19 (known or suspected) No    Chicken pox?  No    Measles? No    Pertussis? No    Have you had a positive COVID-19 diagnostic test (nasal swab test) in the last 14 days or are you currently   on self-quarantine restrictions (i.e.travel restriction, exposure, etc?) No        Patient notified of visitor restriction: Yes  Patient informed to wear a mask: Yes    Patient's appointment status: Patient will be seen in clinic as scheduled on 10/01/2020

## 2020-10-01 ENCOUNTER — OFFICE VISIT (OUTPATIENT)
Dept: CARDIOLOGY | Facility: CLINIC | Age: 65
End: 2020-10-01
Payer: MEDICARE

## 2020-10-01 VITALS
SYSTOLIC BLOOD PRESSURE: 122 MMHG | WEIGHT: 265.6 LBS | HEART RATE: 61 BPM | BODY MASS INDEX: 45.34 KG/M2 | OXYGEN SATURATION: 97 % | DIASTOLIC BLOOD PRESSURE: 76 MMHG | HEIGHT: 64 IN

## 2020-10-01 DIAGNOSIS — I48.91 ATRIAL FIBRILLATION WITH RVR (H): Primary | ICD-10-CM

## 2020-10-01 DIAGNOSIS — E78.5 HYPERLIPIDEMIA LDL GOAL <130: ICD-10-CM

## 2020-10-01 DIAGNOSIS — I10 BENIGN ESSENTIAL HYPERTENSION: ICD-10-CM

## 2020-10-01 PROCEDURE — 99214 OFFICE O/P EST MOD 30 MIN: CPT | Performed by: PHYSICIAN ASSISTANT

## 2020-10-01 ASSESSMENT — MIFFLIN-ST. JEOR: SCORE: 1734.75

## 2020-10-01 NOTE — LETTER
"10/1/2020    Yuval Jama MD  [address] [facility] [address]    RE: Maxine Sears       Dear Colleague,    I had the pleasure of seeing Maxine Sears in the [facility].    HPI:   I had the pleasure of seeing Roxanna when she came for preoperative evaluation prior to planned left atrial appendage occluder/Watchman device.  This 65 year old sees Dr. Vaughn for her history of:    1. Paroxysmal AFib first diagnosed 9/2015.  She is undergone DC cardioversion, most recently 2/2019.  Hospitalized 1/2020 and flecainide increased.  2. GI bleeds. Had Hgb 7.2 g/dL 3/2019. EGD revealed duodenal ulcer which was cauterized.  Xarelto was held, but then later restarted.  She was rehospitalized 4/2019 with a lower GI bleed.  Hemoglobin got down to 8.7 g/dL.  Xarelto was discontinued, and Dr. Vaughn recommended not restarting this for at least 3-6 months. Capsule endoscopy 5/2019 showed normal mucosa but did not reach the colon (Dr. Ko). We restarted Xarelto 20 mg daily 10/2019 but Hgb dropped >1 gram in ~1 month. No anticoagulation despite CHADSVASc 2 (HTN, sex)  3. Benign essential hypertension with recent med changes   4. Coronary Calcification noted on CTA Heart 9/2020. Stress Test 3/2016 without ischemia  5. Pulmonary Nodules noted on CTA Heart 9/2020 - low risk - no Follow-up; high risk - optional 12 m CT follow-up. Roxanna states she considers herself low risk as smoked for <6 months    I saw Roxanna 2/2020 at which time we reviewed the discontinuation of her Xarelto therapy given her anemia. Hgb dropped from 11.4 to 10.5 g/dL in ~1m Fall 2019.     She met with Dr. Vaughn most recently 8/2020 and subsequently we received the OK from GI to proceed with Watchman with plans for AC x 45 days following.    Interval History:  Overall is doing well.  No c/o palpitations - feels like it \"might go into something\" but nothing has.  She's really pleased about this and thinks flecainide is working " well.    No c/o exertional CP. No c/o change in mild JAUREGUI. No edema, orthopnea, or PND.      Recent Diagnostic Testing:  CT A Heart 9/8/2020 with normal aorta. Coronary calcification noted. Non-cardiac portion with 4 mm non-calcified medial RML nodule, indeterminate 4 mm RUL nodule anterior and 3 mm lingular nodule noted. Low risk - no follow-up needed; High-risk - optional CT follow-up 12 m  Echocardiogam 1/2020 showed EF 55-60%. No RWMA. RV OK. Mild MAC. Trace TR.   Echocardiogram 7/2017 showed an EF of 55-60% with borderline LVH.  LA was mildly dilated at 4.6 cm with a volume index of 24.2 mL/m .  She was noted to have a dilated IVC with elevated right atrial pressure at 15-20.  No significant valvular abnormalities were noted.  Nuclear stress test 3/2016 was negative for ischemia    Component      Latest Ref Rng & Units 5/25/2018 3/13/2019   Cholesterol      <200 mg/dL 213 (H) 172   Triglycerides      <150 mg/dL 297 (H) 212 (H)   HDL Cholesterol      >49 mg/dL 51 45 (L)   LDL Cholesterol Calculated      <100 mg/dL 103 (H) 85   Non HDL Cholesterol      <130 mg/dL 162 (H) 127     Component      Latest Ref Rng & Units 1/29/2020 6/12/2020   WBC      4.0 - 11.0 10e9/L 11.1 (H) 9.6   RBC Count      3.8 - 5.2 10e12/L 4.62 4.64   Hemoglobin      11.7 - 15.7 g/dL 12.0 11.9   Hematocrit      35.0 - 47.0 % 38.8 38.9   MCV      78 - 100 fl 84 84   MCH      26.5 - 33.0 pg 26.0 (L) 25.6 (L)   MCHC      31.5 - 36.5 g/dL 30.9 (L) 30.6 (L)   RDW      10.0 - 15.0 % 17.1 (H) 16.8 (H)   Platelet Count      150 - 450 10e9/L 456 (H) 468 (H)     Component      Latest Ref Rng & Units 1/29/2020   Sodium      133 - 144 mmol/L 136   Potassium      3.4 - 5.3 mmol/L 4.3   Chloride      94 - 109 mmol/L 104   Carbon Dioxide      20 - 32 mmol/L 25   Anion Gap      3 - 14 mmol/L 7   Glucose      70 - 99 mg/dL 109 (H)   Urea Nitrogen      7 - 30 mg/dL 19   Creatinine      0.52 - 1.04 mg/dL 0.79   GFR Estimate      >60 mL/min/1.73:m2 79   GFR  Estimate If Black      >60 mL/min/1.73:m2 >90   Calcium      8.5 - 10.1 mg/dL 8.8     Assessment & Plan:    1. Paroxysmal AFib    On flecainide 150 mg q 12 hours    No AC as above. CHADSVASc  3 (HTN, age, sex). HASBLED 2 (major bleed, age), 4.1% - moderate risk of bleeding    PLAN:    Left Atrial Appendage Occluder device 10/8/2020    Arrive 0800    COVID testing 10/5    Will HOLD Maxzide day of procedure      Today we discussed the risks, benefits and indication of left atrial appendage occluder device, including but not limited to: use of general anesthesia, peripheral vessel injury including bruising and bleeding, discomfort, heart attack, stroke, esophageal injury, cardiac puncture/tamponade and even death. Discussed that blood transfusion may be necessary. We also discussed anatomy of heart may not allow for deployment of device despite imaging findings. Discussed possibility that it may not fully occlude the appendage and anticoagulation would be continued despite procedure.  Recommended SBE prophylaxis x 6 months following procedure. Follow-up plans were discussed, including limitations and plans for repeat imaging.  Finally, discussed use of both anticoagulation and dual antiplatelet therapy, and aspirin will be recommended indefinitely unless directed otherwise. The patient voiced understanding and is willing to proceed. A consent form will be signed by the procedural physician.       2. HTN    Remains on metprolol XL 12.5 mg daily, Maxzide 37.5/25 and losartan 100 mg daily.     BPs OK today    PLAN:    Continue meds      3. Coronary Calcification    Noted on CT Scan    Last stress test 2016 without ischemia. Echo 1/2020 with normal EF    Remains on BB & ARB    Lipids last checked 3/2019 as above with LDL 85    Has controlled BP. No tobacco for years. Strong FHx for CAD/CVA    PLAN:    Will try to get updated lipids when in for Watchman    Continue BP and cholesterol control    4. Pulmonary  Nodules    Scattered, <4 mm.  Optional 12 m follow-up and she doesn't think she'll do this as she smoke <6 months and considers herself low risk    PLAN:    Will KASI Sue, PALexieC, MSPAS      No orders of the defined types were placed in this encounter.    No orders of the defined types were placed in this encounter.    Medications Discontinued During This Encounter   Medication Reason     ferrous fumarate 65 mg, [place]. FE,-Vitamin C 125 mg (VITRON-C)  MG TABS tablet Stopped by Patient         Encounter Diagnoses   Name Primary?     Atrial fibrillation with RVR (H) Yes     Benign essential hypertension      Hyperlipidemia LDL goal <130        CURRENT MEDICATIONS:  Current Outpatient Medications   Medication Sig Dispense Refill     Acetaminophen 325 MG CAPS Take 325-650 mg by mouth every 4 hours as needed       flecainide (TAMBOCOR) 150 MG tablet Take 1 tablet (150 mg) by mouth every 12 hours 60 tablet 11     gabapentin (NEURONTIN) 300 MG capsule TAKE 1 CAPSULE BY MOUTH EVERY MORNING AND TAKE 2 CAPSULES BY MOUTH EVERY NIGHT AT BEDTIME 270 capsule 1     levothyroxine (SYNTHROID/LEVOTHROID) 137 MCG tablet TAKE 1 TABLET (137 MCG) BY MOUTH DAILY 28 tablet 10     losartan (COZAAR) 100 MG tablet TAKE 1 TABLET BY MOUTH DAILY 90 tablet 3     metoprolol succinate ER (TOPROL-XL) 25 MG 24 hr tablet Take 0.5 tablets (12.5 mg) by mouth daily 30 tablet 3     order for DME DREAMSTATION  5-15 CM/H20  NASAL AIRFIT N10 HER       pantoprazole (PROTONIX) 40 MG EC tablet TAKE 1 TABLET BY MOUTH TWICE A DAY BEFORE MEALS 180 tablet 1     SUMAtriptan (IMITREX) 100 MG tablet Take 1 tablet (100 mg) by mouth at onset of headache for migraine May repeat in 2 hours if needed: max 2/day; 10 tablet 11     triamterene-HCTZ (MAXZIDE-25) 37.5-25 MG tablet Take 1 tablet by mouth daily 90 tablet 3       ALLERGIES     Allergies   Allergen Reactions     Morphine Anaphylaxis     Ceclor [Cefaclor] Hives     Diltiazem Hives     Lisinopril  Cough     Sertraline Nausea and Vomiting       PAST MEDICAL HISTORY:  Past Medical History:   Diagnosis Date     Depression       Duodenal ulcer 03/04/2019     Essential hypertension       GERD (gastroesophageal reflux disease)      Hiatal hernia      Hypothyroidism      Migraine without aura and without status migrainosus, not intractable  Aug 2016     Obesity      NELY on CPAP      Osteoarthritis      Paroxysmal atrial fibrillation (H) 01/05/2015     RLS (restless legs syndrome)      Symptomatic menopausal or female climacteric states (aka FLASHES)      TMJ disease        PAST SURGICAL HISTORY:  Past Surgical History:   Procedure Laterality Date     APPENDECTOMY       CHOLECYSTECTOMY       COLONOSCOPY N/A 4/12/2019    Procedure: COLONOSCOPY;  Surgeon: Vahid Cardona MD;  Location:  GI     ESOPHAGOSCOPY, GASTROSCOPY, DUODENOSCOPY (EGD), COMBINED N/A 4/11/2019    Procedure: COMBINED ESOPHAGOSCOPY, GASTROSCOPY, DUODENOSCOPY (EGD);  Surgeon: Ben Ko MD;  Location:  GI     HYSTERECTOMY, [facility]  08/1999     TONSILLECTOMY         FAMILY HISTORY:  Family History   Problem Relation Age of Onset     Myocardial Infarction Mother      Heart Failure Mother      Heart Surgery Father         bypass surgery     Cerebrovascular Disease Father      Heart Surgery Brother      Hyperlipidemia Brother      Hyperlipidemia Sister      Hyperlipidemia Brother      Sleep Apnea Sister        SOCIAL HISTORY:  Social History     Socioeconomic History     Marital status:      Spouse name: None     Number of children: None     Years of education: None     Highest education level: None   Occupational History     None   Social Needs     Financial resource strain: None     Food insecurity     Worry: None     Inability: None     Transportation needs     Medical: None     Non-medical: None   Tobacco Use     Smoking status: Former Smoker     Packs/day: 0.25     Years: 1.00     Pack years: 0.25     Types: Cigarettes      "Start date:      Quit date:      Years since quittin.7     Smokeless tobacco: Never Used     Tobacco comment: very minimal smoking history   Substance and Sexual Activity     Alcohol use: No     Alcohol/week: 0.0 standard drinks     Drug use: No     Sexual activity: Never   Lifestyle     Physical activity     Days per week: 3 days     Minutes per session: 10 min     Stress: None   Relationships     Social connections     Talks on phone: None     Gets together: None     Attends Sabianism service: None     Active member of club or organization: None     Attends meetings of clubs or organizations: None     Relationship status: None     Intimate partner violence     Fear of current or ex partner: None     Emotionally abused: None     Physically abused: None     Forced sexual activity: None   Other Topics Concern     Parent/sibling w/ CABG, MI or angioplasty before 65F 55M? Not Asked      Service Not Asked     Blood Transfusions Not Asked     Caffeine Concern No     Occupational Exposure Not Asked     Hobby Hazards Not Asked     Sleep Concern Yes     Stress Concern Not Asked     Weight Concern No     Special Diet No     Back Care Not Asked     Exercise No     Bike Helmet Not Asked     Seat Belt Yes     Self-Exams Not Asked   Social History Narrative     None       Review of Systems:  Skin:  Negative     Eyes:  Positive for glasses  ENT:  Negative    Respiratory:  Positive for sleep apnea;CPAP  Cardiovascular:  Negative for;palpitations;chest pain;edema    Gastroenterology: Negative for melena;hematochezia  Genitourinary:  Negative    Musculoskeletal:  Positive for arthritis  Neurologic:  Negative    Psychiatric:  Positive for depression  Heme/Lymph/Imm:  Positive for allergies  Endocrine:  Positive for thyroid disorder    Physical Exam:  Vitals: /76   Pulse 61   Ht 1.626 m (5' 4\")   Wt 120.5 kg (265 lb 9.6 oz)   SpO2 97%   BMI 45.59 kg/m      Constitutional:  cooperative, alert and " oriented, well developed, well nourished, in no acute distress        Skin:  warm and dry to the touch, no apparent skin lesions or masses noted        Head:  normocephalic, no masses or lesions        Eyes:  pupils equal and round;conjunctivae and lids unremarkable;sclera white        ENT:  no pallor or cyanosis, dentition good        Neck:  JVP normal;no carotid bruit        Chest:  normal respiratory excursion        Cardiac: regular rhythm                  Abdomen:  abdomen soft obese      Vascular: pulses full and equal                                      Extremities and Back:  no deformities, clubbing, cyanosis, erythema observed;no edema        Neurological:  no gross motor deficits        Recent Lab Results:  LIPID RESULTS:  Lab Results   Component Value Date    CHOL 172 03/13/2019    HDL 45 (L) 03/13/2019    LDL 85 03/13/2019    TRIG 212 (H) 03/13/2019    CHOLHDLRATIO 4.7 09/24/2015       LIVER ENZYME RESULTS:  Lab Results   Component Value Date    AST 19 01/21/2020    ALT 18 01/21/2020       CBC RESULTS:  Lab Results   Component Value Date    WBC 9.6 06/12/2020    RBC 4.64 06/12/2020    HGB 11.9 06/12/2020    HCT 38.9 06/12/2020    MCV 84 06/12/2020    MCH 25.6 (L) 06/12/2020    MCHC 30.6 (L) 06/12/2020    RDW 16.8 (H) 06/12/2020     (H) 06/12/2020       BMP RESULTS:  Lab Results   Component Value Date     01/29/2020    POTASSIUM 4.3 01/29/2020    CHLORIDE 104 01/29/2020    CO2 25 01/29/2020    ANIONGAP 7 01/29/2020     (H) 01/29/2020    BUN 19 01/29/2020    CR 0.79 01/29/2020    GFRESTIMATED 63 09/08/2020    GFRESTBLACK 76 09/08/2020    J CARLOS 8.8 01/29/2020        Thank you for allowing me to participate in the care of your patient.    Sincerely,     Ilda Sue PA-C     [facility]

## 2020-10-01 NOTE — PATIENT INSTRUCTIONS
Roxanna - it was nice to see you today!    1. Reviewed plan for Watchman procedure due to high risk of stroke requiring blood thinner but high risk of bleed while on blood thinners.  2. Reviewed CT scan showing some coronary calcification (hardened plaque).  To help prevent this from getting worse, continue BP and cholesterol control  3. CT also showed nodules - optional 12 month CT reviewed    PLAN:  1. For Watchman -   On 10/8/2020:    Arrive 0800    Take 325 mg aspirin on 10/7 and morning 10/8 (take 4 tablets of the 81 mg aspirin at once)    Nothing to eat/drink after midnight    COVID testing prior    Will HOLD Maxzide 37.5/25 mg day of procedure but OK to take everything else with a small sip of water!     2. Will try to get cholesterol checked when you come for Watchman

## 2020-10-01 NOTE — LETTER
"10/1/2020    Yuval Jama MD  [address] [facility] [address]    RE: Maxine Sears       Dear Colleague,    I had the pleasure of seeing Maxine Sears in the [facility].    HPI:   I had the pleasure of seeing Roxanna when she came for preoperative evaluation prior to planned left atrial appendage occluder/Watchman device.  This 65 year old sees Dr. Vaughn for her history of:    1. Paroxysmal AFib first diagnosed 9/2015.  She is undergone DC cardioversion, most recently 2/2019.  Hospitalized 1/2020 and flecainide increased.  2. GI bleeds. Had Hgb 7.2 g/dL 3/2019. EGD revealed duodenal ulcer which was cauterized.  Xarelto was held, but then later restarted.  She was rehospitalized 4/2019 with a lower GI bleed.  Hemoglobin got down to 8.7 g/dL.  Xarelto was discontinued, and Dr. Vaughn recommended not restarting this for at least 3-6 months. Capsule endoscopy 5/2019 showed normal mucosa but did not reach the colon (Dr. Ko). We restarted Xarelto 20 mg daily 10/2019 but Hgb dropped >1 gram in ~1 month. No anticoagulation despite CHADSVASc 2 (HTN, sex)  3. Benign essential hypertension with recent med changes   4. Coronary Calcification noted on CTA Heart 9/2020. Stress Test 3/2016 without ischemia  5. Pulmonary Nodules noted on CTA Heart 9/2020 - low risk - no Follow-up; high risk - optional 12 m CT follow-up. Roxanna states she considers herself low risk as smoked for <6 months    I saw Roxanna 2/2020 at which time we reviewed the discontinuation of her Xarelto therapy given her anemia. Hgb dropped from 11.4 to 10.5 g/dL in ~1m Fall 2019.     She met with Dr. Vaughn most recently 8/2020 and subsequently we received the OK from GI to proceed with Watchman with plans for AC x 45 days following.    Interval History:  Overall is doing well.  No c/o palpitations - feels like it \"might go into something\" but nothing has.  She's really pleased about this and thinks flecainide is working " well.    No c/o exertional CP. No c/o change in mild JAUREGUI. No edema, orthopnea, or PND.      Recent Diagnostic Testing:  CT A Heart 9/8/2020 with normal aorta. Coronary calcification noted. Non-cardiac portion with 4 mm non-calcified medial RML nodule, indeterminate 4 mm RUL nodule anterior and 3 mm lingular nodule noted. Low risk - no follow-up needed; High-risk - optional CT follow-up 12 m  Echocardiogam 1/2020 showed EF 55-60%. No RWMA. RV OK. Mild MAC. Trace TR.   Echocardiogram 7/2017 showed an EF of 55-60% with borderline LVH.  LA was mildly dilated at 4.6 cm with a volume index of 24.2 mL/m .  She was noted to have a dilated IVC with elevated right atrial pressure at 15-20.  No significant valvular abnormalities were noted.  Nuclear stress test 3/2016 was negative for ischemia    Component      Latest Ref Rng & Units 5/25/2018 3/13/2019   Cholesterol      <200 mg/dL 213 (H) 172   Triglycerides      <150 mg/dL 297 (H) 212 (H)   HDL Cholesterol      >49 mg/dL 51 45 (L)   LDL Cholesterol Calculated      <100 mg/dL 103 (H) 85   Non HDL Cholesterol      <130 mg/dL 162 (H) 127     Component      Latest Ref Rng & Units 1/29/2020 6/12/2020   WBC      4.0 - 11.0 10e9/L 11.1 (H) 9.6   RBC Count      3.8 - 5.2 10e12/L 4.62 4.64   Hemoglobin      11.7 - 15.7 g/dL 12.0 11.9   Hematocrit      35.0 - 47.0 % 38.8 38.9   MCV      78 - 100 fl 84 84   MCH      26.5 - 33.0 pg 26.0 (L) 25.6 (L)   MCHC      31.5 - 36.5 g/dL 30.9 (L) 30.6 (L)   RDW      10.0 - 15.0 % 17.1 (H) 16.8 (H)   Platelet Count      150 - 450 10e9/L 456 (H) 468 (H)     Component      Latest Ref Rng & Units 1/29/2020   Sodium      133 - 144 mmol/L 136   Potassium      3.4 - 5.3 mmol/L 4.3   Chloride      94 - 109 mmol/L 104   Carbon Dioxide      20 - 32 mmol/L 25   Anion Gap      3 - 14 mmol/L 7   Glucose      70 - 99 mg/dL 109 (H)   Urea Nitrogen      7 - 30 mg/dL 19   Creatinine      0.52 - 1.04 mg/dL 0.79   GFR Estimate      >60 mL/min/1.73:m2 79   GFR  Estimate If Black      >60 mL/min/1.73:m2 >90   Calcium      8.5 - 10.1 mg/dL 8.8     Assessment & Plan:    1. Paroxysmal AFib    On flecainide 150 mg q 12 hours    No AC as above. CHADSVASc  3 (HTN, age, sex). HASBLED 2 (major bleed, age), 4.1% - moderate risk of bleeding    PLAN:    Left Atrial Appendage Occluder device 10/8/2020    Arrive 0800    COVID testing 10/5    Will HOLD Maxzide day of procedure      Today we discussed the risks, benefits and indication of left atrial appendage occluder device, including but not limited to: use of general anesthesia, peripheral vessel injury including bruising and bleeding, discomfort, heart attack, stroke, esophageal injury, cardiac puncture/tamponade and even death. Discussed that blood transfusion may be necessary. We also discussed anatomy of heart may not allow for deployment of device despite imaging findings. Discussed possibility that it may not fully occlude the appendage and anticoagulation would be continued despite procedure.  Recommended SBE prophylaxis x 6 months following procedure. Follow-up plans were discussed, including limitations and plans for repeat imaging.  Finally, discussed use of both anticoagulation and dual antiplatelet therapy, and aspirin will be recommended indefinitely unless directed otherwise. The patient voiced understanding and is willing to proceed. A consent form will be signed by the procedural physician.       2. HTN    Remains on metprolol XL 12.5 mg daily, Maxzide 37.5/25 and losartan 100 mg daily.     BPs OK today    PLAN:    Continue meds      3. Coronary Calcification    Noted on CT Scan    Last stress test 2016 without ischemia. Echo 1/2020 with normal EF    Remains on BB & ARB    Lipids last checked 3/2019 as above with LDL 85    Has controlled BP. No tobacco for years. Strong FHx for CAD/CVA    PLAN:    Will try to get updated lipids when in for Watchman    Continue BP and cholesterol control    4. Pulmonary  Nodules    Scattered, <4 mm.  Optional 12 m follow-up and she doesn't think she'll do this as she smoke <6 months and considers herself low risk    PLAN:    Will KASI Sue, PALexieC, MSPAS      No orders of the defined types were placed in this encounter.    No orders of the defined types were placed in this encounter.    Medications Discontinued During This Encounter   Medication Reason     ferrous fumarate 65 mg, [place]. FE,-Vitamin C 125 mg (VITRON-C)  MG TABS tablet Stopped by Patient         Encounter Diagnoses   Name Primary?     Atrial fibrillation with RVR (H) Yes     Benign essential hypertension      Hyperlipidemia LDL goal <130        CURRENT MEDICATIONS:  Current Outpatient Medications   Medication Sig Dispense Refill     Acetaminophen 325 MG CAPS Take 325-650 mg by mouth every 4 hours as needed       flecainide (TAMBOCOR) 150 MG tablet Take 1 tablet (150 mg) by mouth every 12 hours 60 tablet 11     gabapentin (NEURONTIN) 300 MG capsule TAKE 1 CAPSULE BY MOUTH EVERY MORNING AND TAKE 2 CAPSULES BY MOUTH EVERY NIGHT AT BEDTIME 270 capsule 1     levothyroxine (SYNTHROID/LEVOTHROID) 137 MCG tablet TAKE 1 TABLET (137 MCG) BY MOUTH DAILY 28 tablet 10     losartan (COZAAR) 100 MG tablet TAKE 1 TABLET BY MOUTH DAILY 90 tablet 3     metoprolol succinate ER (TOPROL-XL) 25 MG 24 hr tablet Take 0.5 tablets (12.5 mg) by mouth daily 30 tablet 3     order for DME DREAMSTATION  5-15 CM/H20  NASAL AIRFIT N10 HER       pantoprazole (PROTONIX) 40 MG EC tablet TAKE 1 TABLET BY MOUTH TWICE A DAY BEFORE MEALS 180 tablet 1     SUMAtriptan (IMITREX) 100 MG tablet Take 1 tablet (100 mg) by mouth at onset of headache for migraine May repeat in 2 hours if needed: max 2/day; 10 tablet 11     triamterene-HCTZ (MAXZIDE-25) 37.5-25 MG tablet Take 1 tablet by mouth daily 90 tablet 3       ALLERGIES     Allergies   Allergen Reactions     Morphine Anaphylaxis     Ceclor [Cefaclor] Hives     Diltiazem Hives     Lisinopril  Cough     Sertraline Nausea and Vomiting       PAST MEDICAL HISTORY:  Past Medical History:   Diagnosis Date     Depression       Duodenal ulcer 03/04/2019     Essential hypertension       GERD (gastroesophageal reflux disease)      Hiatal hernia      Hypothyroidism      Migraine without aura and without status migrainosus, not intractable  Aug 2016     Obesity      NELY on CPAP      Osteoarthritis      Paroxysmal atrial fibrillation (H) 01/05/2015     RLS (restless legs syndrome)      Symptomatic menopausal or female climacteric states (aka FLASHES)      TMJ disease        PAST SURGICAL HISTORY:  Past Surgical History:   Procedure Laterality Date     APPENDECTOMY       CHOLECYSTECTOMY       COLONOSCOPY N/A 4/12/2019    Procedure: COLONOSCOPY;  Surgeon: Vahid Cardona MD;  Location:  GI     ESOPHAGOSCOPY, GASTROSCOPY, DUODENOSCOPY (EGD), COMBINED N/A 4/11/2019    Procedure: COMBINED ESOPHAGOSCOPY, GASTROSCOPY, DUODENOSCOPY (EGD);  Surgeon: Ben Ko MD;  Location:  GI     HYSTERECTOMY, [facility]  08/1999     TONSILLECTOMY         FAMILY HISTORY:  Family History   Problem Relation Age of Onset     Myocardial Infarction Mother      Heart Failure Mother      Heart Surgery Father         bypass surgery     Cerebrovascular Disease Father      Heart Surgery Brother      Hyperlipidemia Brother      Hyperlipidemia Sister      Hyperlipidemia Brother      Sleep Apnea Sister        SOCIAL HISTORY:  Social History     Socioeconomic History     Marital status:      Spouse name: None     Number of children: None     Years of education: None     Highest education level: None   Occupational History     None   Social Needs     Financial resource strain: None     Food insecurity     Worry: None     Inability: None     Transportation needs     Medical: None     Non-medical: None   Tobacco Use     Smoking status: Former Smoker     Packs/day: 0.25     Years: 1.00     Pack years: 0.25     Types: Cigarettes      "Start date:      Quit date:      Years since quittin.7     Smokeless tobacco: Never Used     Tobacco comment: very minimal smoking history   Substance and Sexual Activity     Alcohol use: No     Alcohol/week: 0.0 standard drinks     Drug use: No     Sexual activity: Never   Lifestyle     Physical activity     Days per week: 3 days     Minutes per session: 10 min     Stress: None   Relationships     Social connections     Talks on phone: None     Gets together: None     Attends Nondenominational service: None     Active member of club or organization: None     Attends meetings of clubs or organizations: None     Relationship status: None     Intimate partner violence     Fear of current or ex partner: None     Emotionally abused: None     Physically abused: None     Forced sexual activity: None   Other Topics Concern     Parent/sibling w/ CABG, MI or angioplasty before 65F 55M? Not Asked      Service Not Asked     Blood Transfusions Not Asked     Caffeine Concern No     Occupational Exposure Not Asked     Hobby Hazards Not Asked     Sleep Concern Yes     Stress Concern Not Asked     Weight Concern No     Special Diet No     Back Care Not Asked     Exercise No     Bike Helmet Not Asked     Seat Belt Yes     Self-Exams Not Asked   Social History Narrative     None       Review of Systems:  Skin:  Negative     Eyes:  Positive for glasses  ENT:  Negative    Respiratory:  Positive for sleep apnea;CPAP  Cardiovascular:  Negative for;palpitations;chest pain;edema    Gastroenterology: Negative for melena;hematochezia  Genitourinary:  Negative    Musculoskeletal:  Positive for arthritis  Neurologic:  Negative    Psychiatric:  Positive for depression  Heme/Lymph/Imm:  Positive for allergies  Endocrine:  Positive for thyroid disorder    Physical Exam:  Vitals: /76   Pulse 61   Ht 1.626 m (5' 4\")   Wt 120.5 kg (265 lb 9.6 oz)   SpO2 97%   BMI 45.59 kg/m      Constitutional:  cooperative, alert and " oriented, well developed, well nourished, in no acute distress        Skin:  warm and dry to the touch, no apparent skin lesions or masses noted        Head:  normocephalic, no masses or lesions        Eyes:  pupils equal and round;conjunctivae and lids unremarkable;sclera white        ENT:  no pallor or cyanosis, dentition good        Neck:  JVP normal;no carotid bruit        Chest:  normal respiratory excursion        Cardiac: regular rhythm                  Abdomen:  abdomen soft obese      Vascular: pulses full and equal                                      Extremities and Back:  no deformities, clubbing, cyanosis, erythema observed;no edema        Neurological:  no gross motor deficits        Recent Lab Results:  LIPID RESULTS:  Lab Results   Component Value Date    CHOL 172 03/13/2019    HDL 45 (L) 03/13/2019    LDL 85 03/13/2019    TRIG 212 (H) 03/13/2019    CHOLHDLRATIO 4.7 09/24/2015       LIVER ENZYME RESULTS:  Lab Results   Component Value Date    AST 19 01/21/2020    ALT 18 01/21/2020       CBC RESULTS:  Lab Results   Component Value Date    WBC 9.6 06/12/2020    RBC 4.64 06/12/2020    HGB 11.9 06/12/2020    HCT 38.9 06/12/2020    MCV 84 06/12/2020    MCH 25.6 (L) 06/12/2020    MCHC 30.6 (L) 06/12/2020    RDW 16.8 (H) 06/12/2020     (H) 06/12/2020       BMP RESULTS:  Lab Results   Component Value Date     01/29/2020    POTASSIUM 4.3 01/29/2020    CHLORIDE 104 01/29/2020    CO2 25 01/29/2020    ANIONGAP 7 01/29/2020     (H) 01/29/2020    BUN 19 01/29/2020    CR 0.79 01/29/2020    GFRESTIMATED 63 09/08/2020    GFRESTBLACK 76 09/08/2020    J CARLOS 8.8 01/29/2020                  Thank you for allowing me to participate in the care of your patient.      Sincerely,     Ilda Sue PA-C     [facility]    cc:   No referring provider defined for this encounter.

## 2020-10-02 ENCOUNTER — DOCUMENTATION ONLY (OUTPATIENT)
Dept: CARDIOLOGY | Facility: CLINIC | Age: 65
End: 2020-10-02

## 2020-10-02 ENCOUNTER — TELEPHONE (OUTPATIENT)
Dept: CARDIOLOGY | Facility: CLINIC | Age: 65
End: 2020-10-02

## 2020-10-02 NOTE — TELEPHONE ENCOUNTER
Spoke to patient to see if she could come in on Monday before her COVID testing to have labs done for upcoming procedure.  Patient is able to do this.  Wellness screen completed.   Wellness Screening Tool    Symptom Screening:    Do you have one of the following NEW symptoms:      Fever (subjective or >100.0)?  No    New cough? No    Shortness of breath? No    Chills? No    New loss of taste or smell? No    Generalized body aches? No    New persistent headache? No    New sore throat? No    Nausea, vomiting or diarrhea? No    Within the past 2 weeks, have you been exposed to someone with a known positive illness below?      COVID - 19 (known or suspected) No    Chicken pox?  No    Measles? No    Pertussis? No    Have you had a positive COVID-19 diagnostic test (nasal swab test) in the last 14 days or are you currently   on self-quarantine restrictions (i.e.travel restriction, exposure, etc?) No        Patient notified of visitor restriction: Yes  Patient informed to wear a mask: Yes    Patient's appointment status: Patient will be seen in clinic 10/5/2020 for labs needed prior to WATCHMAN procedure.  KRISTOPHER Salas

## 2020-10-02 NOTE — PROGRESS NOTES
Kostas Jama! FYI  ** Optional CT 12 m for follow-up <4 mm nodules **    I saw Roxanna to go over her plans for Watchman. CT showed coronary calcifications so we're getting lipids (negative stress test 2016 and no c/o CP).    Also showed scattered <4 mm nodules. Low-risk = no follow-up. High-risk= optional CT @ 12 months.      ** She doesn't think she'll do this as she smoked <6 months and considers herself low risk.    Wanted you to be aware!  Monika ALEJANDRO

## 2020-10-05 ENCOUNTER — HOSPITAL ENCOUNTER (OUTPATIENT)
Dept: LAB | Facility: CLINIC | Age: 65
Discharge: HOME | DRG: 274 | End: 2020-10-05
Attending: INTERNAL MEDICINE | Admitting: INTERNAL MEDICINE
Payer: MEDICARE

## 2020-10-05 DIAGNOSIS — I48.0 PAROXYSMAL ATRIAL FIBRILLATION (H): ICD-10-CM

## 2020-10-05 DIAGNOSIS — Z11.59 ENCOUNTER FOR SCREENING FOR OTHER VIRAL DISEASES: ICD-10-CM

## 2020-10-05 LAB
ABO + RH BLD: NORMAL
ABO + RH BLD: NORMAL
ANION GAP SERPL CALCULATED.3IONS-SCNC: 4 MMOL/L (ref 3–14)
BASOPHILS # BLD AUTO: 0.1 10E9/L (ref 0–0.2)
BASOPHILS NFR BLD AUTO: 0.6 %
BLD GP AB SCN SERPL QL: NORMAL
BLOOD BANK CMNT PATIENT-IMP: NORMAL
BUN SERPL-MCNC: 12 MG/DL (ref 7–30)
CALCIUM SERPL-MCNC: 8.6 MG/DL (ref 8.5–10.1)
CHLORIDE SERPL-SCNC: 103 MMOL/L (ref 94–109)
CO2 SERPL-SCNC: 28 MMOL/L (ref 20–32)
CREAT SERPL-MCNC: 0.75 MG/DL (ref 0.52–1.04)
DIFFERENTIAL METHOD BLD: ABNORMAL
EOSINOPHIL # BLD AUTO: 0.3 10E9/L (ref 0–0.7)
EOSINOPHIL NFR BLD AUTO: 2.9 %
ERYTHROCYTE [DISTWIDTH] IN BLOOD BY AUTOMATED COUNT: 16.3 % (ref 10–15)
GFR SERPL CREATININE-BSD FRML MDRD: 84 ML/MIN/{1.73_M2}
GLUCOSE SERPL-MCNC: 103 MG/DL (ref 70–99)
HCT VFR BLD AUTO: 39.8 % (ref 35–47)
HGB BLD-MCNC: 12.4 G/DL (ref 11.7–15.7)
IMM GRANULOCYTES # BLD: 0 10E9/L (ref 0–0.4)
IMM GRANULOCYTES NFR BLD: 0.1 %
LYMPHOCYTES # BLD AUTO: 3.8 10E9/L (ref 0.8–5.3)
LYMPHOCYTES NFR BLD AUTO: 43.8 %
MCH RBC QN AUTO: 27.1 PG (ref 26.5–33)
MCHC RBC AUTO-ENTMCNC: 31.2 G/DL (ref 31.5–36.5)
MCV RBC AUTO: 87 FL (ref 78–100)
MONOCYTES # BLD AUTO: 0.6 10E9/L (ref 0–1.3)
MONOCYTES NFR BLD AUTO: 6.7 %
NEUTROPHILS # BLD AUTO: 4 10E9/L (ref 1.6–8.3)
NEUTROPHILS NFR BLD AUTO: 45.9 %
NRBC # BLD AUTO: 0 10*3/UL
NRBC BLD AUTO-RTO: 0 /100
PLATELET # BLD AUTO: 464 10E9/L (ref 150–450)
POTASSIUM SERPL-SCNC: 3.7 MMOL/L (ref 3.4–5.3)
RBC # BLD AUTO: 4.57 10E12/L (ref 3.8–5.2)
SODIUM SERPL-SCNC: 135 MMOL/L (ref 133–144)
SPECIMEN EXP DATE BLD: NORMAL
WBC # BLD AUTO: 8.7 10E9/L (ref 4–11)

## 2020-10-05 PROCEDURE — 36415 COLL VENOUS BLD VENIPUNCTURE: CPT | Performed by: INTERNAL MEDICINE

## 2020-10-05 PROCEDURE — 86850 RBC ANTIBODY SCREEN: CPT | Performed by: INTERNAL MEDICINE

## 2020-10-05 PROCEDURE — 86901 BLOOD TYPING SEROLOGIC RH(D): CPT | Performed by: INTERNAL MEDICINE

## 2020-10-05 PROCEDURE — 80048 BASIC METABOLIC PNL TOTAL CA: CPT | Performed by: INTERNAL MEDICINE

## 2020-10-05 PROCEDURE — 85025 COMPLETE CBC W/AUTO DIFF WBC: CPT | Performed by: INTERNAL MEDICINE

## 2020-10-05 PROCEDURE — U0003 INFECTIOUS AGENT DETECTION BY NUCLEIC ACID (DNA OR RNA); SEVERE ACUTE RESPIRATORY SYNDROME CORONAVIRUS 2 (SARS-COV-2) (CORONAVIRUS DISEASE [COVID-19]), AMPLIFIED PROBE TECHNIQUE, MAKING USE OF HIGH THROUGHPUT TECHNOLOGIES AS DESCRIBED BY CMS-2020-01-R: HCPCS | Performed by: INTERNAL MEDICINE

## 2020-10-05 PROCEDURE — 86900 BLOOD TYPING SEROLOGIC ABO: CPT | Performed by: INTERNAL MEDICINE

## 2020-10-06 LAB
SARS-COV-2 RNA SPEC QL NAA+PROBE: NOT DETECTED
SPECIMEN SOURCE: NORMAL

## 2020-10-07 ENCOUNTER — TELEPHONE (OUTPATIENT)
Dept: CARDIOLOGY | Facility: CLINIC | Age: 65
End: 2020-10-07

## 2020-10-07 DIAGNOSIS — I48.91 ATRIAL FIBRILLATION WITH RVR (H): Primary | ICD-10-CM

## 2020-10-07 RX ORDER — LIDOCAINE 40 MG/G
CREAM TOPICAL
Status: CANCELLED | OUTPATIENT
Start: 2020-10-07

## 2020-10-07 RX ORDER — POTASSIUM CHLORIDE 1500 MG/1
20 TABLET, EXTENDED RELEASE ORAL
Status: CANCELLED | OUTPATIENT
Start: 2020-10-07

## 2020-10-07 RX ORDER — CLINDAMYCIN PHOSPHATE 900 MG/50ML
900 INJECTION, SOLUTION INTRAVENOUS
Status: CANCELLED | OUTPATIENT
Start: 2020-10-07

## 2020-10-07 RX ORDER — METHYLPREDNISOLONE SODIUM SUCCINATE 125 MG/2ML
125 INJECTION, POWDER, LYOPHILIZED, FOR SOLUTION INTRAMUSCULAR; INTRAVENOUS
Status: CANCELLED | OUTPATIENT
Start: 2020-10-07

## 2020-10-07 RX ORDER — SODIUM CHLORIDE 9 MG/ML
1000 INJECTION, SOLUTION INTRAVENOUS CONTINUOUS
Status: CANCELLED | OUTPATIENT
Start: 2020-10-07

## 2020-10-07 RX ORDER — DIPHENHYDRAMINE HYDROCHLORIDE 50 MG/ML
50 INJECTION INTRAMUSCULAR; INTRAVENOUS
Status: CANCELLED | OUTPATIENT
Start: 2020-10-07

## 2020-10-07 RX ORDER — ACETAMINOPHEN 500 MG
500-1000 TABLET ORAL 3 TIMES DAILY PRN
COMMUNITY

## 2020-10-07 RX ORDER — ASPIRIN 81 MG/1
324 TABLET ORAL ONCE
Status: ON HOLD | COMMUNITY
End: 2020-10-09

## 2020-10-07 NOTE — PROGRESS NOTES
PTA medications updated by Medication Scribe prior to surgery via phone call with patient      -LAST DOSES ENTERED BY NURSE-    Comments:    Medication history sources: Patient, Surescripts and H&P  Medication history source reliability: Good  Adherence assessment: N/A Not Observed    Significant changes made to the medication list:  Patient taking meds differently than prescribed; See PTA entries for: Gabapentin      Additional medication history information:   None        Prior to Admission medications    Medication Sig Last Dose Taking? Auth Provider   acetaminophen (TYLENOL) 500 MG tablet Take 500-1,000 mg by mouth 3 times daily as needed for mild pain  at PRN Yes Reported, Patient   aspirin 81 MG EC tablet Take 324 mg by mouth once (take 4 x 81mg)  Per Pre-op instructions; then instructed to start 81mg once daily after surgery  Yes Reported, Patient   flecainide (TAMBOCOR) 150 MG tablet Take 1 tablet (150 mg) by mouth every 12 hours  Yes Ilda Sue PA-C   gabapentin (NEURONTIN) 300 MG capsule TAKE 1 CAPSULE BY MOUTH EVERY MORNING AND TAKE 2 CAPSULES BY MOUTH EVERY NIGHT AT BEDTIME  Patient taking differently: Take 900 mg by mouth every evening (takes 3 x 300mg)  at PM Yes Yuval Jama MD   levothyroxine (SYNTHROID/LEVOTHROID) 137 MCG tablet TAKE 1 TABLET (137 MCG) BY MOUTH DAILY  at AM Yes Yuval Jama MD   losartan (COZAAR) 100 MG tablet TAKE 1 TABLET BY MOUTH DAILY  Patient taking differently: Take 100 mg by mouth daily   at AM Yes Yuval Jama MD   metoprolol succinate ER (TOPROL-XL) 25 MG 24 hr tablet Take 0.5 tablets (12.5 mg) by mouth daily  at AM Yes Constantin Vaughn MD   pantoprazole (PROTONIX) 40 MG EC tablet TAKE 1 TABLET BY MOUTH TWICE A DAY BEFORE MEALS  Patient taking differently: Take 40 mg by mouth 2 times daily   Yes Yuval Jama MD   SUMAtriptan (IMITREX) 100 MG tablet Take 1 tablet (100 mg) by mouth at onset of headache for migraine May repeat in 2 hours if needed: max  2/day; MORE THAN 1 MONTH at PRN Yes Yuval Jama MD   triamterene-HCTZ (MAXZIDE-25) 37.5-25 MG tablet Take 1 tablet by mouth daily  at AM Yes Yuval Jama MD   order for DME DREAMSTATION  5-15 CM/H20  NASAL AIRFIT N10 HER   Reported, Patient

## 2020-10-07 NOTE — TELEPHONE ENCOUNTER
Spoke to patient regarding pre instructions for schedule WATCHMAN procedure scheduled for 10/8.  Patient aware that she is to be NPO after midnight and may take sips of water with am medications. Instructed to hold maxide.  Patient to arrive at 8:00am.  Patient aware that she will be staying overnight after procedure.   wellness screen completed.  Wellness Screening Tool    Symptom Screening:    Do you have one of the following NEW symptoms:      Fever (subjective or >100.0)?  No    New cough? No    Shortness of breath? No    Chills? No    New loss of taste or smell? No    Generalized body aches? No    New persistent headache? No    New sore throat? No    Nausea, vomiting or diarrhea? No    Within the past 2 weeks, have you been exposed to someone with a known positive illness below?      COVID - 19 (known or suspected) No    Chicken pox?  No    Measles? No    Pertussis? No    Have you had a positive COVID-19 diagnostic test (nasal swab test) in the last 14 days or are you currently   on self-quarantine restrictions (i.e.travel restriction, exposure, etc?) No        Patient notified of visitor restriction: Yes  Patient informed to wear a mask: Yes    Patient's appointment status: Patient scheduled for WATCHMAN procedure on 10/8.  COVID test negative.  KRISTOPHER Salas

## 2020-10-08 ENCOUNTER — HOSPITAL ENCOUNTER (OUTPATIENT)
Dept: CARDIOLOGY | Facility: CLINIC | Age: 65
DRG: 274 | End: 2020-10-08
Attending: INTERNAL MEDICINE | Admitting: INTERNAL MEDICINE
Payer: MEDICARE

## 2020-10-08 ENCOUNTER — ANESTHESIA EVENT (OUTPATIENT)
Dept: CARDIOLOGY | Facility: CLINIC | Age: 65
DRG: 274 | End: 2020-10-08
Payer: MEDICARE

## 2020-10-08 ENCOUNTER — ANESTHESIA (OUTPATIENT)
Dept: CARDIOLOGY | Facility: CLINIC | Age: 65
DRG: 274 | End: 2020-10-08
Payer: MEDICARE

## 2020-10-08 ENCOUNTER — HOSPITAL ENCOUNTER (INPATIENT)
Facility: CLINIC | Age: 65
Setting detail: SURGERY ADMIT
LOS: 1 days | Discharge: HOME | DRG: 274 | End: 2020-10-09
Attending: INTERNAL MEDICINE | Admitting: INTERNAL MEDICINE
Payer: MEDICARE

## 2020-10-08 DIAGNOSIS — I25.10 CORONARY ARTERY CALCIFICATION SEEN ON CAT SCAN: Primary | ICD-10-CM

## 2020-10-08 DIAGNOSIS — I48.0 PAROXYSMAL ATRIAL FIBRILLATION (H): ICD-10-CM

## 2020-10-08 DIAGNOSIS — I48.91 ATRIAL FIBRILLATION WITH RVR (H): ICD-10-CM

## 2020-10-08 LAB
CHOLEST SERPL-MCNC: 217 MG/DL
HDLC SERPL-MCNC: 46 MG/DL
KCT BLD-ACNC: 150 SEC (ref 75–150)
KCT BLD-ACNC: 365 SEC (ref 75–150)
LDLC SERPL CALC-MCNC: 106 MG/DL
NONHDLC SERPL-MCNC: 171 MG/DL
POTASSIUM SERPL-SCNC: 3.7 MMOL/L (ref 3.4–5.3)
TRIGL SERPL-MCNC: 325 MG/DL

## 2020-10-08 PROCEDURE — 80061 LIPID PANEL: CPT | Performed by: ANESTHESIOLOGY

## 2020-10-08 PROCEDURE — 250N000013 HC RX MED GY IP 250 OP 250 PS 637: Performed by: INTERNAL MEDICINE

## 2020-10-08 PROCEDURE — 85347 COAGULATION TIME ACTIVATED: CPT

## 2020-10-08 PROCEDURE — 250N000009 HC RX 250: Performed by: INTERNAL MEDICINE

## 2020-10-08 PROCEDURE — 258N000003 HC RX IP 258 OP 636: Performed by: INTERNAL MEDICINE

## 2020-10-08 PROCEDURE — 250N000011 HC RX IP 250 OP 636: Performed by: ANESTHESIOLOGY

## 2020-10-08 PROCEDURE — 258N000003 HC RX IP 258 OP 636: Performed by: NURSE ANESTHETIST, CERTIFIED REGISTERED

## 2020-10-08 PROCEDURE — 272N000002 HC OR SUPPLY OTHER OPNP: Performed by: INTERNAL MEDICINE

## 2020-10-08 PROCEDURE — 250N000009 HC RX 250: Performed by: ANESTHESIOLOGY

## 2020-10-08 PROCEDURE — 272N000001 HC OR GENERAL SUPPLY STERILE: Performed by: INTERNAL MEDICINE

## 2020-10-08 PROCEDURE — 93325 DOPPLER ECHO COLOR FLOW MAPG: CPT

## 2020-10-08 PROCEDURE — 370N000001 HC ANESTHESIA TECHNICAL FEE, 1ST 30 MIN: Performed by: INTERNAL MEDICINE

## 2020-10-08 PROCEDURE — 370N000002 HC ANESTHESIA TECHNICAL FEE, EACH ADDTL 15 MIN: Performed by: INTERNAL MEDICINE

## 2020-10-08 PROCEDURE — C1769 GUIDE WIRE: HCPCS | Performed by: INTERNAL MEDICINE

## 2020-10-08 PROCEDURE — 250N000011 HC RX IP 250 OP 636: Performed by: NURSE ANESTHETIST, CERTIFIED REGISTERED

## 2020-10-08 PROCEDURE — 93010 ELECTROCARDIOGRAM REPORT: CPT | Mod: 76 | Performed by: INTERNAL MEDICINE

## 2020-10-08 PROCEDURE — 271N000001 HC OR GENERAL SUPPLY NON-STERILE: Performed by: INTERNAL MEDICINE

## 2020-10-08 PROCEDURE — 258N000003 HC RX IP 258 OP 636: Performed by: ANESTHESIOLOGY

## 2020-10-08 PROCEDURE — C1894 INTRO/SHEATH, NON-LASER: HCPCS | Performed by: INTERNAL MEDICINE

## 2020-10-08 PROCEDURE — 250N000009 HC RX 250: Performed by: NURSE ANESTHETIST, CERTIFIED REGISTERED

## 2020-10-08 PROCEDURE — 02L73DK OCCLUSION OF LEFT ATRIAL APPENDAGE WITH INTRALUMINAL DEVICE, PERCUTANEOUS APPROACH: ICD-10-PCS | Performed by: INTERNAL MEDICINE

## 2020-10-08 PROCEDURE — 250N000013 HC RX MED GY IP 250 OP 250 PS 637: Performed by: ANESTHESIOLOGY

## 2020-10-08 PROCEDURE — 250N000011 HC RX IP 250 OP 636: Performed by: INTERNAL MEDICINE

## 2020-10-08 PROCEDURE — 36415 COLL VENOUS BLD VENIPUNCTURE: CPT | Performed by: ANESTHESIOLOGY

## 2020-10-08 PROCEDURE — 84132 ASSAY OF SERUM POTASSIUM: CPT | Performed by: ANESTHESIOLOGY

## 2020-10-08 PROCEDURE — 210N000002 HC R&B HEART CARE

## 2020-10-08 PROCEDURE — 250N000003 HC SEVOFLURANE, EA 15 MIN: Performed by: INTERNAL MEDICINE

## 2020-10-08 PROCEDURE — 93325 DOPPLER ECHO COLOR FLOW MAPG: CPT | Mod: 26 | Performed by: INTERNAL MEDICINE

## 2020-10-08 PROCEDURE — 93005 ELECTROCARDIOGRAM TRACING: CPT

## 2020-10-08 PROCEDURE — 93312 ECHO TRANSESOPHAGEAL: CPT | Mod: 26 | Performed by: INTERNAL MEDICINE

## 2020-10-08 PROCEDURE — 33340 PERQ CLSR TCAT L ATR APNDGE: CPT | Performed by: INTERNAL MEDICINE

## 2020-10-08 PROCEDURE — C1760 CLOSURE DEV, VASC: HCPCS | Performed by: INTERNAL MEDICINE

## 2020-10-08 PROCEDURE — 93320 DOPPLER ECHO COMPLETE: CPT | Mod: 26 | Performed by: INTERNAL MEDICINE

## 2020-10-08 RX ORDER — PANTOPRAZOLE SODIUM 40 MG/1
40 TABLET, DELAYED RELEASE ORAL 2 TIMES DAILY
Status: DISCONTINUED | OUTPATIENT
Start: 2020-10-08 | End: 2020-10-09 | Stop reason: HOSPADM

## 2020-10-08 RX ORDER — LIDOCAINE HYDROCHLORIDE 20 MG/ML
INJECTION, SOLUTION INFILTRATION; PERINEURAL PRN
Status: DISCONTINUED | OUTPATIENT
Start: 2020-10-08 | End: 2020-10-08

## 2020-10-08 RX ORDER — HEPARIN SODIUM 200 [USP'U]/100ML
100-500 INJECTION, SOLUTION INTRAVENOUS CONTINUOUS PRN
Status: DISCONTINUED | OUTPATIENT
Start: 2020-10-08 | End: 2020-10-08 | Stop reason: HOSPADM

## 2020-10-08 RX ORDER — NALOXONE HYDROCHLORIDE 0.4 MG/ML
.1-.4 INJECTION, SOLUTION INTRAMUSCULAR; INTRAVENOUS; SUBCUTANEOUS
Status: DISCONTINUED | OUTPATIENT
Start: 2020-10-08 | End: 2020-10-09 | Stop reason: HOSPADM

## 2020-10-08 RX ORDER — FENTANYL CITRATE 50 UG/ML
25-50 INJECTION, SOLUTION INTRAMUSCULAR; INTRAVENOUS
Status: DISCONTINUED | OUTPATIENT
Start: 2020-10-08 | End: 2020-10-09 | Stop reason: HOSPADM

## 2020-10-08 RX ORDER — LIDOCAINE 40 MG/G
CREAM TOPICAL
Status: DISCONTINUED | OUTPATIENT
Start: 2020-10-08 | End: 2020-10-08 | Stop reason: HOSPADM

## 2020-10-08 RX ORDER — DEXAMETHASONE SODIUM PHOSPHATE 4 MG/ML
4 INJECTION, SOLUTION INTRA-ARTICULAR; INTRALESIONAL; INTRAMUSCULAR; INTRAVENOUS; SOFT TISSUE
Status: DISCONTINUED | OUTPATIENT
Start: 2020-10-08 | End: 2020-10-09 | Stop reason: HOSPADM

## 2020-10-08 RX ORDER — EPHEDRINE SULFATE 50 MG/ML
INJECTION, SOLUTION INTRAMUSCULAR; INTRAVENOUS; SUBCUTANEOUS PRN
Status: DISCONTINUED | OUTPATIENT
Start: 2020-10-08 | End: 2020-10-08

## 2020-10-08 RX ORDER — ACETAMINOPHEN 500 MG
1000 TABLET ORAL ONCE
Status: COMPLETED | OUTPATIENT
Start: 2020-10-08 | End: 2020-10-08

## 2020-10-08 RX ORDER — FLECAINIDE ACETATE 50 MG/1
150 TABLET ORAL EVERY 12 HOURS
Status: DISCONTINUED | OUTPATIENT
Start: 2020-10-08 | End: 2020-10-09 | Stop reason: HOSPADM

## 2020-10-08 RX ORDER — PROPOFOL 10 MG/ML
INJECTION, EMULSION INTRAVENOUS PRN
Status: DISCONTINUED | OUTPATIENT
Start: 2020-10-08 | End: 2020-10-08

## 2020-10-08 RX ORDER — HEPARIN SODIUM 1000 [USP'U]/ML
INJECTION, SOLUTION INTRAVENOUS; SUBCUTANEOUS
Status: DISCONTINUED | OUTPATIENT
Start: 2020-10-08 | End: 2020-10-08 | Stop reason: HOSPADM

## 2020-10-08 RX ORDER — GLYCOPYRROLATE 0.2 MG/ML
INJECTION, SOLUTION INTRAMUSCULAR; INTRAVENOUS PRN
Status: DISCONTINUED | OUTPATIENT
Start: 2020-10-08 | End: 2020-10-08

## 2020-10-08 RX ORDER — SODIUM CHLORIDE, SODIUM LACTATE, POTASSIUM CHLORIDE, CALCIUM CHLORIDE 600; 310; 30; 20 MG/100ML; MG/100ML; MG/100ML; MG/100ML
INJECTION, SOLUTION INTRAVENOUS CONTINUOUS PRN
Status: DISCONTINUED | OUTPATIENT
Start: 2020-10-08 | End: 2020-10-08

## 2020-10-08 RX ORDER — ONDANSETRON 4 MG/1
4 TABLET, ORALLY DISINTEGRATING ORAL EVERY 30 MIN PRN
Status: DISCONTINUED | OUTPATIENT
Start: 2020-10-08 | End: 2020-10-09 | Stop reason: HOSPADM

## 2020-10-08 RX ORDER — CEFAZOLIN SODIUM 1 G/3ML
1 INJECTION, POWDER, FOR SOLUTION INTRAMUSCULAR; INTRAVENOUS
Status: DISCONTINUED | OUTPATIENT
Start: 2020-10-08 | End: 2020-10-08 | Stop reason: HOSPADM

## 2020-10-08 RX ORDER — ONDANSETRON 2 MG/ML
4 INJECTION INTRAMUSCULAR; INTRAVENOUS EVERY 30 MIN PRN
Status: DISCONTINUED | OUTPATIENT
Start: 2020-10-08 | End: 2020-10-09 | Stop reason: HOSPADM

## 2020-10-08 RX ORDER — HYDRALAZINE HYDROCHLORIDE 20 MG/ML
2.5-5 INJECTION INTRAMUSCULAR; INTRAVENOUS EVERY 10 MIN PRN
Status: DISCONTINUED | OUTPATIENT
Start: 2020-10-08 | End: 2020-10-09 | Stop reason: HOSPADM

## 2020-10-08 RX ORDER — HYDROMORPHONE HYDROCHLORIDE 1 MG/ML
.3-.5 INJECTION, SOLUTION INTRAMUSCULAR; INTRAVENOUS; SUBCUTANEOUS EVERY 5 MIN PRN
Status: DISCONTINUED | OUTPATIENT
Start: 2020-10-08 | End: 2020-10-09 | Stop reason: HOSPADM

## 2020-10-08 RX ORDER — OXYCODONE AND ACETAMINOPHEN 5; 325 MG/1; MG/1
1 TABLET ORAL EVERY 4 HOURS PRN
Status: DISCONTINUED | OUTPATIENT
Start: 2020-10-08 | End: 2020-10-09 | Stop reason: HOSPADM

## 2020-10-08 RX ORDER — POTASSIUM CHLORIDE 1500 MG/1
20 TABLET, EXTENDED RELEASE ORAL
Status: DISCONTINUED | OUTPATIENT
Start: 2020-10-08 | End: 2020-10-08 | Stop reason: HOSPADM

## 2020-10-08 RX ORDER — SODIUM CHLORIDE 9 MG/ML
1000 INJECTION, SOLUTION INTRAVENOUS CONTINUOUS
Status: DISCONTINUED | OUTPATIENT
Start: 2020-10-08 | End: 2020-10-08 | Stop reason: HOSPADM

## 2020-10-08 RX ORDER — DOBUTAMINE HYDROCHLORIDE 200 MG/100ML
5-40 INJECTION INTRAVENOUS CONTINUOUS PRN
Status: DISCONTINUED | OUTPATIENT
Start: 2020-10-08 | End: 2020-10-08 | Stop reason: HOSPADM

## 2020-10-08 RX ORDER — ACETAMINOPHEN 500 MG
500-1000 TABLET ORAL 3 TIMES DAILY PRN
Status: DISCONTINUED | OUTPATIENT
Start: 2020-10-08 | End: 2020-10-09 | Stop reason: HOSPADM

## 2020-10-08 RX ORDER — HEPARIN SODIUM 200 [USP'U]/100ML
100-600 INJECTION, SOLUTION INTRAVENOUS CONTINUOUS PRN
Status: DISCONTINUED | OUTPATIENT
Start: 2020-10-08 | End: 2020-10-08 | Stop reason: HOSPADM

## 2020-10-08 RX ORDER — DIPHENHYDRAMINE HYDROCHLORIDE 50 MG/ML
50 INJECTION INTRAMUSCULAR; INTRAVENOUS
Status: DISCONTINUED | OUTPATIENT
Start: 2020-10-08 | End: 2020-10-08 | Stop reason: HOSPADM

## 2020-10-08 RX ORDER — PROTAMINE SULFATE 10 MG/ML
INJECTION, SOLUTION INTRAVENOUS
Status: DISCONTINUED | OUTPATIENT
Start: 2020-10-08 | End: 2020-10-08 | Stop reason: HOSPADM

## 2020-10-08 RX ORDER — NEOSTIGMINE METHYLSULFATE 1 MG/ML
VIAL (ML) INJECTION PRN
Status: DISCONTINUED | OUTPATIENT
Start: 2020-10-08 | End: 2020-10-08

## 2020-10-08 RX ORDER — FENTANYL CITRATE 50 UG/ML
INJECTION, SOLUTION INTRAMUSCULAR; INTRAVENOUS PRN
Status: DISCONTINUED | OUTPATIENT
Start: 2020-10-08 | End: 2020-10-08

## 2020-10-08 RX ORDER — LABETALOL 20 MG/4 ML (5 MG/ML) INTRAVENOUS SYRINGE
10
Status: DISCONTINUED | OUTPATIENT
Start: 2020-10-08 | End: 2020-10-09 | Stop reason: HOSPADM

## 2020-10-08 RX ORDER — PROPOFOL 10 MG/ML
INJECTION, EMULSION INTRAVENOUS
Status: DISCONTINUED | OUTPATIENT
Start: 2020-10-08 | End: 2020-10-08

## 2020-10-08 RX ORDER — LOSARTAN POTASSIUM 100 MG/1
100 TABLET ORAL DAILY
Status: DISCONTINUED | OUTPATIENT
Start: 2020-10-09 | End: 2020-10-09 | Stop reason: HOSPADM

## 2020-10-08 RX ORDER — CLINDAMYCIN PHOSPHATE 900 MG/50ML
900 INJECTION, SOLUTION INTRAVENOUS
Status: COMPLETED | OUTPATIENT
Start: 2020-10-08 | End: 2020-10-08

## 2020-10-08 RX ORDER — SODIUM CHLORIDE 9 MG/ML
INJECTION, SOLUTION INTRAVENOUS CONTINUOUS
Status: DISCONTINUED | OUTPATIENT
Start: 2020-10-08 | End: 2020-10-09 | Stop reason: HOSPADM

## 2020-10-08 RX ORDER — METHYLPREDNISOLONE SODIUM SUCCINATE 125 MG/2ML
125 INJECTION, POWDER, LYOPHILIZED, FOR SOLUTION INTRAMUSCULAR; INTRAVENOUS
Status: DISCONTINUED | OUTPATIENT
Start: 2020-10-08 | End: 2020-10-08 | Stop reason: HOSPADM

## 2020-10-08 RX ORDER — KETOROLAC TROMETHAMINE 15 MG/ML
15 INJECTION, SOLUTION INTRAMUSCULAR; INTRAVENOUS EVERY 6 HOURS PRN
Status: DISCONTINUED | OUTPATIENT
Start: 2020-10-08 | End: 2020-10-09 | Stop reason: HOSPADM

## 2020-10-08 RX ORDER — GABAPENTIN 300 MG/1
900 CAPSULE ORAL EVERY EVENING
Status: DISCONTINUED | OUTPATIENT
Start: 2020-10-08 | End: 2020-10-09 | Stop reason: HOSPADM

## 2020-10-08 RX ORDER — SODIUM CHLORIDE, SODIUM LACTATE, POTASSIUM CHLORIDE, CALCIUM CHLORIDE 600; 310; 30; 20 MG/100ML; MG/100ML; MG/100ML; MG/100ML
INJECTION, SOLUTION INTRAVENOUS CONTINUOUS
Status: DISCONTINUED | OUTPATIENT
Start: 2020-10-08 | End: 2020-10-09 | Stop reason: HOSPADM

## 2020-10-08 RX ORDER — SUMATRIPTAN 50 MG/1
100 TABLET, FILM COATED ORAL
Status: DISCONTINUED | OUTPATIENT
Start: 2020-10-08 | End: 2020-10-09 | Stop reason: HOSPADM

## 2020-10-08 RX ORDER — TRIAMTERENE/HYDROCHLOROTHIAZID 37.5-25 MG
1 TABLET ORAL DAILY
Status: DISCONTINUED | OUTPATIENT
Start: 2020-10-08 | End: 2020-10-09 | Stop reason: HOSPADM

## 2020-10-08 RX ADMIN — LIDOCAINE HYDROCHLORIDE 100 MG: 20 INJECTION, SOLUTION INFILTRATION; PERINEURAL at 10:13

## 2020-10-08 RX ADMIN — Medication 5 MG: at 10:48

## 2020-10-08 RX ADMIN — PROPOFOL 180 MG: 10 INJECTION, EMULSION INTRAVENOUS at 10:13

## 2020-10-08 RX ADMIN — SODIUM CHLORIDE, POTASSIUM CHLORIDE, SODIUM LACTATE AND CALCIUM CHLORIDE: 600; 310; 30; 20 INJECTION, SOLUTION INTRAVENOUS at 10:06

## 2020-10-08 RX ADMIN — PANTOPRAZOLE SODIUM 40 MG: 40 TABLET, DELAYED RELEASE ORAL at 21:02

## 2020-10-08 RX ADMIN — ROCURONIUM BROMIDE 50 MG: 10 INJECTION INTRAVENOUS at 10:13

## 2020-10-08 RX ADMIN — SODIUM CHLORIDE, POTASSIUM CHLORIDE, SODIUM LACTATE AND CALCIUM CHLORIDE: 600; 310; 30; 20 INJECTION, SOLUTION INTRAVENOUS at 13:25

## 2020-10-08 RX ADMIN — Medication 4 MG: at 11:20

## 2020-10-08 RX ADMIN — KETOROLAC TROMETHAMINE 15 MG: 15 INJECTION, SOLUTION INTRAMUSCULAR; INTRAVENOUS at 21:01

## 2020-10-08 RX ADMIN — APIXABAN 5 MG: 5 TABLET, FILM COATED ORAL at 21:02

## 2020-10-08 RX ADMIN — PHENYLEPHRINE HYDROCHLORIDE 100 MCG: 10 INJECTION INTRAVENOUS at 10:32

## 2020-10-08 RX ADMIN — FENTANYL CITRATE 50 MCG: 50 INJECTION, SOLUTION INTRAMUSCULAR; INTRAVENOUS at 10:25

## 2020-10-08 RX ADMIN — SODIUM CHLORIDE 1000 ML: 9 INJECTION, SOLUTION INTRAVENOUS at 09:34

## 2020-10-08 RX ADMIN — CLINDAMYCIN PHOSPHATE 900 MG: 900 INJECTION, SOLUTION INTRAVENOUS at 10:21

## 2020-10-08 RX ADMIN — GLYCOPYRROLATE 0.8 MG: 0.2 INJECTION, SOLUTION INTRAMUSCULAR; INTRAVENOUS at 11:20

## 2020-10-08 RX ADMIN — FENTANYL CITRATE 50 MCG: 50 INJECTION, SOLUTION INTRAMUSCULAR; INTRAVENOUS at 10:13

## 2020-10-08 RX ADMIN — PHENYLEPHRINE HYDROCHLORIDE 100 MCG: 10 INJECTION INTRAVENOUS at 10:48

## 2020-10-08 RX ADMIN — GABAPENTIN 900 MG: 300 CAPSULE ORAL at 21:02

## 2020-10-08 RX ADMIN — Medication 5 MG: at 10:32

## 2020-10-08 RX ADMIN — FLECAINIDE ACETATE 150 MG: 50 TABLET ORAL at 21:02

## 2020-10-08 RX ADMIN — MIDAZOLAM 2 MG: 1 INJECTION INTRAMUSCULAR; INTRAVENOUS at 10:06

## 2020-10-08 RX ADMIN — Medication 5 MG: at 10:41

## 2020-10-08 RX ADMIN — ACETAMINOPHEN 1000 MG: 500 TABLET, FILM COATED ORAL at 12:29

## 2020-10-08 RX ADMIN — PHENYLEPHRINE HYDROCHLORIDE 100 MCG: 10 INJECTION INTRAVENOUS at 10:28

## 2020-10-08 ASSESSMENT — LIFESTYLE VARIABLES: TOBACCO_USE: 1

## 2020-10-08 ASSESSMENT — ENCOUNTER SYMPTOMS: DYSRHYTHMIAS: 1

## 2020-10-08 ASSESSMENT — ACTIVITIES OF DAILY LIVING (ADL)
ADLS_ACUITY_SCORE: 12
ADLS_ACUITY_SCORE: 13
DRESSING/BATHING_DIFFICULTY: NO

## 2020-10-08 ASSESSMENT — MIFFLIN-ST. JEOR: SCORE: 1743.38

## 2020-10-08 NOTE — PROGRESS NOTES
65-year-old lady with a history of hypertension, paroxysmal atrial fibrillation and  recurrent GI bleed, who was referred for Watchman procedure to minimize long term risk of bleeding associated with chronic oral anticoagulation.    Her CHADS-Vasc is 3 and she will undergo FRIDA prior to procedure.  CT prior to ablation showed an ADDISON measuring 18/23X44 mm (baseX apex) with anterior location.    Of note, options were discussed including do nothing, oral anticoagulation or Watchman procedure.  Patient understand the options in details.  She understands the anticoagulation protocol after Watchman procedure and that there is 3-4% risk of complication associated with procedure. Consent was signed.    Constantin Vaughn MD

## 2020-10-08 NOTE — PROVIDER NOTIFICATION
MD Notification    Notified Person: on call cardiologist    Notified Person Name:  Miky    Notification Date/Time: 10/8/2020 6421    Notification Interaction: answering service, phone call    Purpose of Notification: pt experiencing 6/10 chest discomfort post-Watchman procedure. Please advise.    Orders Received: Will enter orders for anti-inflammatory and PTA meds.    Comments:

## 2020-10-08 NOTE — ADDENDUM NOTE
Addendum  created 10/08/20 1347 by Rosa M Laws APRN CRNA    Clinical Note Signed, Intraprocedure Blocks edited

## 2020-10-08 NOTE — PRE-PROCEDURE
GENERAL PRE-PROCEDURE:   Procedure:  Watchman  Date/Time:  10/8/2020 9:38 AM    Written consent obtained?: Yes    Risks and benefits: Risks, benefits and alternatives were discussed    Consent given by:  Patient  Patient states understanding of procedure being performed: Yes    Patient's understanding of procedure matches consent: Yes    Procedure consent matches procedure scheduled: Yes    Expected level of sedation:  Moderate  Appropriately NPO:  Yes  ASA Class:  Class 3- Severe systemic disease, definite functional limitations  Mallampati  :  Grade 2- soft palate, base of uvula, tonsillar pillars, and portion of posterior pharyngeal wall visible  Lungs:  Lungs clear with good breath sounds bilaterally  Heart:  Normal heart sounds and rate  History & Physical reviewed:  History and physical reviewed and no updates needed  Statement of review:  I have reviewed the lab findings, diagnostic data, medications, and the plan for sedation

## 2020-10-08 NOTE — ANESTHESIA PREPROCEDURE EVALUATION
Anesthesia Pre-Procedure Evaluation    Patient: Maxine Sears   MRN: [id number] : 1955          Preoperative Diagnosis: WATCHMAN    Procedure(s):  EP Left Atrial Appendage Closure    Past Medical History:   Diagnosis Date     Depression       Duodenal ulcer 2019     Essential hypertension       GERD (gastroesophageal reflux disease)      Hiatal hernia      Hypothyroidism      Migraine without aura and without status migrainosus, not intractable  Aug 2016     Obesity      NELY on CPAP      Osteoarthritis      Paroxysmal atrial fibrillation (H) 2015     RLS (restless legs syndrome)      Symptomatic menopausal or female climacteric states (aka FLASHES)      TMJ disease      Past Surgical History:   Procedure Laterality Date     APPENDECTOMY       CHOLECYSTECTOMY       COLONOSCOPY N/A 2019    Procedure: COLONOSCOPY;  Surgeon: Vahid Cardona MD;  Location:  GI     ESOPHAGOSCOPY, GASTROSCOPY, DUODENOSCOPY (EGD), COMBINED N/A 2019    Procedure: COMBINED ESOPHAGOSCOPY, GASTROSCOPY, DUODENOSCOPY (EGD);  Surgeon: Ben Ko MD;  Location:  GI     HYSTERECTOMY, [facility]  1999     TONSILLECTOMY         Anesthesia Evaluation     . Pt has had prior anesthetic.     No history of anesthetic complications          ROS/MED HX    ENT/Pulmonary:     (+)sleep apnea, tobacco use, Past use uses CPAP , . .    Neurologic: Comment: Restless leg syndrome    (+)migraines,     Cardiovascular:     (+) Dyslipidemia, hypertension----. : . . . :. dysrhythmias a-fib, .       METS/Exercise Tolerance:     Hematologic:         Musculoskeletal:   (+) arthritis,  -       GI/Hepatic:     (+) GERD Asymptomatic on medication, hiatal hernia, Other GI/Hepatic duodenal ulcer      Renal/Genitourinary:  - ROS Renal section negative       Endo:     (+) thyroid problem hypothyroidism, Obesity, .   (-) Type II DM   Psychiatric:     (+) psychiatric history depression      Infectious Disease:         Malignancy:  "        Other:                          Physical Exam  Normal systems: pulmonary    Airway   Mallampati: II  TM distance: >3 FB  Neck ROM: full    Dental   (+) other  Comment: Broken tooth    Cardiovascular   Rhythm and rate: irregular      Pulmonary             Lab Results   Component Value Date    WBC 8.7 10/05/2020    HGB 12.4 10/05/2020    HCT 39.8 10/05/2020     (H) 10/05/2020     10/05/2020    POTASSIUM 3.7 10/05/2020    CHLORIDE 103 10/05/2020    CO2 28 10/05/2020    BUN 12 10/05/2020    CR 0.75 10/05/2020     (H) 10/05/2020    J CARLOS 8.6 10/05/2020    MAG 2.1 01/21/2020    ALBUMIN 3.6 01/21/2020    PROTTOTAL 7.7 01/21/2020    ALT 18 01/21/2020    AST 19 01/21/2020    ALKPHOS 113 01/21/2020    BILITOTAL 0.4 01/21/2020    INR 1.78 (H) 03/02/2019    TSH 0.79 06/12/2020    T4 1.15 01/29/2020       Preop Vitals  BP Readings from Last 3 Encounters:   10/01/20 122/76   09/08/20 (!) 159/84   06/18/20 128/80    Pulse Readings from Last 3 Encounters:   10/01/20 61   09/08/20 59   06/12/20 83      Resp Readings from Last 3 Encounters:   06/12/20 16   02/10/20 16   01/22/20 18    SpO2 Readings from Last 3 Encounters:   10/01/20 97%   06/12/20 95%   02/10/20 98%      Temp Readings from Last 1 Encounters:   06/12/20 36.8  C (98.3  F) (Temporal)    Ht Readings from Last 1 Encounters:   10/01/20 1.626 m (5' 4\")      Wt Readings from Last 1 Encounters:   10/01/20 120.5 kg (265 lb 9.6 oz)    Estimated body mass index is 45.59 kg/m  as calculated from the following:    Height as of 10/1/20: 1.626 m (5' 4\").    Weight as of 10/1/20: 120.5 kg (265 lb 9.6 oz).       Anesthesia Plan      History & Physical Review  History and physical reviewed and following examination; no interval change.    ASA Status:  3 .    NPO Status:  > 8 hours    Plan for General with Intravenous and Propofol induction. Maintenance will be Balanced.    PONV prophylaxis:  Ondansetron (or other 5HT-3)  Additional equipment: Videolaryngoscope "        Postoperative Care  Postoperative pain management:  Multi-modal analgesia.      Consents  Anesthetic plan, risks, benefits and alternatives discussed with:  Patient..                 Aly Wong MD

## 2020-10-08 NOTE — OR NURSING
Spoke with Dr Wong and Massiel from [facility] - no need to replace K of 3.7

## 2020-10-08 NOTE — ANESTHESIA CARE TRANSFER NOTE
Patient: Maxine Sears    Procedure(s):  EP Left Atrial Appendage Closure    Diagnosis: WATCHMAN  Diagnosis Additional Information: No value filed.    Anesthesia Type:   General     Note:  Airway :Face Mask  Patient transferred to:PACU  Comments: Transferred to PACU, spontaneous respirations, 10L oxygen via facemask.  All monitors and alarms on and functioning, VSS.  Patient awake, comfortable.  Report to PACU RN.Handoff Report: Identifed the Patient, Identified the Reponsible Provider, Reviewed the pertinent medical history, Discussed the surgical course, Reviewed Intra-OP anesthesia mangement and issues during anesthesia, Set expectations for post-procedure period and Allowed opportunity for questions and acknowledgement of understanding      Vitals: (Last set prior to Anesthesia Care Transfer)    CRNA VITALS  10/8/2020 1102 - 10/8/2020 1139      10/8/2020             Pulse:  84    SpO2:  93 %                Electronically Signed By: LUPE Angelo CRNA  October 8, 2020  11:39 AM

## 2020-10-08 NOTE — ANESTHESIA POSTPROCEDURE EVALUATION
Patient: Maxine Sears    Procedure(s):  EP Left Atrial Appendage Closure    Diagnosis:WATCHMAN  Diagnosis Additional Information: No value filed.    Anesthesia Type:  General    Note:  Anesthesia Post Evaluation    Patient location during evaluation: PACU  Patient participation: Able to fully participate in evaluation  Level of consciousness: awake and alert  Pain management: adequate  Airway patency: patent  Cardiovascular status: acceptable  Respiratory status: acceptable and unassisted  Hydration status: acceptable  PONV: none             Last vitals:  Vitals:    10/08/20 1240 10/08/20 1257 10/08/20 1300   BP: (!) 144/64     Pulse: 73     Resp: 11     Temp:      SpO2: 96% 96% 91%         Electronically Signed By: Aly Wong MD  October 8, 2020  1:27 PM

## 2020-10-09 ENCOUNTER — APPOINTMENT (OUTPATIENT)
Dept: GENERAL RADIOLOGY | Facility: CLINIC | Age: 65
DRG: 274 | End: 2020-10-09
Attending: INTERNAL MEDICINE
Payer: MEDICARE

## 2020-10-09 ENCOUNTER — TELEPHONE (OUTPATIENT)
Dept: CARDIOLOGY | Facility: CLINIC | Age: 65
End: 2020-10-09

## 2020-10-09 ENCOUNTER — PATIENT OUTREACH (OUTPATIENT)
Dept: CARE COORDINATION | Facility: CLINIC | Age: 65
End: 2020-10-09

## 2020-10-09 VITALS
HEART RATE: 69 BPM | WEIGHT: 274.2 LBS | SYSTOLIC BLOOD PRESSURE: 119 MMHG | HEIGHT: 63 IN | OXYGEN SATURATION: 93 % | RESPIRATION RATE: 16 BRPM | TEMPERATURE: 98.8 F | DIASTOLIC BLOOD PRESSURE: 57 MMHG | BODY MASS INDEX: 48.58 KG/M2

## 2020-10-09 DIAGNOSIS — I48.0 PAROXYSMAL ATRIAL FIBRILLATION (H): Primary | ICD-10-CM

## 2020-10-09 LAB
INTERPRETATION ECG - MUSE: NORMAL
INTERPRETATION ECG - MUSE: NORMAL

## 2020-10-09 PROCEDURE — 999N000065 XR CHEST 2 VW

## 2020-10-09 PROCEDURE — 258N000003 HC RX IP 258 OP 636: Performed by: ANESTHESIOLOGY

## 2020-10-09 PROCEDURE — 250N000013 HC RX MED GY IP 250 OP 250 PS 637: Performed by: INTERNAL MEDICINE

## 2020-10-09 PROCEDURE — 99238 HOSP IP/OBS DSCHRG MGMT 30/<: CPT | Performed by: PHYSICIAN ASSISTANT

## 2020-10-09 RX ORDER — ROSUVASTATIN CALCIUM 20 MG/1
20 TABLET, COATED ORAL DAILY
Qty: 30 TABLET | Refills: 0 | Status: SHIPPED | OUTPATIENT
Start: 2020-10-09 | End: 2020-11-06

## 2020-10-09 RX ADMIN — FLECAINIDE ACETATE 150 MG: 50 TABLET ORAL at 08:29

## 2020-10-09 RX ADMIN — APIXABAN 5 MG: 5 TABLET, FILM COATED ORAL at 08:29

## 2020-10-09 RX ADMIN — PANTOPRAZOLE SODIUM 40 MG: 40 TABLET, DELAYED RELEASE ORAL at 08:29

## 2020-10-09 RX ADMIN — TRIAMTERENE AND HYDROCHLOROTHIAZIDE 1 TABLET: 37.5; 25 TABLET ORAL at 00:21

## 2020-10-09 RX ADMIN — LEVOTHYROXINE SODIUM 137 MCG: 112 TABLET ORAL at 06:44

## 2020-10-09 RX ADMIN — LOSARTAN POTASSIUM 100 MG: 100 TABLET, FILM COATED ORAL at 08:29

## 2020-10-09 RX ADMIN — ACETAMINOPHEN 1000 MG: 500 TABLET, FILM COATED ORAL at 06:52

## 2020-10-09 RX ADMIN — METOPROLOL SUCCINATE 12.5 MG: 25 TABLET, EXTENDED RELEASE ORAL at 08:29

## 2020-10-09 RX ADMIN — TRIAMTERENE AND HYDROCHLOROTHIAZIDE 1 TABLET: 37.5; 25 TABLET ORAL at 08:29

## 2020-10-09 ASSESSMENT — ACTIVITIES OF DAILY LIVING (ADL)
ADLS_ACUITY_SCORE: 13
ADLS_ACUITY_SCORE: 12
ADLS_ACUITY_SCORE: 13
DEPENDENT_IADLS:: CLEANING;TRANSPORTATION

## 2020-10-09 ASSESSMENT — MIFFLIN-ST. JEOR: SCORE: 1757.89

## 2020-10-09 NOTE — PROGRESS NOTES
Cardiology on call note    Called for patient having pleuritic chest pain, also worse with sitting up. Watchman device implant earlier today.     Vitals reviewed. Stable hemodynamics. No tachycardia or hypotension.     Probably some pericardial irritation from the procdure. Discussed with Dr. Vaughn. Procedure was simple and uncomplicated.     Will give Ketorolac 15 mg IV q 6 h and watch for signs of pericardial effusion: tachycardia, hypotension, dyspnea.     Home meds restarted.     Casimiro Bolaños MD

## 2020-10-09 NOTE — PROGRESS NOTES
Care Management Discharge Note    Discharge Planning:  Expected Discharge Date: 10/09/20     Concerns to be Addressed:         Anticipated Discharge Disposition:    Anticipated Discharge Services:    Anticipated Discharge DME:      Patient/family educated on Medicare website which has current facility and service quality ratings:    Referrals Placed by CM/SW:    Education Provided on the Discharge Plan:    Patient/Family in Agreement with the Plan:       Disposition Comments:      Selected Continued Care - Admitted Since 10/8/2020    No services have been selected for the patient.         Additional Information:        Mallory Uamnzor RN

## 2020-10-09 NOTE — DISCHARGE INSTRUCTIONS
Left Atrial Appendage Closure Discharge Instructions    After you go home:    Have an adult stay with you until tomorrow.    You may eat your normal diet, unless your doctor tells you otherwise.    Increase fluid intake for two days.    Continue to use Incentive Spirometer, if given to you, 4-5 times every two hours while awake for 2 days, then throw away.       For 24-48 hours (due to the sedation you received):     Do NOT make any important or legal decisions.    Do NOT drive or operate machines at home or at work.    Do NOT drink alcohol.    Care of Puncture Site:    Check the R groin puncture site every 1-2 hours while awake.    For 2-3 days, when you cough, sneeze, laugh or move your bowels, hold your hand over the R groin puncture site and press firmly.    Remove the band aid after 24 hours. If there is minor oozing, apply another band aid and remove it after 12 hours.    It is normal to have a small bruise or pea size lump at the site.    Ok to use ice packs at groin sites 20 minutes at a time for groin discomfort    You may shower. Do NOT take a bath, or use a hot tub or pool until groin site heals, which may take up to a week.  Do NOT scrub the site. Do not use lotion or powder near the puncture site.    Activity:    Do NOT lift, push or pull more than 10 pounds for 5 days.    NO repetitive motions such as loading  or vacuuming.     NO jogging, exercise classes, sports or vigorous activities for at least 5-7 days.    JUST relax and take it easy for 5 days.     REMINDER:  DO NOT DRIVE FOR 2-3 DAYS!    Bleeding:    If you start bleeding from the groin site, lie down flat and press firmly on the site for 10 minutes or until bleeding stops.     Once bleeding stops lay flat for 2 hours.        Go to ER or Call 911 right away if you have heavy bleeding or with bleeding that does not stop.    Medications:    You will be starting XARELTO 20 mg daily with supper x 45 days. Rx sent to hospital pharmacy    I  have also prescribed rosuvastatin 20 mg for cholesterol. Rx sent to hospital pharmacy     If you have pain, you may take Tylenol (acetaminophen).     DENTAL VISITS: you will need antibiotics before you have a dental procedure for 6 months after Watchman is placed.        Call the Watchman Care Coordinator (Massiel or Krystal) if:    Having Chest pain    Shortness of Breath    You have increased pain or a large or growing hard lump around the site.    The site is red, swollen, hot or tender.    Blood or fluid is draining from the site.    You have chills or a fever greater than 101 F (38 C).    Your leg feels numb, cool or changes color.    If groin pain is not relieved by Advil or Tylenol.    Any questions or concerns.    Call 911 for signs/symptoms of stroke:    Visual disturbance    Problems with talking    Smile only occurs on half your face    Numbness on one side    Sudden headache    Confusion    Problems with walking or balance.       Follow Up Appointments:    10/23/2020 at 1:30pm you need to have your labs done.  A hemaglobin will be drawn    10/23/2020 at 1:50pm with JAVON Mcrae in [place]    Your next appointment will be a 45 day follow up.  A FRIDA (tranesophageal echocardiogram)  will be needed prior to this appointment (this is needed to see how the device is seated in the left atrial appendage).  This appointment can be set up after your 2 week follow up.       [facility]: Nitish RN   [phone] (Mon-Fri, 8:00-5:00)  or    call [phone] for on-call Cardiologist after clinic hours  (7 days a week).

## 2020-10-09 NOTE — PLAN OF CARE
VSS. Tele: SR. Chest pressure improving. Right groin CDI. No bruit noted, good CMS. Medications filled here and sent with patient. All questions and concerns addressed. Pt brought down to door 6 via WC where family picked her up. Pt states that she has all of her belongings.

## 2020-10-09 NOTE — PLAN OF CARE
[id number]. A&O x 4. Neuros intact. Patient c/o of chest pressure (w/o EKG changes), cardiology believes it is pericarditis. Toradol given for pain, pt resting comfortably. Up with SBA. Tele: SR. Right groin site, WDL. Plan for potential discharge tomorrow. Continue to Monitor.

## 2020-10-09 NOTE — PROGRESS NOTES
Clinic Care Coordination Contact  Ambulatory Care Coordination to Inpatient Care Management   Hand-In Communication    Date:  October 9, 2020  Name: Maxine Sears is enrolled in Ambulatory Care Coordination program and I am the Lead Care Coordinator.  CC Contact Information: Epic InMemorightsket + phone  Payor Source: Payor: MEDICARE / Plan: MEDICARE / Product Type: Medicare /   Current services in place: Housekeeping/Chore Agency   Please see the CC Snaphot and Care Management Flowsheets for specific details of this Maxine Sears care plan.   Additional details/specific concerns r/t this admission:    No additional concerns at this time    I will follow this admission in Epic. Please feel free to contact me with questions or for further collaboration in discharge planning.    Damien Person RN  Clinic Care Coordinator  [facility]  Ph: [phone]

## 2020-10-09 NOTE — PLAN OF CARE
Pt is A&O. Please see provider notification note re: chest pain. Tele shows SR with 1st degree AVB. R groin is unchanged and WDL. CMS to RLE intact. Pt is up to bathroom with SBA. Plan is for discharge tomorrow if CP resolved.

## 2020-10-09 NOTE — DISCHARGE SUMMARY
[facility]    Discharge Summary  Electrophysiology    Date of Admission:  10/8/2020  Date of Discharge:  10/9/2020  Discharging Provider: Ilda Sue  Date of Service (when I saw the patient): 10/09/20    Discharge Diagnoses   1. Paroxysmal AFib, CHADSVASc 3, HASBLED 2   S/p 27 mm Left Atrial Appendage Occluder device 10/8/2020 with Dr. Vaughn and Dr. Toth  2. H/o GI Bleeds 3/2019, 4/2019 necessitating discontinuation of Xarelto therapy.   3. HTN  4. Coronary Calcifications  5. Dyslipidemia - Crestor started  6. Pulmonary Nodules    History of Present Illness   66 y/o F who sees Dr. Vaughn for her h/o;    1. Paroxysmal AFib first diagnosed 9/2015.  S/p DCCV, most recently 2/2019.  Hospitalized 1/2020 and flecainide increased.  2. H/o GI bleeds. Had Hgb 7.2 g/dL 3/2019. EGD revealed duodenal ulcer which was cauterized at that time.  Xarelto was held, but then later restarted.  She was rehospitalized 4/2019 with a lower GI bleed.  Hemoglobin got down to 8.7 g/dL.  Xarelto was discontinued. Capsule endoscopy 5/2019 showed normal mucosa but did not reach the colon (Dr. Ko). We restarted Xarelto 20 mg daily 10/2019 but Hgb dropped >1 gram in ~1 month. No anticoagulation despite CHADSVASc 2 (HTN, sex). Dr. Ko (GI) has OK'd use of short term AC   3. Benign essential hypertension with recent med changes   4. Coronary Calcification noted on CTA Heart 9/2020. Stress Test 3/2016 without ischemia  5. Pulmonary Nodules noted on CTA Heart 9/2020 - low risk - no Follow-up; high risk - optional 12 m CT follow-up. Roxanna states she considers herself low risk as smoked for <6 months    Hospital Course   Maxine Sears was admitted on 10/8/2020.  The following problems were addressed during her hospitalization:    1. Paroxysmal AFib, CHADSVASc 3, HASBLED 2 -  - Remains on flecainide 150 mg q 12 hours. EKG OK with QRS duration 104 ms  - Has been on NO AC d/t above h/o GI bleeding  - Now  s/p 27 mm Watchman with Dr. Vaughn and Dr. Toth 10/8/2020  - FRIDA prior ruled out intra-atrial clots    - Had some pain following, worse with laying down and deep breaths. Improved this AM, but still notes this     PLAN:   1. Xarelto 20 mg daily (called [facility] to NOT fill the Eliquis 5 mg BID that I had initially sent down based on insurance coverage)   2. Stick with Tylenol for pain if possible given use of ASA/Xarelto and h/o GI Bleed   3. Follow-up 10/23 as planned    2. HTN -  - PTA on metoprolol XL 12.5 mg daily, Maxzide 37.5/25 and losartan 100. Maxzide held 10/8   - BPs 90-130s     PLAN:   1. Continue PTA meds    3. Coronary Calcification  - Noted on CT Scan  - Last stress test 2016 without ischemia. Echo 1/2020 with normal EF  - Remains on BB & ARB  - Has controlled BP. No tobacco for years. Strong FHx for CAD/CVA     Component      Latest Ref Rng & Units 3/13/2019 10/8/2020   Cholesterol      <200 mg/dL 172 217 (H)   Triglycerides      <150 mg/dL 212 (H) 325 (H)   HDL Cholesterol      >49 mg/dL 45 (L) 46 (L)   LDL Cholesterol Calculated      <100 mg/dL 85 106 (H)   Non HDL Cholesterol      <130 mg/dL 127 171 (H)     PLAN:  1. Start Crestor 20 mg daily  2. TLC for trigs     4. Pulmonary Nodules  - Scattered, <4 mm.  Optional 12 m follow-up and she doesn't think she'll do this as she smoke <6 months and considers herself low risk     PLAN:  1. Dr. Jama and pt aware    Ilda Sue, TINA, PA-C    Significant Results and Procedures   FRIDA  27 mm Left Atrial Appendage Occluder Device     Code Status   Full Code    Primary Care Physician   Yuval Jama    Physical Exam                      Vitals:    10/08/20 0908 10/09/20 0610   Weight: 122.9 kg (271 lb) 124.4 kg (274 lb 3.2 oz)     Vital Signs with Ranges     No intake/output data recorded.    Constitutional: awake, alert, cooperative, no apparent distress, and appears stated age  Eyes: Sclera normal. Lids/lashes normal  ENT: Normocephalic,  atraumatic  Respiratory: CTA B. Slightly diminished R base  Cardiovascular: Regular. No rub noted  GI: Obese. BS present  Skin: No sig bruising  Musculoskeletal: No edema. R groin site c/d bandage. No pain  Neurologic: Alert, oriented    Time Spent on this Encounter   Ilda MORALES PA-C, personally saw the patient today and spent less than or equal to 30 minutes discharging this patient.    Discharge Disposition   Discharged to home  Condition at discharge: Stable    Consultations This Hospital Stay   PHARMACY LIAISON FOR MEDICATION COVERAGE CONSULT    Discharge Orders      Reason for your hospital stay    Implantation of Left Atrial Appendage Occluder (Watchman) Device 10/8/2020     Follow-up and recommended labs and tests     See Patient Instructions     Activity    See Patient Instructions     When to contact your care team    See Patient Instructions     Wound care and dressings    See Patient Instructions     Diet    See Patient Instructions     Discharge Medications   Discharge Medication List as of 10/9/2020  9:49 AM      START taking these medications    Details   rivaroxaban ANTICOAGULANT (XARELTO ANTICOAGULANT) 20 MG TABS tablet Take 1 tablet (20 mg) by mouth daily (with dinner), Disp-30 tablet, R-0, E-Prescribe      rosuvastatin (CRESTOR) 20 MG tablet Take 1 tablet (20 mg) by mouth daily, Disp-30 tablet, R-0, E-Prescribe         CONTINUE these medications which have CHANGED    Details   aspirin (ASA) 81 MG EC tablet Take 1 tablet (81 mg) by mouth daily, OTC         CONTINUE these medications which have NOT CHANGED    Details   acetaminophen (TYLENOL) 500 MG tablet Take 500-1,000 mg by mouth 3 times daily as needed for mild pain, Historical      flecainide (TAMBOCOR) 150 MG tablet Take 1 tablet (150 mg) by mouth every 12 hours, Disp-60 tablet, R-11, E-PrescribeReplaces previous Rx for flecainide      levothyroxine (SYNTHROID/LEVOTHROID) 137 MCG tablet TAKE 1 TABLET (137 MCG) BY MOUTH DAILY,  Disp-28 tablet, R-10, E-PrescribeMaximum Refills Reached      losartan (COZAAR) 100 MG tablet TAKE 1 TABLET BY MOUTH DAILY, Disp-90 tablet, R-3, E-PrescribeMaximum Refills Reached      metoprolol succinate ER (TOPROL-XL) 25 MG 24 hr tablet Take 0.5 tablets (12.5 mg) by mouth daily, Disp-30 tablet, R-3, E-Prescribe      pantoprazole (PROTONIX) 40 MG EC tablet TAKE 1 TABLET BY MOUTH TWICE A DAY BEFORE MEALS, Disp-180 tablet, R-1, E-PrescribeMaximum Refills Reached      triamterene-HCTZ (MAXZIDE-25) 37.5-25 MG tablet Take 1 tablet by mouth daily, Disp-90 tablet, R-3, E-PrescribeNote dose change. Stop RFs of the 75/50mg tabs      gabapentin (NEURONTIN) 300 MG capsule TAKE 1 CAPSULE BY MOUTH EVERY MORNING AND TAKE 2 CAPSULES BY MOUTH EVERY NIGHT AT BEDTIME, Disp-270 capsule, R-1, E-PrescribeMaximum Refills Reached      order for DME DREAMSTATION  5-15 CM/H20  NASAL AIRFIT N10 HERHistorical      SUMAtriptan (IMITREX) 100 MG tablet Take 1 tablet (100 mg) by mouth at onset of headache for migraine May repeat in 2 hours if needed: max 2/day;, Disp-10 tablet, R-11, E-PrescribeProfile only. Pt doesn't require refill at this time         STOP taking these medications       apixaban ANTICOAGULANT (ELIQUIS) 5 MG tablet Comments:   Reason for Stopping:             Allergies   Allergies   Allergen Reactions     Morphine Anaphylaxis     Ceclor [Cefaclor] Hives     Diltiazem Hives     Lisinopril Cough     Sertraline Nausea and Vomiting     Data   Most Recent 3 CBC's:  Recent Labs   Lab Test 10/23/20  1235 10/05/20  1144 06/12/20  1048 01/29/20  0953   WBC  --  8.7 9.6 11.1*   HGB 11.7 12.4 11.9 12.0   MCV  --  87 84 84   PLT  --  464* 468* 456*      Most Recent 3 BMP's:  Recent Labs   Lab Test 10/08/20  0840 10/05/20  1144 01/29/20  0953 01/22/20  0701   NA  --  135 136 136   POTASSIUM 3.7 3.7 4.3 3.2*   CHLORIDE  --  103 104 105   CO2  --  28 25 24   BUN  --  12 19 14   CR  --  0.75 0.79 0.72   ANIONGAP  --  4 7 7   J CARLOS  --  8.6 8.8  8.8   GLC  --  103* 109* 130*     Most Recent 2 LFT's:  Recent Labs   Lab Test 01/21/20  1125 04/10/19  1622   AST 19 14   ALT 18 19   ALKPHOS 113 101   BILITOTAL 0.4 0.2     Most Recent INR's and Anticoagulation Dosing History:  Anticoagulation Dose History     Recent Dosing and Labs Latest Ref Rng & Units 3/2/2019    INR 0.86 - 1.14 1.78(H)        Most Recent 3 Troponin's:  Recent Labs   Lab Test 01/21/20 2007 01/21/20  1545 01/21/20  1125   TROPI <0.015 <0.015 <0.015     Most Recent Cholesterol Panel:  Recent Labs   Lab Test 10/08/20  0840   CHOL 217*   *   HDL 46*   TRIG 325*     Most Recent 6 Bacteria Isolates From Any Culture (See EPIC Reports for Culture Details):No lab results found.  Most Recent TSH, T4 and A1c Labs:  Recent Labs   Lab Test 06/12/20  1048 01/29/20  0953 03/13/19  0952 03/13/19  0952   TSH 0.79 0.09*   < >  --    T4  --  1.15   < >  --    A1C  --   --   --  5.9*    < > = values in this interval not displayed.     Results for orders placed or performed during the hospital encounter of 10/08/20   EP Device    Narrative    PROCEDURES PERFORMED:  1. Cardiac fluoroscopy.  2. Transseptal approach.  3. Left atrial catheterization and angiogram.  4. Left atrial appendage closure with a 27mm Watchman device.  5. Venotomy closure with a figure of eight suture.    PHYSICIANS:  1. Electrophysiology Attendings: Krish Toth MD and Constantin Vaughn MD    INDICATION: Atrial fibrillation (CHADS- vasc of 3) and inability to   undergo long-term anticoagulation.    HISTORY OF PRESENT ILLNESS:  65-year-old lady with a history of   hypertension, paroxysmal atrial fibrillation and  recurrent GI bleed, who   was referred for Watchman procedure to minimize long term risk of bleeding   associated with chronic oral anticoagulation.     Her CHADS-Vasc is 3 and FRIDA prior to procedure ruled our intra-atrial   clots.    Risks and benefits of the procedure were reviewed including (but not   limitedto) arrhythmias,   pain, infection, bleeding, skin damage from   ionizing radiation, kidney damage or allergic reaction to contrast dye,   injury to neighboring vascular structures, need for emergent   cardiopulmonary resuscitation, heart attack, stroke, death. Alternatives   were discussed including doing nothing. All questions were answered to the   patient's satisfaction. Informed consent was signed and the patient wished   to proceed.  Timeout was performed in the presence of the staff members listed above.    SEDATION PROCEDURE:  Procedure was done under general anesthesia. Heart rate, blood pressure,   oxygen saturation and patient responses were monitored throughout the   procedure with the anesthesia team assistance.     FLUOROSCOPY DATA:  Fluoro time: 3.3 minutes.  Radiation dose (AK): 119 mGy.   Dose-area product (DAP): 12,357 mGy.cm2.    PROCEDURE DESCRIPTION:  After obtaining informed consent, patient was   brought to the lab and under general anesthesia.  He was intubated and   transesophageal echocardiography was performed and ruled out intra-atrial   clots.  After the usual sterile prep and drape, right femoral site was   locally anesthetized and under ultasound guidance a venous vascular sheath   (8.5Fr x1) was inserted via modified seldinger technique.    With routine hemodynamic and electrocardiographic monitoring, a guide wire   and trans-septal sheath were advanced to the superior vena cava under   fluoroscopic guidance. The guide wire was withdrawn, the sheath was   vigorously flushed and the needle assembly was advanced to the end of the   sheath. Using transesophageal echocardiogram and fluoroscopic guidance,   the sheath was withdrawn until its tip engaged the fossa ovalis of the   inter-atrial septum. The fossa ovalis was crossed with a safesept wire.   The needle and sheath assembly were then advanced as a unit, a short   distance into the left atrium. Localization within the left atrium was   confirmed and  the needle was withdrawn. The mean left atrial pressure was   measured as 13 mmHg.     Next, an 0.035 Amplatz super stiff wire (260 cm in length) was advanced   into the left upper pulmonary vein to serve as a rail during guide   catheter exchange. The transseptal sheath was removed and the 14Fr double   curved Watchman Access System was gently advanced to the left atrium.   Through the sheath, an 6Fr pigtail catheter was advanced in the appendage.    Selective injections of the left atrial appendage were obtained to   further define the anatomy and confirm proper sizing of the device.   Contrast injection in the ADDISON revealed a broccoli shape appendage   ammenable for WATCHMAN deployment.  The maximum left atrial appendage   ostial diameter and depth were obtained by transesophageal echocardiogram   images in 4 views (0, 45, 90 and 135 degrees). Based on the appendage   depth and ostial diameter, an 27 mm device size was chosen.    Using the pigtail catheter, the Watchman Access System was then advanced   into the left atrial appendage. Following removal of the pigtail catheter,   the Watchman Delivery System was advanced through the Watchman Access   System and positioned in the left atrial appendage. The distal markers of   the Watchman Delivery System and the Watchman Access System were aligned.    The device was slowly deployed under fluoroscopic and transesophageal   guidance.    Prior to releasing the device, proper position, anchoring, sizing with   compression and seal were confirmed.  A compression of approximately 20%   was measured.  No leak was noted around the device.  The device was   released with counterclockwise rotation of the deployment knob. The   Watchman Access and Delivery systems were removed.    At the end of the procedure, FRIDA images ruled out presence of pericardial   effusion. Anticoagulation was reversed with Protamine Sulfate and   hemostasis was obtained by using a percutaneous suture  (Perclose   ProGlide ).      MEASURED DATA:    Left atrial pressure: 13 mmHg.    IMPRESSION:  - Successful Left atrial appendage closure with a 27-mm Watchman device.    RECOMMENDATIONS:  1. Remain flat in bed for 4 hours. May elevate head of bed 30 degrees   after 4 hours.  2. Eliquis 5 mg BID for 45 days.   3. Continue oral anticoagulation for at least 45 days after procedure.    Oral anticoagulant may be restarted 4-6 hours after procedure.  4. Observation over night.  5. Restart all other medications.  6. No vigorous activity for 5-7 days.    No immediate complications apparent.

## 2020-10-09 NOTE — PLAN OF CARE
A&Ox4. VSS on RA ex BP soft. IV SL. Tele SR. Right groin site CDI. CMS intact. C/O improving chest pain 3-4/10, prn Toradol available. Regular diet. SBA.

## 2020-10-12 ENCOUNTER — TELEPHONE (OUTPATIENT)
Dept: CARDIOLOGY | Facility: CLINIC | Age: 65
End: 2020-10-12

## 2020-10-12 ENCOUNTER — PATIENT OUTREACH (OUTPATIENT)
Dept: NURSING | Facility: CLINIC | Age: 65
End: 2020-10-12
Payer: MEDICARE

## 2020-10-12 DIAGNOSIS — G25.81 RLS (RESTLESS LEGS SYNDROME): ICD-10-CM

## 2020-10-12 ASSESSMENT — ACTIVITIES OF DAILY LIVING (ADL): DEPENDENT_IADLS:: CLEANING;TRANSPORTATION

## 2020-10-12 NOTE — LETTER
[place] CARE COORDINATION  [facility]  [address], [place] [address]    October 12, 2020        Maxine Velasquez  [address]  [place] [address]          Dear Alondra Goodman is an updated Complex Care Plan for your continued enrollment in Care Coordination. Please let us know if you have additional questions, concerns, or goals that we can assist with.    Sincerely,    Damien Person RN        [facility]  Complex Care Plan  About Me:    Patient Name:  Maxine Velasquez    YOB: 1955  Age:         65 year old   [place] MRN:    [id number] Telephone Information:  Home Phone [phone]   Mobile [phone]       Address:  [address]  [place] [address] Email address:  No e-mail address on record      Emergency Contact(s)    Name Relationship Lgl Grd Work Phone Home Phone Mobile Phone   1. SEVERSON,VIVIAN Sister   [phone]   2. BRANDI GEORGES Daughter  none [phone]   3. PRATIMA VELASQUEZ Son   [phone]           Primary language:  English     needed? No   [facility]:  [phone] op. 1  Other communication barriers: None  Preferred Method of Communication:  Phone  Current living arrangement: I live alone  Mobility Status/ Medical Equipment: Independent w/Device    Health Maintenance  Health Maintenance Reviewed: Due/Overdue   Health Maintenance Due   Topic Date Due     ADVANCE CARE PLANNING  1955     MIGRAINE ACTION PLAN  1955     ZOSTER IMMUNIZATION (1 of 2) 08/23/2005     PHQ-9  04/07/2020     INFLUENZA VACCINE (1) 09/01/2020     Pneumococcal Vaccine: 65+ Years (1 of 1 - PPSV23) 08/23/2020     MEDICARE ANNUAL WELLNESS VISIT  10/07/2020     My Access Plan  Medical Emergency 911   Primary Clinic Line [facility] - [phone]   24 Hour Appointment Line [phone] or  [phone])  (toll-free)   24 Hour Nurse Line [phone] (toll-free)   Preferred Urgent Care [facility], [phone]   Preferred Hospital [facility]  [phone]   Preferred Pharmacy [facility] [place], [place] - [address]     Behavioral Health Crisis Line The National Suicide Prevention Lifeline at 1-736.868.4879 or 911       My Care Team Members  Patient Care Team       Relationship Specialty Notifications Start End    Yuval Jama MD PCP - General Internal Medicine  10/20/14     Merged    Phone: [phone] Fax: [phone]         [address] [place] [address]    Yuval Jama MD  Internal Medicine  10/20/14     Merged    Phone: [phone] Fax: [phone]         [address] [place] [address]    Yuval Jama MD Assigned PCP   12/19/14     Phone: [phone] Fax: [phone]         [address] [address]    Damien Person, RN Lead Care Coordinator Primary Care - [facility] Admissions 3/6/19     Phone: [phone]         Carmen Mims MA Community Health Worker Primary Care - [facility]  1/22/20     Phone: [phone]                 My Care Plans  Self Management and Treatment Plan  Goals and (Comments)  Goals        General    1. Medical (pt-stated)     Notes - Note edited  10/12/2020  1:20 PM by Damien Person, RN    Goal Statement: I will avoid health complications by improving my cholesterol panel.  Date Goal set: 10/12/20  Barriers: limited mobility related to knee pain  Strengths: motivated to reduce health risks  Date to Achieve By: 4/30/21  Patient expressed understanding of goal: Yes    Action steps to achieve this goal:  1. I will continue taking my medications as prescribed.  2. I will follow a Heart Healthy diet (low-fat, low-salt).  3. I will work toward regular exercise as able.  4. I will attend routine follow-ups with my PCP and Cardiology.  5. I will continue working with Care Coordination to identify and  address any barriers to achieving my goal.             Action Plans on File:   Depression    Advance Care Plans/Directives Type:   None on file       My Medical and Care Information  Problem List   Patient Active Problem List   Diagnosis     Osteoarthritis     TMJ disease     Atrial fibrillation and flutter (H)     Health Care Home     Hyperlipidemia LDL goal <130     Hypothyroidism     Morbid obesity, unspecified obesity type (H)     Migraine without aura and without status migrainosus, not intractable     Essential hypertension     NELY on CPAP     RLS (restless legs syndrome)     Severe episode of recurrent major depressive disorder, without psychotic features (H)     Gastroesophageal reflux disease, esophagitis presence not specified     Atrial fibrillation with RVR (H)     Iron deficiency     Chronic pain of left knee     Lumbar radiculopathy     Paroxysmal atrial fibrillation (H)      Current Medications:   Current Outpatient Medications   Medication Instructions     acetaminophen (TYLENOL) 500-1,000 mg, Oral, 3 TIMES DAILY PRN     aspirin (ASA) - ask your doctor 81 mg, Oral, DAILY - ask your doctor     flecainide (TAMBOCOR) 150 mg, Oral, EVERY 12 HOURS     gabapentin (NEURONTIN) 300 MG capsule TAKE 1 CAPSULE BY MOUTH EVERY MORNING AND TAKE 2 CAPSULES BY MOUTH EVERY NIGHT AT BEDTIME     levothyroxine (SYNTHROID/LEVOTHROID) 137 mcg, Oral, DAILY     losartan (COZAAR) 100 MG tablet TAKE 1 TABLET BY MOUTH DAILY     metoprolol succinate ER (TOPROL-XL) 12.5 mg, Oral, DAILY     order for DME DREAMSTATION<BR>5-15 CM/H20<BR>NASAL AIRFIT N10 HER     pantoprazole (PROTONIX) 40 MG EC tablet TAKE 1 TABLET BY MOUTH TWICE A DAY BEFORE MEALS     rivaroxaban ANTICOAGULANT (XARELTO ANTICOAGULANT) 20 mg, Oral, DAILY WITH SUPPER     rosuvastatin (CRESTOR) 20 mg, Oral, DAILY     SUMAtriptan (IMITREX) 100 mg, Oral, AT ONSET OF HEADACHE, May repeat in 2 hours if needed: max 2/day;     triamterene-HCTZ (MAXZIDE-25) 37.5-25 MG tablet 1  tablet, Oral, DAILY     Care Coordination Start Date: 3/15/2019   Frequency of Care Coordination: monthly   Form Last Updated: 10/12/2020

## 2020-10-12 NOTE — LETTER
[place] CARE COORDINATION  [facility]  [address], [place] [address]    October 12, 2020        Maxine Velasquez  [address]  [facility] [address]          Dear Alondra Goodman is an updated Complex Care Plan for your continued enrollment in Care Coordination. Please let us know if you have additional questions, concerns, or goals that we can assist with.    Sincerely,    Damien Person RN        [facility]  Complex Care Plan  About Me:    Patient Name:  Maxine Velasquez    YOB: 1955  Age:         65 year old   [place] MRN:    [id number] Telephone Information:  Home Phone [phone]   Mobile [phone]       Address:  [address]  [facility] [address] Email address:  No e-mail address on record      Emergency Contact(s)    Name Relationship Lgl Grd Work Phone Home Phone Mobile Phone   1. SEVERSON,VIVIAN Sister   [phone]   2. BRANDI GEORGES Daughter  none [phone]   3. PRATIMA VELASQUEZ Son   [phone]           Primary language:  English     needed? No   [place] [facility]:  [phone] op. 1  Other communication barriers: None  Preferred Method of Communication:  Phone  Current living arrangement: I live alone  Mobility Status/ Medical Equipment: Independent w/Device    Health Maintenance  Health Maintenance Reviewed: Due/Overdue   Health Maintenance Due   Topic Date Due     ADVANCE CARE PLANNING  1955     MIGRAINE ACTION PLAN  1955     ZOSTER IMMUNIZATION (1 of 2) 08/23/2005     PHQ-9  04/07/2020     INFLUENZA VACCINE (1) 09/01/2020     Pneumococcal Vaccine: 65+ Years (1 of 1 - PPSV23) 08/23/2020     MEDICARE ANNUAL WELLNESS VISIT  10/07/2020     My Access Plan  Medical Emergency 911   Primary Clinic Line [facility] - [phone]   24 Hour Appointment Line [phone] or  [phone])  (toll-free)   24 Hour Nurse Line [phone] (toll-free)   Preferred Urgent Care [facility], [phone]   Preferred Hospital [facility]  [phone]   Preferred Pharmacy [facility] - [place], [place] - [address]     Behavioral Health Crisis Line The National Suicide Prevention Lifeline at 1-901.763.3600 or 911       My Care Team Members  Patient Care Team       Relationship Specialty Notifications Start End    Yuval Jama MD PCP - General Internal Medicine  10/20/14     Merged    Phone: [phone] Fax: [phone]         [address] [place] [address]    Yuval Jama MD  Internal Medicine  10/20/14     Merged    Phone: [phone] Fax: [phone]         [address] [place] [address]    Yuval Jama MD Assigned PCP   12/19/14     Phone: [phone] Fax: [phone]         [address] [address]    Damien Person, RN Lead Care Coordinator Primary Care - CC Admissions 3/6/19     Phone: [phone]         Carmen Mims MA Community Health Worker Primary Care - CC  1/22/20     Phone: [phone]                 My Care Plans  Self Management and Treatment Plan  Goals and (Comments)  Goals        General    1. Medical (pt-stated)     Notes - Note edited  10/12/2020  1:20 PM by Damien Person, RN    Goal Statement: I will avoid health complications by improving my cholesterol panel.  Date Goal set: 10/12/20  Barriers: limited mobility related to right knee pain  Strengths: motivated to reduce health risks  Date to Achieve By: 4/30/21  Patient expressed understanding of goal: Yes    Action steps to achieve this goal:  1. I will continue taking my medications as prescribed.  2. I will follow a Heart Healthy diet (low-fat, low-salt).  3. I will work toward regular exercise as able.  4. I will attend routine follow-ups with my PCP and Cardiology.  5. I will continue working with Care Coordination to identify  and address any barriers to achieving my goal.             Action Plans on File:   Depression    Advance Care Plans/Directives Type:   None on file       My Medical and Care Information  Problem List   Patient Active Problem List   Diagnosis     Osteoarthritis     TMJ disease     Atrial fibrillation and flutter (H)     Health Care Home     Hyperlipidemia LDL goal <130     Hypothyroidism     Morbid obesity, unspecified obesity type (H)     Migraine without aura and without status migrainosus, not intractable     Essential hypertension     NELY on CPAP     RLS (restless legs syndrome)     Severe episode of recurrent major depressive disorder, without psychotic features (H)     Gastroesophageal reflux disease, esophagitis presence not specified     Atrial fibrillation with RVR (H)     Iron deficiency     Chronic pain of left knee     Lumbar radiculopathy     Paroxysmal atrial fibrillation (H)      Current Medications:  Current Outpatient Medications   Medication Instructions     acetaminophen (TYLENOL) 500-1,000 mg, Oral, 3 TIMES DAILY PRN     aspirin (ASA)  - check with your doctor 81 mg, Oral, DAILY - check with your doctor     flecainide (TAMBOCOR) 150 mg, Oral, EVERY 12 HOURS     gabapentin (NEURONTIN) 300 MG capsule TAKE 1 CAPSULE BY MOUTH EVERY MORNING AND TAKE 2 CAPSULES BY MOUTH EVERY NIGHT AT BEDTIME     levothyroxine (SYNTHROID/LEVOTHROID) 137 mcg, Oral, DAILY     losartan (COZAAR) 100 MG tablet TAKE 1 TABLET BY MOUTH DAILY     metoprolol succinate ER (TOPROL-XL) 12.5 mg, Oral, DAILY     order for DME DREAMSTATION<BR>5-15 CM/H20<BR>NASAL AIRFIT N10 HER     pantoprazole (PROTONIX) 40 MG EC tablet TAKE 1 TABLET BY MOUTH TWICE A DAY BEFORE MEALS     rivaroxaban ANTICOAGULANT (XARELTO ANTICOAGULANT) 20 mg, Oral, DAILY WITH SUPPER     rosuvastatin (CRESTOR) 20 mg, Oral, DAILY     SUMAtriptan (IMITREX) 100 mg, Oral, AT ONSET OF HEADACHE, May repeat in 2 hours if needed: max 2/day;     triamterene-HCTZ (MAXZIDE-25)  37.5-25 MG tablet 1 tablet, Oral, DAILY     Care Coordination Start Date: No linked episodes   Frequency of Care Coordination: monthly   Form Last Updated: 10/12/2020

## 2020-10-12 NOTE — PROGRESS NOTES
Clinic Care Coordination Contact    OUTREACH    Referral Information:  Referral Source: IP Report  Primary Diagnosis: Cardiovascular - other  Chief Complaint   Patient presents with     Clinic Care Coordination - Post Hospital     atrial fibrillation, implantation of Watchman device      [place] Utilization: reviewed on this date  Difficulty keeping appointments: No  Compliance Concerns: No  No-Show Concerns: No  No PCP office visit in Past Year: No  Utilization    Last refreshed: 10/12/2020  1:24 PM: Hospital Admissions 2           Last refreshed: 10/12/2020  1:24 PM: ED Visits 1           Last refreshed: 10/12/2020  1:24 PM: No Show Count (past year) 1              Current as of: 10/12/2020  1:24 PM          Patient enrolled with Care Coordination; noted to have recent hospitalization at [facility] from 10/8/20 - 10/9/20 in setting of atrial fibrillation; underwent scheduled implantation of Watchman device without complication.      CC RN outreach to patient for discharge follow-up.    Patient reported to be doing fairly well since hospital discharge.    She does continue to feel tired and noted some pressure at times to her chest incision site, which she was reassured is a normal finding post-procedure.  Patient denied fevers or site redness/swelling.  She does not have any current pain related to her procedure.    Patient has been careful to not overdue her activity as directed on discharge.  She has had a friend from her [employer] provide her with meals so she does not have to do any cooking at this time.    Patient has been taking this time following her procedure to relax and watch movies/TV shows she's been wanting to watch.    Patient noted her elevated lipid panel results; she plans to work on this by eating healthier, exercising as able, and taking her newly prescribed Crestor.  Per discussion with patient, goal was updated; see Goal below.    Patient reported that her [employer] friend that  provides her with meals occasionally does cook quite healthy so she feels thankful for this.    Patient hopes to have her left knee replaced in a few months or so, which will ultimately help her mobility and therefore help with her weight loss goals.    Patient noted she was feeling more depressed leading up to her procedure because she wasn't able to do much and she was feeling anxious about the procedure.  She now feels much better.  CC RN did offer mental health referral for additional support; patient declined stating that she has good support from her sister and [employer] friends, and has already noticed she feels less depressed than before.  She agreed to notify CC RN if she would like a mental health referral at any time.    CC RN reviewed hospital discharge AVS with patient; she confirmed instructions and follow-up recommendations and denied further questions or concerns at this time.    Clinical Concerns:  Current Medical Concerns: atrial fibrillation, implantation of Watchman device, dyslipidemia  Current Behavioral Concerns: depression    Education Provided to patient: reviewed hospital discharge AVS     Pain  Pain (GOAL): Yes  Type: Chronic (>3mo)  Location of chronic pain: back, right knee    Medication Management:  Post-discharge medication reconciliation status: Discharge medications reviewed and reconciled.  Changed medications per note/orders (see AVS).     Unclear if patient was directed to continue or stop aspirin on discharge; CC RN will clarify with Cardiology.    Functional Status:  Dependent ADLs: Independent  Dependent IADLs: Cleaning, Transportation  Bed or wheelchair confined: No  Mobility Status: Independent w/Device  Fallen 2 or more times in the past year?: No  Any fall with injury in the past year?: No    Living Situation:  Current living arrangement: I live alone  Type of residence: Independent Senior Living    Lifestyle & Psychosocial Needs:  Lifestyle     Physical activity     Days per  week: 3 days     Minutes per session: 10 min     Stress: Not on file     Diet: Regular  Inadequate nutrition (GOAL): No  Tube Feeding: No  Inadequate activity/exercise (GOAL): Yes  Significant changes in sleep pattern (GOAL): No  Transportation means: Regular car, Family, Friend  Financial/Insurance concerns (GOAL): No  Buddhism or spiritual beliefs that impact treatment: No  Mental health DX: Yes  Mental health DX how managed: None  Mental health management concern (GOAL): No  Informal Support system: Family, Friends, [place] based     Socioeconomic History     Marital status:      Spouse name: Not on file     Number of children: Not on file     Years of education: Not on file     Highest education level: Not on file     Tobacco Use     Smoking status: Former Smoker     Packs/day: 0.25     Years: 1.00     Pack years: 0.25     Types: Cigarettes     Start date:      Quit date:      Years since quittin.8     Smokeless tobacco: Never Used     Tobacco comment: very minimal smoking history   Substance and Sexual Activity     Alcohol use: No     Alcohol/week: 0.0 standard drinks     Drug use: No     Sexual activity: Never     Care Coordinator has reviewed patient's Social Determinants of Health (SDoH) on this date. Upon review, changes were not made.     Resources and Interventions:  Community Resources: Housekeeping/Chore Agency  Supplies used at home: None  Equipment Currently Used at Home: walker, rolling    Advance Care Plan/Directive  Advanced Care Plans/Directives on file: No  Advanced Care Plan/Directive Status: (not discussed this encounter)    Referrals Placed: None     Goals:   Goals        General    1. Medical (pt-stated)     Notes - Note edited  10/12/2020  1:20 PM by Damien Person RN    Goal Statement: I will avoid health complications by improving my cholesterol panel.  Date Goal set: 10/12/20  Barriers: limited mobility related to right knee pain  Strengths: motivated to reduce health  risks  Date to Achieve By: 4/30/21  Patient expressed understanding of goal: Yes    Action steps to achieve this goal:  1. I will continue taking my medications as prescribed.  2. I will follow a Heart Healthy diet (low-fat, low-salt).  3. I will work toward regular exercise as able.  4. I will attend routine follow-ups with my PCP and Cardiology.  5. I will continue working with Care Coordination to identify and address any barriers to achieving my goal.        Patient/Caregiver understanding: Yes    Outreach Frequency: monthly  Future Appointments              In 1 week Yuval Jama MD [facility],     In 1 week [facility], [facility]    In 1 week Ilda Sue PA-C [facility]        Plan:    CC RN will communicate with Cardiology to determine instructions for aspirin.  Patient will continue taking other medications as prescribed.    Patient will attend upcoming appointments as scheduled.    Patient will work on following a Heart Healthy diet as discussed and will exercise as able.    Patient agreed to contact CC RN with additional questions or concerns.    Care Coordination will outreach to patient in approximately 1 month to get updates on patient status, assess goal progress, and offer additional support and resources as indicated.    UPDATE at 1553: CC RN received reply from Ilda Sue PA-C with Cardiology; she confirmed patient should be taking both the Xarelto and the Aspirin x 6 weeks.  CC RN called patient back to advise her of above; patient stated understanding and confirmed she will begin taking the Aspirin, as well; dose/frequency instructions reviewed with patient, she denied additional questions or concerns at this time.    Damien Person, RN  Clinic Care Coordinator  [facility]  Ph: [phone]

## 2020-10-12 NOTE — TELEPHONE ENCOUNTER
Spoke to patient in follow up to WATCHMAN procedure done on 10/8.  Over all patient has been feeling well.  Patient reports she continues to have the chest pressure she reported in the hospital after procedure.  Describes the discomfort as gas trapped in her chest as the discomfort has moved from mid chest to shoulder and chin area.  Denies radiating pain.  Denies acid reflux.  Reports more discomfort when she bends over.  /70 HR 72 today. Rates discomfort 2 on scale of 1-10.  Improvement with discomfort  noted every day..  Groin site CDI.  Did not resume asa as directed at discharge started this today.  Will update JAVON Mcrae regarding above.  Instructed patient to go to ED if pain worsens.  Patient provided verbal understanding regarding above.  KRISTOPHER Salas

## 2020-10-12 NOTE — TELEPHONE ENCOUNTER
Yes needs ASA.  We reviewed she'd be on AC (Xarelto) and antiplat (ASA) x 6 weeks, then would be switched to DAPT.     Mary Frank

## 2020-10-13 NOTE — TELEPHONE ENCOUNTER
Given it changes with position it likely has a pericardial component.  As long as it's getting better every day and is a 2/10, without SOB/lightheadedness, would continue to use just Tylenol (with ASA as s/p Watchman).    Agree she should call if it gets worse    Mary Frank

## 2020-10-13 NOTE — TELEPHONE ENCOUNTER
Spoke to patient in follow up after Monika reviewed.  Patient reports discomfort is better today and is getting better every day.  Denies SOB/lightheadedness.  Using tylenol prn for discomfort.  Patient instructed to call with concerns and go to ED if pain worsens.  Patient provided verbal understanding regarding above.  KRISTOPHER Salas

## 2020-10-13 NOTE — TELEPHONE ENCOUNTER
Gabapentin    Routing refill request to provider for review/approval because:  Drug not on the [facility] refill protocol

## 2020-10-14 RX ORDER — GABAPENTIN 300 MG/1
900 CAPSULE ORAL AT BEDTIME
Qty: 270 CAPSULE | Refills: 3 | Status: SHIPPED | OUTPATIENT
Start: 2020-10-14 | End: 2021-07-16

## 2020-10-14 NOTE — TELEPHONE ENCOUNTER
Pt takes 900 mg at bedtime as she states this works well that if she takes 300 mg in am it makes her too tired to do anything during the day .Fawn Rosario RN

## 2020-10-14 NOTE — TELEPHONE ENCOUNTER
Note on med list  States pt is taking 900mg at bedtime rather than 300mg in AM and 600mg at bedtime. P{lrease call pt to conform how she is taking med and how restless leg sx doing during day and night and then route back to MD to review and address RF

## 2020-10-19 ENCOUNTER — VIRTUAL VISIT (OUTPATIENT)
Dept: INTERNAL MEDICINE | Facility: CLINIC | Age: 65
End: 2020-10-19
Payer: MEDICARE

## 2020-10-19 DIAGNOSIS — I48.0 PAROXYSMAL ATRIAL FIBRILLATION (H): ICD-10-CM

## 2020-10-19 DIAGNOSIS — E78.5 HYPERLIPIDEMIA LDL GOAL <100: ICD-10-CM

## 2020-10-19 DIAGNOSIS — R91.8 PULMONARY NODULES: ICD-10-CM

## 2020-10-19 DIAGNOSIS — M54.16 LUMBAR RADICULOPATHY: ICD-10-CM

## 2020-10-19 PROCEDURE — 99442 PR PHYSICIAN TELEPHONE EVALUATION 11-20 MIN: CPT | Mod: 95 | Performed by: INTERNAL MEDICINE

## 2020-10-19 ASSESSMENT — PATIENT HEALTH QUESTIONNAIRE - PHQ9: SUM OF ALL RESPONSES TO PHQ QUESTIONS 1-9: 1

## 2020-10-19 NOTE — PROGRESS NOTES
"Maxine Sears is a 65 year old female who is being evaluated via a billable telephone visit.      The patient has been notified of following:     \"This telephone visit will be conducted via a call between you and your physician/provider. We have found that certain health care needs can be provided without the need for a physical exam.  This service lets us provide the care you need with a short phone conversation.  If a prescription is necessary we can send it directly to your pharmacy.  If lab work is needed we can place an order for that and you can then stop by our lab to have the test done at a later time.    Telephone visits are billed at different rates depending on your insurance coverage. During this emergency period, for some insurers they may be billed the same as an in-person visit.  Please reach out to your insurance provider with any questions.    If during the course of the call the physician/provider feels a telephone visit is not appropriate, you will not be charged for this service.\"    Patient has given verbal consent for Telephone visit?  Yes    What phone number would you like to be contacted at? [phone]    How would you like to obtain your AVS? Mail a copy    Subjective     Maxine Sears is a 65 year old female who presents via phone visit today for the following health issues:    [facility]       Hospital Follow-up Visit:    Hospital/Nursing Home/IP Rehab Facility: [facility]  Date of Admission: 10//08/2020  Date of Discharge: 10/09/2020  Reason(s) for Admission: Paroxysmal A-Fib      Was your hospitalization related to COVID-19? No   Problems taking medications regularly:  None  Medication changes since discharge: None  Problems adhering to non-medication therapy:  None    Summary of hospitalization:  [facility] discharge summary reviewed  Diagnostic Tests/Treatments reviewed.  Follow up needed: labs  Other Healthcare Providers Involved in Patient s Care:   "       cardiology  Update since discharge: improved.       Post Discharge Medication Reconciliation: discharge medications reconciled, continue medications without change.  Plan of care communicated with patient                 Discharge summary reviewed. Part of the summary as below:    [facility]     Discharge Summary  Electrophysiology     Date of Admission:  10/8/2020  Date of Discharge:  10/9/2020  Discharging Provider: Ilda Sue  Date of Service (when I saw the patient): 10/09/20        Discharge Diagnoses     1. Paroxysmal AFib, CHADSVASc 3, HASBLED 2               S/p 27 mm Left Atrial Appendage Occluder device 10/8/2020 with Dr. Vaughn and Dr. Toth  2. HTN  3. Coronary Calcifications  4. Dyslipidemia - Crestor started  5. Pulmonary Nodules           History of Present Illness     66 y/o F who sees Dr. Vaughn for her h/o;     1. Paroxysmal AFib first diagnosed 9/2015.  S/p DCCV, most recently 2/2019.  Hospitalized 1/2020 and flecainide increased.  2. GI bleeds. Had Hgb 7.2 g/dL 3/2019. EGD revealed duodenal ulcer which was cauterized.  Xarelto was held, but then later restarted.  She was rehospitalized 4/2019 with a lower GI bleed.  Hemoglobin got down to 8.7 g/dL.  Xarelto was discontinued. Capsule endoscopy 5/2019 showed normal mucosa but did not reach the colon (Dr. Ko). We restarted Xarelto 20 mg daily 10/2019 but Hgb dropped >1 gram in ~1 month. No anticoagulation despite CHADSVASc 2 (HTN, sex). Dr. Ko (GI) has OK'd use of short term AC   3. Benign essential hypertension with recent med changes   4. Coronary Calcification noted on CTA Heart 9/2020. Stress Test 3/2016 without ischemia  5. Pulmonary Nodules noted on CTA Heart 9/2020 - low risk - no Follow-up; high risk - optional 12 m CT follow-up. Roxanna states she considers herself low risk as smoked for <6 months           Hospital Course     Maxine Sears was admitted on 10/8/2020.  The following  problems were addressed during her hospitalization:     1. Paroxysmal AFib, CHADSVASc 3, HASBLED 2 -  - Remains on flecainide 150 mg q 12 hours. EKG OK with QRS duration 104 ms  - Has been on NO AC d/t above  - Now s/p 27 mm Watchman with Dr. Vaughn and Dr. Toth 10/8/2020  - FRIDA prior ruled out intra-atrial clots     - Had some pain following, worse with laying down and deep breaths. Improved this AM, but still notes this                  PLAN:               1. Xarelto 20 mg daily (called [facility] pharmacy to NOT fill the Eliquis 5 mg BID that I had initially sent down based on insurance coverage)               2. Stick with Tylenol for pain if possible given use of ASA/Xarelto and h/o GI Bleed               3. Follow-up 10/23 as planned     2. HTN -  - PTA on metoprolol XL 12.5 mg daily, Maxzide 37.5/25 and losartan 100. Maxzide held 10/8   - BPs 90-130s                  PLAN:               1. Continue PTA meds     3. Coronary Calcification  - Noted on CT Scan  - Last stress test 2016 without ischemia. Echo 1/2020 with normal EF  - Remains on BB & ARB  - Has controlled BP. No tobacco for years. Strong FHx for CAD/CVA     Component      Latest Ref Rng & Units 3/13/2019 10/8/2020   Cholesterol      <200 mg/dL 172 217 (H)   Triglycerides      <150 mg/dL 212 (H) 325 (H)   HDL Cholesterol      >49 mg/dL 45 (L) 46 (L)   LDL Cholesterol Calculated      <100 mg/dL 85 106 (H)   Non HDL Cholesterol      <130 mg/dL 127 171 (H)      PLAN:  1. Start Crestor 20 mg daily  2. TLC for trigs     4. Pulmonary Nodules  - Scattered, <4 mm.  Optional 12 m follow-up and she doesn't think she'll do this as she smoke <6 months and considers herself low risk     PLAN:  1. Dr. Jama and pt aware     Ilda Sue PA-C, PASAM              Due to the current impact of the Covid19 virus and recommendations by [facility] administration, CDC, etc to limit  clinic visits and pt exposure risk, was recommend pt proceed with virtual phone visit to  address acute/stable  medical needs with plan to defer other non-acute health maintenance issues/exam to a future date when less self-isolation is required.    I have reviewed the nursing note as documented above.   See below for other information/data  and my personal notes capturing the substance of my conversation with the patient.    Discharge summary reviewed as above.  Since undergoing watchman procedure, patient denies any palpitation issues.  Denies chest pain or shortness of breath.  Currently on short-term Xarelto after the procedure.  Denies seeing any blood in her stools.  Has been started on rosuvastatin for hyperlipidemia.  Denies current myalgias starting medication.  Chronic back pain with some left lower extremity radiculopathy to the proximal foot.  Previous MRI lumbar sacral spine showed L4-5 moderate to severe central stenosis.  Patient has declined previous lumbar epidural steroid injection but may consider in the future after done with Xarelto.  No bowel or bladder incontinence.  As part of her imaging for the watchman procedure, patient was noted to have a pulmonary nodules 3-4mm.  Low risk with non-smoker status.   Radiology recommendation for low risk status is not to repeat scan and patient declines interest in repeating chest CT in the future        Additional ROS:   Constitutional, HEENT, Cardiovascular, Pulmonary, GI and , Neuro, MSK and Psych review of systems/symptoms are otherwise negative or unchanged from previous, except as noted above.      ASSESSMENT:   1. Paroxysmal atrial fibrillation (H)  S/p Watchman.  Denies recent palpitations symptoms, chest pain, shortness of breath.  Continue management per cardiology including short-term Xarelto    2. Pulmonary nodules  Asymptomatic.  Small enough that no further imaging necessary    3. Lumbar radiculopathy  Chronic.  No bowel/bladder incontinence.  Possible future lumbar epidural steroid trial after patient off Xarelto.  Patient will  contact clinic if wishes to proceed    4. Hyperlipidemia LDL goal <100  Now on rosuvastatin.  Repeat lipids in a month  - Lipid panel reflex to direct LDL Fasting; Future  - Hepatic panel; Future  - CK total; Future      PLAN:   Fasting labs in mid/later November to follow-up on cholesterol/ liver status with Rosuvastatin  Follow-up with cardiology later this month as scheduled with Hemoglobin recheck given shortterm use of Xarelto after Watchan procedure  Flu vaccine - get at pharmacy  Continue other medications   After done with Xarelto. If back/leg pain bothersome and willing to have a spinal steroid injection, inform clinic and will place order       Phone call contact time  Call Started at 4:06pm  Call Ended at 4:18pm  Total minutes: 12 min    (Chart documentation was completed, in part, with [employer] voice-recognition software. Even though reviewed, some grammatical, spelling, and word errors may remain.)

## 2020-10-20 NOTE — PROGRESS NOTES
HPI:   I had the pleasure of seeing Roxanna when she came for follow-up of recent left atrial appendage occluder/Watchman device.  This 65 year old sees Dr. Vaughn for her history of:     1. Paroxysmal AFib first diagnosed 9/2015.  She is undergone DC cardioversion, most recently 2/2019.  Hospitalized 1/2020 and flecainide increased.  2. GI bleeds. Had Hgb 7.2 g/dL 3/2019. EGD revealed duodenal ulcer which was cauterized.  Xarelto was held, but then later restarted.  She was rehospitalized 4/2019 with a lower GI bleed.  Hemoglobin got down to 8.7 g/dL.  Xarelto was discontinued, and Dr. Vaughn recommended not restarting this for at least 3-6 months. Capsule endoscopy 5/2019 showed normal mucosa but did not reach the colon (Dr. Ko). We restarted Xarelto 20 mg daily 10/2019 but Hgb dropped >1 gram in ~1 month. Had been kept off of AC despite CHADSVASc 2 (HTN, sex). Now s/p 27 mm LAAO Watchman 10/8/2020 with Dr. Vaughn and Dr. Toth  3. Benign essential hypertension with recent med changes   4. Coronary Calcification noted on CTA Heart 9/2020. Stress Test 3/2016 without ischemia  5. Pulmonary Nodules noted on CTA Heart 9/2020 - low risk - no Follow-up; high risk - optional 12 m CT follow-up. Roxanna states she considers herself low risk as smoked for <6 months      I saw Roxanna a few weeks ago and discussed her upcoming Watchman procedure. This was done on 10/8. She was discharged the following day on Xarelto (Dr. Ko had approved) and Crestor started given presence of coronary calcification. Of note, she did have some pleuritic discomfort following the procedure for which Tylenol was rec'd.     Interval History:  Overall feeling really good. No recurrent CP - had some discomfort when leaning forward for the first few days, but all has improved. No R groin site discomofort. No bleeding issues on the Xarelto. Remains on BID PPI give her hx of GI bleed.    No c/o AFib on the flecainide.     Overall, she's really pleased.  She shares with me that she's planning on moving back to [place] in a few years.    Recent Diagnostic Testing:  FRIDA 10/8/2020 with nl LVEF, nl RVEF, mild dilation of LA. Trace valve abnls  CT A Heart 9/8/2020 with normal aorta. Coronary calcification noted. Non-cardiac portion with 4 mm non-calcified medial RML nodule, indeterminate 4 mm RUL nodule anterior and 3 mm lingular nodule noted. Low risk - no follow-up needed; High-risk - optional CT follow-up 12 m  Echocardiogam 1/2020 showed EF 55-60%. No RWMA. RV OK. Mild MAC. Trace TR.   Echocardiogram 7/2017 showed an EF of 55-60% with borderline LVH.  LA was mildly dilated at 4.6 cm with a volume index of 24.2 mL/m .  She was noted to have a dilated IVC with elevated right atrial pressure at 15-20.  No significant valvular abnormalities were noted.  Nuclear stress test 3/2016 was negative for ischemia    Assessment & Plan:    1. Paroxysmal AFib    Remains on flecainide 150 mg q 12 hours    Had not been on AC given GI bleed, but Dr. Ko noted she could be on this short term post Watchman (8/11 Telephone Note & scanned documentation)    She's done well, without problems post Watchman    PLAN:    Dr. Toth appt with FRIDA and labs will be scheduled ~4.5 weeks    Hgb today looks good (came back after appt)  Component       Hemoglobin   Latest Ref Rng & Units       11.7 - 15.7 g/dL   6/12/2020      10:48 AM 11.9   10/5/2020      11:44 AM 12.4   10/23/2020      12:35 PM 11.7       2. HTN    Remains on metprolol XL 12.5 mg daily, Maxzide 37.5/25 and losartan 100 mg daily.     BP super high today but notes she's had a headache all day and hasn't eaten as didn't know if labs were supposed to be fasting    Just checked recently and in 120s.     PLAN:    Continue meds     3. Coronary Calcification    Noted on CT Scan    Last stress test 2016 without ischemia. Echo 1/2020 with normal EF    Remains on BB & ARB. Statin now started based on labs done at hospital  Component      Latest  Ref Rng & Units 5/25/2018 3/13/2019 10/8/2020          10:23 AM  9:52 AM  8:40 AM   Cholesterol      <200 mg/dL 213 (H) 172 217 (H)   Triglycerides      <150 mg/dL 297 (H) 212 (H) 325 (H)   HDL Cholesterol      >49 mg/dL 51 45 (L) 46 (L)   LDL Cholesterol Calculated      <100 mg/dL 103 (H) 85 106 (H)   Non HDL Cholesterol      <130 mg/dL 162 (H) 127 171 (H)       Has controlled BP. No tobacco for years. Strong FHx for CAD/CVA     PLAN:    Doing fine on Crestor 20 mg daily     4. Pulmonary Nodules    Scattered, <4 mm.  Optional 12 m follow-up and she doesn't think she'll do this as she smoke <6 months and considers herself low risk     PLAN:    Had previously messaged Dr. Jama. Pt aware.    Monika Sue PA-C, MSPAS      No orders of the defined types were placed in this encounter.    No orders of the defined types were placed in this encounter.    There are no discontinued medications.      Encounter Diagnosis   Name Primary?     Atrial fibrillation with RVR (H)        CURRENT MEDICATIONS:  Current Outpatient Medications   Medication Sig Dispense Refill     acetaminophen (TYLENOL) 500 MG tablet Take 500-1,000 mg by mouth 3 times daily as needed for mild pain       aspirin (ASA) 81 MG EC tablet Take 1 tablet (81 mg) by mouth daily       flecainide (TAMBOCOR) 150 MG tablet Take 1 tablet (150 mg) by mouth every 12 hours 60 tablet 11     gabapentin (NEURONTIN) 300 MG capsule Take 3 capsules (900 mg) by mouth At Bedtime 270 capsule 3     levothyroxine (SYNTHROID/LEVOTHROID) 137 MCG tablet TAKE 1 TABLET (137 MCG) BY MOUTH DAILY 28 tablet 10     losartan (COZAAR) 100 MG tablet TAKE 1 TABLET BY MOUTH DAILY (Patient taking differently: Take 100 mg by mouth daily ) 90 tablet 3     metoprolol succinate ER (TOPROL-XL) 25 MG 24 hr tablet Take 0.5 tablets (12.5 mg) by mouth daily 30 tablet 3     order for DME DREAMSTATION  5-15 CM/H20  NASAL AIRFIT N10 HER       pantoprazole (PROTONIX) 40 MG EC tablet TAKE 1 TABLET BY MOUTH TWICE A  DAY BEFORE MEALS (Patient taking differently: Take 40 mg by mouth 2 times daily ) 180 tablet 1     rivaroxaban ANTICOAGULANT (XARELTO ANTICOAGULANT) 20 MG TABS tablet Take 1 tablet (20 mg) by mouth daily (with dinner) 30 tablet 0     rosuvastatin (CRESTOR) 20 MG tablet Take 1 tablet (20 mg) by mouth daily 30 tablet 0     SUMAtriptan (IMITREX) 100 MG tablet Take 1 tablet (100 mg) by mouth at onset of headache for migraine May repeat in 2 hours if needed: max 2/day; 10 tablet 11     triamterene-HCTZ (MAXZIDE-25) 37.5-25 MG tablet Take 1 tablet by mouth daily 90 tablet 3       ALLERGIES     Allergies   Allergen Reactions     Morphine Anaphylaxis     Ceclor [Cefaclor] Hives     Diltiazem Hives     Lisinopril Cough     Sertraline Nausea and Vomiting       PAST MEDICAL HISTORY:  Past Medical History:   Diagnosis Date     Depression       Duodenal ulcer 03/04/2019     Essential hypertension       GERD (gastroesophageal reflux disease)      Hiatal hernia      Hypothyroidism      Migraine without aura and without status migrainosus, not intractable  Aug 2016     Obesity      NELY on CPAP      Osteoarthritis      Paroxysmal atrial fibrillation (H) 01/05/2015     RLS (restless legs syndrome)      Symptomatic menopausal or female climacteric states (aka FLASHES)      TMJ disease        PAST SURGICAL HISTORY:  Past Surgical History:   Procedure Laterality Date     APPENDECTOMY       CHOLECYSTECTOMY       COLONOSCOPY N/A 4/12/2019    Procedure: COLONOSCOPY;  Surgeon: Vahid Cardona MD;  Location:  GI     EP LEFT ATRIAL APPENDAGE CLOSURE N/A 10/8/2020    Procedure: EP Left Atrial Appendage Closure;  Surgeon: Constantin Vaughn MD;  Location:  HEART CARDIAC CATH LAB     ESOPHAGOSCOPY, GASTROSCOPY, DUODENOSCOPY (EGD), COMBINED N/A 4/11/2019    Procedure: COMBINED ESOPHAGOSCOPY, GASTROSCOPY, DUODENOSCOPY (EGD);  Surgeon: Ben Ko MD;  Location:  GI     HYSTERECTOMY, [facility]  08/1999     TONSILLECTOMY          FAMILY HISTORY:  Family History   Problem Relation Age of Onset     Myocardial Infarction Mother      Heart Failure Mother      Heart Surgery Father         bypass surgery     Cerebrovascular Disease Father      Heart Surgery Brother      Hyperlipidemia Brother      Hyperlipidemia Sister      Hyperlipidemia Brother      Sleep Apnea Sister        SOCIAL HISTORY:  Social History     Socioeconomic History     Marital status:      Spouse name: None     Number of children: None     Years of education: None     Highest education level: None   Occupational History     None   Social Needs     Financial resource strain: None     Food insecurity     Worry: None     Inability: None     Transportation needs     Medical: None     Non-medical: None   Tobacco Use     Smoking status: Former Smoker     Packs/day: 0.25     Years: 1.00     Pack years: 0.25     Types: Cigarettes     Start date:      Quit date:      Years since quittin.8     Smokeless tobacco: Never Used     Tobacco comment: very minimal smoking history   Substance and Sexual Activity     Alcohol use: No     Alcohol/week: 0.0 standard drinks     Drug use: No     Sexual activity: Never   Lifestyle     Physical activity     Days per week: 3 days     Minutes per session: 10 min     Stress: None   Relationships     Social connections     Talks on phone: None     Gets together: None     Attends Taoist service: None     Active member of club or organization: None     Attends meetings of clubs or organizations: None     Relationship status: None     Intimate partner violence     Fear of current or ex partner: None     Emotionally abused: None     Physically abused: None     Forced sexual activity: None   Other Topics Concern     Parent/sibling w/ CABG, MI or angioplasty before 65F 55M? Not Asked      Service Not Asked     Blood Transfusions Not Asked     Caffeine Concern No     Occupational Exposure Not Asked     Hobby Hazards Not Asked      Sleep Concern Yes     Stress Concern Not Asked     Weight Concern No     Special Diet No     Back Care Not Asked     Exercise No     Bike Helmet Not Asked     Seat Belt Yes     Self-Exams Not Asked   Social History Narrative     None       Review of Systems:  Skin:  Negative     Eyes:  Positive for glasses  ENT:  Negative    Respiratory:  Positive for sleep apnea;CPAP  Cardiovascular:  Negative for;palpitations;chest pain;edema    Gastroenterology: Negative for melena;hematochezia  Genitourinary:  Negative    Musculoskeletal:  Positive for arthritis  Neurologic:  Negative    Psychiatric:  Positive for depression  Heme/Lymph/Imm:  Positive for allergies  Endocrine:  Positive for thyroid disorder    Physical Exam:  Vitals: BP (!) 162/92   Pulse 63   Wt 121.6 kg (268 lb)   SpO2 95%   BMI 47.47 kg/m      Constitutional:  cooperative, alert and oriented, well developed, well nourished, in no acute distress        Skin:  warm and dry to the touch, no apparent skin lesions or masses noted        Head:  normocephalic, no masses or lesions        Eyes:  pupils equal and round;conjunctivae and lids unremarkable;sclera white        ENT:  no pallor or cyanosis, dentition good        Neck:  JVP normal;no carotid bruit        Chest:  normal respiratory excursion        Cardiac: regular rhythm;no murmurs, gallops or rubs detected                  Abdomen:  abdomen soft obese      Vascular: pulses full and equal                                 R groin site without tenderness    Extremities and Back:  no deformities, clubbing, cyanosis, erythema observed;no edema        Neurological:  no gross motor deficits        Recent Lab Results:  LIPID RESULTS:  Lab Results   Component Value Date    CHOL 217 (H) 10/08/2020    HDL 46 (L) 10/08/2020     (H) 10/08/2020    TRIG 325 (H) 10/08/2020    CHOLHDLRATIO 4.7 09/24/2015       LIVER ENZYME RESULTS:  Lab Results   Component Value Date    AST 19 01/21/2020    ALT 18 01/21/2020        CBC RESULTS:  Lab Results   Component Value Date    WBC 8.7 10/05/2020    RBC 4.57 10/05/2020    HGB 11.7 10/23/2020    HCT 39.8 10/05/2020    MCV 87 10/05/2020    MCH 27.1 10/05/2020    MCHC 31.2 (L) 10/05/2020    RDW 16.3 (H) 10/05/2020     (H) 10/05/2020       BMP RESULTS:  Lab Results   Component Value Date     10/05/2020    POTASSIUM 3.7 10/08/2020    CHLORIDE 103 10/05/2020    CO2 28 10/05/2020    ANIONGAP 4 10/05/2020     (H) 10/05/2020    BUN 12 10/05/2020    CR 0.75 10/05/2020    GFRESTIMATED 84 10/05/2020    GFRESTBLACK >90 10/05/2020    J CRALOS 8.6 10/05/2020

## 2020-10-23 ENCOUNTER — TELEPHONE (OUTPATIENT)
Dept: CARDIOLOGY | Facility: CLINIC | Age: 65
End: 2020-10-23

## 2020-10-23 ENCOUNTER — OFFICE VISIT (OUTPATIENT)
Dept: CARDIOLOGY | Facility: CLINIC | Age: 65
End: 2020-10-23
Attending: INTERNAL MEDICINE
Payer: MEDICARE

## 2020-10-23 VITALS
BODY MASS INDEX: 47.47 KG/M2 | SYSTOLIC BLOOD PRESSURE: 162 MMHG | HEART RATE: 63 BPM | OXYGEN SATURATION: 95 % | WEIGHT: 268 LBS | DIASTOLIC BLOOD PRESSURE: 92 MMHG

## 2020-10-23 DIAGNOSIS — I48.91 ATRIAL FIBRILLATION WITH RVR (H): ICD-10-CM

## 2020-10-23 DIAGNOSIS — I48.0 PAROXYSMAL ATRIAL FIBRILLATION (H): ICD-10-CM

## 2020-10-23 DIAGNOSIS — Z95.818 PRESENCE OF WATCHMAN LEFT ATRIAL APPENDAGE CLOSURE DEVICE: Primary | ICD-10-CM

## 2020-10-23 LAB — HGB BLD-MCNC: 11.7 G/DL (ref 11.7–15.7)

## 2020-10-23 PROCEDURE — 85018 HEMOGLOBIN: CPT | Performed by: INTERNAL MEDICINE

## 2020-10-23 PROCEDURE — 36415 COLL VENOUS BLD VENIPUNCTURE: CPT | Performed by: INTERNAL MEDICINE

## 2020-10-23 PROCEDURE — 99214 OFFICE O/P EST MOD 30 MIN: CPT | Performed by: PHYSICIAN ASSISTANT

## 2020-10-23 NOTE — PATIENT INSTRUCTIONS
Roxanna - it was nice to see you today!    1. Reviewed that you're feeling really good following the procedure  2. Reviewed the FRIDA and Watchman procedure done 10/8    PLAN:  1. No changes - continue low dose aspirin and Xarelto 20 mg daily with supper. We'll be contacting you to set up a FRIDA (transesophageal echo) to see if we can stop Xarelto  2. You'll get blood work and see the doc at that time as well    3. CALL if issues! [phone]

## 2020-10-23 NOTE — LETTER
10/23/2020    Yuval Jama MD  [address] [facility] [address]    RE: Maxine Sears       Dear Colleague,    I had the pleasure of seeing Maxine Sears in the [facility].    HPI:   I had the pleasure of seeing Roxanna when she came for follow-up of recent left atrial appendage occluder/Watchman device.  This 65 year old sees Dr. Vaughn for her history of:     1. Paroxysmal AFib first diagnosed 9/2015.  She is undergone DC cardioversion, most recently 2/2019.  Hospitalized 1/2020 and flecainide increased.  2. GI bleeds. Had Hgb 7.2 g/dL 3/2019. EGD revealed duodenal ulcer which was cauterized.  Xarelto was held, but then later restarted.  She was rehospitalized 4/2019 with a lower GI bleed.  Hemoglobin got down to 8.7 g/dL.  Xarelto was discontinued, and Dr. Vaughn recommended not restarting this for at least 3-6 months. Capsule endoscopy 5/2019 showed normal mucosa but did not reach the colon (Dr. Ko). We restarted Xarelto 20 mg daily 10/2019 but Hgb dropped >1 gram in ~1 month. Had been kept off of AC despite CHADSVASc 2 (HTN, sex). Now s/p 27 mm LAAO Watchman 10/8/2020 with Dr. Vaughn and Dr. Toth  3. Benign essential hypertension with recent med changes   4. Coronary Calcification noted on CTA Heart 9/2020. Stress Test 3/2016 without ischemia  5. Pulmonary Nodules noted on CTA Heart 9/2020 - low risk - no Follow-up; high risk - optional 12 m CT follow-up. Roxanna states she considers herself low risk as smoked for <6 months      I saw Roxanna a few weeks ago and discussed her upcoming Watchman procedure. This was done on 10/8. She was discharged the following day on Xarelto (Dr. Ko had approved) and Crestor started given presence of coronary calcification. Of note, she did have some pleuritic discomfort following the procedure for which Tylenol was rec'd.     Interval History:  Overall feeling really good. No recurrent CP - had some discomfort when leaning forward for  the first few days, but all has improved. No R groin site discomofort. No bleeding issues on the Xarelto. Remains on BID PPI give her hx of GI bleed.    No c/o AFib on the flecainide.     Overall, she's really pleased. She shares with me that she's planning on moving back to [place] in a few years.    Recent Diagnostic Testing:  FRIDA 10/8/2020 with nl LVEF, nl RVEF, mild dilation of LA. Trace valve abnls  CT A Heart 9/8/2020 with normal aorta. Coronary calcification noted. Non-cardiac portion with 4 mm non-calcified medial RML nodule, indeterminate 4 mm RUL nodule anterior and 3 mm lingular nodule noted. Low risk - no follow-up needed; High-risk - optional CT follow-up 12 m  Echocardiogam 1/2020 showed EF 55-60%. No RWMA. RV OK. Mild MAC. Trace TR.   Echocardiogram 7/2017 showed an EF of 55-60% with borderline LVH.  LA was mildly dilated at 4.6 cm with a volume index of 24.2 mL/m .  She was noted to have a dilated IVC with elevated right atrial pressure at 15-20.  No significant valvular abnormalities were noted.  Nuclear stress test 3/2016 was negative for ischemia    Assessment & Plan:    1. Paroxysmal AFib    Remains on flecainide 150 mg q 12 hours    Had not been on AC given GI bleed, but Dr. Ko noted she could be on this short term post Watchman (8/11 Telephone Note & scanned documentation)    She's done well, without problems post Watchman    PLAN:    Dr. Toth appt with FRIDA and labs will be scheduled ~4.5 weeks    Hgb today looks good (came back after appt)  Component       Hemoglobin   Latest Ref Rng & Units       11.7 - 15.7 g/dL   6/12/2020      10:48 AM 11.9   10/5/2020      11:44 AM 12.4   10/23/2020      12:35 PM 11.7       2. HTN    Remains on metprolol XL 12.5 mg daily, Maxzide 37.5/25 and losartan 100 mg daily.     BP super high today but notes she's had a headache all day and hasn't eaten as didn't know if labs were supposed to be fasting    Just checked recently and in 120s.     PLAN:    Continue  meds     3. Coronary Calcification    Noted on CT Scan    Last stress test 2016 without ischemia. Echo 1/2020 with normal EF    Remains on BB & ARB. Statin now started based on labs done at hospital  Component      Latest Ref Rng & Units 5/25/2018 3/13/2019 10/8/2020          10:23 AM  9:52 AM  8:40 AM   Cholesterol      <200 mg/dL 213 (H) 172 217 (H)   Triglycerides      <150 mg/dL 297 (H) 212 (H) 325 (H)   HDL Cholesterol      >49 mg/dL 51 45 (L) 46 (L)   LDL Cholesterol Calculated      <100 mg/dL 103 (H) 85 106 (H)   Non HDL Cholesterol      <130 mg/dL 162 (H) 127 171 (H)       Has controlled BP. No tobacco for years. Strong FHx for CAD/CVA     PLAN:    Doing fine on Crestor 20 mg daily     4. Pulmonary Nodules    Scattered, <4 mm.  Optional 12 m follow-up and she doesn't think she'll do this as she smoke <6 months and considers herself low risk     PLAN:    Had previously messaged Dr. Jama. Pt aware.    Monika Sue PA-C, MSPAS      No orders of the defined types were placed in this encounter.    No orders of the defined types were placed in this encounter.    There are no discontinued medications.      Encounter Diagnosis   Name Primary?     Atrial fibrillation with RVR (H)        CURRENT MEDICATIONS:  Current Outpatient Medications   Medication Sig Dispense Refill     acetaminophen (TYLENOL) 500 MG tablet Take 500-1,000 mg by mouth 3 times daily as needed for mild pain       aspirin (ASA) 81 MG EC tablet Take 1 tablet (81 mg) by mouth daily       flecainide (TAMBOCOR) 150 MG tablet Take 1 tablet (150 mg) by mouth every 12 hours 60 tablet 11     gabapentin (NEURONTIN) 300 MG capsule Take 3 capsules (900 mg) by mouth At Bedtime 270 capsule 3     levothyroxine (SYNTHROID/LEVOTHROID) 137 MCG tablet TAKE 1 TABLET (137 MCG) BY MOUTH DAILY 28 tablet 10     losartan (COZAAR) 100 MG tablet TAKE 1 TABLET BY MOUTH DAILY (Patient taking differently: Take 100 mg by mouth daily ) 90 tablet 3     metoprolol succinate ER  (TOPROL-XL) 25 MG 24 hr tablet Take 0.5 tablets (12.5 mg) by mouth daily 30 tablet 3     order for DME DREAMSTATION  5-15 CM/H20  NASAL AIRFIT N10 HER       pantoprazole (PROTONIX) 40 MG EC tablet TAKE 1 TABLET BY MOUTH TWICE A DAY BEFORE MEALS (Patient taking differently: Take 40 mg by mouth 2 times daily ) 180 tablet 1     rivaroxaban ANTICOAGULANT (XARELTO ANTICOAGULANT) 20 MG TABS tablet Take 1 tablet (20 mg) by mouth daily (with dinner) 30 tablet 0     rosuvastatin (CRESTOR) 20 MG tablet Take 1 tablet (20 mg) by mouth daily 30 tablet 0     SUMAtriptan (IMITREX) 100 MG tablet Take 1 tablet (100 mg) by mouth at onset of headache for migraine May repeat in 2 hours if needed: max 2/day; 10 tablet 11     triamterene-HCTZ (MAXZIDE-25) 37.5-25 MG tablet Take 1 tablet by mouth daily 90 tablet 3       ALLERGIES     Allergies   Allergen Reactions     Morphine Anaphylaxis     Ceclor [Cefaclor] Hives     Diltiazem Hives     Lisinopril Cough     Sertraline Nausea and Vomiting       PAST MEDICAL HISTORY:  Past Medical History:   Diagnosis Date     Depression       Duodenal ulcer 03/04/2019     Essential hypertension       GERD (gastroesophageal reflux disease)      Hiatal hernia      Hypothyroidism      Migraine without aura and without status migrainosus, not intractable  Aug 2016     Obesity      NELY on CPAP      Osteoarthritis      Paroxysmal atrial fibrillation (H) 01/05/2015     RLS (restless legs syndrome)      Symptomatic menopausal or female climacteric states (aka FLASHES)      TMJ disease        PAST SURGICAL HISTORY:  Past Surgical History:   Procedure Laterality Date     APPENDECTOMY       CHOLECYSTECTOMY       COLONOSCOPY N/A 4/12/2019    Procedure: COLONOSCOPY;  Surgeon: Vahid Cardona MD;  Location:  GI     EP LEFT ATRIAL APPENDAGE CLOSURE N/A 10/8/2020    Procedure: EP Left Atrial Appendage Closure;  Surgeon: Constantin Vaughn MD;  Location:  HEART CARDIAC CATH LAB     ESOPHAGOSCOPY,  GASTROSCOPY, DUODENOSCOPY (EGD), COMBINED N/A 2019    Procedure: COMBINED ESOPHAGOSCOPY, GASTROSCOPY, DUODENOSCOPY (EGD);  Surgeon: Ben Ko MD;  Location:  GI     HYSTERECTOMY, [facility]  1999     TONSILLECTOMY         FAMILY HISTORY:  Family History   Problem Relation Age of Onset     Myocardial Infarction Mother      Heart Failure Mother      Heart Surgery Father         bypass surgery     Cerebrovascular Disease Father      Heart Surgery Brother      Hyperlipidemia Brother      Hyperlipidemia Sister      Hyperlipidemia Brother      Sleep Apnea Sister        SOCIAL HISTORY:  Social History     Socioeconomic History     Marital status:      Spouse name: None     Number of children: None     Years of education: None     Highest education level: None   Occupational History     None   Social Needs     Financial resource strain: None     Food insecurity     Worry: None     Inability: None     Transportation needs     Medical: None     Non-medical: None   Tobacco Use     Smoking status: Former Smoker     Packs/day: 0.25     Years: 1.00     Pack years: 0.25     Types: Cigarettes     Start date:      Quit date:      Years since quittin.8     Smokeless tobacco: Never Used     Tobacco comment: very minimal smoking history   Substance and Sexual Activity     Alcohol use: No     Alcohol/week: 0.0 standard drinks     Drug use: No     Sexual activity: Never   Lifestyle     Physical activity     Days per week: 3 days     Minutes per session: 10 min     Stress: None   Relationships     Social connections     Talks on phone: None     Gets together: None     Attends Restorationism service: None     Active member of club or organization: None     Attends meetings of clubs or organizations: None     Relationship status: None     Intimate partner violence     Fear of current or ex partner: None     Emotionally abused: None     Physically abused: None     Forced sexual activity: None   Other Topics  Concern     Parent/sibling w/ CABG, MI or angioplasty before 65F 55M? Not Asked      Service Not Asked     Blood Transfusions Not Asked     Caffeine Concern No     Occupational Exposure Not Asked     Hobby Hazards Not Asked     Sleep Concern Yes     Stress Concern Not Asked     Weight Concern No     Special Diet No     Back Care Not Asked     Exercise No     Bike Helmet Not Asked     Seat Belt Yes     Self-Exams Not Asked   Social History Narrative     None       Review of Systems:  Skin:  Negative     Eyes:  Positive for glasses  ENT:  Negative    Respiratory:  Positive for sleep apnea;CPAP  Cardiovascular:  Negative for;palpitations;chest pain;edema    Gastroenterology: Negative for melena;hematochezia  Genitourinary:  Negative    Musculoskeletal:  Positive for arthritis  Neurologic:  Negative    Psychiatric:  Positive for depression  Heme/Lymph/Imm:  Positive for allergies  Endocrine:  Positive for thyroid disorder    Physical Exam:  Vitals: BP (!) 162/92   Pulse 63   Wt 121.6 kg (268 lb)   SpO2 95%   BMI 47.47 kg/m      Constitutional:  cooperative, alert and oriented, well developed, well nourished, in no acute distress        Skin:  warm and dry to the touch, no apparent skin lesions or masses noted        Head:  normocephalic, no masses or lesions        Eyes:  pupils equal and round;conjunctivae and lids unremarkable;sclera white        ENT:  no pallor or cyanosis, dentition good        Neck:  JVP normal;no carotid bruit        Chest:  normal respiratory excursion        Cardiac: regular rhythm;no murmurs, gallops or rubs detected                  Abdomen:  abdomen soft obese      Vascular: pulses full and equal                                 R groin site without tenderness    Extremities and Back:  no deformities, clubbing, cyanosis, erythema observed;no edema        Neurological:  no gross motor deficits        Recent Lab Results:  LIPID RESULTS:  Lab Results   Component Value Date    CHOL 217  (H) 10/08/2020    HDL 46 (L) 10/08/2020     (H) 10/08/2020    TRIG 325 (H) 10/08/2020    CHOLHDLRATIO 4.7 09/24/2015       LIVER ENZYME RESULTS:  Lab Results   Component Value Date    AST 19 01/21/2020    ALT 18 01/21/2020       CBC RESULTS:  Lab Results   Component Value Date    WBC 8.7 10/05/2020    RBC 4.57 10/05/2020    HGB 11.7 10/23/2020    HCT 39.8 10/05/2020    MCV 87 10/05/2020    MCH 27.1 10/05/2020    MCHC 31.2 (L) 10/05/2020    RDW 16.3 (H) 10/05/2020     (H) 10/05/2020       BMP RESULTS:  Lab Results   Component Value Date     10/05/2020    POTASSIUM 3.7 10/08/2020    CHLORIDE 103 10/05/2020    CO2 28 10/05/2020    ANIONGAP 4 10/05/2020     (H) 10/05/2020    BUN 12 10/05/2020    CR 0.75 10/05/2020    GFRESTIMATED 84 10/05/2020    GFRESTBLACK >90 10/05/2020    J CARLOS 8.6 10/05/2020                    Thank you for allowing me to participate in the care of your patient.      Sincerely,     LISA GalvezC     [facility]    cc:   Constantin Vaughn MD  [address]  [place] [id number]

## 2020-10-23 NOTE — LETTER
10/23/2020    Yuval Jama MD  [address] [facility] [address]    RE: Maxine Sears       Dear Colleague,    I had the pleasure of seeing Maxine Sears in the [facility].    HPI:   I had the pleasure of seeing Roxanna when she came for follow-up of recent left atrial appendage occluder/Watchman device.  This 65 year old sees Dr. Vaughn for her history of:     1. Paroxysmal AFib first diagnosed 9/2015.  She is undergone DC cardioversion, most recently 2/2019.  Hospitalized 1/2020 and flecainide increased.  2. GI bleeds. Had Hgb 7.2 g/dL 3/2019. EGD revealed duodenal ulcer which was cauterized.  Xarelto was held, but then later restarted.  She was rehospitalized 4/2019 with a lower GI bleed.  Hemoglobin got down to 8.7 g/dL.  Xarelto was discontinued, and Dr. Vaughn recommended not restarting this for at least 3-6 months. Capsule endoscopy 5/2019 showed normal mucosa but did not reach the colon (Dr. Ko). We restarted Xarelto 20 mg daily 10/2019 but Hgb dropped >1 gram in ~1 month. Had been kept off of AC despite CHADSVASc 2 (HTN, sex). Now s/p 27 mm LAAO Watchman 10/8/2020 with Dr. Vaughn and Dr. Toth  3. Benign essential hypertension with recent med changes   4. Coronary Calcification noted on CTA Heart 9/2020. Stress Test 3/2016 without ischemia  5. Pulmonary Nodules noted on CTA Heart 9/2020 - low risk - no Follow-up; high risk - optional 12 m CT follow-up. Roxanna states she considers herself low risk as smoked for <6 months      I saw Roxanna a few weeks ago and discussed her upcoming Watchman procedure. This was done on 10/8. She was discharged the following day on Xarelto (Dr. Ko had approved) and Crestor started given presence of coronary calcification. Of note, she did have some pleuritic discomfort following the procedure for which Tylenol was rec'd.     Interval History:  Overall feeling really good. No recurrent CP - had some discomfort when leaning forward for  the first few days, but all has improved. No R groin site discomofort. No bleeding issues on the Xarelto. Remains on BID PPI give her hx of GI bleed.    No c/o AFib on the flecainide.     Overall, she's really pleased. She shares with me that she's planning on moving back to [place] in a few years.    Recent Diagnostic Testing:  FRIDA 10/8/2020 with nl LVEF, nl RVEF, mild dilation of LA. Trace valve abnls  CT A Heart 9/8/2020 with normal aorta. Coronary calcification noted. Non-cardiac portion with 4 mm non-calcified medial RML nodule, indeterminate 4 mm RUL nodule anterior and 3 mm lingular nodule noted. Low risk - no follow-up needed; High-risk - optional CT follow-up 12 m  Echocardiogam 1/2020 showed EF 55-60%. No RWMA. RV OK. Mild MAC. Trace TR.   Echocardiogram 7/2017 showed an EF of 55-60% with borderline LVH.  LA was mildly dilated at 4.6 cm with a volume index of 24.2 mL/m .  She was noted to have a dilated IVC with elevated right atrial pressure at 15-20.  No significant valvular abnormalities were noted.  Nuclear stress test 3/2016 was negative for ischemia    Assessment & Plan:    1. Paroxysmal AFib    Remains on flecainide 150 mg q 12 hours    Had not been on AC given GI bleed, but Dr. Ko noted she could be on this short term post Watchman (8/11 Telephone Note & scanned documentation)    She's done well, without problems post Watchman    PLAN:    Dr. Toth appt with FRIDA and labs will be scheduled ~4.5 weeks    Hgb today looks good (came back after appt)  Component       Hemoglobin   Latest Ref Rng & Units       11.7 - 15.7 g/dL   6/12/2020      10:48 AM 11.9   10/5/2020      11:44 AM 12.4   10/23/2020      12:35 PM 11.7       2. HTN    Remains on metprolol XL 12.5 mg daily, Maxzide 37.5/25 and losartan 100 mg daily.     BP super high today but notes she's had a headache all day and hasn't eaten as didn't know if labs were supposed to be fasting    Just checked recently and in 120s.     PLAN:    Continue  meds     3. Coronary Calcification    Noted on CT Scan    Last stress test 2016 without ischemia. Echo 1/2020 with normal EF    Remains on BB & ARB. Statin now started based on labs done at hospital  Component      Latest Ref Rng & Units 5/25/2018 3/13/2019 10/8/2020          10:23 AM  9:52 AM  8:40 AM   Cholesterol      <200 mg/dL 213 (H) 172 217 (H)   Triglycerides      <150 mg/dL 297 (H) 212 (H) 325 (H)   HDL Cholesterol      >49 mg/dL 51 45 (L) 46 (L)   LDL Cholesterol Calculated      <100 mg/dL 103 (H) 85 106 (H)   Non HDL Cholesterol      <130 mg/dL 162 (H) 127 171 (H)       Has controlled BP. No tobacco for years. Strong FHx for CAD/CVA     PLAN:    Doing fine on Crestor 20 mg daily     4. Pulmonary Nodules    Scattered, <4 mm.  Optional 12 m follow-up and she doesn't think she'll do this as she smoke <6 months and considers herself low risk     PLAN:    Had previously messaged Dr. Jama. Pt aware.    Monika Sue PA-C, MSPAS      No orders of the defined types were placed in this encounter.    No orders of the defined types were placed in this encounter.    There are no discontinued medications.      Encounter Diagnosis   Name Primary?     Atrial fibrillation with RVR (H)        CURRENT MEDICATIONS:  Current Outpatient Medications   Medication Sig Dispense Refill     acetaminophen (TYLENOL) 500 MG tablet Take 500-1,000 mg by mouth 3 times daily as needed for mild pain       aspirin (ASA) 81 MG EC tablet Take 1 tablet (81 mg) by mouth daily       flecainide (TAMBOCOR) 150 MG tablet Take 1 tablet (150 mg) by mouth every 12 hours 60 tablet 11     gabapentin (NEURONTIN) 300 MG capsule Take 3 capsules (900 mg) by mouth At Bedtime 270 capsule 3     levothyroxine (SYNTHROID/LEVOTHROID) 137 MCG tablet TAKE 1 TABLET (137 MCG) BY MOUTH DAILY 28 tablet 10     losartan (COZAAR) 100 MG tablet TAKE 1 TABLET BY MOUTH DAILY (Patient taking differently: Take 100 mg by mouth daily ) 90 tablet 3     metoprolol succinate ER  (TOPROL-XL) 25 MG 24 hr tablet Take 0.5 tablets (12.5 mg) by mouth daily 30 tablet 3     order for DME DREAMSTATION  5-15 CM/H20  NASAL AIRFIT N10 HER       pantoprazole (PROTONIX) 40 MG EC tablet TAKE 1 TABLET BY MOUTH TWICE A DAY BEFORE MEALS (Patient taking differently: Take 40 mg by mouth 2 times daily ) 180 tablet 1     rivaroxaban ANTICOAGULANT (XARELTO ANTICOAGULANT) 20 MG TABS tablet Take 1 tablet (20 mg) by mouth daily (with dinner) 30 tablet 0     rosuvastatin (CRESTOR) 20 MG tablet Take 1 tablet (20 mg) by mouth daily 30 tablet 0     SUMAtriptan (IMITREX) 100 MG tablet Take 1 tablet (100 mg) by mouth at onset of headache for migraine May repeat in 2 hours if needed: max 2/day; 10 tablet 11     triamterene-HCTZ (MAXZIDE-25) 37.5-25 MG tablet Take 1 tablet by mouth daily 90 tablet 3       ALLERGIES     Allergies   Allergen Reactions     Morphine Anaphylaxis     Ceclor [Cefaclor] Hives     Diltiazem Hives     Lisinopril Cough     Sertraline Nausea and Vomiting       PAST MEDICAL HISTORY:  Past Medical History:   Diagnosis Date     Depression       Duodenal ulcer 03/04/2019     Essential hypertension       GERD (gastroesophageal reflux disease)      Hiatal hernia      Hypothyroidism      Migraine without aura and without status migrainosus, not intractable  Aug 2016     Obesity      NELY on CPAP      Osteoarthritis      Paroxysmal atrial fibrillation (H) 01/05/2015     RLS (restless legs syndrome)      Symptomatic menopausal or female climacteric states (aka FLASHES)      TMJ disease        PAST SURGICAL HISTORY:  Past Surgical History:   Procedure Laterality Date     APPENDECTOMY       CHOLECYSTECTOMY       COLONOSCOPY N/A 4/12/2019    Procedure: COLONOSCOPY;  Surgeon: Vahid Cardona MD;  Location:  GI     EP LEFT ATRIAL APPENDAGE CLOSURE N/A 10/8/2020    Procedure: EP Left Atrial Appendage Closure;  Surgeon: Constantin Vaughn MD;  Location:  HEART CARDIAC CATH LAB     ESOPHAGOSCOPY,  GASTROSCOPY, DUODENOSCOPY (EGD), COMBINED N/A 2019    Procedure: COMBINED ESOPHAGOSCOPY, GASTROSCOPY, DUODENOSCOPY (EGD);  Surgeon: Ben Ko MD;  Location:  GI     HYSTERECTOMY, [facility]  1999     TONSILLECTOMY         FAMILY HISTORY:  Family History   Problem Relation Age of Onset     Myocardial Infarction Mother      Heart Failure Mother      Heart Surgery Father         bypass surgery     Cerebrovascular Disease Father      Heart Surgery Brother      Hyperlipidemia Brother      Hyperlipidemia Sister      Hyperlipidemia Brother      Sleep Apnea Sister        SOCIAL HISTORY:  Social History     Socioeconomic History     Marital status:      Spouse name: None     Number of children: None     Years of education: None     Highest education level: None   Occupational History     None   Social Needs     Financial resource strain: None     Food insecurity     Worry: None     Inability: None     Transportation needs     Medical: None     Non-medical: None   Tobacco Use     Smoking status: Former Smoker     Packs/day: 0.25     Years: 1.00     Pack years: 0.25     Types: Cigarettes     Start date:      Quit date:      Years since quittin.8     Smokeless tobacco: Never Used     Tobacco comment: very minimal smoking history   Substance and Sexual Activity     Alcohol use: No     Alcohol/week: 0.0 standard drinks     Drug use: No     Sexual activity: Never   Lifestyle     Physical activity     Days per week: 3 days     Minutes per session: 10 min     Stress: None   Relationships     Social connections     Talks on phone: None     Gets together: None     Attends Restorationist service: None     Active member of club or organization: None     Attends meetings of clubs or organizations: None     Relationship status: None     Intimate partner violence     Fear of current or ex partner: None     Emotionally abused: None     Physically abused: None     Forced sexual activity: None   Other Topics  Concern     Parent/sibling w/ CABG, MI or angioplasty before 65F 55M? Not Asked      Service Not Asked     Blood Transfusions Not Asked     Caffeine Concern No     Occupational Exposure Not Asked     Hobby Hazards Not Asked     Sleep Concern Yes     Stress Concern Not Asked     Weight Concern No     Special Diet No     Back Care Not Asked     Exercise No     Bike Helmet Not Asked     Seat Belt Yes     Self-Exams Not Asked   Social History Narrative     None       Review of Systems:  Skin:  Negative     Eyes:  Positive for glasses  ENT:  Negative    Respiratory:  Positive for sleep apnea;CPAP  Cardiovascular:  Negative for;palpitations;chest pain;edema    Gastroenterology: Negative for melena;hematochezia  Genitourinary:  Negative    Musculoskeletal:  Positive for arthritis  Neurologic:  Negative    Psychiatric:  Positive for depression  Heme/Lymph/Imm:  Positive for allergies  Endocrine:  Positive for thyroid disorder    Physical Exam:  Vitals: BP (!) 162/92   Pulse 63   Wt 121.6 kg (268 lb)   SpO2 95%   BMI 47.47 kg/m      Constitutional:  cooperative, alert and oriented, well developed, well nourished, in no acute distress        Skin:  warm and dry to the touch, no apparent skin lesions or masses noted        Head:  normocephalic, no masses or lesions        Eyes:  pupils equal and round;conjunctivae and lids unremarkable;sclera white        ENT:  no pallor or cyanosis, dentition good        Neck:  JVP normal;no carotid bruit        Chest:  normal respiratory excursion        Cardiac: regular rhythm;no murmurs, gallops or rubs detected                  Abdomen:  abdomen soft obese      Vascular: pulses full and equal                                 R groin site without tenderness    Extremities and Back:  no deformities, clubbing, cyanosis, erythema observed;no edema        Neurological:  no gross motor deficits        Recent Lab Results:  LIPID RESULTS:  Lab Results   Component Value Date    CHOL 217  (H) 10/08/2020    HDL 46 (L) 10/08/2020     (H) 10/08/2020    TRIG 325 (H) 10/08/2020    CHOLHDLRATIO 4.7 09/24/2015       LIVER ENZYME RESULTS:  Lab Results   Component Value Date    AST 19 01/21/2020    ALT 18 01/21/2020       CBC RESULTS:  Lab Results   Component Value Date    WBC 8.7 10/05/2020    RBC 4.57 10/05/2020    HGB 11.7 10/23/2020    HCT 39.8 10/05/2020    MCV 87 10/05/2020    MCH 27.1 10/05/2020    MCHC 31.2 (L) 10/05/2020    RDW 16.3 (H) 10/05/2020     (H) 10/05/2020       BMP RESULTS:  Lab Results   Component Value Date     10/05/2020    POTASSIUM 3.7 10/08/2020    CHLORIDE 103 10/05/2020    CO2 28 10/05/2020    ANIONGAP 4 10/05/2020     (H) 10/05/2020    BUN 12 10/05/2020    CR 0.75 10/05/2020    GFRESTIMATED 84 10/05/2020    GFRESTBLACK >90 10/05/2020    J CARLOS 8.6 10/05/2020          Thank you for allowing me to participate in the care of your patient.    Sincerely,     Ilda Sue PA-C     [facility]

## 2020-10-25 PROBLEM — R91.8 PULMONARY NODULES: Status: ACTIVE | Noted: 2020-10-25

## 2020-10-25 PROBLEM — E78.5 HYPERLIPIDEMIA LDL GOAL <100: Status: ACTIVE | Noted: 2020-10-25

## 2020-10-26 DIAGNOSIS — Z11.59 ENCOUNTER FOR SCREENING FOR OTHER VIRAL DISEASES: Primary | ICD-10-CM

## 2020-10-26 NOTE — PATIENT INSTRUCTIONS
Fasting labs in mid/later November to follow-up on cholesterol/ liver status with Rosuvastatin  Follow-up with cardiology later this month as scheduled with Hemoglobin recheck given shortterm use of Xarelto after Watchan procedure  Flu vaccine - get at pharmacy  Continue other medications   After done with Xarelto. If back/leg pain bothersome and willing to have a spinal steroid injection, inform clinic and will place order

## 2020-11-06 ENCOUNTER — TELEPHONE (OUTPATIENT)
Dept: CARDIOLOGY | Facility: CLINIC | Age: 65
End: 2020-11-06

## 2020-11-06 DIAGNOSIS — I48.0 PAROXYSMAL ATRIAL FIBRILLATION (H): ICD-10-CM

## 2020-11-06 DIAGNOSIS — I25.10 CORONARY ARTERY CALCIFICATION SEEN ON CAT SCAN: ICD-10-CM

## 2020-11-06 DIAGNOSIS — K92.2 ACUTE GI BLEEDING: ICD-10-CM

## 2020-11-06 RX ORDER — PANTOPRAZOLE SODIUM 40 MG/1
TABLET, DELAYED RELEASE ORAL
Qty: 56 TABLET | Refills: 1 | Status: SHIPPED | OUTPATIENT
Start: 2020-11-06 | End: 2020-12-31

## 2020-11-06 RX ORDER — ROSUVASTATIN CALCIUM 20 MG/1
20 TABLET, COATED ORAL DAILY
Qty: 90 TABLET | Refills: 0 | Status: SHIPPED | OUTPATIENT
Start: 2020-11-06 | End: 2021-01-04

## 2020-11-06 NOTE — TELEPHONE ENCOUNTER
Patient called requesting refill on crestor and xarelto.  Will only send 30 day supply xarelto as patient recently had WATCHMAN placed and has 45 day follow up on 11/30 and AC should be stopped if FRIDA looks good.  KRISTOPHER Salas

## 2020-11-16 DIAGNOSIS — I48.0 PAROXYSMAL ATRIAL FIBRILLATION (H): ICD-10-CM

## 2020-11-16 DIAGNOSIS — Z95.818 PRESENCE OF WATCHMAN LEFT ATRIAL APPENDAGE CLOSURE DEVICE: ICD-10-CM

## 2020-11-16 LAB
CREAT SERPL-MCNC: 0.8 MG/DL (ref 0.52–1.04)
GFR SERPL CREATININE-BSD FRML MDRD: 78 ML/MIN/{1.73_M2}
HGB BLD-MCNC: 12.1 G/DL (ref 11.7–15.7)

## 2020-11-16 PROCEDURE — 36415 COLL VENOUS BLD VENIPUNCTURE: CPT | Performed by: INTERNAL MEDICINE

## 2020-11-16 PROCEDURE — 85018 HEMOGLOBIN: CPT | Performed by: INTERNAL MEDICINE

## 2020-11-16 PROCEDURE — 82565 ASSAY OF CREATININE: CPT | Performed by: INTERNAL MEDICINE

## 2020-11-17 ENCOUNTER — ALLIED HEALTH/NURSE VISIT (OUTPATIENT)
Dept: NURSING | Facility: CLINIC | Age: 65
End: 2020-11-17
Payer: MEDICARE

## 2020-11-17 DIAGNOSIS — Z23 NEED FOR PROPHYLACTIC VACCINATION AND INOCULATION AGAINST INFLUENZA: Primary | ICD-10-CM

## 2020-11-17 DIAGNOSIS — Z11.59 ENCOUNTER FOR SCREENING FOR OTHER VIRAL DISEASES: ICD-10-CM

## 2020-11-17 PROCEDURE — 99207 PR NO CHARGE NURSE ONLY: CPT

## 2020-11-17 PROCEDURE — G0008 ADMIN INFLUENZA VIRUS VAC: HCPCS

## 2020-11-17 PROCEDURE — 90662 IIV NO PRSV INCREASED AG IM: CPT

## 2020-11-17 PROCEDURE — U0003 INFECTIOUS AGENT DETECTION BY NUCLEIC ACID (DNA OR RNA); SEVERE ACUTE RESPIRATORY SYNDROME CORONAVIRUS 2 (SARS-COV-2) (CORONAVIRUS DISEASE [COVID-19]), AMPLIFIED PROBE TECHNIQUE, MAKING USE OF HIGH THROUGHPUT TECHNOLOGIES AS DESCRIBED BY CMS-2020-01-R: HCPCS | Performed by: INTERNAL MEDICINE

## 2020-11-18 LAB
SARS-COV-2 RNA SPEC QL NAA+PROBE: NOT DETECTED
SPECIMEN SOURCE: NORMAL

## 2020-11-20 ENCOUNTER — HOSPITAL ENCOUNTER (OUTPATIENT)
Dept: CARDIOLOGY | Facility: CLINIC | Age: 65
End: 2020-11-20
Attending: INTERNAL MEDICINE
Payer: MEDICARE

## 2020-11-20 ENCOUNTER — PATIENT OUTREACH (OUTPATIENT)
Dept: CARE COORDINATION | Facility: CLINIC | Age: 65
End: 2020-11-20

## 2020-11-20 ENCOUNTER — HOSPITAL ENCOUNTER (OUTPATIENT)
Facility: CLINIC | Age: 65
Discharge: HOME | End: 2020-11-20
Admitting: INTERNAL MEDICINE
Payer: MEDICARE

## 2020-11-20 VITALS
SYSTOLIC BLOOD PRESSURE: 128 MMHG | OXYGEN SATURATION: 96 % | RESPIRATION RATE: 17 BRPM | DIASTOLIC BLOOD PRESSURE: 61 MMHG | TEMPERATURE: 98.6 F | HEIGHT: 63 IN | HEART RATE: 60 BPM | WEIGHT: 268.8 LBS | BODY MASS INDEX: 47.63 KG/M2

## 2020-11-20 DIAGNOSIS — Z95.818 PRESENCE OF WATCHMAN LEFT ATRIAL APPENDAGE CLOSURE DEVICE: ICD-10-CM

## 2020-11-20 DIAGNOSIS — I48.0 PAROXYSMAL ATRIAL FIBRILLATION (H): ICD-10-CM

## 2020-11-20 PROCEDURE — 93325 DOPPLER ECHO COLOR FLOW MAPG: CPT | Mod: 26 | Performed by: INTERNAL MEDICINE

## 2020-11-20 PROCEDURE — 250N000009 HC RX 250

## 2020-11-20 PROCEDURE — 93005 ELECTROCARDIOGRAM TRACING: CPT

## 2020-11-20 PROCEDURE — 99152 MOD SED SAME PHYS/QHP 5/>YRS: CPT | Performed by: INTERNAL MEDICINE

## 2020-11-20 PROCEDURE — 999N000054 HC STATISTIC EKG NON-CHARGEABLE

## 2020-11-20 PROCEDURE — 93312 ECHO TRANSESOPHAGEAL: CPT | Mod: 26 | Performed by: INTERNAL MEDICINE

## 2020-11-20 PROCEDURE — 93010 ELECTROCARDIOGRAM REPORT: CPT | Performed by: INTERNAL MEDICINE

## 2020-11-20 PROCEDURE — 93320 DOPPLER ECHO COMPLETE: CPT

## 2020-11-20 PROCEDURE — 258N000003 HC RX IP 258 OP 636

## 2020-11-20 PROCEDURE — 250N000011 HC RX IP 250 OP 636

## 2020-11-20 PROCEDURE — 999N000183 HC STATISTIC TEE INCLUDES SEDATION

## 2020-11-20 PROCEDURE — 93320 DOPPLER ECHO COMPLETE: CPT | Mod: 26 | Performed by: INTERNAL MEDICINE

## 2020-11-20 RX ORDER — DEXTROSE MONOHYDRATE 25 G/50ML
9.5 INJECTION, SOLUTION INTRAVENOUS
Status: DISCONTINUED | OUTPATIENT
Start: 2020-11-20 | End: 2020-11-20 | Stop reason: HOSPADM

## 2020-11-20 RX ORDER — FLUMAZENIL 0.1 MG/ML
0.2 INJECTION, SOLUTION INTRAVENOUS
Status: DISCONTINUED | OUTPATIENT
Start: 2020-11-20 | End: 2020-11-20 | Stop reason: HOSPADM

## 2020-11-20 RX ORDER — FENTANYL CITRATE 50 UG/ML
25 INJECTION, SOLUTION INTRAMUSCULAR; INTRAVENOUS
Status: DISCONTINUED | OUTPATIENT
Start: 2020-11-20 | End: 2020-11-20 | Stop reason: HOSPADM

## 2020-11-20 RX ORDER — LIDOCAINE 40 MG/G
CREAM TOPICAL
Status: DISCONTINUED | OUTPATIENT
Start: 2020-11-20 | End: 2020-11-20 | Stop reason: HOSPADM

## 2020-11-20 RX ORDER — NALOXONE HYDROCHLORIDE 0.4 MG/ML
0.4 INJECTION, SOLUTION INTRAMUSCULAR; INTRAVENOUS; SUBCUTANEOUS
Status: DISCONTINUED | OUTPATIENT
Start: 2020-11-20 | End: 2020-11-20 | Stop reason: HOSPADM

## 2020-11-20 RX ORDER — GLYCOPYRROLATE 0.2 MG/ML
0.1 INJECTION, SOLUTION INTRAMUSCULAR; INTRAVENOUS ONCE
Status: COMPLETED | OUTPATIENT
Start: 2020-11-20 | End: 2020-11-20

## 2020-11-20 RX ORDER — NALOXONE HYDROCHLORIDE 0.4 MG/ML
0.2 INJECTION, SOLUTION INTRAMUSCULAR; INTRAVENOUS; SUBCUTANEOUS
Status: DISCONTINUED | OUTPATIENT
Start: 2020-11-20 | End: 2020-11-20 | Stop reason: HOSPADM

## 2020-11-20 RX ORDER — LIDOCAINE 50 MG/G
OINTMENT TOPICAL ONCE
Status: COMPLETED | OUTPATIENT
Start: 2020-11-20 | End: 2020-11-20

## 2020-11-20 RX ORDER — SODIUM CHLORIDE 9 MG/ML
INJECTION, SOLUTION INTRAVENOUS CONTINUOUS PRN
Status: DISCONTINUED | OUTPATIENT
Start: 2020-11-20 | End: 2020-11-20 | Stop reason: HOSPADM

## 2020-11-20 RX ORDER — LIDOCAINE HYDROCHLORIDE 40 MG/ML
1.5 SOLUTION TOPICAL ONCE
Status: COMPLETED | OUTPATIENT
Start: 2020-11-20 | End: 2020-11-20

## 2020-11-20 RX ADMIN — MIDAZOLAM HYDROCHLORIDE 1 MG: 1 INJECTION, SOLUTION INTRAMUSCULAR; INTRAVENOUS at 09:48

## 2020-11-20 RX ADMIN — LIDOCAINE HYDROCHLORIDE 1.5 ML: 40 SOLUTION TOPICAL at 09:16

## 2020-11-20 RX ADMIN — MIDAZOLAM HYDROCHLORIDE 1 MG: 1 INJECTION, SOLUTION INTRAMUSCULAR; INTRAVENOUS at 09:44

## 2020-11-20 RX ADMIN — MIDAZOLAM HYDROCHLORIDE 1 MG: 1 INJECTION, SOLUTION INTRAMUSCULAR; INTRAVENOUS at 09:54

## 2020-11-20 RX ADMIN — MIDAZOLAM HYDROCHLORIDE 1 MG: 1 INJECTION, SOLUTION INTRAMUSCULAR; INTRAVENOUS at 09:49

## 2020-11-20 RX ADMIN — FENTANYL CITRATE 50 MCG: 50 INJECTION, SOLUTION INTRAMUSCULAR; INTRAVENOUS at 09:45

## 2020-11-20 RX ADMIN — FENTANYL CITRATE 25 MCG: 50 INJECTION, SOLUTION INTRAMUSCULAR; INTRAVENOUS at 09:48

## 2020-11-20 RX ADMIN — SODIUM CHLORIDE 30 ML/HR: 9 INJECTION, SOLUTION INTRAVENOUS at 08:10

## 2020-11-20 RX ADMIN — TOPICAL ANESTHETIC 0.5 ML: 200 SPRAY DENTAL; PERIODONTAL at 09:41

## 2020-11-20 RX ADMIN — GLYCOPYRROLATE 0.1 MG: 0.2 INJECTION, SOLUTION INTRAMUSCULAR; INTRAVENOUS at 09:14

## 2020-11-20 RX ADMIN — FENTANYL CITRATE 25 MCG: 50 INJECTION, SOLUTION INTRAMUSCULAR; INTRAVENOUS at 09:53

## 2020-11-20 ASSESSMENT — MIFFLIN-ST. JEOR: SCORE: 1733.4

## 2020-11-20 ASSESSMENT — ACTIVITIES OF DAILY LIVING (ADL): DEPENDENT_IADLS:: CLEANING;TRANSPORTATION

## 2020-11-20 NOTE — DISCHARGE INSTRUCTIONS
FRIDA  (Transesophageal Echocardiogram)  Discharge Instructions    After you go home:      Have an adult stay with you for 6 hours.       For 24 hours - due to the sedation you received:    Relax and take it easy.    Do NOT make any important or legal decisions.    Do NOT drive or operate machines at home or at work.    Do NOT drink alcohol.    Diet:    You may resume your normal diet, but no scratchy foods for two days.    If your throat is sore, eat cold, bland or soft foods.    You may have heartburn if the tube used in the exam entered your stomach.  If so:   - Do not eat acidic and spicy foods.   - Do not eat three hours before bedtime. Clear liquids are okay.   - When lying down, use two pillows to raise your head.    Medicines:      Take your medications, including blood thinners, unless your provider tells you not to.    If you have stopped any medicines, check with your provider about when to restart them.    You may take Tylenol (Acetaminophen) if your throat is sore.    You may take antacids if you have heartburn.      Follow Up Appointments:      Follow up with your cardiologist at [facility] of patient preference as instructed.    Follow up with your primary care provider as needed.    Call the clinic if:      You have heartburn that is severe or lasts more than 72 hours.    You have a sore throat that feels worse after 72 hours.    You have shortness of breath, neck pain, chest pain, fever, chills, coughing up blood, or other unusual signs.    Questions or concerns      [facility] [place]:    [phone] [facility] (7 days a week)

## 2020-11-20 NOTE — PROGRESS NOTES
Care Suites Procedure Nursing Note    Patient Information  Name: Maxine Sears  Age: 65 year old    Procedure  Procedure: FRIDA - post Watchman procedure (10/8)  Procedure start time: 0945  Procedure complete time: 1015  Concerns/abnormal assessment:   *See Procedural flowsheet/ VS flowsheet  Total procedure time 30 minutes   Sedation- Versed 4 mg  Fentanyl 100 mcgs.   Plan/Other: Monitor post procedure and discharge when awake     Care Suites Post Procedure Note    Post Procedure  Concerns/abnormal assessment: none  Plan/Other: Monitor post FRIDA.   Review AVS   Discharge in one hour     Care Suites Discharge Nursing Note      Discharge Education:  Discharge instructions reviewed: Yes  AVS reviewed and understood  Additional education/resources provided: no  Patient/patient representative verbalizes understanding: Yes  Patient discharging on new medications: No  Medication education completed: Yes    Discharge Plans:   Discharge location: home  Discharge ride contacted: Yes  Approximate discharge time: 1130    Discharge Criteria:  Discharge criteria met and vital signs stable: Yes    Patient Belongs:  Patient belongings returned to patient: Yes    Hung Anglin RN

## 2020-11-20 NOTE — PROGRESS NOTES
Care Suites Admission Nursing Note    Patient Information  Name: Maxine Sears  Age: 65 year old  Reason for admission: FRIDA - PAF - 10/8 Watchman procedure   Care Suites arrival time: 0700    Patient Admission/Assessment   Pre-procedure assessment complete: Yes  EKG- Sinus rhythm   If abnormal assessment/labs, provider notified: recent labs 11/16  Creat .80, Hgb 12.1  NPO: Yes  Medications held per instructions/orders: N/A  Consent: obtained  Patient oriented to room: Yes  Education/questions answered: Yes  Plan/other: FRIDA at 0930  See Procedural Sedation flowsheet    Discharge Planning  Discharge name/phone number   [employer]  [phone]  RN verified that transport is medically trained- pt. Is curb to curb and her sister will be at the house.     Overnight post sedation caregiver: Laurence sister [phone]  Discharge location: home    Hung Anglin RN

## 2020-11-20 NOTE — PROCEDURES
[facility]    Procedure: Tranesophageal Echocardiogram    Date/Time: 11/20/2020 10:20 AM  Performed by: Renuka Bashir MD  Authorized by: Renuka Bashir MD     UNIVERSAL PROTOCOL   Site Marked: Yes  Prior Images Obtained and Reviewed:  Yes  Required items: Required blood products, implants, devices and special equipment available    Patient identity confirmed:  Verbally with patient  Patient was reevaluated immediately before administering moderate or deep sedation or anesthesia  Confirmation Checklist:  Patient's identity using two indicators  Time out: Immediately prior to the procedure a time out was called    Universal Protocol: the Joint Commission Universal Protocol was followed          SEDATION    Patient Sedated: Yes    Sedation:  Fentanyl and midazolam  Vital signs: Vital signs monitored during sedation    PROCEDURE   Patient Tolerance:  Patient tolerated the procedure well with no immediate complications  Describe Procedure: 4 IV versed  100 mcg IV fentanyl     Patient intubated without difficulty. Watchman device visualized with moshe-device leak. Full report to follow. Recommend cardiac CT.  Length of time physician/provider present for 1:1 monitoring during sedation: 30

## 2020-11-20 NOTE — PROGRESS NOTES
Clinic Care Coordination Contact    Situation: Patient chart reviewed by care coordinator.    Background: Patient enrolled with Care Coordination; current goal of improving cholesterol panel.    Assessment: Per chart review, patient admitted today for FRIDA to evaluate appropriateness of stopping Xarelto.    Plan/Recommendations: CC RN will outreach to patient in approximately 1 business day to get updates on patient's status since last outreach and follow up after recent FRIDA procedure.    Damien Person RN  Clinic Care Coordinator  [facility]  Ph: [phone]

## 2020-11-20 NOTE — PRE-PROCEDURE
GENERAL PRE-PROCEDURE:   Procedure:  Transesophageal echocardiogram   Date/Time:  11/20/2020 9:41 AM    Verbal consent obtained?: Yes    Written consent obtained?: Yes    Risks and benefits: Risks, benefits and alternatives were discussed    Consent given by:  Patient  Patient states understanding of procedure being performed: Yes    Patient's understanding of procedure matches consent: Yes    Procedure consent matches procedure scheduled: Yes    Appropriately NPO:  Yes  ASA Class:  Class 3- Severe systemic disease, definite functional limitations  Mallampati  :  Grade 2- soft palate, base of uvula, tonsillar pillars, and portion of posterior pharyngeal wall visible  Lungs:  Lungs clear with good breath sounds bilaterally  Heart:  Normal heart sounds and rate  History & Physical reviewed:  History and physical reviewed and no updates needed  Statement of review:  I have reviewed the lab findings, diagnostic data, medications, and the plan for sedation

## 2020-11-20 NOTE — PROGRESS NOTES
PATIENT/VISITOR WELLNESS SCREENING    Step 1 Patient Screening    1. In the last month, have you been in contact with someone who was confirmed or suspected to have Coronavirus/COVID-19? No  11/17/2020 - Negative Covid screen     2. Do you have the following symptoms?  Fever/Chills? No   Cough? No   Shortness of breath? No   New loss of taste or smell? No  Sore throat? No  Muscle or body aches? No  Headaches? No  Fatigue? No  Vomiting or diarrhea? No    Step 3 Refer to logic grid below for actions    NO SYMPTOM(S)    ACTIONS:  1. Standard rooming process  2. Provider to assess per normal protocol  3. Implement precautions as needed and per guidelines     POSITIVE SYMPTOM(S)  If positive for ANY of the following symptoms: fever, cough, shortness of breath, rash    ACTION:  1. Continue to have the patient wear a mask   2. Room patient as soon as possible  3. Don appropriate PPE when entering room  4. Provider evaluation

## 2020-11-23 ENCOUNTER — PATIENT OUTREACH (OUTPATIENT)
Dept: NURSING | Facility: CLINIC | Age: 65
End: 2020-11-23
Payer: MEDICARE

## 2020-11-23 ASSESSMENT — ACTIVITIES OF DAILY LIVING (ADL): DEPENDENT_IADLS:: CLEANING;TRANSPORTATION

## 2020-11-23 NOTE — PROGRESS NOTES
"Clinic Care Coordination Contact    OUTREACH    Referral Information:  Referral Source: IP Report  Primary Diagnosis: Cardiovascular - other  Chief Complaint   Patient presents with     Clinic Care Coordination - Post Hospital     post-procedure follow-up      Universal Utilization: reviewed on this date  Difficulty keeping appointments: No  Compliance Concerns: No  No-Show Concerns: No  No PCP office visit in Past Year: No  Utilization    Last refreshed: 11/21/2020  2:19 AM: Hospital Admissions 3           Last refreshed: 11/21/2020  2:19 AM: ED Visits 1           Last refreshed: 11/21/2020  2:19 AM: No Show Count (past year) 1              Current as of: 11/21/2020  2:19 AM          Patient was admitted to [facility] on 11/20/20 for FRIDA.  CC RN outreach to patient for discharge follow-up.    Patient reported to be doing quite well following her FRIDA.  She stated her throat was a little sore but she otherwise denied pain or other symptoms.    Patient's sister stayed with her for a day following her procedure to ensure no complications.    Patient updated that she has a Virtual Visit scheduled with Cardiology next Monday at which time they'll discuss results of her procedure and plan moving forward.  Patient hopes to be able to stop her Xarelto.    Patient updated that things with her goal have been going okay. She stated \"exercise is slow right now\" but she hopes that she can get cleared by Cardiology and be able to focus on exercising more.  Patient has calculated how far she would need to walk in her hallways/building for it to be the equivalent of walking around her block and plans to do this.    Patient noted her Heart Healthy diet has been going pretty well.  She does still eat sweets or other non-Heart Healthy items at times but has started taking more control over what she eats by making her own foods.  She purchased a bread maker so she can use less sugar/salt and use health add-ins like " grains.  Patient also notes she has been replacing pasta with things like spaghetti squash.    CC RN reviewed with patient reason for following a low-salt diet with heart disease.  Discussed salt's impact on heart and blood pressure.  Patient was appreciative of the re-education and plans to continue striving toward a Heart Healthy diet.    Patient denied further questions or concerns at this time but agreed to contact CC RN should any arise.    Clinical Concerns:  Current Medical Concerns: atrial fibrillation, left atrial appendage occluder/Watchman device, admitted for FRIDA  Current Behavioral Concerns: depression  Education Provided to patient: reviewed discharge AVS     Pain  Pain (GOAL): Yes  Type: Chronic (>3mo)  Location of chronic pain: back, left knee    Medication Management:  Post-discharge medication reconciliation status: Discharge medications reviewed and reconciled.  Continue medications without change.    Functional Status:  Dependent ADLs: Independent  Dependent IADLs: Cleaning, Transportation  Bed or wheelchair confined: No  Mobility Status: Independent w/Device  Fallen 2 or more times in the past year?: No  Any fall with injury in the past year?: No    Living Situation:  Current living arrangement: I live alone  Type of residence: Independent Senior Living    Lifestyle & Psychosocial Needs:  Lifestyle     Physical activity     Days per week: 3 days     Minutes per session: 10 min     Stress: Not on file   Diet: Regular  Inadequate nutrition (GOAL): No  Tube Feeding: No  Inadequate activity/exercise (GOAL): Yes  Significant changes in sleep pattern (GOAL): No  Transportation means: Regular car, Family, Friend  Financial/Insurance concerns (GOAL): No  Mormonism or spiritual beliefs that impact treatment: No  Mental health DX: Yes  Mental health DX how managed: None  Mental health management concern (GOAL): No  Informal Support system: Family, Friends, Debby based   Socioeconomic History     Marital  status:      Spouse name: Not on file     Number of children: Not on file     Years of education: Not on file     Highest education level: Not on file     Tobacco Use     Smoking status: Former Smoker     Packs/day: 0.25     Years: 1.00     Pack years: 0.25     Types: Cigarettes     Start date:      Quit date:      Years since quittin.9     Smokeless tobacco: Never Used     Tobacco comment: very minimal smoking history   Substance and Sexual Activity     Alcohol use: No     Alcohol/week: 0.0 standard drinks     Drug use: No     Sexual activity: Never     Care Coordinator has reviewed patient's Social Determinants of Health (SDoH) on this date. Upon review, changes were not  made.     Resources and Interventions:  Community Resources: Housekeeping/Chore Agency  Supplies used at home: None  Equipment Currently Used at Home: walker, rolling    Advance Care Plan/Directive  Advanced Care Plans/Directives on file: No  Advanced Care Plan/Directive Status: (not discussed this encounter)    Referrals Placed: None     Goals:   Goals        General    1. Medical (pt-stated)     Notes - Note edited  10/12/2020  1:35 PM by Damien Person RN    Goal Statement: I will avoid health complications by improving my cholesterol panel.  Date Goal set: 10/12/20  Barriers: limited mobility related to knee pain  Strengths: motivated to reduce health risks  Date to Achieve By: 21  Patient expressed understanding of goal: Yes    Action steps to achieve this goal:  1. I will continue taking my medications as prescribed.  2. I will follow a Heart Healthy diet (low-fat, low-salt).  3. I will work toward regular exercise as able.  4. I will attend routine follow-ups with my PCP and Cardiology.  5. I will continue working with Care Coordination to identify and address any barriers to achieving my goal.        Patient/Caregiver understanding: Yes    Outreach Frequency: monthly  Future Appointments              In 1 week  Constantin Vaughn MD [facility] [place], [facility]        Plan:    Patient will continue taking her medications as prescribed.    Patient will attend her upcoming appointments as scheduled.    Patient will continue working toward goal with action steps as discussed.    Patient agreed to contact CC RN with additional questions or concerns.    CC CHW will continue with outreaches at this time with next outreach in approximately 1 month. CC CHW will inform CC RN of any concerns or changes regarding patient as needed.  CC RN will review patient's chart in approximately 6 weeks and outreach to patient as indicated.    Damien Person RN  Clinic Care Coordinator  [facility]  Ph: [phone]

## 2020-11-24 LAB — INTERPRETATION ECG - MUSE: NORMAL

## 2020-11-30 ENCOUNTER — VIRTUAL VISIT (OUTPATIENT)
Dept: CARDIOLOGY | Facility: CLINIC | Age: 65
End: 2020-11-30
Payer: MEDICARE

## 2020-11-30 DIAGNOSIS — Z95.818 PRESENCE OF WATCHMAN LEFT ATRIAL APPENDAGE CLOSURE DEVICE: ICD-10-CM

## 2020-11-30 DIAGNOSIS — I48.0 PAROXYSMAL ATRIAL FIBRILLATION (H): ICD-10-CM

## 2020-11-30 PROCEDURE — 99213 OFFICE O/P EST LOW 20 MIN: CPT | Mod: 95 | Performed by: INTERNAL MEDICINE

## 2020-11-30 RX ORDER — CLOPIDOGREL BISULFATE 75 MG/1
75 TABLET ORAL DAILY
Qty: 30 TABLET | Refills: 3 | Status: SHIPPED | OUTPATIENT
Start: 2020-11-30 | End: 2021-02-04

## 2020-11-30 NOTE — LETTER
11/30/2020    Yuval Jama MD  [address] [facility] [address]    RE: Maxine Sears       Dear Colleague,    I had the pleasure of seeing Maxine Sears in the [facility].    Electrophysiology/ Virtual Clinic Note         H&P and Plan:   Patient opted to have a virtual visit due to ongoing issues related to ongoing COVID-19 virus pandemic and to minimize social interaction (based on CDC guidelines).      Visit details:  Patient has given verbal consent for this visit.    Interview was done talking and seeing patient via Internet.    Mode of transmission: [employer], [place].  Visit start time: 2:46 PM  Visit stop time: 2:55 PM  Provider location: Office.  Patient location: Home.       REASON FOR VISIT: Electrophysiology evaluation.      HISTORY OF PRESENT ILLNESS: Ms. Sears is a delightful 65-year-old lady with a history of hypertension, paroxysmal atrial fibrillation and recurrent GI bleed, who underwent Watchman procedure on 10/8/2020 to minimize long term risk of bleeding associated with chronic oral anticoagulation.  She is here today for routine follow-up after device implantation.    Today, she comes in doing well.  She denies any issues after device implantation.  She also denies any recurrence of atrial fibrillation.  She denies any symptoms of chest pain, shortness of breath, lightheadedness, near-syncope or syncope.    A FRIDA was repeated (45 days after device implantation) and showed good watchman device position (details below).      EKG done on 11/28/2020 showed normal sinus rhythm.  QRS was measured to 110 ms which is unchanged from previous EKGs.       Previous studies:  -FRIDA (11/20/2020): Normal LV function.  EF estimated 55-60%.  The watchman device was well-seated with adequate compression and minimal leak.  -Echocardiogram (01/21/2020): Normal LV function.  EF estimated 55-60%.  No significant LVH noticed.  - EGD (04/3/2019): - Normal esophagus. Mild  Schatzki ring. Small hiatal hernia. Scar in the gastric antrum (previous ulcer).                       ASSESSMENT AND PLAN:   1.  Paroxysmal atrial fibrillation.  No recurrence of A. Fib.    Continue therapy with flecainide and metoprolol.  2.  Embolic prevention.  CHADS-VASc score of 3.  She responded well to watchman procedure.  FRIDA 45 days after procedure confirmed good device position.  I recommend the following:   -Start Plavix in addition to aspirin   -stop Xarelto  -Follow-up in clinic in 6 months.  At that time we will may discontinue Plavix.        Constantin Vaughn MD    Physical Exam:  Vitals: There were no vitals taken for this visit.    Constitutional:  Pt is in no acute distress.  Normal body habitus, upright.  HEAD: Normocephalic. No evidence of injury or laceration.   SKIN: Skin normal color with no lesions or eruptions. No xanthelasma.  ENT/Mouth:  Membranes moist. No nasal discharge or bleeding gums.   Neck:  Supple, normal JVP, no evidence of thyromegaly.  Chest/Lungs: No audible wheezing or labored breathing. Normal chest wall expansion. No cough.   Cardiovascular: Normal jugular venous pressure. No evidence of pitting edema bilaterally.   Abdomen: No evidence of abdominal distention. No observed jaundice.  Eyes: PERRL, EOMI. No scleral icterus, normal conjunctivae.  Extremities:  No lower extremity edema noticed.  No cyanosis or clubbing noted.   Neurologic: Normal arm motion bilateral.  No tremors. No evidence of focal defect.   Psychiatric: Alert and oriented x3, calm.         CURRENT MEDICATIONS:  Current Outpatient Medications   Medication Sig Dispense Refill     acetaminophen (TYLENOL) 500 MG tablet Take 500-1,000 mg by mouth 3 times daily as needed for mild pain       aspirin (ASA) 81 MG EC tablet Take 1 tablet (81 mg) by mouth daily       flecainide (TAMBOCOR) 150 MG tablet Take 1 tablet (150 mg) by mouth every 12 hours 60 tablet 11     gabapentin (NEURONTIN) 300 MG capsule Take 3 capsules  (900 mg) by mouth At Bedtime 270 capsule 3     levothyroxine (SYNTHROID/LEVOTHROID) 137 MCG tablet TAKE 1 TABLET (137 MCG) BY MOUTH DAILY 28 tablet 10     losartan (COZAAR) 100 MG tablet TAKE 1 TABLET BY MOUTH DAILY (Patient taking differently: Take 100 mg by mouth daily ) 90 tablet 3     metoprolol succinate ER (TOPROL-XL) 25 MG 24 hr tablet Take 0.5 tablets (12.5 mg) by mouth daily 30 tablet 3     pantoprazole (PROTONIX) 40 MG EC tablet TAKE 1 TABLET BY MOUTH TWICE A DAY BEFORE MEALS 56 tablet 1     rivaroxaban ANTICOAGULANT (XARELTO ANTICOAGULANT) 20 MG TABS tablet Take 1 tablet (20 mg) by mouth daily (with dinner) 30 tablet 0     rosuvastatin (CRESTOR) 20 MG tablet Take 1 tablet (20 mg) by mouth daily 90 tablet 0     triamterene-HCTZ (MAXZIDE-25) 37.5-25 MG tablet Take 1 tablet by mouth daily 90 tablet 3     order for DME DREAMSTATION  5-15 CM/H20  NASAL AIRFIT N10 HER       SUMAtriptan (IMITREX) 100 MG tablet Take 1 tablet (100 mg) by mouth at onset of headache for migraine May repeat in 2 hours if needed: max 2/day; (Patient not taking: Reported on 11/30/2020) 10 tablet 11       ALLERGIES     Allergies   Allergen Reactions     Morphine Anaphylaxis     Ceclor [Cefaclor] Hives     Diltiazem Hives     Lisinopril Cough     Sertraline Nausea and Vomiting       PAST MEDICAL HISTORY:  Past Medical History:   Diagnosis Date     Depression       Duodenal ulcer 03/04/2019     Essential hypertension       GERD (gastroesophageal reflux disease)      Hiatal hernia      Hypothyroidism      Migraine without aura and without status migrainosus, not intractable  Aug 2016     Obesity      NELY on CPAP      Osteoarthritis      Paroxysmal atrial fibrillation (H) 01/05/2015     RLS (restless legs syndrome)      Symptomatic menopausal or female climacteric states (aka FLASHES)      TMJ disease        PAST SURGICAL HISTORY:  Past Surgical History:   Procedure Laterality Date     APPENDECTOMY       CHOLECYSTECTOMY       COLONOSCOPY  N/A 2019    Procedure: COLONOSCOPY;  Surgeon: Vahid Cardona MD;  Location:  GI     EP LEFT ATRIAL APPENDAGE CLOSURE N/A 10/8/2020    Procedure: EP Left Atrial Appendage Closure;  Surgeon: Constantin Vaughn MD;  Location:  HEART CARDIAC CATH LAB     ESOPHAGOSCOPY, GASTROSCOPY, DUODENOSCOPY (EGD), COMBINED N/A 2019    Procedure: COMBINED ESOPHAGOSCOPY, GASTROSCOPY, DUODENOSCOPY (EGD);  Surgeon: Ben Ko MD;  Location:  GI     HYSTERECTOMY, [facility]  1999     TONSILLECTOMY         FAMILY HISTORY:  Family History   Problem Relation Age of Onset     Myocardial Infarction Mother      Heart Failure Mother      Heart Surgery Father         bypass surgery     Cerebrovascular Disease Father      Heart Surgery Brother      Hyperlipidemia Brother      Hyperlipidemia Sister      Hyperlipidemia Brother      Sleep Apnea Sister        SOCIAL HISTORY:  Social History     Socioeconomic History     Marital status:      Spouse name: None     Number of children: None     Years of education: None     Highest education level: None   Occupational History     None   Social Needs     Financial resource strain: None     Food insecurity     Worry: None     Inability: None     Transportation needs     Medical: None     Non-medical: None   Tobacco Use     Smoking status: Former Smoker     Packs/day: 0.25     Years: 1.00     Pack years: 0.25     Types: Cigarettes     Start date:      Quit date:      Years since quittin.9     Smokeless tobacco: Never Used     Tobacco comment: very minimal smoking history   Substance and Sexual Activity     Alcohol use: No     Alcohol/week: 0.0 standard drinks     Drug use: No     Sexual activity: Never   Lifestyle     Physical activity     Days per week: 3 days     Minutes per session: 10 min     Stress: None   Relationships     Social connections     Talks on phone: None     Gets together: None     Attends Cheondoism service: None     Active member of  club or organization: None     Attends meetings of clubs or organizations: None     Relationship status: None     Intimate partner violence     Fear of current or ex partner: None     Emotionally abused: None     Physically abused: None     Forced sexual activity: None   Other Topics Concern     Parent/sibling w/ CABG, MI or angioplasty before 65F 55M? Not Asked      Service Not Asked     Blood Transfusions Not Asked     Caffeine Concern No     Occupational Exposure Not Asked     Hobby Hazards Not Asked     Sleep Concern Yes     Stress Concern Not Asked     Weight Concern No     Special Diet No     Back Care Not Asked     Exercise No     Bike Helmet Not Asked     Seat Belt Yes     Self-Exams Not Asked   Social History Narrative     None       Review of Systems:  Skin:        Eyes:       ENT:       Respiratory:       Cardiovascular:       Gastroenterology:      Genitourinary:       Musculoskeletal:       Neurologic:       Psychiatric:       Heme/Lymph/Imm:       Endocrine:           Recent Lab Results:  LIPID RESULTS:  Lab Results   Component Value Date    CHOL 217 (H) 10/08/2020    HDL 46 (L) 10/08/2020     (H) 10/08/2020    TRIG 325 (H) 10/08/2020    CHOLHDLRATIO 4.7 09/24/2015       LIVER ENZYME RESULTS:  Lab Results   Component Value Date    AST 19 01/21/2020    ALT 18 01/21/2020       CBC RESULTS:  Lab Results   Component Value Date    WBC 8.7 10/05/2020    RBC 4.57 10/05/2020    HGB 12.1 11/16/2020    HCT 39.8 10/05/2020    MCV 87 10/05/2020    MCH 27.1 10/05/2020    MCHC 31.2 (L) 10/05/2020    RDW 16.3 (H) 10/05/2020     (H) 10/05/2020       BMP RESULTS:  Lab Results   Component Value Date     10/05/2020    POTASSIUM 3.7 10/08/2020    CHLORIDE 103 10/05/2020    CO2 28 10/05/2020    ANIONGAP 4 10/05/2020     (H) 10/05/2020    BUN 12 10/05/2020    CR 0.80 11/16/2020    GFRESTIMATED 78 11/16/2020    GFRESTBLACK 90 11/16/2020    J CARLOS 8.6 10/05/2020        A1C RESULTS:  Lab Results  "  Component Value Date    A1C 5.9 (H) 03/13/2019       INR RESULTS:  Lab Results   Component Value Date    INR 1.78 (H) 03/02/2019       ECHOCARDIOGRAM  No results found for this or any previous visit (from the past 8760 hour(s)).      No orders of the defined types were placed in this encounter.    No orders of the defined types were placed in this encounter.    There are no discontinued medications.      Encounter Diagnoses   Name Primary?     Presence of Watchman left atrial appendage closure device      Paroxysmal atrial fibrillation (H)          Maxine Sears is a 65 year old female who is being evaluated via a billable video visit.      The patient has been notified of following:     \"This video visit will be conducted via a call between you and your physician/provider. We have found that certain health care needs can be provided without the need for an in-person physical exam.  This service lets us provide the care you need with a video conversation.  If a prescription is necessary we can send it directly to your pharmacy.  If lab work is needed we can place an order for that and you can then stop by our lab to have the test done at a later time.    Video visits are billed at different rates depending on your insurance coverage.  Please reach out to your insurance provider with any questions.    If during the course of the call the physician/provider feels a video visit is not appropriate, you will not be charged for this service.\"    Patient has given verbal consent for Video visit? Yes  How would you like to obtain your AVS? Mail a copy  If you are dropped from the video visit, the video invite should be resent to: Text to cell phone: [phone]  Will anyone else be joining your video visit? No    Vitals reported by patient:  B/P: 136/72   HR: 56  Weight: 266 lbs    Review Of Systems  Skin: negative  Eyes: negative  Ears/Nose/Throat: negative  Respiratory: No shortness of breath, dyspnea on exertion, " cough, or hemoptysis  Cardiovascular: negative  Gastrointestinal: negative  Genitourinary: negative  Musculoskeletal: arthritis  Neurologic: migraine headaches  Psychiatric: negative  Hematologic/Lymphatic/Immunologic: negative  Endocrine: thyroid disorder    Will pt continue to be on Xarelto? Review test results.   Reviewed by: Chacha Alcocer MA    Video-Visit Details    Type of service:  Video Visit    Video Start Time:   Video End Time:     Originating Location (pt. Location):     Distant Location (provider location):  [facility]     Platform used for Video Visit:         Thank you for allowing me to participate in the care of your patient.    Sincerely,     Constantin Vaughn MD     [facility]

## 2020-11-30 NOTE — PROGRESS NOTES
Electrophysiology/ Virtual Clinic Note         H&P and Plan:   Patient opted to have a virtual visit due to ongoing issues related to ongoing COVID-19 virus pandemic and to minimize social interaction (based on CDC guidelines).      Visit details:  Patient has given verbal consent for this visit.    Interview was done talking and seeing patient via Internet.    Mode of transmission: [employer].  Visit start time: 2:46 PM  Visit stop time: 2:55 PM  Provider location: Office.  Patient location: Home.       REASON FOR VISIT: Electrophysiology evaluation.      HISTORY OF PRESENT ILLNESS: Ms. Sears is a delightful 65-year-old lady with a history of hypertension, paroxysmal atrial fibrillation and recurrent GI bleed, who underwent Watchman procedure on 10/8/2020 to minimize long term risk of bleeding associated with chronic oral anticoagulation.  She is here today for routine follow-up after device implantation.    Today, she comes in doing well.  She denies any issues after device implantation.  She also denies any recurrence of atrial fibrillation.  She denies any symptoms of chest pain, shortness of breath, lightheadedness, near-syncope or syncope.    A FRIDA was repeated (45 days after device implantation) and showed good watchman device position (details below).      EKG done on 11/28/2020 showed normal sinus rhythm.  QRS was measured to 110 ms which is unchanged from previous EKGs.       Previous studies:  -FRIDA (11/20/2020): Normal LV function.  EF estimated 55-60%.  The watchman device was well-seated with adequate compression and minimal leak.  -Echocardiogram (01/21/2020): Normal LV function.  EF estimated 55-60%.  No significant LVH noticed.  - EGD (04/3/2019): - Normal esophagus. Mild Schatzki ring. Small hiatal hernia. Scar in the gastric antrum (previous ulcer).                       ASSESSMENT AND PLAN:   1.  Paroxysmal atrial fibrillation.  No recurrence of A. Fib.    Continue therapy with flecainide  and metoprolol.  2.  Embolic prevention.  CHADS-VASc score of 3.  She responded well to watchman procedure.  FRIDA 45 days after procedure confirmed good device position.  I recommend the following:   -Start Plavix in addition to aspirin   -stop Xarelto  -Follow-up in clinic in 6 months.  At that time we will may discontinue Plavix.        Constantin Vaughn MD    Physical Exam:  Vitals: There were no vitals taken for this visit.    Constitutional:  Pt is in no acute distress.  Normal body habitus, upright.  HEAD: Normocephalic. No evidence of injury or laceration.   SKIN: Skin normal color with no lesions or eruptions. No xanthelasma.  ENT/Mouth:  Membranes moist. No nasal discharge or bleeding gums.   Neck:  Supple, normal JVP, no evidence of thyromegaly.  Chest/Lungs: No audible wheezing or labored breathing. Normal chest wall expansion. No cough.   Cardiovascular: Normal jugular venous pressure. No evidence of pitting edema bilaterally.   Abdomen: No evidence of abdominal distention. No observed jaundice.  Eyes: PERRL, EOMI. No scleral icterus, normal conjunctivae.  Extremities:  No lower extremity edema noticed.  No cyanosis or clubbing noted.   Neurologic: Normal arm motion bilateral.  No tremors. No evidence of focal defect.   Psychiatric: Alert and oriented x3, calm.         CURRENT MEDICATIONS:  Current Outpatient Medications   Medication Sig Dispense Refill     acetaminophen (TYLENOL) 500 MG tablet Take 500-1,000 mg by mouth 3 times daily as needed for mild pain       aspirin (ASA) 81 MG EC tablet Take 1 tablet (81 mg) by mouth daily       flecainide (TAMBOCOR) 150 MG tablet Take 1 tablet (150 mg) by mouth every 12 hours 60 tablet 11     gabapentin (NEURONTIN) 300 MG capsule Take 3 capsules (900 mg) by mouth At Bedtime 270 capsule 3     levothyroxine (SYNTHROID/LEVOTHROID) 137 MCG tablet TAKE 1 TABLET (137 MCG) BY MOUTH DAILY 28 tablet 10     losartan (COZAAR) 100 MG tablet TAKE 1 TABLET BY MOUTH DAILY (Patient  taking differently: Take 100 mg by mouth daily ) 90 tablet 3     metoprolol succinate ER (TOPROL-XL) 25 MG 24 hr tablet Take 0.5 tablets (12.5 mg) by mouth daily 30 tablet 3     pantoprazole (PROTONIX) 40 MG EC tablet TAKE 1 TABLET BY MOUTH TWICE A DAY BEFORE MEALS 56 tablet 1     rivaroxaban ANTICOAGULANT (XARELTO ANTICOAGULANT) 20 MG TABS tablet Take 1 tablet (20 mg) by mouth daily (with dinner) 30 tablet 0     rosuvastatin (CRESTOR) 20 MG tablet Take 1 tablet (20 mg) by mouth daily 90 tablet 0     triamterene-HCTZ (MAXZIDE-25) 37.5-25 MG tablet Take 1 tablet by mouth daily 90 tablet 3     order for DME DREAMSTATION  5-15 CM/H20  NASAL AIRFIT N10 HER       SUMAtriptan (IMITREX) 100 MG tablet Take 1 tablet (100 mg) by mouth at onset of headache for migraine May repeat in 2 hours if needed: max 2/day; (Patient not taking: Reported on 11/30/2020) 10 tablet 11       ALLERGIES     Allergies   Allergen Reactions     Morphine Anaphylaxis     Ceclor [Cefaclor] Hives     Diltiazem Hives     Lisinopril Cough     Sertraline Nausea and Vomiting       PAST MEDICAL HISTORY:  Past Medical History:   Diagnosis Date     Depression       Duodenal ulcer 03/04/2019     Essential hypertension       GERD (gastroesophageal reflux disease)      Hiatal hernia      Hypothyroidism      Migraine without aura and without status migrainosus, not intractable  Aug 2016     Obesity      NELY on CPAP      Osteoarthritis      Paroxysmal atrial fibrillation (H) 01/05/2015     RLS (restless legs syndrome)      Symptomatic menopausal or female climacteric states (aka FLASHES)      TMJ disease        PAST SURGICAL HISTORY:  Past Surgical History:   Procedure Laterality Date     APPENDECTOMY       CHOLECYSTECTOMY       COLONOSCOPY N/A 4/12/2019    Procedure: COLONOSCOPY;  Surgeon: Vahid Cardona MD;  Location: [facility]     EP LEFT ATRIAL APPENDAGE CLOSURE N/A 10/8/2020    Procedure: EP Left Atrial Appendage Closure;  Surgeon: Constantin Vaughn,  MD;  Location:  [facility]     ESOPHAGOSCOPY, GASTROSCOPY, DUODENOSCOPY (EGD), COMBINED N/A 2019    Procedure: COMBINED ESOPHAGOSCOPY, GASTROSCOPY, DUODENOSCOPY (EGD);  Surgeon: Ben Ko MD;  Location:  GI     HYSTERECTOMY, [facility]  1999     TONSILLECTOMY         FAMILY HISTORY:  Family History   Problem Relation Age of Onset     Myocardial Infarction Mother      Heart Failure Mother      Heart Surgery Father         bypass surgery     Cerebrovascular Disease Father      Heart Surgery Brother      Hyperlipidemia Brother      Hyperlipidemia Sister      Hyperlipidemia Brother      Sleep Apnea Sister        SOCIAL HISTORY:  Social History     Socioeconomic History     Marital status:      Spouse name: None     Number of children: None     Years of education: None     Highest education level: None   Occupational History     None   Social Needs     Financial resource strain: None     Food insecurity     Worry: None     Inability: None     Transportation needs     Medical: None     Non-medical: None   Tobacco Use     Smoking status: Former Smoker     Packs/day: 0.25     Years: 1.00     Pack years: 0.25     Types: Cigarettes     Start date:      Quit date:      Years since quittin.9     Smokeless tobacco: Never Used     Tobacco comment: very minimal smoking history   Substance and Sexual Activity     Alcohol use: No     Alcohol/week: 0.0 standard drinks     Drug use: No     Sexual activity: Never   Lifestyle     Physical activity     Days per week: 3 days     Minutes per session: 10 min     Stress: None   Relationships     Social connections     Talks on phone: None     Gets together: None     Attends Faith service: None     Active member of club or organization: None     Attends meetings of clubs or organizations: None     Relationship status: None     Intimate partner violence     Fear of current or ex partner: None     Emotionally abused: None     Physically  abused: None     Forced sexual activity: None   Other Topics Concern     Parent/sibling w/ CABG, MI or angioplasty before 65F 55M? Not Asked      Service Not Asked     Blood Transfusions Not Asked     Caffeine Concern No     Occupational Exposure Not Asked     Hobby Hazards Not Asked     Sleep Concern Yes     Stress Concern Not Asked     Weight Concern No     Special Diet No     Back Care Not Asked     Exercise No     Bike Helmet Not Asked     Seat Belt Yes     Self-Exams Not Asked   Social History Narrative     None       Review of Systems:  Skin:        Eyes:       ENT:       Respiratory:       Cardiovascular:       Gastroenterology:      Genitourinary:       Musculoskeletal:       Neurologic:       Psychiatric:       Heme/Lymph/Imm:       Endocrine:           Recent Lab Results:  LIPID RESULTS:  Lab Results   Component Value Date    CHOL 217 (H) 10/08/2020    HDL 46 (L) 10/08/2020     (H) 10/08/2020    TRIG 325 (H) 10/08/2020    CHOLHDLRATIO 4.7 09/24/2015       LIVER ENZYME RESULTS:  Lab Results   Component Value Date    AST 19 01/21/2020    ALT 18 01/21/2020       CBC RESULTS:  Lab Results   Component Value Date    WBC 8.7 10/05/2020    RBC 4.57 10/05/2020    HGB 12.1 11/16/2020    HCT 39.8 10/05/2020    MCV 87 10/05/2020    MCH 27.1 10/05/2020    MCHC 31.2 (L) 10/05/2020    RDW 16.3 (H) 10/05/2020     (H) 10/05/2020       BMP RESULTS:  Lab Results   Component Value Date     10/05/2020    POTASSIUM 3.7 10/08/2020    CHLORIDE 103 10/05/2020    CO2 28 10/05/2020    ANIONGAP 4 10/05/2020     (H) 10/05/2020    BUN 12 10/05/2020    CR 0.80 11/16/2020    GFRESTIMATED 78 11/16/2020    GFRESTBLACK 90 11/16/2020    J CARLOS 8.6 10/05/2020        A1C RESULTS:  Lab Results   Component Value Date    A1C 5.9 (H) 03/13/2019       INR RESULTS:  Lab Results   Component Value Date    INR 1.78 (H) 03/02/2019         ECHOCARDIOGRAM  No results found for this or any previous visit (from the past 8766  "hour(s)).      No orders of the defined types were placed in this encounter.    No orders of the defined types were placed in this encounter.    There are no discontinued medications.      Encounter Diagnoses   Name Primary?     Presence of Watchman left atrial appendage closure device      Paroxysmal atrial fibrillation (H)          CC  No referring provider defined for this encounter.              Maxine Sears is a 65 year old female who is being evaluated via a billable video visit.      The patient has been notified of following:     \"This video visit will be conducted via a call between you and your physician/provider. We have found that certain health care needs can be provided without the need for an in-person physical exam.  This service lets us provide the care you need with a video conversation.  If a prescription is necessary we can send it directly to your pharmacy.  If lab work is needed we can place an order for that and you can then stop by our lab to have the test done at a later time.    Video visits are billed at different rates depending on your insurance coverage.  Please reach out to your insurance provider with any questions.    If during the course of the call the physician/provider feels a video visit is not appropriate, you will not be charged for this service.\"    Patient has given verbal consent for Video visit? Yes  How would you like to obtain your AVS? Mail a copy  If you are dropped from the video visit, the video invite should be resent to: Text to cell phone: [phone]  Will anyone else be joining your video visit? No    Vitals reported by patient:  B/P: 136/72   HR: 56  Weight: 266 lbs    Review Of Systems  Skin: negative  Eyes: negative  Ears/Nose/Throat: negative  Respiratory: No shortness of breath, dyspnea on exertion, cough, or hemoptysis  Cardiovascular: negative  Gastrointestinal: negative  Genitourinary: negative  Musculoskeletal: arthritis  Neurologic: migraine " headaches  Psychiatric: negative  Hematologic/Lymphatic/Immunologic: negative  Endocrine: thyroid disorder    Will pt continue to be on Xarelto? Review test results.   Reviewed by: Chacha Alcocer MA    Video-Visit Details    Type of service:  Video Visit    Video Start Time:   Video End Time:     Originating Location (pt. Location):     Distant Location (provider location):  [facility]     Platform used for Video Visit:

## 2020-12-08 DIAGNOSIS — I48.91 ATRIAL FIBRILLATION WITH RVR (H): ICD-10-CM

## 2020-12-08 RX ORDER — METOPROLOL SUCCINATE 25 MG/1
12.5 TABLET, EXTENDED RELEASE ORAL DAILY
Qty: 45 TABLET | Refills: 1 | Status: SHIPPED | OUTPATIENT
Start: 2020-12-08 | End: 2021-03-02

## 2020-12-31 DIAGNOSIS — K92.2 ACUTE GI BLEEDING: ICD-10-CM

## 2020-12-31 DIAGNOSIS — I10 ESSENTIAL HYPERTENSION: ICD-10-CM

## 2020-12-31 RX ORDER — PANTOPRAZOLE SODIUM 40 MG/1
TABLET, DELAYED RELEASE ORAL
Qty: 56 TABLET | Refills: 9 | Status: SHIPPED | OUTPATIENT
Start: 2020-12-31 | End: 2021-07-21

## 2020-12-31 RX ORDER — TRIAMTERENE/HYDROCHLOROTHIAZID 37.5-25 MG
1 TABLET ORAL DAILY
Qty: 28 TABLET | Refills: 9 | Status: SHIPPED | OUTPATIENT
Start: 2020-12-31 | End: 2021-07-21

## 2021-01-04 DIAGNOSIS — I25.10 CORONARY ARTERY CALCIFICATION SEEN ON CAT SCAN: ICD-10-CM

## 2021-01-04 DIAGNOSIS — I48.0 PAROXYSMAL ATRIAL FIBRILLATION (H): ICD-10-CM

## 2021-01-04 RX ORDER — ROSUVASTATIN CALCIUM 20 MG/1
20 TABLET, COATED ORAL DAILY
Qty: 90 TABLET | Refills: 1 | Status: SHIPPED | OUTPATIENT
Start: 2021-01-04 | End: 2021-06-03

## 2021-01-04 RX ORDER — FLECAINIDE ACETATE 150 MG/1
150 TABLET ORAL EVERY 12 HOURS
Qty: 180 TABLET | Refills: 1 | Status: SHIPPED | OUTPATIENT
Start: 2021-01-04 | End: 2021-04-30

## 2021-01-12 ENCOUNTER — VIRTUAL VISIT (OUTPATIENT)
Dept: INTERNAL MEDICINE | Facility: CLINIC | Age: 66
End: 2021-01-12
Payer: MEDICARE

## 2021-01-12 ENCOUNTER — TELEPHONE (OUTPATIENT)
Dept: CARDIOLOGY | Facility: CLINIC | Age: 66
End: 2021-01-12

## 2021-01-12 ENCOUNTER — TELEPHONE (OUTPATIENT)
Dept: INTERNAL MEDICINE | Facility: CLINIC | Age: 66
End: 2021-01-12

## 2021-01-12 DIAGNOSIS — K04.7 DENTAL INFECTION: Primary | ICD-10-CM

## 2021-01-12 PROCEDURE — 99441 PR PHYSICIAN TELEPHONE EVALUATION 5-10 MIN: CPT | Mod: 95 | Performed by: PHYSICIAN ASSISTANT

## 2021-01-12 RX ORDER — SUMATRIPTAN 100 MG/1
100 TABLET, FILM COATED ORAL
Qty: 10 TABLET | Refills: 11 | Status: CANCELLED | OUTPATIENT
Start: 2021-01-12

## 2021-01-12 NOTE — Clinical Note
Dr. Jama,     Just a heads up, I saw Roxanna today. Sounds like she has a dental infection. I gave her antibiotics and advised her to get into see a dentist ASAP. She thought for some reason her cardiologist did not want her to receive dental care. I advised her to contact her cardiologist immediately for advice on this and to review antibiotic prophylaxis and if she needs to hold blood thinners.

## 2021-01-12 NOTE — TELEPHONE ENCOUNTER
Pt called and states that she has and infected tooth and wondering what she needs to do.  Explained that prior to a procedure pt needs to be started on an antibiotic.  Pt states that she has been prescribed and antibiotic and will start when it arrives today.  Pt will then go to the dentist tomorrow for an evaluation and then it will be decided what the plan will be.  Pt will then call clinic to make aware of plan and if needing to hold her Plavix. Mary

## 2021-01-12 NOTE — PROGRESS NOTES
Roxanna is a 65 year old who is being evaluated via a billable video visit.      How would you like to obtain your AVS? Mail a copy  If the video visit is dropped, the invitation should be resent by: Text to cell phone: [phone]  Will anyone else be joining your video visit? No      Assessment & Plan     Dental infection  - reviewed case with MD on site  - pt to schedule emergency dental visit  - she will notify her cardiology's office to see if they really do not want to her to see dentist, I suspect she will just need advise on antibiotic prophylaxis and blood thinners   - amoxicillin-clavulanate (AUGMENTIN) 875-125 MG tablet; Take 1 tablet by mouth 2 times daily for 7 days        No follow-ups on file.    Geeta Carlin PA-C  [facility]     Roxanna is a 65 year old who presents to clinic today for the following health issues     HPI     Patient reports last night she started having tooth pain. She notes the cap came off her toot. She reports underneath the cap, it is very soft. Now it is starting to hurt. Last night it was hurting really bad. Pt notes swelling in chin as well. Swelling goes back to where her ear is. She notes she thought her cardiologist said she should not see a dentist. She is requesting antibiotics. She has not reached out to a dentist.           Objective           Vitals:  No vitals were obtained today due to virtual visit.    Physical Exam   GENERAL: Healthy, alert and no distress  RESP: No audible wheeze, or cough              Unable to do video visit, switched to telephone visit duration - 9 minutes

## 2021-01-14 ENCOUNTER — PATIENT OUTREACH (OUTPATIENT)
Dept: NURSING | Facility: CLINIC | Age: 66
End: 2021-01-14
Payer: COMMERCIAL

## 2021-01-14 NOTE — PROGRESS NOTES
Clinic Care Coordination Contact  Community Health Worker Follow Up    Goals:   Goals Addressed as of 1/14/2021 at 3:01 PM                 Today    11/23/20       Patient Stated      1. Medical (pt-stated)   40%  30%    Added 10/12/20 by Damien Person RN     Goal Statement: I will avoid health complications by improving my cholesterol panel.  Date Goal set: 10/12/20  Barriers: limited mobility related to knee pain  Strengths: motivated to reduce health risks  Date to Achieve By: 4/30/21  Patient expressed understanding of goal: Yes    Action steps to achieve this goal:  1. I will continue taking my medications as prescribed.  2. I will follow a Heart Healthy diet (low-fat, low-salt).  3. I will work toward regular exercise as able.  4. I will attend routine follow-ups with my PCP and Cardiology.  5. I will continue working with Care Coordination to identify and address any barriers to achieving my goal.        Discussed:  Patient stated that she is trying to walk more. She walks around her apartment.  Patient stated that she has been buying 12 grain bread, and is eating a healthy diet.  Patient has increased her intake of fruits and vegetables.  Patient stated that a friend takes her grocery shopping. Patient stated that she buys one dessert, she takes a small piece at a time. Patient stated that she is trying to lose weight, and has lost 3 lbs.  Patient stated that she is taking her medications as prescribed.  Patient stated that she has a problem with her tooth and is on antibiotics.  Patient stated that she will need three teeth extracted.    Intervention and Education during outreach: CHW encouraged patient to contact CC if there are any other changes or resources needed.  Patient acknowledged understanding.      Plan: Patient is going to try to walk an extra 5 minutes a day if tolerated. Patient is going to continue to eat a healthy diet.    Next Outreach: 2/9/21    TRINIDAD Li  Clinic Care  Coordination  [facility]: [facility] Jeffers, [facility], Women's, and [facility]  Phone: [phone]

## 2021-01-21 NOTE — TELEPHONE ENCOUNTER
Medical release from [facility] arrived. Spoke to  and made them aware that pt is not on an anticoagulant, but is on Plavix until the end of April, post Watchman on 10/8/20. Dentist will be asked if form still needs to be sent back to office. Also made the dental clinic aware that pt needs an ABX prior to procedure. Will call back if wanting faxed back to them. JNelsonKRISTOPHER

## 2021-01-26 ENCOUNTER — PATIENT OUTREACH (OUTPATIENT)
Dept: CARE COORDINATION | Facility: CLINIC | Age: 66
End: 2021-01-26

## 2021-01-26 ASSESSMENT — ACTIVITIES OF DAILY LIVING (ADL): DEPENDENT_IADLS:: CLEANING;TRANSPORTATION

## 2021-01-26 NOTE — LETTER
[facility] CARE COORDINATION  [facility]  [address], [place] [address]    January 26, 2021        Maxine Velasquez  [address]  [facility] [place] [facility] [address]          Dear Alondra Goodman is an updated Complex Care Plan for your continued enrollment in Care Coordination. Please let us know if you have additional questions, concerns, or goals that we can assist with.    Sincerely,    Damien Person RN        [facility]  Complex Care Plan  About Me:    Patient Name:  Maxine Velasquez    YOB: 1955  Age:         65 year old   [place] MRN:    [id number] Telephone Information:  Home Phone [phone]   Mobile [phone]       Address:  [address]  [facility] [place] [address] Email address:  No e-mail address on record      Emergency Contact(s)    Name Relationship Lgl Grd Work Phone Home Phone Mobile Phone   1. SEVERSON,VIVIAN Sister   [phone]   2. BRANDI GEORGES Daughter   [phone]   3. PRATIMA VELASQUEZ Son   [phone]           Primary language:  English     needed? No   [facility]:  [phone] op. 1  Other communication barriers: None  Preferred Method of Communication:  Phone  Current living arrangement: I live alone  Mobility Status/ Medical Equipment: Independent w/Device    Health Maintenance  Health Maintenance Reviewed:    Health Maintenance Due   Topic Date Due     ADVANCE CARE PLANNING  1955     MIGRAINE ACTION PLAN  1955     ZOSTER IMMUNIZATION (1 of 2) 08/23/2005     MEDICARE ANNUAL WELLNESS VISIT  10/07/2020     Pneumococcal Vaccine: 65+ Years (1 of 1 - PPSV23) 08/23/2020     My Access Plan  Medical Emergency 911   Primary Clinic Line [facility] - [phone]   24 Hour Appointment Line [phone] or  [phone]) (toll-free)   24 Hour Nurse Line [phone] (toll-free)   Preferred Urgent  Care [facility], [phone]   Preferred Hospital [facility]  [phone]   Preferred Pharmacy [facility] [address]     Behavioral Health Crisis Line The National Suicide Prevention Lifeline at 1-841.754.3546 or 919       My Care Team Members  Patient Care Team       Relationship Specialty Notifications Start End    Yuval Jama MD PCP - General Internal Medicine  10/20/14     Merged    Phone: [phone] Fax: [phone]         [address] [place] [address]    Yuval Jama MD  Internal Medicine  10/20/14     Merged    Phone: [phone] Fax: [phone]         [address] [address]    Yuval Jama MD Assigned PCP   12/19/14     Phone: [phone] Fax: [phone]         [address] [address]    Damien Person, RN Lead Care Coordinator Primary Care - CC Admissions 3/6/19     Phone: [phone]         Carmen Mims MA Community Health Worker Primary Care - CC  1/22/20     Phone: [phone]         Kevin Mcdaniels MD Assigned Musculoskeletal Provider   10/23/20     Phone: [phone] Fax: [phone] [address] [place] [address]            My Care Plans  Self Management and Treatment Plan  Goals and (Comments)  Goals        General    1. Medical (pt-stated)     Notes - Note edited  10/12/2020  1:35 PM by Damien Person, RN    Goal Statement: I will avoid health complications by improving my cholesterol panel.  Date Goal set: 10/12/20  Barriers: limited mobility related to knee pain  Strengths: motivated to reduce health risks  Date to Achieve By: 4/30/21  Patient expressed understanding of goal: Yes    Action steps to achieve this goal:  1. I will continue taking my medications as prescribed.  2. I will follow a Heart Healthy diet (low-fat, low-salt).  3. I will work toward regular exercise as able.  4. I will attend routine follow-ups with my  PCP and Cardiology.  5. I will continue working with Care Coordination to identify and address any barriers to achieving my goal.             Action Plans on File:   Depression  Advance Care Plans/Directives Type: None on file       My Medical and Care Information  Problem List   Patient Active Problem List   Diagnosis     Osteoarthritis     TMJ disease     Health Care Home     Hypothyroidism     Morbid obesity, unspecified obesity type (H)     Migraine without aura and without status migrainosus, not intractable     Essential hypertension     NELY on CPAP     RLS (restless legs syndrome)     Severe episode of recurrent major depressive disorder, without psychotic features (H)     Gastroesophageal reflux disease, esophagitis presence not specified     Iron deficiency     Chronic pain of left knee     Lumbar radiculopathy     Paroxysmal atrial fibrillation (H)     Pulmonary nodules     Hyperlipidemia LDL goal <100      Current Medications:    Current Outpatient Medications   Medication Instructions     acetaminophen (TYLENOL) 500-1,000 mg, Oral, 3 TIMES DAILY PRN     aspirin (ASA) 81 mg, Oral, DAILY     clopidogrel (PLAVIX) 75 mg, Oral, DAILY     flecainide (TAMBOCOR) 150 mg, Oral, EVERY 12 HOURS     gabapentin (NEURONTIN) 900 mg, Oral, AT BEDTIME     levothyroxine (SYNTHROID/LEVOTHROID) 137 mcg, Oral, DAILY     losartan (COZAAR) 100 MG tablet TAKE 1 TABLET BY MOUTH DAILY     metoprolol succinate ER (TOPROL-XL) 12.5 mg, Oral, DAILY     order for DME DREAMSTATION<BR>5-15 CM/H20<BR>NASAL AIRFIT N10 HER     pantoprazole (PROTONIX) 40 MG EC tablet TAKE 1 TABLET BY MOUTH TWICE A DAY BEFORE MEALS     rosuvastatin (CRESTOR) 20 mg, Oral, DAILY     SUMAtriptan (IMITREX) 100 mg, Oral, AT ONSET OF HEADACHE, May repeat in 2 hours if needed: max 2/day;     triamterene-HCTZ (MAXZIDE-25) 37.5-25 MG tablet 1 tablet, Oral, DAILY     Care Coordination Start Date: 3/15/2019   Frequency of Care Coordination: monthly   Form Last Updated:  01/26/2021

## 2021-01-26 NOTE — PROGRESS NOTES
Clinic Care Coordination Contact    Situation: Patient chart reviewed by care coordinator.     Background: Care Coordination following patient to assist with goal as below.     Assessment: Per chart review, patient outreach completed by CC CHW on 1/14/21.  Patient is actively working to accomplish goal.  Patient noted her efforts to walk more often.  She has been making changes to eat more healthy by incorporating more grains, fruits and vegetables.  Patient noted she has lost 3 lbs.  She is taking medications as prescribed.  Patient's goal remains appropriate and relevant at this time.    Goals        Patient Stated      1. Medical (pt-stated)      Goal Statement: I will avoid health complications by improving my cholesterol panel.  Date Goal set: 10/12/20  Barriers: limited mobility related to knee pain  Strengths: motivated to reduce health risks  Date to Achieve By: 4/30/21  Patient expressed understanding of goal: Yes    Action steps to achieve this goal:  1. I will continue taking my medications as prescribed.  2. I will follow a Heart Healthy diet (low-fat, low-salt).  3. I will work toward regular exercise as able.  4. I will attend routine follow-ups with my PCP and Cardiology.  5. I will continue working with Care Coordination to identify and address any barriers to achieving my goal.        Plan/Recommendations: The patient will continue working with Care Coordination to achieve goal as above.  CC CHW will continue with outreaches at this time with next outreach in approximately 2 weeks. CC CHW will inform CC RN of any concerns or changes regarding patient as needed.  CC RN will review patient's chart in approximately 6 weeks and outreach to patient as indicated.    Damien Person RN  Clinic Care Coordinator  [facility]  Ph: [phone]

## 2021-02-04 DIAGNOSIS — Z95.818 PRESENCE OF WATCHMAN LEFT ATRIAL APPENDAGE CLOSURE DEVICE: ICD-10-CM

## 2021-02-04 RX ORDER — CLOPIDOGREL BISULFATE 75 MG/1
75 TABLET ORAL DAILY
Qty: 90 TABLET | Refills: 1 | Status: SHIPPED | OUTPATIENT
Start: 2021-02-04 | End: 2021-06-03

## 2021-02-25 ENCOUNTER — PATIENT OUTREACH (OUTPATIENT)
Dept: NURSING | Facility: CLINIC | Age: 66
End: 2021-02-25
Payer: COMMERCIAL

## 2021-02-25 NOTE — PROGRESS NOTES
Clinic Care Coordination Contact  Community Health Worker Follow Up    Goals:   Goals Addressed as of 2/25/2021 at 1:15 PM                 Today    1/14/21       Patient Stated      1. Medical (pt-stated)   50%  40%    Added 10/12/20 by Damien Person RN     Goal Statement: I will avoid health complications by improving my cholesterol panel.  Date Goal set: 10/12/20  Barriers: limited mobility related to knee pain  Strengths: motivated to reduce health risks  Date to Achieve By: 4/30/21  Patient expressed understanding of goal: Yes    Action steps to achieve this goal:  1. I will continue taking my medications as prescribed.  2. I will follow a Heart Healthy diet (low-fat, low-salt).  3. I will work toward regular exercise as able.  4. I will attend routine follow-ups with my PCP and Cardiology.  5. I will continue working with Care Coordination to identify and address any barriers to achieving my goal.        Discussed:  Patient stated that she has been eating healthy.  Patient stated that she has been reading on the Keto diet.  Patient reported eating lowfat, fruits, vegetables, and lean protein (baked chicken breast in a sweet chilli sauce.  Patient stated that she had 3 teeth pulled out two weeks ago.  The areas are now healing, and she can chew better.  Patient reported taking her medications as prescribed.  Patient report at times she will use an electric cart when grocery shopping.  Patient stated that she walks around at home for exercises.  She does not want to be wheelchair bound.  Patient reported that she will be getting new glasses.    Intervention and Education during outreach: CHW encouraged patient to contact CC if there are any other changes or resources needed.  Patient acknowledged understanding.      Plan: Patient will continue to eat a healthy diet.  CHW will follow up in one month.    Next Outreach: 3/25/2021    TRINIDAD Li  Clinic Care Coordination  [facility] Clinics: [place]  [place] [facility] [place], and [place] [facility]  Phone: [phone]

## 2021-03-02 DIAGNOSIS — I48.91 ATRIAL FIBRILLATION WITH RVR (H): ICD-10-CM

## 2021-03-02 RX ORDER — METOPROLOL SUCCINATE 25 MG/1
12.5 TABLET, EXTENDED RELEASE ORAL DAILY
Qty: 45 TABLET | Refills: 0 | Status: SHIPPED | OUTPATIENT
Start: 2021-03-02 | End: 2021-07-16

## 2021-03-11 ENCOUNTER — OFFICE VISIT (OUTPATIENT)
Dept: INTERNAL MEDICINE | Facility: CLINIC | Age: 66
End: 2021-03-11
Payer: COMMERCIAL

## 2021-03-11 VITALS
BODY MASS INDEX: 46.07 KG/M2 | TEMPERATURE: 97.2 F | WEIGHT: 260 LBS | HEART RATE: 83 BPM | HEIGHT: 63 IN | DIASTOLIC BLOOD PRESSURE: 78 MMHG | RESPIRATION RATE: 16 BRPM | OXYGEN SATURATION: 95 % | SYSTOLIC BLOOD PRESSURE: 132 MMHG

## 2021-03-11 DIAGNOSIS — E03.9 HYPOTHYROIDISM, UNSPECIFIED TYPE: ICD-10-CM

## 2021-03-11 DIAGNOSIS — E78.5 HYPERLIPIDEMIA LDL GOAL <100: ICD-10-CM

## 2021-03-11 DIAGNOSIS — D75.839 THROMBOCYTOSIS: ICD-10-CM

## 2021-03-11 DIAGNOSIS — I10 ESSENTIAL HYPERTENSION: ICD-10-CM

## 2021-03-11 DIAGNOSIS — E66.01 MORBID OBESITY, UNSPECIFIED OBESITY TYPE (H): ICD-10-CM

## 2021-03-11 DIAGNOSIS — I48.0 PAROXYSMAL ATRIAL FIBRILLATION (H): ICD-10-CM

## 2021-03-11 DIAGNOSIS — Z12.31 ENCOUNTER FOR SCREENING MAMMOGRAM FOR BREAST CANCER: ICD-10-CM

## 2021-03-11 DIAGNOSIS — Z00.00 MEDICARE ANNUAL WELLNESS VISIT, INITIAL: ICD-10-CM

## 2021-03-11 PROBLEM — F33.2 SEVERE EPISODE OF RECURRENT MAJOR DEPRESSIVE DISORDER, WITHOUT PSYCHOTIC FEATURES (H): Status: RESOLVED | Noted: 2017-04-24 | Resolved: 2021-03-11

## 2021-03-11 LAB
ERYTHROCYTE [DISTWIDTH] IN BLOOD BY AUTOMATED COUNT: 15.5 % (ref 10–15)
HCT VFR BLD AUTO: 39.9 % (ref 35–47)
HGB BLD-MCNC: 12.2 G/DL (ref 11.7–15.7)
MCH RBC QN AUTO: 26.8 PG (ref 26.5–33)
MCHC RBC AUTO-ENTMCNC: 30.6 G/DL (ref 31.5–36.5)
MCV RBC AUTO: 88 FL (ref 78–100)
PLATELET # BLD AUTO: 427 10E9/L (ref 150–450)
RBC # BLD AUTO: 4.55 10E12/L (ref 3.8–5.2)
WBC # BLD AUTO: 11.3 10E9/L (ref 4–11)

## 2021-03-11 PROCEDURE — 84443 ASSAY THYROID STIM HORMONE: CPT | Performed by: INTERNAL MEDICINE

## 2021-03-11 PROCEDURE — 80053 COMPREHEN METABOLIC PANEL: CPT | Performed by: INTERNAL MEDICINE

## 2021-03-11 PROCEDURE — 85027 COMPLETE CBC AUTOMATED: CPT | Performed by: INTERNAL MEDICINE

## 2021-03-11 PROCEDURE — 36415 COLL VENOUS BLD VENIPUNCTURE: CPT | Performed by: INTERNAL MEDICINE

## 2021-03-11 PROCEDURE — 99214 OFFICE O/P EST MOD 30 MIN: CPT | Mod: 25 | Performed by: INTERNAL MEDICINE

## 2021-03-11 PROCEDURE — G0402 INITIAL PREVENTIVE EXAM: HCPCS | Performed by: INTERNAL MEDICINE

## 2021-03-11 PROCEDURE — 80061 LIPID PANEL: CPT | Performed by: INTERNAL MEDICINE

## 2021-03-11 ASSESSMENT — MIFFLIN-ST. JEOR: SCORE: 1693.48

## 2021-03-11 NOTE — PROGRESS NOTES
SUBJECTIVE:   Maxine Sears is a 65 year old female who presents for Preventive Visit and follow-up regarding hypertension, hypothyroidism, hyperlipidemia, paroxysmal atrial fibrillation      Are you in the first 12 months of your Medicare coverage?  YES-Right Eye-20/25, Left Eye-20/20, Both Eyes-20/20    HPI  Do you feel safe in your environment? Yes    Have you ever done Advance Care Planning? (For example, a Health Directive, POLST, or a discussion with a medical provider or your loved ones about your wishes): No, advance care planning information given to patient to review.  Advanced care planning was discussed at today's visit.    Fall risk  Fallen 2 or more times in the past year?: No  Any fall with injury in the past year?: No    Cognitive Screening   1) Repeat 3 items (Leader, Season, Table)    2) Clock draw: NORMAL  3) 3 item recall: Recalls 3 objects  Results: 3 recalled items, NORMAL CLOCK    Mini-CogTM Copyright S Jon. Licensed by the author for use in [facility]; reprinted with permission (sohail@[facility]). All rights reserved.      Do you have sleep apnea, excessive snoring or daytime drowsiness?: yes    Reviewed and updated as needed this visit by clinical staff   Allergies               Reviewed and updated as needed this visit by Provider                Social History     Tobacco Use     Smoking status: Former Smoker     Packs/day: 0.25     Years: 1.00     Pack years: 0.25     Types: Cigarettes     Start date:      Quit date:      Years since quittin.2     Smokeless tobacco: Never Used     Tobacco comment: very minimal smoking history   Substance Use Topics     Alcohol use: No     Alcohol/week: 0.0 standard drinks     If you drink alcohol do you typically have >3 drinks per day or >7 drinks per week? No    Alcohol Use 10/7/2019   Prescreen: >3 drinks/day or >7 drinks/week? Not Applicable   Prescreen: >3 drinks/day or >7 drinks/week? -           Current providers  sharing in care for this patient include:   Patient Care Team:  Yuval Jama MD as PCP - General (Internal Medicine)  Yuval Jama MD (Internal Medicine)  Yuval Jama MD as Assigned PCP  Damien Person RN as Lead Care Coordinator (Primary Care - CC)  Carmen Mims MA as Community Health Worker (Primary Care - CC)  Constantin Vaughn MD as Assigned Heart and Vascular Provider  Kevin Mcdaniels MD as Assigned Musculoskeletal Provider    The following health maintenance items are reviewed in Epic and correct as of today:  Health Maintenance   Topic Date Due     ADVANCE CARE PLANNING  Never done     MIGRAINE ACTION PLAN  Never done     COVID-19 Vaccine (1 of 2) Never done     ZOSTER IMMUNIZATION (1 of 2) Never done     Pneumococcal Vaccine: 65+ Years (1 of 1 - PPSV23) Never done     MEDICARE ANNUAL WELLNESS VISIT  10/07/2020     PHQ-9  04/19/2021     MAMMO SCREENING  10/10/2021     FALL RISK ASSESSMENT  11/23/2021     LIPID  10/08/2025     COLORECTAL CANCER SCREENING  04/12/2029     DTAP/TDAP/TD IMMUNIZATION (3 - Td) 10/07/2029     DEXA  10/10/2034     HEPATITIS C SCREENING  Completed     DEPRESSION ACTION PLAN  Completed     INFLUENZA VACCINE  Completed     HIV SCREENING  Addressed     Pneumococcal Vaccine: Pediatrics (0 to 5 Years) and At-Risk Patients (6 to 64 Years)  Aged Out     IPV IMMUNIZATION  Aged Out     MENINGITIS IMMUNIZATION  Aged Out     HEPATITIS B IMMUNIZATION  Aged Out     Labs reviewed in [facility]  Mammogram Screening: Recommended mammography every 1-2 years with patient discussion and risk factor consideration    Review of Systems  CONSTITUTIONAL: NEGATIVE for fever, chills Weight down 14 pounds  INTEGUMENTARY/SKIN: NEGATIVE for worrisome rashes, moles or lesions  EYES: NEGATIVE for vision changes or irritation. Has glasses. Recent eye exam  Feb 2021  ENT/MOUTH: NEGATIVE for ear, mouth and throat problems  RESP: NEGATIVE for significant cough or SOB  BREAST: NEGATIVE for masses,  "tenderness or discharge  CV: NEGATIVE for chest pain, palpitations or edema (On Flecanide and metoprolol for PAF . Pt s/p Watchaman)  GI: NEGATIVE for nausea, abdominal pain, heartburn, or change in bowel habits  : NEGATIVE for frequency, dysuria, or hematuria  MUSCULOSKELETAL:  Positive for stable arthralgias in knees and hands  NEURO: NEGATIVE for weakness, dizziness or paresthesias. Stable gait with walker  ENDOCRINE:   POSITIVE for  Thyroid treatment  HEME: NEGATIVE for bleeding problems. On Plavix and ASA  PSYCHIATRIC: NEGATIVE for changes in mood or affect    OBJECTIVE:   /78   Pulse 83   Temp 97.2  F (36.2  C) (Temporal)   Resp 16   Ht 1.6 m (5' 3\")   Wt 117.9 kg (260 lb)   SpO2 95%   BMI 46.06 kg/m   Estimated body mass index is 47.62 kg/m  as calculated from the following:    Height as of 11/20/20: 1.6 m (5' 3\").    Weight as of 11/20/20: 121.9 kg (268 lb 12.8 oz).  Physical Exam  General appearance -  alert, no distress  Skin - No rashes or lesions.  Head - normocephalic, atraumatic  Eyes - TYRONE, EOMI, fundi exam with nondilated pupils negative.  Ears - External ears normal. Canals clear. TM's normal.  Nose/Sinuses - Nares normal. Septum midline. Mucosa normal. No drainage or sinus tenderness.  Oropharynx - No erythema, no adenopathy, no exudates.  Neck - Supple without adenopathy or thyromegaly. No bruits.  Lungs - Clear to auscultation without wheezes/rhonchi.  Heart - Regular rate and rhythm without murmurs, clicks, or gallops.  Nodes - No supraclavicular, axillary, or inguinal adenopathy palpable.  Breasts - deferred  Abdomen - Abdomen obese, soft, non-tender. BS normal. No masses or hepatosplenomegaly palpable. No bruits.  Extremities -No cyanosis, clubbing or edema.    Musculoskeletal - Spine ROM normal. Muscular strength intact.  Minimal tenderness to palpation bilateral knee joint lines. No effusions  Peripheral pulses - radial=4/4, femoral=4/4, posterior tibial=4/4, dorsalis " "pedis=4/4,  Neuro - Gait  Stable with use of a walker.  Reflexes normal and symmetric. Sensation grossly WNL.  Genital/Rectal - deferred      ASSESSMENT / PLAN:   1. Medicare annual wellness visit, initial  See below for healthcare maintenance discussion regarding mammogram, Covid vaccine, Shingrix and pneumococcal vaccines.  Colon screening and DEXA up-to-date    2. Morbid obesity, unspecified obesity type (H)  Weight down 14 pounds in the past year.  Counseled patient regarding continued calorie/carbohydrate reduction and increase in walking exercise as able.    3. Paroxysmal atrial fibrillation (H)  Patient denies any palpitations with use of flecainide and metoprolol.  On Plavix and aspirin after previous watchman procedure.  Due to follow-up with cardiology and may need to discuss possible discontinuation of Plavix    4. Thrombocytosis (H)  History of previous minimal thrombocytosis.  Due for lab follow-up  - CBC with platelets    5. Hyperlipidemia LDL goal <100  On statin therapy.  Due for lab follow-up.  Continue current medication for now  - Comprehensive metabolic panel  - Lipid panel reflex to direct LDL Fasting    6. Hypothyroidism, unspecified type  On levothyroxine.  Due for lab follow-up.  Weight loss as above.  Continue current dose pending lab results  - TSH with free T4 reflex    7. Essential hypertension  Controlled.  Continue current medication.  Labs ordered  - Comprehensive metabolic panel    8. Encounter for screening mammogram for breast cancer  - MA Screening Digital Bilateral; Future      Patient has been advised of split billing requirements and indicates understanding: Yes       COUNSELING:  Reviewed preventive health counseling, as reflected in patient instructions    Estimated body mass index is 47.62 kg/m  as calculated from the following:    Height as of 11/20/20: 1.6 m (5' 3\").    Weight as of 11/20/20: 121.9 kg (268 lb 12.8 oz).    Weight management plan: Discussed healthy diet and " exercise guidelines    She reports that she quit smoking about 49 years ago. Her smoking use included cigarettes. She started smoking about 50 years ago. She has a 0.25 pack-year smoking history. She has never used smokeless tobacco.      Appropriate preventive services were discussed with this patient, including applicable screening as appropriate for cardiovascular disease, diabetes, osteopenia/osteoporosis, and glaucoma.  As appropriate for age/gender, discussed screening for colorectal cancer, prostate cancer, breast cancer, and cervical cancer. Checklist reviewing preventive services available has been given to the patient.    Reviewed patients plan of care and provided an AVS. The Basic Care Plan (routine screening as documented in Health Maintenance) for Maxine meets the Care Plan requirement. This Care Plan has been established and reviewed with the Patient.    Counseling Resources:  ATP IV Guidelines  Pooled Cohorts Equation Calculator  Breast Cancer Risk Calculator  Breast Cancer: Medication to Reduce Risk  FRAX Risk Assessment  ICSI Preventive Guidelines  Dietary Guidelines for Americans, 2010  USDA's MyPlate  ASA Prophylaxis  Lung CA Screening      PLAN:  Continue current meds  Labs today as ordered  Reduce calorie/carbohydrate (sugar, bread, potato, pasta, rice, etc)  intake in diet.  Increase color on your plate with fruits and vegetables. Increase  frequency of walking or other aerobic exercise as able (goal is daily)  Mammogram . This [facility] be done at the [facility]. Call [phone] or use [employer] to schedule.   Call [phone] to schedule  Covid vaccine. Call 8 AM on Tuesday mornings  Follow-up with cardiology in May. Possible stopping Plavix/Clopidogrel at that time   Pneumococcal 23 vaccine this early summer for pneumonia prevention. Get at pharmacy or clinic  Also check with insurance or speak with your pharmacist re: Shingrix vaccine coverage for shingles prevention.  This is  a 2 shot series done 2-6 months apart. If done, need to also wait until this summer      Yuval Jama MD  [facility]

## 2021-03-11 NOTE — PATIENT INSTRUCTIONS
Continue current meds  Labs today as ordered  Reduce calorie/carbohydrate (sugar, bread, potato, pasta, rice, etc)  intake in diet.  Increase color on your plate with fruits and vegetables. Increase  frequency of walking or other aerobic exercise as able (goal is daily)  Mammogram . This will be done at the [facility]. Call [phone] or use [employer] to schedule.   Call [phone] to schedule  Covid vaccine. Call 8 AM on Tuesday mornings  Follow-up with cardiology in May. Possible stopping Plavix/Clopidogrel at that time   Pneumococcal 23 vaccine this early summer for pneumonia prevention. Get at pharmacy or clinic  Also check with insurance or speak with your pharmacist re: Shingrix vaccine coverage for shingles prevention.  This is a 2 shot series done 2-6 months apart. If done, need to also wait until this summer

## 2021-03-12 LAB
ALBUMIN SERPL-MCNC: 3.9 G/DL (ref 3.4–5)
ALP SERPL-CCNC: 104 U/L (ref 40–150)
ALT SERPL W P-5'-P-CCNC: 19 U/L (ref 0–50)
ANION GAP SERPL CALCULATED.3IONS-SCNC: 7 MMOL/L (ref 3–14)
AST SERPL W P-5'-P-CCNC: 13 U/L (ref 0–45)
BILIRUB SERPL-MCNC: 0.5 MG/DL (ref 0.2–1.3)
BUN SERPL-MCNC: 12 MG/DL (ref 7–30)
CALCIUM SERPL-MCNC: 9.5 MG/DL (ref 8.5–10.1)
CHLORIDE SERPL-SCNC: 103 MMOL/L (ref 94–109)
CHOLEST SERPL-MCNC: 128 MG/DL
CO2 SERPL-SCNC: 27 MMOL/L (ref 20–32)
CREAT SERPL-MCNC: 0.9 MG/DL (ref 0.52–1.04)
GFR SERPL CREATININE-BSD FRML MDRD: 67 ML/MIN/{1.73_M2}
GLUCOSE SERPL-MCNC: 100 MG/DL (ref 70–99)
HDLC SERPL-MCNC: 64 MG/DL
LDLC SERPL CALC-MCNC: 24 MG/DL
NONHDLC SERPL-MCNC: 64 MG/DL
POTASSIUM SERPL-SCNC: 4 MMOL/L (ref 3.4–5.3)
PROT SERPL-MCNC: 8.1 G/DL (ref 6.8–8.8)
SODIUM SERPL-SCNC: 137 MMOL/L (ref 133–144)
TRIGL SERPL-MCNC: 201 MG/DL
TSH SERPL DL<=0.005 MIU/L-ACNC: 0.45 MU/L (ref 0.4–4)

## 2021-03-18 ENCOUNTER — PATIENT OUTREACH (OUTPATIENT)
Dept: CARE COORDINATION | Facility: CLINIC | Age: 66
End: 2021-03-18

## 2021-03-18 ENCOUNTER — IMMUNIZATION (OUTPATIENT)
Dept: NURSING | Facility: CLINIC | Age: 66
End: 2021-03-18
Payer: COMMERCIAL

## 2021-03-18 PROCEDURE — 0001A PR COVID VAC PFIZER DIL RECON 30 MCG/0.3 ML IM: CPT

## 2021-03-18 PROCEDURE — 91300 PR COVID VAC PFIZER DIL RECON 30 MCG/0.3 ML IM: CPT

## 2021-03-18 ASSESSMENT — ACTIVITIES OF DAILY LIVING (ADL): DEPENDENT_IADLS:: CLEANING;TRANSPORTATION

## 2021-03-18 NOTE — PROGRESS NOTES
Care Coordination Clinician Chart Review    Situation: Patient chart reviewed by care coordinator.    Background: Care Coordination initial assessment and enrollment to Care Coordination was 3/15/2019.  Patient centered goals were developed with participation from patient.  CC RN handed patient off to CHW for continued outreach every 30 days.    Assessment: Per chart review, patient outreach completed by CHW on 2/25/21.  Patient is actively working to accomplish goal.  Patient has been following a healthy diet involving fruits, vegetables, lean protein, and low-fat items.  She is exercising at home.  She continues following medical plan of care.  Patient's goal remains appropriate and relevant at this time.  Patient is not due for updated Complex Care Plan.  Annual assessment to be completed annually and as needed.    Goals        Patient Stated      1. Medical (pt-stated)      Goal Statement: I will avoid health complications by improving my cholesterol panel.  Date Goal set: 10/12/20  Barriers: limited mobility related to knee pain  Strengths: motivated to reduce health risks  Date to Achieve By: 4/30/21  Patient expressed understanding of goal: Yes    Action steps to achieve this goal:  1. I will continue taking my medications as prescribed.  2. I will follow a Heart Healthy diet (low-fat, low-salt).  3. I will work toward regular exercise as able.  4. I will attend routine follow-ups with my PCP and Cardiology.  5. I will continue working with Care Coordination to identify and address any barriers to achieving my goal.        Plan/Recommendations: The patient will continue working with Care Coordination to achieve goal as above.  CHW will involve CC RN as needed or if patient is ready to move to maintenance.  CC RN will continue to monitor progress to goals and CHW outreaches every 6 weeks.  Care Plan updated and sent to patient: Beatriz Person RN  Clinic Care Coordinator  [facility]  [facility]  Ph: [phone]

## 2021-04-06 ENCOUNTER — PATIENT OUTREACH (OUTPATIENT)
Dept: NURSING | Facility: CLINIC | Age: 66
End: 2021-04-06
Payer: COMMERCIAL

## 2021-04-06 NOTE — PROGRESS NOTES
"Clinic Care Coordination Contact  Community Health Worker Follow Up    Goals:   Goals Addressed as of 4/6/2021 at 11:56 AM                 Today    2/25/21       Patient Stated      1. Medical (pt-stated)   60%  50%    Added 10/12/20 by Damien Person RN     Goal Statement: I will avoid health complications by improving my cholesterol panel.  Date Goal set: 10/12/20  Barriers: limited mobility related to knee pain  Strengths: motivated to reduce health risks  Date to Achieve By: 4/30/21  Patient expressed understanding of goal: Yes    Action steps to achieve this goal:  1. I will continue taking my medications as prescribed.  2. I will follow a Heart Healthy diet (low-fat, low-salt).  3. I will work toward regular exercise as able.  4. I will attend routine follow-ups with my PCP and Cardiology.  5. I will continue working with Care Coordination to identify and address any barriers to achieving my goal.        Discussed:    Patient stated that things are going \"pretty well\".    Patient reported that she is walking more and being able to stand longer.    Patient reported that she noticed that her eating habits are getting better.    Patient stated that she had claw chowder, and was it \"way too much\", and she had only 2 crackers. Patient stated that she is eating more salads and vegetables.    Patient stated that she had her 2nd Covid-19 vaccine last week.    Patient reported that instead of sitting watching TV, she has been making quilts for the homeless shelters.  Last Friday a group of ladies gathered at 9:00am to 12:00pm to work on quilts.  Everyone was very careful by wearing a mask, and getting their distance. This will be every Friday.  Patient is working on getting shelves installed in the bedroom for fabric.    Patient has not heard from [email or web address] yet. Patient gave her PCP the paperwork to complete and mail in.    Patient stated that she really wants to go swimming at the [facility].    Intervention and " Education during outreach: CHW encouraged patient to contact CC if there are any other changes or resources needed.  Patient acknowledged understanding.      Plan: Patient will continue to eat healthier meals.  Patient will walk to help build up her strength. Patient will continue quilting.  Patient will attend her medical appointments.  Patient will take medication as prescribed.  CHW will send a message to [facility] to check on the status of the [employer] application.  CHW will follow up in one month.    Next Outreach: 5/6/21    TRINIDAD Li  Clinic Care Coordination  [facility]: [place], [place] and [place] [facility]  Phone: [phone]

## 2021-04-08 ENCOUNTER — TELEPHONE (OUTPATIENT)
Dept: CARE COORDINATION | Facility: CLINIC | Age: 66
End: 2021-04-08

## 2021-04-08 ENCOUNTER — IMMUNIZATION (OUTPATIENT)
Dept: NURSING | Facility: CLINIC | Age: 66
End: 2021-04-08
Attending: INTERNAL MEDICINE
Payer: COMMERCIAL

## 2021-04-08 PROCEDURE — 0002A PR COVID VAC PFIZER DIL RECON 30 MCG/0.3 ML IM: CPT

## 2021-04-08 PROCEDURE — 91300 PR COVID VAC PFIZER DIL RECON 30 MCG/0.3 ML IM: CPT

## 2021-04-08 NOTE — TELEPHONE ENCOUNTER
Clinic Care Coordination Contact  Patient stated that she has not heard from [employer] yet, and is checking on the status of her [employer] application.  Patient gave the paperwork to her PCP on 3/11/21.  Patient would like confirmation that the forms were completed and mailed to [employer].  Please call patient to discuss.  Thank you.    Carmen Mism, TRINIDAD  Clinic Care Coordination  [facility] Clinics: [place], [place], [place], and [place] [facility]  Phone: [phone]

## 2021-04-28 DIAGNOSIS — E03.9 HYPOTHYROIDISM, UNSPECIFIED TYPE: ICD-10-CM

## 2021-04-28 DIAGNOSIS — I10 ESSENTIAL HYPERTENSION: ICD-10-CM

## 2021-04-28 RX ORDER — LEVOTHYROXINE SODIUM 137 UG/1
137 TABLET ORAL DAILY
Qty: 90 TABLET | Refills: 3 | Status: SHIPPED | OUTPATIENT
Start: 2021-04-28 | End: 2022-05-18

## 2021-04-28 RX ORDER — LOSARTAN POTASSIUM 100 MG/1
TABLET ORAL
Qty: 90 TABLET | Refills: 3 | Status: SHIPPED | OUTPATIENT
Start: 2021-04-28 | End: 2022-05-18

## 2021-04-30 DIAGNOSIS — I48.0 PAROXYSMAL ATRIAL FIBRILLATION (H): ICD-10-CM

## 2021-04-30 RX ORDER — FLECAINIDE ACETATE 150 MG/1
150 TABLET ORAL EVERY 12 HOURS
Qty: 180 TABLET | Refills: 0 | Status: SHIPPED | OUTPATIENT
Start: 2021-04-30 | End: 2021-07-16

## 2021-05-04 ENCOUNTER — PATIENT OUTREACH (OUTPATIENT)
Dept: CARE COORDINATION | Facility: CLINIC | Age: 66
End: 2021-05-04

## 2021-05-04 ASSESSMENT — ACTIVITIES OF DAILY LIVING (ADL): DEPENDENT_IADLS:: CLEANING;TRANSPORTATION

## 2021-05-04 NOTE — PROGRESS NOTES
Care Coordination Clinician Chart Review    Situation: Patient chart reviewed by care coordinator.    Background: Care Coordination initial assessment and enrollment to Care Coordination was 3/15/2019; updated assessment completed on 11/23/2020.  Patient centered goals were developed with participation from patient.  CC RN handed patient off to CHW for continued outreach every 30 days.    Assessment: Per chart review, patient outreach completed by CHW on 4/6/21.  Patient is actively working to accomplish goal.  Patient has been continuing to work on action steps below toward goal; per labs drawn on 3/11/21, patient's lipid/cholesterol panel has improved as compared to 10/2020.  Patient's goal remains appropriate and relevant at this time.  Patient is due for updated Complex Care Plan.  Annual assessment to be completed annually and as needed.    Goals        Patient Stated      1. Medical (pt-stated)      Goal Statement: I will avoid health complications by improving my cholesterol panel.  Date Goal set: 10/12/20  Barriers: limited mobility related to knee pain  Strengths: motivated to reduce health risks  Date to Achieve By: 4/30/21  Patient expressed understanding of goal: Yes    Action steps to achieve this goal:  1. I will continue taking my medications as prescribed.  2. I will follow a Heart Healthy diet (low-fat, low-salt).  3. I will work toward regular exercise as able.  4. I will attend routine follow-ups with my PCP and Cardiology.  5. I will continue working with Care Coordination to identify and address any barriers to achieving my goal.        Plan/Recommendations: The patient will continue working with Care Coordination to achieve goal as above.  On next CHW outreach, would recommend discussion of completing current goal; can inquire if any additional patient needs at this time with transition to Maintenance if no current additional patient goals.  CHW will involve CC RN as needed or if patient is  ready to move to maintenance.  CC RN will continue to monitor progress to goals and CHW outreaches every 6 weeks.  Care Plan updated and sent to patient: Yes    Damien Person, RN  Clinic Care Coordinator  [facility]  [facility]  Ph: [phone]

## 2021-05-04 NOTE — LETTER
[facility] CARE COORDINATION  [facility]  [address], [place] [address]    May 4, 2021        Maxine Velasquez  [address]  [place] [address]          Dear Maxine,     Alondra is an updated Complex Care Plan for your continued enrollment in Care Coordination. Please let us know if you have additional questions, concerns, or goals that we can assist with.    Sincerely,    Damien Person, RN  Clinic Care Coordinator  [facility]  Enedina Lockhart, [facility]  Ph: [phone]            [facility]  Complex Care Plan  About Me:    Patient Name:  Maxine Velasquez    YOB: 1955  Age:         65 year old   [place] MRN:    [id number] Telephone Information:  Home Phone [phone]   Mobile [phone]       Address:  [address]  [place] [address] Email address:  [email or web address]      Emergency Contact(s)    Name Relationship Lgl Grd Work Phone Home Phone Mobile Phone   1. SEVERSON,VIVIAN Sister   [phone]   2. BRANDI GEORGES Daughter   [phone]   3. PRATIMA VELASQUEZ Son   [phone]           Primary language:  English     needed? No   [place] [facility]:  [phone] op. 1  Other communication barriers: None  Preferred Method of Communication:  Phone  Current living arrangement: I live alone  Mobility Status/ Medical Equipment: Independent w/Device    Health Maintenance  Health Maintenance Reviewed:   Health Maintenance Due   Topic Date Due     MIGRAINE ACTION PLAN  Never done     ZOSTER IMMUNIZATION (1 of 2) Never done     Pneumococcal Vaccine: 65+ Years (1 of 1 - PPSV23) Never done     PHQ-9  04/19/2021     My Access Plan  Medical Emergency 911   Primary Clinic Line [facility] - [phone]   24 Hour Appointment Line [phone] or  [phone]) (toll-free)   96  Hour Nurse Line [phone] (toll-free)   Preferred Urgent Care [facility], [phone]   Preferred Hospital [facility]  [phone]   Preferred Pharmacy [facility] [place], [place] - [address]     Behavioral Health Crisis Line The National Suicide Prevention Lifeline at 1-925.514.3006 or 911       My Care Team Members  Patient Care Team       Relationship Specialty Notifications Start End    Yuval Jama MD PCP - General Internal Medicine  10/20/14     Merged    Phone: [phone] Fax: [phone]         [address] [place] [address]    Yuval Jama MD  Internal Medicine  10/20/14     Merged    Phone: [phone] Fax: [phone]         [address] [place] [address]    Yuval Jama MD Assigned PCP   12/19/14     Phone: [phone] Fax: [phone]         [address] [place] [address]    Damien Person, RN Lead Care Coordinator Primary Care - [facility] Admissions 3/6/19     Phone: [phone]         Carmen Mims MA Community Health Worker Primary Care - [facility]  1/22/20     Phone: [phone]         Kevin Mcdaniels MD Assigned Musculoskeletal Provider   10/23/20     Phone: [phone] Fax: [phone] [address] [place] [address]    Constantin Vaughn MD Assigned Heart and Vascular Provider   4/4/21     Phone: [phone] Fax: [phone] [address] [place] [address]            My Care Plans  Self Management and Treatment Plan  Goals and (Comments)  Goals        Patient Stated      1. Medical (pt-stated)      Goal Statement: I will avoid health complications by improving my cholesterol panel.  Date Goal set: 10/12/20  Barriers: limited mobility related to knee pain  Strengths: motivated to reduce health risks  Date to Achieve By: 4/30/21  Patient expressed understanding of goal: Yes    Action steps to achieve this goal:  1. I will continue taking my medications as  prescribed.  2. I will follow a Heart Healthy diet (low-fat, low-salt).  3. I will work toward regular exercise as able.  4. I will attend routine follow-ups with my PCP and Cardiology.  5. I will continue working with Care Coordination to identify and address any barriers to achieving my goal.        Action Plans on File:   Depression  Advance Care Plans/Directives Type: none on file    Honoring Choices    Advance Care Planning and Health Care Directives  When it comes to decisions about your health care, it s important that your voice is heard. You may not always be able to speak for yourself.    We encourage you to have discussions with your loved ones, cultural or spiritual leaders and health care providers about your goals, values, beliefs and choices.    We are a part of [employer] [place] , supporting and promoting the benefits of advance care planning conversations.    Our goals are to:    Help you make informed decisions about your healthcare choices and ensure that those choices are honored    Offer advance care planning discussions with trained staff    Ensure your choices are clearly defined, documented and available in your medical record    Translate your choices into medical orders as needed    Why is Advance Care Planning important?    Know what your health care choices are and decide what is right for you    An unexpected illness or injury could leave you unable to participate in important treatment decisions    When choices are left to others to decide that responsibility can be difficult and stressful    By discussing and outlining your choices, your voice is heard in the care you want to receive    How can I learn more?  We offer free classes at multiple locations, days and times. Our trained facilitators will provide information and guide you through a Health Care Directive document. They can also review, notarize and add your document to your medical record.    Call [employer] at [phone] or toll free at [phone] for assistance.       My Medical and Care Information  Problem List   Patient Active Problem List   Diagnosis     Osteoarthritis     TMJ disease     Health Care Home     Hypothyroidism     Morbid obesity, unspecified obesity type (H)     Migraine without aura and without status migrainosus, not intractable     Essential hypertension     NELY on CPAP     RLS (restless legs syndrome)     Gastroesophageal reflux disease, esophagitis presence not specified     Iron deficiency     Chronic pain of left knee     Lumbar radiculopathy     Paroxysmal atrial fibrillation (H)     Pulmonary nodules     Hyperlipidemia LDL goal <100      Current Medications:  Current Outpatient Medications   Medication Instructions     acetaminophen (TYLENOL) 500-1,000 mg, Oral, 3 TIMES DAILY PRN     aspirin (ASA) 81 mg, Oral, DAILY     clopidogrel (PLAVIX) 75 mg, Oral, DAILY     flecainide (TAMBOCOR) 150 mg, Oral, EVERY 12 HOURS, Please call for an appointment [phone]     gabapentin (NEURONTIN) 900 mg, Oral, AT BEDTIME     levothyroxine (SYNTHROID/LEVOTHROID) 137 mcg, Oral, DAILY     losartan (COZAAR) 100 MG tablet TAKE 1 TABLET BY MOUTH DAILY     metoprolol succinate ER (TOPROL-XL) 12.5 mg, Oral, DAILY     order for DME DREAMSTATION<BR>5-15 CM/H20<BR>NASAL AIRFIT N10 HER     pantoprazole (PROTONIX) 40 MG EC tablet TAKE 1 TABLET BY MOUTH TWICE A DAY BEFORE MEALS     rosuvastatin (CRESTOR) 20 mg, Oral, DAILY     SUMAtriptan (IMITREX) 100 mg, Oral, AT ONSET OF HEADACHE, May repeat in 2 hours if needed: max 2/day;     triamterene-HCTZ (MAXZIDE-25) 37.5-25 MG tablet 1 tablet, Oral, DAILY     Care Coordination Start Date: 3/15/2019   Frequency of Care Coordination: monthly   Form Last Updated: 05/04/2021

## 2021-05-20 ENCOUNTER — PATIENT OUTREACH (OUTPATIENT)
Dept: NURSING | Facility: CLINIC | Age: 66
End: 2021-05-20
Payer: COMMERCIAL

## 2021-05-20 NOTE — PROGRESS NOTES
Clinic Care Coordination Contact  Community Health Worker Follow Up    Goals:   Goals Addressed as of 5/20/2021 at 9:02 AM                 Today    4/6/21       Patient Stated      1. Medical (pt-stated)   70%  60%    Added 10/12/20 by Damien Person RN     Goal Statement: I will avoid health complications by improving my cholesterol panel.  Date Goal set: 10/12/20  Barriers: limited mobility related to knee pain  Strengths: motivated to reduce health risks  Date to Achieve By: 4/30/21  Patient expressed understanding of goal: Yes    Action steps to achieve this goal:  1. I will continue taking my medications as prescribed.  2. I will follow a Heart Healthy diet (low-fat, low-salt).  3. I will work toward regular exercise as able.  4. I will attend routine follow-ups with my PCP and Cardiology.  5. I will continue working with Care Coordination to identify and address any barriers to achieving my goal.        Discussed:    Patient stated that she has not heard from [employer].    Patient stated that she is eating a healthy diet.  She is eating better, and it is going well.  Patient is eating a low-fat, low-salt diet.    Patient stated that she likes the idea of losing weight and getting healthier.    Patient stated that she is vacuuming and cleaning her floors herself. It just takes a bit longer.    Patient stated that she had her 2nd Covid dose a few weeks ago.    Patient stated that she has been walking more. At the grocery store taking a cart instead of riding on a cart. She is walking more in the stores.    Patient stated that she has a 6 month follow up with her Cardiologist on 6/3/21.    Patient stated that she continues to meet with a group and quilt together.  Last Friday they spent time quilting and all went out to lunch.    Patient stated that she is moving more, and is feeling better.      Intervention and Education during outreach: CHW encouraged patient to contact CC if there are any other changes  or resources needed.  Patient acknowledged understanding.        Plan: CHW will route a message to  Triage regarding the [employer] application.  CHW will follow up with patient in one month, and call [employer] if needed.    Next Outreach: 6/21/21    TRINIDAD Li  Clinic Care Coordination  [facility] Clinics: [place], [place], [facility], and [place] [facility]  Phone: [phone]

## 2021-05-25 ENCOUNTER — TELEPHONE (OUTPATIENT)
Dept: CARE COORDINATION | Facility: CLINIC | Age: 66
End: 2021-05-25

## 2021-05-25 NOTE — TELEPHONE ENCOUNTER
Clinic Care Coordination Contact  Patient had an office visit on 3/11/21 with her PCP.  Patient brought in [employer] Forms for the doctor to complete, sign, and mail to [employer].  Was the paperwork completed and mailed in? Please call patient to discuss.   Thank you.    Carmen Mims, BISI  Clinic Care Coordination  [facility] Clinics: [place], [place], [place], and [place] [facility]  Phone: [phone]

## 2021-06-02 NOTE — PROGRESS NOTES
[facility]    I had the pleasure of seeing Roxanna when she came for 6-month follow-up.  This 65 year old sees Dr. Vaughn for her history of:    1. Paroxysmal AFib first diagnosed 9/2015.  She is undergone DC cardioversion, most recently 2/2019.  Hospitalized 1/2020 and flecainide increased.  2. GI bleeds. Had Hgb 7.2 g/dL 3/2019. EGD revealed duodenal ulcer which was cauterized.  Xarelto was held, but then later restarted.  She was rehospitalized 4/2019 with a lower GI bleed.  Hemoglobin got down to 8.7 g/dL.  Xarelto was discontinued, and Dr. Vaughn recommended not restarting this for at least 3-6 months. Capsule endoscopy 5/2019 showed normal mucosa but did not reach the colon (Dr. Ko). We restarted Xarelto 20 mg daily 10/2019 but Hgb dropped >1 gram in ~1 month. Had been kept off of AC despite CHADSVASc 2 (HTN, sex). Now s/p 27 mm LAAO Watchman 10/8/2020 with Dr. Vaughn and Dr. Toth  3. HTN  4. Coronary Calcification noted on CTA Heart 9/2020. Stress Test 3/2016 without ischemia  5. Pulmonary Nodules noted on CTA Heart 9/2020 - low risk - no Follow-up; high risk - optional 12 m CT follow-up. Roxanna states she considers herself low risk as smoked for <6 months    I saw Roxanna 10/2020 at which time she was doing well following her Watchman procedure.  Hemoglobin done in follow-up remained normal despite use of anticoagulation.  She had a virtual visit with Dr. Vaughn 11/2020 at which time they reviewed her FRIDA showing good Watchman device position. Xarelto was stopped, and she was started on Plavix.  Follow-up in 6 months was recommended with expectation to discontinue Plavix therapy    Interval History:  Overall Roxanna is doing really well, without recurrent AFib/prolonged arrhythmias. No dizziness, lightheadedness.  No syncope or falls.    Her edema is under good control.     Reviewed her lipids from 3/2021 .. LDL is too low at 24 mg/dL on Crestor. She's working on losing weight and has reduced  "her sugared soda intake and has been walking more. No issues.    VITALS:  Vitals: /77   Pulse 60   Ht 1.6 m (5' 3\")   Wt 117.4 kg (258 lb 14.4 oz)   BMI 45.86 kg/m      Diagnostic Testing:  FRIDA (11/20/2020): Normal LV function.  EF estimated 55-60%.  The Watchman device was well-seated with adequate compression and minimal leak.  FRIDA 10/8/2020 with nl LVEF, nl RVEF, mild dilation of LA. Trace valve abnls  CT A Heart 9/8/2020 with normal aorta. Coronary calcification noted. Non-cardiac portion with 4 mm non-calcified medial RML nodule, indeterminate 4 mm RUL nodule anterior and 3 mm lingular nodule noted. Low risk - no follow-up needed; High-risk - optional CT follow-up 12 m  Echocardiogam 1/2020 showed EF 55-60%. No RWMA. RV OK. Mild MAC. Trace TR.   Echocardiogram 7/2017 showed an EF of 55-60% with borderline LVH.  LA was mildly dilated at 4.6 cm with a volume index of 24.2 mL/m .  She was noted to have a dilated IVC with elevated right atrial pressure at 15-20.  No significant valvular abnormalities were noted.  Nuclear stress test 3/2016 was negative for ischemia  Component      Latest Ref Rng & Units 3/13/2019 10/8/2020 3/11/2021   Cholesterol      <200 mg/dL 172 217 (H) 128   Triglycerides      <150 mg/dL 212 (H) 325 (H) 201 (H)   HDL Cholesterol      >49 mg/dL 45 (L) 46 (L) 64   LDL Cholesterol Calculated      <100 mg/dL 85 106 (H) 24   Non HDL Cholesterol      <130 mg/dL 127 171 (H) 64     Plan:  Stop Plavix  Decrease Crestor from 20 to 10 mg daily  See me back in 11/2021 with EKG for flecainide monitoring with new fasting lipid profile with lower dose Crestor    Assessment/Plan:    1. Paroxysmal AFib, high HASBLED    Remains on flecainide 150 and metoprolol XL    S/p 27 mm LAAO device 10/2020.  Follow-up FRIDA showed good placement/seating    Remains on DAPT    PLAN:    Stop Plavix as above as >6 months out from Watchman    Continue ASA 81 mg daily    Continue flecainide    See me in 11/2021 for " flecainide monitoring    2. Hypertension    Remains on metoprolol XL 12.5 mg daily, Maxide 37.5/25 and losartan 100 mg daily    BPs look great    PLAN:    No changes      3. Coronary calcification    Noted on CT scan prior to Watchman    Last stress test 2016 without ischemia.  FRIDA 11/2020 with normal EF    Remains on beta-blocker, ARB.  Statin started at time of LAAO 10/2020    Reviewed lipids    PLAN:    Reduce Crestor from 20 to 10 mg daily d/t LDL <40 mg/dL. Recheck lipids in 11/2021 at follow-up visit    Continue weight loss, more routine exercise        Monika Sue PA-C, MSPAS      Orders Placed This Encounter   Procedures     Lipid Profile     ALT     Follow-Up with Cardiac Advanced Practice Provider     EKG 12-lead complete w/read - Clinics     Orders Placed This Encounter   Medications     diphenhydrAMINE HCl (ALLERGY MEDICATION PO)     rosuvastatin (CRESTOR) 20 MG tablet     Sig: Take 0.5 tablets (10 mg) by mouth daily     Medications Discontinued During This Encounter   Medication Reason     clopidogrel (PLAVIX) 75 MG tablet Therapy completed     rosuvastatin (CRESTOR) 20 MG tablet          Encounter Diagnoses   Name Primary?     Presence of Watchman left atrial appendage closure device      Coronary artery calcification seen on CAT scan      Encounter for monitoring flecainide therapy Yes       CURRENT MEDICATIONS:  Current Outpatient Medications   Medication Sig Dispense Refill     acetaminophen (TYLENOL) 500 MG tablet Take 500-1,000 mg by mouth 3 times daily as needed for mild pain       aspirin (ASA) 81 MG EC tablet Take 1 tablet (81 mg) by mouth daily       diphenhydrAMINE HCl (ALLERGY MEDICATION PO)        flecainide (TAMBOCOR) 150 MG tablet Take 1 tablet (150 mg) by mouth every 12 hours Please call for an appointment [phone] 180 tablet 0     gabapentin (NEURONTIN) 300 MG capsule Take 3 capsules (900 mg) by mouth At Bedtime 270 capsule 3     levothyroxine (SYNTHROID/LEVOTHROID) 137 MCG tablet  "TAKE 1 TABLET (137 MCG) BY MOUTH DAILY 90 tablet 3     losartan (COZAAR) 100 MG tablet TAKE 1 TABLET BY MOUTH DAILY 90 tablet 3     metoprolol succinate ER (TOPROL-XL) 25 MG 24 hr tablet Take 0.5 tablets (12.5 mg) by mouth daily 45 tablet 0     order for DME DREAMSTATION  5-15 CM/H20  NASAL AIRFIT N10 HER       pantoprazole (PROTONIX) 40 MG EC tablet TAKE 1 TABLET BY MOUTH TWICE A DAY BEFORE MEALS 56 tablet 9     rosuvastatin (CRESTOR) 20 MG tablet Take 0.5 tablets (10 mg) by mouth daily       SUMAtriptan (IMITREX) 100 MG tablet Take 1 tablet (100 mg) by mouth at onset of headache for migraine May repeat in 2 hours if needed: max 2/day; 10 tablet 11     triamterene-HCTZ (MAXZIDE-25) 37.5-25 MG tablet TAKE 1 TABLET BY MOUTH DAILY 28 tablet 9       ALLERGIES     Allergies   Allergen Reactions     Morphine Anaphylaxis     Ceclor [Cefaclor] Hives     Diltiazem Hives     Lisinopril Cough     Sertraline Nausea and Vomiting         Review of Systems:  Skin:  Positive for bruising   Eyes:  Positive for glasses  ENT:  Negative    Respiratory:  Positive for sleep apnea;CPAP  Cardiovascular:  Negative for;palpitations;chest pain;edema    Gastroenterology: Negative for melena;hematochezia  Genitourinary:  Negative    Musculoskeletal:  Positive for arthritis  Neurologic:  Negative    Psychiatric:  Positive for depression  Heme/Lymph/Imm:  Positive for allergies  Endocrine:  Positive for thyroid disorder    Physical Exam:  Vitals: /77   Pulse 60   Ht 1.6 m (5' 3\")   Wt 117.4 kg (258 lb 14.4 oz)   BMI 45.86 kg/m      Constitutional:  cooperative, alert and oriented, well developed, well nourished, in no acute distress        Skin:  warm and dry to the touch, no apparent skin lesions or masses noted        Head:  normocephalic, no masses or lesions        Eyes:  pupils equal and round;conjunctivae and lids unremarkable;sclera white        ENT:  no pallor or cyanosis, dentition good        Neck:  JVP normal;no carotid " bruit        Chest:  normal respiratory excursion        Cardiac: regular rhythm;no murmurs, gallops or rubs detected                  Abdomen:  abdomen soft obese      Vascular: pulses full and equal                                 R groin site without tenderness    Extremities and Back:  no deformities, clubbing, cyanosis, erythema observed;no edema        Neurological:  no gross motor deficits            PAST MEDICAL HISTORY:  Past Medical History:   Diagnosis Date     Depression       Duodenal ulcer 03/04/2019     Essential hypertension       GERD (gastroesophageal reflux disease)      Hiatal hernia      Hypothyroidism      Migraine without aura and without status migrainosus, not intractable  Aug 2016     Obesity      NELY on CPAP      Osteoarthritis      Paroxysmal atrial fibrillation (H) 01/05/2015     RLS (restless legs syndrome)      Symptomatic menopausal or female climacteric states (aka FLASHES)      TMJ disease        PAST SURGICAL HISTORY:  Past Surgical History:   Procedure Laterality Date     APPENDECTOMY       CHOLECYSTECTOMY       COLONOSCOPY N/A 4/12/2019    Procedure: COLONOSCOPY;  Surgeon: Vahid Cardona MD;  Location:  GI     EP LEFT ATRIAL APPENDAGE CLOSURE N/A 10/8/2020    Procedure: EP Left Atrial Appendage Closure;  Surgeon: Constantin Vaughn MD;  Location:  HEART CARDIAC CATH LAB     ESOPHAGOSCOPY, GASTROSCOPY, DUODENOSCOPY (EGD), COMBINED N/A 4/11/2019    Procedure: COMBINED ESOPHAGOSCOPY, GASTROSCOPY, DUODENOSCOPY (EGD);  Surgeon: Ben Ko MD;  Location:  GI     HYSTERECTOMY, [facility]  08/1999     TONSILLECTOMY         FAMILY HISTORY:  Family History   Problem Relation Age of Onset     Myocardial Infarction Mother      Heart Failure Mother      Heart Surgery Father         bypass surgery     Cerebrovascular Disease Father      Heart Surgery Brother      Hyperlipidemia Brother      Hyperlipidemia Sister      Hyperlipidemia Brother      Sleep Apnea Sister         SOCIAL HISTORY:  Social History     Socioeconomic History     Marital status:      Spouse name: None     Number of children: None     Years of education: None     Highest education level: None   Occupational History     None   Social Needs     Financial resource strain: None     Food insecurity     Worry: None     Inability: None     Transportation needs     Medical: None     Non-medical: None   Tobacco Use     Smoking status: Former Smoker     Packs/day: 0.25     Years: 1.00     Pack years: 0.25     Types: Cigarettes     Start date:      Quit date:      Years since quittin.4     Smokeless tobacco: Never Used     Tobacco comment: very minimal smoking history   Substance and Sexual Activity     Alcohol use: No     Alcohol/week: 0.0 standard drinks     Drug use: No     Sexual activity: Never   Lifestyle     Physical activity     Days per week: 3 days     Minutes per session: 10 min     Stress: None   Relationships     Social connections     Talks on phone: None     Gets together: None     Attends [employer] service: None     Active member of club or organization: None     Attends meetings of clubs or organizations: None     Relationship status: None     Intimate partner violence     Fear of current or ex partner: None     Emotionally abused: None     Physically abused: None     Forced sexual activity: None   Other Topics Concern     Parent/sibling w/ CABG, MI or angioplasty before 65F 55M? Not Asked      Service Not Asked     Blood Transfusions Not Asked     Caffeine Concern No     Occupational Exposure Not Asked     Hobby Hazards Not Asked     Sleep Concern Yes     Stress Concern Not Asked     Weight Concern No     Special Diet No     Back Care Not Asked     Exercise No     Bike Helmet Not Asked     Seat Belt Yes     Self-Exams Not Asked   Social History Narrative     None

## 2021-06-03 ENCOUNTER — OFFICE VISIT (OUTPATIENT)
Dept: CARDIOLOGY | Facility: CLINIC | Age: 66
End: 2021-06-03
Attending: INTERNAL MEDICINE
Payer: COMMERCIAL

## 2021-06-03 VITALS
SYSTOLIC BLOOD PRESSURE: 128 MMHG | BODY MASS INDEX: 45.87 KG/M2 | HEART RATE: 60 BPM | WEIGHT: 258.9 LBS | DIASTOLIC BLOOD PRESSURE: 77 MMHG | HEIGHT: 63 IN

## 2021-06-03 DIAGNOSIS — Z79.899 ENCOUNTER FOR MONITORING FLECAINIDE THERAPY: Primary | ICD-10-CM

## 2021-06-03 DIAGNOSIS — Z95.818 PRESENCE OF WATCHMAN LEFT ATRIAL APPENDAGE CLOSURE DEVICE: ICD-10-CM

## 2021-06-03 DIAGNOSIS — Z51.81 ENCOUNTER FOR MONITORING FLECAINIDE THERAPY: Primary | ICD-10-CM

## 2021-06-03 DIAGNOSIS — I25.10 CORONARY ARTERY CALCIFICATION SEEN ON CAT SCAN: ICD-10-CM

## 2021-06-03 PROCEDURE — 99214 OFFICE O/P EST MOD 30 MIN: CPT | Performed by: PHYSICIAN ASSISTANT

## 2021-06-03 RX ORDER — ROSUVASTATIN CALCIUM 20 MG/1
10 TABLET, COATED ORAL DAILY
Start: 2021-06-03 | End: 2021-10-25 | Stop reason: DRUGHIGH

## 2021-06-03 ASSESSMENT — MIFFLIN-ST. JEOR: SCORE: 1688.49

## 2021-06-03 NOTE — PATIENT INSTRUCTIONS
Roxanna - it was good to see you today!    1. Reviewed that overall you're doing well without any heart issues  2. Reviewed that you bruise easily - likely due to combination of Plavix and Aspirin    PLAN:  1. STOP Plavix (clopidogrel) at this point as now 6 months out from Watchman!  Great news!  2. Stay on Aspirin life long  3. Continue to monitor for AFib  - you should not have this b/c of the flecainide and so far so good!  4. Reviewed cholesterol panel in 3/2021 showing GREAT improvement in your cholesterol, trigs, good chol/HDL and bad chol/LDL.  The change in good chol and trigs are due to DIET/EXERCISE changes - VERY COOL!  Keep it up!!   Your LDL is too low!  Reduce the Crestor (rosuvastatin) from 20 to just 10 mg daily (cut in 1/2).  When close to running out, let me know and we can get Rx for 10 mg tabs     5. See us back with fasting cholesterol in ~5-6 months but call if issues prior! We'll get EKG at that time. [phone]

## 2021-06-03 NOTE — LETTER
6/3/2021    Yuval Jama MD  [address] [facility] [address]    RE: Maxine Sears       Dear Colleague,    I had the pleasure of seeing Maxine KOWALSKI Orion in the [facility].    [facility]    I had the pleasure of seeing Roxanna when she came for 6-month follow-up.  This 65 year old sees Dr. Vaughn for her history of:    1. Paroxysmal AFib first diagnosed 9/2015.  She is undergone DC cardioversion, most recently 2/2019.  Hospitalized 1/2020 and flecainide increased.  2. GI bleeds. Had Hgb 7.2 g/dL 3/2019. EGD revealed duodenal ulcer which was cauterized.  Xarelto was held, but then later restarted.  She was rehospitalized 4/2019 with a lower GI bleed.  Hemoglobin got down to 8.7 g/dL.  Xarelto was discontinued, and Dr. Vaughn recommended not restarting this for at least 3-6 months. Capsule endoscopy 5/2019 showed normal mucosa but did not reach the colon (Dr. Ko). We restarted Xarelto 20 mg daily 10/2019 but Hgb dropped >1 gram in ~1 month. Had been kept off of AC despite CHADSVASc 2 (HTN, sex). Now s/p 27 mm LAAO Watchman 10/8/2020 with Dr. Vaughn and Dr. Toth  3. HTN  4. Coronary Calcification noted on CTA Heart 9/2020. Stress Test 3/2016 without ischemia  5. Pulmonary Nodules noted on CTA Heart 9/2020 - low risk - no Follow-up; high risk - optional 12 m CT follow-up. Roxanna states she considers herself low risk as smoked for <6 months    I saw Roxanna 10/2020 at which time she was doing well following her Watchman procedure.  Hemoglobin done in follow-up remained normal despite use of anticoagulation.  She had a virtual visit with Dr. Vaughn 11/2020 at which time they reviewed her FRIDA showing good Watchman device position. Xarelto was stopped, and she was started on Plavix.  Follow-up in 6 months was recommended with expectation to discontinue Plavix therapy    Interval History:  Overall Roxanna is doing really well, without recurrent  "AFib/prolonged arrhythmias. No dizziness, lightheadedness.  No syncope or falls.    Her edema is under good control.     Reviewed her lipids from 3/2021 .. LDL is too low at 24 mg/dL on Crestor. She's working on losing weight and has reduced her sugared soda intake and has been walking more. No issues.    VITALS:  Vitals: /77   Pulse 60   Ht 1.6 m (5' 3\")   Wt 117.4 kg (258 lb 14.4 oz)   BMI 45.86 kg/m      Diagnostic Testing:  FRIDA (11/20/2020): Normal LV function.  EF estimated 55-60%.  The Watchman device was well-seated with adequate compression and minimal leak.  FRIDA 10/8/2020 with nl LVEF, nl RVEF, mild dilation of LA. Trace valve abnls  CT A Heart 9/8/2020 with normal aorta. Coronary calcification noted. Non-cardiac portion with 4 mm non-calcified medial RML nodule, indeterminate 4 mm RUL nodule anterior and 3 mm lingular nodule noted. Low risk - no follow-up needed; High-risk - optional CT follow-up 12 m  Echocardiogam 1/2020 showed EF 55-60%. No RWMA. RV OK. Mild MAC. Trace TR.   Echocardiogram 7/2017 showed an EF of 55-60% with borderline LVH.  LA was mildly dilated at 4.6 cm with a volume index of 24.2 mL/m .  She was noted to have a dilated IVC with elevated right atrial pressure at 15-20.  No significant valvular abnormalities were noted.  Nuclear stress test 3/2016 was negative for ischemia  Component      Latest Ref Rng & Units 3/13/2019 10/8/2020 3/11/2021   Cholesterol      <200 mg/dL 172 217 (H) 128   Triglycerides      <150 mg/dL 212 (H) 325 (H) 201 (H)   HDL Cholesterol      >49 mg/dL 45 (L) 46 (L) 64   LDL Cholesterol Calculated      <100 mg/dL 85 106 (H) 24   Non HDL Cholesterol      <130 mg/dL 127 171 (H) 64     Plan:  Stop Plavix  Decrease Crestor from 20 to 10 mg daily  See me back in 11/2021 with EKG for flecainide monitoring with new fasting lipid profile with lower dose Crestor    Assessment/Plan:    1. Paroxysmal AFib, high HASBLED    Remains on flecainide 150 and metoprolol " XL    S/p 27 mm LAAO device 10/2020.  Follow-up FRIDA showed good placement/seating    Remains on DAPT    PLAN:    Stop Plavix as above as >6 months out from Watchman    Continue ASA 81 mg daily    Continue flecainide    See me in 11/2021 for flecainide monitoring    2. Hypertension    Remains on metoprolol XL 12.5 mg daily, Maxide 37.5/25 and losartan 100 mg daily    BPs look great    PLAN:    No changes      3. Coronary calcification    Noted on CT scan prior to Watchman    Last stress test 2016 without ischemia.  FRIDA 11/2020 with normal EF    Remains on beta-blocker, ARB.  Statin started at time of LAAO 10/2020    Reviewed lipids    PLAN:    Reduce Crestor from 20 to 10 mg daily d/t LDL <40 mg/dL. Recheck lipids in 11/2021 at follow-up visit    Continue weight loss, more routine exercise        Monika Sue PA-C, MSPAS      Orders Placed This Encounter   Procedures     Lipid Profile     ALT     Follow-Up with Cardiac Advanced Practice Provider     EKG 12-lead complete w/read - Clinics     Orders Placed This Encounter   Medications     diphenhydrAMINE HCl (ALLERGY MEDICATION PO)     rosuvastatin (CRESTOR) 20 MG tablet     Sig: Take 0.5 tablets (10 mg) by mouth daily     Medications Discontinued During This Encounter   Medication Reason     clopidogrel (PLAVIX) 75 MG tablet Therapy completed     rosuvastatin (CRESTOR) 20 MG tablet          Encounter Diagnoses   Name Primary?     Presence of Watchman left atrial appendage closure device      Coronary artery calcification seen on CAT scan      Encounter for monitoring flecainide therapy Yes       CURRENT MEDICATIONS:  Current Outpatient Medications   Medication Sig Dispense Refill     acetaminophen (TYLENOL) 500 MG tablet Take 500-1,000 mg by mouth 3 times daily as needed for mild pain       aspirin (ASA) 81 MG EC tablet Take 1 tablet (81 mg) by mouth daily       diphenhydrAMINE HCl (ALLERGY MEDICATION PO)        flecainide (TAMBOCOR) 150 MG tablet Take 1 tablet (150  "mg) by mouth every 12 hours Please call for an appointment [phone] 180 tablet 0     gabapentin (NEURONTIN) 300 MG capsule Take 3 capsules (900 mg) by mouth At Bedtime 270 capsule 3     levothyroxine (SYNTHROID/LEVOTHROID) 137 MCG tablet TAKE 1 TABLET (137 MCG) BY MOUTH DAILY 90 tablet 3     losartan (COZAAR) 100 MG tablet TAKE 1 TABLET BY MOUTH DAILY 90 tablet 3     metoprolol succinate ER (TOPROL-XL) 25 MG 24 hr tablet Take 0.5 tablets (12.5 mg) by mouth daily 45 tablet 0     order for DME DREAMSTATION  5-15 CM/H20  NASAL AIRFIT N10 HER       pantoprazole (PROTONIX) 40 MG EC tablet TAKE 1 TABLET BY MOUTH TWICE A DAY BEFORE MEALS 56 tablet 9     rosuvastatin (CRESTOR) 20 MG tablet Take 0.5 tablets (10 mg) by mouth daily       SUMAtriptan (IMITREX) 100 MG tablet Take 1 tablet (100 mg) by mouth at onset of headache for migraine May repeat in 2 hours if needed: max 2/day; 10 tablet 11     triamterene-HCTZ (MAXZIDE-25) 37.5-25 MG tablet TAKE 1 TABLET BY MOUTH DAILY 28 tablet 9       ALLERGIES     Allergies   Allergen Reactions     Morphine Anaphylaxis     Ceclor [Cefaclor] Hives     Diltiazem Hives     Lisinopril Cough     Sertraline Nausea and Vomiting         Review of Systems:  Skin:  Positive for bruising   Eyes:  Positive for glasses  ENT:  Negative    Respiratory:  Positive for sleep apnea;CPAP  Cardiovascular:  Negative for;palpitations;chest pain;edema    Gastroenterology: Negative for melena;hematochezia  Genitourinary:  Negative    Musculoskeletal:  Positive for arthritis  Neurologic:  Negative    Psychiatric:  Positive for depression  Heme/Lymph/Imm:  Positive for allergies  Endocrine:  Positive for thyroid disorder    Physical Exam:  Vitals: /77   Pulse 60   Ht 1.6 m (5' 3\")   Wt 117.4 kg (258 lb 14.4 oz)   BMI 45.86 kg/m      Constitutional:  cooperative, alert and oriented, well developed, well nourished, in no acute distress        Skin:  warm and dry to the touch, no apparent skin lesions " or masses noted        Head:  normocephalic, no masses or lesions        Eyes:  pupils equal and round;conjunctivae and lids unremarkable;sclera white        ENT:  no pallor or cyanosis, dentition good        Neck:  JVP normal;no carotid bruit        Chest:  normal respiratory excursion        Cardiac: regular rhythm;no murmurs, gallops or rubs detected                  Abdomen:  abdomen soft obese      Vascular: pulses full and equal                                 R groin site without tenderness    Extremities and Back:  no deformities, clubbing, cyanosis, erythema observed;no edema        Neurological:  no gross motor deficits            PAST MEDICAL HISTORY:  Past Medical History:   Diagnosis Date     Depression       Duodenal ulcer 03/04/2019     Essential hypertension       GERD (gastroesophageal reflux disease)      Hiatal hernia      Hypothyroidism      Migraine without aura and without status migrainosus, not intractable  Aug 2016     Obesity      NELY on CPAP      Osteoarthritis      Paroxysmal atrial fibrillation (H) 01/05/2015     RLS (restless legs syndrome)      Symptomatic menopausal or female climacteric states (aka FLASHES)      TMJ disease        PAST SURGICAL HISTORY:  Past Surgical History:   Procedure Laterality Date     APPENDECTOMY       CHOLECYSTECTOMY       COLONOSCOPY N/A 4/12/2019    Procedure: COLONOSCOPY;  Surgeon: Vahid Cardona MD;  Location:  GI     EP LEFT ATRIAL APPENDAGE CLOSURE N/A 10/8/2020    Procedure: EP Left Atrial Appendage Closure;  Surgeon: Constantin Vaughn MD;  Location:  HEART CARDIAC CATH LAB     ESOPHAGOSCOPY, GASTROSCOPY, DUODENOSCOPY (EGD), COMBINED N/A 4/11/2019    Procedure: COMBINED ESOPHAGOSCOPY, GASTROSCOPY, DUODENOSCOPY (EGD);  Surgeon: Ben Ko MD;  Location:  GI     HYSTERECTOMY, [facility]  08/1999     TONSILLECTOMY         FAMILY HISTORY:  Family History   Problem Relation Age of Onset     Myocardial Infarction Mother      Heart  Failure Mother      Heart Surgery Father         bypass surgery     Cerebrovascular Disease Father      Heart Surgery Brother      Hyperlipidemia Brother      Hyperlipidemia Sister      Hyperlipidemia Brother      Sleep Apnea Sister        SOCIAL HISTORY:  Social History     Socioeconomic History     Marital status:      Spouse name: None     Number of children: None     Years of education: None     Highest education level: None   Occupational History     None   Social Needs     Financial resource strain: None     Food insecurity     Worry: None     Inability: None     Transportation needs     Medical: None     Non-medical: None   Tobacco Use     Smoking status: Former Smoker     Packs/day: 0.25     Years: 1.00     Pack years: 0.25     Types: Cigarettes     Start date:      Quit date:      Years since quittin.4     Smokeless tobacco: Never Used     Tobacco comment: very minimal smoking history   Substance and Sexual Activity     Alcohol use: No     Alcohol/week: 0.0 standard drinks     Drug use: No     Sexual activity: Never   Lifestyle     Physical activity     Days per week: 3 days     Minutes per session: 10 min     Stress: None   Relationships     Social connections     Talks on phone: None     Gets together: None     Attends Gnosticism service: None     Active member of club or organization: None     Attends meetings of clubs or organizations: None     Relationship status: None     Intimate partner violence     Fear of current or ex partner: None     Emotionally abused: None     Physically abused: None     Forced sexual activity: None   Other Topics Concern     Parent/sibling w/ CABG, MI or angioplasty before 65F 55M? Not Asked      Service Not Asked     Blood Transfusions Not Asked     Caffeine Concern No     Occupational Exposure Not Asked     Hobby Hazards Not Asked     Sleep Concern Yes     Stress Concern Not Asked     Weight Concern No     Special Diet No     Back Care Not Asked      Exercise No     Bike Helmet Not Asked     Seat Belt Yes     Self-Exams Not Asked   Social History Narrative     None        Thank you for allowing me to participate in the care of your patient.      Sincerely,     Ilda Sue PA-C     [facility]    cc:   Constantin Vaughn MD  [address]  [place] [address]

## 2021-06-04 ENCOUNTER — PATIENT OUTREACH (OUTPATIENT)
Dept: CARE COORDINATION | Facility: CLINIC | Age: 66
End: 2021-06-04

## 2021-06-04 ASSESSMENT — ACTIVITIES OF DAILY LIVING (ADL): DEPENDENT_IADLS:: CLEANING;TRANSPORTATION

## 2021-06-04 NOTE — PROGRESS NOTES
Care Coordination Clinician Chart Review    Situation: Patient chart reviewed by care coordinator.    Background: Care Coordination initial assessment and enrollment to Care Coordination was 3/15/2019.  Patient centered goals were developed with participation from patient.  CC RN handed patient off to CHW for continued outreach every 30 days.    Assessment: Per chart review, patient outreach completed by CHW on 5/20/21.  Patient is actively working to accomplish goal.  She continues following Heart Healthy diet, has been walking more, and is following up regularly with Cardiology.  Patient's goal remains appropriate and relevant at this time.  Patient is not due for updated Complex Care Plan.  Annual assessment to be completed annually and as needed.    Goals        Patient Stated      1. Medical (pt-stated)      Goal Statement: I will avoid health complications by improving my cholesterol panel.  Date Goal set: 10/12/20  Barriers: limited mobility related to knee pain  Strengths: motivated to reduce health risks  Date to Achieve By: 4/30/21 // extended to 6/30/21  Patient expressed understanding of goal: Yes    Action steps to achieve this goal:  1. I will continue taking my medications as prescribed.  2. I will follow a Heart Healthy diet (low-fat, low-salt).  3. I will work toward regular exercise as able.  4. I will attend routine follow-ups with my PCP and Cardiology.  5. I will continue working with Care Coordination to identify and address any barriers to achieving my goal.        Plan/Recommendations: The patient will continue working with Care Coordination to achieve goal as above.  On next CHW outreach, would recommend discussion of completion of goal as patient has continued following action steps as outlined above.  CHW will involve CC RN as needed or if patient is ready to move to maintenance.  CC RN will continue to monitor progress to goals and CHW outreaches every 6 weeks.  Care Plan updated and sent  to patient: Beatriz Person, RN  Clinic Care Coordinator  [facility]  [facility], [facility]  Ph: [phone]

## 2021-06-15 NOTE — TELEPHONE ENCOUNTER
MD had misplaced forms. Have forms now and all completed and mailed. MD spoke with pt ans apologized for delay in having forms sent to MM

## 2021-06-29 ENCOUNTER — PATIENT OUTREACH (OUTPATIENT)
Dept: NURSING | Facility: CLINIC | Age: 66
End: 2021-06-29
Payer: MEDICAID

## 2021-06-29 NOTE — PROGRESS NOTES
"Clinic Care Coordination Contact  Community Health Worker Follow Up    Goals:   Goals Addressed as of 6/29/2021 at 4:00 PM                 Today    5/20/21       Patient Stated      1. Medical (pt-stated)   80%  70%    Added 10/12/20 by Damien Person RN     Goal Statement: I will avoid health complications by improving my cholesterol panel.  Date Goal set: 10/12/20  Barriers: limited mobility related to knee pain  Strengths: motivated to reduce health risks  Date to Achieve By: 4/30/21 // extended to 6/30/21//extended 8/31/2021  Patient expressed understanding of goal: Yes    Action steps to achieve this goal:  1. I will continue taking my medications as prescribed.  2. I will follow a Heart Healthy diet (low-fat, low-salt).  3. I will work toward regular exercise as able.  4. I will attend routine follow-ups with my PCP and Cardiology.  5. I will continue working with Care Coordination to identify and address any barriers to achieving my goal.        Discussed:    Patient stated that her PCP called her regarding her [employer] forms.  He will complete the forms, and mail the paperwork.    Patient stated that she is trying to eat a healthy diet, she has \"good days, and bad days\".    Patient stated that she has been trying to lose weight. Patient stated that she has lost 20 - 25 pounds so far.    Patient stated that she vacuumed and cleaned up the other day. She is trying to do more.    Patient stated that [place] will be opening, and her weekly quilting group will start to meet there instead of her unit.    Patient stated that her friend takes her grocery shopping, at times they go window shopping and out to lunch.  Patient stated that she is trying to get out.    Intervention and Education during outreach: CHW congratulated patient on her weight loss.  CHW extended the goal due to the [employer] form delay.  CHW encouraged patient to contact CC if there are any other changes or resources needed.  " Patient acknowledged understanding.        Plan: Patient will continue to eat a healthy diet.  Patient will exercise as she is able.      Next Outreach: 7/29/21    TRINIDAD Li  Clinic Care Coordination  [facility]: [facility] [place], and [place] [facility]  Phone: [phone]

## 2021-07-12 NOTE — ADDENDUM NOTE
Addended by: MATTHIEU JORDAN on: 8/14/2020 04:09 PM     Modules accepted: Mikala Marks     eMERGENCY dEPARTMENT eNCOUnter        [facility]    Chief Complaint   Patient presents with    Finger Injury     yesterday evening to R 5th finger       HPI    Sandro Abdul is a 44 y.o. female who presents with right pinky pain. She states that it got bent backwards yesterday and her friend corrected it. She states it is still swollen her so she came in to get evaluated. Moving it makes it worse. She has some discoloration as well. No other associated signs or symptoms    REVIEW OF SYSTEMS    See HPI for further details. Review of systems otherwise negative. 10 point review of systems reviewed and is otherwise negative  PAST MEDICAL HISTORY    Past Medical History:   Diagnosis Date    Anxiety     Diabetes mellitus ([facility] [address]     GDM-glyburide    Mixed hyperlipidemia 1/17/2020    Mixed hyperlipidemia     Pyelonephritis 4/7/2016    Severe obesity (BMI 35.0-39. 9) with comorbidity ([facility] [address] 1/17/2020       SURGICAL HISTORY    Past Surgical History:   Procedure Laterality Date    WISDOM TOOTH EXTRACTION         CURRENT MEDICATIONS    Current Outpatient Rx   Medication Sig Dispense Refill    ibuprofen (ADVIL;MOTRIN) 200 MG tablet Take 400 mg by mouth every 6 hours as needed for Pain         ALLERGIES    Allergies   Allergen Reactions    No Known Allergies      05/18/2017 - 7/31/17, 9/11/17 10/12/2016 - ls 08/31/2016 -  05/19/2016 - ls 04/18/2016 -  04/12/2016 -  04/04/2016 -  03/07/2016 - ls 02/11/2016 - ls 02/04/2016 - ls 11/20/2015 -  10/26/2015 -  09/15/2015 -         FAMILY HISTORY    Family History   Problem Relation Age of Onset    Cancer Paternal Grandfather         lung    Diabetes Paternal Grandfather     Diabetes Maternal Cousin     High Blood Pressure Mother     High Cholesterol Mother     Osteoporosis Mother     High Blood Pressure Father     Diabetes Father     Alcohol Abuse Brother     Osteoporosis Maternal Grandmother     Early Death Maternal Grandfather     Cancer Maternal Grandfather        SOCIAL HISTORY    Social History     Socioeconomic History    Marital status:      Spouse name: None    Number of children: None    Years of education: None    Highest education level: None   Occupational History    None   Tobacco Use    Smoking status: Current Some Day Smoker     Packs/day: 0.25     Years: 20.00     Pack years: 5.00     Types: Cigarettes     Last attempt to quit: 2/7/2018     Years since quitting: 3.4    Smokeless tobacco: Never Used    Tobacco comment: 4 cigs a day max when she smokes/not smoking during pregnancy   Vaping Use    Vaping Use: Never used   Substance and Sexual Activity    Alcohol use: Yes    Drug use: No    Sexual activity: Yes     Partners: Male   Other Topics Concern    None   Social History Narrative    None     Social Determinants of Health     Financial Resource Strain:     Difficulty of Paying Living Expenses:    Food Insecurity:     Worried About Running Out of Food in the Last Year:     [address] in the Last Year:    Transportation Needs:     Lack of Transportation (Medical):      Lack of Transportation (Non-Medical):    Physical Activity:     Days of Exercise per Week:     Minutes of Exercise per Session:    Stress:     Feeling of Stress :    Social Connections:     Frequency of Communication with Friends and Family:     Frequency of Social Gatherings with Friends and Family:     Attends Sikhism Services:     Active Member of Clubs or Organizations:     Attends Club or Organization Meetings:     Marital Status:    Intimate Partner Violence:     Fear of Current or Ex-Partner:     Emotionally Abused:     Physically Abused:     Sexually Abused:        PHYSICAL EXAM    VITAL SIGNS: BP (!) 147/92   Pulse 92   Temp 98.1 °F (36.7 °C) (Oral)   Resp 16   Ht 5' 1\" (1.549 m)   Wt 167 lb 15.9 oz (76.2 kg)   LMP 06/27/2021   SpO2 96%   BMI 31.74 kg/m²   Constitutional:  Well developed, well nourished, no acute distress, non-toxic appearance   HENT:  Atraumatic, external ears normal, nose normal, oropharynx moist.  Neck- normal range of motion, no tenderness, supple   Respiratory:  No respiratory distress, normal breath sounds. Cardiovascular:  Normal rate, normal rhythm, no murmurs, no gallops, no rubs   GI:  Soft, nondistended, normal bowel sounds, nontender   Musculoskeletal: Right hand is nontender except for the fifth digit which has some fusiform edema and purplish discoloration particularly dorsally. Sensation is intact distally. Cap refill is brisk  Integument:  Well hydrated, no rash   Neurologic: Sensation intact distally    RADIOLOGY/PROCEDURES    XR HAND RIGHT (MIN 3 VIEWS)    (Results Pending)     Spiral fracture proximal phalanx of the fifth digit    ED COURSE & MEDICAL DECISION MAKING    Pertinent Labs & Imaging studies reviewed. (See chart for details)  This patient has the fracture as noted above. She was very apprehensive and did not want me to touch it at all. I convince her to let me put the splint on it. We will give her orthopedic follow-up. I tried to impress upon her the importance of follow-up and that she may even need a surgery for this injury. I am writing her Norco for home and given orthopedic follow-up. FINAL IMPRESSION    1. Finger fracture  2.           Shannan Montiel MD  07/11/21 [id number]

## 2021-07-15 ENCOUNTER — PATIENT OUTREACH (OUTPATIENT)
Dept: CARE COORDINATION | Facility: CLINIC | Age: 66
End: 2021-07-15

## 2021-07-15 DIAGNOSIS — G25.81 RLS (RESTLESS LEGS SYNDROME): ICD-10-CM

## 2021-07-15 NOTE — PROGRESS NOTES
Clinic Care Coordination Contact  Care Coordination Clinician Chart review    Situation: Patient chart reviewed by care coordinator.       Background: Care Coordination initial assessment and enrollment took place 3/15/2019.   Upon initial assessment patient-centered goals were discussed and developed with participation from patient.  RNCC handed patient follow up and monitoring of goal progression off to CHW for continued outreach every 30 days.        Assessment: Per chart review, patient outreach completed by CC CHW on 06/29/2021.  Patient is actively working to accomplish goal and patient's goal remains appropriate and relevant at this time.       Goals        Patient Stated       1. Medical (pt-stated)       Goal Statement: I would like support and assistance to maintain my health and wellness in management of my chronic health conditions to prevent ER visits/hospital readmission.   Date Goal set: 10/12/20. Updated/modified: 07/15/2021.   Barriers: limited mobility related to knee pain, diagnosis of chronic, complex health conditions.   Strengths: motivated to reduce health risks  Date to Achieve By: 11/2021  Patient expressed understanding of goal: Yes    Action steps to achieve this goal:  1. I will follow up with my providers as scheduled/recommended. (Primary Care Provider follow up recommended 09/11/2021 TBD, Cardiology follow up recommended 11/2021 with EKG - TBD, etc.)   2. I will take my medications as prescribed.   3. I will follow a heart healthy diet (low-fat, low-salt)   4.   I will work toward regular exercise as able.  5.   I will complete my metro mobility application  6. I will discuss, review, schedule and complete recommended overdue health maintenance with my provider.   7. I will consider completing a health care directive and provide a copy when completed to my care team.   8. I will notify my care team of any barriers, questions, concerns, additional resources needed. Care coordinator will  remain available as needed.   9. I will use the clinic as a resource and I understand I can contact my clinic with 24/7 after hours services available.                   Plan/Recommendations: RNCC Clinical Assessments to be completed annually or as needed. Annual Assessment will be due 11/2021. The patient will continue working with Care Coordination to achieve goal as above.  CHW will involve RNCC as needed or if patient is ready to transition to goal status of Maintenance.  RNCC will continue to review progress to goals and CHW outreaches every 6 weeks.     Complex Care Plan:  Patient is not due for Complex Care Plan to be updated.  Care Plan updated and mailed to patient: Beatriz Lebron RN Care Coordinator  [facility] [place]  Email: [email or web address]  Phone: [phone]

## 2021-07-16 DIAGNOSIS — I48.91 ATRIAL FIBRILLATION WITH RVR (H): ICD-10-CM

## 2021-07-16 DIAGNOSIS — I48.0 PAROXYSMAL ATRIAL FIBRILLATION (H): ICD-10-CM

## 2021-07-16 RX ORDER — GABAPENTIN 300 MG/1
CAPSULE ORAL
Qty: 270 CAPSULE | Refills: 3 | Status: SHIPPED | OUTPATIENT
Start: 2021-07-16 | End: 2021-07-21

## 2021-07-16 RX ORDER — FLECAINIDE ACETATE 150 MG/1
150 TABLET ORAL EVERY 12 HOURS
Qty: 180 TABLET | Refills: 3 | Status: SHIPPED | OUTPATIENT
Start: 2021-07-16 | End: 2022-03-24

## 2021-07-16 RX ORDER — METOPROLOL SUCCINATE 25 MG/1
12.5 TABLET, EXTENDED RELEASE ORAL DAILY
Qty: 45 TABLET | Refills: 0 | Status: SHIPPED | OUTPATIENT
Start: 2021-07-16 | End: 2021-10-12

## 2021-07-16 NOTE — TELEPHONE ENCOUNTER
Routing refill request to provider for review/approval because:  Drug not on the [facility] refill protocol     Pending Prescriptions:                       Disp   Refills    gabapentin (NEURONTIN) 300 MG capsule [Ph*270 ca*3            Sig: TAKE 3 CAPSULES BY MOUTH AT BEDTIME    Last Written Prescription Date: 10/14/2020  Last Fill Quantity: 270,  # refills: 3  Last office visit: 3/11/2021 with prescribing provider:    Future Office Visit:  none    Gayatri Hou RN  [facility]

## 2021-07-20 DIAGNOSIS — K92.2 ACUTE GI BLEEDING: ICD-10-CM

## 2021-07-20 DIAGNOSIS — I10 ESSENTIAL HYPERTENSION: ICD-10-CM

## 2021-07-20 DIAGNOSIS — G25.81 RLS (RESTLESS LEGS SYNDROME): ICD-10-CM

## 2021-07-21 RX ORDER — PANTOPRAZOLE SODIUM 40 MG/1
40 TABLET, DELAYED RELEASE ORAL DAILY
Qty: 90 TABLET | Refills: 3 | Status: SHIPPED | OUTPATIENT
Start: 2021-07-21 | End: 2022-05-26

## 2021-07-21 RX ORDER — TRIAMTERENE/HYDROCHLOROTHIAZID 37.5-25 MG
1 TABLET ORAL DAILY
Qty: 90 TABLET | Refills: 3 | Status: ON HOLD | OUTPATIENT
Start: 2021-07-21 | End: 2022-07-14

## 2021-07-21 RX ORDER — GABAPENTIN 300 MG/1
CAPSULE ORAL
Qty: 270 CAPSULE | Refills: 3 | Status: SHIPPED | OUTPATIENT
Start: 2021-07-21 | End: 2022-07-12 | Stop reason: DRUGHIGH

## 2021-07-21 NOTE — TELEPHONE ENCOUNTER
Needs MD review for refill quantity.    Please refill as appropriate.    Georgina Goodson RN  [facility]

## 2021-07-21 NOTE — TELEPHONE ENCOUNTER
Patient's account is active for mychart but no evidence of use in the last year.  Recent blood pressure at goal and BMP up-to-date.  Prescriptions refilled but would recommend dose change for pantoprazole as patient  Know does not  Need  such aggressive acid suppression (had been placed on twice daily dosing after prior GI bleed).  Would recommend patient lower pantoprazole to 1 tablet daily in the morning rather than current twice a day dosing.  Inform patient and then close encounter

## 2021-07-30 ENCOUNTER — PATIENT OUTREACH (OUTPATIENT)
Dept: CARE COORDINATION | Facility: CLINIC | Age: 66
End: 2021-07-30

## 2021-07-30 NOTE — PROGRESS NOTES
Clinic Care Coordination Contact  [facility]/Voicemail       Clinical Data: Care Coordinator Outreach  Outreach attempted x 1.  Left message on patient's voicemail with call back information and requested return call.    Plan: Care Coordinator will try to reach patient again in 10 business days.    TRINIDAD Li  Clinic Care Coordination  [facility] Clinics: [place], [place], [place], and [place] [facility]  Phone: [phone]

## 2021-08-19 ENCOUNTER — PATIENT OUTREACH (OUTPATIENT)
Dept: NURSING | Facility: CLINIC | Age: 66
End: 2021-08-19
Payer: MEDICAID

## 2021-08-19 NOTE — PROGRESS NOTES
"Clinic Care Coordination Contact  Community Health Worker Follow Up      Goals:   Goals Addressed as of 8/19/2021 at 2:27 PM                    Today    6/29/21       Patient Stated       1. Medical (pt-stated)   90%  80%    Added 10/12/20 by Damien Person RN      Goal Statement: I would like support and assistance to maintain my health and wellness in management of my chronic health conditions to prevent ER visits/hospital readmission.   Date Goal set: 10/12/20. Updated/modified: 07/15/2021.   Barriers: limited mobility related to knee pain, diagnosis of chronic, complex health conditions.   Strengths: motivated to reduce health risks  Date to Achieve By: 11/2021  Patient expressed understanding of goal: Yes    Action steps to achieve this goal:  1. I will follow up with my providers as scheduled/recommended. (Primary Care Provider follow up recommended 09/11/2021 TBD, Cardiology follow up recommended 11/2021 with EKG - TBD, etc.)   2. I will take my medications as prescribed.   3. I will follow a heart healthy diet (low-fat, low-salt)   4.   I will work toward regular exercise as able.  5.   I will complete my metro mobility application  6. I will discuss, review, schedule and complete recommended overdue health maintenance with my provider.   7. I will consider completing a health care directive and provide a copy when completed to my care team.   8. I will notify my care team of any barriers, questions, concerns, additional resources needed. Care coordinator will remain available as needed.   9. I will use the clinic as a resource and I understand I can contact my clinic with 24/7 after hours services available.           Discussed:    Patient stated she has been busy.  [place] opened, and she has been doing a lot. Patient stated it \"feels better being back at [place]\".    Patient stated she is in two different quilting groups.  She is also in a group making hats and scarves.    Patient stated she is " going to sign-up for swimming.  It is $25.00 a month.    Patient received her [employer] card, and will use [employer] on days she is going to swim.  Patient has used MM several times already.  [employer] is working out nicely.    Patient stated she is taking her medication as prescribed.    Patient stated today her nephew is over, and he is helping to move furniture around.    Patient stated she stayed home last week.  She had a pretty bad cold.    Intervention and Education during outreach:  CHW wished the patient an early happy birthday.  CHW asked patient if she has any resource needs at this time, patient declined.  CHW encouraged patient to contact CC if there are any other changes or resources needed.  Patient acknowledged understanding.     Plan: CHW will follow up in one month.    TRINIDAD Li  Clinic Care Coordination  [facility] Clinics: [place], [place], [place], and [place] for [facility]  Phone: [phone]

## 2021-08-25 ENCOUNTER — PATIENT OUTREACH (OUTPATIENT)
Dept: CARE COORDINATION | Facility: CLINIC | Age: 66
End: 2021-08-25

## 2021-08-25 ASSESSMENT — ACTIVITIES OF DAILY LIVING (ADL): DEPENDENT_IADLS:: CLEANING;TRANSPORTATION

## 2021-08-25 NOTE — PROGRESS NOTES
Clinic Care Coordination Contact  Care Coordination Clinician Chart review    Situation: Patient chart reviewed by care coordinator.       Background: Care Coordination initial assessment and enrollment took place 3/15/2019.   Upon initial assessment patient-centered goals were discussed and developed with participation from patient.  RNCC handed patient follow up and monitoring of goal progression off to CHW for continued outreach every 30 days.        Assessment: Per chart review, patient outreach completed by CC CHW on 8/19/2021.  Patient is actively working to accomplish goal and patient's goal remains appropriate and relevant at this time.       Goals        Patient Stated       1. Medical (pt-stated)       Goal Statement: I would like support and assistance to maintain my health and wellness in management of my chronic health conditions to prevent ER visits/hospital readmission.   Date Goal set: 10/12/20. Updated/modified: 08/25/2021.   Barriers: limited mobility related to knee pain, diagnosis of chronic, complex health conditions.   Strengths: motivated to reduce health risks  Date to Achieve By: 11/2021  Patient expressed understanding of goal: Yes    Action steps to achieve this goal:  1. I will follow up with my providers as scheduled/recommended. (Primary Care Provider follow up recommended 09/11/2021 TBD, Cardiology follow up recommended 11/2021 with EKG - TBD, etc.)   2. I will take my medications as prescribed.   3. I will follow a heart healthy diet (low-fat, low-salt)   4.   I will work toward regular exercise as able.  5. I will discuss, review, schedule and complete recommended overdue health maintenance with my provider.   6. I will consider completing a health care directive and provide a copy when completed to my care team.   7. I will notify my care team of any barriers, questions, concerns, additional resources needed. Care coordinator will remain available as needed.   8. I will use the  clinic as a resource and I understand I can contact my clinic with 24/7 after hours services available.     *ANNUAL ASSESSMENT DUE:  11/2021           Pain Management (pt-stated)           Plan/Recommendations: RNCC Clinical Assessments to be completed annually or as needed. Annual Assessment will be due 11/20/2021. The patient will continue working with Care Coordination to achieve goal as above.  CHW will involve RNCC as needed or if patient is ready to transition to goal status of Maintenance.  RNCC will continue to review progress to goals and CHW outreaches every 6 weeks.     Complex Care Plan:  Patient is due for Complex Care Plan to be updated.  Care Plan updated and mailed to patient: Yes, via [employer]     CHW will:  CHW Delegation:   1)  Due Date:  09/30/2021       Delegation: Please discuss/review/assist scheduling recommended follow up appointments.       Jeana Lebron RN Care Coordinator  [facility] [place]  Email: [email or web address]  Phone: [phone]

## 2021-08-25 NOTE — LETTER
[facility] CARE COORDINATION  [address]  [facility] [address]    August 25, 2021        Maxine DEX Velasquez  [address]  [place] [address]          Dear Alondra Goodman is an updated Complex Care Plan for your continued enrollment in Care Coordination. Please let us know if you have additional questions, concerns, or goals that we can assist with.    Sincerely,  Jeana Lebron, RN Care Coordinator  [facility] [place]  Email: [email or web address]  Phone: [phone]                  [facility]  Patient Centered Plan of Care  About Me:        Patient Name:  Maxine Velasquez    YOB: 1955  Age:         66 year old   [place] MRN:    [id number] Telephone Information:  Home Phone [phone]   Mobile [phone]       Address:  [address]  [place] [address] Email address:  [email or web address]      Emergency Contact(s)    Name Relationship Lgl Grd Work Phone Home Phone Mobile Phone   1. SEVERSON,VIVIAN Sister   [phone]   2. BRANDI GEORGES Daughter   [phone]   3. PRATIMA VELASQUEZ Son   [phone]           Primary language:  English     needed? No   [employer]:  [phone] op. 1  Other communication barriers: None  Preferred Method of Communication:  Phone  Current living arrangement: I live alone  Mobility Status/ Medical Equipment: Independent w/Device    Health Maintenance  Health Maintenance Reviewed: Due/Overdue:   Health Maintenance Topics with due status: Overdue       Topic Date Due    MIGRAINE ACTION PLAN Never done    ZOSTER IMMUNIZATION Never done    Pneumococcal Vaccine: 65+ Years Never done    PHQ-9 04/19/2021     Health Maintenance Topics with due status: Due Soon       Topic Date Due    INFLUENZA VACCINE 09/01/2021         My Access Plan  Medical Emergency 911   Primary Clinic Line [facility]  [facility] - [phone]   24 Hour Appointment Line [phone] or  [phone]) (toll-free)   24 Hour Nurse Line [phone] (toll-free)   Preferred Urgent Care [facility], [phone]   Preferred Hospital [facility]  [phone]   Preferred Pharmacy [facility] [address]     Behavioral Health Crisis Line The National Suicide Prevention Lifeline at 1-469.859.4271 or 915             My Care Team Members  Patient Care Team       Relationship Specialty Notifications Start End    Yuval Jama MD PCP - General Internal Medicine  10/20/14     Merged    Phone: [phone] Fax: [phone]         [address] [address]    Yuval Jama MD  Internal Medicine  10/20/14     Merged    Phone: [phone] Fax: [phone]         [address] [address]    Yuval Jama MD Assigned PCP   12/19/14     Phone: [phone] Fax: [phone]         [address] [address]    Carmen Mims MA Community Health Worker Primary Care - CC  1/22/20     Phone: [phone]         Kevin Mcdaniels MD Assigned Musculoskeletal Provider   10/23/20     Phone: [phone] Fax: [phone] [address] [place] [address]    Ilda Sue PA-C Assigned Heart and Vascular Provider   6/13/21     Phone: [phone] Fax: [phone] [address] [place] [address]    Lizet Krishnan RN Lead Care Coordinator  Admissions 8/25/21             My Care Plans  Self Management and Treatment Plan  Goals and (Comments)  Goals        General     1. Medical (pt-stated)      Notes - Note edited  8/25/2021 11:08 AM by Robeck, Jeana L, RN     Goal Statement: I would like support and assistance to maintain my health and wellness in management of my chronic health conditions to prevent ER visits/hospital readmission.   Date Goal set: 10/12/20.  Updated/modified: 07/15/2021.   Barriers: limited mobility related to knee pain, diagnosis of chronic, complex health conditions.   Strengths: motivated to reduce health risks  Date to Achieve By: 11/2021  Patient expressed understanding of goal: Yes    Action steps to achieve this goal:  1. I will follow up with my providers as scheduled/recommended. (Primary Care Provider follow up recommended 09/11/2021 TBD, Cardiology follow up recommended 11/2021 with EKG - TBD, etc.)   2. I will take my medications as prescribed.   3. I will follow a heart healthy diet (low-fat, low-salt)   4.   I will work toward regular exercise as able.  5.   I will complete my metro mobility application  6. I will discuss, review, schedule and complete recommended overdue health maintenance with my provider.   7. I will consider completing a health care directive and provide a copy when completed to my care team.   8. I will notify my care team of any barriers, questions, concerns, additional resources needed. Care coordinator will remain available as needed.   9. I will use the clinic as a resource and I understand I can contact my clinic with 24/7 after hours services available.     *ANNUAL ASSESSMENT DUE:  11/2021         Pain Management (pt-stated)            Action Plans on File:            Depression          Advance Care Plans/Directives Type:        My Medical and Care Information  Problem List   Patient Active Problem List   Diagnosis     Osteoarthritis     TMJ disease     Health Care Home     Hypothyroidism     Morbid obesity, unspecified obesity type (H)     Migraine without aura and without status migrainosus, not intractable     Essential hypertension     NELY on CPAP     RLS (restless legs syndrome)     Gastroesophageal reflux disease, esophagitis presence not specified     Iron deficiency     Chronic pain of left knee     Lumbar radiculopathy     Paroxysmal atrial fibrillation (H)     Pulmonary nodules     Hyperlipidemia LDL  goal <100      Current Medications and Allergies:    Allergies   Allergen Reactions     Morphine Anaphylaxis     Ceclor [Cefaclor] Hives     Diltiazem Hives     Lisinopril Cough     Sertraline Nausea and Vomiting     Current Outpatient Medications   Medication     acetaminophen (TYLENOL) 500 MG tablet     aspirin (ASA) 81 MG EC tablet     diphenhydrAMINE HCl (ALLERGY MEDICATION PO)     flecainide (TAMBOCOR) 150 MG tablet     gabapentin (NEURONTIN) 300 MG capsule     levothyroxine (SYNTHROID/LEVOTHROID) 137 MCG tablet     losartan (COZAAR) 100 MG tablet     metoprolol succinate ER (TOPROL-XL) 25 MG 24 hr tablet     order for DME     pantoprazole (PROTONIX) 40 MG EC tablet     rosuvastatin (CRESTOR) 20 MG tablet     SUMAtriptan (IMITREX) 100 MG tablet     triamterene-HCTZ (MAXZIDE-25) 37.5-25 MG tablet     No current facility-administered medications for this visit.        Care Coordination Start Date: 3/15/2019   Frequency of Care Coordination: monthly   Form Last Updated: 08/25/2021

## 2021-09-14 ENCOUNTER — TELEPHONE (OUTPATIENT)
Dept: INTERNAL MEDICINE | Facility: CLINIC | Age: 66
End: 2021-09-14

## 2021-09-14 NOTE — TELEPHONE ENCOUNTER
Reason for Call:  Other appointment    Detailed comments: Patient concerned with Thyroid, causing her to feel more tired. Would like a call back from clinic to see if she can be seen sooner than her next appt day (10/25/2021).     Phone Number Patient can be reached at: Home number on file [phone] (home)    Best Time: anytime    Can we leave a detailed message on this number? YES    Call taken on 9/14/2021 at 3:32 PM by Norris Ross

## 2021-09-16 NOTE — TELEPHONE ENCOUNTER
Spoke with Patient-States she is doing a lot better, because she started using her Cpap again. Will   Keep scheduled appointment with Wiley for October.

## 2021-09-23 ENCOUNTER — PATIENT OUTREACH (OUTPATIENT)
Dept: NURSING | Facility: CLINIC | Age: 66
End: 2021-09-23
Payer: MEDICAID

## 2021-09-24 NOTE — PROGRESS NOTES
Clinic Care Coordination Contact    Community Health Worker Follow Up    Care Gaps:     Health Maintenance Due   Topic Date Due     MIGRAINE ACTION PLAN  Never done     ZOSTER IMMUNIZATION (1 of 2) Never done     Pneumococcal Vaccine: 65+ Years (1 of 1 - PPSV23) Never done     PHQ-9  04/19/2021     INFLUENZA VACCINE (1) 09/01/2021     MAMMO SCREENING  10/10/2021       Will discuss with patient at next outreach visit    Goals:   Goals Addressed as of 9/24/2021 at 7:28 AM                    9/23/21 8/19/21       Patient Stated       1. Medical (pt-stated)   90%  90%    Added 10/12/20 by Damien Person RN      Goal Statement: I would like support and assistance to maintain my health and wellness in management of my chronic health conditions to prevent ER visits/hospital readmission.   Date Goal set: 10/12/20. Updated/modified: 08/25/2021.   Barriers: limited mobility related to knee pain, diagnosis of chronic, complex health conditions.   Strengths: motivated to reduce health risks  Date to Achieve By: 11/2021  Patient expressed understanding of goal: Yes    Action steps to achieve this goal:  1. I will follow up with my providers as scheduled/recommended. (Primary Care Provider follow up recommended 09/11/2021 TBD, Cardiology follow up recommended 11/2021 with EKG - TBD, etc.)   2. I will take my medications as prescribed.   3. I will follow a heart healthy diet (low-fat, low-salt)   4.   I will work toward regular exercise as able.  5. I will discuss, review, schedule and complete recommended overdue health maintenance with my provider.   6. I will consider completing a health care directive and provide a copy when completed to my care team.   7. I will notify my care team of any barriers, questions, concerns, additional resources needed. Care coordinator will remain available as needed.   8. I will use the clinic as a resource and I understand I can contact my clinic with 24/7 after hours services available.  "    *ANNUAL ASSESSMENT DUE:  11/2021          Discussed:    Patient stated things are going \"pretty good\" except she has a bit of a headache today, so she is resting.  Patient stated it was fine to talk now.    Patient stated is walking more.  She does things until her back starts to hurt, then she will stop and rest.    Patient stated she has been going to [place] for quilting twice a week, and sometimes she will attend the knitting group.    Patient stated she has made a lot of baby blankets, and the blankets will be shipped overseas.      Patient stated she uses [employer] when needed.    Patient stated her building facility started [facility] last Monday, and it was fun.      Patient stated she has a PCP appointment on 10/24.       Intervention and Education during outreach: CHW encouraged patient to contact CC if there are any other changes or resources needed.  Patient acknowledged understanding.      Plan: CHW will follow up in one month.    TRINIDAD Li  Clinic Care Coordination  [facility] Clinics: [place], [place], [place], and [place] for Women  Phone: [phone]  "

## 2021-10-02 ENCOUNTER — HEALTH MAINTENANCE LETTER (OUTPATIENT)
Age: 66
End: 2021-10-02

## 2021-10-11 ENCOUNTER — PATIENT OUTREACH (OUTPATIENT)
Dept: CARE COORDINATION | Facility: CLINIC | Age: 66
End: 2021-10-11

## 2021-10-11 NOTE — PROGRESS NOTES
Clinic Care Coordination Contact    Situation: Patient chart reviewed by care coordinator.    Background:   -Care coordination initial assessment and enrollment on 3/15/2019  -At enrollment patient centered goals were developed with patient participation  -Follow up and monitoring of goal progression assigned to CHW for continued outreach every 30 days    Assessment:  -Per chart review, CC CHW reached out to patient on 9/23/2021  -Patient is actively working on goals  -Patient goals are still  relevant at this time    Goals                               Patient Stated          1. Medical (pt-stated)             Goal Statement: I would like support and assistance to maintain my health and wellness in management of my chronic health conditions to prevent ER visits/hospital readmission.   Date Goal set: 10/12/20. Updated/modified: 08/25/2021.   Barriers: limited mobility related to knee pain, diagnosis of chronic, complex health conditions.   Strengths: motivated to reduce health risks  Date to Achieve By: 11/2021  Patient expressed understanding of goal: Yes     Action steps to achieve this goal:  1. I will follow up with my providers as scheduled/recommended. (Primary Care Provider follow up recommended 10/25/2021, Cardiology follow up recommended 11/2021 with EKG - TBD, etc.)   2. I will take my medications as prescribed.   3. I will follow a heart healthy diet (low-fat, low-salt)   4.   I will work toward regular exercise as able.  5. I will discuss, review, schedule and complete recommended overdue health maintenance with my provider.   6. I will consider completing a health care directive and provide a copy when completed to my care team.   7. I will notify my care team of any barriers, questions, concerns, additional resources needed. Care coordinator will remain available as needed.   8. I will use the clinic as a resource and I understand I can contact my clinic with 24/7 after hours services available.      *ANNUAL ASSESSMENT DUE:  11/2021      Plan/Recommendations:  -RNCC clinical assessment is due 11/20/2021.  -The patient will continue working care coordination team to achieve goal as above.  -CHW will continue following up monthly to monitor goal progression.  Due:  10/30/2021  -CHW will discuss, review and assist with scheduling recommended follow up appointments    [facility]   Lizet Krishnan RN, Care Coordinator   [facility] [place], [facility], [facility] [place]  E-mail [email or web address]   [phone]

## 2021-10-12 DIAGNOSIS — I48.91 ATRIAL FIBRILLATION WITH RVR (H): ICD-10-CM

## 2021-10-12 RX ORDER — METOPROLOL SUCCINATE 25 MG/1
12.5 TABLET, EXTENDED RELEASE ORAL DAILY
Qty: 45 TABLET | Refills: 0 | Status: SHIPPED | OUTPATIENT
Start: 2021-10-12 | End: 2022-02-07

## 2021-10-21 ENCOUNTER — TELEPHONE (OUTPATIENT)
Dept: CARDIOLOGY | Facility: CLINIC | Age: 66
End: 2021-10-21

## 2021-10-21 DIAGNOSIS — Z95.818 PRESENCE OF WATCHMAN LEFT ATRIAL APPENDAGE CLOSURE DEVICE: ICD-10-CM

## 2021-10-21 DIAGNOSIS — I48.0 PAROXYSMAL ATRIAL FIBRILLATION (H): Primary | ICD-10-CM

## 2021-10-25 ENCOUNTER — TELEPHONE (OUTPATIENT)
Dept: INTERNAL MEDICINE | Facility: CLINIC | Age: 66
End: 2021-10-25

## 2021-10-25 ENCOUNTER — OFFICE VISIT (OUTPATIENT)
Dept: INTERNAL MEDICINE | Facility: CLINIC | Age: 66
End: 2021-10-25
Payer: COMMERCIAL

## 2021-10-25 VITALS
HEART RATE: 77 BPM | BODY MASS INDEX: 46.78 KG/M2 | HEIGHT: 63 IN | WEIGHT: 264 LBS | SYSTOLIC BLOOD PRESSURE: 130 MMHG | RESPIRATION RATE: 16 BRPM | TEMPERATURE: 97.1 F | DIASTOLIC BLOOD PRESSURE: 80 MMHG | OXYGEN SATURATION: 97 %

## 2021-10-25 DIAGNOSIS — M70.61 TROCHANTERIC BURSITIS OF BOTH HIPS: ICD-10-CM

## 2021-10-25 DIAGNOSIS — M70.62 TROCHANTERIC BURSITIS OF BOTH HIPS: ICD-10-CM

## 2021-10-25 DIAGNOSIS — E66.01 MORBID OBESITY, UNSPECIFIED OBESITY TYPE (H): ICD-10-CM

## 2021-10-25 DIAGNOSIS — M54.50 CHRONIC MIDLINE LOW BACK PAIN WITHOUT SCIATICA: ICD-10-CM

## 2021-10-25 DIAGNOSIS — Z23 HIGH PRIORITY FOR 2019-NCOV VACCINE: ICD-10-CM

## 2021-10-25 DIAGNOSIS — E78.5 HYPERLIPIDEMIA LDL GOAL <100: ICD-10-CM

## 2021-10-25 DIAGNOSIS — R73.9 BLOOD GLUCOSE ELEVATED: ICD-10-CM

## 2021-10-25 DIAGNOSIS — F32.0 MAJOR DEPRESSIVE DISORDER, SINGLE EPISODE, MILD (H): ICD-10-CM

## 2021-10-25 DIAGNOSIS — D72.829 LEUKOCYTOSIS, UNSPECIFIED TYPE: ICD-10-CM

## 2021-10-25 DIAGNOSIS — Z23 NEED FOR INFLUENZA VACCINATION: ICD-10-CM

## 2021-10-25 DIAGNOSIS — G89.29 CHRONIC MIDLINE LOW BACK PAIN WITHOUT SCIATICA: ICD-10-CM

## 2021-10-25 DIAGNOSIS — E03.9 HYPOTHYROIDISM, UNSPECIFIED TYPE: ICD-10-CM

## 2021-10-25 DIAGNOSIS — I10 ESSENTIAL HYPERTENSION: ICD-10-CM

## 2021-10-25 PROCEDURE — 99214 OFFICE O/P EST MOD 30 MIN: CPT | Mod: 25 | Performed by: INTERNAL MEDICINE

## 2021-10-25 PROCEDURE — 91300 COVID-19,PF,PFIZER (12+ YRS): CPT | Performed by: INTERNAL MEDICINE

## 2021-10-25 PROCEDURE — G0008 ADMIN INFLUENZA VIRUS VAC: HCPCS | Performed by: INTERNAL MEDICINE

## 2021-10-25 PROCEDURE — 90662 IIV NO PRSV INCREASED AG IM: CPT | Performed by: INTERNAL MEDICINE

## 2021-10-25 PROCEDURE — 0004A COVID-19,PF,PFIZER (12+ YRS): CPT | Performed by: INTERNAL MEDICINE

## 2021-10-25 RX ORDER — DULOXETIN HYDROCHLORIDE 20 MG/1
20 CAPSULE, DELAYED RELEASE ORAL DAILY
Qty: 30 CAPSULE | Refills: 11 | Status: SHIPPED | OUTPATIENT
Start: 2021-10-25 | End: 2021-11-29 | Stop reason: SINTOL

## 2021-10-25 RX ORDER — DULOXETIN HYDROCHLORIDE 20 MG/1
20 CAPSULE, DELAYED RELEASE ORAL 2 TIMES DAILY
Qty: 30 CAPSULE | Refills: 11 | Status: SHIPPED | OUTPATIENT
Start: 2021-10-25 | End: 2021-10-25

## 2021-10-25 RX ORDER — ROSUVASTATIN CALCIUM 10 MG/1
10 TABLET, COATED ORAL DAILY
Qty: 90 TABLET | Refills: 3 | Status: SHIPPED | OUTPATIENT
Start: 2021-10-25 | End: 2022-07-30

## 2021-10-25 ASSESSMENT — ANXIETY QUESTIONNAIRES
5. BEING SO RESTLESS THAT IT IS HARD TO SIT STILL: NOT AT ALL
1. FEELING NERVOUS, ANXIOUS, OR ON EDGE: NOT AT ALL
6. BECOMING EASILY ANNOYED OR IRRITABLE: NOT AT ALL
2. NOT BEING ABLE TO STOP OR CONTROL WORRYING: NOT AT ALL
GAD7 TOTAL SCORE: 0
7. FEELING AFRAID AS IF SOMETHING AWFUL MIGHT HAPPEN: NOT AT ALL
IF YOU CHECKED OFF ANY PROBLEMS ON THIS QUESTIONNAIRE, HOW DIFFICULT HAVE THESE PROBLEMS MADE IT FOR YOU TO DO YOUR WORK, TAKE CARE OF THINGS AT HOME, OR GET ALONG WITH OTHER PEOPLE: NOT DIFFICULT AT ALL
3. WORRYING TOO MUCH ABOUT DIFFERENT THINGS: NOT AT ALL

## 2021-10-25 ASSESSMENT — PATIENT HEALTH QUESTIONNAIRE - PHQ9
5. POOR APPETITE OR OVEREATING: NOT AT ALL
SUM OF ALL RESPONSES TO PHQ QUESTIONS 1-9: 9

## 2021-10-25 ASSESSMENT — MIFFLIN-ST. JEOR: SCORE: 1706.63

## 2021-10-25 NOTE — TELEPHONE ENCOUNTER
[facility] calling about RX for Duloxetine 20mg capsule.    Directions on med list are Take 1 capsule (20 mg) by mouth 2 times daily.    But directions in chart notes read  1 capsule daily in AM for mental health.    Which is correct?  Please call [facility] back with final directions. [phone].
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
## 2021-10-25 NOTE — PATIENT INSTRUCTIONS
Change Rosuvastatin to 10mg tab, 1 tab daily for cholesterol   Duloxetine 20mg capsule, 1 capsule daily in AM for mental health  Stretching of trochanter bursa in AM and ice to the area of soreness for 10 min in late PM daily. If not improving, possible future cortisone injection with ortho  Continue other meds.  Call  [phone] or use [employer] to schedule a future lab appointment  fasting in 6 months.   For fasting labs, please refrain from eating for 8 hours or more.   Drink 2 glasses of water before your lab appointment. It is fine to take your  oral medications on the morning of the lab test as usual  Follow up in 1 month. Earlier as needed   OK for it to be a telephone virtual visit.    Pt will have paperwork sent re: disability and  Possible move to other apartment  Reduce calorie/carbohydrate (sugar, bread, potato, pasta, rice, alcohol etc)  intake in diet.  Increase color on your plate with fruits and vegetables. Increase  frequency of walking or other aerobic exercise as able (goal is daily)  Referral to [facility] for physical therapy. Their schedulers will call you. If you have not heard from them in 2 business days, then call [phone].  Vaccinations: Influenza vaccine and Pfizer covid vaccine  Tylenol ES 2 tabs up to 3 times a day as needed for pain

## 2021-10-26 ENCOUNTER — PATIENT OUTREACH (OUTPATIENT)
Dept: NURSING | Facility: CLINIC | Age: 66
End: 2021-10-26
Payer: MEDICAID

## 2021-10-26 ASSESSMENT — ANXIETY QUESTIONNAIRES: GAD7 TOTAL SCORE: 0
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
## 2021-10-28 ENCOUNTER — TELEPHONE (OUTPATIENT)
Dept: INTERNAL MEDICINE | Facility: CLINIC | Age: 66
End: 2021-10-28
Payer: MEDICAID

## 2021-10-28 NOTE — TELEPHONE ENCOUNTER
Reason for Call:  Form, our goal is to have forms completed with 72 hours, however, some forms may require a visit or additional information.    Type of letter, form or note:  Reasonable Accommodation/Modification Request Verification    Who is the form from?: [facility] (if other please explain)    Where did the form come from: form was faxed in    What clinic location was the form placed at?: Internal Medicine    Where the form was placed: Given to MA/RN    What number is listed as a contact on the form?:        Additional comments:     Call taken on 10/28/2021 at 9:43 AM by Jovani Mallory

## 2021-11-11 ENCOUNTER — THERAPY VISIT (OUTPATIENT)
Dept: PHYSICAL THERAPY | Facility: CLINIC | Age: 66
End: 2021-11-11
Payer: COMMERCIAL

## 2021-11-11 DIAGNOSIS — M54.50 CHRONIC MIDLINE LOW BACK PAIN WITHOUT SCIATICA: ICD-10-CM

## 2021-11-11 DIAGNOSIS — G89.29 CHRONIC MIDLINE LOW BACK PAIN WITHOUT SCIATICA: ICD-10-CM

## 2021-11-11 DIAGNOSIS — M54.50 BILATERAL LOW BACK PAIN WITHOUT SCIATICA: Primary | ICD-10-CM

## 2021-11-11 PROCEDURE — 97110 THERAPEUTIC EXERCISES: CPT | Mod: GP | Performed by: PHYSICAL THERAPIST

## 2021-11-11 PROCEDURE — 97161 PT EVAL LOW COMPLEX 20 MIN: CPT | Mod: GP | Performed by: PHYSICAL THERAPIST

## 2021-11-11 NOTE — PROGRESS NOTES
[facility]    OUTPATIENT Physical Therapy ORTHOPEDIC EVALUATION  PLAN OF TREATMENT FOR OUTPATIENT REHABILITATION  (COMPLETE FOR INITIAL CLAIMS ONLY)  Patient's Last Name, First Name, M.I.  YOB: 1955  Maxine Sears    Provider s Name:  [facility]   Medical Record No.  [id number]   Start of Care Date:  11/11/21   Onset Date:   10/25/21 (MD order)   Type:     _X__PT   ___OT Medical Diagnosis:    Encounter Diagnosis   Name Primary?     Chronic midline low back pain without sciatica         Treatment Diagnosis:  LBP        Goals:     11/11/21 0500   Body Part   Goals listed below are for Lumbar   Goal #1   Goal #1 standing   Current Functional Level Minutes patient can stand   Performance level 5 with up to 10/10 pain    STG Target Performance Minutes patient will be able to stand   Performance level 5 with 5/10 pain or less   Rationale for housekeeping tasks such as vacuuming, bed making, mowing, gardening;for personal hygiene;for meal preparation;for safe household ambulation;for safe community ambulation   Due date 12/02/21   LTG Target Performance Minutes patient will be able to stand   Performance Level /walk 15 with 3/10 pain or less   Rationale for housekeeping tasks such as vacuuming, bed making, mowing;for personal hygiene;for meal preparation;for safe household ambulation;for safe community ambulation   Due date 01/06/22       Therapy Frequency:  1x/week  Predicted Duration of Therapy Intervention:  8 weeks    Jessica Alanis, PT                 I CERTIFY THE NEED FOR THESE SERVICES FURNISHED UNDER        THIS PLAN OF TREATMENT AND WHILE UNDER MY CARE     (Physician attestation of this document indicates review and certification of the therapy plan).                       Certification Date From:  11/11/21   Certification Date To:  01/10/22    Referring  Provider:  Yuval Jama    Initial Assessment        See Epic Evaluation SOC Date: 11/11/21
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
## 2021-11-24 ENCOUNTER — PATIENT OUTREACH (OUTPATIENT)
Dept: CARE COORDINATION | Facility: CLINIC | Age: 66
End: 2021-11-24
Payer: MEDICAID

## 2021-11-24 ASSESSMENT — ACTIVITIES OF DAILY LIVING (ADL): DEPENDENT_IADLS:: INDEPENDENT

## 2021-11-24 NOTE — LETTER
[address] [facility] [address]    [facility]  Patient Centered Plan of Care  Updated 11/24/2021  About Me:        Patient Name:  Maxine Velasquez    YOB: 1955  Age:         66 year old   Anita MRN:    [id number] Telephone Information:  Home Phone [phone]   Mobile [phone]       Address:  [address]  [facility] [address] Email address:  [email or web address]      Emergency Contact(s)    Name Relationship Lgl Grd Work Phone Home Phone Mobile Phone   1. SEVERSON,VIVIAN Sister   [phone]   2. BRANDI GEORGES Daughter   [phone]   3. PRATIMA VELASQUEZ Son   [phone]           Primary language:  English     needed? No   [facility] [employer]:  [phone] op. 1  Other communication barriers:None    Preferred Method of Communication:  Phone  Current living arrangement: I live in a private home    Mobility Status/ Medical Equipment: Independent        Health Maintenance  Health Maintenance Reviewed: Due/Overdue   Health Maintenance Due   Topic Date Due     MIGRAINE ACTION PLAN  Never done     ZOSTER IMMUNIZATION (1 of 2) Never done     Pneumococcal Vaccine: 65+ Years (1 of 1 - PPSV23) Never done     MAMMO SCREENING  10/10/2021           My Access Plan  Medical Emergency 911   Primary Clinic Line [facility] - [phone]   24 Hour Appointment Line [phone] or  [phone]) (toll-free)   24 Hour Nurse Line [phone] (toll-free)   Preferred Urgent Care [facility], [phone]     Preferred Hospital [facility]  [phone]     Preferred Pharmacy [facility] [place], [place] - [address]     Behavioral Health Crisis Line The National Suicide Prevention Lifeline at 1-623.502.3037 or 911             My Care Team Members  Patient Care Team       Relationship Specialty Notifications Start End     Yuval Jama MD PCP - General Internal Medicine  10/20/14     Merged    Phone: [phone] Fax: [phone]         [address] [place] [address]    Yuval Jama MD  Internal Medicine  10/20/14     Merged    Phone: [phone] Fax: [phone]         [address] [place] [address]    Yuval Jama MD Assigned PCP   12/19/14     Phone: [phone] Fax: [phone]         [address] [place] [address]    Carmen Mims MA Community Health Worker Primary Care - CC  1/22/20     Phone: [phone]         Kevin Mcdaniels MD Assigned Musculoskeletal Provider   10/23/20     Phone: [phone] Fax: [phone]         [address] [place] [address]    Ilda Sue PA-C Assigned Heart and Vascular Provider   6/13/21     Phone: [phone] Fax: [phone]         [address] [place] [address]    Lizet Krishnan RN Lead Care Coordinator  Admissions 8/25/21             My Care Plans  Self Management and Treatment Plan  Goals and (Comments)  Goals        General     1. Medical (pt-stated)      Notes - Note edited  11/24/2021  2:02 PM by Lizet Krishnan RN     Goal Statement: I would like support and assistance to maintain my health and wellness in management of my chronic health conditions to prevent ER visits/hospital readmission.   Date Goal set: 10/12/20. Updated/modified: 08/25/2021 // 11/24/2021  Barriers: limited mobility related to knee pain, diagnosis of chronic, complex health conditions.   Strengths: motivated to reduce health risks  Date to Achieve By: 11/2021 // 5/2022  Patient expressed understanding of goal: Yes    Action steps to achieve this goal:  1. I will follow up with my providers as scheduled/recommended. (Primary Care Provider follow up recommended 09/11/2021, 11/29/2021, Cardiology follow up recommended 11/2021 with EKG - TBD, Outpatient Physical Therapy 11/29/2021)   2. I will take my medications as prescribed.   3. I will  follow a heart healthy diet (low-fat, low-salt)   4.   I will work toward regular exercise as able.  5. I will discuss, review, schedule and complete recommended overdue health maintenance with my provider.   6. I will consider completing a health care directive and provide a copy when completed to my care team.   7. I will notify my care team of any barriers, questions, concerns, additional resources needed. Care coordinator will remain available as needed.   8. I will use the clinic as a resource and I understand I can contact my clinic with 24/7 after hours services available.    *ANNUAL ASSESSMENT DUE:  11/2021               Action Plans on File:            Depression          Advance Care Plans/Directives Type:   No data recorded    My Medical and Care Information  Problem List   Patient Active Problem List   Diagnosis     Osteoarthritis     TMJ disease     Health Care Home     Hypothyroidism     Morbid obesity, unspecified obesity type (H)     Migraine without aura and without status migrainosus, not intractable     Bilateral low back pain without sciatica     Essential hypertension     NELY on CPAP     RLS (restless legs syndrome)     Gastroesophageal reflux disease, esophagitis presence not specified     Iron deficiency     Chronic pain of left knee     Lumbar radiculopathy     Paroxysmal atrial fibrillation (H)     Pulmonary nodules     Hyperlipidemia LDL goal <100      Current Medications and Allergies:    Allergies   Allergen Reactions     Morphine Anaphylaxis     Ceclor [Cefaclor] Hives     Diltiazem Hives     Lisinopril Cough     Sertraline Nausea and Vomiting     Current Outpatient Medications   Medication     acetaminophen (TYLENOL) 500 MG tablet     aspirin (ASA) 81 MG EC tablet     diphenhydrAMINE HCl (ALLERGY MEDICATION PO)     DULoxetine (CYMBALTA) 20 MG capsule     flecainide (TAMBOCOR) 150 MG tablet     gabapentin (NEURONTIN) 300 MG capsule     levothyroxine (SYNTHROID/LEVOTHROID) 137 MCG tablet      losartan (COZAAR) 100 MG tablet     metoprolol succinate ER (TOPROL-XL) 25 MG 24 hr tablet     order for DME     pantoprazole (PROTONIX) 40 MG EC tablet     rosuvastatin (CRESTOR) 10 MG tablet     SUMAtriptan (IMITREX) 100 MG tablet     triamterene-HCTZ (MAXZIDE-25) 37.5-25 MG tablet     No current facility-administered medications for this visit.         Care Coordination Start Date: 3/15/2019   Frequency of Care Coordination: 6 weeks     Form Last Updated: 11/24/2021

## 2021-11-24 NOTE — LETTER
[facility] CARE COORDINATION  [address] [facility] [address]    November 24, 2021    Maxine KOWALSKI [place]  [address]  [facility] [address]      Dear Maxine,    I am a clinic care coordinator who works with Yuval Jama MD at [facility]. I wanted to introduce myself and provide you with my contact information for you to be able to call me with any questions or concerns. Below is a description of clinic care coordination and how I can further assist you.      The clinic care coordination team is made up of a registered nurse,  and community health worker who understand the health care system. The goal of clinic care coordination is to help you manage your health and improve access to the health care system in the most efficient manner. The team can assist you in meeting your health care goals by providing education, coordinating services, strengthening the communication among your providers and supporting you with any resource needs.    Please feel free to contact me with any questions or concerns. We are focused on providing you with the highest-quality healthcare experience possible and that all starts with you.     Sincerely,     [facility]   Lizet Krishnan RN, Care Coordinator   [facility] [place], [facility], [facility], [place]  E-mail [email or web address]   [phone]      Enclosed: I have enclosed a copy of the Patient Centered Plan of Care. This has helpful information and goals that we have talked about. Please keep this in an easy to access place to use as needed.

## 2021-11-24 NOTE — PROGRESS NOTES
Clinic Care Coordination Contact    Situation: Patient chart reviewed by care coordinator.    Background:   -Care coordination initial assessment and enrollment on 3/15/2019  -At enrollment patient centered goals were developed with patient participation  -Follow up and monitoring of goal progression assigned to CHW for continued outreach every 30 days    Assessment:   -Per chart review, CC CHW reached out to patient on 10/26/2021  -Patient is actively working on goals-RNCC clinical assessment is due 2/20/2022.  -The patient will continue working care coordination team to achieve goal as above.  -CHW will continue following up monthly to monitor goal progression.  Due:  12/26/2021  -CHW will discuss, review and assist with scheduling recommended follow up appointments  -Patient goals reviewed and updated    Plan/Recommendations:   -RNCC will send introduction to care coordination letter with new RNCC contact information via [employer]  -RNCC will send updated patient centered goals via [employer]      [facility]   Lizet Krishnan RN, Care Coordinator   [facility] [place], [facility], [facility], [place]  E-mail [email or web address]   [phone]

## 2021-11-29 ENCOUNTER — VIRTUAL VISIT (OUTPATIENT)
Dept: INTERNAL MEDICINE | Facility: CLINIC | Age: 66
End: 2021-11-29
Payer: COMMERCIAL

## 2021-11-29 DIAGNOSIS — M54.16 LUMBAR RADICULOPATHY: ICD-10-CM

## 2021-11-29 DIAGNOSIS — F32.A MILD DEPRESSION: Primary | ICD-10-CM

## 2021-11-29 DIAGNOSIS — E78.5 HYPERLIPIDEMIA LDL GOAL <100: ICD-10-CM

## 2021-11-29 PROCEDURE — 96127 BRIEF EMOTIONAL/BEHAV ASSMT: CPT | Performed by: INTERNAL MEDICINE

## 2021-11-29 PROCEDURE — 99442 PR PHYSICIAN TELEPHONE EVALUATION 11-20 MIN: CPT | Performed by: INTERNAL MEDICINE

## 2021-11-29 ASSESSMENT — ANXIETY QUESTIONNAIRES

## 2021-11-29 ASSESSMENT — PATIENT HEALTH QUESTIONNAIRE - PHQ9: 5. POOR APPETITE OR OVEREATING: NOT AT ALL

## 2021-11-29 NOTE — PROGRESS NOTES
"Roxanna is a 66 year old who is being evaluated via a billable telephone visit.      What phone number would you like to be contacted at? [phone]  How would you like to obtain your AVS? Hilario    ASSESSMENT:   1. Mild depression (H)  Had side effects with Cymbalta and stop the medication.  Mood overall better however with plan for moved to department. See PHQ9 score below.  Will monitor things off of medication therapy    2. Lumbar radiculopathy  Improved.  Patient to continue physical therapy    3. Hyperlipidemia LDL goal <100  On higher-dose rosuvastatin.  Will repeat lipids in April 2022      PLAN:   Remain off of duloxetine  Continue other medications  Continue physical therapy for back pain  Repeat fasting labs in April 2022    Phone call contact time  Call Started at 4:44pm  Call Ended at 4:57pm  Total minutes: 13 min    (Chart documentation was completed, in part, with [employer] voice-recognition software. Even though reviewed, some grammatical, spelling, and word errors may remain.)    Yuval Jama MD  Internal Medicine Department  [facility]           Subjective   Roxanna is a 66 year old who presents for the following health issues     HPI     Hyperlipidemia Follow-Up      Are you regularly taking any medication or supplement to lower your cholesterol?   Yes- Rosuvastatin    Are you having muscle aches or other side effects that you think could be caused by your cholesterol lowering medication?  No    Depression Followup    How are you doing with your depression since your last visit? Medication made patient feel as if \"Head was going to float off\"    Are you having other symptoms that might be associated with depression? No    Have you had a significant life event?  No     Are you feeling anxious or having panic attacks?   No    Do you have any concerns with your use of alcohol or other drugs? No    Social History     Tobacco Use     Smoking status: Former Smoker     " Packs/day: 0.25     Years: 1.00     Pack years: 0.25     Types: Cigarettes     Start date:      Quit date:      Years since quittin.9     Smokeless tobacco: Never Used     Tobacco comment: very minimal smoking history   Substance Use Topics     Alcohol use: No     Alcohol/week: 0.0 standard drinks     Drug use: No     PHQ 10/7/2019 10/19/2020 10/25/2021   PHQ-9 Total Score 0 1 9   Q9: Thoughts of better off dead/self-harm past 2 weeks Not at all Not at all Not at all     MICHEL-7 SCORE 10/25/2021   Total Score 0     Last PHQ-9 2021   1.  Little interest or pleasure in doing things 0   2.  Feeling down, depressed, or hopeless 0   3.  Trouble falling or staying asleep, or sleeping too much 1   4.  Feeling tired or having little energy 1   5.  Poor appetite or overeating 0   6.  Feeling bad about yourself 0   7.  Trouble concentrating 1   8.  Moving slowly or restless 0   Q9: Thoughts of better off dead/self-harm past 2 weeks 0   PHQ-9 Total Score 3   Difficulty at work, home, or with people Not difficult at all     MICHEL-7  2021   1. Feeling nervous, anxious, or on edge 0   2. Not being able to stop or control worrying 0   3. Worrying too much about different things 0   4. Trouble relaxing 0   5. Being so restless that it is hard to sit still 0   6. Becoming easily annoyed or irritable 0   7. Feeling afraid, as if something awful might happen 0   MICHEL-7 Total Score 0   If you checked any problems, how difficult have they made it for you to do your work, take care of things at home, or get along with other people? Not difficult at all       Most recent lab results reviewed with pt.         Component      Latest Ref Rng & Units 3/11/2021   Sodium      133 - 144 mmol/L 137   Potassium      3.4 - 5.3 mmol/L 4.0   Chloride      94 - 109 mmol/L 103   Carbon Dioxide      20 - 32 mmol/L 27   Anion Gap      3 - 14 mmol/L 7   Glucose      70 - 99 mg/dL 100 (H)   Urea Nitrogen      7 - 30 mg/dL 12  "  Creatinine      0.52 - 1.04 mg/dL 0.90   GFR Estimate      >60 mL/min/1.73:m2 67   GFR Estimate If Black      >60 mL/min/1.73:m2 78   Calcium      8.5 - 10.1 mg/dL 9.5   Bilirubin Total      0.2 - 1.3 mg/dL 0.5   Albumin      3.4 - 5.0 g/dL 3.9   Protein Total      6.8 - 8.8 g/dL 8.1   Alkaline Phosphatase      40 - 150 U/L 104   ALT      0 - 50 U/L 19   AST      0 - 45 U/L 13   WBC      4.0 - 11.0 10e9/L 11.3 (H)   RBC Count      3.8 - 5.2 10e12/L 4.55   Hemoglobin      11.7 - 15.7 g/dL 12.2   Hematocrit      35.0 - 47.0 % 39.9   MCV      78 - 100 fl 88   MCH      26.5 - 33.0 pg 26.8   MCHC      31.5 - 36.5 g/dL 30.6 (L)   RDW      10.0 - 15.0 % 15.5 (H)   Platelet Count      150 - 450 10e9/L 427   Cholesterol      <200 mg/dL 128   Triglycerides      <150 mg/dL 201 (H)   HDL Cholesterol      >49 mg/dL 64   LDL Cholesterol Calculated      <100 mg/dL 24   Non HDL Cholesterol      <130 mg/dL 64   TSH      0.40 - 4.00 mU/L 0.45     Last seen October. Plan a that time as below\":    PLAN:  Change Rosuvastatin to 10mg tab, 1 tab daily for cholesterol   Duloxetine 20mg capsule, 1 capsule daily in AM for mental health  Stretching of trochanter bursa in AM and ice to the area of soreness for 10 min in late PM daily. If not improving, possible future cortisone injection with ortho  Continue other meds.  Call  [phone] or use [employer] to schedule a future lab appointment  fasting in 6 months.   For fasting labs, please refrain from eating for 8 hours or more.   Drink 2 glasses of water before your lab appointment. It is fine to take your  oral medications on the morning of the lab test as usual  Follow up in 1 month. Earlier as needed   OK for it to be a telephone virtual visit.    Pt will have paperwork sent re: disability and  Possible move to other apartment  Reduce calorie/carbohydrate (sugar, bread, potato, pasta, rice, alcohol etc)  intake in diet.  Increase color on your plate with fruits and vegetables. Increase  " frequency of walking or other aerobic exercise as able (goal is daily)  Referral to [facility] for physical therapy. Their schedulers will call you. If you have not heard from them in 2 business days, then call [phone].  Vaccinations: Influenza vaccine and Pfizer covid vaccine  Tylenol ES 2 tabs up to 3 times a day as needed for pain     Patient start duloxetine but had side effects with the medication had to stop.  Rowe her head was going slow to wait.  Patient states mood is overall better as she has been approved to move to a new apartment in the next few weeks.  Patient also states that she believes her brother in [place] passed away and she has come to accept this thought.  Has not received any emails or Facebook messages from him.  She does not have any address however where he was living or any other way of contacting friends or neighbors to confirm that he passed away.  Apparently had a lot of health issues so patient states not unexpected.  Patient has been doing physical therapy for her back and this is better  PHQ9 improved.  See results above      Additional ROS:   Constitutional, HEENT, Cardiovascular, Pulmonary, GI and , Neuro, MSK and Psych review of systems/symptoms are otherwise negative or unchanged from previous, except as noted above.           Objective:  Any reported vitals from today were reviewed in chart. See nursing documentation for details    RESP: No cough, no audible wheezing, able to talk in full sentences  GEN: Normal affect. Normal though content/speech  Remainder of exam unable to be completed due to telephone visits

## 2021-11-30 ASSESSMENT — ANXIETY QUESTIONNAIRES: GAD7 TOTAL SCORE: 0

## 2021-11-30 ASSESSMENT — PATIENT HEALTH QUESTIONNAIRE - PHQ9: SUM OF ALL RESPONSES TO PHQ QUESTIONS 1-9: 3

## 2021-12-02 ENCOUNTER — PATIENT OUTREACH (OUTPATIENT)
Dept: NURSING | Facility: CLINIC | Age: 66
End: 2021-12-02
Payer: MEDICAID

## 2021-12-02 NOTE — PROGRESS NOTES
Clinic Care Coordination Contact  Community Health Worker Follow Up      Goals:   Goals Addressed as of 12/2/2021 at 2:00 PM                    Today    10/26/21       Patient Stated       1. Medical (pt-stated)   90%  90%    Added 10/12/20 by Damien Person RN      Goal Statement: I would like support and assistance to maintain my health and wellness in management of my chronic health conditions to prevent ER visits/hospital readmission.   Date Goal set: 10/12/20. Updated/modified: 08/25/2021 // 11/24/2021  Barriers: limited mobility related to knee pain, diagnosis of chronic, complex health conditions.   Strengths: motivated to reduce health risks  Date to Achieve By: 11/2021 // 5/2022  Patient expressed understanding of goal: Yes    Action steps to achieve this goal:  1. I will follow up with my providers as scheduled/recommended. (Primary Care Provider follow up recommended 09/11/2021, 11/29/2021, Cardiology follow up recommended 11/2021 with EKG - TBD, Outpatient Physical Therapy 11/29/2021)   2. I will take my medications as prescribed.   3. I will follow a heart healthy diet (low-fat, low-salt)   4.   I will work toward regular exercise as able.  5. I will discuss, review, schedule and complete recommended overdue health maintenance with my provider.   6. I will consider completing a health care directive and provide a copy when completed to my care team.   7. I will notify my care team of any barriers, questions, concerns, additional resources needed. Care coordinator will remain available as needed.   8. I will use the clinic as a resource and I understand I can contact my clinic with 24/7 after hours services available.    *ANNUAL ASSESSMENT DUE:  11/2021          Discussed:    Patient stated she was approved from the [employer] office to move to a different unit in her building.  Patient has been busy doing some packing.  Patient stated the move will take place the first part of January 2022.    Patient  stated her nephew will be staying overnight next Tuesday to help pack.    Patient stated she has been taking her medications as prescribed.    Patient stated she has been eating a healthy diet, doing okay with that.  Patient has been eating meat, vegetables, and fruit.  Patient is trying to stay away from sweets.    Patient stated [facility], she has been busy packing.  Patient plans to go once she is moved and unpacked at her new unit.    Patient stated she goes to [place] on Tuesdays and Fridays for quilting.  Patient really enjoys this a lot.  Patient stated a friend picks her up on the way. Her friend will take her to the grocery store if needed.  Patient's friend live a few blocks away.    Patient stated she did quite a bit of walking yesterday.  Patient's back and legs are sore today.  Patient stated she overdid it yesterday.  Patient is resting today.    Patient stated her son and his wife visited her.  They were only going to stay for 2 days, but extended their time with her for 5 days.  Patient stated she had a great visit with both of them.  This was the first time she met her daughter-in-law.    Patient stated she has not heard from her brother in [place].  Patient is concerned about this.  Patient has been trying to contact him without any success.  Patient stated her last conversation with her brother was August 2021.  She has been trying to track him down in [place].  Patient stated she is going to continue to try.    Intervention and Education during outreach: CHW encouraged patient to contact CC if there are any other changes or resources needed.  Patient acknowledged understanding.      Plan: CHW will outreach in one month.    TRINIDAD Li  Clinic Care Coordination  [facility] Clinics: [place] [place], [place], [place], and [facility]  Phone: [phone]

## 2021-12-05 PROBLEM — F32.A MILD DEPRESSION: Status: ACTIVE | Noted: 2021-12-05

## 2021-12-22 NOTE — TELEPHONE ENCOUNTER
Previously addressed.  Per virtual visit from 11/29/2021, patient stated she had been approved to move into a new apartment

## 2022-01-07 ENCOUNTER — PATIENT OUTREACH (OUTPATIENT)
Dept: CARE COORDINATION | Facility: CLINIC | Age: 67
End: 2022-01-07
Payer: COMMERCIAL

## 2022-01-07 ASSESSMENT — ACTIVITIES OF DAILY LIVING (ADL): DEPENDENT_IADLS:: TRANSPORTATION

## 2022-01-07 NOTE — LETTER
[address] [facility] [address]    [facility]  Patient Centered Plan of Care  About Me:        Patient Name:  Maxine Velasquez    YOB: 1955  Age:         66 year old   Anita MRN:    [id number] Telephone Information:  Home Phone [phone]   Mobile [phone]       Address:  [address]  [facility] [address] Email address:  [email or web address]      Emergency Contact(s)    Name Relationship Lgl Grd Work Phone Home Phone Mobile Phone   1. SEVERSON,VIVIAN Sister   [phone]   2. BRANDI GEORGES Daughter   [phone]   3. PRATIMA VELASQUEZ Son   [phone]           Primary language:  English     needed? No   [facility]:  [phone] op. 1  Other communication barriers:None    Preferred Method of Communication:  Phone  Current living arrangement: I live in a private home    Mobility Status/ Medical Equipment: Independent w/Device    Health Maintenance  Health Maintenance Reviewed: Due/Overdue   Health Maintenance Due   Topic Date Due     MIGRAINE ACTION PLAN  Never done     ZOSTER IMMUNIZATION (1 of 2) Never done     Pneumococcal Vaccine: 65+ Years (1 of 1 - PPSV23) Never done     MAMMO SCREENING  10/10/2021           My Access Plan  Medical Emergency 911   Primary Clinic Line [facility] [phone]   24 Hour Appointment Line [phone] or  [phone]) (toll-free)   24 Hour Nurse Line [phone] (toll-free)   Preferred Urgent Care [facility], [phone]     Preferred Hospital [facility]  [phone]     Preferred Pharmacy [facility] [place], [place] - [address]     Behavioral Health Crisis Line The National Suicide Prevention Lifeline at 1-177.402.4136 or 911       My Care Team Members  Patient Care Team       Relationship Specialty Notifications Start End    Veum, Yuval ELLIS MD PCP  - General Internal Medicine  10/20/14     Merged    Phone: [phone] Fax: [phone]         [address] [place] [address]    Yuval Jama MD  Internal Medicine  10/20/14     Merged    Phone: [phone] Fax: [phone]         [address] [place] [address]    Yuval Jama MD Assigned PCP   12/19/14     Phone: [phone] Fax: [phone]         [address] [place] [address]    Carmen Mims MA Community Health Worker Primary Care - CC  1/22/20     Phone: [phone]         Ilda Sue PA-C Assigned Heart and Vascular Provider   6/13/21     Phone: [phone] Fax: [phone] [address] [facility] [address]    Lizet Krishnan, RN Lead Care Coordinator  Admissions 8/25/21             My Care Plans  Self Management and Treatment Plan  Goals and (Comments)  Goals        General     1. Medical (pt-stated)      Notes - Note edited  1/7/2022 10:37 AM by Lizet Krishnan RN     Goal Statement: I would like support and assistance to maintain my health and wellness in management of my chronic health conditions to prevent ER visits/hospital readmission.   Date Goal set: 10/12/20. Updated/modified: 08/25/2021 // 11/24/2021  Barriers: limited mobility related to knee pain, diagnosis of chronic, complex health conditions.   Strengths: motivated to reduce health risks  Date to Achieve By: 11/2021 // 5/2022  Patient expressed understanding of goal: Yes    Action steps to achieve this goal:  1. I will follow up with my providers as scheduled/recommended. (Primary Care Provider follow up recommended with labs 4/2022, Cardiology follow up recommended with EKG - Due 11/2021 TBD, ongoing Outpatient Physical Therapy   2. I will take my medications as prescribed.   3. I will follow a heart healthy diet (low-fat, low-salt)   4.   I will work toward regular exercise as able.  5. I will discuss, review, schedule and complete recommended overdue health maintenance with my  provider.   6. I will consider completing a health care directive and provide a copy when completed to my care team.   7. I will notify my care team of any barriers, questions, concerns, additional resources needed.   8. I will use the clinic as a resource and I understand I can contact my clinic with 24/7 after hours services available.  9. Care coordinator will remain available as needed.     *ANNUAL ASSESSMENT DUE:  11/2022               Action Plans on File:     Depression    Advance Care Plans/Directives Type:   No data recorded    My Medical and Care Information  Problem List   Patient Active Problem List   Diagnosis     Osteoarthritis     TMJ disease     Health Care Home     Hypothyroidism     Morbid obesity, unspecified obesity type (H)     Migraine without aura and without status migrainosus, not intractable     Bilateral low back pain without sciatica     Essential hypertension     NELY on CPAP     RLS (restless legs syndrome)     Gastroesophageal reflux disease, esophagitis presence not specified     Iron deficiency     Chronic pain of left knee     Lumbar radiculopathy     Paroxysmal atrial fibrillation (H)     Pulmonary nodules     Hyperlipidemia LDL goal <100     Mild depression (H)      Current Medications and Allergies:    Allergies   Allergen Reactions     Morphine Anaphylaxis     Ceclor [Cefaclor] Hives     Cymbalta      Foggy head feeling     Diltiazem Hives     Lisinopril Cough     Sertraline Nausea and Vomiting     Current Outpatient Medications   Medication     acetaminophen (TYLENOL) 500 MG tablet     aspirin (ASA) 81 MG EC tablet     diphenhydrAMINE HCl (ALLERGY MEDICATION PO)     flecainide (TAMBOCOR) 150 MG tablet     gabapentin (NEURONTIN) 300 MG capsule     levothyroxine (SYNTHROID/LEVOTHROID) 137 MCG tablet     losartan (COZAAR) 100 MG tablet     metoprolol succinate ER (TOPROL-XL) 25 MG 24 hr tablet     order for DME     pantoprazole (PROTONIX) 40 MG EC tablet     rosuvastatin (CRESTOR)  10 MG tablet     SUMAtriptan (IMITREX) 100 MG tablet     triamterene-HCTZ (MAXZIDE-25) 37.5-25 MG tablet     No current facility-administered medications for this visit.         Care Coordination Start Date: 3/15/2019   Frequency of Care Coordination: monthly     Form Last Updated: 01/07/2022

## 2022-01-07 NOTE — PROGRESS NOTES
Clinic Care Coordination Contact    Situation: Patient chart reviewed by care coordinator.    Background:   -Care coordination initial assessment and enrollment on 3/15/2019  -At enrollment patient centered goals were developed with patient participation  -Follow up and monitoring of goal progression assigned to CHW for continued outreach every 30 days    Assessment:  -Per chart review, CC CHW reached out to patient on 12/2/2021  -Patient is actively working on goals-RNCC clinical assessment is due 4/20/2022.  -The patient will continue working care coordination team to achieve goal as above.  -CHW will continue following up monthly to monitor goal progression.  Due: 2/2/2022  -CHW will discuss, review and assist with scheduling recommended follow up appointments  -Patient goals reviewed and updatedAssessment:     Plan/Recommendations:   -RNCC will send introduction to care coordination letter with new RNCC contact information via [employer]  -RNCC will send updated patient centered goals via [employer]    Lizet Krishnan RN, BSN, PHN  Primary Care / Care Coordinator   [facility]  E-mail [email or web address]   [phone]

## 2022-01-12 ENCOUNTER — PATIENT OUTREACH (OUTPATIENT)
Dept: CARE COORDINATION | Facility: CLINIC | Age: 67
End: 2022-01-12
Payer: COMMERCIAL

## 2022-01-12 NOTE — PROGRESS NOTES
Clinic Care Coordination Contact  [facility]/Voicemail       Clinical Data: Care Coordinator Outreach  Outreach attempted x 1.  Left message on patient's voicemail with call back information and requested return call.    Plan: Care Coordinator will try to reach patient again in 10 business days.    TRINIDAD Li  Clinic Care Coordination  [facility] Clinics: [place] [place], [place], [place], and [place] [facility]  Phone: [phone]

## 2022-01-19 NOTE — PROGRESS NOTES
Patient seen for one time evaluation and treatment.  Patient did not return for further treatment and current status is unknown.  Please see initial evaluation for further information.   Jessica Alanis PT

## 2022-01-22 ENCOUNTER — HEALTH MAINTENANCE LETTER (OUTPATIENT)
Age: 67
End: 2022-01-22

## 2022-01-24 NOTE — PLAN OF CARE
Pt had EGD today. Show duodenal ulcer that was cauterized. HGB stable at 8.7. Pt tolerating a clear liquid diet. No complaints post procedure.   Detail Level: Detailed Biopsy Photograph Reviewed: Yes Number Of Curettages: 3 Size Of Lesion In Cm: 0.6 Size Of Lesion After Curettage: 1 Add Intralesional Injection: No Anesthesia Type: 3% carbocaine Anesthesia Volume In Cc: 2.1 Cautery Type: electrodesiccation What Was Performed First?: Curettage Final Size Statement: The size of the lesion after curettage was Additional Information: (Optional): The wound was cleaned, and a pressure dressing was applied.  The patient received detailed post-op instructions. Consent was obtained from the patient. The risks, benefits and alternatives to therapy were discussed in detail. Specifically, the risks of infection, scarring, bleeding, prolonged wound healing, nerve injury, incomplete removal, allergy to anesthesia and recurrence were addressed. Alternatives to ED&C, such as: surgical removal and XRT were also discussed.  Prior to the procedure, the treatment site was clearly identified and confirmed by the patient. All components of Universal Protocol/PAUSE Rule completed. Post-Care Instructions: I reviewed with the patient in detail post-care instructions. Patient is to keep the area dry for 48 hours, and not to engage in any swimming until the area is healed. Should the patient develop any fevers, chills, bleeding, severe pain patient will contact the office immediately. Bill As A Line Item Or As Units: Line Item

## 2022-01-27 ENCOUNTER — PATIENT OUTREACH (OUTPATIENT)
Dept: NURSING | Facility: CLINIC | Age: 67
End: 2022-01-27
Payer: COMMERCIAL

## 2022-01-27 NOTE — PROGRESS NOTES
Clinic Care Coordination Contact  Community Health Worker Follow Up    The Community Health Worker called for a Care Coordination outreach to follow up on goals and action steps. Spoke to Roxanna.      Care Gaps:     Health Maintenance Due   Topic Date Due     MIGRAINE ACTION PLAN  Never done     ZOSTER IMMUNIZATION (1 of 2) Never done     Pneumococcal Vaccine: 65+ Years (1 of 1 - PPSV23) Never done     MAMMO SCREENING  10/10/2021       Care Gaps Last addressed on Did not discuss    Goals:   Goals Addressed as of 1/27/2022 at 12:58 PM                    Today    12/2/21       Patient Stated       1. Medical (pt-stated)   90%  90%    Added 10/12/20 by Damien Person RN      Goal Statement: I would like support and assistance to maintain my health and wellness in management of my chronic health conditions to prevent ER visits/hospital readmission.   Date Goal set: 10/12/20. Updated/modified: 08/25/2021 // 11/24/2021  Barriers: limited mobility related to knee pain, diagnosis of chronic, complex health conditions.   Strengths: motivated to reduce health risks  Date to Achieve By: 11/2021 // 5/2022  Patient expressed understanding of goal: Yes    Action steps to achieve this goal:  1. I will follow up with my providers as scheduled/recommended. (Primary Care Provider follow up recommended with labs 4/2022, Cardiology follow up recommended with EKG - Due 11/2021 TBD, ongoing Outpatient Physical Therapy   2. I will take my medications as prescribed.   3. I will follow a heart healthy diet (low-fat, low-salt)   4.   I will work toward regular exercise as able.  5. I will discuss, review, schedule and complete recommended overdue health maintenance with my provider.   6. I will consider completing a health care directive and provide a copy when completed to my care team.   7. I will notify my care team of any barriers, questions, concerns, additional resources needed.   8. I will use the clinic as a resource and I  understand I can contact my clinic with 24/7 after hours services available.  9. Care coordinator will remain available as needed.     *ANNUAL ASSESSMENT DUE:  11/2022          Discussed:    Patient stated she is doing okay.    Patient stated she moved into her new unit and it is great. Patient stated her floor is nice and quiet.  She met some of the neighbors.  Patient stated the hallways are being painted on her floor.    Patient stated she has a lot of unpacking to do.  Her nephew and her sister are over now. They are helping her unpack and get settled in.  Her sister lives nearby, only 5 minutes away.    Patient stated she hurt her knee on Tuesday when walking to the elevator.  She has been elevating her leg. She is trying to stay off of her leg as much as possible.    Patient stated she still goes to [place] on Tuesdays and Fridays for quilting.  Her friend picks her up.    Patient stated she is doing better eating a healthy low-fat, low-salt diet.    Patient stated she was able to connect with her brother in [place].  She has been trying to find him for a few months.    Patient stated she uses [employer] for appointments if needed.      Intervention and Education during outreach: CHW asked patient if she has any resource needs, patient declined.  CHW offered to assist in scheduling a PCP appointment for her knee pain, patient declined. CHW encouraged patient to contact CC if there are any other changes or resources needed.  Patient acknowledged understanding.      Plan: Patient will call the clinic for her knee pain if needed.  Patient will attend appointments as scheduled.  CHW will outreach in one month.    TRINIDAD Li  Clinic Care Coordination  [facility] Clinics: [place] [place], and [place] [facility]  Phone: [phone]

## 2022-02-07 DIAGNOSIS — I48.91 ATRIAL FIBRILLATION WITH RVR (H): ICD-10-CM

## 2022-02-07 RX ORDER — METOPROLOL SUCCINATE 25 MG/1
12.5 TABLET, EXTENDED RELEASE ORAL DAILY
Qty: 45 TABLET | Refills: 1 | Status: SHIPPED | OUTPATIENT
Start: 2022-02-07 | End: 2022-03-24

## 2022-02-09 ENCOUNTER — PATIENT OUTREACH (OUTPATIENT)
Dept: CARE COORDINATION | Facility: CLINIC | Age: 67
End: 2022-02-09
Payer: COMMERCIAL

## 2022-02-09 NOTE — PROGRESS NOTES
Care Coordination Clinician Chart Review     Situation: Patient chart reviewed by care coordinator.?     Background: Initial assessment and enrollment to Care Coordination was 3/15/2019.?? Patient centered goals were developed with participation from patient.? Lead CC handed patient off to CHW for continued outreach every 30 days.??     Assessment: Per chart review, patient outreach completed by CC CHW on 1/27/2022.? Patient is actively working to accomplish goal(s).? Patient's goal(s) remain(s) appropriate at this time.? Patient is not due for updated Plan of Care.? Annual assessment will be due 3/15/2022.     Goals        1. Medical (pt-stated)       Goal Statement: I would like support and assistance to maintain my health and wellness in management of my chronic health conditions to prevent ER visits/hospital readmission.   Date Goal set: 10/12/20. Updated/modified: 08/25/2021 // 11/24/2021  Barriers: limited mobility related to knee pain, diagnosis of chronic, complex health conditions.   Strengths: motivated to reduce health risks  Date to Achieve By: 11/2021 // 5/2022  Patient expressed understanding of goal: Yes    Action steps to achieve this goal:  1. I will follow up with my providers as scheduled/recommended. (Primary Care Provider follow up recommended with labs 4/2022, Cardiology follow up recommended with EKG - Due 11/2021 TBD, ongoing Outpatient Physical Therapy   2. I will take my medications as prescribed.   3. I will follow a heart healthy diet (low-fat, low-salt)   4.   I will work toward regular exercise as able.  5. I will discuss, review, schedule and complete recommended overdue health maintenance with my provider.   6. I will consider completing a health care directive and provide a copy when completed to my care team.   7. I will notify my care team of any barriers, questions, concerns, additional resources needed.   8. I will use the clinic as a resource and I understand I can contact my  clinic with 24/7 after hours services available.  9. Care coordinator will remain available as needed.     *ANNUAL ASSESSMENT DUE:  11/2022          ??     Plan/Recommendations: The patient will continue working with Care Coordination to achieve above goal(s).? CHW will involve Lead CC as needed or if patient is ready to move to maintenance.? Lead CC will continue to monitor CHW s monthly outreaches and progress to goal(s) every 6 weeks.?     Plan of Care updated and sent to patient: No     Lizet Krishnan RN, BSN, PHN  Primary Care / Care Coordinator   [facility]  E-mail [email or web address]   [phone]

## 2022-03-06 NOTE — PHARMACY-ADMISSION MEDICATION HISTORY
Pharmacy Medication History  Admission medication history interview status for the 1/21/2020  admission is complete. See EPIC admission navigator for prior to admission medications     Medication history sources: Patient and Surescripts  Medication history source reliability: Good  Adherence assessment: Good    Significant changes made to the medication list:        Additional medication history information:   ---  Pt was on Xarelto 20 mg po qPM (for afib) for ~ 3 weeks in Nov 2019.  However, this was stopped due to severe bleeding.  Pt reveals that her physician is still trying to work out which anticoagulant to start for her afib (but not Xarelto).  Pt is not taking any aspirin right now.    Medication reconciliation completed by provider prior to medication history? No    Time spent in this activity: 15 minutes      Prior to Admission medications    Medication Sig Last Dose Taking? Auth Provider   carvedilol (COREG) 12.5 MG tablet Take 1 tablet (12.5 mg) by mouth 2 times daily (with meals) 1/21/2020 at am Yes Ilda Sue PA-C   flecainide (TAMBOCOR) 150 MG tablet TAKE 1/2 TABLET BY MOUTH TWICE A DAY 1/21/2020 at am Yes Yuval Jama MD   gabapentin (NEURONTIN) 300 MG capsule Take 900 mg by mouth At Bedtime For restless legs syndrome 1/20/2020 at hs Yes Yuval Jama MD   levothyroxine (SYNTHROID/LEVOTHROID) 150 MCG tablet Take 1 tablet (150 mcg) by mouth daily 1/21/2020 at am Yes Yuval Jama MD   losartan (COZAAR) 100 MG tablet Take 1 tablet (100 mg) by mouth daily 1/21/2020 at am Yes Zachary Sal MD   pantoprazole (PROTONIX) 40 MG EC tablet TAKE 1 TABLET BY MOUTH TWICE A DAY BEFORE MEALS 1/21/2020 at am Yes Yuval Jama MD   SUMAtriptan (IMITREX) 100 MG tablet Take 1 tablet (100 mg) by mouth at onset of headache for migraine May repeat in 2 hours if needed: max 2/day; prn Yes Yuval Jama MD   triamterene-HCTZ (MAXZIDE) 75-50 MG tablet Take 0.5 tablets by mouth daily 1/21/2020 at am Yes  RT Ventilator Management Note  Justine Boogie 66 y o  female MRN: [id number]  Unit/Bed#: ICU 09 Encounter: [id number]      Daily Screen       3/5/2022  0732 3/6/2022  0736          Patient safety screen outcome[de-identified] Failed Passed      Not Ready for Weaning due to[de-identified] FiO2 >60%;PEEP > 8cmH2O;Underline problem not resolved --      Spont breathing trial % for 30 min: -- Yes              Physical Exam:   Respiratory Pattern: Assisted  Chest Assessment: Chest expansion symmetrical  Bilateral Breath Sounds: Coarse  O2 Device: (P) vent      Resp Comments: (P) Pt placed on SBT, pt appears comfortable will continue to monitor Ilda Sue PA-C   order for DME [employer]  5-15 CM/H20  NASAL AIRFIT N10 HER   Reported, Patient

## 2022-03-07 ENCOUNTER — PATIENT OUTREACH (OUTPATIENT)
Dept: NURSING | Facility: CLINIC | Age: 67
End: 2022-03-07
Payer: COMMERCIAL

## 2022-03-07 NOTE — PROGRESS NOTES
Clinic Care Coordination Contact    Community Health Worker Follow Up    Care Gaps:     Health Maintenance Due   Topic Date Due     MIGRAINE ACTION PLAN  Never done     ZOSTER IMMUNIZATION (1 of 2) Never done     Pneumococcal Vaccine: 65+ Years (1 of 1 - PPSV23) Never done     MAMMO SCREENING  10/10/2021     MEDICARE ANNUAL WELLNESS VISIT  03/11/2022     ALT  03/11/2022     BMP  03/11/2022     LIPID  03/11/2022     TSH W/FREE T4 REFLEX  03/11/2022     CBC  03/11/2022       Care Gaps Last addressed on 3/7/22-Medicare Annual Wellness Visit, Labs, and Mammo Screeening    Goals:   Goals Addressed as of 3/7/2022 at 11:31 AM                    Today    2/9/22       Patient Stated       1. Medical (pt-stated)   90%  90%    Added 10/12/20 by Damien Person RN      Goal Statement: I would like support and assistance to maintain my health and wellness in management of my chronic health conditions to prevent ER visits/hospital readmission.   Date Goal set: 10/12/20. Updated/modified: 08/25/2021 // 11/24/2021  Barriers: limited mobility related to knee pain, diagnosis of chronic, complex health conditions.   Strengths: motivated to reduce health risks  Date to Achieve By: 11/2021 // 5/2022  Patient expressed understanding of goal: Yes    Action steps to achieve this goal:  1. I will follow up with my providers as scheduled/recommended. (Primary Care Provider follow up recommended with labs 4/2022, Cardiology follow up recommended with EKG - Due 11/2021 TBD, ongoing Outpatient Physical Therapy   2. I will take my medications as prescribed.   3. I will follow a heart healthy diet (low-fat, low-salt)   4.   I will work toward regular exercise as able.  5. I will discuss, review, schedule and complete recommended overdue health maintenance with my provider.   6. I will consider completing a health care directive and provide a copy when completed to my care team.   7. I will notify my care team of any barriers, questions,  "concerns, additional resources needed.   8. I will use the clinic as a resource and I understand I can contact my clinic with 24/7 after hours services available.  9. Care coordinator will remain available as needed.     *ANNUAL ASSESSMENT DUE:  11/2022          Discussed:    Patient stated she can't move.  She is in extreme pain from her knees, and her hip is starting to hurt again.  Patient stated she is seeing her PCP tomorrow.  Patient stated her knees need to be replaced.  Patient stated she does not like to take any pain medication.      Patient stated she is \"frustrated\" that she is not able to move or do anything.  Patient still has unpacking to do, and she is tired of \"just sitting\".  Patient stated she has a high chair to sit on when washing the dishes.    Patient stated her activities are on hold, it is too far too walk.  Patient is not going to the [facility] for quilting.    Patient stated she is taking [employer] tomorrow.    Intervention and Education during outreach: CHW discussed with patient her care gaps.  CHW offered to assist patient in scheduling the Medicare Annual Wellness Visit, and labs needed.  Patient declined.  Patient stated she will ask her PCP if she can get labs tomorrow.  Patient stated she will call schedule the Medicare Visit herself.  CHW used Empathy and Active listening to understand the patients barriers and struggles.   CHW encouraged patient to contact CC if there are any other changes or resources needed.  Patient acknowledged understanding.      CHW gave patient the following meal delivery resources:  Meals on Wheels- [phone]  [employer]- [phone]    CHW Plan: will outreach in one month.    TRINIDAD Li  Clinic Care Coordination  [facility] Clinics: [place], [place], [facility], and [place] [facility]  Phone: [phone]  "

## 2022-03-08 ENCOUNTER — OFFICE VISIT (OUTPATIENT)
Dept: INTERNAL MEDICINE | Facility: CLINIC | Age: 67
End: 2022-03-08
Payer: COMMERCIAL

## 2022-03-08 ENCOUNTER — MEDICAL CORRESPONDENCE (OUTPATIENT)
Dept: INTERNAL MEDICINE | Facility: CLINIC | Age: 67
End: 2022-03-08

## 2022-03-08 VITALS
TEMPERATURE: 97.8 F | WEIGHT: 274 LBS | BODY MASS INDEX: 48.55 KG/M2 | OXYGEN SATURATION: 99 % | DIASTOLIC BLOOD PRESSURE: 80 MMHG | SYSTOLIC BLOOD PRESSURE: 128 MMHG | HEIGHT: 63 IN | RESPIRATION RATE: 16 BRPM | HEART RATE: 64 BPM

## 2022-03-08 DIAGNOSIS — E78.5 HYPERLIPIDEMIA LDL GOAL <100: ICD-10-CM

## 2022-03-08 DIAGNOSIS — M25.562 CHRONIC PAIN OF BOTH KNEES: Primary | ICD-10-CM

## 2022-03-08 DIAGNOSIS — E66.01 MORBID OBESITY, UNSPECIFIED OBESITY TYPE (H): ICD-10-CM

## 2022-03-08 DIAGNOSIS — M25.561 CHRONIC PAIN OF BOTH KNEES: Primary | ICD-10-CM

## 2022-03-08 DIAGNOSIS — E03.9 HYPOTHYROIDISM, UNSPECIFIED TYPE: ICD-10-CM

## 2022-03-08 DIAGNOSIS — G89.29 CHRONIC PAIN OF BOTH KNEES: Primary | ICD-10-CM

## 2022-03-08 DIAGNOSIS — R73.9 BLOOD GLUCOSE ELEVATED: ICD-10-CM

## 2022-03-08 PROCEDURE — 99214 OFFICE O/P EST MOD 30 MIN: CPT | Performed by: INTERNAL MEDICINE

## 2022-03-08 ASSESSMENT — ENCOUNTER SYMPTOMS: BACK PAIN: 1
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
## 2022-03-08 NOTE — PATIENT INSTRUCTIONS
Referral to [facility] to discuss knee replacement. They will call to schedule   Fasting labs and Xrays knees in the next 1 week., Will schedule later today   Possible Rybelsus medication or Metformin for weight loss and blood sugars once labs back   Reduce calorie/carbohydrate (sugar, bread, potato, pasta, rice, etc)  intake  in diet for weight loss, increase color on your plate with fruits and vegetables and meats and increase  frequency of walking or other aerobic exercise as able (goal is daily)   If Weight watchers covered by insurance, then start that also

## 2022-03-10 ENCOUNTER — TELEPHONE (OUTPATIENT)
Dept: INTERNAL MEDICINE | Facility: CLINIC | Age: 67
End: 2022-03-10
Payer: COMMERCIAL

## 2022-03-10 NOTE — TELEPHONE ENCOUNTER
----- Message from Yuval Jama MD sent at 3/9/2022 11:21 PM CST -----  Regarding: appt for lab and xray  Please call pt and get appts scheduled for this week still early next week before 3.15.22 for fasting lab tests and Xrays knees. Orders in chart. Pt has [employer] so ask her how long in advnace she needs to have to make [employer] appt

## 2022-03-14 ENCOUNTER — ANCILLARY PROCEDURE (OUTPATIENT)
Dept: GENERAL RADIOLOGY | Facility: CLINIC | Age: 67
End: 2022-03-14
Attending: INTERNAL MEDICINE
Payer: COMMERCIAL

## 2022-03-14 ENCOUNTER — LAB (OUTPATIENT)
Dept: LAB | Facility: CLINIC | Age: 67
End: 2022-03-14

## 2022-03-14 DIAGNOSIS — E78.5 HYPERLIPIDEMIA LDL GOAL <100: ICD-10-CM

## 2022-03-14 DIAGNOSIS — G89.29 CHRONIC PAIN OF BOTH KNEES: ICD-10-CM

## 2022-03-14 DIAGNOSIS — I48.0 PAROXYSMAL ATRIAL FIBRILLATION (H): ICD-10-CM

## 2022-03-14 DIAGNOSIS — Z79.899 ENCOUNTER FOR MONITORING FLECAINIDE THERAPY: Primary | ICD-10-CM

## 2022-03-14 DIAGNOSIS — M25.561 CHRONIC PAIN OF BOTH KNEES: ICD-10-CM

## 2022-03-14 DIAGNOSIS — E03.9 HYPOTHYROIDISM, UNSPECIFIED TYPE: ICD-10-CM

## 2022-03-14 DIAGNOSIS — R73.9 BLOOD GLUCOSE ELEVATED: ICD-10-CM

## 2022-03-14 DIAGNOSIS — M25.562 CHRONIC PAIN OF BOTH KNEES: ICD-10-CM

## 2022-03-14 DIAGNOSIS — I25.10 CORONARY ARTERY CALCIFICATION SEEN ON CAT SCAN: ICD-10-CM

## 2022-03-14 DIAGNOSIS — Z51.81 ENCOUNTER FOR MONITORING FLECAINIDE THERAPY: Primary | ICD-10-CM

## 2022-03-14 DIAGNOSIS — D72.829 LEUKOCYTOSIS, UNSPECIFIED TYPE: ICD-10-CM

## 2022-03-14 DIAGNOSIS — Z95.818 PRESENCE OF WATCHMAN LEFT ATRIAL APPENDAGE CLOSURE DEVICE: ICD-10-CM

## 2022-03-14 LAB
ALBUMIN SERPL-MCNC: 3.6 G/DL (ref 3.4–5)
ALP SERPL-CCNC: 100 U/L (ref 40–150)
ALT SERPL W P-5'-P-CCNC: 21 U/L (ref 0–50)
ANION GAP SERPL CALCULATED.3IONS-SCNC: 8 MMOL/L (ref 3–14)
AST SERPL W P-5'-P-CCNC: 12 U/L (ref 0–45)
BILIRUB SERPL-MCNC: 0.4 MG/DL (ref 0.2–1.3)
BUN SERPL-MCNC: 11 MG/DL (ref 7–30)
CALCIUM SERPL-MCNC: 9.2 MG/DL (ref 8.5–10.1)
CHLORIDE BLD-SCNC: 103 MMOL/L (ref 94–109)
CHOLEST SERPL-MCNC: 158 MG/DL
CO2 SERPL-SCNC: 27 MMOL/L (ref 20–32)
CREAT SERPL-MCNC: 0.8 MG/DL (ref 0.52–1.04)
ERYTHROCYTE [DISTWIDTH] IN BLOOD BY AUTOMATED COUNT: 16.4 % (ref 10–15)
FASTING STATUS PATIENT QL REPORTED: YES
GFR SERPL CREATININE-BSD FRML MDRD: 81 ML/MIN/1.73M2
GLUCOSE BLD-MCNC: 129 MG/DL (ref 70–99)
HBA1C MFR BLD: 6.6 % (ref 0–5.6)
HCT VFR BLD AUTO: 40.3 % (ref 35–47)
HDLC SERPL-MCNC: 64 MG/DL
HGB BLD-MCNC: 12.3 G/DL (ref 11.7–15.7)
LDLC SERPL CALC-MCNC: 48 MG/DL
MCH RBC QN AUTO: 26.8 PG (ref 26.5–33)
MCHC RBC AUTO-ENTMCNC: 30.5 G/DL (ref 31.5–36.5)
MCV RBC AUTO: 88 FL (ref 78–100)
NONHDLC SERPL-MCNC: 94 MG/DL
PLATELET # BLD AUTO: 418 10E3/UL (ref 150–450)
POTASSIUM BLD-SCNC: 3.9 MMOL/L (ref 3.4–5.3)
PROT SERPL-MCNC: 7.6 G/DL (ref 6.8–8.8)
RBC # BLD AUTO: 4.59 10E6/UL (ref 3.8–5.2)
SODIUM SERPL-SCNC: 138 MMOL/L (ref 133–144)
TRIGL SERPL-MCNC: 229 MG/DL
TSH SERPL DL<=0.005 MIU/L-ACNC: 1.35 MU/L (ref 0.4–4)
WBC # BLD AUTO: 9.3 10E3/UL (ref 4–11)

## 2022-03-14 PROCEDURE — 36415 COLL VENOUS BLD VENIPUNCTURE: CPT

## 2022-03-14 PROCEDURE — 83036 HEMOGLOBIN GLYCOSYLATED A1C: CPT

## 2022-03-14 PROCEDURE — 84443 ASSAY THYROID STIM HORMONE: CPT

## 2022-03-14 PROCEDURE — 80053 COMPREHEN METABOLIC PANEL: CPT

## 2022-03-14 PROCEDURE — 80061 LIPID PANEL: CPT

## 2022-03-14 PROCEDURE — 85027 COMPLETE CBC AUTOMATED: CPT

## 2022-03-14 PROCEDURE — 73565 X-RAY EXAM OF KNEES: CPT | Performed by: RADIOLOGY

## 2022-03-15 ENCOUNTER — OFFICE VISIT (OUTPATIENT)
Dept: ORTHOPEDICS | Facility: CLINIC | Age: 67
End: 2022-03-15
Attending: INTERNAL MEDICINE
Payer: COMMERCIAL

## 2022-03-15 ENCOUNTER — ANCILLARY PROCEDURE (OUTPATIENT)
Dept: GENERAL RADIOLOGY | Facility: CLINIC | Age: 67
End: 2022-03-15
Attending: ORTHOPAEDIC SURGERY
Payer: COMMERCIAL

## 2022-03-15 VITALS
SYSTOLIC BLOOD PRESSURE: 128 MMHG | WEIGHT: 274 LBS | HEIGHT: 63 IN | DIASTOLIC BLOOD PRESSURE: 76 MMHG | BODY MASS INDEX: 48.55 KG/M2

## 2022-03-15 DIAGNOSIS — G89.29 CHRONIC PAIN OF BOTH KNEES: ICD-10-CM

## 2022-03-15 DIAGNOSIS — M17.11 PRIMARY OSTEOARTHRITIS OF RIGHT KNEE: ICD-10-CM

## 2022-03-15 DIAGNOSIS — M25.561 CHRONIC PAIN OF BOTH KNEES: ICD-10-CM

## 2022-03-15 DIAGNOSIS — M25.562 CHRONIC PAIN OF BOTH KNEES: ICD-10-CM

## 2022-03-15 DIAGNOSIS — E66.01 MORBID OBESITY (H): ICD-10-CM

## 2022-03-15 DIAGNOSIS — M17.12 PRIMARY OSTEOARTHRITIS OF LEFT KNEE: Primary | ICD-10-CM

## 2022-03-15 PROCEDURE — 99203 OFFICE O/P NEW LOW 30 MIN: CPT | Performed by: ORTHOPAEDIC SURGERY

## 2022-03-15 PROCEDURE — 73560 X-RAY EXAM OF KNEE 1 OR 2: CPT | Mod: LT | Performed by: RADIOLOGY

## 2022-03-15 ASSESSMENT — KOOS JR
RISING FROM SITTING: SEVERE
GOING UP OR DOWN STAIRS: SEVERE
TWISING OR PIVOTING ON KNEE: EXTREME
BENDING TO THE FLOOR TO PICK UP OBJECT: SEVERE
HOW SEVERE IS YOUR KNEE STIFFNESS AFTER FIRST WAKING IN MORNING: MILD
STANDING UPRIGHT: MODERATE
STRAIGHTENING KNEE FULLY: MODERATE
KOOS JR SCORING: 42.28

## 2022-03-15 ASSESSMENT — PATIENT HEALTH QUESTIONNAIRE - PHQ9: SUM OF ALL RESPONSES TO PHQ QUESTIONS 1-9: 2

## 2022-03-15 NOTE — PROGRESS NOTES
S: Patient is a pleasant 66 year old female seen today in consultation regarding bilateral knee pain.  She reports the left knee is more painful than the right.  Onset of symptoms left knee pain progressively worsening over the last couple years, no injury or trauma.  She reports the right knee more recently has started to become bothersome.  She reports increased pain with moving from sit to stand. She reports pain at nighttime, and will have severe pain after knee is resting in an extended position, she will have difficulties flexing the knee. She reports limited activities due to knee pain, difficulties with ADL's. She has tried the following therapies to improve her pain Voltaren gel, Tylenol, heat. Increased pain with use of ice. She uses 4 wheel walker for ambulation.  Current pain level: 5/10, worst pain level: 10/10.  Patient currently retired.         Patient Active Problem List   Diagnosis     Osteoarthritis     TMJ disease     Health Care Home     Hypothyroidism     Morbid obesity, unspecified obesity type (H)     Migraine without aura and without status migrainosus, not intractable     Essential hypertension     NELY on CPAP     RLS (restless legs syndrome)     Gastroesophageal reflux disease, esophagitis presence not specified     Iron deficiency     Chronic pain of left knee     Lumbar radiculopathy     Paroxysmal atrial fibrillation (H)     Pulmonary nodules     Hyperlipidemia LDL goal <100     Mild depression (H)            Past Medical History:   Diagnosis Date     Depression       Duodenal ulcer 03/04/2019     Essential hypertension       GERD (gastroesophageal reflux disease)      Hiatal hernia      Hypothyroidism      Migraine without aura and without status migrainosus, not intractable  Aug 2016     Obesity      NELY on CPAP      Osteoarthritis      Paroxysmal atrial fibrillation (H) 01/05/2015     RLS (restless legs syndrome)      Symptomatic menopausal or female climacteric states (aka FLASHES)       TMJ disease             Past Surgical History:   Procedure Laterality Date     APPENDECTOMY       CHOLECYSTECTOMY       COLONOSCOPY N/A 2019    Procedure: COLONOSCOPY;  Surgeon: Vahid Cardona MD;  Location:  GI     EP LEFT ATRIAL APPENDAGE CLOSURE N/A 10/8/2020    Procedure: EP Left Atrial Appendage Closure;  Surgeon: Cnostantin Vaughn MD;  Location:  HEART CARDIAC CATH LAB     ESOPHAGOSCOPY, GASTROSCOPY, DUODENOSCOPY (EGD), COMBINED N/A 2019    Procedure: COMBINED ESOPHAGOSCOPY, GASTROSCOPY, DUODENOSCOPY (EGD);  Surgeon: Ben Ko MD;  Location:  GI     HYSTERECTOMY, DOM  1999     TONSILLECTOMY              Social History     Tobacco Use     Smoking status: Former Smoker     Packs/day: 0.25     Years: 1.00     Pack years: 0.25     Types: Cigarettes     Start date:      Quit date:      Years since quittin.2     Smokeless tobacco: Never Used     Tobacco comment: very minimal smoking history   Substance Use Topics     Alcohol use: No     Alcohol/week: 0.0 standard drinks            Family History   Problem Relation Age of Onset     Myocardial Infarction Mother      Heart Failure Mother      Heart Surgery Father         bypass surgery     Cerebrovascular Disease Father      Heart Surgery Brother      Hyperlipidemia Brother      Hyperlipidemia Sister      Hyperlipidemia Brother      Sleep Apnea Sister                Allergies   Allergen Reactions     Morphine Anaphylaxis     Ceclor [Cefaclor] Hives     Cymbalta      Foggy head feeling     Diltiazem Hives     Lisinopril Cough     Sertraline Nausea and Vomiting            Current Outpatient Medications   Medication Sig Dispense Refill     acetaminophen (TYLENOL) 500 MG tablet Take 500-1,000 mg by mouth 3 times daily as needed for mild pain       aspirin (ASA) 81 MG EC tablet Take 1 tablet (81 mg) by mouth daily       diphenhydrAMINE HCl (ALLERGY MEDICATION PO)        flecainide (TAMBOCOR) 150 MG tablet Take 1  tablet (150 mg) by mouth every 12 hours 180 tablet 3     gabapentin (NEURONTIN) 300 MG capsule TAKE 3 CAPSULES BY MOUTH AT BEDTIME 270 capsule 3     levothyroxine (SYNTHROID/LEVOTHROID) 137 MCG tablet TAKE 1 TABLET (137 MCG) BY MOUTH DAILY 90 tablet 3     losartan (COZAAR) 100 MG tablet TAKE 1 TABLET BY MOUTH DAILY 90 tablet 3     metoprolol succinate ER (TOPROL-XL) 25 MG 24 hr tablet Take 0.5 tablets (12.5 mg) by mouth daily Please call for an appointment with Monika Sue. [phone] 45 tablet 1     order for DME DREAMSTATION  5-15 CM/H20  NASAL AIRFIT N10 HER       pantoprazole (PROTONIX) 40 MG EC tablet Take 1 tablet (40 mg) by mouth daily 90 tablet 3     rosuvastatin (CRESTOR) 10 MG tablet Take 1 tablet (10 mg) by mouth daily 90 tablet 3     SUMAtriptan (IMITREX) 100 MG tablet Take 1 tablet (100 mg) by mouth at onset of headache for migraine May repeat in 2 hours if needed: max 2/day; 10 tablet 11     triamterene-HCTZ (MAXZIDE-25) 37.5-25 MG tablet TAKE 1 TABLET BY MOUTH DAILY 90 tablet 3          Review Of Systems  Skin: negative  Eyes: negative  Ears/Nose/Throat: negative  Respiratory: No shortness of breath, dyspnea on exertion, cough, or hemoptysis    O: Physical Exam:  Pain to palpation each knee medial joint line.  Adequate knee flexion and extension.  CMS intact to each lower extremity.  Varus along longitudinal axis each knee.    Lab:Inflammatory markers and arthritic profile not available, triglycerides 229, HgbA1C 6.6    Images:  IMPRESSION:  1.  Right knee: Moderate degenerative arthrosis. Moderate marginal  osteophytosis in the patellofemoral compartment. Moderate size joint  effusion. No fracture.  2.  Left knee: Advanced degenerative arthrosis. Advanced marginal  osteophytosis in the patellofemoral compartment. Small joint effusion.  No fracture.    Moderate to severe OA medial compartment each knee         A: Arthropathy each knee    P: obtain inflammatory markers and arthritic profile.  Consider  joint injection, bracing, PT.  Discussed and patient will let us know how she'd like to proceed.  See back 3-4 weeks.           In addition to the above assessment and plan each active problem on Maxine's problem list was evaluated today. This included the questioning of Maxine for any medication problems. We will continue the current treatment plan for these active problems except as noted.

## 2022-03-15 NOTE — PHARMACY-ADMISSION MEDICATION HISTORY
Admission medication history interview status for the 4/10/2019  admission is complete. See EPIC admission navigator for prior to admission medications     Medication history source reliability:Good    Actions taken by pharmacist (provider contacted, etc):None     Additional medication history information not noted on PTA med list :None    Medication reconciliation/reorder completed by provider prior to medication history? No    Time spent in this activity: 15 min    Prior to Admission medications    Medication Sig Last Dose Taking? Auth Provider   atenolol (TENORMIN) 50 MG tablet Take 1 tablet (50 mg) by mouth daily 4/10/2019 at Unknown time Yes Yuval Jama MD   ferrous fumarate 65 mg, [place]. FE,-Vitamin C 125 mg (VITRON-C)  MG TABS tablet Take 1 tablet by mouth daily 4/10/2019 at Unknown time Yes Yuval Jama MD   flecainide acetate 150 MG TABS Take 1/2 tab (75 mg) twice daily by mouth 4/10/2019 at Unknown time Yes Yuval Jama MD   gabapentin (NEURONTIN) 300 MG capsule Take 600 mg by mouth At Bedtime 2 capsules 4/9/2019 at Unknown time Yes Unknown, Entered By History   levothyroxine (SYNTHROID/LEVOTHROID) 75 MCG tablet 1 tab daily in addition to Levothyroxine 88mcg daily so total daily dose = 163mcg daily 4/10/2019 at Unknown time Yes Yuval Jama MD   levothyroxine (SYNTHROID/LEVOTHROID) 88 MCG tablet 1 tab daily in addition to Levothyroxine 75mcg daily so total daily dose = 163mcg daily 4/10/2019 at Unknown time Yes Yuval Jama MD   losartan (COZAAR) 100 MG tablet Take 1 tablet (100 mg) by mouth daily 4/10/2019 at Unknown time Yes Zachary Sal MD   pantoprazole (PROTONIX) 40 MG EC tablet Take 1 tablet (40 mg) by mouth 2 times daily (before meals) 4/10/2019 at Unknown time Yes Zachary Sal MD   SUMAtriptan (IMITREX) 100 MG tablet Take 1 tablet (100 mg) by mouth at onset of headache for migraine May repeat in 2 hours if needed: max 2/day; Past Month at Unknown time Yes Yuval Jama MD  March 15, 2022       Heladio Beatty MD  [address] [facility] [address]  Via Fax: [phone]      Patient: Jodie Oneill   YOB: 2005   Date of Visit: 3/15/2022       Dear Dr. Beatty:    I saw your patient, Jodie Oneill, for an evaluation. Below are my notes for this visit with her.    If you have questions, please do not hesitate to call me.      Sincerely,        Liz Nuñez MD        CC: No Recipients  Liz Nuñez MD  3/15/2022 10:36 AM  Signed  PEDIATRIC NEUROLOGY   NEW CONSULT NOTE      Patient: Jodie Oneill Date of Service: 2019   : 2005 MRN: [id number]     SUBJECTIVE:   March 15, 2022  Done with cognitive therapy     Headaches are ok, none reported except with some triggers   Topamax - no side effects reported     Sleep is ok  School is good     2021  Interim was hospitalized for headaches for 2 days   Mother had tried Tylenol, Caffeine, Ibuprofen without relief   MRI was repeated   ER got IV cocktail   Home on Topamax 50 mg at bedtime only   No side effects reported     Sleep is ok  School is ok     2021  No seizures reported in the interim   No side effects with Oxcarbazepine 600 mg bid    Occasional headaches good with Tylenol    Sleep is ok  School is good     Dec 14, 2020  No headaches are seizures reported   Has been seizure free since 2019    Remote learning is ok  Sleep is good besides some dreams   Screen time - eyes ?     Aug 12, 2020  No spells reported   Has been good with the current dose of Oxcarbazepine without reportable side effects   Last seizure     Sleep - snoring and mouth breathing   Recommended to start with CPAP     2020  Has remained good   Last seizure   Headaches have been under control as well    2019   Interim MRI brain in August - throat mass increased in size ( from 2019)  No symptoms reported in that regard   Did see ENT, Hematology - mother reports as being ok     No    order for DME DREAMSTATION  5-15 CM/H20  NASAL AIRFIT N10 HER   Reported, Patient          headaches and no seizures (last seizure Jan 2019 )  Sleep - occasional snoring and no apneas       August 8, 2019  Headaches -sides, sharp, pressure, nausea without vomiting, no photo or phonophobia, few days - some help with Tylenol and motrin   No seizures reported   Has been stable on Oxcarbazepine   Interim EEG remains abnormal     May 20, 2019   No seizures reported   No side effects with Oxcarbazepine   School is fine   Sleep is good     HISTORY OF PRESENT ILLNESS:  Jodie Oneill is a 13 year old female who presents today as hospital f/u  NMDA receptor encephalitis  Seizures  - on Oxcarbazepine   Headaches     EEG was reported normal  MRI - findings ? Intracranial hypotension  MRA/MRV - were unremarkable     Since going home   No seizures reported   Behaviorally is so - so per mother   On a steroid taper - now at 40 mg a day     No history of head injury with loc    Hospitalization - recently for the above   Born normal  Developmentally - appropriate   Sleep - ok  Family history - none significant   School - special ed - this school year     PAST MEDICAL HISTORY:  Past Medical History:   Diagnosis Date   • Head ache    • Seizures (CMS/HCC)        MEDICATIONS:  Current Outpatient Medications   Medication Sig   • ergocalciferol (DRISDOL) 71315 units capsule Take 50,000 Units by mouth 1 day a week.   • famotidine (PEPCID) 20 MG tablet Take 20 mg by mouth 2 times daily.   • levothyroxine (SYNTHROID, LEVOTHROID) 25 MCG tablet Take 25 mcg by mouth daily. 1 tab QOD   • levothyroxine (SYNTHROID, LEVOTHROID) 75 MCG tablet Take 75 mcg by mouth daily. 1/2 tab QOD   • melatonin 3 MG Take by mouth nightly. 1 tab QHS   • OXcarbazepine (TRILEPTAL) 300 MG/5ML suspension Take by mouth 2 times daily. 6.7 ml BID   • PREDNISONE PO    • DIAZepam (DIASTAT ACUDIAL) 20 MG rectal gel 20 mg prn for seizure for 5 minutes (Patient taking differently: 20 mg prn for seizure for 2 minutes)     No current facility-administered medications  for this visit.        ALLERGIES:  ALLERGIES:  No Known Allergies    PAST SURGICAL HISTORY:  History reviewed. No pertinent surgical history.    FAMILY HISTORY:  Family History   Problem Relation Age of Onset   • Depression Mother    • Hypertension Mother    • Diabetes Paternal Grandmother    • Neuropathy Maternal Grandfather    • Diabetes Maternal Grandfather    • Diabetes Paternal Grandfather        SOCIAL HISTORY:  Social History     Tobacco Use   • Smoking status: Never Smoker   • Smokeless tobacco: Never Used   Substance Use Topics   • Alcohol use: Not on file   • Drug use: Not on file       Review of Systems   Constitutional: Negative for activity change, appetite change and fever.   HENT: Negative for congestion, ear discharge, facial swelling, tinnitus and trouble swallowing.    Eyes: Negative for redness and visual disturbance.   Respiratory: Negative for apnea and shortness of breath.    Gastrointestinal: Negative for abdominal distention, diarrhea and vomiting.   Endocrine:        Normal stature   Genitourinary: Negative for hematuria.   Musculoskeletal: Negative for gait problem, myalgias and neck stiffness.   Skin: Negative for color change, pallor and rash.   Allergic/Immunologic: Negative for food allergies.   Neurological: Negative for dizziness, tremors, seizures, syncope, facial asymmetry, speech difficulty, weakness, light-headedness, numbness and headaches.   Psychiatric/Behavioral: Negative for agitation, behavioral problems, decreased concentration and sleep disturbance. The patient is not nervous/anxious and is not hyperactive.          OBJECTIVE:     Physical Exam   Constitutional: She appears well-developed.   HENT:   Head: Atraumatic.   Right Ear: External ear normal.   Left Ear: External ear normal.   Nose: Nose normal.   Mouth/Throat: Oropharynx is clear and moist. No oropharyngeal exudate.   Eyes: Conjunctivae and EOM are normal. Pupils are equal, round, and reactive to light. Right eye  exhibits no discharge. Left eye exhibits no discharge.   Neck: Neck supple.   Cardiovascular:   No murmur heard.  Pulmonary/Chest: Breath sounds normal.   Abdominal: Soft.   Musculoskeletal: Normal range of motion. She exhibits no edema, tenderness or deformity.        Neurological:     Awake and interactive  Symmetric face   EOMI, MISAEL   Tone and dtr's intact   FNF - intact   Gait - unremarkable      Skin: Skin is warm. No rash noted.   Psychiatric: She has a normal mood and affect.   Nursing note and vitals reviewed.      Visit Vitals  /72 (BP Location: [facility], Patient Position: Sitting, Cuff Size: Large Adult)   Pulse 90   Ht 5' 5.55\" (1.665 m)   Wt (!) 103 kg (227 lb 1.2 oz)   BMI 37.15 kg/m²       DIAGNOSTIC STUDIES:   LAB RESULTS:    Lab Results Reviewed and Diagnostic Data Reviewed    Assessment AND PLAN:   March 15, 2022  Has remained stable   Doing fine     Options discussed   May taper Topamax to 25 mg at bedtime for a week then discontinued    Symptomatic headache management was discussed     Nov 16, 2021  Interim hospitalization for headaches   Topamax was restarted     Previously was on it in April for a month     Symptomatic management of headaches was also discussed     May 18, 2021  Interim was hospitalized at [facility] then transferred to [facility]   Received 2 days of IVIG ( no Rituximab )    Has been seizure free and off of Oxcarbazepine since Feb of 2021 after an EEG with irregular sharps     Headaches have been under control as well on Topamax 50 mg at bedtime   This is to be continued as well for now     Sleep is better after removal of tonsils   Not using CPAP now     Feb 16, 2021  Has remained stable on Oxcarbazepine at the current dose   Now about 2 years seizure free   Interim EEG in Jan of 2021 - was mildly abnormal   Results discussed   Options reviewed   Started on a trial of Oxcarbazepine wean   Risk of seizures reviewed besides the use of Nayzilam    Sleep - encouraged  to use the CPAP   Weight loss was reviewed again   Is also scheduled to undergo removal of t/a with ENT     Feb 12, 2020  Has remained stable   On Oxcarbazepine at the current without reportable side effects   Just about a year since the last seizure in Jan of 2019   Headaches were not reported     Nov 11, 2019   Interim history reviewed   [place]-pharyngeal mass as reported in the brain MRI from august 2019 was discussed and explained   Strongly recommended to f/u with ENT     Initial visit   NMDA receptor antibody encephalitis   Seizures and headaches     Since going home   Has remained seizure free on Oxcarbazepine - now changed to a tablet of 600 mg bid -     On steroid taper - when done to f/u with Dr Siddiqi - Rheumatology    No Follow-up on file.    Instructions provided as documented in the AVS.    Patient Discussion:.   I have counseled the family extensively regarding the nature of the patient's difficulties, course, prognosis, plan, recommendations and outcome. The family will keep me informed of the patient's progress.     The patient and mother indicated understanding of the diagnosis and agreed with the plan of care.    Liz Nuñez MD

## 2022-03-15 NOTE — LETTER
3/15/2022         RE: Maxine Sears  [address]  [place] [address]        Dear Colleague,    Thank you for referring your patient, Maxine Sears, to the [facility] [place]. Please see a copy of my visit note below.    S: Patient is a pleasant 66 year old female seen today in consultation regarding bilateral knee pain.  She reports the left knee is more painful than the right.  Onset of symptoms left knee pain progressively worsening over the last couple years, no injury or trauma.  She reports the right knee more recently has started to become bothersome.  She reports increased pain with moving from sit to stand. She reports pain at nighttime, and will have severe pain after knee is resting in an extended position, she will have difficulties flexing the knee. She reports limited activities due to knee pain, difficulties with ADL's. She has tried the following therapies to improve her pain Voltaren gel, Tylenol, heat. Increased pain with use of ice. She uses 4 wheel walker for ambulation.  Current pain level: 5/10, worst pain level: 10/10.  Patient currently retired.         Patient Active Problem List   Diagnosis     Osteoarthritis     TMJ disease     Health Care Home     Hypothyroidism     Morbid obesity, unspecified obesity type (H)     Migraine without aura and without status migrainosus, not intractable     Essential hypertension     NELY on CPAP     RLS (restless legs syndrome)     Gastroesophageal reflux disease, esophagitis presence not specified     Iron deficiency     Chronic pain of left knee     Lumbar radiculopathy     Paroxysmal atrial fibrillation (H)     Pulmonary nodules     Hyperlipidemia LDL goal <100     Mild depression (H)            Past Medical History:   Diagnosis Date     Depression       Duodenal ulcer 03/04/2019     Essential hypertension       GERD (gastroesophageal reflux disease)      Hiatal hernia      Hypothyroidism      Migraine  without aura and without status migrainosus, not intractable  Aug 2016     Obesity      NELY on CPAP      Osteoarthritis      Paroxysmal atrial fibrillation (H) 2015     RLS (restless legs syndrome)      Symptomatic menopausal or female climacteric states (aka FLASHES)      TMJ disease             Past Surgical History:   Procedure Laterality Date     APPENDECTOMY       CHOLECYSTECTOMY       COLONOSCOPY N/A 2019    Procedure: COLONOSCOPY;  Surgeon: Vahid Cardona MD;  Location:  GI     EP LEFT ATRIAL APPENDAGE CLOSURE N/A 10/8/2020    Procedure: EP Left Atrial Appendage Closure;  Surgeon: Constantin Vaughn MD;  Location:  HEART CARDIAC CATH LAB     ESOPHAGOSCOPY, GASTROSCOPY, DUODENOSCOPY (EGD), COMBINED N/A 2019    Procedure: COMBINED ESOPHAGOSCOPY, GASTROSCOPY, DUODENOSCOPY (EGD);  Surgeon: Ben Ko MD;  Location:  GI     HYSTERECTOMY, DOM  1999     TONSILLECTOMY              Social History     Tobacco Use     Smoking status: Former Smoker     Packs/day: 0.25     Years: 1.00     Pack years: 0.25     Types: Cigarettes     Start date:      Quit date:      Years since quittin.2     Smokeless tobacco: Never Used     Tobacco comment: very minimal smoking history   Substance Use Topics     Alcohol use: No     Alcohol/week: 0.0 standard drinks            Family History   Problem Relation Age of Onset     Myocardial Infarction Mother      Heart Failure Mother      Heart Surgery Father         bypass surgery     Cerebrovascular Disease Father      Heart Surgery Brother      Hyperlipidemia Brother      Hyperlipidemia Sister      Hyperlipidemia Brother      Sleep Apnea Sister                Allergies   Allergen Reactions     Morphine Anaphylaxis     Ceclor [Cefaclor] Hives     Cymbalta      Foggy head feeling     Diltiazem Hives     Lisinopril Cough     Sertraline Nausea and Vomiting            Current Outpatient Medications   Medication Sig Dispense Refill      acetaminophen (TYLENOL) 500 MG tablet Take 500-1,000 mg by mouth 3 times daily as needed for mild pain       aspirin (ASA) 81 MG EC tablet Take 1 tablet (81 mg) by mouth daily       diphenhydrAMINE HCl (ALLERGY MEDICATION PO)        flecainide (TAMBOCOR) 150 MG tablet Take 1 tablet (150 mg) by mouth every 12 hours 180 tablet 3     gabapentin (NEURONTIN) 300 MG capsule TAKE 3 CAPSULES BY MOUTH AT BEDTIME 270 capsule 3     levothyroxine (SYNTHROID/LEVOTHROID) 137 MCG tablet TAKE 1 TABLET (137 MCG) BY MOUTH DAILY 90 tablet 3     losartan (COZAAR) 100 MG tablet TAKE 1 TABLET BY MOUTH DAILY 90 tablet 3     metoprolol succinate ER (TOPROL-XL) 25 MG 24 hr tablet Take 0.5 tablets (12.5 mg) by mouth daily Please call for an appointment with Monika Sue. [phone] 45 tablet 1     order for DME DREAMSTATION  5-15 CM/H20  NASAL AIRFIT N10 HER       pantoprazole (PROTONIX) 40 MG EC tablet Take 1 tablet (40 mg) by mouth daily 90 tablet 3     rosuvastatin (CRESTOR) 10 MG tablet Take 1 tablet (10 mg) by mouth daily 90 tablet 3     SUMAtriptan (IMITREX) 100 MG tablet Take 1 tablet (100 mg) by mouth at onset of headache for migraine May repeat in 2 hours if needed: max 2/day; 10 tablet 11     triamterene-HCTZ (MAXZIDE-25) 37.5-25 MG tablet TAKE 1 TABLET BY MOUTH DAILY 90 tablet 3          Review Of Systems  Skin: negative  Eyes: negative  Ears/Nose/Throat: negative  Respiratory: No shortness of breath, dyspnea on exertion, cough, or hemoptysis    O: Physical Exam:  Pain to palpation each knee medial joint line.  Adequate knee flexion and extension.  CMS intact to each lower extremity.  Varus along longitudinal axis each knee.    Lab:Inflammatory markers and arthritic profile not available, triglycerides 229, HgbA1C 6.6    Images:  IMPRESSION:  1.  Right knee: Moderate degenerative arthrosis. Moderate marginal  osteophytosis in the patellofemoral compartment. Moderate size joint  effusion. No fracture.  2.  Left knee: Advanced  degenerative arthrosis. Advanced marginal  osteophytosis in the patellofemoral compartment. Small joint effusion.  No fracture.    Moderate to severe OA medial compartment each knee         A: Arthropathy each knee    P: obtain inflammatory markers and arthritic profile.  Consider joint injection, bracing, PT.  Discussed and patient will let us know how she'd like to proceed.  See back 3-4 weeks.           In addition to the above assessment and plan each active problem on Maxine's problem list was evaluated today. This included the questioning of Maxine for any medication problems. We will continue the current treatment plan for these active problems except as noted.        Again, thank you for allowing me to participate in the care of your patient.        Sincerely,        CELSO JARAMILLO MD

## 2022-03-15 NOTE — PATIENT INSTRUCTIONS
Thank you for choosing [facility]    Dr. De León Locations:    [facility] [address], [address]  [place], [place] [id number] [place] [address]   Appointments: [phone] Appointments: [phone]   Fax: [phone] Fax: [phone]     Dr. De León contact: [email or web address] - Please email with any questions.     Follow up: 1 month after completion of lab work.    Please call [phone] to schedule your follow up appointment.     Orders for Labs has been placed. You can go to your [facility] location that has a lab. Please call your [facility] to schedule a lab appointment. [facility] has a walk-in hospital lab for your convenience.

## 2022-03-22 NOTE — PROGRESS NOTES
"[facility]    I had the pleasure of seeing Roxanna when she came for follow up of Watchman device.  This 66 year old sees Dr. Vaughn for her history of:    1. Paroxysmal AFib first diagnosed 9/2015.  She is undergone DC cardioversion, most recently 2/2019.  Hospitalized 1/2020 and flecainide increased.  2. GI bleeds. Had Hgb 7.2 g/dL 3/2019. EGD revealed duodenal ulcer which was cauterized.  Xarelto was held, but then later restarted.  She was rehospitalized 4/2019 with a lower GI bleed.  Hemoglobin got down to 8.7 g/dL.  Xarelto was discontinued, and Dr. Vaughn recommended not restarting this for at least 3-6 months. Capsule endoscopy 5/2019 showed normal mucosa but did not reach the colon (Dr. Ko). We restarted Xarelto 20 mg daily 10/2019 but Hgb dropped >1 gram in ~1 month. Had been kept off of AC despite CHADSVASc 2 (HTN, sex). Now s/p 27 mm LAAO Watchman 10/8/2020 with Dr. Vaughn and Dr. Toth  3. HTN  4. Coronary Calcification noted on CTA Heart 9/2020. Stress Test 3/2016 without ischemia  5. Pulmonary Nodules noted on CTA Heart 9/2020 - low risk - no Follow-up; high risk - optional 12 m CT follow-up. Roxanna states she considers herself low risk as smoked for <6 months    I saw Roxanna last in 6/2021 at which time she was doing well 6 months post Watchman procedure (done 1/8/2020). I reduced her Crestor as LDL was only 24 mg/dL & stopped Plavix per protocol. She was to see us back 11/2021 with updated labs and EKG but we're just seeing her back now.    Interval History:  From a cardiac standpoint she feels she's doing well.     Biggest issues is L knee pain, noting it's \"bone on bone.\" She uses a wheeled walker. She's been recommended to lose weight, which she feels is extra difficult as she's not able to exercise at all.      No CP, SOB. No edema (despite knee arthritis).  Does not think she's had kaelyn AFib while on the flecainide and the metoprolol. Nothing prolonged. No palpitations, " "dizziness.     VITALS:  Vitals: /81 (BP Location: Right arm, Cuff Size: Adult Large)   Pulse 62   Ht 1.6 m (5' 3\")   Wt 124 kg (273 lb 6.4 oz)   BMI 48.43 kg/m      Diagnostic Testing:  EKG today on flecaindie 150 mg BID and metoprolol XL 12.5 mg daily which I overread, showed SR 60 bpm. QRS duration 110 ms  FRIDA (11/20/2020): Normal LV function.  EF estimated 55-60%.  The Watchman device was well-seated with adequate compression and minimal leak.  FRIDA 10/8/2020 with nl LVEF, nl RVEF, mild dilation of LA. Trace valve abnls  CT A Heart 9/8/2020 with normal aorta. Coronary calcification noted. Non-cardiac portion with 4 mm non-calcified medial RML nodule, indeterminate 4 mm RUL nodule anterior and 3 mm lingular nodule noted. Low risk - no follow-up needed; High-risk - optional CT follow-up 12 m  Echocardiogam 1/2020 showed EF 55-60%. No RWMA. RV OK. Mild MAC. Trace TR.   Echocardiogram 7/2017 showed an EF of 55-60% with borderline LVH.  LA was mildly dilated at 4.6 cm with a volume index of 24.2 mL/m .  She was noted to have a dilated IVC with elevated right atrial pressure at 15-20.  No significant valvular abnormalities were noted.  Nuclear stress test 3/2016 was negative for ischemia  Component      Latest Ref Rng & Units 10/8/2020 3/11/2021 3/14/2022   Cholesterol      <200 mg/dL 217 (H) 128 158   Triglycerides      <150 mg/dL 325 (H) 201 (H) 229 (H)   HDL Cholesterol      >=50 mg/dL 46 (L) 64 64   LDL Cholesterol Calculated      <=100 mg/dL 106 (H) 24 48   Non HDL Cholesterol      <130 mg/dL 171 (H) 64 94     Component      Latest Ref Rng & Units 3/11/2021 3/14/2022   WBC      4.0 - 11.0 10e3/uL 11.3 (H) 9.3   RBC Count      3.80 - 5.20 10e6/uL 4.55 4.59   Hemoglobin      11.7 - 15.7 g/dL 12.2 12.3   Hematocrit      35.0 - 47.0 % 39.9 40.3   MCV      78 - 100 fL 88 88   MCH      26.5 - 33.0 pg 26.8 26.8   MCHC      31.5 - 36.5 g/dL 30.6 (L) 30.5 (L)   RDW      10.0 - 15.0 % 15.5 (H) 16.4 (H)   Platelet " Count      150 - 450 10e3/uL 427 418     Component      Latest Ref Rng & Units 3/11/2021 3/14/2022   Sodium      133 - 144 mmol/L 137 138   Potassium      3.4 - 5.3 mmol/L 4.0 3.9   Chloride      94 - 109 mmol/L 103 103   Carbon Dioxide      20 - 32 mmol/L 27 27   Anion Gap      3 - 14 mmol/L 7 8   Glucose      70 - 99 mg/dL 100 (H) 129 (H)   Urea Nitrogen      7 - 30 mg/dL 12 11   Creatinine      0.52 - 1.04 mg/dL 0.90 0.80   GFR Estimate      >60 mL/min/1.73m2 67 81   GFR Estimate If Black      >60 mL/min/1.73:m2 78    Calcium      8.5 - 10.1 mg/dL 9.5 9.2     Plan:  Annual follow-up with annual EKG    Assessment/Plan:    1. Paroxysmal AFib; high HASBLED Score    Remains on flecainide 150 mg BID and Metoprolol XL 12.5 mg daily with excellent results    S/p 27 mm Watchman placement 10/2020 and on ASA only    CBC 3/2022 showed stable Hgb 12.3 g/dL    PLAN:    Continue ASA only    EKG and follow-up in 1 year    2. HTN    On Maxzide 37.5/25, losartan 100 daily and metoprolol Xl 12.5 mg daily    BPs look good    BMP as above    PLAN:    Continue current meds    3. Dyslipidemia    Her LDL is more appropriate as of 3/2022 after reducing Crestor therapy from 20 to 10 mg daily    PLAN:    Continue Crestor 10 mg daily        Monika Sue PA-C, MSPAS      Orders Placed This Encounter   Procedures     Follow-Up with Cardiology ANALY     EKG 12-lead complete w/read - Clinics (performed today)     EKG 12-lead complete w/read - Clinics (to be scheduled)     Orders Placed This Encounter   Medications     metoprolol succinate ER (TOPROL-XL) 25 MG 24 hr tablet     Sig: Take 0.5 tablets (12.5 mg) by mouth daily     Dispense:  45 tablet     Refill:  3     flecainide (TAMBOCOR) 150 MG tablet     Sig: Take 1 tablet (150 mg) by mouth every 12 hours     Dispense:  180 tablet     Refill:  3     Keep on file until pt is due for refill     Medications Discontinued During This Encounter   Medication Reason     flecainide (TAMBOCOR) 150 MG  tablet Reorder     metoprolol succinate ER (TOPROL-XL) 25 MG 24 hr tablet          Encounter Diagnoses   Name Primary?     Paroxysmal atrial fibrillation (H)      Presence of Watchman left atrial appendage closure device      Atrial fibrillation with RVR (H)        CURRENT MEDICATIONS:  Current Outpatient Medications   Medication Sig Dispense Refill     acetaminophen (TYLENOL) 500 MG tablet Take 500-1,000 mg by mouth 3 times daily as needed for mild pain       aspirin (ASA) 81 MG EC tablet Take 1 tablet (81 mg) by mouth daily       diphenhydrAMINE HCl (ALLERGY MEDICATION PO)        flecainide (TAMBOCOR) 150 MG tablet Take 1 tablet (150 mg) by mouth every 12 hours 180 tablet 3     gabapentin (NEURONTIN) 300 MG capsule TAKE 3 CAPSULES BY MOUTH AT BEDTIME 270 capsule 3     levothyroxine (SYNTHROID/LEVOTHROID) 137 MCG tablet TAKE 1 TABLET (137 MCG) BY MOUTH DAILY 90 tablet 3     losartan (COZAAR) 100 MG tablet TAKE 1 TABLET BY MOUTH DAILY 90 tablet 3     metoprolol succinate ER (TOPROL-XL) 25 MG 24 hr tablet Take 0.5 tablets (12.5 mg) by mouth daily 45 tablet 3     pantoprazole (PROTONIX) 40 MG EC tablet Take 1 tablet (40 mg) by mouth daily 90 tablet 3     rosuvastatin (CRESTOR) 10 MG tablet Take 1 tablet (10 mg) by mouth daily 90 tablet 3     SUMAtriptan (IMITREX) 100 MG tablet Take 1 tablet (100 mg) by mouth at onset of headache for migraine May repeat in 2 hours if needed: max 2/day; 10 tablet 11     triamterene-HCTZ (MAXZIDE-25) 37.5-25 MG tablet TAKE 1 TABLET BY MOUTH DAILY 90 tablet 3     order for DME DREAMSTATION  5-15 CM/H20  NASAL AIRFIT N10 HER (Patient not taking: Reported on 3/24/2022)         ALLERGIES     Allergies   Allergen Reactions     Morphine Anaphylaxis     Ceclor [Cefaclor] Hives     Cymbalta      Foggy head feeling     Diltiazem Hives     Lisinopril Cough     Sertraline Nausea and Vomiting         Review of Systems:  Skin:  Positive for bruising   Eyes:  Positive for glasses  ENT:  Negative   "  Respiratory:  Positive for sleep apnea;CPAP  Cardiovascular:  Negative for;palpitations;chest pain;edema    Gastroenterology: Negative for melena;hematochezia  Genitourinary:  Negative    Musculoskeletal:  Positive for arthritis  Neurologic:  Negative    Psychiatric:  Positive for depression  Heme/Lymph/Imm:  Positive for allergies  Endocrine:  Positive for thyroid disorder    Physical Exam:  Vitals: /81 (BP Location: Right arm, Cuff Size: Adult Large)   Pulse 62   Ht 1.6 m (5' 3\")   Wt 124 kg (273 lb 6.4 oz)   BMI 48.43 kg/m      Constitutional:  cooperative, alert and oriented, well developed, well nourished, in no acute distress        Skin:  warm and dry to the touch, no apparent skin lesions or masses noted        Head:  normocephalic, no masses or lesions        Eyes:  pupils equal and round;conjunctivae and lids unremarkable;sclera white        ENT:  not assessed this visit        Neck:  JVP normal;no carotid bruit        Chest:  normal respiratory excursion        Cardiac: regular rhythm;no murmurs, gallops or rubs detected                  Abdomen:  abdomen soft obese      Vascular: pulses full and equal                                 R groin site without tenderness    Extremities and Back:  no deformities, clubbing, cyanosis, erythema observed;no edema        Neurological:  no gross motor deficits            PAST MEDICAL HISTORY:  Past Medical History:   Diagnosis Date     Depression       Duodenal ulcer 03/04/2019     Essential hypertension       GERD (gastroesophageal reflux disease)      Hiatal hernia      Hypothyroidism      Migraine without aura and without status migrainosus, not intractable  Aug 2016     Obesity      NELY on CPAP      Osteoarthritis      Paroxysmal atrial fibrillation (H) 01/05/2015     RLS (restless legs syndrome)      Symptomatic menopausal or female climacteric states (aka FLASHES)      TMJ disease        PAST SURGICAL HISTORY:  Past Surgical History:   Procedure " Laterality Date     APPENDECTOMY       CHOLECYSTECTOMY       COLONOSCOPY N/A 2019    Procedure: COLONOSCOPY;  Surgeon: Vahid Cardona MD;  Location:  GI     EP LEFT ATRIAL APPENDAGE CLOSURE N/A 10/8/2020    Procedure: EP Left Atrial Appendage Closure;  Surgeon: Constantin Vaughn MD;  Location:  HEART CARDIAC CATH LAB     ESOPHAGOSCOPY, GASTROSCOPY, DUODENOSCOPY (EGD), COMBINED N/A 2019    Procedure: COMBINED ESOPHAGOSCOPY, GASTROSCOPY, DUODENOSCOPY (EGD);  Surgeon: Ben Ko MD;  Location:  GI     HYSTERECTOMY, DOM  1999     TONSILLECTOMY         FAMILY HISTORY:  Family History   Problem Relation Age of Onset     Myocardial Infarction Mother      Heart Failure Mother      Heart Surgery Father         bypass surgery     Cerebrovascular Disease Father      Heart Surgery Brother      Hyperlipidemia Brother      Hyperlipidemia Sister      Hyperlipidemia Brother      Sleep Apnea Sister        SOCIAL HISTORY:  Social History     Socioeconomic History     Marital status:      Spouse name: None     Number of children: None     Years of education: None     Highest education level: None   Occupational History     None   Tobacco Use     Smoking status: Former Smoker     Packs/day: 0.25     Years: 1.00     Pack years: 0.25     Types: Cigarettes     Start date:      Quit date:      Years since quittin.2     Smokeless tobacco: Never Used     Tobacco comment: very minimal smoking history   Substance and Sexual Activity     Alcohol use: No     Alcohol/week: 0.0 standard drinks     Drug use: No     Sexual activity: Never   Other Topics Concern     Parent/sibling w/ CABG, MI or angioplasty before 65F 55M? Not Asked      Service Not Asked     Blood Transfusions Not Asked     Caffeine Concern No     Occupational Exposure Not Asked     Hobby Hazards Not Asked     Sleep Concern Yes     Stress Concern Not Asked     Weight Concern No     Special Diet No     Back Care  Not Asked     Exercise No     Bike Helmet Not Asked     Seat Belt Yes     Self-Exams Not Asked   Social History Narrative     None     Social Determinants of Health     Financial Resource Strain: Not on file   Food Insecurity: Not on file   Transportation Needs: Not on file   Physical Activity: Not on file   Stress: Not on file   Social Connections: Not on file   Intimate Partner Violence: Not on file   Housing Stability: Not on file

## 2022-03-24 ENCOUNTER — OFFICE VISIT (OUTPATIENT)
Dept: CARDIOLOGY | Facility: CLINIC | Age: 67
End: 2022-03-24
Payer: COMMERCIAL

## 2022-03-24 VITALS
BODY MASS INDEX: 48.44 KG/M2 | HEIGHT: 63 IN | HEART RATE: 62 BPM | WEIGHT: 273.4 LBS | DIASTOLIC BLOOD PRESSURE: 81 MMHG | SYSTOLIC BLOOD PRESSURE: 138 MMHG

## 2022-03-24 DIAGNOSIS — I48.91 ATRIAL FIBRILLATION WITH RVR (H): ICD-10-CM

## 2022-03-24 DIAGNOSIS — Z95.818 PRESENCE OF WATCHMAN LEFT ATRIAL APPENDAGE CLOSURE DEVICE: ICD-10-CM

## 2022-03-24 DIAGNOSIS — I48.0 PAROXYSMAL ATRIAL FIBRILLATION (H): ICD-10-CM

## 2022-03-24 PROCEDURE — 99214 OFFICE O/P EST MOD 30 MIN: CPT | Performed by: PHYSICIAN ASSISTANT

## 2022-03-24 PROCEDURE — 93000 ELECTROCARDIOGRAM COMPLETE: CPT | Performed by: PHYSICIAN ASSISTANT

## 2022-03-24 RX ORDER — METOPROLOL SUCCINATE 25 MG/1
12.5 TABLET, EXTENDED RELEASE ORAL DAILY
Qty: 45 TABLET | Refills: 3 | Status: SHIPPED | OUTPATIENT
Start: 2022-03-24 | End: 2023-04-12

## 2022-03-24 RX ORDER — FLECAINIDE ACETATE 150 MG/1
150 TABLET ORAL EVERY 12 HOURS
Qty: 180 TABLET | Refills: 3 | Status: SHIPPED | OUTPATIENT
Start: 2022-03-24 | End: 2023-04-12

## 2022-03-24 NOTE — LETTER
"3/24/2022    Yuval Jama MD  [address] [facility] [address]    RE: Maxine Sears       Dear Colleague,     I had the pleasure of seeing Maxine Rothmandmore in the [facility].  [facility]    I had the pleasure of seeing Roxanna when she came for follow up of Watchman device.  This 66 year old sees Dr. Vaughn for her history of:    1. Paroxysmal AFib first diagnosed 9/2015.  She is undergone DC cardioversion, most recently 2/2019.  Hospitalized 1/2020 and flecainide increased.  2. GI bleeds. Had Hgb 7.2 g/dL 3/2019. EGD revealed duodenal ulcer which was cauterized.  Xarelto was held, but then later restarted.  She was rehospitalized 4/2019 with a lower GI bleed.  Hemoglobin got down to 8.7 g/dL.  Xarelto was discontinued, and Dr. Vaughn recommended not restarting this for at least 3-6 months. Capsule endoscopy 5/2019 showed normal mucosa but did not reach the colon (Dr. Ko). We restarted Xarelto 20 mg daily 10/2019 but Hgb dropped >1 gram in ~1 month. Had been kept off of AC despite CHADSVASc 2 (HTN, sex). Now s/p 27 mm LAAO Watchman 10/8/2020 with Dr. Vaughn and Dr. Toth  3. HTN  4. Coronary Calcification noted on CTA Heart 9/2020. Stress Test 3/2016 without ischemia  5. Pulmonary Nodules noted on CTA Heart 9/2020 - low risk - no Follow-up; high risk - optional 12 m CT follow-up. Roxanna states she considers herself low risk as smoked for <6 months    I saw Roxanna last in 6/2021 at which time she was doing well 6 months post Watchman procedure (done 1/8/2020). I reduced her Crestor as LDL was only 24 mg/dL & stopped Plavix per protocol. She was to see us back 11/2021 with updated labs and EKG but we're just seeing her back now.    Interval History:  From a cardiac standpoint she feels she's doing well.     Biggest issues is L knee pain, noting it's \"bone on bone.\" She uses a wheeled walker. She's been recommended to lose weight, which she feels is extra difficult as she's " "not able to exercise at all.      No CP, SOB. No edema (despite knee arthritis).  Does not think she's had kaelyn AFib while on the flecainide and the metoprolol. Nothing prolonged. No palpitations, dizziness.     VITALS:  Vitals: /81 (BP Location: Right arm, Cuff Size: Adult Large)   Pulse 62   Ht 1.6 m (5' 3\")   Wt 124 kg (273 lb 6.4 oz)   BMI 48.43 kg/m      Diagnostic Testing:  EKG today on flecaindie 150 mg BID and metoprolol XL 12.5 mg daily which I overread, showed SR 60 bpm. QRS duration 110 ms  FRIDA (11/20/2020): Normal LV function.  EF estimated 55-60%.  The Watchman device was well-seated with adequate compression and minimal leak.  FRIDA 10/8/2020 with nl LVEF, nl RVEF, mild dilation of LA. Trace valve abnls  CT A Heart 9/8/2020 with normal aorta. Coronary calcification noted. Non-cardiac portion with 4 mm non-calcified medial RML nodule, indeterminate 4 mm RUL nodule anterior and 3 mm lingular nodule noted. Low risk - no follow-up needed; High-risk - optional CT follow-up 12 m  Echocardiogam 1/2020 showed EF 55-60%. No RWMA. RV OK. Mild MAC. Trace TR.   Echocardiogram 7/2017 showed an EF of 55-60% with borderline LVH.  LA was mildly dilated at 4.6 cm with a volume index of 24.2 mL/m .  She was noted to have a dilated IVC with elevated right atrial pressure at 15-20.  No significant valvular abnormalities were noted.  Nuclear stress test 3/2016 was negative for ischemia  Component      Latest Ref Rng & Units 10/8/2020 3/11/2021 3/14/2022   Cholesterol      <200 mg/dL 217 (H) 128 158   Triglycerides      <150 mg/dL 325 (H) 201 (H) 229 (H)   HDL Cholesterol      >=50 mg/dL 46 (L) 64 64   LDL Cholesterol Calculated      <=100 mg/dL 106 (H) 24 48   Non HDL Cholesterol      <130 mg/dL 171 (H) 64 94     Component      Latest Ref Rng & Units 3/11/2021 3/14/2022   WBC      4.0 - 11.0 10e3/uL 11.3 (H) 9.3   RBC Count      3.80 - 5.20 10e6/uL 4.55 4.59   Hemoglobin      11.7 - 15.7 g/dL 12.2 12.3 "   Hematocrit      35.0 - 47.0 % 39.9 40.3   MCV      78 - 100 fL 88 88   MCH      26.5 - 33.0 pg 26.8 26.8   MCHC      31.5 - 36.5 g/dL 30.6 (L) 30.5 (L)   RDW      10.0 - 15.0 % 15.5 (H) 16.4 (H)   Platelet Count      150 - 450 10e3/uL 427 418     Component      Latest Ref Rng & Units 3/11/2021 3/14/2022   Sodium      133 - 144 mmol/L 137 138   Potassium      3.4 - 5.3 mmol/L 4.0 3.9   Chloride      94 - 109 mmol/L 103 103   Carbon Dioxide      20 - 32 mmol/L 27 27   Anion Gap      3 - 14 mmol/L 7 8   Glucose      70 - 99 mg/dL 100 (H) 129 (H)   Urea Nitrogen      7 - 30 mg/dL 12 11   Creatinine      0.52 - 1.04 mg/dL 0.90 0.80   GFR Estimate      >60 mL/min/1.73m2 67 81   GFR Estimate If Black      >60 mL/min/1.73:m2 78    Calcium      8.5 - 10.1 mg/dL 9.5 9.2     Plan:  Annual follow-up with annual EKG    Assessment/Plan:    1. Paroxysmal AFib; high HASBLED Score    Remains on flecainide 150 mg BID and Metoprolol XL 12.5 mg daily with excellent results    S/p 27 mm Watchman placement 10/2020 and on ASA only    CBC 3/2022 showed stable Hgb 12.3 g/dL    PLAN:    Continue ASA only    EKG and follow-up in 1 year    2. HTN    On Maxzide 37.5/25, losartan 100 daily and metoprolol Xl 12.5 mg daily    BPs look good    BMP as above    PLAN:    Continue current meds    3. Dyslipidemia    Her LDL is more appropriate as of 3/2022 after reducing Crestor therapy from 20 to 10 mg daily    PLAN:    Continue Crestor 10 mg daily        Monika Sue PA-C, MSPAS      Orders Placed This Encounter   Procedures     Follow-Up with Cardiology ANALY     EKG 12-lead complete w/read - Clinics (performed today)     EKG 12-lead complete w/read - Clinics (to be scheduled)     Orders Placed This Encounter   Medications     metoprolol succinate ER (TOPROL-XL) 25 MG 24 hr tablet     Sig: Take 0.5 tablets (12.5 mg) by mouth daily     Dispense:  45 tablet     Refill:  3     flecainide (TAMBOCOR) 150 MG tablet     Sig: Take 1 tablet (150 mg) by mouth  every 12 hours     Dispense:  180 tablet     Refill:  3     Keep on file until pt is due for refill     Medications Discontinued During This Encounter   Medication Reason     flecainide (TAMBOCOR) 150 MG tablet Reorder     metoprolol succinate ER (TOPROL-XL) 25 MG 24 hr tablet          Encounter Diagnoses   Name Primary?     Paroxysmal atrial fibrillation (H)      Presence of Watchman left atrial appendage closure device      Atrial fibrillation with RVR (H)        CURRENT MEDICATIONS:  Current Outpatient Medications   Medication Sig Dispense Refill     acetaminophen (TYLENOL) 500 MG tablet Take 500-1,000 mg by mouth 3 times daily as needed for mild pain       aspirin (ASA) 81 MG EC tablet Take 1 tablet (81 mg) by mouth daily       diphenhydrAMINE HCl (ALLERGY MEDICATION PO)        flecainide (TAMBOCOR) 150 MG tablet Take 1 tablet (150 mg) by mouth every 12 hours 180 tablet 3     gabapentin (NEURONTIN) 300 MG capsule TAKE 3 CAPSULES BY MOUTH AT BEDTIME 270 capsule 3     levothyroxine (SYNTHROID/LEVOTHROID) 137 MCG tablet TAKE 1 TABLET (137 MCG) BY MOUTH DAILY 90 tablet 3     losartan (COZAAR) 100 MG tablet TAKE 1 TABLET BY MOUTH DAILY 90 tablet 3     metoprolol succinate ER (TOPROL-XL) 25 MG 24 hr tablet Take 0.5 tablets (12.5 mg) by mouth daily 45 tablet 3     pantoprazole (PROTONIX) 40 MG EC tablet Take 1 tablet (40 mg) by mouth daily 90 tablet 3     rosuvastatin (CRESTOR) 10 MG tablet Take 1 tablet (10 mg) by mouth daily 90 tablet 3     SUMAtriptan (IMITREX) 100 MG tablet Take 1 tablet (100 mg) by mouth at onset of headache for migraine May repeat in 2 hours if needed: max 2/day; 10 tablet 11     triamterene-HCTZ (MAXZIDE-25) 37.5-25 MG tablet TAKE 1 TABLET BY MOUTH DAILY 90 tablet 3     order for DME DREAMSTATION  5-15 CM/H20  NASAL AIRFIT N10 HER (Patient not taking: Reported on 3/24/2022)         ALLERGIES     Allergies   Allergen Reactions     Morphine Anaphylaxis     Ceclor [Cefaclor] Hives     Cymbalta       "Foggy head feeling     Diltiazem Hives     Lisinopril Cough     Sertraline Nausea and Vomiting         Review of Systems:  Skin:  Positive for bruising   Eyes:  Positive for glasses  ENT:  Negative    Respiratory:  Positive for sleep apnea;CPAP  Cardiovascular:  Negative for;palpitations;chest pain;edema    Gastroenterology: Negative for melena;hematochezia  Genitourinary:  Negative    Musculoskeletal:  Positive for arthritis  Neurologic:  Negative    Psychiatric:  Positive for depression  Heme/Lymph/Imm:  Positive for allergies  Endocrine:  Positive for thyroid disorder    Physical Exam:  Vitals: /81 (BP Location: Right arm, Cuff Size: Adult Large)   Pulse 62   Ht 1.6 m (5' 3\")   Wt 124 kg (273 lb 6.4 oz)   BMI 48.43 kg/m      Constitutional:  cooperative, alert and oriented, well developed, well nourished, in no acute distress        Skin:  warm and dry to the touch, no apparent skin lesions or masses noted        Head:  normocephalic, no masses or lesions        Eyes:  pupils equal and round;conjunctivae and lids unremarkable;sclera white        ENT:  not assessed this visit        Neck:  JVP normal;no carotid bruit        Chest:  normal respiratory excursion        Cardiac: regular rhythm;no murmurs, gallops or rubs detected                  Abdomen:  abdomen soft obese      Vascular: pulses full and equal                                 R groin site without tenderness    Extremities and Back:  no deformities, clubbing, cyanosis, erythema observed;no edema        Neurological:  no gross motor deficits            PAST MEDICAL HISTORY:  Past Medical History:   Diagnosis Date     Depression       Duodenal ulcer 03/04/2019     Essential hypertension       GERD (gastroesophageal reflux disease)      Hiatal hernia      Hypothyroidism      Migraine without aura and without status migrainosus, not intractable  Aug 2016     Obesity      NELY on CPAP      Osteoarthritis      Paroxysmal atrial fibrillation (H) " 2015     RLS (restless legs syndrome)      Symptomatic menopausal or female climacteric states (aka FLASHES)      TMJ disease        PAST SURGICAL HISTORY:  Past Surgical History:   Procedure Laterality Date     APPENDECTOMY       CHOLECYSTECTOMY       COLONOSCOPY N/A 2019    Procedure: COLONOSCOPY;  Surgeon: Vahid Cardona MD;  Location:  GI     EP LEFT ATRIAL APPENDAGE CLOSURE N/A 10/8/2020    Procedure: EP Left Atrial Appendage Closure;  Surgeon: Constantin Vaughn MD;  Location:  HEART CARDIAC CATH LAB     ESOPHAGOSCOPY, GASTROSCOPY, DUODENOSCOPY (EGD), COMBINED N/A 2019    Procedure: COMBINED ESOPHAGOSCOPY, GASTROSCOPY, DUODENOSCOPY (EGD);  Surgeon: Ben Ko MD;  Location:  GI     HYSTERECTOMY, [facility]  1999     TONSILLECTOMY         FAMILY HISTORY:  Family History   Problem Relation Age of Onset     Myocardial Infarction Mother      Heart Failure Mother      Heart Surgery Father         bypass surgery     Cerebrovascular Disease Father      Heart Surgery Brother      Hyperlipidemia Brother      Hyperlipidemia Sister      Hyperlipidemia Brother      Sleep Apnea Sister        SOCIAL HISTORY:  Social History     Socioeconomic History     Marital status:      Spouse name: None     Number of children: None     Years of education: None     Highest education level: None   Occupational History     None   Tobacco Use     Smoking status: Former Smoker     Packs/day: 0.25     Years: 1.00     Pack years: 0.25     Types: Cigarettes     Start date:      Quit date:      Years since quittin.2     Smokeless tobacco: Never Used     Tobacco comment: very minimal smoking history   Substance and Sexual Activity     Alcohol use: No     Alcohol/week: 0.0 standard drinks     Drug use: No     Sexual activity: Never   Other Topics Concern     Parent/sibling w/ CABG, MI or angioplasty before 65F 55M? Not Asked      Service Not Asked     Blood Transfusions Not Asked      Caffeine Concern No     Occupational Exposure Not Asked     Hobby Hazards Not Asked     Sleep Concern Yes     Stress Concern Not Asked     Weight Concern No     Special Diet No     Back Care Not Asked     Exercise No     Bike Helmet Not Asked     Seat Belt Yes     Self-Exams Not Asked   Social History Narrative     None     Social Determinants of Health     Financial Resource Strain: Not on file   Food Insecurity: Not on file   Transportation Needs: Not on file   Physical Activity: Not on file   Stress: Not on file   Social Connections: Not on file   Intimate Partner Violence: Not on file   Housing Stability: Not on file           Thank you for allowing me to participate in the care of your patient.      Sincerely,     Ilda Sue PA-C     [facility]  cc:   No referring provider defined for this encounter.

## 2022-03-24 NOTE — PATIENT INSTRUCTIONS
"Roxanna - it was good to see you today!    1. EKG on the flecainide looked good  2. Reviewed that you think the flecainide is working well - very few palpitations - this is great news!   3. Reviewed the L knee is giving you ALL sorts of problems ... difficult to lose weight given lack of exercise    PLAN:  1. Whatever \"chair\" exercises you can do would be beneficial - something to build muscle and burn calories even though you can't walk  2. CALL if recurrent atrial fibrillation  3. See us back 1 year but call beforehand if yo turner christensen! [phone]  "

## 2022-03-29 ENCOUNTER — PATIENT OUTREACH (OUTPATIENT)
Dept: CARE COORDINATION | Facility: CLINIC | Age: 67
End: 2022-03-29
Payer: COMMERCIAL

## 2022-03-29 NOTE — PROGRESS NOTES
Care Coordination Clinician Chart Review     Situation: Patient chart reviewed by care coordinator.?     Background: Initial assessment and enrollment to Care Coordination was 3/15/19.?? Patient centered goals were developed with participation from patient.? Lead CC handed patient off to CHW for continued outreach every 30 days.??     Assessment: Per chart review, patient outreach completed by CC CHW on 3/7/22.? Patient is actively working to accomplish goal(s).? Patient's goal(s) remain(s) appropriate at this time.? Patient is not due for updated Plan of Care.? Annual assessment will be due 11/2022.     Goals        1. Medical (pt-stated)       Goal Statement: I would like support and assistance to maintain my health and wellness in management of my chronic health conditions to prevent ER visits/hospital readmission.   Date Goal set: 10/12/20. Updated/modified: 08/25/2021 // 11/24/2021  Barriers: limited mobility related to knee pain, diagnosis of chronic, complex health conditions.   Strengths: motivated to reduce health risks  Date to Achieve By: 11/2021 // 5/2022  Patient expressed understanding of goal: Yes    Action steps to achieve this goal:  1. I will follow up with my providers as scheduled/recommended. (Primary Care Provider follow up recommended with labs 4/2022, Cardiology follow up recommended with EKG - Due 11/2021 TBD, ongoing Outpatient Physical Therapy   2. I will take my medications as prescribed.   3. I will follow a heart healthy diet (low-fat, low-salt)   4.   I will work toward regular exercise as able.  5. I will discuss, review, schedule and complete recommended overdue health maintenance with my provider.   6. I will consider completing a health care directive and provide a copy when completed to my care team.   7. I will notify my care team of any barriers, questions, concerns, additional resources needed.   8. I will use the clinic as a resource and I understand I can contact my clinic  with 24/7 after hours services available.  9. Care coordinator will remain available as needed.     *ANNUAL ASSESSMENT DUE:  11/2022          ??     Plan/Recommendations: The patient will continue working with Care Coordination to achieve above goal(s).? CHW will involve Lead CC as needed or if patient is ready to move to maintenance.? Lead CC will continue to monitor CHW s monthly outreaches and progress to goal(s) every 6 weeks.?     Plan of Care updated and sent to patient: No    Tylor Guo RN Care Coordinator  [facility]  (Covering for Lead RN Care Coordinator)

## 2022-04-06 ENCOUNTER — PATIENT OUTREACH (OUTPATIENT)
Dept: NURSING | Facility: CLINIC | Age: 67
End: 2022-04-06
Payer: COMMERCIAL

## 2022-04-06 NOTE — PROGRESS NOTES
Clinic Care Coordination Contact    Community Health Worker Follow Up    Care Gaps:     Health Maintenance Due   Topic Date Due     MIGRAINE ACTION PLAN  Never done     ZOSTER IMMUNIZATION (1 of 2) Never done     Pneumococcal Vaccine: 65+ Years (1 of 1 - PPSV23) Never done     MAMMO SCREENING  10/10/2021     MEDICARE ANNUAL WELLNESS VISIT  03/11/2022       Did not discuss    Goals:   Goals Addressed as of 4/6/2022 at 10:18 AM                    Today    3/7/22       Patient Stated       1. Medical (pt-stated)   90%  90%    Added 10/12/20 by Damien Person RN      Goal Statement: I would like support and assistance to maintain my health and wellness in management of my chronic health conditions to prevent ER visits/hospital readmission.   Date Goal set: 10/12/20. Updated/modified: 08/25/2021 // 11/24/2021  Barriers: limited mobility related to knee pain, diagnosis of chronic, complex health conditions.   Strengths: motivated to reduce health risks  Date to Achieve By: 11/2021 // 5/2022  Patient expressed understanding of goal: Yes    Action steps to achieve this goal:  1. I will follow up with my providers as scheduled/recommended. (Primary Care Provider follow up recommended with labs 4/2022, Cardiology follow up recommended with EKG - Due 11/2021 TBD, ongoing Outpatient Physical Therapy   2. I will take my medications as prescribed.   3. I will follow a heart healthy diet (low-fat, low-salt)   4.   I will work toward regular exercise as able.  5. I will discuss, review, schedule and complete recommended overdue health maintenance with my provider.   6. I will consider completing a health care directive and provide a copy when completed to my care team.   7. I will notify my care team of any barriers, questions, concerns, additional resources needed.   8. I will use the clinic as a resource and I understand I can contact my clinic with 24/7 after hours services available.  9. Care coordinator will remain available  as needed.     *ANNUAL ASSESSMENT DUE:  11/2022          Discussed:    Patient stated she is doing alright.  She still has her knee pain.    Patient stated she is taking her medications as prescribed.    Patient stated she has been trying to eat a low-salt / low-fat meals. Her meals will vary.    Patient stated she had an ortho appointment, and the doctor told her to lose weight to take some pressure off of her knees.  The doctor suggested she sees a nutritionist.  Patient stated she has an appointment in June.    Patient stated her sister and nephew are planning on visiting her tomorrow.    Patient stated she has been ordering groceries online.  The friend that takes her to the store, broke her ankle.  Patient stated she is going to ask another friend.      Intervention and Education during outreach: CHW used Empathy and Active listening to understand the patients barriers and struggles.  CHW encouraged patient to contact CC if there are any other changes or resources needed.  Patient acknowledged understanding.      Plan:  CHW will outreach in one month.    TRINIDAD Li  Clinic Care Coordination  [facility] Clinics: [facility] [place], and [facility]  Phone: [phone]

## 2022-04-21 ENCOUNTER — PATIENT OUTREACH (OUTPATIENT)
Dept: GERIATRIC MEDICINE | Facility: CLINIC | Age: 67
End: 2022-04-21
Payer: COMMERCIAL

## 2022-04-21 NOTE — TELEPHONE ENCOUNTER
Anita Gale Care Coordination Contact    Member became effective with ALANA Gale on 04/01/2022 with [facility]  Previous Health Plan: [facility]  Previous Care System: [facility]  Previous care coordinators name and number: Julio Richey through [facility]  Waiver Type: EW  Last MMIS Entry: Date 04/13/22 and Type pers assessment  MMIS visit date (and type) if different from above: 03/02/22 UTR  Services Listed in MMIS:   35 F CASE MGMT 04 F HOME MEALS 06 F HOMEMAKER  47 F WVRAC HENDRICKSON 52 F EMERG RSP 53 C MED ADMIN  01 C GROC SHOP 62 C LAUNDRY 24 C MH SERV  UTF received: No: Requested on 04/21/2022 from [email or web address]     Jeana Sandoval  Care Management Specialist  [facility]  [phone]

## 2022-04-21 NOTE — LETTER
April 21, 2022    DANIELLE VELASQUEZ  [address]   [place] [address]      Dear Danielle:    As a member of [facility] you are provided a care coordinator. I will be your new care coordinator as of 04/01/2022. I will be calling you soon to see how you are doing and determine your needs.    If you have any questions, please feel free to call me at [phone]. If you reach my voice mail, please leave a message and your phone number. If you are hearing impaired, please call the [place] Relay at 557 or [phone] ([id number] relay service).    I look forward to speaking with you soon.    Sincerely,        Jaymie Coats RN    E-mail: [email or web address]  Phone: [phone]      [facility]          [facility]  [id number] DHS Approved ([id number])  [id number] (11/18)

## 2022-04-25 ENCOUNTER — PATIENT OUTREACH (OUTPATIENT)
Dept: GERIATRIC MEDICINE | Facility: CLINIC | Age: 67
End: 2022-04-25
Payer: COMMERCIAL

## 2022-04-25 NOTE — PROGRESS NOTES
[facility] Care Coordination Contact      [facility] Change (Transfer)    Member is new enrollee to [facility] effective 4/1/22 with [employer] health plan. Member transferred from [facility].    4/25/22 No home visit required because this care coordinator (CC) has received all required documentation from the previous CC.  POC signature page is missing, reviewed this with member who will try to send, reviewed current POC and member is in agreement, made additions to goals.   Writer t/c to member, introduced self as member's new CC. Member has not received the welcome letter with writer's name and contact information.  Shared information on role of CC, provided contact information Reviewed LTCC/Health Risk Assessment (HRA) and POC with member. No changes noted.  Transitional HRA completed. Care Plan Summary updated and reflects current services.  Required referral authorization information communicated to CMS: Not applicable at this time. Member was assessed 3/14/22 for EW, per previous CC they have not rec'd the 3543.  Member stated that she did not receive, explained that CC will mail the form with an enclosed envelope. Member will contact this CC early next week if not received.     Writer reviewed the following with member:    ER visits: No  Hospitalizations: No  TCU stays: No  Significant health status changes: No.  Member reports that she would like to have knee surgery but she cannot until she loses weight. Member would like to lose 20 pounds over the next 4-5 months. Member has an upcoming appt scheduled with a nutritionist. Member inquired on fitness membership. Explained that with [employer] this is a free benefit, also a Smart Reemo watch is available for [employer]. Provided member with contact info to [employer] to review options. Member is due for mammogram, annual wellness exam, pneumonia vaccine, shingles vaccine. Member will schedule her annual wellness exam with PCP,  stating that she is able to schedule her own appt's  Falls/Injuries: No  ADL/IADL changes: No  Changes in services: NA, awaiting EW approval. Will place referrals for TWAN, MAIKOL, [employer] passes (member is MM certified),homemaking. Member stated that homemaking is not in the top ten, is able to manage but with knee and back pain, homemaking tasks are difficult.     Follow-Up Plan: Member informed of future contact, plan to f/u with member with at next regularly scheduled contact.  Contact information shared with member and family, encouraged member to call with any questions or concerns.    E-mail sent to previous [facility] CC to share that MMIS entry for 3/14/22 was denied and request that she review and correct edits.  Jaymie Coats RN, BC  Manager [facility] Care Coordinator   [phone]  (Fax)

## 2022-05-02 ENCOUNTER — PATIENT OUTREACH (OUTPATIENT)
Dept: GERIATRIC MEDICINE | Facility: CLINIC | Age: 67
End: 2022-05-02
Payer: COMMERCIAL

## 2022-05-02 NOTE — PROGRESS NOTES
[facility] Care Coordination Contact    Call placed to member, she reports completing the DHS 2812 and mailing it back to [facility] 4/30/22    CC has been in e-mail communication with previous [facility] CCStephani regarding the denied MMIS entry for 3/14/22.  Left vm with Stephani requesting a return call to report that this entry will need to be corrected, that an 03 program type is invalid until member is approved for EW.   Jaymie Coats RN, BC  Manager [facility] Care Coordinator   [phone]  (Fax)

## 2022-05-04 NOTE — PROGRESS NOTES
[facility] Care Coordination Contact    Rec'd DHS [phone]. [phone] Fax DHS [phone] & DHS [phone] to Elysia Coats RN, BC  Manager [facility] Care Coordinator   [phone]  (Fax)

## 2022-05-06 ENCOUNTER — PATIENT OUTREACH (OUTPATIENT)
Dept: NURSING | Facility: CLINIC | Age: 67
End: 2022-05-06

## 2022-05-06 ASSESSMENT — ACTIVITIES OF DAILY LIVING (ADL): DEPENDENT_IADLS:: TRANSPORTATION;SHOPPING

## 2022-05-06 NOTE — PROGRESS NOTES
Care Coordination Clinician Chart Review     Situation: Patient chart reviewed by care coordinator.?     Background: Initial assessment and enrollment to Care Coordination was 10/20/2021.?? Patient centered goals were developed with participation from patient.? Lead CC handed patient off to CHW for continued outreach every 30 days.??     Assessment: Per chart review, patient outreach completed by CC CHW/RNCC on 5/6/2022.? Patient is actively working to accomplish goal(s).? Patient's goal(s) remain(s) appropriate at this time.? Patient is due for updated Plan of Care.? Annual assessment will be due 10/2022.      Goals        2. Medical (pt-stated)       Goal Statement: I will complete my annual wellness visit and care gaps.  Date Goal set: 5/6/2022  Barriers: Time; busy with sewing projects  Strengths: Strong advocate for self  Date to Achieve By: 11/6/2022  Patient expressed understanding of goal: yes  Action steps to achieve this goal:  1. I will schedule my annual wellness visit; [phone] ([phone])  2. I will attend my annual wellness visit.  3. I will contact my Care Management or clinic team if I have barriers to attending my annual wellness visit.     Annual Assessment Due: 10/2022          ??   Plan/Recommendations: The patient will continue working with Care Coordination to achieve above goal(s).? CHW will involve Lead CC as needed or if patient is ready to move to maintenance.? Lead CC will continue to monitor CHW s monthly outreaches and progress to goal(s) every 6 weeks.?     Plan of Care updated and sent to patient: Yes, 5/6/2022     Lizet Krishnan RN, BSN, PHN  Primary Care / Care Coordinator   [facility]  [email or web address] [email or web address]   [phone]

## 2022-05-06 NOTE — LETTER
[address]  [facility] [address]    [facility]  Patient Centered Plan of Care  About Me:        Patient Name:  Maxine Velasquez    YOB: 1955  Age:         66 year old   Anita MRN:    [id number] Telephone Information:  Home Phone [phone]   Mobile [phone]       Address:  [address]  [facility] [address] Email address:  [email or web address]      Emergency Contact(s)    Name Relationship Lgl Grd Work Phone Home Phone Mobile Phone   1. SEVERSON,VIVIAN Sister   [phone]   2. BRANDI GEORGES Daughter   [phone]   3. PRATIMA VELASQUEZ Son   [phone]           Primary language:  English     needed? No   [facility]:  [phone] op. 1  Other communication barriers:None    Preferred Method of Communication:  Phone  Current living arrangement: I live alone    Mobility Status/ Medical Equipment: Independent w/Device    Health Maintenance  Health Maintenance Reviewed: Due/Overdue   Health Maintenance Due   Topic Date Due     MIGRAINE ACTION PLAN  Never done     Pneumococcal Vaccine: 65+ Years (1 - PCV) Never done     ZOSTER IMMUNIZATION (1 of 2) Never done     DTAP/TDAP/TD IMMUNIZATION (1 - Tdap) 10/07/2019     MAMMO SCREENING  10/10/2021     COVID-19 Vaccine (4 - Booster for Pfizer series) 02/25/2022     MEDICARE ANNUAL WELLNESS VISIT  03/11/2022     My Access Plan  Medical Emergency 911   Primary Clinic Line [facility] [phone]   24 Hour Appointment Line [phone] or  [phone]) (toll-free)   24 Hour Nurse Line [phone] (toll-free)   Preferred Urgent Care [facility], [phone]     Preferred Hospital [facility]  [phone]     Preferred Pharmacy [facility] [place], [place] - [address]     Behavioral Health Crisis Line The National Suicide Prevention Lifeline at  4-021-176-1713 or 911     My Care Team Members  Patient Care Team       Relationship Specialty Notifications Start End    Yuval Jama MD PCP - General Internal Medicine  10/20/14     Merged    Phone: [phone] Fax: [phone]         [address] [place] [address]    Yuval Jama MD  Internal Medicine  10/20/14     Merged    Phone: [phone] Fax: [phone]         [address] [place] [address]    Yuval Jama MD Assigned PCP   12/19/14     Phone: [phone] Fax: [phone]         [address] [place] [address]    Carmen Mims MA Community Health Worker Primary Care - CC  1/22/20     Phone: [phone]         Ilda Sue PA-C Assigned Heart and Vascular Provider   6/13/21     Phone: [phone] Fax: [phone] [address] [facility] [address]    Lizet Krishnan RN Lead Care Coordinator  Admissions 8/25/21     Chad De León MD Assigned Musculoskeletal Provider   3/20/22     Phone: [phone] Fax: [phone]         [address] [place] [address] [address] [facility] [address]    Stephany Coats RN Lead Care Coordinator Primary Care - CC Admissions 4/21/22     Phone: [phone]                 My Care Plans  Self Management and Treatment Plan  Goals and (Comments)   Goals        General     1. Medical (pt-stated)      Notes - Note edited  1/7/2022 10:37 AM by Lizet Krishnan RN     Goal Statement: I would like support and assistance to maintain my health and wellness in management of my chronic health conditions to prevent ER visits/hospital readmission.   Date Goal set: 10/12/20. Updated/modified: 08/25/2021 // 11/24/2021  Barriers: limited mobility related to knee pain, diagnosis of chronic, complex health conditions.   Strengths: motivated to reduce health risks  Date to Achieve By: 11/2021 // 5/2022  Patient expressed understanding of goal: Yes    Action steps to achieve this goal:  1. I will follow up with my providers as scheduled/recommended.  (Primary Care Provider follow up recommended with labs 4/2022, Cardiology follow up recommended with EKG - Due 11/2021 TBD, ongoing Outpatient Physical Therapy   2. I will take my medications as prescribed.   3. I will follow a heart healthy diet (low-fat, low-salt)   4.   I will work toward regular exercise as able.  5. I will discuss, review, schedule and complete recommended overdue health maintenance with my provider.   6. I will consider completing a health care directive and provide a copy when completed to my care team.   7. I will notify my care team of any barriers, questions, concerns, additional resources needed.   8. I will use the clinic as a resource and I understand I can contact my clinic with 24/7 after hours services available.  9. Care coordinator will remain available as needed.     *ANNUAL ASSESSMENT DUE:  11/2022           2. Medical (pt-stated)      Notes - Note edited  5/6/2022 12:51 PM by Lizet Krihsnan RN     Goal Statement: I will complete my annual wellness visit and care gaps.  Date Goal set: 5/6/2022  Barriers: Time; busy with PANOS[employer] projects  Strengths: Strong advocate for self  Date to Achieve By: 11/6/2022  Patient expressed understanding of goal: yes  Action steps to achieve this goal:  1. I will schedule my annual wellness visit; [phone] ([phone])  2. I will attend my annual wellness visit.  3. I will contact my Care Management or clinic team if I have barriers to attending my annual wellness visit.                  Action Plans on File:   Depression     Advance Care Plans/Directives Type:   -- (Full code)      My Medical and Care Information  Problem List   Patient Active Problem List   Diagnosis     Osteoarthritis     TMJ disease     Health Care Home     Hypothyroidism     Morbid obesity, unspecified obesity type (H)     Migraine without aura and without status migrainosus, not intractable     Essential hypertension     NLEY on CPAP     RLS (restless legs syndrome)      Gastroesophageal reflux disease, esophagitis presence not specified     Iron deficiency     Chronic pain of left knee     Lumbar radiculopathy     Paroxysmal atrial fibrillation (H)     Pulmonary nodules     Hyperlipidemia LDL goal <100     Mild depression (H)      Current Medications and Allergies:    Allergies   Allergen Reactions     Morphine Anaphylaxis     Ceclor [Cefaclor] Hives     Cymbalta      Foggy head feeling     Diltiazem Hives     Lisinopril Cough     Sertraline Nausea and Vomiting     Current Outpatient Medications   Medication     acetaminophen (TYLENOL) 500 MG tablet     aspirin (ASA) 81 MG EC tablet     diphenhydrAMINE HCl (ALLERGY MEDICATION PO)     flecainide (TAMBOCOR) 150 MG tablet     gabapentin (NEURONTIN) 300 MG capsule     levothyroxine (SYNTHROID/LEVOTHROID) 137 MCG tablet     losartan (COZAAR) 100 MG tablet     metoprolol succinate ER (TOPROL-XL) 25 MG 24 hr tablet     order for DME     pantoprazole (PROTONIX) 40 MG EC tablet     rosuvastatin (CRESTOR) 10 MG tablet     SUMAtriptan (IMITREX) 100 MG tablet     triamterene-HCTZ (MAXZIDE-25) 37.5-25 MG tablet     No current facility-administered medications for this visit.     Care Coordination Start Date: 4/21/2022   Frequency of Care Coordination: monthly     Form Last Updated: 05/06/2022

## 2022-05-10 NOTE — PROGRESS NOTES
[facility] Care Coordination Contact    Call placed to Elysia Lugo to inquire in EW approval. Spoke with Erin who stated that she will create a ticket to have a team member contact this CC.  Jaymie Coats RN, BC  Manager [facility] Care Coordinator   [phone]  (Fax)

## 2022-05-10 NOTE — PROGRESS NOTES
[facility] Care Coordination Contact    Rec'd tele call from Katelyn, Human Service Rep. Katelyn stated that the system has been updated, member is eligible for EW eff date 5/4/22. Inquired if member had wavier spend down, per Katelyn the estimated amount is $400. Katelyn requested time to review the case and she will contact CC later today.  Jaymie Coats RN, BC  Manager [facility] Care Coordinator   [phone]  (Fax)

## 2022-05-13 NOTE — PROGRESS NOTES
[facility] Care Coordination Contact    5/11/22 Rec'd tele call from Katelyn [employer] to report that member will have a $274 waiver obligation. Katelyn requests CC to enter MMIS. Explained that CC will reach out to member to inform of the waiver obligation.  Call placed to member, shared above information and how a waiver obligation works.   Reviewed services on transferred POC. Member stated that homemaking is not that important, but she would like assistance with shopping.   Reviewed Store To [employer]. Reviewed cost of MOW, Store To Door and average cost of PERS. Member wishes to think about it and will get back to CC.  Member stated that she will likely want referrals for MOW and PERS.   CC will f/u with member     MMIS entered.  Jaymie Coats RN, BC  Manager [facility] Care Coordinator   [phone]  (Fax)

## 2022-05-14 ENCOUNTER — HEALTH MAINTENANCE LETTER (OUTPATIENT)
Age: 67
End: 2022-05-14

## 2022-05-16 DIAGNOSIS — E03.9 HYPOTHYROIDISM, UNSPECIFIED TYPE: ICD-10-CM

## 2022-05-16 DIAGNOSIS — I10 ESSENTIAL HYPERTENSION: Primary | ICD-10-CM

## 2022-05-18 RX ORDER — LOSARTAN POTASSIUM 100 MG/1
100 TABLET ORAL DAILY
Qty: 90 TABLET | Refills: 3 | Status: SHIPPED | OUTPATIENT
Start: 2022-05-18 | End: 2023-01-11

## 2022-05-18 RX ORDER — LOSARTAN POTASSIUM 50 MG/1
TABLET ORAL
Qty: 180 TABLET | Refills: 2 | OUTPATIENT
Start: 2022-05-18

## 2022-05-18 RX ORDER — LEVOTHYROXINE SODIUM 137 UG/1
137 TABLET ORAL DAILY
Qty: 90 TABLET | Refills: 2 | Status: ON HOLD | OUTPATIENT
Start: 2022-05-18 | End: 2022-07-14

## 2022-05-23 ENCOUNTER — VIRTUAL VISIT (OUTPATIENT)
Dept: SURGERY | Facility: CLINIC | Age: 67
End: 2022-05-23
Attending: ORTHOPAEDIC SURGERY
Payer: COMMERCIAL

## 2022-05-23 VITALS — WEIGHT: 274 LBS | BODY MASS INDEX: 48.55 KG/M2 | HEIGHT: 63 IN

## 2022-05-23 DIAGNOSIS — M17.12 PRIMARY OSTEOARTHRITIS OF LEFT KNEE: ICD-10-CM

## 2022-05-23 DIAGNOSIS — E11.9 TYPE 2 DIABETES MELLITUS WITHOUT COMPLICATION, WITHOUT LONG-TERM CURRENT USE OF INSULIN (H): ICD-10-CM

## 2022-05-23 DIAGNOSIS — E66.01 MORBID OBESITY (H): Primary | ICD-10-CM

## 2022-05-23 DIAGNOSIS — I48.0 PAROXYSMAL ATRIAL FIBRILLATION (H): ICD-10-CM

## 2022-05-23 DIAGNOSIS — E78.5 HYPERLIPIDEMIA LDL GOAL <100: ICD-10-CM

## 2022-05-23 DIAGNOSIS — G47.33 OSA ON CPAP: ICD-10-CM

## 2022-05-23 DIAGNOSIS — K21.9 GASTROESOPHAGEAL REFLUX DISEASE, UNSPECIFIED WHETHER ESOPHAGITIS PRESENT: ICD-10-CM

## 2022-05-23 DIAGNOSIS — M17.11 PRIMARY OSTEOARTHRITIS OF RIGHT KNEE: ICD-10-CM

## 2022-05-23 PROCEDURE — 99205 OFFICE O/P NEW HI 60 MIN: CPT | Mod: 95 | Performed by: PHYSICIAN ASSISTANT

## 2022-05-23 RX ORDER — LIRAGLUTIDE 6 MG/ML
INJECTION SUBCUTANEOUS
Qty: 9 ML | Refills: 2 | Status: SHIPPED | OUTPATIENT
Start: 2022-05-23 | End: 2022-07-12

## 2022-05-23 NOTE — PATIENT INSTRUCTIONS
Nice to talk with you today.  Below is our plan we discussed.-  TINA Funk    Plan:    Start Victoza.  Week 1 Inject 0.6 mg Subcutaneous daily for 7 days  Week 2 Inject 1.2 mg daily for 7 days,   Week 3 Inject 1.8 mg daily nutil you are seen again.     Please call [phone] to schedule with medical therapy management with Lauren Bloch, MTM Pharmacist tin 1 month to see how the medication is going.     Here is the website for the bariatric online information session.  Near the bottom you will see a turquoise box that says SOHM.  Please click this to review. We will discuss at our follow up visit.    [email or web address]     FOLLOW-UP:    Please call [phone] to schedule your next visit in 8 wks.   If you want to do bariatric surgery appt is for bariatric initial eval 1 hr in person  Otherwise 1/2 medication follow up appt    MEDICATION STARTED AT THIS APPOINTMENT    We are starting a GLP-1 (Glucagon-like Peptide-1) medication.: Victoza. It works to help you lose weight and control your blood sugar. One of the ways it works is by slowing down the rate that food leaves your stomach. You feel yarbrough and will eat less.  It also helps regulate hormones that can help improve your blood sugars.    Victoza: Starting dose is 0.6 mg for a week, then 1.2 mg for a week, and then 1.8 mg thereafter (if prescribed by doctor). This medication is given daily. Pen needles need to be ordered also.    Side effects of GLP- Medications include: The most common side effects are all GI related and consist of: nausea, constipation, diarrhea, burping, or gassiness. Patients are advised to eat slowly and less, and nausea typically passes if people can stick it out.     The risk of pancreatitis (inflammation of the pancreas) has been associated with this type of medication, but is very rare.  If you have had pancreatitis in the past, this medication may not be for you. Please  let us know about any past history of pancreas problems.      Symptoms of pancreatitis include: Pain in your upper stomach area which  may travel to your back and be worse after eating. Your stomach area may be tender to the touch.  You may have vomiting or nausea and/or have a fever. If you should develop any of these symptoms, stop the medication and contact your primary care doctor. They will do a blood test to check for pancreatitis.         There is a small chance you may have some low blood sugar after taking the medication.   The signs of low blood sugar are:  Weakness  Shaky   Hungry  Sweating  Confusion      See below for ways to treat low blood sugar without adding in lots of extra calories.      Treating Low Blood Sugar    If you have symptoms of low blood sugar (sweating, shaking, dizzy, confused) eat 15 grams of carbs and wait 15 minutes:    Glucose Tabs are best for sugars under 70 -  Dex4 or BD Glucose tablets are good, you will need to take 3-4 of these to equal 15 grams.     One small box of raisins  4 oz fruit juice box or   cup fruit juice  1 small apple  1 small banana    cup canned fruit in water    English muffin or a slice of bread with jelly   1 low fat frozen waffle with sugar-free syrup    cup cottage cheese with   cup frozen or fresh blueberries  1 cup skim or low-fat milk    cup whole grain cereal  4-6 crackers such as Triscuits      This medication is usually covered by insurance for patients with a diagnosis of Type 2 Diabetes. Depending on insurance coverage, the medication may be changed to a different formulary, but they all work in the same way. Sometimes a prior authorization is required, which may take up to 1-2 weeks for an insurance company to make a decision if they will cover the medication. Please be patient, you will be notified either way.     Contact the nurse via Vivaty or call [phone] if you have any questions or concerns. (Do not stop taking it if you don't think  it's working. For some people it works without them knowing it.)     In order to get refills of this or any medication we prescribe you must be seen in the medical weight mgmt clinic every 2-4 months.        10 Healthy Habits  Eat Better ? Move More ? Live Well   Eat breakfast daily.     Try to eat within the first hour after you wake up. This helps you control your appetite during the day, and you are less likely to snack in the evening.    80% of people who skip meals are overweight or obese.    2.   Include protein with every meal, even breakfast.    Protein will suppress your appetite longer than other types of food. This helps you  feel more satisfied with the amount of food you have eaten.    3.  Fill up on Fiber   Fiber comes from plants--fruits, veggies, whole grains, nuts/seeds and beans   Fiber is low in calories, high in phytonutrients and helps you stay full longer    4.  Eat S-L-O-W-L-Y   Take 20-30 minutes to eat each meal by taking small bites, chewing foods to applesauce consistency or 20-30 times before you swallow   Eating foods too fast can delay satiety/fullness signals and increase overeating     5.  Avoid Mindless Eating   Be present when you eat--take note of the smell, taste and quality of your food   Make a list of alternative activities you could do to prevent boredom/stress eating  Go for a walk, call a friend, chew gum, paint your nails    6.  Keep a Journal   Writing down what you eat, how you feel and when you are active helps you identify new changes to work on from week to week         Look for ways to cut 100 calories from your current diet 2-3 times per day    7.  Drink 64 ounces of Non Calorie drinks between meals   Water   Zero calorie Propel , Vitamin Water , Crystal Light    Avoid regular soda pop    8.  Stop thinking about food choices as a  diet.    Instead, think of them as healthy, lifelong habits--an effort toward a new way of eating.   Weigh yourself once a week instead of  everyday.     9.  Get enough sleep--at least 7 to 8 hours each night.    Lack of sleep is one reason that fat builds up around the waist.    10.  Exercise five to six times per week    Do a combination aerobic exercise (fast walking, biking, swimming) and strength training (Dumbbells, pushups, weight machines, resistance bands).   Start at 10 minutes per day and slowly work your way up to 30 minutes or more.  Taking the stairs instead of the elevator, park at the far end of the parking lot, pace while on the phone, take the dog for a walk.     [email or web address]

## 2022-05-23 NOTE — ASSESSMENT & PLAN NOTE
Pt on Pantoprazole.  If heartburn occurs when starting Victoza, pt will increase this to twice daily.

## 2022-05-23 NOTE — ASSESSMENT & PLAN NOTE
Pt will new dx of type II diabetes.  Reviewed diagnosis.  Treatment options including medications, lifestyle treatment, and bariatric surgery.  Pt will see dietician tomorrow. Will have HgA1C drawn in 2 months. Will start Victoza

## 2022-05-23 NOTE — PROGRESS NOTES
"Roxanna is a 66 year old who is being evaluated via a billable video visit.      If the video visit is dropped, the invitation should be resent by: Text to cell phone: [phone]  Will anyone else be joining your video visit? No      Video-Visit Details    Type of service:  Video Visit    Video Start Time: 3:07 PM    Video End Time:4:05 PM    Originating Location (pt. Location): Home    Distant Location (provider location):  [facility] [place]     Platform used for Video Visit: [facility] Consult  May 23, 2022      Dear Yuval Jama MD,    I had the pleasure of seeing your patient, Maxine Sears. Full intake/assessment was done to determine barriers to weight loss success and develop a treatment plan. Maxine Sears is a 66 year old female interested in treatment of medical problems associated with excess weight. She has a height of 5' 3\"[pt reported[, a weight of 274 lbs 0 oz, and the calculated Body mass index is 48.54 kg/m .    Assessment & Plan   Problem List Items Addressed This Visit     Osteoarthritis     Should improve with weight loss           Morbid obesity (H) - Primary    Relevant Medications    liraglutide (VICTOZA) 18 MG/3ML solution    Other Relevant Orders    Med Therapy Management Referral    Sleep Study Referral    NUTRITION REFERRAL    NELY on CPAP     Pt needing new CPAP.  Will put in referral to sleep clinic.            Relevant Orders    Sleep Study Referral    Gastroesophageal reflux disease, esophagitis presence not specified     Pt on Pantoprazole.  If heartburn occurs when starting Victoza, pt will increase this to twice daily.            Paroxysmal atrial fibrillation (H)     Noted will avoid stimulants.            Hyperlipidemia LDL goal <100    Diabetes mellitus, type 2 (H)     Pt will new dx of type II diabetes.  Reviewed diagnosis.  Treatment options including medications, lifestyle treatment, and bariatric " "surgery.  Pt will see dietician tomorrow. Will have HgA1C drawn in 2 months. Will start Victoza           Relevant Medications    liraglutide (VICTOZA) 18 MG/3ML solution    Other Relevant Orders    Med Therapy Management Referral    NUTRITION REFERRAL           PROGRAM OVERVIEW  Reviewed options at [facility] including provider visits, dietician, 24 week healthy lifestyle program, health coaching, food supplements, Get Moving program, and psychological support.  All questions about weight loss program were answered.    SURGICAL WEIGHT LOSS   Option presented given pt BMI and current comorbid conditions. No current interest.  Website for bariatric online information session provided.  Pt will review at home and we will discuss at our follow up visit. [email or web address]     MEDICATIONS:  We discussed healthy habits to assist with weight loss. We reviewed medications associated with weight gain. We discussed the role of pharmacological agents in the treatment of obesity and the \"off-label\" use of medications in this practice. We reviewed medication that may assist with weight loss. Indications, contraindications, risks/benefits, and potential side effects were discussed.  Metformin and GLP-1 discussed.  Victoza was prescribed. Discussed that medications must always be used together with lifestyle changes such as improvements in diet choices, portion control and establishing and maintaining a regular exercise program.          PT INSTRUCTIONS:   Start Victoza  Schedule MTM visit with Lauren Bloch, MTM Pharmacist in 1 month to see how the medication is going.   View bariatric information session    FOLLOW-UP:    Please call [phone] to schedule your next visit in 8 wks.   If you want to do bariatric surgery appt is for bariatric initial eval 1 hr in person  Otherwise 1/2 medication follow up appt    74 minutes spent on the date of the encounter " doing chart review, history and exam, review test results, counseling, developing plan of care, documentation, and further activities as noted above.       Weight Related Co-morbidities:          I have the following health issues associated with obesity: Type II diabetes, NELY, hyperlipidemia, OA of knees, GERD   I have the following symptoms associated with obesity: Knee pain, reflux, back pain, limited mobility     Patient Active Problem List   Diagnosis     Osteoarthritis     TMJ disease     Health Care Home     Hypothyroidism     Morbid obesity (H)     Migraine without aura and without status migrainosus, not intractable     Essential hypertension     NELY on CPAP     RLS (restless legs syndrome)     Gastroesophageal reflux disease, esophagitis presence not specified     Iron deficiency     Chronic pain of left knee     Lumbar radiculopathy     Paroxysmal atrial fibrillation (H)     Pulmonary nodules     Hyperlipidemia LDL goal <100     Mild depression (H)     Diabetes mellitus, type 2 (H)       Patient Goals: (Achieve healthier weight to:)    diminish current health problems Yes   prevent future health problems No      move better Yes      in order to get another surgery Yes      improve quality of life Yes      I am interested in hearing more about weight loss surgery? Yes, feel like she has tried every diet plan.            Weight History    Previously had weight loss surgery: none   Age pt became overweight:   After last pregnancy   The following factors contributed to weight gain:    Pregnancy, not knowing portion control, lousy marriage, lousy childhood, Using food as emotional coping mechanism.     I have tried the following methods to lose weight:   WW, NS, Exercise, RADHA, other dietary programs. Keto diet.     My lowest weight since age 18 was: unknown   My highest weight since age 18 was: 300 lbs   The most weight I have ever lost was: (lbs) 80-90 lbs   How has your weight changed over the last year?   Gained weight   Ever since she had her last child she became overweight.  She swam around her mid 50's and lost some weight then she stopped swimming and gained her weight back.  Her biggest goal is to lose weight so she can get the pressure off her knees.  She is hurting all the time she stands or walks.  She uses a cane and a walker.  It is hard to use the walker in the apartment.       Diet Recall     Wake Up: 5:30 am    Breakfast .8-9 am 2 English muffins, 3 Turkey sausages, egg   Lunch None   Supper; Sushi    Bedtime:   Snack between meals:   Snack in evening:          Apple,       Typical snacks: Fruits, Leftovers   Caloric beverages per day. What type: None   Water consumed daily: Greater than 40 oz.    Low karen/diet drinks:   Alcohol:   Foods you crave:       None   Peach rings candy        Pt makes her own meals.  If she gets paid or gets her food stamps she gets Cub chicken with Slaw.  Pt states her biggest problem is she overeats stuffing, white potatoes, potato salad.    12 Grain bread Aldi.        Sleep History    How many hours do you sleep at night?  10:00-10:30 PM   Do you think you have sleep apnea?  Yes, has NELY   Do you snore so loudly some one can hear you through a closed door? Had cpap but it was recalled, needs new one but has not gotten one yet. Has not followed up regarding this- forgot about this.           Eating Habits (Yes/No) (Never, Daily, Several Days, Weekly,Monthly)   Healthy Eater Yes      Generally, eat a lot of Simple Carbs/processed boxed foods Yes   Generally, eat a lot of fatty foods No   Eat fast food  Never      Eat Take out/sit-down restaurant. Every 2 weeks      Eat most meals in front of screens Yes      Skip meals Yes   Grazes all day No      Snacks between meals. No      Eat most food at end of day. Yes      Eat in middle of night  No      Eat  to prevent or correct low blood sugar. No      Eat to prevent GERD No      Worry about not having enough food to eat. No       Constant Dieter No      I emotionally eat. (stressed, depressed, anxious, bored) Yes, depressed or bored      I feel hungry all the time-even if I just have eaten. No      Feeling full is important to me. No      I finish all the food on my plate-even if I am already full. No      I eat/snack without noticing that I am eating. No      I eat when I am preparing the meal. No      I have trouble not eating junk if they are around the house. Yes      I think about food all day. No      Who does the grocery shopping? Pt   Who does the cooking? Pt       Eating Disorder Screen    I binge eat No          Activity/Exercise History    How much of a typical 12 hour day do you spend sitting or lying down? Most of the day.  Even sits when she does the dishes.    How many hours of screen time do you have daily? Most of the day it is on but often for the noise.    How many times a week are you active for the purpose of exercise? None   What do you do for exercise? None   For how long to your exercise for? None   What keeps you from being more active? Pain       Past Medical History:   Diagnosis Date     Depression       Duodenal ulcer 03/04/2019     Essential hypertension       GERD (gastroesophageal reflux disease)      Hiatal hernia      Hypothyroidism      Migraine without aura and without status migrainosus, not intractable  Aug 2016     Obesity      NELY on CPAP      Osteoarthritis      Paroxysmal atrial fibrillation (H) 01/05/2015     RLS (restless legs syndrome)      Symptomatic menopausal or female climacteric states (aka FLASHES)      TMJ disease       Past Surgical History:   Procedure Laterality Date     APPENDECTOMY       CHOLECYSTECTOMY       COLONOSCOPY N/A 4/12/2019    Procedure: COLONOSCOPY;  Surgeon: Vahid Cardona MD;  Location:  GI     EP LEFT ATRIAL APPENDAGE CLOSURE N/A 10/8/2020    Procedure: EP Left Atrial Appendage Closure;  Surgeon: Constantin Vaughn MD;  Location:  HEART CARDI[facility] CATH LAB      ESOPHAGOSCOPY, GASTROSCOPY, DUODENOSCOPY (EGD), COMBINED N/A 2019    Procedure: COMBINED ESOPHAGOSCOPY, GASTROSCOPY, DUODENOSCOPY (EGD);  Surgeon: Ben Ko MD;  Location:  GI     HYSTERECTOMY, [facility]  1999     TONSILLECTOMY         Work/Social History Reviewed With Patient    My employment status is: retired   What is your marital status?    Who do you live with?  Lives alone in an apartment.    Are they supportive of your health goals?    Who does the food shopping?  Pt    Enjoys sewing, quilting.  Goes to a [employer] class with a friend.      Social History     Tobacco Use     Smoking status: Former Smoker     Packs/day: 0.25     Years: 1.00     Pack years: 0.25     Types: Cigarettes     Start date:      Quit date:      Years since quittin.4     Smokeless tobacco: Never Used     Tobacco comment: very minimal smoking history   Substance Use Topics     Alcohol use: No     Alcohol/week: 0.0 standard drinks     Drug use: No        MEDICATIONS:   Current Outpatient Medications   Medication     acetaminophen (TYLENOL) 500 MG tablet     aspirin (ASA) 81 MG EC tablet     diphenhydrAMINE HCl (ALLERGY MEDICATION PO)     flecainide (TAMBOCOR) 150 MG tablet     gabapentin (NEURONTIN) 300 MG capsule     levothyroxine (SYNTHROID/LEVOTHROID) 137 MCG tablet     liraglutide (VICTOZA) 18 MG/3ML solution     losartan (COZAAR) 100 MG tablet     metoprolol succinate ER (TOPROL-XL) 25 MG 24 hr tablet     pantoprazole (PROTONIX) 40 MG EC tablet     rosuvastatin (CRESTOR) 10 MG tablet     SUMAtriptan (IMITREX) 100 MG tablet     triamterene-HCTZ (MAXZIDE-25) 37.5-25 MG tablet     order for DME     No current facility-administered medications for this visit.        ALLERGIES:      Allergies   Allergen Reactions     Morphine Anaphylaxis     Ceclor [Cefaclor] Hives     Cymbalta      Foggy head feeling     Diltiazem Hives     Lisinopril Cough     Sertraline Nausea and Vomiting         ROS:    Cardiovascular  CAD:   no  Palpitations:   Yes, A fib, on Warfarin  HTN:    Yes, HTN  Gastrointestinal  GERD:   Yes, controlled on pantroprazole  Constipation:   no  Gastroparesis:  no  Liver Dz:   no  H/O Pancreatitis:  No  S/P Cholecystomy.   Psychiatric  Moods Stable:  yes  Endocrine  PMH/FMH of MTC or MEN2:  no  Nephro  Kidney disease:  no      Labs/Records Reviewed:  Hemoglobin A1C POCT   Date Value Ref Range Status   03/13/2019 5.9 (H) 0 - 5.6 % Final     Comment:     Normal <5.7% Prediabetes 5.7-6.4%  Diabetes 6.5% or higher - adopted from ADA   consensus guidelines.       Hemoglobin A1C   Date Value Ref Range Status   03/14/2022 6.6 (H) 0.0 - 5.6 % Final     Comment:     Normal <5.7%   Prediabetes 5.7-6.4%    Diabetes 6.5% or higher     Note: Adopted from ADA consensus guidelines.     TSH   Date Value Ref Range Status   03/14/2022 1.35 0.40 - 4.00 mU/L Final   03/11/2021 0.45 0.40 - 4.00 mU/L Final     Sodium   Date Value Ref Range Status   03/14/2022 138 133 - 144 mmol/L Final   03/11/2021 137 133 - 144 mmol/L Final     Potassium   Date Value Ref Range Status   03/14/2022 3.9 3.4 - 5.3 mmol/L Final   03/11/2021 4.0 3.4 - 5.3 mmol/L Final     Chloride   Date Value Ref Range Status   03/14/2022 103 94 - 109 mmol/L Final   03/11/2021 103 94 - 109 mmol/L Final     Carbon Dioxide   Date Value Ref Range Status   03/11/2021 27 20 - 32 mmol/L Final     Carbon Dioxide (CO2)   Date Value Ref Range Status   03/14/2022 27 20 - 32 mmol/L Final     Anion Gap   Date Value Ref Range Status   03/14/2022 8 3 - 14 mmol/L Final   03/11/2021 7 3 - 14 mmol/L Final     Glucose   Date Value Ref Range Status   03/14/2022 129 (H) 70 - 99 mg/dL Final   03/11/2021 100 (H) 70 - 99 mg/dL Final     Urea Nitrogen   Date Value Ref Range Status   03/14/2022 11 7 - 30 mg/dL Final   03/11/2021 12 7 - 30 mg/dL Final     Creatinine   Date Value Ref Range Status   03/14/2022 0.80 0.52 - 1.04 mg/dL Final   03/11/2021 0.90 0.52 - 1.04  mg/dL Final     GFR Estimate   Date Value Ref Range Status   03/14/2022 81 >60 mL/min/1.73m2 Final     Comment:     Effective December 21, 2021 eGFRcr in adults is calculated using the 2021 CKD-EPI creatinine equation which includes age and gender (Bruna riddle al., [place], DOI: 10.1056/MUZAoi1797731)   03/11/2021 67 >60 mL/min/[1.73_m2] Final     Comment:     Non  GFR Calc  Starting 12/18/2018, serum creatinine based estimated GFR (eGFR) will be   calculated using the Chronic Kidney Disease Epidemiology Collaboration   (CKD-EPI) equation.       Calcium   Date Value Ref Range Status   03/14/2022 9.2 8.5 - 10.1 mg/dL Final   03/11/2021 9.5 8.5 - 10.1 mg/dL Final     Bilirubin Total   Date Value Ref Range Status   03/14/2022 0.4 0.2 - 1.3 mg/dL Final   03/11/2021 0.5 0.2 - 1.3 mg/dL Final     Alkaline Phosphatase   Date Value Ref Range Status   03/14/2022 100 40 - 150 U/L Final   03/11/2021 104 40 - 150 U/L Final     ALT   Date Value Ref Range Status   03/14/2022 21 0 - 50 U/L Final   03/11/2021 19 0 - 50 U/L Final     AST   Date Value Ref Range Status   03/14/2022 12 0 - 45 U/L Final   03/11/2021 13 0 - 45 U/L Final     Cholesterol   Date Value Ref Range Status   03/14/2022 158 <200 mg/dL Final   03/11/2021 128 <200 mg/dL Final     HDL Cholesterol   Date Value Ref Range Status   03/11/2021 64 >49 mg/dL Final     Direct Measure HDL   Date Value Ref Range Status   03/14/2022 64 >=50 mg/dL Final     LDL Cholesterol Calculated   Date Value Ref Range Status   03/14/2022 48 <=100 mg/dL Final   03/11/2021 24 <100 mg/dL Final     Comment:     Desirable:       <100 mg/dl     Triglycerides   Date Value Ref Range Status   03/14/2022 229 (H) <150 mg/dL Final   03/11/2021 201 (H) <150 mg/dL Final     Comment:     Borderline high:  150-199 mg/dl  High:             200-499 mg/dl  Very high:       >499 mg/dl       WBC   Date Value Ref Range Status   03/11/2021 11.3 (H) 4.0 - 11.0 10e9/L Final     WBC Count   Date Value Ref  "Range Status   03/14/2022 9.3 4.0 - 11.0 10e3/uL Final     Hemoglobin   Date Value Ref Range Status   03/14/2022 12.3 11.7 - 15.7 g/dL Final   03/11/2021 12.2 11.7 - 15.7 g/dL Final     Hematocrit   Date Value Ref Range Status   03/14/2022 40.3 35.0 - 47.0 % Final   03/11/2021 39.9 35.0 - 47.0 % Final     MCV   Date Value Ref Range Status   03/14/2022 88 78 - 100 fL Final   03/11/2021 88 78 - 100 fl Final     Platelet Count   Date Value Ref Range Status   03/14/2022 418 150 - 450 10e3/uL Final   03/11/2021 427 150 - 450 10e9/L Final        PHYSICAL EXAM:  Ht 5' 9\" (1.753 m)   Wt 195 lb (88.5 kg)   BMI 28.80 kg/m    GENERAL: Healthy, alert and no distress  EYES: Eyes grossly normal to inspection.  No discharge or erythema, or obvious scleral/conjunctival abnormalities.  RESP: No audible wheeze, cough, or visible cyanosis.  No visible retractions or increased work of breathing.    SKIN: Visible skin clear. No significant rash, abnormal pigmentation or lesions.  NEURO: Cranial nerves grossly intact.  Mentation and speech appropriate for age.  PSYCH: Mentation appears normal, affect normal/bright, judgement and insight intact, normal speech and appearance well-groomed.    COUNSELING:   Reviewed obesity as a chronic disease and comprehensive management stratagies.      We discussed Bariatric Basics including:  -eating 3 meals daily  -eating protein first  -eating slowly, chewing food well  -avoiding/limiting calorie containing beverages  -limiting carbohydrates and changing to whole grains  -limiting restaurant or cafeteria eating to twice a week or less    We discussed the importance of restorative sleep and stress management in maintaining a healthy weight.  We discussed insulin resistance and glycemic index as it relates to appetite and weight control.   We discussed the importance of physical activity including cardiovascular and strength training in maintaining a healthier weight and explored viable " options.  Patient education of above written in AVS.      Sincerely,      Blessing Nix PA-C

## 2022-05-24 ENCOUNTER — VIRTUAL VISIT (OUTPATIENT)
Dept: SURGERY | Facility: CLINIC | Age: 67
End: 2022-05-24
Payer: COMMERCIAL

## 2022-05-24 VITALS — BODY MASS INDEX: 48.55 KG/M2 | HEIGHT: 63 IN | WEIGHT: 274 LBS

## 2022-05-24 DIAGNOSIS — E66.01 MORBID OBESITY (H): ICD-10-CM

## 2022-05-24 PROCEDURE — 97802 MEDICAL NUTRITION INDIV IN: CPT | Mod: 95 | Performed by: DIETITIAN, REGISTERED

## 2022-05-24 NOTE — PATIENT INSTRUCTIONS
"Kostas Mcknight!      It was great meeting with you today! Here are some links to the handouts I referenced:        Protein Sources:  [email or web address]     Fiber Sources:  [email or web address]     My Plate:  https://www.choosemyplate.gov/        A helpful search term to type into Locality, Resultly, etc is \"myplate examples.\"     Key points from today:  Eat 3 meals/day at consistent intervals  Shoot for 60-90g protein (20-30g/meal)  Aim for 25-35g fiber (5-10g/meal)  Eat slowly: 20-30 minutes per meal     Here is a summary of the goals that we discussed:     1. Eat meals off a smaller plate  - Try a salad plate - this is the perfect size to help guide your portions     2. Include at least 2 food groups at each meal  - Always include a protein, then add vegetables, fruits and whole grain starches        Let's plan on following up in 1-2 months. This can be scheduled via our call center at . Of course, reach out sooner if you have any questions or concerns. Have a great week and welcome to the program!        Zee Nevarez, TERRANCE, LD?  Clinical Dietitian   "

## 2022-05-24 NOTE — PROGRESS NOTES
Roxanna is a 66 year old who is being evaluated via a billable video visit.      How would you like to obtain your AVS? MyChart  Will anyone else be joining your video visit? No      Video Start Time: 2:55pm          Video-Visit Details    Type of service:  Video Visit    Video End Time:3:36pm    Originating Location (pt. Location): Home    Distant Location (provider location):  [facility] [place]     Platform used for Video Visit: [facility] WEIGHT LOSS INITIAL EVALUATION  DIAGNOSIS:  Obesity Class III    NUTRITION HISTORY:  Diet recall per provider visit on 5/23/2022 as follows:    Diet Recall      Wake Up: 5:30 am    Breakfast .8-9 am 2 English muffins, 3 Turkey sausages, egg   Lunch None   Supper; Sushi    Bedtime:   Snack between meals:   Snack in evening:            Apple,       Typical snacks: Fruits, Leftovers   Caloric beverages per day. What type: None   Water consumed daily: Greater than 40 oz.    Low karen/diet drinks:   Alcohol:   Foods you crave:        None   Peach rings candy         Pt makes her own meals.  If she gets paid or gets her food stamps she gets Cub chicken with Slaw.  Pt states her biggest problem is she overeats stuffing, white potatoes, potato salad.     12 Grain bread Aldi.         Sleep History     How many hours do you sleep at night?  10:00-10:30 PM   Do you think you have sleep apnea?  Yes, has NELY   Do you snore so loudly some one can hear you through a closed door? Had cpap but it was recalled, needs new one but has not gotten one yet. Has not followed up regarding this- forgot about this.            Eating Habits (Yes/No) (Never, Daily, Several Days, Weekly,Monthly)   Healthy Eater Yes      Generally, eat a lot of Simple Carbs/processed boxed foods Yes   Generally, eat a lot of fatty foods No   Eat fast food  Never      Eat Take out/sit-down restaurant. Every 2 weeks      Eat most meals in front of screens Yes      Skip meals Yes   Ronal  "all day No      Snacks between meals. No      Eat most food at end of day. Yes      Eat in middle of night  No      Eat  to prevent or correct low blood sugar. No      Eat to prevent GERD No      Worry about not having enough food to eat. No      Constant Dieter No      I emotionally eat. (stressed, depressed, anxious, bored) Yes, depressed or bored      I feel hungry all the time-even if I just have eaten. No      Feeling full is important to me. No      I finish all the food on my plate-even if I am already full. No      I eat/snack without noticing that I am eating. No      I eat when I am preparing the meal. No      I have trouble not eating junk if they are around the house. Yes      I think about food all day. No      Who does the grocery shopping? Pt   Who does the cooking? Pt         Eating Disorder Screen     I binge eat No            Activity/Exercise History     How much of a typical 12 hour day do you spend sitting or lying down? Most of the day.  Even sits when she does the dishes.    How many hours of screen time do you have daily? Most of the day it is on but often for the noise.    How many times a week are you active for the purpose of exercise? None   What do you do for exercise? None   For how long to your exercise for? None   What keeps you from being more active? Pain           Other: Pt struggles with knowing what to eat and how much to eat. Many appropriate questions today. Starting Victoza.     ANTHROPOMETRICS:  Height: 5' 3\"   Weight: 274 lbs 0 oz   BMI:  48.54 kg/m2  NUTRITION DIAGNOSIS:   Obese, class III related to excess energy intake as evidence by BMI of  48.54 kg/m2.   NUTRITION INTERVENTIONS  Nutrition Prescription:  Recommend modified energy- nutrient intake  Implementation:  Nutrition Education (Content):    Discussed portion sizes and plate method    Provided handouts: Protein Sources for Weight Loss, Fiber Content in Food, Plate Method    Nutrition Education (Application): "     Patient to practice goals as stated below    Patient verbalizes understanding of diet by stating she will eat meals off smaller plate    Anticipate good compliance    Goals:  Eat meals off smaller plate  Include at least 2 food groups at each meal; always including a protein    FOLLOW UP AND MONITORING:    Other  - follow up in 4-8 weeks.     TIME SPENT WITH PATIENT:   41 minutes       Zee Nevarez RD, LD  Clinical Dietitian

## 2022-05-26 DIAGNOSIS — K92.2 ACUTE GI BLEEDING: ICD-10-CM

## 2022-05-26 RX ORDER — PANTOPRAZOLE SODIUM 40 MG/1
40 TABLET, DELAYED RELEASE ORAL DAILY
Qty: 90 TABLET | Refills: 3 | Status: SHIPPED | OUTPATIENT
Start: 2022-05-26 | End: 2023-08-18

## 2022-05-27 ENCOUNTER — TELEPHONE (OUTPATIENT)
Dept: SURGERY | Facility: CLINIC | Age: 67
End: 2022-05-27
Payer: COMMERCIAL

## 2022-05-27 DIAGNOSIS — E66.01 MORBID OBESITY (H): Primary | ICD-10-CM

## 2022-05-27 NOTE — TELEPHONE ENCOUNTER
Pt states she did not receive needles for prescription, [facility], [phone] / ok to leave detailed msg

## 2022-06-16 ENCOUNTER — PATIENT OUTREACH (OUTPATIENT)
Dept: NURSING | Facility: CLINIC | Age: 67
End: 2022-06-16
Payer: COMMERCIAL

## 2022-06-16 ASSESSMENT — ACTIVITIES OF DAILY LIVING (ADL): DEPENDENT_IADLS:: TRANSPORTATION;SHOPPING

## 2022-06-16 NOTE — PROGRESS NOTES
Care Coordination Clinician Chart Review     Situation: Patient chart reviewed by care coordinator.?     Background: Initial assessment and enrollment to Care Coordination was 10/20/2021.?? Patient centered goals were developed with participation from patient.? Lead CC handed patient off to CHW for continued outreach every 30 days.??     Assessment: Per chart review, patient outreach completed by CC CHW/RNCC  on 6/16/2022.? Patient is actively working to accomplish goal(s).? Patient's goal(s) remain(s) appropriate at this time.? Patient is not due for updated Plan of Care.    Annual assessment will be due 10/2022.      Goals        1. Medical (pt-stated)       Goal Statement: I will complete my annual wellness visit and care gaps.  Date Goal set: 5/6/2022  Barriers: Time; busy with sewing projects  Strengths: Strong advocate for self  Date to Achieve By: 11/6/2022  Patient expressed understanding of goal: yes  Action steps to achieve this goal:  1. I will schedule my annual wellness visit; [id number] ([phone])  2. I will attend my annual wellness visit.  3. I will contact my Care Management or clinic team if I have barriers to attending my annual wellness visit.     Annual Assessment Due: 10/2022          ??   Plan/Recommendations: The patient will continue working with Care Coordination to achieve above goal(s).? CHW will involve Lead CC as needed or if patient is ready to move to maintenance.? Lead CC will continue to monitor CHW s monthly outreaches and progress to goal(s) every 6 weeks.?     Plan of Care updated and sent to patient: No     Lizet Krishnan RN, BSN, PHN  Primary Care / Care Coordinator   [facility]  E-mail [email or web address]   [phone]

## 2022-06-21 ENCOUNTER — PATIENT OUTREACH (OUTPATIENT)
Dept: GERIATRIC MEDICINE | Facility: CLINIC | Age: 67
End: 2022-06-21
Payer: COMMERCIAL

## 2022-06-21 NOTE — PROGRESS NOTES
[facility] Care Coordination Contact    Call placed to member to follow up on EW services; member stated that she no longer requires assistance with shopping because friends from [employer] are assisting her. Member stated that she may be interested in a PERS, she inquired on pricing. Explained that the monthly estimate is around $50, but CC can confirm. Member does not wish to purse homemaking or MOW due to costs (member has a waiver obligation). Plan: CC will f/u with [employer] [facility] pricing for PERS, member has not had any falls within the past 5 years, she utilizes a cell phone. Explained that if she decides not to pursue an EW services, the EW will need to be closed.    VM left with [employer] [facility] requesting a return call on pricing for standard mobile PERS  Jaymie Coats RN, BC  Manager [facility] Care Coordinator   [phone]  (Fax)

## 2022-06-23 ENCOUNTER — PATIENT OUTREACH (OUTPATIENT)
Dept: CARE COORDINATION | Facility: CLINIC | Age: 67
End: 2022-06-23

## 2022-06-23 NOTE — LETTER
[facility] CARE COORDINATION  [address]  [place] [address]    June 23, 2022    Maxine Sears  [address]   [place] [address]      Dear Maxine,    I have been unsuccessful in reaching you since our last contact. At this time the Care Coordination team will make no further attempts to reach you, however this does not change your ability to continue receiving care from your providers at your primary care clinic. If you need additional support from a care coordinator in the future please contact [facility]   at [phone].    All of us at [facility] are invested in your health and are here to assist you in meeting your goals.     Sincerely,       Lizet Krishnan RN, BSN, PHN  Primary Care / Care Coordinator   [facility]  E-mail [email or web address]   [phone]

## 2022-06-23 NOTE — PROGRESS NOTES
Clinic Care Coordination Contact  Care Team Conversations    RNCC received an in-basket message from W stating that patient is working with [employer] and is not eligible for clinic care coordination; therefore, RNCC will disenroll patient f  rom clinic care coordination and send disenrollment letter to patient via [employer].     Lizet Krishnan RN, BSN, PHN  Primary Care / Care Coordinator   [facility]  E-mail [email or web address]   [phone]

## 2022-06-23 NOTE — PROGRESS NOTES
Clinic Care Coordination Contact  Care Team Conversations    CHW reviewed chart covering for Carmen Mims. This patient is enrolled with [facility] therefore she is not eligible to be working with Clinic Care Coordination too. Routing to lead CC to review and disenroll if appropriate.     TRINIDAD Birmingham, B.S. [facility]  Clinic Care Coordination  [facility]:  [place], [place] and [place]  [phone]  [email or web address]

## 2022-06-24 NOTE — PROGRESS NOTES
[facility] Care Coordination Contact    Rec'd [facility] regarding pricing, their Mini is cell phone compatible and also has GPS. Install $65, monthly fee $45  Call placed to member to share above information, she would like to think about and will call CC early next month.  Jaymie Coats RN, BC  Manager [facility] Care Coordinator   [phone]  (Fax)

## 2022-07-06 ENCOUNTER — VIRTUAL VISIT (OUTPATIENT)
Dept: PHARMACY | Facility: CLINIC | Age: 67
End: 2022-07-06
Payer: COMMERCIAL

## 2022-07-06 DIAGNOSIS — J30.2 SEASONAL ALLERGIES: ICD-10-CM

## 2022-07-06 DIAGNOSIS — I48.0 PAROXYSMAL ATRIAL FIBRILLATION (H): ICD-10-CM

## 2022-07-06 DIAGNOSIS — E11.9 TYPE 2 DIABETES MELLITUS WITHOUT COMPLICATION, WITHOUT LONG-TERM CURRENT USE OF INSULIN (H): Primary | ICD-10-CM

## 2022-07-06 DIAGNOSIS — E03.9 HYPOTHYROIDISM, UNSPECIFIED TYPE: ICD-10-CM

## 2022-07-06 DIAGNOSIS — K21.9 GASTROESOPHAGEAL REFLUX DISEASE, UNSPECIFIED WHETHER ESOPHAGITIS PRESENT: ICD-10-CM

## 2022-07-06 DIAGNOSIS — G43.009 MIGRAINE WITHOUT AURA AND WITHOUT STATUS MIGRAINOSUS, NOT INTRACTABLE: ICD-10-CM

## 2022-07-06 DIAGNOSIS — I10 ESSENTIAL HYPERTENSION: ICD-10-CM

## 2022-07-06 DIAGNOSIS — E78.5 HYPERLIPIDEMIA LDL GOAL <100: ICD-10-CM

## 2022-07-06 DIAGNOSIS — G25.81 RLS (RESTLESS LEGS SYNDROME): ICD-10-CM

## 2022-07-06 PROCEDURE — 99207 PR NO CHARGE LOS: CPT | Performed by: PHARMACIST

## 2022-07-06 NOTE — PROGRESS NOTES
Medication Therapy Management (MTM) Encounter    ASSESSMENT:                            Medication Adherence/Access: No issues identified    Type 2 Diabetes: A1c at goal < 7%. Weigh loss progressing, reasonable to continue. Would benefit from using miralax daily as needed for constipation.     Hyperlipidemia: Stable. LDL < 100.     Afib/Hypertension: blood pressure stable.     GERD: Stable.     Allergic Rhinitis: Needs improvement. Discussed alternative antihistamine options that could be considered such as loratadine that have less cognitive impact (diphenhydramine on [employer]), patient preferred to conitnue     Hypothyroidism: Stable. TSH within normal limits.     RLS: Stable.     Migraines: Stable.     PLAN:                            1. Use Miralax 1 capful daily as needed.     Follow-up: yearly comprehensive review of medications or as needed     SUBJECTIVE/OBJECTIVE:                          Maxine Sears is a 66 year old female contacted via secure video for an initial visit. She was referred to me from Blessing Nix PA-C.      Reason for visit: Victoza start follow up - patient reports constipation.     Allergies/ADRs: Reviewed in chart  Past Medical History: Reviewed in chart  Tobacco: She reports that she quit smoking about 50 years ago. Her smoking use included cigarettes. She started smoking about 51 years ago. She has a 0.25 pack-year smoking history. She has never used smokeless tobacco.  Alcohol: not currently using    Medication Adherence/Access: no issues reported    Type 2 Diabetes:   Victoza 1.8 mg daily     Started Victoza to help with weight loss. Patient is experiencing the following side effects: constipation, bowel movement every 4-7 days. Baseline is ~2-3 bowel movements per week. Gets in 64+ oz water daily. Has been eating more fruits and vegetables.  Is noticing that foods are more sensitive to certain foods - breads, beef - upset stomach. Getting full faster since starting  "Victoza. Snacking on healthy options - fruit. Would prefer to continue Victoza at current dose.   Blood sugar monitoring: never.   Symptoms of low blood sugar? none  Symptoms of high blood sugar? none  Eye exam: up to date, due 1/2023  Foot exam: due  Aspirin: Taking 81mg daily and denies side effects   Statin: Yes: rosuvastatin 10 mg daily   ACEi/ARB: Yes: Losartan 100 mg daily.   Urine Albumin: No results found for: UMALCR   Lab Results   Component Value Date    A1C 6.6 03/14/2022    A1C 5.9 03/13/2019    A1C 6.3 11/12/2018     Weight: 258 lb   Wt Readings from Last 4 Encounters:   05/24/22 274 lb (124.3 kg)   05/23/22 274 lb (124.3 kg)   03/24/22 273 lb 6.4 oz (124 kg)   03/15/22 274 lb (124.3 kg)     Estimated body mass index is 48.54 kg/m  as calculated from the following:    Height as of 5/24/22: 5' 3\" (1.6 m).    Weight as of 5/24/22: 274 lb (124.3 kg).    Hyperlipidemia:   Rosustatin 10mg daily.      Patient reports no significant myalgias or other side effects.    Recent Labs   Lab Test 03/14/22  1035 03/11/21  1534 04/24/17  1535 09/24/15  1039 10/14/14  1546   CHOL 158 128   < > 215* 211*   HDL 64 64   < > 46* 48*   LDL 48 24   < > 111 87   TRIG 229* 201*   < > 291* 378*   CHOLHDLRATIO  --   --   --  4.7 4.4    < > = values in this interval not displayed.     Afib/Hypertension: Patient is currently taking aspirin 81mg for anticoagulation. Patient reports no current concerns of bruising or bleeding. Patient does have a history of GI bleed. 3/2019 - duodenal ulcer - and rehospitalization 4/2019 with lower gastrointestinal bleed, Xarelto stopped at that time. We restarted Xarelto 20 mg daily 10/2019 but Hgb dropped >1 gram in ~1 month. Had been kept off of AC despite CHADSVASc 2 (HTN, sex). Hx LAAO Watchman in 2020.   Patient is also taking Flecainide 150 mg twice daily, losartan 100 mg daily, metoprolol ER 12.5 mg daily, triamterene-hydrochlorothiazide 37.5-25 mg daily. Patient reports no current medication " "side effects.   Patient does self-monitor blood pressure, but just moved a couple weeks ago and hasn't unpacked that box so unsure of what current blood pressure is.  [id number] Score    Date Calculated: 4/30/2019  3:58 PM     4 (Age (65-74), female, HTN, diabetes)     Specialist: Cardiology: Vikram, last seen 3/2022.     BP Readings from Last 3 Encounters:   03/24/22 138/81   03/15/22 128/76   03/08/22 128/80     GERD:   Protonix (pantoprazole) 40 mg once daily.     Patient reports no current symptoms. The patient does have a history of GI bleed, see above. Patient feels that current regimen is effective.    Allergic Rhinitis:   Diphenhydramine 25mg once daily    Primary symptoms are runny nose, sneezing and itchy/watery eyes. Patient feels that current therapy is effective. Reports the other allergy pills are too expensive so prefers to take the diphenhydramine daily from June to September. Doesn't take other times of year. Takes in AM, reports no greater drowsiness when takes.      Hypothyroidism:   levothyroxine 137 mcg daily. Patient is having the following symptoms: none.   TSH   Date Value Ref Range Status   03/14/2022 1.35 0.40 - 4.00 mU/L Final   03/11/2021 0.45 0.40 - 4.00 mU/L Final     RLS:   Gabapentin 600 mg nightly     Reports symptoms are \"pretty well\" managed \"for the most part\". Medication side effects: none. She finds if she is overtired then will move more so tries to ensure getting adequate sleep.     Migraines:   Abortive  Sumatriptan 100 mg as neede  acetaminophen 1000 mg as needed, no more than 2000 mg per day     Will use acetaminophen for headaches and sometimes and lower back when hurts. Does find somewhat helpful. Hasn't had a migraine to the point of taking the sumatriptan for at least 6 months. Has been effective historically when needed.   ----------------      I spent 30 minutes with this patient today. All changes were made via collaborative practice agreement with Blessing Nix. " TINA. A copy of the visit note was provided to the patient's provider(s).    The patient was sent via [employer] a summary of these recommendations.     Lauren Bloch, PharmD, BCACP   Medication Therapy Management Pharmacist   [facility]    Telemedicine Visit Details  Type of service:  Telephone visit  Start Time: 1:04 PM  End Time: 1:34 PM  Originating Location (patient location): Home  Distant Location (provider location):  [facility]     Medication Therapy Recommendations  Diabetes mellitus, type 2 (H)    Current Medication: liraglutide (VICTOZA) 18 MG/3ML solution   Rationale: Undesirable effect - Adverse medication event - Safety   Recommendation: Start Medication - MiraLax 17 GM/SCOOP Powd   Status: Accepted - no CPA Needed         Seasonal allergies    Current Medication: diphenhydrAMINE HCl (ALLERGY MEDICATION PO)   Rationale: Unsafe medication for the patient - Adverse medication event - Safety   Recommendation: Change Medication - loratadine 10 MG tablet   Status: Declined per Patient

## 2022-07-06 NOTE — PATIENT INSTRUCTIONS
"Recommendations from today's MTM visit:                                                    MTM (medication therapy management) is a service provided by a clinical pharmacist designed to help you get the most of out of your medicines.   Today we reviewed what your medicines are for, how to know if they are working, that your medicines are safe and how to make your medicine regimen as easy as possible.      1. Use Miralax 1 capful daily as needed.     Follow-up: yearly comprehensive review of medications or as needed     It was great speaking with you today.  I value your experience and would be very thankful for your time in providing feedback in our clinic survey. In the next few days, you may receive an email or text message from [facility] [employer] with a link to a survey related to your  clinical pharmacist.\"     My Clinical Pharmacist's contact information:                                                      Please feel free to contact me with any questions or concerns you have.      Lauren Bloch, PharmD  Medication Therapy Management Pharmacist   [facility] [place]             "

## 2022-07-09 ENCOUNTER — HEALTH MAINTENANCE LETTER (OUTPATIENT)
Age: 67
End: 2022-07-09

## 2022-07-11 ENCOUNTER — PATIENT OUTREACH (OUTPATIENT)
Dept: GERIATRIC MEDICINE | Facility: CLINIC | Age: 67
End: 2022-07-11

## 2022-07-11 NOTE — PROGRESS NOTES
[facility] Care Coordination Contact    Member changed health plan products from [facility] [employer] to [facility] [employer] effective 7/1/22. CC to complete items on Product Change/ Health Plan Change check list including MMIS entry. CMS verified MNits & health plan eligibility and other required tasks.    Radha Jiang  Care Management Specialist   [facility]   [phone]

## 2022-07-12 ENCOUNTER — APPOINTMENT (OUTPATIENT)
Dept: CT IMAGING | Facility: CLINIC | Age: 67
End: 2022-07-12
Attending: EMERGENCY MEDICINE
Payer: COMMERCIAL

## 2022-07-12 ENCOUNTER — HOSPITAL ENCOUNTER (OUTPATIENT)
Facility: CLINIC | Age: 67
Setting detail: OBSERVATION
Discharge: HOME | End: 2022-07-14
Attending: EMERGENCY MEDICINE | Admitting: INTERNAL MEDICINE
Payer: COMMERCIAL

## 2022-07-12 DIAGNOSIS — R94.31 ABNORMAL ELECTROCARDIOGRAM: ICD-10-CM

## 2022-07-12 DIAGNOSIS — E87.6 HYPOKALEMIA: ICD-10-CM

## 2022-07-12 DIAGNOSIS — E03.9 HYPOTHYROIDISM, UNSPECIFIED TYPE: Primary | ICD-10-CM

## 2022-07-12 DIAGNOSIS — I10 ESSENTIAL HYPERTENSION: ICD-10-CM

## 2022-07-12 DIAGNOSIS — R53.1 GENERALIZED WEAKNESS: ICD-10-CM

## 2022-07-12 LAB
ALBUMIN SERPL-MCNC: 3.4 G/DL (ref 3.4–5)
ALBUMIN UR-MCNC: NEGATIVE MG/DL
ALP SERPL-CCNC: 77 U/L (ref 40–150)
ALT SERPL W P-5'-P-CCNC: 18 U/L (ref 0–50)
ANION GAP SERPL CALCULATED.3IONS-SCNC: 8 MMOL/L (ref 3–14)
APPEARANCE UR: CLEAR
AST SERPL W P-5'-P-CCNC: 13 U/L (ref 0–45)
ATRIAL RATE - MUSE: 69 BPM
BACTERIA #/AREA URNS HPF: ABNORMAL /HPF
BASOPHILS # BLD AUTO: 0.1 10E3/UL (ref 0–0.2)
BASOPHILS NFR BLD AUTO: 1 %
BILIRUB SERPL-MCNC: 1.1 MG/DL (ref 0.2–1.3)
BILIRUB UR QL STRIP: NEGATIVE
BUN SERPL-MCNC: 27 MG/DL (ref 7–30)
CALCIUM SERPL-MCNC: 8.8 MG/DL (ref 8.5–10.1)
CHLORIDE BLD-SCNC: 102 MMOL/L (ref 94–109)
CO2 SERPL-SCNC: 24 MMOL/L (ref 20–32)
COLOR UR AUTO: ABNORMAL
CREAT SERPL-MCNC: 2.53 MG/DL (ref 0.52–1.04)
DIASTOLIC BLOOD PRESSURE - MUSE: NORMAL MMHG
EOSINOPHIL # BLD AUTO: 0.2 10E3/UL (ref 0–0.7)
EOSINOPHIL NFR BLD AUTO: 2 %
ERYTHROCYTE [DISTWIDTH] IN BLOOD BY AUTOMATED COUNT: 16.8 % (ref 10–15)
GFR SERPL CREATININE-BSD FRML MDRD: 20 ML/MIN/1.73M2
GLUCOSE BLD-MCNC: 107 MG/DL (ref 70–99)
GLUCOSE BLDC GLUCOMTR-MCNC: 100 MG/DL (ref 70–99)
GLUCOSE BLDC GLUCOMTR-MCNC: 119 MG/DL (ref 70–99)
GLUCOSE UR STRIP-MCNC: NEGATIVE MG/DL
HCT VFR BLD AUTO: 38.3 % (ref 35–47)
HGB BLD-MCNC: 12.2 G/DL (ref 11.7–15.7)
HGB UR QL STRIP: NEGATIVE
IMM GRANULOCYTES # BLD: 0 10E3/UL
IMM GRANULOCYTES NFR BLD: 0 %
INTERPRETATION ECG - MUSE: NORMAL
KETONES UR STRIP-MCNC: NEGATIVE MG/DL
LEUKOCYTE ESTERASE UR QL STRIP: ABNORMAL
LYMPHOCYTES # BLD AUTO: 2.5 10E3/UL (ref 0.8–5.3)
LYMPHOCYTES NFR BLD AUTO: 28 %
MAGNESIUM SERPL-MCNC: 2.3 MG/DL (ref 1.6–2.3)
MCH RBC QN AUTO: 26.8 PG (ref 26.5–33)
MCHC RBC AUTO-ENTMCNC: 31.9 G/DL (ref 31.5–36.5)
MCV RBC AUTO: 84 FL (ref 78–100)
MONOCYTES # BLD AUTO: 0.8 10E3/UL (ref 0–1.3)
MONOCYTES NFR BLD AUTO: 9 %
MUCOUS THREADS #/AREA URNS LPF: PRESENT /LPF
NEUTROPHILS # BLD AUTO: 5.2 10E3/UL (ref 1.6–8.3)
NEUTROPHILS NFR BLD AUTO: 60 %
NITRATE UR QL: NEGATIVE
NRBC # BLD AUTO: 0 10E3/UL
NRBC BLD AUTO-RTO: 0 /100
P AXIS - MUSE: NORMAL DEGREES
PH UR STRIP: 7 [PH] (ref 5–7)
PLATELET # BLD AUTO: 376 10E3/UL (ref 150–450)
POTASSIUM BLD-SCNC: 2.9 MMOL/L (ref 3.4–5.3)
PR INTERVAL - MUSE: 232 MS
PROT SERPL-MCNC: 7.1 G/DL (ref 6.8–8.8)
QRS DURATION - MUSE: 126 MS
QT - MUSE: 454 MS
QTC - MUSE: 486 MS
R AXIS - MUSE: 57 DEGREES
RBC # BLD AUTO: 4.56 10E6/UL (ref 3.8–5.2)
RBC URINE: 2 /HPF
SARS-COV-2 RNA RESP QL NAA+PROBE: NEGATIVE
SODIUM SERPL-SCNC: 134 MMOL/L (ref 133–144)
SP GR UR STRIP: 1.02 (ref 1–1.03)
SQUAMOUS EPITHELIAL: 5 /HPF
SYSTOLIC BLOOD PRESSURE - MUSE: NORMAL MMHG
T AXIS - MUSE: -80 DEGREES
T4 FREE SERPL-MCNC: 1.6 NG/DL (ref 0.76–1.46)
TROPONIN I SERPL HS-MCNC: 3 NG/L
TROPONIN I SERPL HS-MCNC: <3 NG/L
TSH SERPL DL<=0.005 MIU/L-ACNC: 0.02 MU/L (ref 0.4–4)
UROBILINOGEN UR STRIP-MCNC: NORMAL MG/DL
VENTRICULAR RATE- MUSE: 69 BPM
WBC # BLD AUTO: 8.7 10E3/UL (ref 4–11)
WBC URINE: 34 /HPF

## 2022-07-12 PROCEDURE — 99285 EMERGENCY DEPT VISIT HI MDM: CPT | Mod: 25

## 2022-07-12 PROCEDURE — 82962 GLUCOSE BLOOD TEST: CPT

## 2022-07-12 PROCEDURE — 87086 URINE CULTURE/COLONY COUNT: CPT | Performed by: EMERGENCY MEDICINE

## 2022-07-12 PROCEDURE — 83735 ASSAY OF MAGNESIUM: CPT | Performed by: INTERNAL MEDICINE

## 2022-07-12 PROCEDURE — 84439 ASSAY OF FREE THYROXINE: CPT | Performed by: EMERGENCY MEDICINE

## 2022-07-12 PROCEDURE — 84484 ASSAY OF TROPONIN QUANT: CPT | Performed by: INTERNAL MEDICINE

## 2022-07-12 PROCEDURE — 81001 URINALYSIS AUTO W/SCOPE: CPT | Performed by: EMERGENCY MEDICINE

## 2022-07-12 PROCEDURE — 85025 COMPLETE CBC W/AUTO DIFF WBC: CPT | Performed by: EMERGENCY MEDICINE

## 2022-07-12 PROCEDURE — 80053 COMPREHEN METABOLIC PANEL: CPT | Performed by: EMERGENCY MEDICINE

## 2022-07-12 PROCEDURE — 99220 PR INITIAL OBSERVATION CARE,LEVEL III: CPT | Performed by: INTERNAL MEDICINE

## 2022-07-12 PROCEDURE — 84484 ASSAY OF TROPONIN QUANT: CPT | Performed by: EMERGENCY MEDICINE

## 2022-07-12 PROCEDURE — 84443 ASSAY THYROID STIM HORMONE: CPT | Performed by: EMERGENCY MEDICINE

## 2022-07-12 PROCEDURE — G0378 HOSPITAL OBSERVATION PER HR: HCPCS

## 2022-07-12 PROCEDURE — 250N000013 HC RX MED GY IP 250 OP 250 PS 637: Performed by: EMERGENCY MEDICINE

## 2022-07-12 PROCEDURE — 96361 HYDRATE IV INFUSION ADD-ON: CPT

## 2022-07-12 PROCEDURE — 96360 HYDRATION IV INFUSION INIT: CPT | Mod: 59

## 2022-07-12 PROCEDURE — 71275 CT ANGIOGRAPHY CHEST: CPT

## 2022-07-12 PROCEDURE — 93005 ELECTROCARDIOGRAM TRACING: CPT

## 2022-07-12 PROCEDURE — 250N000011 HC RX IP 250 OP 636: Performed by: EMERGENCY MEDICINE

## 2022-07-12 PROCEDURE — 250N000013 HC RX MED GY IP 250 OP 250 PS 637: Performed by: INTERNAL MEDICINE

## 2022-07-12 PROCEDURE — U0003 INFECTIOUS AGENT DETECTION BY NUCLEIC ACID (DNA OR RNA); SEVERE ACUTE RESPIRATORY SYNDROME CORONAVIRUS 2 (SARS-COV-2) (CORONAVIRUS DISEASE [COVID-19]), AMPLIFIED PROBE TECHNIQUE, MAKING USE OF HIGH THROUGHPUT TECHNOLOGIES AS DESCRIBED BY CMS-2020-01-R: HCPCS | Performed by: EMERGENCY MEDICINE

## 2022-07-12 PROCEDURE — 36415 COLL VENOUS BLD VENIPUNCTURE: CPT | Performed by: EMERGENCY MEDICINE

## 2022-07-12 PROCEDURE — 258N000003 HC RX IP 258 OP 636: Performed by: EMERGENCY MEDICINE

## 2022-07-12 PROCEDURE — 258N000003 HC RX IP 258 OP 636: Performed by: INTERNAL MEDICINE

## 2022-07-12 PROCEDURE — 250N000009 HC RX 250: Performed by: EMERGENCY MEDICINE

## 2022-07-12 PROCEDURE — 36415 COLL VENOUS BLD VENIPUNCTURE: CPT | Performed by: INTERNAL MEDICINE

## 2022-07-12 RX ORDER — BISACODYL 10 MG
10 SUPPOSITORY, RECTAL RECTAL DAILY PRN
Status: DISCONTINUED | OUTPATIENT
Start: 2022-07-12 | End: 2022-07-14 | Stop reason: HOSPADM

## 2022-07-12 RX ORDER — SODIUM CHLORIDE, SODIUM LACTATE, POTASSIUM CHLORIDE, CALCIUM CHLORIDE 600; 310; 30; 20 MG/100ML; MG/100ML; MG/100ML; MG/100ML
INJECTION, SOLUTION INTRAVENOUS CONTINUOUS
Status: DISCONTINUED | OUTPATIENT
Start: 2022-07-12 | End: 2022-07-14

## 2022-07-12 RX ORDER — ASPIRIN 81 MG/1
81 TABLET ORAL DAILY
Status: DISCONTINUED | OUTPATIENT
Start: 2022-07-13 | End: 2022-07-14 | Stop reason: HOSPADM

## 2022-07-12 RX ORDER — LIRAGLUTIDE 6 MG/ML
1.8 INJECTION SUBCUTANEOUS DAILY
COMMUNITY
End: 2022-07-26

## 2022-07-12 RX ORDER — PROCHLORPERAZINE MALEATE 5 MG
5 TABLET ORAL EVERY 6 HOURS PRN
Status: DISCONTINUED | OUTPATIENT
Start: 2022-07-12 | End: 2022-07-14 | Stop reason: HOSPADM

## 2022-07-12 RX ORDER — FLECAINIDE ACETATE 150 MG/1
150 TABLET ORAL EVERY 12 HOURS
Status: DISCONTINUED | OUTPATIENT
Start: 2022-07-12 | End: 2022-07-12

## 2022-07-12 RX ORDER — AMOXICILLIN 250 MG
1 CAPSULE ORAL 2 TIMES DAILY PRN
Status: DISCONTINUED | OUTPATIENT
Start: 2022-07-12 | End: 2022-07-14 | Stop reason: HOSPADM

## 2022-07-12 RX ORDER — GABAPENTIN 300 MG/1
600 CAPSULE ORAL AT BEDTIME
Status: DISCONTINUED | OUTPATIENT
Start: 2022-07-12 | End: 2022-07-12

## 2022-07-12 RX ORDER — POTASSIUM CHLORIDE 1.5 G/1.58G
40 POWDER, FOR SOLUTION ORAL ONCE
Status: COMPLETED | OUTPATIENT
Start: 2022-07-12 | End: 2022-07-12

## 2022-07-12 RX ORDER — PROCHLORPERAZINE 25 MG
12.5 SUPPOSITORY, RECTAL RECTAL EVERY 12 HOURS PRN
Status: DISCONTINUED | OUTPATIENT
Start: 2022-07-12 | End: 2022-07-14 | Stop reason: HOSPADM

## 2022-07-12 RX ORDER — FLECAINIDE ACETATE 100 MG/1
100 TABLET ORAL EVERY 24 HOURS
Status: DISCONTINUED | OUTPATIENT
Start: 2022-07-13 | End: 2022-07-13

## 2022-07-12 RX ORDER — GABAPENTIN 300 MG/1
600 CAPSULE ORAL AT BEDTIME
COMMUNITY
End: 2022-10-11 | Stop reason: DRUGHIGH

## 2022-07-12 RX ORDER — ONDANSETRON 4 MG/1
4 TABLET, ORALLY DISINTEGRATING ORAL EVERY 6 HOURS PRN
Status: DISCONTINUED | OUTPATIENT
Start: 2022-07-12 | End: 2022-07-14 | Stop reason: HOSPADM

## 2022-07-12 RX ORDER — AMOXICILLIN 250 MG
2 CAPSULE ORAL 2 TIMES DAILY PRN
Status: DISCONTINUED | OUTPATIENT
Start: 2022-07-12 | End: 2022-07-14 | Stop reason: HOSPADM

## 2022-07-12 RX ORDER — NICOTINE POLACRILEX 4 MG
15-30 LOZENGE BUCCAL
Status: DISCONTINUED | OUTPATIENT
Start: 2022-07-12 | End: 2022-07-14 | Stop reason: HOSPADM

## 2022-07-12 RX ORDER — DEXTROSE MONOHYDRATE 25 G/50ML
25-50 INJECTION, SOLUTION INTRAVENOUS
Status: DISCONTINUED | OUTPATIENT
Start: 2022-07-12 | End: 2022-07-14 | Stop reason: HOSPADM

## 2022-07-12 RX ORDER — SUMATRIPTAN 50 MG/1
100 TABLET, FILM COATED ORAL
Status: DISCONTINUED | OUTPATIENT
Start: 2022-07-12 | End: 2022-07-14 | Stop reason: HOSPADM

## 2022-07-12 RX ORDER — GABAPENTIN 300 MG/1
600 CAPSULE ORAL DAILY
Status: DISCONTINUED | OUTPATIENT
Start: 2022-07-12 | End: 2022-07-14 | Stop reason: HOSPADM

## 2022-07-12 RX ORDER — POLYETHYLENE GLYCOL 3350 17 G/17G
17 POWDER, FOR SOLUTION ORAL DAILY PRN
Status: DISCONTINUED | OUTPATIENT
Start: 2022-07-12 | End: 2022-07-14 | Stop reason: HOSPADM

## 2022-07-12 RX ORDER — IOPAMIDOL 755 MG/ML
82 INJECTION, SOLUTION INTRAVASCULAR ONCE
Status: COMPLETED | OUTPATIENT
Start: 2022-07-12 | End: 2022-07-12

## 2022-07-12 RX ORDER — DIPHENHYDRAMINE HCL 25 MG
25 TABLET ORAL DAILY
COMMUNITY
End: 2022-07-30

## 2022-07-12 RX ORDER — PANTOPRAZOLE SODIUM 40 MG/1
40 TABLET, DELAYED RELEASE ORAL DAILY
Status: DISCONTINUED | OUTPATIENT
Start: 2022-07-13 | End: 2022-07-14 | Stop reason: HOSPADM

## 2022-07-12 RX ORDER — ONDANSETRON 2 MG/ML
4 INJECTION INTRAMUSCULAR; INTRAVENOUS EVERY 6 HOURS PRN
Status: DISCONTINUED | OUTPATIENT
Start: 2022-07-12 | End: 2022-07-14 | Stop reason: HOSPADM

## 2022-07-12 RX ADMIN — IOPAMIDOL 82 ML: 755 INJECTION, SOLUTION INTRAVENOUS at 09:44

## 2022-07-12 RX ADMIN — SODIUM CHLORIDE 100 ML: 9 INJECTION, SOLUTION INTRAVENOUS at 09:45

## 2022-07-12 RX ADMIN — GABAPENTIN 600 MG: 300 CAPSULE ORAL at 17:29

## 2022-07-12 RX ADMIN — SODIUM CHLORIDE 1000 ML: 9 INJECTION, SOLUTION INTRAVENOUS at 09:13

## 2022-07-12 RX ADMIN — POTASSIUM CHLORIDE 40 MEQ: 1.5 POWDER, FOR SOLUTION ORAL at 13:53

## 2022-07-12 RX ADMIN — SODIUM CHLORIDE, POTASSIUM CHLORIDE, SODIUM LACTATE AND CALCIUM CHLORIDE: 600; 310; 30; 20 INJECTION, SOLUTION INTRAVENOUS at 16:32

## 2022-07-12 ASSESSMENT — ENCOUNTER SYMPTOMS
DIFFICULTY URINATING: 1
BLOOD IN STOOL: 0
NAUSEA: 1
DIAPHORESIS: 0
ARTHRALGIAS: 1
COUGH: 0
BACK PAIN: 1
FEVER: 0
CONSTIPATION: 1
HEADACHES: 0
ABDOMINAL PAIN: 0
CHILLS: 0
LIGHT-HEADEDNESS: 1
DIARRHEA: 0
WEAKNESS: 1

## 2022-07-12 NOTE — H&P
[facility]    History and Physical - Hospitalist Service       Date of Admission:  7/12/2022    Assessment & Plan   Maxine Sears is a 66 year-old female with history of depression, duodenal ulcer, hypertension, hypothyroidism, obesity, NELY, paroxysmal atrial fibrillation with prior watchman, RLS who presents with generalized weakness, lightheadedness.  Admitted on 7/12/2022.     Acute kidney injury   Suspected orthostatic hypotension  *Presents with generalized weakness, light-headedness with position changes  *Creatinine 2.53 on admission from baseline of 0.80. Given concurrent orthostasis symptoms and hypokalemia suspect hypovolemia.  *Recently constipated, followed by diarrhea associated with some nausea and poor intake possibly contributing to hypovolemia combined with PTA diuretic. Ongoing losartan use potentially contributing to SOBEIDA as well.  *UA with a few WBC but contaminated  *Note that she received contrast study in ED  - Continue IVF  - Monitor orthostatics  - BMP in AM  - If not improving in AM consider renal US  - Hold losartan, triamterene-HCTZ  - Repeat UA as original contaminated   - PT consulted, though can defer if weakness improves with IV fluids    History of hypothyroidism with suspected iatrogenic hyperthyroidism  TSH 0.02, free T4 1.60. Reports taking her levothyroxine as prescribed, no recent dose changes.  No clear hyperthyroid symptoms  - Reduce PTA levothyroxine to 125 mcg  - Will need repeat TSH as outpatient    Hypokalemia  Secondary to poor intake, recent diarrhea, +/- diuretic use.   - Monitor and replace per protocol   - Add on magnesium     Non-specific EKG changes  *EKG not overwhelmingly different from prior EKG  *No chest pain or concerning cardiac symptoms  *Initial HS troponin undetectable  *Electrolyte disturbances may be contributing  - Serial troponins  - Repeat EKG once potassium replete  - May consider echocardiogram if changes persist with  normal electrolytes or troponin trend is concerning    Hyperlipidemia  - Hold prior to admission statin while observation status    History of duodenal ulcer  GERD  - Continue prior to admission PPI    Obstructive sleep apnea  - Continue CPAP per home settings    Diabetes mellitus, type 2   HgbA1c 6.6% 3/14/22. Prior to admission on liraglutide 1.8 mg daily.  - Hold liraglutide while hospitalized  - Moderate consistent carbohydrate diet next  - Glucose well controlled on BMP, can defer insulin while observation status   - Glucose BID and hypoglycemia protocol pending liraglutide washout     Hypertension  - Hold losartan and triamterene-HCTZ as above  - Continue PTA metoprolol XL     Paroxysmal atrial fibrillation with prior watchman procedure  Currently in sinus rhythm  - Monitor on telemetry for potential recurrent atrial fibrillation contributing to symptoms  - Continue prior to admission metoprolol XL, flecainide, aspirin     Morbid obesity  Body mass index is 48.54 kg/m .. Increase in all-cause morbidity and mortality. Encourage weight loss.          Diet:  Moderate consistent carbohydrate diet   DVT Prophylaxis: Observation patient, short stay anticipated   Vasquez Catheter: Not present  Code Status:   Full code     Disposition Plan   [place] to observation status. Anticipate discharge within 24 hours if clinically improved.     Entered: Tacos Huggins MD 07/12/2022, 11:20 AM     The patient's care was discussed with the Patient.    Clinically Significant Risk Factors Present on Admission        # Hypokalemia: K = 2.9 mmol/L (Ref range: 3.4 - 5.3 mmol/L) on admission, will replace as needed          # Hypertension: home medication list includes antihypertensive(s)               Tacos Huggins MD  [facility]    ______________________________________________________________________    Chief Complaint   Generalized weakness, light-headedness    History of Present Illness   Maxine KOWALSKI  Orion is a 66 year old female who presents with the above chief complaint.    History is obtained from the patient. The patient reports she first began to feel generally weak about 1 week prior to presentation. She subsequently felt light-headed, specifically exacerbated by position changes.  She denies any chest pain.  She has felt more easily fatigued since her symptom onset.  She reports that she has generally been eating and drinking at her baseline, though 2 days ago after a period of constipation had a few watery bowel movements with some associated nausea / dry heaves and reduced intake.  She is on a diuretic prior to admission.  She reports rare NSAID use.  She reports within the last day she has noticed she has been urinating less than usual, otherwise denies any urinary symptoms.  She reports she has been taking her levothyroxine as prescribed with no recent dose changes.  She reports some weight loss over the past week which she says is intentional. She denies any focal numbness/tingling/weakness, vision changes associated with her lightheadedness.    In the Emergency Department, the patient was seen by Dr. Vásquez, with whom I discussed the patient's presenting symptoms and emergency department course.  Initial vital signs were a temperature of 98.7 Fahrenheit, HR 74, /62, RR 18, SpO2 96% on room air. Work-up included creatinine 2.53, potassium 2.9, EKG with non-specific ST T wave changes. Hospitalists were contacted for admission to the hospital.     Review of Systems    Complete 10 point review of systems assessed and negative except as noted in HPI.    Past Medical History    I have reviewed this patient's medical history and updated it with pertinent information if needed.   Past Medical History:   Diagnosis Date     Depression       Duodenal ulcer 03/04/2019     Essential hypertension       GERD (gastroesophageal reflux disease)      Hiatal hernia      Hypothyroidism      Migraine without aura  and without status migrainosus, not intractable  Aug 2016     Obesity      NELY on CPAP      Osteoarthritis      Paroxysmal atrial fibrillation (H) 2015     RLS (restless legs syndrome)      Symptomatic menopausal or female climacteric states (aka FLASHES)      TMJ disease        Past Surgical History   I have reviewed this patient's surgical history and updated it with pertinent information if needed.  Past Surgical History:   Procedure Laterality Date     APPENDECTOMY       CHOLECYSTECTOMY       COLONOSCOPY N/A 2019    Procedure: COLONOSCOPY;  Surgeon: Vahid Cardona MD;  Location:  GI     EP LEFT ATRIAL APPENDAGE CLOSURE N/A 10/8/2020    Procedure: EP Left Atrial Appendage Closure;  Surgeon: Constantin Vaughn MD;  Location:  HEART CARDIAC CATH LAB     ESOPHAGOSCOPY, GASTROSCOPY, DUODENOSCOPY (EGD), COMBINED N/A 2019    Procedure: COMBINED ESOPHAGOSCOPY, GASTROSCOPY, DUODENOSCOPY (EGD);  Surgeon: Ben Ko MD;  Location:  GI     HYSTERECTOMY, [facility]  1999     TONSILLECTOMY           Social History   I have reviewed this patient's social history and updated it with pertinent information if needed.  Social History     Tobacco Use     Smoking status: Former Smoker     Packs/day: 0.25     Years: 1.00     Pack years: 0.25     Types: Cigarettes     Start date:      Quit date:      Years since quittin.5     Smokeless tobacco: Never Used     Tobacco comment: very minimal smoking history   Substance Use Topics     Alcohol use: No     Alcohol/week: 0.0 standard drinks     Drug use: No     Lives alone in senior apartments.      Family History   I have reviewed this patient's family history and updated it with pertinent information if needed.   Family History   Problem Relation Age of Onset     Myocardial Infarction Mother      Heart Failure Mother      Heart Surgery Father         bypass surgery     Cerebrovascular Disease Father      Heart Surgery Brother       Hyperlipidemia Brother      Hyperlipidemia Sister      Hyperlipidemia Brother      Sleep Apnea Sister        Prior to Admission Medications   Prior to Admission Medications   Prescriptions Last Dose Informant Patient Reported? Taking?   SUMAtriptan (IMITREX) 100 MG tablet prn at prn Self No Yes   Sig: Take 1 tablet (100 mg) by mouth at onset of headache for migraine May repeat in 2 hours if needed: max 2/day;   acetaminophen (TYLENOL) 500 MG tablet prn at prn Self Yes Yes   Sig: Take 500-1,000 mg by mouth 3 times daily as needed for mild pain   aspirin (ASA) 81 MG EC tablet 7/12/2022 at am Self No Yes   Sig: Take 1 tablet (81 mg) by mouth daily   diphenhydrAMINE (BENADRYL) 25 MG tablet 7/12/2022 at am Self Yes Yes   Sig: Take 25 mg by mouth daily   flecainide (TAMBOCOR) 150 MG tablet 7/12/2022 at am Self No Yes   Sig: Take 1 tablet (150 mg) by mouth every 12 hours   gabapentin (NEURONTIN) 300 MG capsule 7/11/2022 at hs Self Yes Yes   Sig: Take 600 mg by mouth At Bedtime   insulin pen needle (31G X 6 MM) 31G X 6 MM miscellaneous   No No   Sig: Use 1 pen needles daily   levothyroxine (SYNTHROID/LEVOTHROID) 137 MCG tablet 7/12/2022 at am Self No Yes   Sig: TAKE 1 TABLET (137 MCG) BY MOUTH DAILY   liraglutide (VICTOZA) 18 MG/3ML solution 7/10/2022 at hs Self Yes Yes   Sig: Inject 1.8 mg Subcutaneous daily   losartan (COZAAR) 100 MG tablet 7/12/2022 at am Self No Yes   Sig: Take 1 tablet (100 mg) by mouth daily   metoprolol succinate ER (TOPROL-XL) 25 MG 24 hr tablet 7/12/2022 at am Self No Yes   Sig: Take 0.5 tablets (12.5 mg) by mouth daily   order for DME   Yes No   Sig: DREAMSTATION  5-15 CM/H20  NASAL AIRFIT N10 HER   Patient not taking: Reported on 5/23/2022   pantoprazole (PROTONIX) 40 MG EC tablet 7/12/2022 at am Self No Yes   Sig: Take 1 tablet (40 mg) by mouth daily   rosuvastatin (CRESTOR) 10 MG tablet 7/12/2022 at am Self No Yes   Sig: Take 1 tablet (10 mg) by mouth daily   triamterene-HCTZ (MAXZIDE-25)  37.5-25 MG tablet 7/12/2022 at am Self No Yes   Sig: TAKE 1 TABLET BY MOUTH DAILY      Facility-Administered Medications: None     Allergies   Allergies   Allergen Reactions     Morphine Anaphylaxis     Ceclor [Cefaclor] Hives     Cymbalta      Foggy head feeling     Diltiazem Hives     Lisinopril Cough     Sertraline Nausea and Vomiting       Physical Exam   Vital Signs: Temp: 98.7  F (37.1  C) Temp src: Temporal BP: (!) 141/62 Pulse: 74   Resp: 18 SpO2: 96 % O2 Device: None (Room air)    Weight: 0 lbs 0 oz    Constitutional:  NAD  Eyes: PERRL, EOMI  HENT: Oropharynx clear, MMM  Respiratory: Clear to auscultation bilaterally, good air movement, normal effort   Cardiovascular: RRR. No peripheral edema.   GI: Soft, non-tender, non-distended. No rebound tenderness or guarding.   Skin: Warm, dry   Neurologic: Alert. Responding to questions appropriately. Following commands. Moving all extremities equally and on command.   Psychiatric: Normal affect, appropriate      Data   Data reviewed today: I reviewed all medications, new labs and imaging results over the last 24 hours. I personally reviewed the EKG tracing showing Sinus rhythm with first-degree AV block, right bundle branch block, T wave inversions V1 through V4, II, III, aVF.  Prior EKG with T wave flattening in II, III, aVF, T wave inversions in V1-V4 and flattening in V5-V6.    Recent Labs   Lab 07/12/22  0919   WBC 8.7   HGB 12.2   MCV 84         POTASSIUM 2.9*   CHLORIDE 102   CO2 24   BUN 27   CR 2.53*   ANIONGAP 8   J CARLOS 8.8   *   ALBUMIN 3.4   PROTTOTAL 7.1   BILITOTAL 1.1   ALKPHOS 77   ALT 18   AST 13       Recent Results (from the past 24 hour(s))   CT Chest Pulmonary Embolism w Contrast    Narrative    CT CHEST PULMONARY EMBOLISM W CONTRAST 7/12/2022 9:55 AM    CLINICAL HISTORY: dyspnea, near syncope, abnormal ecg  TECHNIQUE: CT angiogram chest during arterial phase injection IV  contrast. 2D and 3D MIP reconstructions were performed  by the CT  technologist. Dose reduction techniques were used.     CONTRAST: 82mL Isovue-370    COMPARISON: 9/8/2020 and 10/9/2020    FINDINGS:  ANGIOGRAM CHEST: Pulmonary arteries are normal caliber and negative  for pulmonary emboli. Thoracic aorta is negative for dissection. No CT  evidence of right heart strain.    LUNGS AND PLEURA: No infiltrate or pleural effusion. Few small  pulmonary nodules, some of which are outside the field of view of the  prior CT. For example, there is a 2 mm right upper lobe nodule (series  5, image 88) and 5 mm left upper lobe nodule (series 5, image 53) were  outside the field of view. A few other stable nodules. For example a 6  mm right upper lobe nodule and 4 mm right upper lobe nodule (series 5,  image [id number]) are stable and represent benign due to stability.    MEDIASTINUM/AXILLAE: No lymphadenopathy. No thoracic aortic aneurysm.  Left atrial appendage closure device. No pericardial effusion.  Moderate coronary artery calcifications.    UPPER ABDOMEN: Elevated right hemidiaphragm, stable. Cholecystectomy.    MUSCULOSKELETAL: Mild degenerative changes of the spine. No suspicious  lesions of the bones.      Impression    IMPRESSION:  1.  No pulmonary emboli. No acute findings in the chest.  2.  Few pulmonary nodules measuring up to 5 mm were outside the  field-of-view of the prior CTA in 2020. Few other pulmonary nodules in  the lung bases are stable. See follow-up guidelines.    REFERENCE:  Guidelines for Management of Incidental Pulmonary Nodules Detected on  CT Images: From the Fleischner Society 2017.   Guidelines apply to incidental nodules in patients who are 35 years or  older.  Guidelines do not apply to lung cancer screening, patients with  immunosuppression, or patients with known primary cancer.    MULTIPLE NODULES  Nodule size <6 mm  Low-risk patients: No follow-up needed.  High-risk patients: Optional follow-up at 12 months.    TEX DENNIS MD         SYSTEM ID:   [id number]

## 2022-07-12 NOTE — ED NOTES
Bed: ED26  Expected date:   Expected time:   Means of arrival:   Comments:  Pete - 514 - 66 F dizzy weak eta [id number]

## 2022-07-12 NOTE — PHARMACY-ADMISSION MEDICATION HISTORY
Pharmacy Medication History  Admission medication history interview status for the 7/12/2022  admission is complete. See EPIC admission navigator for prior to admission medications     Location of Interview: Patient room  Medication history sources: Patient    Significant changes made to the medication list:  Changed Gabapentin dose to 600mg daily at bedtime    In the past week, patient estimated taking medication this percent of the time: greater than 90%    Additional medication history information:   None    Medication reconciliation completed by provider prior to medication history? No    Time spent in this activity: 30 mins    Prior to Admission medications    Medication Sig Last Dose Taking? Auth Provider Long Term End Date   acetaminophen (TYLENOL) 500 MG tablet Take 500-1,000 mg by mouth 3 times daily as needed for mild pain prn at prn Yes Reported, Patient     aspirin (ASA) 81 MG EC tablet Take 1 tablet (81 mg) by mouth daily 7/12/2022 at am Yes Ilda Sue PA-C     diphenhydrAMINE (BENADRYL) 25 MG tablet Take 25 mg by mouth daily 7/12/2022 at am Yes Unknown, Entered By History     flecainide (TAMBOCOR) 150 MG tablet Take 1 tablet (150 mg) by mouth every 12 hours 7/12/2022 at am Yes Ilda Sue PA-C Yes    gabapentin (NEURONTIN) 300 MG capsule Take 600 mg by mouth At Bedtime 7/11/2022 at hs Yes Unknown, Entered By History No    levothyroxine (SYNTHROID/LEVOTHROID) 137 MCG tablet TAKE 1 TABLET (137 MCG) BY MOUTH DAILY 7/12/2022 at am Yes Yuval Jama MD Yes    liraglutide (VICTOZA) 18 MG/3ML solution Inject 1.8 mg Subcutaneous daily 7/10/2022 at hs Yes Unknown, Entered By History Yes    losartan (COZAAR) 100 MG tablet Take 1 tablet (100 mg) by mouth daily 7/12/2022 at am Yes Yuval Jama MD Yes    metoprolol succinate ER (TOPROL-XL) 25 MG 24 hr tablet Take 0.5 tablets (12.5 mg) by mouth daily 7/12/2022 at am Yes Ilda Sue PA-C Yes    pantoprazole (PROTONIX) 40 MG  EC tablet Take 1 tablet (40 mg) by mouth daily 7/12/2022 at am Yes Yuval Jama MD     rosuvastatin (CRESTOR) 10 MG tablet Take 1 tablet (10 mg) by mouth daily 7/12/2022 at am Yes Yuval Jama MD Yes    SUMAtriptan (IMITREX) 100 MG tablet Take 1 tablet (100 mg) by mouth at onset of headache for migraine May repeat in 2 hours if needed: max 2/day; prn at prn Yes Yuval Jama MD     triamterene-HCTZ (MAXZIDE-25) 37.5-25 MG tablet TAKE 1 TABLET BY MOUTH DAILY 7/12/2022 at am Yes Yuval Jama MD Yes    insulin pen needle (31G X 6 MM) 31G X 6 MM miscellaneous Use 1 pen needles daily   Blessing Nix PA-C     order for DME DREAMSTATION  5-15 CM/H20  NASAL AIRFIT N10 HER  Patient not taking: Reported on 5/23/2022   Reported, Patient         The information provided in this note is only as accurate as the sources available at the time of update(s)

## 2022-07-12 NOTE — ED NOTES
"[facility]  ED Nurse Handoff Report    ED Chief complaint: Dizziness      ED Diagnosis:   Final diagnoses:   Generalized weakness   Hypokalemia   Abnormal electrocardiogram       Code Status: Full Code    Allergies:   Allergies   Allergen Reactions     Morphine Anaphylaxis     Ceclor [Cefaclor] Hives     Cymbalta      Foggy head feeling     Diltiazem Hives     Lisinopril Cough     Sertraline Nausea and Vomiting       Patient Story: pt c/o dizziness statese started new med that she believes if the problem.  Focused Assessment:denies CP, HA or syncope, denies urinary sxs or GI SXS.    Treatments and/or interventions provided: SEE CHART  Patient's response to treatments and/or interventions: TOLERATED WELL    To be done/followed up on inpatient unit:  SEE CHART    Does this patient have any cognitive concerns?: NA    Activity level - Baseline/Home:  CANE  Activity Level - Current:   Cane    Patient's Preferred language: English   Needed?: No    Isolation: None  Infection: Not Applicable  Patient tested for COVID 19 prior to admission: YES  Bariatric?: No    Vital Signs:   Vitals:    07/12/22 0839 07/12/22 1130   BP: (!) 141/62 118/75   Pulse: 74 66   Resp: 18    Temp: 98.7  F (37.1  C)    TempSrc: Temporal    SpO2: 96% 99%   Height: 1.6 m (5' 3\")        Cardiac Rhythm:     EKG 12-lead, tracing only Resulted (Edited) RI       Details:   Collected: 7/12/2022 09:16  Last updated: 7/12/2022 10:13  Ventricular Rate 69 BPM  Atrial Rate 69 BPM  LA Interval 232 ms  QRS Duration 126 ms   ms  QTc 486 ms  R AXIS 57 degrees  T Axis -80 degrees  Interpretation ECG Sinus rhythm with 1st degree A-V block   Right bundle branch block   T wave abnormality, consider inferolateral ischemia   Abnormal ECG   When compared with ECG of 20-NOV-2020 07:32,   Right bundle branch block is now Present   Confirmed by GENERATED REPORT, COMPUTER ([id number]),  HOWARD BURNS ([id number]) on 7/12/2022 10:12:22 AM        "         Was the PSS-3 completed:   Yes  What interventions are required if any?               Family Comments: NA  OBS brochure/video discussed/provided to patient/family: Yes              Name of person given brochure if not patient: NA              Relationship to patient: NA    For the majority of the shift this patient's behavior was Green.   Behavioral interventions performed were NA.    ED NURSE PHONE NUMBER: [phone]

## 2022-07-12 NOTE — ED PROVIDER NOTES
History   Chief Complaint:  Lightheadedness and weakness     The history is provided by the patient.      Maxine Sears is a 66 year old female with history of paroxysmal AFIB, hypertension, hyperlipidemia, and type 2 diabetes who presents with lightheadedness and weakness. The patient [facility] when she developed weakness and lightheadedness. She reports that she can't stand for longer than 3 minutes without her legs feeling weak. She denies any past episodes of this. She denies any fall or injury. She reports she is also experiencing back pain, knee pain, and nausea. She also developed an onset of constipation on Sunday had developed a short episode of dry heaving. She denies any chest pain or new headache. She also notes that she has to take deep breaths from time-to-time but denies labored breathing. She denies cough, fever, chills, or diaphoresis. She denies abdominal pain. She denies any bloody stool or diarrhea. She notes that this morning she had to force out urine, she thinks this could be related to dehydration. She denies any history of blood clots or being on blood thinners. She adds that she did get prescribed Victoza which she has to inject every night but did not inject last night. She notes that she is vaccinated against Covid but did not get her 4th shot. She reports that she is living in independent senior living and does not check her blood sugar at home.    Review of Systems   Constitutional: Negative for chills, diaphoresis and fever.   Respiratory: Negative for cough.    Cardiovascular: Negative for chest pain.   Gastrointestinal: Positive for constipation and nausea. Negative for abdominal pain, blood in stool and diarrhea.   Genitourinary: Positive for difficulty urinating.   Musculoskeletal: Positive for arthralgias (Knee) and back pain.   Neurological: Positive for weakness and light-headedness. Negative for  "headaches.   All other systems reviewed and are negative.    Allergies:  Morphine  Ceclor [Cefaclor]  Cymbalta  Diltiazem  Lisinopril  Sertraline    Medications:  Aspirin 81 mg  Diphenhydramine HCl  Flecainide  Gabapentin  Insulin pen needle  Levothyroxine  Liraglutide  Losartan  Metopolol succinate ER  Pantoprazole  Rosuvastatin  Sumatriptan  Triamterene-HCTZ    Past Medical History:     Depression  Hypertension  Hypothyroidism  DJD  Osteoarthritis  TMJ disease  Obesity  Restless leg syndrome  Gastroesophageal reflux disease  Iron deficiency  Chronic pain left knee  Lumbar radiculopathy  Paroxysmal atrial fibrillation  Pulmonary nodules  Hyperlipidemia  Type 2 diabetes   Kidney infection    Past Surgical History:    DOM and BSO  Tubal ligation  LAP cholecystectomy  IN forearm,wrist procedure  Appendectomy  Tonsillectomy  EP left atrial appendage closure     Family History:    Myocardial infarction, heart failure-mother  Heart bypass surgery, Cerebrovascular disease- father    Social History:  Presents to ED via EMS.  Presents to ED alone.    Physical Exam     Patient Vitals for the past 24 hrs:   BP Temp Temp src Pulse Resp SpO2 Height   07/12/22 1130 118/75 -- -- 66 -- 99 % --   07/12/22 0839 (!) 141/62 98.7  F (37.1  C) Temporal 74 18 96 % 1.6 m (5' 3\")     Physical Exam  SKIN:  Warm, dry.  No jaundice.  No rash.  HEMATOLOGIC/IMMUNOLOGIC/LYMPHATIC:  No pallor.  No limb edema.  HENT: No JVD.  EYES:  Conjunctivae normal.  Anicteric.  No rub.  CARDIOVASCULAR:  Regular rate and rhythm.  No murmur.  RESPIRATORY:  No respiratory distress, breath sounds equal and normal.  GASTROINTESTINAL:  Soft, nontender abdomen with active bowel sounds.  No distention.  No palpable mass.  MUSCULOSKELETAL: Overweight body habitus.  NEUROLOGIC:  Alert, conversant.  Oriented to self place and time.  No aphasia.  No dysarthria.  No tactile sensory or motor deficit of the face or limbs.  No limb ataxia.  PSYCHIATRIC:  Normal " mood.    Emergency Department Course   ECG  ECG taken at 0916, ECG read at 0921  Sinus rhythm with 1st degree AV block  Right bundle branch block  T wave abnormality, consider inferolateral ischemia  Changes noted above as compared to prior, dated 11/20/20.  Rate 69 bpm. FL interval 232 ms. QRS duration 126 ms. QT/QTc 454/486 ms. P-R-T axes * 57 -80.     Imaging:  CT Chest Pulmonary Embolism w Contrast   Final Result   IMPRESSION:   1.  No pulmonary emboli. No acute findings in the chest.   2.  Few pulmonary nodules measuring up to 5 mm were outside the   field-of-view of the prior CTA in 2020. Few other pulmonary nodules in   the lung bases are stable. See follow-up guidelines.      REFERENCE:   Guidelines for Management of Incidental Pulmonary Nodules Detected on   CT Images: From the Fleischner Society 2017.    Guidelines apply to incidental nodules in patients who are 35 years or   older.   Guidelines do not apply to lung cancer screening, patients with   immunosuppression, or patients with known primary cancer.      MULTIPLE NODULES   Nodule size <6 mm   Low-risk patients: No follow-up needed.   High-risk patients: Optional follow-up at 12 months.      TEX DENNIS MD            SYSTEM ID:  [id number]        Report per radiology    Laboratory:  Labs Ordered and Resulted from Time of ED Arrival to Time of ED Departure   COMPREHENSIVE METABOLIC PANEL - Abnormal       Result Value    Sodium 134      Potassium 2.9 (*)     Chloride 102      Carbon Dioxide (CO2) 24      Anion Gap 8      Urea Nitrogen 27      Creatinine 2.53 (*)     Calcium 8.8      Glucose 107 (*)     Alkaline Phosphatase 77      AST 13      ALT 18      Protein Total 7.1      Albumin 3.4      Bilirubin Total 1.1      GFR Estimate 20 (*)    ROUTINE UA WITH MICROSCOPIC REFLEX TO CULTURE - Abnormal    Color Urine Light Yellow      Appearance Urine Clear      Glucose Urine Negative      Bilirubin Urine Negative      Ketones Urine Negative      Specific  Gravity Urine 1.022      Blood Urine Negative      pH Urine 7.0      Protein Albumin Urine Negative      Urobilinogen Urine Normal      Nitrite Urine Negative      Leukocyte Esterase Urine Large (*)     Bacteria Urine Few (*)     Mucus Urine Present (*)     RBC Urine 2      WBC Urine 34 (*)     Squamous Epithelials Urine 5 (*)    TSH WITH FREE T4 REFLEX - Abnormal    TSH 0.02 (*)    CBC WITH PLATELETS AND DIFFERENTIAL - Abnormal    WBC Count 8.7      RBC Count 4.56      Hemoglobin 12.2      Hematocrit 38.3      MCV 84      MCH 26.8      MCHC 31.9      RDW 16.8 (*)     Platelet Count 376      % Neutrophils 60      % Lymphocytes 28      % Monocytes 9      % Eosinophils 2      % Basophils 1      % Immature Granulocytes 0      NRBCs per 100 WBC 0      Absolute Neutrophils 5.2      Absolute Lymphocytes 2.5      Absolute Monocytes 0.8      Absolute Eosinophils 0.2      Absolute Basophils 0.1      Absolute Immature Granulocytes 0.0      Absolute NRBCs 0.0     T4 FREE - Abnormal    Free T4 1.60 (*)    TROPONIN I - Normal    Troponin I High Sensitivity <3     COVID-19 VIRUS (CORONAVIRUS) BY PCR - Normal    SARS CoV2 PCR Negative     MAGNESIUM - Normal    Magnesium 2.3     TROPONIN I - Normal    Troponin I High Sensitivity 3     URINE CULTURE      Emergency Department Course:     Reviewed:  I reviewed nursing notes, vitals, past medical history and Care Everywhere    Assessments:  0849 I obtained history and examined the patient as noted above.   1002 I rechecked the patient and explained findings.     Consults:  1057 I spoke with Dr. Huggins, hospitalist, regarding the patient. He accepts admission.    Interventions:  0913 NS, 1L, IV  1353 KCl, 40 mEq, PO    Disposition:  The patient was admitted to the hospital under the care of Dr. Huggins.     Impression & Plan     Medical Decision Making:  This patient presents with generalized weakness and a lightheaded feeling/dizziness and mild dyspnea.  Considered a host of  etiologies.  EKG was concerning with new abnormalities compared to an old EKG.  This revealed ST segment depression in the inferior and precordial leads which was concerning for ischemia.  However troponin negative.  Given these EKG findings a CT chest was performed with concern of pulmonary embolism.  Gladly negative as well.  Other testing was relatively unrevealing barring hypokalemia.  That certainly could cause generalized weakness.  Given the concerning EKG findings and her symptoms she will be admitted for further care and testing.    Diagnosis:    ICD-10-CM    1. Generalized weakness  R53.1    2. Hypokalemia  E87.6    3. Abnormal electrocardiogram  R94.31        Discharge Medications:  New Prescriptions    No medications on file       Scribe Disclosure:  Yandel MORALES, am serving as a scribe at 8:49 AM on 7/12/2022 to document services personally performed by Alf Vásquez MD based on my observations and the provider's statements to me.      Alf Vásquez MD  07/12/22 7139

## 2022-07-12 NOTE — PROGRESS NOTES
RECEIVING UNIT ED HANDOFF REVIEW    ED Nurse Handoff Report was reviewed by: Belkys Flood RN on July 12, 2022 at 3:13 PM

## 2022-07-13 ENCOUNTER — PATIENT OUTREACH (OUTPATIENT)
Dept: GERIATRIC MEDICINE | Facility: CLINIC | Age: 67
End: 2022-07-13

## 2022-07-13 ENCOUNTER — APPOINTMENT (OUTPATIENT)
Dept: PHYSICAL THERAPY | Facility: CLINIC | Age: 67
End: 2022-07-13
Attending: INTERNAL MEDICINE
Payer: COMMERCIAL

## 2022-07-13 LAB
ALBUMIN UR-MCNC: NEGATIVE MG/DL
ANION GAP SERPL CALCULATED.3IONS-SCNC: 6 MMOL/L (ref 3–14)
APPEARANCE UR: CLEAR
BILIRUB UR QL STRIP: NEGATIVE
BUN SERPL-MCNC: 23 MG/DL (ref 7–30)
CALCIUM SERPL-MCNC: 8.4 MG/DL (ref 8.5–10.1)
CHLORIDE BLD-SCNC: 109 MMOL/L (ref 94–109)
CO2 SERPL-SCNC: 22 MMOL/L (ref 20–32)
COLOR UR AUTO: ABNORMAL
CREAT SERPL-MCNC: 1.58 MG/DL (ref 0.52–1.04)
ERYTHROCYTE [DISTWIDTH] IN BLOOD BY AUTOMATED COUNT: 17.1 % (ref 10–15)
GFR SERPL CREATININE-BSD FRML MDRD: 36 ML/MIN/1.73M2
GLUCOSE BLD-MCNC: 101 MG/DL (ref 70–99)
GLUCOSE BLDC GLUCOMTR-MCNC: 101 MG/DL (ref 70–99)
GLUCOSE BLDC GLUCOMTR-MCNC: 106 MG/DL (ref 70–99)
GLUCOSE BLDC GLUCOMTR-MCNC: 201 MG/DL (ref 70–99)
GLUCOSE UR STRIP-MCNC: NEGATIVE MG/DL
HCT VFR BLD AUTO: 33.7 % (ref 35–47)
HGB BLD-MCNC: 10.6 G/DL (ref 11.7–15.7)
HGB UR QL STRIP: NEGATIVE
KETONES UR STRIP-MCNC: NEGATIVE MG/DL
LEUKOCYTE ESTERASE UR QL STRIP: ABNORMAL
MCH RBC QN AUTO: 26.4 PG (ref 26.5–33)
MCHC RBC AUTO-ENTMCNC: 31.5 G/DL (ref 31.5–36.5)
MCV RBC AUTO: 84 FL (ref 78–100)
NITRATE UR QL: NEGATIVE
PH UR STRIP: 6.5 [PH] (ref 5–7)
PLATELET # BLD AUTO: 322 10E3/UL (ref 150–450)
POTASSIUM BLD-SCNC: 3.3 MMOL/L (ref 3.4–5.3)
POTASSIUM BLD-SCNC: 3.5 MMOL/L (ref 3.4–5.3)
RBC # BLD AUTO: 4.01 10E6/UL (ref 3.8–5.2)
RBC URINE: 1 /HPF
SODIUM SERPL-SCNC: 137 MMOL/L (ref 133–144)
SP GR UR STRIP: 1.02 (ref 1–1.03)
SQUAMOUS EPITHELIAL: 1 /HPF
TROPONIN I SERPL HS-MCNC: <3 NG/L
TROPONIN I SERPL HS-MCNC: <3 NG/L
UROBILINOGEN UR STRIP-MCNC: NORMAL MG/DL
WBC # BLD AUTO: 7.5 10E3/UL (ref 4–11)
WBC URINE: 17 /HPF

## 2022-07-13 PROCEDURE — 250N000013 HC RX MED GY IP 250 OP 250 PS 637: Performed by: INTERNAL MEDICINE

## 2022-07-13 PROCEDURE — 250N000013 HC RX MED GY IP 250 OP 250 PS 637: Performed by: PHYSICIAN ASSISTANT

## 2022-07-13 PROCEDURE — 82962 GLUCOSE BLOOD TEST: CPT

## 2022-07-13 PROCEDURE — 93005 ELECTROCARDIOGRAM TRACING: CPT

## 2022-07-13 PROCEDURE — 99225 PR SUBSEQUENT OBSERVATION CARE,LEVEL II: CPT | Performed by: PHYSICIAN ASSISTANT

## 2022-07-13 PROCEDURE — 84132 ASSAY OF SERUM POTASSIUM: CPT | Mod: 91 | Performed by: PHYSICIAN ASSISTANT

## 2022-07-13 PROCEDURE — 97116 GAIT TRAINING THERAPY: CPT | Mod: GP | Performed by: PHYSICAL THERAPIST

## 2022-07-13 PROCEDURE — 36415 COLL VENOUS BLD VENIPUNCTURE: CPT | Mod: 59 | Performed by: INTERNAL MEDICINE

## 2022-07-13 PROCEDURE — 93010 ELECTROCARDIOGRAM REPORT: CPT | Performed by: INTERNAL MEDICINE

## 2022-07-13 PROCEDURE — 81001 URINALYSIS AUTO W/SCOPE: CPT | Performed by: INTERNAL MEDICINE

## 2022-07-13 PROCEDURE — 85027 COMPLETE CBC AUTOMATED: CPT | Performed by: INTERNAL MEDICINE

## 2022-07-13 PROCEDURE — G0378 HOSPITAL OBSERVATION PER HR: HCPCS

## 2022-07-13 PROCEDURE — 97161 PT EVAL LOW COMPLEX 20 MIN: CPT | Mod: GP | Performed by: PHYSICAL THERAPIST

## 2022-07-13 PROCEDURE — 258N000003 HC RX IP 258 OP 636: Performed by: INTERNAL MEDICINE

## 2022-07-13 PROCEDURE — 97530 THERAPEUTIC ACTIVITIES: CPT | Mod: GP | Performed by: PHYSICAL THERAPIST

## 2022-07-13 PROCEDURE — 87086 URINE CULTURE/COLONY COUNT: CPT | Performed by: INTERNAL MEDICINE

## 2022-07-13 PROCEDURE — 80048 BASIC METABOLIC PNL TOTAL CA: CPT | Performed by: INTERNAL MEDICINE

## 2022-07-13 PROCEDURE — 84484 ASSAY OF TROPONIN QUANT: CPT | Performed by: PHYSICIAN ASSISTANT

## 2022-07-13 PROCEDURE — 36415 COLL VENOUS BLD VENIPUNCTURE: CPT | Performed by: PHYSICIAN ASSISTANT

## 2022-07-13 RX ORDER — POTASSIUM CHLORIDE 1500 MG/1
40 TABLET, EXTENDED RELEASE ORAL ONCE
Status: COMPLETED | OUTPATIENT
Start: 2022-07-13 | End: 2022-07-13

## 2022-07-13 RX ORDER — ACETAMINOPHEN 325 MG/1
650 TABLET ORAL EVERY 4 HOURS PRN
Status: DISCONTINUED | OUTPATIENT
Start: 2022-07-13 | End: 2022-07-14 | Stop reason: HOSPADM

## 2022-07-13 RX ORDER — ACETAMINOPHEN 650 MG/1
650 SUPPOSITORY RECTAL EVERY 4 HOURS PRN
Status: DISCONTINUED | OUTPATIENT
Start: 2022-07-13 | End: 2022-07-14 | Stop reason: HOSPADM

## 2022-07-13 RX ADMIN — ASPIRIN 81 MG: 81 TABLET, COATED ORAL at 07:47

## 2022-07-13 RX ADMIN — POLYETHYLENE GLYCOL 3350 17 G: 17 POWDER, FOR SOLUTION ORAL at 16:13

## 2022-07-13 RX ADMIN — LEVOTHYROXINE SODIUM 125 MCG: 75 TABLET ORAL at 07:47

## 2022-07-13 RX ADMIN — METOPROLOL SUCCINATE 12.5 MG: 25 TABLET, EXTENDED RELEASE ORAL at 07:47

## 2022-07-13 RX ADMIN — GABAPENTIN 600 MG: 300 CAPSULE ORAL at 16:11

## 2022-07-13 RX ADMIN — ACETAMINOPHEN 650 MG: 325 TABLET ORAL at 23:37

## 2022-07-13 RX ADMIN — FLECAINIDE ACETATE 100 MG: 100 TABLET ORAL at 07:47

## 2022-07-13 RX ADMIN — SODIUM CHLORIDE, POTASSIUM CHLORIDE, SODIUM LACTATE AND CALCIUM CHLORIDE: 600; 310; 30; 20 INJECTION, SOLUTION INTRAVENOUS at 11:15

## 2022-07-13 RX ADMIN — FLECAINIDE ACETATE 150 MG: 100 TABLET ORAL at 19:57

## 2022-07-13 RX ADMIN — PANTOPRAZOLE SODIUM 40 MG: 40 TABLET, DELAYED RELEASE ORAL at 07:47

## 2022-07-13 RX ADMIN — POTASSIUM CHLORIDE 40 MEQ: 1500 TABLET, EXTENDED RELEASE ORAL at 09:35

## 2022-07-13 RX ADMIN — SODIUM CHLORIDE, POTASSIUM CHLORIDE, SODIUM LACTATE AND CALCIUM CHLORIDE: 600; 310; 30; 20 INJECTION, SOLUTION INTRAVENOUS at 01:18

## 2022-07-13 NOTE — PLAN OF CARE
[facility]      OUTPATIENT PHYSICAL THERAPY EVALUATION  PLAN OF TREATMENT FOR OUTPATIENT REHABILITATION  (COMPLETE FOR INITIAL CLAIMS ONLY)  Patient's Last Name, First Name, M.I.  YOB: 1955  Maxine Sears                        Provider's Name  [facility] Medical Record No.  [id number]                               Onset Date:  07/13/22   Start of Care Date:  07/13/22      Type:     _X_PT   ___OT   ___SLP Medical Diagnosis:  Generalized Weakness                        PT Diagnosis:  Impaired gait   Visits from SOC:  1   _________________________________________________________________________________  Plan of Treatment/Functional Goals    Planned Interventions: balance training, bed mobility training, patient/family education, strengthening, transfer training     Goals: See Physical Therapy Goals on Care Plan in [employer] electronic health record.    Therapy Frequency: Daily  Predicted Duration of Therapy Intervention: 07/20/22  _________________________________________________________________________________    I CERTIFY THE NEED FOR THESE SERVICES FURNISHED UNDER        THIS PLAN OF TREATMENT AND WHILE UNDER MY CARE     (Physician co-signature of this document indicates review and certification of the therapy plan).              Certification date from: 07/13/22, Certification date to: 07/18/22    Referring Physician: Tacos Huggins MD            Initial Assessment        See Physical Therapy evaluation dated 07/13/22 in [employer] electronic health record.

## 2022-07-13 NOTE — PROGRESS NOTES
"   07/13/22 1000   Quick Adds   Quick Adds Certification   Type of Visit Initial PT Evaluation       Present no   Living Environment   People in Home alone   Current Living Arrangements independent living facility  ([facility])   Home Accessibility no concerns   Transportation Anticipated family or friend will provide   Living Environment Comments Pt lives alone in an ILF. Pt reports not concerns regarding stairs. Pt reports a friend will pick her up upon discharge. Pt reports she has access to 24/7 assist if needed.   Self-Care   Usual Activity Tolerance good   Current Activity Tolerance moderate   Regular Exercise No   Equipment Currently Used at Home cane, straight;walker, rolling;grab bar, tub/shower;shower chair   Fall history within last six months no   Activity/Exercise/Self-Care Comment Pt reports being IND with bathing and dressing. Pt reports cooking in a seated position but reports cleaning is difficult. Pt reports she has [employer] friends who assist with cleaning. Pt reports ambulating with a SEC within the ILF and uses a FWW for community ambulation. Pt reports being able to ambulate ~100' w/ FWW before needing a rest break. Pt does not drive. Pt reports her girlfriend assist with errand management.   General Information   Onset of Illness/Injury or Date of Surgery 07/13/22   Referring Physician Tacos Huggins MD   Patient/Family Therapy Goals Statement (PT) \"To go home\"   Pertinent History of Current Problem (include personal factors and/or comorbidities that impact the POC) Per Chart: Maxine Sears is a 66 year-old female with history of depression, duodenal ulcer, hypertension, hypothyroidism, obesity, NELY, paroxysmal atrial fibrillation with prior watchman, RLS who presents with generalized weakness, lightheadedness.  Admitted on 7/12/2022.   Existing Precautions/Restrictions fall   Weight-Bearing Status - LLE full weight-bearing   Weight-Bearing Status - RLE " full weight-bearing   Cognition   Orientation Status (Cognition) oriented x 3   Pain Assessment   Patient Currently in Pain No   Integumentary/Edema   Integumentary/Edema no deficits were identifed   Posture    Posture Forward head position;Protracted shoulders   Range of Motion (ROM)   Range of Motion ROM is WFL   Strength (Manual Muscle Testing)   Strength (Manual Muscle Testing) strength is WFL   Bed Mobility   Comment, (Bed Mobility) Supine>sit w/ mod I   Transfers   Comment, (Transfers) Sit>stand w/ FWW and SBA   Gait/Stairs (Locomotion)   Henry Level (Gait) supervision   Assistive Device (Gait) walker, front-wheeled   Distance in Feet (Required for LE Total Joints) 125'  (10' eval)   Comment, (Gait/Stairs) Pt ambulated ~10' w/ FWW and SBA for eval. Pt was steady throughout and had no LOB.   Balance   Balance Comments Pt able to sit at EOB unsupported without LOB. Pt ambulates using a FWW for added stability and support.   Sensory Examination   Sensory Perception patient reports no sensory changes   Clinical Impression   Criteria for Skilled Therapeutic Intervention Yes, treatment indicated   PT Diagnosis (PT) Impaired gait   Influenced by the following impairments Decreaed activity tolerance; decreased balance; decreased strength   Functional limitations due to impairments Impaired functional mobility   Clinical Presentation (PT Evaluation Complexity) Stable/Uncomplicated   Clinical Presentation Rationale Clinical Judgement   Clinical Decision Making (Complexity) low complexity   Planned Therapy Interventions (PT) balance training;bed mobility training;patient/family education;strengthening;transfer training   Risk & Benefits of therapy have been explained evaluation/treatment results reviewed;care plan/treatment goals reviewed;current/potential barriers reviewed;risks/benefits reviewed;participants voiced agreement with care plan;participants included;patient   PT Discharge Planning   PT Discharge  Recommendation (DC Rec) home with home care physical therapy;home with assist   PT Rationale for DC Rec Pt is below baseline but is moving well. Anticipate at time of discharge pt will be SBA for bed mobility, functional transfers, and gait w/ FWW. Pt does present with deficits in activity tolerance and would benefit from continued skilled PT services via HHPT to address deficits and improve IND with safety and funcitonal mobility. Pt does not drive and uses an AD at baseline, making it a taxing effort to leave the home. Pt will have access to assist if needed upon discharge.   PT Brief overview of current status Supine>sit w/ Mod I; sit>stand w/ FWW and SBA; gait w/ FWW and SBA   Therapy Certification   Start of care date 07/13/22   Certification date from 07/13/22   Certification date to 07/18/22   Medical Diagnosis Generalized Weakness   Total Evaluation Time   Total Evaluation Time (Minutes) 10   Physical Therapy Goals   PT Frequency Daily   PT Predicted Duration/Target Date for Goal Attainment 07/20/22   PT Goals Bed Mobility;Transfers;Gait   PT: Bed Mobility Supervision/stand-by assist;Supine to/from sit   PT: Transfers Supervision/stand-by assist;Sit to/from stand;Assistive device   PT: Gait Supervision/stand-by assist;Assistive device;150 feet

## 2022-07-13 NOTE — PROGRESS NOTES
TRANSITIONS OF CARE (SANTOS) LOG   SANTOS tasks should be completed by the CC within one (1) business day of notification of each transition. Follow up contact with member is required after return to their usual care setting.  Note:  If CC finds out about the transitions fifteen (15) days or more after the member has returned to their usual care setting, no SANTOS log is needed. However, the CC should check in with the member to discuss the transition process, any changes needed to the care plan and document it in a case note.    Member Name:  Maxine Sears MCO Name:  [place] M[employer]/Health Plan Member ID#: [id number]   Product: [facility] Care Coordinator Contact:  Jaymie Coats RN Agency/County/Care System: [facility]   Transition Communication Actions from Care Management Contact   Transition #1   Notification Date: 7/13/22 Transition Date:   7/12/22 Transition From: Home     Is this the member s usual care setting?               yes Transition To: [facility]   Transition Type:  Unplanned  Reason for Admission/Comments:  Generalized Weakness, Hypokalemia  Contact member/responsible party to offer assistance with transition Date completed: NA. CC following along in EMR. Member currently in Observation with plan to possibly discharge within 24 hours    Notes from conversation with the member/responsible party, provider, discharging and receiving facility (as applicable): CC contacted Hospital /discharge planner via the [employer] [facility]itional Care Hand-In Process, with community care plan included.  CC did not reach out to member at hospital. CC will follow EMR and recommendations.  Reviewed and update care plan as needed.  Notified community service providers and placed services None on hold as needed.  Transition log initiated.   PCP notified of hospitalization via EMR.    07/14/2022:  Received call from VERONIKA Davis ([phone]) [facility]. Susan was looking for assistance with  scheduling a f/u appt at PCP clinic. Susan had not received a call back from the clinic to sched an appt. F/U w/ Susan and she was able to reach the clinic and sched a f/u appt. Fadumo Chandra, [facility] [phone], Fax: [phone].   Shared CC contact info, care plan/services with receiving setting--Date completed: 7/13/22   Name & Title of receiving setting contact: [facility]   Notified PCP of transition--Date completed:  7/12/22     via  EMR  Name of PCP: Yuval Jama     Transition #2   Notification Date: 7/18/22 Transition Date:   7/14/22 Transition From: Hospital, [facility]     Is this the member s usual care setting?               no Transition To: Home   Transition Type:  Planned  Reason for Admission/Comments:  Generalized Weakness, Hypokalemia  Contact member/responsible party to offer assistance with transition Date completed: see note below      7/18/22 Notes from conversation with the member/responsible party, provider, discharging and receiving facility (as applicable): CC contacted member and reviewed discharge summary.  Member has a follow-up appointment with PCP in 7 days: Yes: scheduled on 7/19/2022   Member has had a change in condition: No  Home visit needed: No   Spoke with member, she shared that she is doing well, has good understanding of her medications. Member stated that on Saturday her legs were swollen and she was aware that Maxzide was put on hold, but she took a dose and her swelling decreased. Member stated she did not take any other doses since. Member is aware of her PCP appt to review this. Member has compression stockings, she was given a a home b/p unit from a friend, but she cannot find it. CC will place referral for new b/p unit.   Reviewed medications, member agreeable to MTM referral, member takes Benadryl PRN for allergies.  Member stated that she was not informed of her thyroid medication change, but  she did p/u the new Rx. Member stated that she did receive a call from home care and PT will see her today at 11 AM. Explained that this CC will reach out to [facility] to introduce myself and if PT has recommendations for DME.   Care plan reviewed and updated.  The following home based services NA, member not opened to services at this time.   New referrals placed: Yes: Therapies: PT [facility] [phone]  Transition log completed.   PCP notified of transition back to home via EMR.  Jaymie Coats RN, BC  Manager [facility] Care Coordinator   [phone]  (Fax)         Shared CC contact info, care plan/services with receiving setting--Date completed: 7/18/22   Name & Title of receiving setting contact: NA.    Notified PCP of transition--Date completed:  7/14/22     via  EMR  Name of PCP: Yuval Jama      *RETURN TO USUAL CARE SETTING: *Complete tasks below when the member is discharging TO their usual care setting within one (1) business day of notification..      For situations where the Care Coordinator is notified of the discharge prior to the date of discharge, the Care Coordinator must follow up with the member or designated representative to confirm that discharge actually occurred and discuss required SANTOS tasks as outlined in the SANTOS Instructions.  (This includes situations where it may be a  new  usual care setting for the member. (i.e., a community member who decides upon permanent nursing home placement following hospitalization and rehab).    Discuss with Member/Responsible Party:    Check  Yes  - if the member, family member and/or SNF/facility staff manages the following:    If  No  provide explanation in the comments section.          Date completed: 7/18/2022 Communicated with member or their designated representative about the following:  care transition process; about changes to the member s health status; plan of care updates; education about transitions  and how to prevent unplanned transitions/readmissions    Four Pillars for Optimal Transition:    Check  Yes  - if the member, family member and/or SNF/facility staff manages the following:    If  No  provide explanation in the comments section.          [x]  Yes     []  No Does the member have a follow-up appointment scheduled with primary care or specialist? (Mental health hospitalizations--the appt. should be w/in 7 days)              For mental health hospitalizations:  []  Yes     [x]  No     Does the member have a follow-up appointment scheduled with a mental health practitioner within 7 days of discharge?  [x]  Yes     []  No     Has a medication review been completed with member? If no, refer to PCP, home care nurse, MTM, pharmacist  [x]  Yes     []  No     Can the member manage their medications or is there a system in place to manage medications (e.g. home care set-up)?         [x]  Yes     []  No     Can the member verbalize warning signs and symptoms to watch for and how to respond?  [x]  Yes     []  No     Does the member have a copy of and understand their discharge instructions?  If no, assist to obtain copy of discharge instructions, review discharge instructions, and assist to contact PCP to discuss questions about their recent hospitalization.  [x]  Yes     []  No     Does the member have adequate food, housing and transportation?  If no, add goal and discuss additional supports available to the member                                                                                                                                                                                 [x]  Yes     []  No     Is the member safe in their home?  If no, document needs and support provided                                                                                                                                                                          [x]  Yes     []  No     Are there any concerns of  vulnerability, abuse, or neglect?  If yes, document concerns and actions taken by Care Coordinator as a mandated                                                                                                                                                                              [x]  Yes     []  No     Does the member use a Personal Health Care Record?  Check  Yes  if visit summary, discharge summary, and/or healthcare summary are being used as a PHR.                                                                                                                                                                                  [x]  Yes     []  No     Have you reviewed the discharge summary with the member? If  No  provide explanation in comments.  [x]  Yes     []  No     Have you updated the member s care plan/support plan? Add new diagnosis, medications, treatments, goals & interventions, as applicable. If No, provide explanation in comments.    Comments:           Notes from conversation with the member/responsible party, provider, discharging and receiving facility (as applicable): See notes above.   MTM referral placed, b/p home monitoring system will request Rx from PCP.  [facility] to contact CC with recommendations.   Member has this CC contact information.  Jaymie Coats RN, BC  Manager [facility] Care Coordinator   [phone]  (Fax)

## 2022-07-13 NOTE — PROGRESS NOTES
observation goals: prior to discharge  -diagnostic tests and consults completed and resulted- met   -vital signs normal or at patient baseline - met  -safe disposition plan has been identified- not met

## 2022-07-13 NOTE — PROGRESS NOTES
Observation goals  PRIOR TO DISCHARGE        Comments:   -diagnostic tests and consults completed and resulted:Met   -vital signs normal or at patient baseline:Met  -safe disposition plan has been identified:Not met  Nurse to notify provider when observation goals have been met and patient is ready for discharge.

## 2022-07-13 NOTE — PLAN OF CARE
Orientation/Cognitive: A&Ox4  Observation Goals (Met/ Not Met): not met  Mobility Level/Assist Equipment: SBA with cane  Fall Risk (Y/N): yes  Behavior Concerns: none  Pain Management: denies pain  Tele/VS/O2: tele: SB w/ 1st degree AVB and BBB  ABNL Lab/BG:   Diet: mod cho  Bowel/Bladder: continent   Skin Concerns: has dry scab/peeling spots on abdomen  Drains/Devices: IVF @100, other IV-SL  Tests/Procedures for next shift: orthostatic BP  Anticipated DC date & active delays: TBD  Patient Stated Goal for Today: n/a

## 2022-07-13 NOTE — UTILIZATION REVIEW
"[facility]     Admission Status; Secondary Review Determination     Admission Date: 7/12/2022  8:39 AM      Under the authority of the Utilization Management Committee, the utilization review process indicated a secondary review on the above patient.  The review outcome is based on review of the medical records, discussions with staff, and applying clinical experience noted on the date of the review.          (x) Observation Status Appropriate - This patient does not meet hospital inpatient criteria and is placed in observation status. If this patient's primary payer is Medicare and was admitted as an inpatient, Condition Code 44 should be used and patient status changed to \"observation\".     RATIONALE FOR DETERMINATION   66-year-old female with a history of peptic ulcer disease, depression, hypertension, obesity, sleep apnea, and atrial fibrillation was admitted yesterday with generalized weakness and lightheadedness.  She was found to have orthostatic hypotension.  She also had acute kidney injury with creatinine elevated to 2.5 with baseline of about 1.0.  She was started on IV fluids and creatinine has improved to 1.5 this morning.  Vital signs are also showing improvement and she is only borderline orthostatic.  Symptomatically she is doing better with regards to her weakness and dizziness.  This patient has showed clear improvement since admission and at the time of this review does not meet medical necessity for inpatient hospitalization and observation status remains appropriate.    The severity of illness, intensity of service provided, expected LOS and risk for adverse outcome make the care appropriate for further observation; however, doesn't meet criteria for hospital inpatient admission.          The information on this document is developed by the utilization review team in order for the business office to ensure compliance.  This only denotes the appropriateness of proper admission " status and does not reflect the quality of care rendered.         The definitions of Inpatient Status and Observation Status used in making the determination above are those provided in the CMS Coverage Manual, Chapter 1 and Chapter 6, section 70.4.      Sincerely,     Tom Alicia DO MPH   Physician Advisor  Utilization Review  [facility]

## 2022-07-13 NOTE — PROGRESS NOTES
Observation goals  PRIOR TO DISCHARGE        Comments: -diagnostic tests and consults completed and resulted: not met   -vital signs normal or at patient baseline: partially met  -safe disposition plan has been identified: not met   Nurse to notify provider when observation goals have been met and patient is ready for discharge.

## 2022-07-13 NOTE — PROGRESS NOTES
"SPIRITUAL HEALTH SERVICES Progress Note  Nil[facility] 55 Short Stay/Obs    SH Consult for Health Care Directive.    I brought an [employer] form to Roxanna and explained it to her.     She also shared that she expects to discharge home tomorrow and was glad to spend another night in hospital, because it's important \"to be sure.\"     I shared words of encouragement and departed with a blessing.    SH remains available.    Rev Ilda Eddy  Associate rachel Demarco Spiritual Health Phone Line [phone]  [place] (Tuesdays & Thursdays) [phone]    "

## 2022-07-13 NOTE — PLAN OF CARE
Goal Outcome Evaluation:      Orientation/Cognitive: A&Ox4  Observation Goals (Met/ Not Met): Not met  Mobility Level/Assist Equipment: Ax1, GB,walker  Fall Risk (Y/N): Y  Behavior Concerns: NA  Pain Management:Denies pain  Tele/VS/O2:  Tele; SR w/ 1st degree AV block w/ BBB VSS on RA  ABNL Lab/BG: B, K: 2.9 replaced in the ED AM recheck  TSH:0.02 Creat:2.53, UA resulted  Diet:Mod carb   Bowel/Bladder: Continent   Skin Concerns: NA  Drains/Devices: PIV infusing LR @100ml/hr  Tests/Procedures for next shift: Awaiting Urine culture   Anticipated DC date & active delays: 1-2 days  Patient Stated Goal for Today: To get some rest          Observation goals  PRIOR TO DISCHARGE        Comments:   -diagnostic tests and consults completed and resulted:Met   -vital signs normal or at patient baseline:Met  -safe disposition plan has been identified:Not met  Nurse to notify provider when observation goals have been met and patient is ready for discharge.

## 2022-07-13 NOTE — PROGRESS NOTES
[facility]    Medicine Progress Note - Hospitalist Service    Date of Admission:  7/12/2022    Assessment & Plan        Maxine Sears is a 66 year-old female with history of depression, duodenal ulcer, hypertension, hypothyroidism, obesity, NELY, paroxysmal atrial fibrillation with prior watchman, RLS who presents with generalized weakness, lightheadedness and was registered to observation on 7/12/22 for further evaluation.       Acute kidney injury, improving  Suspected orthostatic hypotension  * Presents with generalized weakness, light-headedness with position changes  * Cr 2.53 on admission from baseline of 0.80. Given concurrent orthostasis symptoms and hypokalemia suspect hypovolemia.  * Recently constipated, followed by diarrhea associated with some nausea and poor intake possibly contributing to hypovolemia combined with PTA diuretic. Ongoing losartan use potentially contributing to SOBEIDA as well.  * UA with a few WBC but contaminated  * Note that she received contrast study in ED  * Cr 2.53-->1.58  * UA mod LE, 17 wbc, neg nitrite, patient denies urinary symptoms. Reports has had UTI in the past and this does not feel like UTI  - Observation status  - Continue MIVF  - Borderline (+) orthostatics, monitor  - Holding PTA losartan, triamterene-HCTZ  - PT consulted, though can defer if weakness improves with IV fluids  - Add compression stockings  - Recheck basic labs in AM     History of hypothyroidism with suspected iatrogenic hyperthyroidism  TSH 0.02, free T4 1.60. Reports taking her levothyroxine as prescribed, no recent dose changes.  No clear hyperthyroid symptoms  - Reduce PTA levothyroxine to 125 mcg  - Will need repeat TSH as outpatient     Hypokalemia  K+ 2.9 on admission. Secondary to poor intake, recent diarrhea, +/- diuretic use. Magnesium WNL.  - Monitor and replace per protocol      Non-specific EKG changes  * EKG not overwhelmingly different from prior EKG  * No  chest pain or concerning cardiac symptoms  * Electrolyte disturbances may be contributing  - Serial troponins negative x2  - Repeat EKG once potassium replete  - May consider echocardiogram if changes persist with normal electrolytes or troponin trend is concerning  - Repeat troponin 7/13 x2 as patient reports very mild transient chest pressure while working with PT today, EKG reviewed this AM and improved from prior. She will be ambulating again with RN and monitor this closely. If any recurrence we will order an ECHO and consider stress test. 2016 stress test negative, no heart strain on CTA, moderate coronary calcifications on CTA imaging     Diabetes mellitus, type 2, well controlled  HgbA1c 6.6% 3/14/22. Prior to admission on liraglutide 1.8 mg daily.  - Hold liraglutide while hospitalized  - Moderate consistent carbohydrate diet next  - Glucose well controlled on BMP, can defer insulin while observation status   - Glucose BID and hypoglycemia protocol pending liraglutide washout     Recent Labs   Lab 07/13/22  0645 07/13/22  0644 07/12/22  2127 07/12/22  1738 07/12/22  0919   * 101* 119* 100* 107*        Hypertension  - Hold losartan and triamterene-HCTZ as above  - Continue PTA metoprolol XL      Paroxysmal atrial fibrillation with prior watchman procedure  Currently in sinus rhythm  - Monitor on telemetry for potential recurrent atrial fibrillation contributing to symptoms  - Continue prior to admission metoprolol XL, flecainide, aspirin   - Not on AC due to prior history of GI bleeding, previously on Xarelto. Now has Watchman in place     Chronic stable diagnoses and other pertinent medical history: Appropriate PTA medications will be resumed  Hyperlipidemia: Hold prior to admission statin while observation status  History of duodenal ulcer  GERD: Continue prior to admission PPI  Obstructive sleep apnea: Continue CPAP per home settings  Morbid obesity: Body mass index is 48.54 kg/m .. Increase in  "all-cause morbidity and mortality. Encourage weight loss.             COVID-19 PCR Results    COVID-19 PCR Results 7/12/22   SARS CoV2 PCR Negative          Diet: Moderate Consistent Carb (60 g CHO per Meal) Diet    DVT Prophylaxis: Low Risk/Ambulatory with no VTE prophylaxis indicated and Pneumatic Compression Devices  Vasquez Catheter: Not present  Central Lines: None  Cardiac Monitoring: ACTIVE order. Indication: Electrolyte Imbalance (24 hours)- Magnesium <1.3 mg/ml; Potassium < =2.8 or > 5.5 mg/ml  Code Status: Full Code      Disposition Plan      Expected Discharge Date: 07/14/2022,  9:00 AM  Discharge Delays: *Early Discharge Anticipated  Lab Result Pending (enter specific test & time in comments)  MD D/C Med Rec  MD D/C Order     Discharge Comments: PT recs home w homecare        The patient's care was discussed with the Attending Physician, Dr. Denny, Bedside Nurse, Care Coordinator/ and Patient.    Lizet Ausitn PA-C  Hospitalist Service  [facility]  Securely message with the Vocera Web Console (learn more here)  Text page via [email or web address] Paging/Directory         Clinically Significant Risk Factors Present on Admission          # Hypocalcemia: Ca = 8.4 mg/dL (Ref range: 8.5 - 10.1 mg/dL) and/or iCa = N/A on admission, will replace as needed        # Hypertension: home medication list includes antihypertensive(s)    # Severe Obesity: Estimated body mass index is 45.88 kg/m  as calculated from the following:    Height as of this encounter: 1.6 m (5' 3\").    Weight as of this encounter: 117.5 kg (259 lb).        ______________________________________________________________________    Interval History   Seen and examined. Feeling improved today but not quite to baseline. PT rec'd home PT, pt agreeable. She Denies N/V, she ate bfast. No lightheadedness, dizziness, or urinary symptoms. No palpitations. She reports very mild chest pressure while ambulating with PT which " rapidly resolved, she denies having this before. EKGs reviewed, trop neg x2. No recurrence at this time. If any recurrence will need to pursue further workup, potentially ECHO vs Nuc stress. Will check x2 more trops.    Data reviewed today: I reviewed all medications, new labs and imaging results over the last 24 hours. I personally reviewed the EKG tracing showing reviewed, compared all. TWI chronic. nonspecific ST-T changes improving.    Physical Exam   Vital Signs: Temp: 98  F (36.7  C) Temp src: Oral BP: 107/45 Pulse: 59   Resp: 18 SpO2: 95 % O2 Device: None (Room air)    Weight: 259 lbs 0 oz    Physical Exam    General: Awake, alert, very pleasant woman who appears stated age. Looks comfortable sitting up in bed watching TV. No acute distress.  HEENT: Normocephalic, atraumatic. Extraocular movements intact.   Respiratory: Clear to auscultation bilaterally, no rales, wheezing, or rhonchi to anterolateral fields.  Cardiovascular: Regular rate and rhythm, +S1 and S2, no murmur auscultated. No peripheral edema.   Gastrointestinal: Soft, non-tender, non-distended. Bowel sounds present.  Skin: Warm, dry. No obvious rashes or lesions on exposed skin. Dorsalis pedis pulses palpable bilaterally.  Musculoskeletal: No joint swelling, erythema or tenderness. Moves all extremities equally.  Neurologic: AAO x3. No focal deficits.  Psychiatric: Appropriate mood and affect. No obvious anxiety or depression.        Data   Recent Labs   Lab 07/13/22  0645 07/13/22  0644 07/12/22  2127 07/12/22  1738 07/12/22  0919   WBC 7.5  --   --   --  8.7   HGB 10.6*  --   --   --  12.2   MCV 84  --   --   --  84     --   --   --  376     --   --   --  134   POTASSIUM 3.3*  --   --   --  2.9*   CHLORIDE 109  --   --   --  102   CO2 22  --   --   --  24   BUN 23  --   --   --  27   CR 1.58*  --   --   --  2.53*   ANIONGAP 6  --   --   --  8   J CARLOS 8.4*  --   --   --  8.8   * 101* 119*   < > 107*   ALBUMIN  --   --   --   --   3.4   PROTTOTAL  --   --   --   --  7.1   BILITOTAL  --   --   --   --  1.1   ALKPHOS  --   --   --   --  77   ALT  --   --   --   --  18   AST  --   --   --   --  13    < > = values in this interval not displayed.     No results found for this or any previous visit (from the past 24 hour(s)).  Medications     lactated ringers 100 mL/hr at 07/13/22 1115       aspirin  81 mg Oral Daily     flecainide  150 mg Oral Q12H [facility] (08/20)     gabapentin  600 mg Oral Daily     levothyroxine  125 mcg Oral Daily     metoprolol succinate ER  12.5 mg Oral Daily     pantoprazole  40 mg Oral Daily

## 2022-07-13 NOTE — CONSULTS
Care Management Initial Consult    General Information  Assessment completed with: Roxanna Hood  Type of CM/SW Visit: Initial Assessment    Primary Care Provider verified and updated as needed:     Readmission within the last 30 days:        Reason for Consult: discharge planning  Advance Care Planning:            Communication Assessment  Patient's communication style: spoken language (English or Bilingual)    Hearing Difficulty or Deaf: no        Cognitive  Cognitive/Neuro/Behavioral: WDL                      Living Environment:   People in home: alone     Current living Arrangements: apartment      Able to return to prior arrangements:         Family/Social Support:  Care provided by:    Provides care for:    Marital Status: Single             Description of Support System:           Current Resources:   Patient receiving home care services: No     Community Resources: None  Equipment currently used at home: cane, straight  Supplies currently used at home: None    Employment/Financial:  Employment Status: retired        Financial Concerns:             Lifestyle & Psychosocial Needs:  Social Determinants of Health     Tobacco Use: Medium Risk     Smoking Tobacco Use: Former Smoker     Smokeless Tobacco Use: Never Used   Alcohol Use: Not on file   Financial Resource Strain: Not on file   Food Insecurity: Not on file   Transportation Needs: Not on file   Physical Activity: Not on file   Stress: Not on file   Social Connections: Not on file   Intimate Partner Violence: Not on file   Depression: Not at risk     PHQ-2 Score: 0   Housing Stability: Not on file       Functional Status:  Prior to admission patient needed assistance: transporation             Mental Health Status:      No concerns    Chemical Dependency Status:      none          Values/Beliefs:  Spiritual, Cultural Beliefs, Oriental orthodox Practices, Values that affect care: no               Additional Information:  Met with patient. Home PT is recommended.  Patient does not think she really needs it . She would like help with an Advanced Directive. Order placed for help with Advanced Directive.    Sobeida Torrez RN

## 2022-07-13 NOTE — PROGRESS NOTES
[facility] Care Coordination Contact    [facility]  Ambulatory Care Coordination to Inpatient Care Management   Hand-In Communication    Date:  July 13, 2022  Name: Maxine Sears is enrolled in [facility] Care Coordination program and I am the Lead Care Coordinator.  CC Contact Information:. Jaymie Coats RN  Payor Source: Payor: [facility] / Plan: [employer] MEDICARE ADVANTAGE / Product Type: HMO /   Current services in place:  No current services   Please see the CC Snaphot and Care Management Flowsheets for specific details of this Maxine Sears care plan.   Additional details/specific concerns r/t this admission:    No additional concerns at this time .    I will follow this admission in Epic. Please feel free to contact me with questions or for further collaboration in discharge planning.  Jaymie Coats RN, BC  Manager [facility] Care Coordinator   [phone]  (Fax)

## 2022-07-13 NOTE — PLAN OF CARE
Orientation: A&Ox4  Observation goals: Not met  Activity: SBA IV pole  Fall Risk (Y/N): Y  Behavior Concerns: NA  Pain Management: Denies pain  Tele/VS/O2: SR w/ 1st degree AV block, VSS on RA  ABNL Lab/BG: HGB 10.6, .  Diet: Mod carb   Bowel/bladder: Continent   Skin Concerns: NA  Drains/Devices: PIV infusing LR @100ml/hr  Tests/Procedures for next shift: none    Potassium replaced, up to 3.5 this PM. Creatinine improved to 1.58. Troponin ordered due to chest pressure with activity, unremarkable. Recheck K and creatinine in the morning 6/14. Applied compression stockings this shift, tolerating well. Increased flucainide to 150mg BID. PT recommends home care. Discharge tomorrow pending labs.

## 2022-07-14 ENCOUNTER — TELEPHONE (OUTPATIENT)
Dept: INTERNAL MEDICINE | Facility: CLINIC | Age: 67
End: 2022-07-14

## 2022-07-14 VITALS
WEIGHT: 259 LBS | OXYGEN SATURATION: 95 % | DIASTOLIC BLOOD PRESSURE: 69 MMHG | TEMPERATURE: 97.3 F | SYSTOLIC BLOOD PRESSURE: 144 MMHG | HEART RATE: 60 BPM | RESPIRATION RATE: 16 BRPM | BODY MASS INDEX: 45.89 KG/M2 | HEIGHT: 63 IN

## 2022-07-14 LAB
ANION GAP SERPL CALCULATED.3IONS-SCNC: 4 MMOL/L (ref 3–14)
BACTERIA UR CULT: NORMAL
BACTERIA UR CULT: NORMAL
BUN SERPL-MCNC: 18 MG/DL (ref 7–30)
CALCIUM SERPL-MCNC: 8.3 MG/DL (ref 8.5–10.1)
CHLORIDE BLD-SCNC: 110 MMOL/L (ref 94–109)
CO2 SERPL-SCNC: 24 MMOL/L (ref 20–32)
CREAT SERPL-MCNC: 1 MG/DL (ref 0.52–1.04)
ERYTHROCYTE [DISTWIDTH] IN BLOOD BY AUTOMATED COUNT: 17.2 % (ref 10–15)
GFR SERPL CREATININE-BSD FRML MDRD: 62 ML/MIN/1.73M2
GLUCOSE BLD-MCNC: 114 MG/DL (ref 70–99)
HCT VFR BLD AUTO: 32.6 % (ref 35–47)
HGB BLD-MCNC: 10.5 G/DL (ref 11.7–15.7)
MAGNESIUM SERPL-MCNC: 1.9 MG/DL (ref 1.6–2.3)
MCH RBC QN AUTO: 27.1 PG (ref 26.5–33)
MCHC RBC AUTO-ENTMCNC: 32.2 G/DL (ref 31.5–36.5)
MCV RBC AUTO: 84 FL (ref 78–100)
PLATELET # BLD AUTO: 306 10E3/UL (ref 150–450)
POTASSIUM BLD-SCNC: 3.7 MMOL/L (ref 3.4–5.3)
RBC # BLD AUTO: 3.88 10E6/UL (ref 3.8–5.2)
SODIUM SERPL-SCNC: 138 MMOL/L (ref 133–144)
WBC # BLD AUTO: 7.1 10E3/UL (ref 4–11)

## 2022-07-14 PROCEDURE — 258N000003 HC RX IP 258 OP 636: Performed by: INTERNAL MEDICINE

## 2022-07-14 PROCEDURE — 80048 BASIC METABOLIC PNL TOTAL CA: CPT | Performed by: PHYSICIAN ASSISTANT

## 2022-07-14 PROCEDURE — 250N000013 HC RX MED GY IP 250 OP 250 PS 637: Performed by: INTERNAL MEDICINE

## 2022-07-14 PROCEDURE — 36415 COLL VENOUS BLD VENIPUNCTURE: CPT | Performed by: PHYSICIAN ASSISTANT

## 2022-07-14 PROCEDURE — 99217 PR OBSERVATION CARE DISCHARGE: CPT | Performed by: PHYSICIAN ASSISTANT

## 2022-07-14 PROCEDURE — 250N000013 HC RX MED GY IP 250 OP 250 PS 637: Performed by: PHYSICIAN ASSISTANT

## 2022-07-14 PROCEDURE — G0378 HOSPITAL OBSERVATION PER HR: HCPCS

## 2022-07-14 PROCEDURE — 85027 COMPLETE CBC AUTOMATED: CPT | Performed by: PHYSICIAN ASSISTANT

## 2022-07-14 PROCEDURE — 83735 ASSAY OF MAGNESIUM: CPT | Performed by: PHYSICIAN ASSISTANT

## 2022-07-14 RX ORDER — TRIAMTERENE/HYDROCHLOROTHIAZID 37.5-25 MG
1 TABLET ORAL DAILY
Qty: 90 TABLET | Refills: 3 | DISCHARGE
Start: 2022-07-14 | End: 2022-07-28

## 2022-07-14 RX ORDER — LOSARTAN POTASSIUM 100 MG/1
100 TABLET ORAL DAILY
Status: DISCONTINUED | OUTPATIENT
Start: 2022-07-14 | End: 2022-07-14

## 2022-07-14 RX ORDER — LOSARTAN POTASSIUM 50 MG/1
50 TABLET ORAL DAILY
Status: DISCONTINUED | OUTPATIENT
Start: 2022-07-14 | End: 2022-07-14 | Stop reason: HOSPADM

## 2022-07-14 RX ORDER — LEVOTHYROXINE SODIUM 125 UG/1
125 TABLET ORAL DAILY
Qty: 30 TABLET | Refills: 3 | Status: SHIPPED | OUTPATIENT
Start: 2022-07-15 | End: 2022-11-29

## 2022-07-14 RX ADMIN — METOPROLOL SUCCINATE 12.5 MG: 25 TABLET, EXTENDED RELEASE ORAL at 08:01

## 2022-07-14 RX ADMIN — ACETAMINOPHEN 650 MG: 325 TABLET ORAL at 08:01

## 2022-07-14 RX ADMIN — SODIUM CHLORIDE, POTASSIUM CHLORIDE, SODIUM LACTATE AND CALCIUM CHLORIDE: 600; 310; 30; 20 INJECTION, SOLUTION INTRAVENOUS at 02:24

## 2022-07-14 RX ADMIN — LOSARTAN POTASSIUM 50 MG: 50 TABLET, FILM COATED ORAL at 09:54

## 2022-07-14 RX ADMIN — FLECAINIDE ACETATE 150 MG: 100 TABLET ORAL at 08:01

## 2022-07-14 RX ADMIN — LEVOTHYROXINE SODIUM 125 MCG: 75 TABLET ORAL at 08:01

## 2022-07-14 RX ADMIN — ASPIRIN 81 MG: 81 TABLET, COATED ORAL at 08:02

## 2022-07-14 RX ADMIN — PANTOPRAZOLE SODIUM 40 MG: 40 TABLET, DELAYED RELEASE ORAL at 08:01

## 2022-07-14 NOTE — DISCHARGE SUMMARY
[facility]  Hospitalist Discharge Summary      Date of Admission:  7/12/2022  Date of Discharge:  7/14/2022  Discharging Provider: Lizet Austin PA-C  Discharge Service: Hospitalist Service    Discharge Diagnoses   Acute kidney injury, improving  Suspected orthostatic hypotension, resolved  History of hypothyroidism with suspected iatrogenic hyperthyroidism  Hypokalemia, resolved  Non-specific EKG changes  Benign essential hypertension    Follow-ups Needed After Discharge   Follow-up Appointments     Follow-up and recommended labs and tests       Follow up with primary care provider, Yuval Jama, within 7 days to   evaluate medication change and for hospital follow- up.  The following   labs/tests are recommended: repeat BMP and CBC to follow up on creatinine,   potassium, and hemoglobin. Patient is holding Maxzide until seen by PCP.   Please determine if she should resume. Her levothyroxine dose was   decreased, need to repeat in 6-8 weeks as able. Also consider outpatient   stress test.             Discharge Disposition   Discharged to home with home PT  Condition at discharge: Stable    Hospital Course   Maxine Sears is a 66 year-old female with history of depression, duodenal ulcer, hypertension, hypothyroidism, obesity, NELY, paroxysmal atrial fibrillation with prior watchman, RLS who presents with generalized weakness, lightheadedness and was registered to observation on 7/12/22 for further evaluation. For full HPI please see admission H&P from Dr. Tacos Huggins.      Acute kidney injury, improving  Suspected orthostatic hypotension, resolved  * Presents with generalized weakness, light-headedness with position changes  * Cr 2.53 on admission from baseline of 0.80. Given concurrent orthostasis symptoms and hypokalemia suspect hypovolemia.  * Recently constipated, followed by diarrhea associated with some nausea and poor intake possibly contributing to hypovolemia combined  with PTA diuretic. Ongoing losartan use potentially contributing to SOBEIDA as well.  * Note that she received contrast study in ED  * Cr 2.53-->1.58-->1.00  * UA mod LE, 17 wbc, neg nitrite, patient denies urinary symptoms. Reports has had UTI in the past and this does not feel like UTI. UCx negative x2.    Patient was registered to observation.  She was initiated on maintenance IV fluids.  She was borderline positive orthostatic which improved with IV fluids.  Her prior to admission losartan, triamterene-hydrochlorothiazide were held on admission.  Physical therapy was consulted and recommended home physical therapy on discharge which was arranged by care cremate her.  She was advised to wear compression stockings.  She was restarted on half a dose of losartan 50 mg on the day of discharge and instructed to increase to her home dose of 100 mg on 7/15.  She has a blood pressure cuff at home and will monitor her blood pressure appropriately.  She was instructed to hold her triamterene/hydrochlorothiazide until follow-up with her primary care provider next week when she will also repeat a basic metabolic panel to follow-up on kidney function.  Day of discharge she is feeling well and ready to go home.     History of hypothyroidism with suspected iatrogenic hyperthyroidism  TSH 0.02, free T4 1.60. Reports taking her levothyroxine as prescribed, no recent dose changes.  No clear hyperthyroid symptoms.  Her PTA levothyroxine was reduced to 125 mcg.  She will need a repeat TSH in 6 to 8 weeks with primary care.     Hypokalemia, resolved  K+ 2.9 on admission. Secondary to poor intake, recent diarrhea, +/- diuretic use. Magnesium WNL. Hypokalemia resolved with repletion.     Non-specific EKG changes  * EKG not overwhelmingly different from prior EKG  * No chest pain or concerning cardiac symptoms  * Electrolyte disturbances may be contributing  * Serial troponins negative x4  * Repeated troponin 7/13 x2 as patient reported  very mild transient chest pressure while working with PT, EKG reviewed and improved from prior.  She is able to continue to ambulate without recurrence of any chest discomfort.  Her troponin trended completely normal.  I do think primary care should consider an outpatient nuclear stress test at some point due to risk factors.  Of note she did have a negative stress test in 2016 and there is no heart strain on CTA imaging although does note moderate coronary calcifications.     Diabetes mellitus, type 2, well controlled  HgbA1c 6.6% 3/14/22. Prior to admission on liraglutide 1.8 mg daily.  PTA Victoza was held on admission and resumed on discharge.  Blood glucose was monitored and sliding scale insulin utilized as needed.     Recent Labs   Lab 07/14/22  0637 07/13/22  1727 07/13/22  1210 07/13/22  0645 07/13/22  0644 07/12/22  2127   * 106* 201* 101* 101* 119*        Hypertension  She was continued on PTA Toprol XL. As above her PTA losartan was held, restarted at 50 mg 7/14 and she was instructed to increase to PTA dosing 100 mg on 7/15.  She is continuing to hold triamterene/hydrochlorothiazide until follow-up with primary care and BMP next week.  She will monitor for lower extremity edema.  She is instructed to wear compression stockings and check blood pressure at home with her home blood pressure cuff.     Paroxysmal atrial fibrillation with prior watchman procedure  NSR on admission and during hospital stay. Monitored on telemetry which was unremarkable other than nonspecific ST-T changes as above. She was continued on PTA metoprolol XL, flecainide, and aspirin. She is historically not on AC due to prior history of GI bleeding, previously on Xarelto. Now has Watchman in place     Chronic stable diagnoses and other pertinent medical history: Appropriate PTA medications will be resumed  Hyperlipidemia: Hold prior to admission statin while observation status  History of duodenal ulcer  GERD: Continue prior  to admission PPI  Obstructive sleep apnea: Continue CPAP per home settings  Morbid obesity: Body mass index is 48.54 kg/m .. Increase in all-cause morbidity and mortality. Encourage weight loss.       Consultations This Hospital Stay   PHYSICAL THERAPY ADULT IP CONSULT  ADVANCE DIRECTIVE IP CONSULT  CARE MANAGEMENT / SOCIAL WORK IP CONSULT    Code Status   Prior    Time Spent on this Encounter   I, Lizet Austin PA-C, personally saw the patient today and spent greater than 30 minutes discharging this patient.       Lizet Austin PA-C  [facility] EXTENDED RECOVERY AND SHORT STAY  [address] [address]  Phone: [phone]  ______________________________________________________________________    Physical Exam   Vital Signs: Temp: 97.3  F (36.3  C) Temp src: Oral BP: (!) 144/69 Pulse: 60   Resp: 16 SpO2: 95 % O2 Device: None (Room air)    Weight: 259 lbs 0 oz    General: Awake, alert, very pleasant woman who appears stated age. Looks comfortable sitting up in bed. No acute distress.  HEENT: Normocephalic, atraumatic. Extraocular movements intact.   Respiratory: Clear to auscultation bilaterally, no rales, wheezing, or rhonchi to anterolateral fields.  Cardiovascular: Regular rate and rhythm, +S1 and S2, no murmur auscultated. No peripheral edema.   Gastrointestinal: Soft, non-tender, non-distended. Bowel sounds present.  Skin: Warm, dry. No obvious rashes or lesions on exposed skin. Dorsalis pedis pulses palpable bilaterally.  Musculoskeletal: No joint swelling, erythema or tenderness. Moves all extremities equally.  Neurologic: AAO x3. No focal deficits.  Psychiatric: Appropriate mood and affect. No obvious anxiety or depression.       Primary Care Physician   Yuval Jama    Discharge Orders      Home Care Referral      Reason for your hospital stay    You were admitted to the observation unit with dehydration, low potassium, and increase in your kidney labs.      Follow-up and recommended labs and tests     Follow up with primary care provider, Yuval Jama, within 7 days to evaluate medication change and for hospital follow- up.  The following labs/tests are recommended: repeat BMP and CBC to follow up on creatinine, potassium, and hemoglobin. Patient is holding Maxzide until seen by PCP. Please determine if she should resume. Her levothyroxine dose was decreased, need to repeat in 6-8 weeks as able. Also consider outpatient stress test.     Activity    Your activity upon discharge: activity as tolerated     Monitor and record    blood glucose report findings to PCP     Discharge Instructions    Continue your metoprolol and losartan  Your levothyroxine dose was decreased, new pills sent to pharmacy  Please hold off on taking your Maxzide until you see your primary care provider. If your blood pressure is persistently elevated at home, you may restart a half tab of Maxzide and continue to monitor blood pressure.     Diet    Follow this diet upon discharge: diabetic diet, carbohydrate controlled       Significant Results and Procedures   Results for orders placed or performed during the hospital encounter of 07/12/22   CT Chest Pulmonary Embolism w Contrast    Narrative    CT CHEST PULMONARY EMBOLISM W CONTRAST 7/12/2022 9:55 AM    CLINICAL HISTORY: dyspnea, near syncope, abnormal ecg  TECHNIQUE: CT angiogram chest during arterial phase injection IV  contrast. 2D and 3D MIP reconstructions were performed by the CT  technologist. Dose reduction techniques were used.     CONTRAST: 82mL Isovue-370    COMPARISON: 9/8/2020 and 10/9/2020    FINDINGS:  ANGIOGRAM CHEST: Pulmonary arteries are normal caliber and negative  for pulmonary emboli. Thoracic aorta is negative for dissection. No CT  evidence of right heart strain.    LUNGS AND PLEURA: No infiltrate or pleural effusion. Few small  pulmonary nodules, some of which are outside the field of view of the  prior CT. For  example, there is a 2 mm right upper lobe nodule (series  5, image 88) and 5 mm left upper lobe nodule (series 5, image 53) were  outside the field of view. A few other stable nodules. For example a 6  mm right upper lobe nodule and 4 mm right upper lobe nodule (series 5,  image [id number]) are stable and represent benign due to stability.    MEDIASTINUM/AXILLAE: No lymphadenopathy. No thoracic aortic aneurysm.  Left atrial appendage closure device. No pericardial effusion.  Moderate coronary artery calcifications.    UPPER ABDOMEN: Elevated right hemidiaphragm, stable. Cholecystectomy.    MUSCULOSKELETAL: Mild degenerative changes of the spine. No suspicious  lesions of the bones.      Impression    IMPRESSION:  1.  No pulmonary emboli. No acute findings in the chest.  2.  Few pulmonary nodules measuring up to 5 mm were outside the  field-of-view of the prior CTA in 2020. Few other pulmonary nodules in  the lung bases are stable. See follow-up guidelines.    REFERENCE:  Guidelines for Management of Incidental Pulmonary Nodules Detected on  CT Images: From the Fleischner Society 2017.   Guidelines apply to incidental nodules in patients who are 35 years or  older.  Guidelines do not apply to lung cancer screening, patients with  immunosuppression, or patients with known primary cancer.    MULTIPLE NODULES  Nodule size <6 mm  Low-risk patients: No follow-up needed.  High-risk patients: Optional follow-up at 12 months.    TEX DENNIS MD         SYSTEM ID:  [id number]       Discharge Medications   Discharge Medication List as of 7/14/2022 12:38 PM      CONTINUE these medications which have CHANGED    Details   levothyroxine (SYNTHROID/LEVOTHROID) 125 MCG tablet Take 1 tablet (125 mcg) by mouth daily, Disp-30 tablet, R-3, E-PrescribeFuture refills by PCP Dr. Yuval Jama with phone number [phone].      triamterene-HCTZ (MAXZIDE-25) 37.5-25 MG tablet Take 1 tablet by mouth daily HOLD this medication until advised to  resume by primary care., Disp-90 tablet, R-3, Transitional         CONTINUE these medications which have NOT CHANGED    Details   acetaminophen (TYLENOL) 500 MG tablet Take 500-1,000 mg by mouth 3 times daily as needed for mild pain, Historical      aspirin (ASA) 81 MG EC tablet Take 1 tablet (81 mg) by mouth daily, OTC      diphenhydrAMINE (BENADRYL) 25 MG tablet Take 25 mg by mouth daily, Historical      flecainide (TAMBOCOR) 150 MG tablet Take 1 tablet (150 mg) by mouth every 12 hours, Disp-180 tablet, R-3, E-PrescribeKeep on file until pt is due for refill      gabapentin (NEURONTIN) 300 MG capsule Take 600 mg by mouth At Bedtime, Historical      liraglutide (VICTOZA) 18 MG/3ML solution Inject 1.8 mg Subcutaneous daily, Historical      losartan (COZAAR) 100 MG tablet Take 1 tablet (100 mg) by mouth daily, Disp-90 tablet, R-3, E-PrescribeReceived refill request for 50mg tab, 2 tabs daily. That refill declined.  Pt now on 100mg tab, 1 tab daily and that refill approved. Speak with pt to be sure she is taking only ONE  tab of 100mg  daily      metoprolol succinate ER (TOPROL-XL) 25 MG 24 hr tablet Take 0.5 tablets (12.5 mg) by mouth daily, Disp-45 tablet, R-3, E-Prescribe      pantoprazole (PROTONIX) 40 MG EC tablet Take 1 tablet (40 mg) by mouth daily, Disp-90 tablet, R-3, E-Prescribe      rosuvastatin (CRESTOR) 10 MG tablet Take 1 tablet (10 mg) by mouth daily, Disp-90 tablet, R-3, E-PrescribeStop refills of the 20mg tabs      SUMAtriptan (IMITREX) 100 MG tablet Take 1 tablet (100 mg) by mouth at onset of headache for migraine May repeat in 2 hours if needed: max 2/day;, Disp-10 tablet, R-11, E-PrescribeProfile only. Pt doesn't require refill at this time      insulin pen needle (31G X 6 MM) 31G X 6 MM miscellaneous Use 1 pen needles dailyDisp-100 each, [id number]      order for DME DREAMSTATION  5-15 CM/H20  NASAL AIRFIT N10 HERHistorical           Allergies   Allergies   Allergen Reactions     Morphine  Anaphylaxis     Ceclor [Cefaclor] Hives     Cymbalta      Foggy head feeling     Diltiazem Hives     Lisinopril Cough     Sertraline Nausea and Vomiting

## 2022-07-14 NOTE — PROVIDER NOTIFICATION
MD Notification    Notified Person: MD    Notified Person Name:MD:ORVILLE    Notification Date/Time:7/13 2053    Notification Interaction:AMCOM    Purpose of Notification:Pt lost IV access and is on tele. Pt is wondering if can leave it as it is and not insert a new IV?    Orders Received:    Comments:

## 2022-07-14 NOTE — PROGRESS NOTES
Observation goals  PRIOR TO DISCHARGE        Comments:   -diagnostic tests and consults completed and resulted:Met      -vital signs normal or at patient baseline:met     -safe disposition plan has been identified:Met     Nurse to notify provider when observation goals have been met and patient is ready for discharge.

## 2022-07-14 NOTE — PROVIDER NOTIFICATION
MD Notification    Notified Person: MD    Notified Person Name:    Notification Date/Time:7/132320     Notification Interaction:AMCOM    Purpose of Notification:Pt has a headache and is requesting Tylenol. Can you please order? Also IV not working pt on tele, pt is wondering if we can leave it as it is?    Orders Received:    Comments:

## 2022-07-14 NOTE — PROGRESS NOTES
Observation Goals  -diagnostic tests and consults completed and resulted   Not Met (CRT now stable at 1.00, pending discharge awaiting hospitalist to see patient today)  -vital signs normal or at patient baseline   Met  -safe disposition plan has been identified   Not Met (Home w/ home PT needs to be set up patient will need diabetes education at discharge)    Nurse to notify provider when observation goals have been met and patient is ready for discharge.

## 2022-07-14 NOTE — PLAN OF CARE
Patient is discharging. All belongings with patient and meds brought back up from pharmacy that was locked up. Discharge meds will be filled at pt's pharmacy and picked up there. All discharge instructions discussed w/ patient and questions were answered. PIV removed. Diabetes education provided to patient. Care coordinator set up 1 week follow up and home care (home PT) for patient, this has all been done. Called patient's insurance to set up discharge ride, they are coming now to  the patient.

## 2022-07-14 NOTE — DISCHARGE INSTRUCTIONS
Some additional resources:     Web: https://helpatyourdoor.org/   Phone: 106.786.4312   [employer] is a nonprofit serving seniors and individuals with disabilities across [facility].  Our grocery assistance, home support and transportation services provide help with in-home tasks and chores.      Web: https://senior[employer].org/services/  Phone: (300) 223-4621            Other resources:

## 2022-07-14 NOTE — TELEPHONE ENCOUNTER
[facility] called to request Delay for starting home care for 7/18 due to the first available appointment, patient is aware and okay with this    Also to confirm Dr. Jama will follow for home care    Estevan Devi RN

## 2022-07-14 NOTE — PLAN OF CARE
Goal Outcome Evaluation:      Orientation/Cognitive:A&Ox4  Observation Goals (Met/ Not Met): Not met  Mobility Level/Assist Equipment: IND  Fall Risk (Y/N): N  Behavior Concerns: NA  Pain Management:PRN tylenol given for headache w/ effect  Tele/VS/O2:Tele: SR w/ BBB, VSS on RA  ABNL Lab/BG: Creat:1.58 K:3.5 Hgb:10.6  Diet: Mod carb  Bowel/Bladder: Continent   Skin Concerns: Scattered bruising  Drains/Devices:PIV infusing LR@100ml/hr  Tests/Procedures for next shift: Pt would like some diabetes education  Anticipated DC date & active delays: 7/14  Patient Stated Goal for Today: To go home          Observation goals  PRIOR TO DISCHARGE        Comments:   -diagnostic tests and consults completed and resulted:Met      -vital signs normal or at patient baseline:met     -safe disposition plan has been identified:Met     Nurse to notify provider when observation goals have been met and patient is ready for discharge.

## 2022-07-14 NOTE — TELEPHONE ENCOUNTER
Ayla calling from [facility] to get pt workied for an in person appt with Dr Jama. She was in the hospital for acute kidney injury. Please call Ayla back [phone]. Thank you.  Urmila Zheng,

## 2022-07-14 NOTE — PLAN OF CARE
PT: Patient is independent in her room, walking well with her SEC and pt denies any difficulty besides dec endurance. PT to sign off while inpatient and pt to have home PT at discharge.    Physical Therapy Discharge Summary    Reason for therapy discharge:    All goals and outcomes met, no further needs identified.    Progress towards therapy goal(s). See goals on Care Plan in [facility] electronic health record for goal details.  Goals met    Therapy recommendation(s):    Continued therapy is recommended.  Rationale/Recommendations:  eval and treat with home PT.

## 2022-07-15 ENCOUNTER — TELEPHONE (OUTPATIENT)
Dept: SURGERY | Facility: CLINIC | Age: 67
End: 2022-07-15

## 2022-07-15 LAB
ATRIAL RATE - MUSE: 59 BPM
DIASTOLIC BLOOD PRESSURE - MUSE: NORMAL MMHG
INTERPRETATION ECG - MUSE: NORMAL
P AXIS - MUSE: 84 DEGREES
PR INTERVAL - MUSE: 262 MS
QRS DURATION - MUSE: 110 MS
QT - MUSE: 432 MS
QTC - MUSE: 427 MS
R AXIS - MUSE: 63 DEGREES
SYSTOLIC BLOOD PRESSURE - MUSE: NORMAL MMHG
T AXIS - MUSE: -66 DEGREES
VENTRICULAR RATE- MUSE: 59 BPM

## 2022-07-15 NOTE — TELEPHONE ENCOUNTER
Prior Authorization Retail Medication Request    Medication/Dose: Victoza 18 mg/3 ml pen inj    ICD code (if different than what is on RX):    Previously Tried and Failed:    Rationale:      Insurance Name:  [facility]  Insurance ID:  [id number]      Pharmacy Information (if different than what is on RX)  Name:  [facility] [email or web address] [facility]  Phone:  [phone]

## 2022-07-17 NOTE — PROGRESS NOTES
Outpatient Sleep Medicine Consultation:      Name: Maxine Sears MRN# [id number]   Age: 66 year old YOB: 1955     Date of Consultation: July 17, 2022  Consultation is requested by: Blessing Nix, TINA  [address]  [place] [address] Blessing Nix  Primary care provider: Yuval Jama       Reason for Sleep Consult:     Maxine Sears is sent by Blessing Nix for a sleep consultation regarding NELY, morbid obesity.    Patient s Reason for visit  Maxine Sears main reason for visit:  apnea  Patient states problem(s) started:  2016  Maxine Sears's goals for this visit: get back on CPAP for NELY             Assessment and Plan:     Summary Sleep Diagnoses and Recommendations:  (G47.33,  Z99.89) NELY on CPAP  (primary encounter diagnosis), (E66.01) Morbid obesity (H)  Comment: Roxanna was previously followed by me for NELY. 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 Given her history of A-Fib, she was started on CPAP. She thinks she did sleep a little better and have a little better energy. She stopped using it about 1.5 years ago when she was told the machine was recalled. She disposed of the machine. Medicare will require a repeat study to qualify her for a new machine. Her weight is about the same as at the time of her initial study, BMI 46. She is working with Blessing Nix at the weight loss clinic on non-surgical weight loss strategies. She has HTN and paroxysmal A-Fib. Age is >50 (66). Given her apnea was borderline, I recommend an in lab over home study.   Plan: Comprehensive Sleep Study        In lab PSG. She was prescribed a 5 mg tab of zolpidem for the night of the study.    (G25.81) RLS (restless legs syndrome), (E61.1) Iron deficiency  Comment: She is on 600 mg gabapentin at bedtime and it works well. Her ferritin was 49 when last checked 2 years ago. She had been on an iron supplement to get her ferritin up from 34.  Plan:  Ferritin        Continue gabapentin 600 mg. Recheck ferritin     Comorbid Diagnoses:   paroxysmal atrial fibrillation (has Watchman), HTN, depression, hypothyroidism, GERD and osteoarthritis      Summary Counseling:    Sleep Testing Reviewed  Obstructive Sleep Apnea Reviewed  Complications of Untreated Sleep Apnea Reviewed    Patient will follow up 2 weeks after her study.  Bennett Goltz, PA-C     Total time spent reviewing medical records, history and physical examination, review of previous testing and interpretation as well as documentation on this date: 51 min    CC: Blessing Nix          History of Present Illness:     Roxanna presents for further evaluation/management of previously diagnosed NELY. She had mild NELY on a sleep study in 2016 and was started on auto CPAP 5-15 cm. Her CPAP was recalled and she stopped using it about 1.5 years ago. She did not register her machine, did not know she was supposed to. She had been tolerating it and sleeping pretty well. She did feel she slept better with it and was maybe not as tired in the day. Currently, she is not usually observed while sleeping. If her sister stays over, she is told she does not really snore, but she puff-exhales. She feels tired in the day at times.    She is working on a medical weight loss program with Blessing Nix PA-C.    She also had frequent RLS symptoms and was treated with 100-200 mg gabapentin. Her ferritin was 73.     Past Sleep Evaluations: 8/1/2016. Wt 261#   AHI was 5.6/hr, with desaturations down to 81%. She spent 3.5 minutes below 90% SpO2 and 2.3 minutes below 89%. RDI 5.6/hr.  REM RDI 14.4/hr.  Supine RDI 6.8/hr.  Her non-supine RDI was 3.3/hr. Periodic Limb Movement Index 64/hour, 14.6/hr were associated with arousals.  Her CO2 ranged from the upper 30's to low 40's, not suggestive of hypoventilation. PLM index 70/hr, 14/hr with arousals.  She was initially studied due to difficulty sleeping due to family problems  causing difficulty sleeping.    SLEEP-WAKE SCHEDULE:     Work/School Days: Patient goes to school/work:     Usually gets into bed at 10:30-11:30 PM   Takes patient about 5-60 min to fall asleep  Has trouble falling asleep 2-3  nights per week  Wakes up in the middle of the night 3  times.  Wakes up due to restroom (x1), dog barking, uncertain   She has trouble falling back asleep not often.   It usually takes up to 30 minutes  to get back to sleep  Patient is usually up at  5-7 AM  Uses alarm:  no    Weekends/Non-work Days/All Other Days:  Her sleep schedule is the same    Sleep Need  Patient gets 6 hours  sleep on average   Patient thinks she needs about ? hours sleep    Maxine Sears prefers to sleep in this position(s):   Side, occ wakes supine  Patient states they do the following activities in bed:  infrequent TV in bed (not for 5-6 weeks), rarely on electronics  She sometimes gets a racing mind-all kinds of different things    Naps  Patient takes a purposeful nap 0  times a week because she either gets a migraine or has trouble sleeping at night  She feels better after a nap:    She dozes off unintentionally 0-1  days per week, very seldom  Patient has had a driving accident or near-miss due to sleepiness/drowsiness:  no      SLEEP DISRUPTIONS:    Breathing/Snoring  Patient snores: not really, observed to puff exhale  Other people complain about her snoring:  Sleeps alone  Patient has been told she stops breathing in her sleep:  no  She has issues with the following:  Morning headaches most of the time (CPAP did help with that), occ morning dry mouth  Denies nocturnal heartburn or reflux, nasal congestion at night.    Movement:  Patient gets pain, discomfort, with an urge to move:   Has RLS  It happens when she is resting:     It happens more at night:     Patient has been told she kicks her legs at night:     She is on gabapentin 300 mg, 2 capsules at night for RLS. It works pretty well.   Her ferritin  was 49 in 6/2020. She had been on iron about 1.5 years ago. She had some constipation with it. She did not notice an improvement in her RLS.      Behaviors in Sleep:  Maxine Sears has experienced the following behaviors while sleeping:    Pt denies bruxism, sleep talking, sleep walking, and dream enactment behavior. Pt denies sleep paralysis, hypnagogue and cataplexy.   She has had infrequent sleep paralysis and hypnagogue, not for a long time.      Is there anything else you would like your sleep provider to know:  no      CAFFEINE AND OTHER SUBSTANCES:    Patient consumes caffeinated beverages per day:   0  Last caffeine use is usually:    List of any prescribed or over the counter stimulants that patient takes:  none  List of any prescribed or over the counter sleep medication patient takes:  none  List of previous sleep medications that patient has tried:  Years ago, tried something over the counter and it did not help  Patient drinks alcohol to help them sleep: no    Patient drinks alcohol near bedtime:  no    Family History:  Patient has a family member been diagnosed with a sleep disorder:    Sister has NELY     Social History  She is       SCALES:    EPWORTH SLEEPINESS SCALE      [place] Sleepiness Scale ( SUMIT Conroy  1990-1997[facility] [place]/English - Final version - 21 Nov 07 - [facility] [place].) 7/18/2022   Sitting and reading Slight chance of dozing   Watching TV Would never doze   Sitting, inactive in a public place (e.g. a theatre or a meeting) Would never doze   As a passenger in a car for an hour without a break Would never doze   Lying down to rest in the afternoon when circumstances permit Slight chance of dozing   Sitting and talking to someone Would never doze   Sitting quietly after a lunch without alcohol Would never doze   In a car, while stopped for a few minutes in traffic Would never doze   [place] Score (MC) 2   [place] Score (Sleep) 2         INSOMNIA SEVERITY INDEX (DAVE)  "     Insomnia Severity Index (DAVE) 7/18/2022   Difficulty falling asleep 0   Difficulty staying asleep 0   Problems waking up too early 0   How SATISFIED/DISSATISFIED are you with your CURRENT sleep pattern? 2   How NOTICEABLE to others do you think your sleep problem is in terms of impairing the quality of your life? 0   How WORRIED/DISTRESSED are you about your current sleep problem? 2   To what extent do you consider your sleep problem to INTERFERE with your daily functioning (e.g. daytime fatigue, mood, ability to function at work/daily chores, concentration, memory, mood, etc.) CURRENTLY? 2   DAVE Total Score 6       Guidelines for Scoring/Interpretation:  Total score categories:  0-7 = No clinically significant insomnia   8-14 = Subthreshold insomnia   15-21 = Clinical insomnia (moderate severity)  22-28 = Clinical insomnia (severe)  Used via courtesy of www.[employer].va.gov with permission from Lorne Allen PhD., [facility]      STOP BANG     STOP BANG Questionnaire (  2008, the American Society of Anesthesiologists, Inc. [employer] 7/14/2022   Neck Cir (cm) Clinic: -   B/P Clinic: 144/69   BMI Clinic: -         GAD7    MICHEL-7  11/29/2021   1. Feeling nervous, anxious, or on edge 0   2. Not being able to stop or control worrying 0   3. Worrying too much about different things 0   4. Trouble relaxing 0   5. Being so restless that it is hard to sit still 0   6. Becoming easily annoyed or irritable 0   7. Feeling afraid, as if something awful might happen 0   MICHEL-7 Total Score 0   If you checked any problems, how difficult have they made it for you to do your work, take care of things at home, or get along with other people? Not difficult at all         CAGE-AID    No flowsheet data found.    CAGE-AID reprinted with permission from the [place] Medical Journal, THIAGO Frausto. and ANUJ Schwartz, \"Conjoint screening questionnaires for alcohol and drug abuse\" [place] Medical Journal " 94: 135-140, 1995.      PATIENT HEALTH QUESTIONNAIRE-9 (PHQ - 9)    PHQ-9 ([employer]) 3/15/2022   No Interest In Doing Things -   Feeling Depressed -   Trouble Sleeping -   Tired / No Energy -   No appetite or Over-Eating -   Feeling Bad about Self -   Trouble Concentrating -   Moving Slow or Restless -   Suicidal Thoughts -   Total Score -   1.  Little interest or pleasure in doing things 0   2.  Feeling down, depressed, or hopeless 0   3.  Trouble falling or staying asleep, or sleeping too much 0   4.  Feeling tired or having little energy 0   5.  Poor appetite or overeating 2   6.  Feeling bad about yourself 0   7.  Trouble concentrating 0   8.  Moving slowly or restless 0   9.  Suicidal or self-harm thoughts 0   PHQ-9 Total Score 2   Difficulty at work, home, or with people -       Developed by Kamar Snider, Emerita Torrez, Thomas Wu and colleagues, with an educational saeed from [employer]. No permission required to reproduce, translate, display or distribute.        Allergies:    Allergies   Allergen Reactions     Morphine Anaphylaxis     Ceclor [Cefaclor] Hives     Cymbalta      Foggy head feeling     Diltiazem Hives     Lisinopril Cough     Sertraline Nausea and Vomiting       Medications:    Current Outpatient Medications   Medication Sig Dispense Refill     acetaminophen (TYLENOL) 500 MG tablet Take 500-1,000 mg by mouth 3 times daily as needed for mild pain       aspirin (ASA) 81 MG EC tablet Take 1 tablet (81 mg) by mouth daily       diphenhydrAMINE (BENADRYL) 25 MG tablet Take 25 mg by mouth daily       flecainide (TAMBOCOR) 150 MG tablet Take 1 tablet (150 mg) by mouth every 12 hours 180 tablet 3     gabapentin (NEURONTIN) 300 MG capsule Take 600 mg by mouth At Bedtime       insulin pen needle (31G X 6 MM) 31G X 6 MM miscellaneous Use 1 pen needles daily 100 each 11     levothyroxine (SYNTHROID/LEVOTHROID) 125 MCG tablet Take 1 tablet (125 mcg) by mouth daily 30 tablet 3     liraglutide  (VICTOZA) 18 MG/3ML solution Inject 1.8 mg Subcutaneous daily       losartan (COZAAR) 100 MG tablet Take 1 tablet (100 mg) by mouth daily 90 tablet 3     metoprolol succinate ER (TOPROL-XL) 25 MG 24 hr tablet Take 0.5 tablets (12.5 mg) by mouth daily 45 tablet 3     pantoprazole (PROTONIX) 40 MG EC tablet Take 1 tablet (40 mg) by mouth daily 90 tablet 3     rosuvastatin (CRESTOR) 10 MG tablet Take 1 tablet (10 mg) by mouth daily 90 tablet 3     SUMAtriptan (IMITREX) 100 MG tablet Take 1 tablet (100 mg) by mouth at onset of headache for migraine May repeat in 2 hours if needed: max 2/day; 10 tablet 11     triamterene-HCTZ (MAXZIDE-25) 37.5-25 MG tablet Take 1 tablet by mouth daily HOLD this medication until advised to resume by primary care. 90 tablet 3       Problem List:  Patient Active Problem List    Diagnosis Date Noted     Hypokalemia 07/12/2022     Priority: Medium     Abnormal electrocardiogram 07/12/2022     Priority: Medium     Generalized weakness 07/12/2022     Priority: Medium     Diabetes mellitus, type 2 (H) 05/23/2022     Priority: Medium     Mild depression (H) 12/05/2021     Priority: Medium     Pulmonary nodules 10/25/2020     Priority: Medium     3-4mm. Does not require repeat CT chest with low risk status per radiology recs       Hyperlipidemia LDL goal <100 10/25/2020     Priority: Medium     Paroxysmal atrial fibrillation (H) 09/01/2020     Priority: Medium     Added automatically from request for surgery [id number]       Chronic pain of left knee 06/14/2020     Priority: Medium     Lumbar radiculopathy 06/14/2020     Priority: Medium     Iron deficiency 02/02/2020     Priority: Medium     Gastroesophageal reflux disease, esophagitis presence not specified 01/10/2018     Priority: Medium     IMO Regulatory Load OCT 2020       NELY on CPAP      Priority: Medium     RLS (restless legs syndrome)      Priority: Medium     Essential hypertension 06/29/2016     Priority: Medium     Migraine without aura  and without status migrainosus, not intractable 03/19/2016     Priority: Medium     Morbid obesity (H) 12/09/2015     Priority: Medium     Hypothyroidism 12/03/2015     Priority: Medium     Health Care Home 01/19/2015     Priority: Medium     Status:  CLOSED  Care Coordinator:  Jovanna Abdalla RN    See Letters for [facility] Care Plan  Date:  January 19, 2015           Osteoarthritis      Priority: Medium     TMJ disease      Priority: Medium        Past Medical/Surgical History:  Past Medical History:   Diagnosis Date     Depression       Duodenal ulcer 03/04/2019     Essential hypertension       GERD (gastroesophageal reflux disease)      Hiatal hernia      Hypothyroidism      Migraine without aura and without status migrainosus, not intractable  Aug 2016     Obesity      NELY on CPAP      Osteoarthritis      Paroxysmal atrial fibrillation (H) 01/05/2015     RLS (restless legs syndrome)      Symptomatic menopausal or female climacteric states (aka FLASHES)      TMJ disease      Past Surgical History:   Procedure Laterality Date     APPENDECTOMY       CHOLECYSTECTOMY       COLONOSCOPY N/A 4/12/2019    Procedure: COLONOSCOPY;  Surgeon: Vahid Cardona MD;  Location:  GI     EP LEFT ATRIAL APPENDAGE CLOSURE N/A 10/8/2020    Procedure: EP Left Atrial Appendage Closure;  Surgeon: Constantin Vaughn MD;  Location:  HEART CARDIAC CATH LAB     ESOPHAGOSCOPY, GASTROSCOPY, DUODENOSCOPY (EGD), COMBINED N/A 4/11/2019    Procedure: COMBINED ESOPHAGOSCOPY, GASTROSCOPY, DUODENOSCOPY (EGD);  Surgeon: Ben Ko MD;  Location:  GI     HYSTERECTOMY, [facility]  08/1999     TONSILLECTOMY         Social History:  Social History     Socioeconomic History     Marital status:      Spouse name: Not on file     Number of children: Not on file     Years of education: Not on file     Highest education level: Not on file   Occupational History     Not on file   Tobacco Use     Smoking status: Former Smoker     Packs/day:  "0.25     Years: 1.00     Pack years: 0.25     Types: Cigarettes     Start date:      Quit date:      Years since quittin.5     Smokeless tobacco: Never Used     Tobacco comment: very minimal smoking history   Substance and Sexual Activity     Alcohol use: No     Alcohol/week: 0.0 standard drinks     Drug use: No     Sexual activity: Never   Other Topics Concern     Parent/sibling w/ CABG, MI or angioplasty before 65F 55M? Not Asked      Service Not Asked     Blood Transfusions Not Asked     Caffeine Concern No     Occupational Exposure Not Asked     Hobby Hazards Not Asked     Sleep Concern Yes     Stress Concern Not Asked     Weight Concern No     Special Diet No     Back Care Not Asked     Exercise No     Bike Helmet Not Asked     Seat Belt Yes     Self-Exams Not Asked   Social History Narrative     Not on file     Social Determinants of Health     Financial Resource Strain: Not on file   Food Insecurity: Not on file   Transportation Needs: Not on file   Physical Activity: Not on file   Stress: Not on file   Social Connections: Not on file   Intimate Partner Violence: Not on file   Housing Stability: Not on file       Family History:  Family History   Problem Relation Age of Onset     Myocardial Infarction Mother      Heart Failure Mother      Heart Surgery Father         bypass surgery     Cerebrovascular Disease Father      Heart Surgery Brother      Hyperlipidemia Brother      Hyperlipidemia Sister      Hyperlipidemia Brother      Sleep Apnea Sister        Review of Systems:  omitted    Physical Examination:  Vitals: Ht 1.6 m (5' 3\")   Wt 119.7 kg (264 lb)   BMI 46.77 kg/m             GENERAL APPEARANCE: healthy, alert and no distress  EYES: Eyes grossly normal to inspection and wearing glasses  NECK: no asymmetry, masses, or scars  RESP: no respiratory distress, cough or wheeze           Data: All pertinent previous laboratory data reviewed     Recent Labs   Lab Test 22  0637 " 07/13/22  1727 07/13/22  1411 07/13/22  1210 07/13/22  0645     --   --   --  137   POTASSIUM 3.7  --  3.5  --  3.3*   CHLORIDE 110*  --   --   --  109   CO2 24  --   --   --  22   ANIONGAP 4  --   --   --  6   * 106*  --    < > 101*   BUN 18  --   --   --  23   CR 1.00  --   --   --  1.58*   J CARLOS 8.3*  --   --   --  8.4*    < > = values in this interval not displayed.       Recent Labs   Lab Test 07/14/22  0637   WBC 7.1   RBC 3.88   HGB 10.5*   HCT 32.6*   MCV 84   MCH 27.1   MCHC 32.2   RDW 17.2*          Recent Labs   Lab Test 07/12/22  0919   PROTTOTAL 7.1   ALBUMIN 3.4   BILITOTAL 1.1   ALKPHOS 77   AST 13   ALT 18       TSH (mU/L)   Date Value   07/12/2022 0.02 (L)   03/14/2022 1.35   03/11/2021 0.45   06/12/2020 0.79       No results found for: UAMP, UBARB, BENZODIAZEUR, UCANN, UCOC, OPIT, UPCP    Iron Saturation Index   Date/Time Value Ref Range Status   06/12/2020 10:48 AM 28 15 - 46 % Final     Ferritin   Date/Time Value Ref Range Status   06/12/2020 10:48 AM 49 8 - 252 ng/mL Final       No results found for: PH, PHARTERIAL, PO2, CQ5NESHAVLM, SAT, PCO2, HCO3, BASEEXCESS, LIZETH, BEB    @LABRCNTIPR(phv:4,pco2v:4,po2v:4,hco3v:4,maximiliano:4,o2per:4)@    Echocardiology: No results found for this or any previous visit (from the past 4320 hour(s)).    Chest x-ray: No results found for this or any previous visit from the past 365 days.      Chest CT: CT Chest Pulmonary Embolism w Contrast 07/12/2022    Narrative  CT CHEST PULMONARY EMBOLISM W CONTRAST 7/12/2022 9:55 AM    CLINICAL HISTORY: dyspnea, near syncope, abnormal ecg  TECHNIQUE: CT angiogram chest during arterial phase injection IV  contrast. 2D and 3D MIP reconstructions were performed by the CT  technologist. Dose reduction techniques were used.    CONTRAST: 82mL Isovue-370    COMPARISON: 9/8/2020 and 10/9/2020    FINDINGS:  ANGIOGRAM CHEST: Pulmonary arteries are normal caliber and negative  for pulmonary emboli. Thoracic aorta is negative  for dissection. No CT  evidence of right heart strain.    LUNGS AND PLEURA: No infiltrate or pleural effusion. Few small  pulmonary nodules, some of which are outside the field of view of the  prior CT. For example, there is a 2 mm right upper lobe nodule (series  5, image 88) and 5 mm left upper lobe nodule (series 5, image 53) were  outside the field of view. A few other stable nodules. For example a 6  mm right upper lobe nodule and 4 mm right upper lobe nodule (series 5,  image [id number]) are stable and represent benign due to stability.    MEDIASTINUM/AXILLAE: No lymphadenopathy. No thoracic aortic aneurysm.  Left atrial appendage closure device. No pericardial effusion.  Moderate coronary artery calcifications.    UPPER ABDOMEN: Elevated right hemidiaphragm, stable. Cholecystectomy.    MUSCULOSKELETAL: Mild degenerative changes of the spine. No suspicious  lesions of the bones.    Impression  IMPRESSION:  1.  No pulmonary emboli. No acute findings in the chest.  2.  Few pulmonary nodules measuring up to 5 mm were outside the  field-of-view of the prior CTA in 2020. Few other pulmonary nodules in  the lung bases are stable. See follow-up guidelines.    REFERENCE:  Guidelines for Management of Incidental Pulmonary Nodules Detected on  CT Images: From the Fleischner Society 2017.  Guidelines apply to incidental nodules in patients who are 35 years or  older.  Guidelines do not apply to lung cancer screening, patients with  immunosuppression, or patients with known primary cancer.    MULTIPLE NODULES  Nodule size <6 mm  Low-risk patients: No follow-up needed.  High-risk patients: Optional follow-up at 12 months.    TEX DENNIS MD      SYSTEM ID:  [id number]      PFT: Most Recent [facility] Pulmonary Function Testing    No results found for: 20001      Bennett Ezra Goltz, PA-C, TINA 7/17/2022

## 2022-07-18 ENCOUNTER — VIRTUAL VISIT (OUTPATIENT)
Dept: SLEEP MEDICINE | Facility: CLINIC | Age: 67
End: 2022-07-18
Attending: PHYSICIAN ASSISTANT
Payer: COMMERCIAL

## 2022-07-18 ENCOUNTER — TELEPHONE (OUTPATIENT)
Dept: INTERNAL MEDICINE | Facility: CLINIC | Age: 67
End: 2022-07-18

## 2022-07-18 ENCOUNTER — TELEPHONE (OUTPATIENT)
Dept: GERIATRIC MEDICINE | Facility: CLINIC | Age: 67
End: 2022-07-18

## 2022-07-18 ENCOUNTER — PATIENT OUTREACH (OUTPATIENT)
Dept: GERIATRIC MEDICINE | Facility: CLINIC | Age: 67
End: 2022-07-18

## 2022-07-18 VITALS — HEIGHT: 63 IN | BODY MASS INDEX: 46.78 KG/M2 | WEIGHT: 264 LBS

## 2022-07-18 DIAGNOSIS — G47.33 OSA ON CPAP: Primary | ICD-10-CM

## 2022-07-18 DIAGNOSIS — G25.81 RLS (RESTLESS LEGS SYNDROME): ICD-10-CM

## 2022-07-18 DIAGNOSIS — E61.1 IRON DEFICIENCY: ICD-10-CM

## 2022-07-18 DIAGNOSIS — E87.6 HYPOKALEMIA: Primary | ICD-10-CM

## 2022-07-18 DIAGNOSIS — I10 ESSENTIAL HYPERTENSION: Primary | ICD-10-CM

## 2022-07-18 DIAGNOSIS — E66.01 MORBID OBESITY (H): ICD-10-CM

## 2022-07-18 PROCEDURE — 99204 OFFICE O/P NEW MOD 45 MIN: CPT | Mod: 95 | Performed by: PHYSICIAN ASSISTANT

## 2022-07-18 RX ORDER — ZOLPIDEM TARTRATE 5 MG/1
TABLET ORAL
Qty: 1 TABLET | Refills: 0 | Status: SHIPPED | OUTPATIENT
Start: 2022-07-18 | End: 2022-07-19

## 2022-07-18 ASSESSMENT — SLEEP AND FATIGUE QUESTIONNAIRES
HOW LIKELY ARE YOU TO NOD OFF OR FALL ASLEEP WHILE SITTING AND READING: SLIGHT CHANCE OF DOZING
HOW LIKELY ARE YOU TO NOD OFF OR FALL ASLEEP WHILE LYING DOWN TO REST IN THE AFTERNOON WHEN CIRCUMSTANCES PERMIT: SLIGHT CHANCE OF DOZING
HOW LIKELY ARE YOU TO NOD OFF OR FALL ASLEEP WHILE WATCHING TV: WOULD NEVER DOZE
HOW LIKELY ARE YOU TO NOD OFF OR FALL ASLEEP WHILE SITTING QUIETLY AFTER LUNCH WITHOUT ALCOHOL: WOULD NEVER DOZE
HOW LIKELY ARE YOU TO NOD OFF OR FALL ASLEEP WHILE SITTING AND TALKING TO SOMEONE: WOULD NEVER DOZE
HOW LIKELY ARE YOU TO NOD OFF OR FALL ASLEEP WHILE SITTING INACTIVE IN A PUBLIC PLACE: WOULD NEVER DOZE
HOW LIKELY ARE YOU TO NOD OFF OR FALL ASLEEP IN A CAR, WHILE STOPPED FOR A FEW MINUTES IN TRAFFIC: WOULD NEVER DOZE
HOW LIKELY ARE YOU TO NOD OFF OR FALL ASLEEP WHEN YOU ARE A PASSENGER IN A CAR FOR AN HOUR WITHOUT A BREAK: WOULD NEVER DOZE

## 2022-07-18 NOTE — PATIENT INSTRUCTIONS
"      MY TREATMENT INFORMATION FOR SLEEP APNEA-  Maxine Sears    DOCTOR : Bennett Ezra Goltz, PA-C, TINA    Am I having a sleep study at a sleep center?  --->Due to normal delays, you will be contacted within 2-4 weeks to schedule    Am I having a home sleep study?  --->Watch the video for the device you are using:    -/drop off device-   https://www.Enswers.com/watch?v=yGGFBdELGhk    -Disposable device sent out require phone/computer application-   https://www.Enswers.com/watch?v=BCce_vbiwxE      Frequently asked questions:  1. What is Obstructive Sleep Apnea (NELY)? NELY is the most common type of sleep apnea. Apnea means, \"without breath.\"  Apnea is most often caused by narrowing or collapse of the upper airway as muscles relax during sleep.   Almost everyone has occasional apneas. Most people with sleep apnea have had brief interruptions at night frequently for many years.  The severity of sleep apnea is related to how frequent and severe the events are.   2. What are the consequences of NELY? Symptoms include: feeling sleepy during the day, snoring loudly, gasping or stopping of breathing, trouble sleeping, and occasionally morning headaches or heartburn at night.  Sleepiness can be serious and even increase the risk of falling asleep while driving. Other health consequences may include development of high blood pressure and other cardiovascular disease in persons who are susceptible. Untreated NELY  can contribute to heart disease, stroke and diabetes.   3. What are the treatment options? In most situations, sleep apnea is a lifelong disease that must be managed with daily therapy. Medications are not effective for sleep apnea and surgery is generally not considered until other therapies have been tried. Your treatment is your choice . Continuous Positive Airway (CPAP) works right away and is the therapy that is effective in nearly everyone. An oral device to hold your jaw forward is usually the next " most reliable option. Other options include postioning devices (to keep you off your back), weight loss, and surgery including a tongue pacing device. There is more detail about some of these options below.  4. Are my sleep studies covered by insurance? Although we will request verification of coverage, we advise you also check in advance of the study to ensure there is coverage.    Important tips for those choosing CPAP and similar devices   Know your equipment:  CPAP is continuous positive airway pressure that prevents obstructive sleep apnea by keeping the throat from collapsing while you are sleeping. In most cases, the device is  smart  and can slowly self-adjusts if your throat collapses and keeps a record every day of how well you are treated-this information is available to you and your care team.  BPAP is bilevel positive airway pressure that keeps your throat open and also assists each breath with a pressure boost to maintain adequate breathing.  Special kinds of BPAP are used in patients who have inadequate breathing from lung or heart disease. In most cases, the device is  smart  and can slowly self-adjusts to assist breathing. Like CPAP, the device keeps a record of how well you are treated.  Your mask is your connection to the device. You get to choose what feels most comfortable and the staff will help to make sure if fits. Here: are some examples of the different masks that are available:       Key points to remember on your journey with sleep apnea:  Sleep study.  PAP devices often need to be adjusted during a sleep study to show that they are effective and adjusted right.  Good tips to remember: Try wearing just the mask during a quiet time during the day so your body adapts to wearing it. A humidifier is recommended for comfort in most cases to prevent drying of your nose and throat. Allergy medication from your provider may help you if you are having nasal congestion.  Getting settled-in. It  takes more than one night for most of us to get used to wearing a mask. Try wearing just the mask during a quiet time during the day so your body adapts to wearing it. A humidifier is recommended for comfort in most cases. Our team will work with you carefully on the first day and will be in contact within 4 days and again at 2 and 4 weeks for advice and remote device adjustments. Your therapy is evaluated by the device each day.   Use it every night. The more you are able to sleep naturally for 7-8 hours, the more likely you will have good sleep and to prevent health risks or symptoms from sleep apnea. Even if you use it 4 hours it helps. Occasionally all of us are unable to use a medical therapy, in sleep apnea, it is not dangerous to miss one night.   Communicate. Call our skilled team on the number provided on the first day if your visit for problems that make it difficult to wear the device. Over 2 out of 3 patients can learn to wear the device long-term with help from our team. Remember to call our team or your sleep providers if you are unable to wear the device as we may have other solutions for those who cannot adapt to mask CPAP therapy. It is recommended that you sleep your sleep provider within the first 3 months and yearly after that if you are not having problems.   Use it for your health. We encourage use of CPAP masks during daytime quiet periods to allow your face and brain to adapt to the sensation of CPAP so that it will be a more natural sensation to awaken to at night or during naps. This can be very useful during the first few weeks or months of adapting to CPAP though it does not help medically to wear CPAP during wakefulness and  should not be used as a strategy just to meet guidelines.  Take care of your equipment. Make sure you clean your mask and tubing using directions every day and that your filter and mask are replaced as recommended or if they are not working.     BESIDES CPAP, WHAT  OTHER THERAPIES ARE THERE?    Positioning Device  Positioning devices are generally used when sleep apnea is mild and only occurs on your back.This example shows a pillow that straps around the waist. It may be appropriate for those whose sleep study shows milder sleep apnea that occurs primarily when lying flat on one's back. Preliminary studies have shown benefit but effectiveness at home may need to be verified by a home sleep test. These devices are generally not covered by medical insurance.  Examples of devices that maintain sleeping on the back to prevent snoring and mild sleep apnea.    Belt type body positioner  http://Plug Apps.IDRI ([facility])/    Electronic reminder  http://nightshifttherapy.com/  http://www.[employer].IDRI ([employer] [employer] [facility]).au/      Oral Appliance  What is oral appliance therapy?  An oral appliance device fits on your teeth at night like a retainer used after having braces. The device is made by a specialized dentist and requires several visits over 1-2 months before a manufactured device is made to fit your teeth and is adjusted to prevent your sleep apnea. Once an oral device is working properly, snoring should be improved. A home sleep test may be recommended at that time if to determine whether the sleep apnea is adequately treated.       Some things to remember:  -Oral devices are often, but not always, covered by your medical insurance. Be sure to check with your insurance provider.   -If you are referred for oral therapy, you will be given a list of specialized dentists to consider or you may choose to visit the Web site of the American Academy of Dental Sleep Medicine  -Oral devices are less likely to work if you have severe sleep apnea or are extremely overweight.     More detailed information  An oral appliance is a small acrylic device that fits over the upper and lower teeth  (similar to a retainer or a mouth guard). This device slightly moves jaw forward, which moves the base of the tongue forward, opens the  airway, improves breathing for effective treat snoring and obstructive sleep apnea in perhaps 7 out of 10 people .  The best working devices are custom-made by a dental device  after a mold is made of the teeth 1, 2, 3.  When is an oral appliance indicated?  Oral appliance therapy is recommended as a first-line treatment for patients with primary snoring, mild sleep apnea, and for patients with moderate sleep apnea who prefer appliance therapy to use of CPAP4, 5. Severity of sleep apnea is determined by sleep testing and is based on the number of respiratory events per hour of sleep.   How successful is oral appliance therapy?  The success rate of oral appliance therapy in patients with mild sleep apnea is 75-80% while in patients with moderate sleep apnea it is 50-70%. The chance of success in patients with severe sleep apnea is 40-50%. The research also shows that oral appliances have a beneficial effect on the cardiovascular health of NELY patients at the same magnitude as CPAP therapy7.  Oral appliances should be a second-line treatment in cases of severe sleep apnea, but if not completely successful then a combination therapy utilizing CPAP plus oral appliance therapy may be effective. Oral appliances tend to be effective in a broad range of patients although studies show that the patients who have the highest success are females, younger patients, those with milder disease, and less severe obesity. 3, 6.   Finding a dentist that practices dental sleep medicine  Specific training is available through the American Academy of Dental Sleep Medicine for dentists interested in working in the field of sleep. To find a dentist who is educated in the field of sleep and the use of oral appliances, near you, visit the Web site of the American Academy of Dental Sleep Medicine.    References  1. venkatesh Augustin al. Objectively measured vs self-reported compliance during oral appliance therapy for sleep-disordered  breathing. Chest 2013; 144(5): 8198-1869.  2. Lucretia, et al. Objective measurement of compliance during oral appliance therapy for sleep-disordered breathing. Thorax 2013; 68(1): 91-96.  3. Janis et al. Mandibular advancement devices in 620 men and women with NELY and snoring: tolerability and predictors of treatment success. Chest 2004; 125: 2557-9453.  4. Sarah et al. Oral appliances for snoring and NELY: a review. Sleep 2006; 29: 244-262.  5. Rommel et al. Oral appliance treatment for NELY: an update. J Clin Sleep Med 2014; 10(2): 215-227.  6. Ailyn et al. Predictors of OSAH treatment outcome. J Dent Res 2007; 86: 9847-6577.      Weight Loss:    Weight loss is a long-term strategy that may improve sleep apnea in some patients.    Weight management is a personal decision and the decision should be based on your interest and the potential benefits.  If you are interested in exploring weight loss strategies, the following discussion covers the impact on weight loss on sleep apnea and the approaches that may be successful.    Being overweight does not necessarily mean you will have health consequences.  Those who have BMI over 35 or over 27 with existing medical conditions carries greater risk.   Weight loss decreases severity of sleep apnea in most people with obesity. For those with mild obesity who have developed snoring with weight gain, even 15-30 pound weight loss can improve and occasionally eliminate sleep apnea.  Structured and life-long dietary and health habits are necessary to lose weight and keep healthier weight levels.     Though there may be significant health benefits from weight loss, long-term weight loss is very difficult to achieve- studies show success with dietary management in less than 10% of people. In addition, substantial weight loss may require years of dietary control and may be difficult if patients have severe obesity. In these cases, surgical management may be  considered.  Finally, older individuals who have tolerated obesity without health complications may be less likely to benefit from weight loss strategies.      [unfilled]    Surgery:    Surgery for obstructive sleep apnea is considered generally only when other therapies fail to work. Surgery may be discussed with you if you are having a difficult time tolerating CPAP and or when there is an abnormal structure that requires surgical correction.  Nose and throat surgeries often enlarge the airway to prevent collapse.  Most of these surgeries create pain for 1-2 weeks and up to half of the most common surgeries are not effective throughout life.  You should carefully discuss the benefits and drawbacks to surgery with your sleep provider and surgeon to determine if it is the best solution for you.   More information  Surgery for NELY is directed at areas that are responsible for narrowing or complete obstruction of the airway during sleep.  There are a wide range of procedures available to enlarge and/or stabilize the airway to prevent blockage of breathing in the three major areas where it can occur: the palate, tongue, and nasal regions.  Successful surgical treatment depends on the accurate identification of the factors responsible for obstructive sleep apnea in each person.  A personalized approach is required because there is no single treatment that works well for everyone.  Because of anatomic variation, consultation with an examination by a sleep surgeon is a critical first step in determining what surgical options are best for each patient.  In some cases, examination during sedation may be recommended in order to guide the selection of procedures.  Patients will be counseled about risks and benefits as well as the typical recovery course after surgery. Surgery is typically not a cure for a person s NELY.  However, surgery will often significantly improve one s NELY severity (termed  success rate ).  Even in the  absence of a cure, surgery will decrease the cardiovascular risk associated with OSA7; improve overall quality of life8 (sleepiness, functionality, sleep quality, etc).      Palate Procedures:  Patients with NELY often have narrowing of their airway in the region of their tonsils and uvula.  The goals of palate procedures are to widen the airway in this region as well as to help the tissues resist collapse.  Modern palate procedure techniques focus on tissue conservation and soft tissue rearrangement, rather than tissue removal.  Often the uvula is preserved in this procedure. Residual sleep apnea is common in patient after pharyngoplasty with an average reduction in sleep apnea events of 33%2.      Tongue Procedures:  ExamWhile patients are awake, the muscles that surround the throat are active and keep this region open for breathing. These muscles relax during sleep, allowing the tongue and other structures to collapse and block breathing.  There are several different tongue procedures available.  Selection of a tongue base procedure depends on characteristics seen on physical exam.  Generally, procedures are aimed at removing bulky tissues in this area or preventing the back of the tongue from falling back during sleep.  Success rates for tongue surgery range from 50-62%3.    Hypoglossal Nerve Stimulation:  Hypoglossal nerve stimulation has recently received approval from the [place] Food and Drug Administration for the treatment of obstructive sleep apnea.  This is based on research showing that the system was safe and effective in treating sleep apnea6.  Results showed that the median AHI score decreased 68%, from 29.3 to 9.0. This therapy uses an implant system that senses breathing patterns and delivers mild stimulation to airway muscles, which keeps the airway open during sleep.  The system consists of three fully implanted components: a small generator (similar in size to a pacemaker), a breathing  sensor, and a stimulation lead.  Using a small handheld remote, a patient turns the therapy on before bed and off upon awakening.    Candidates for this device must be greater than 22 years of age, have moderate to severe NELY (AHI between 20-65), BMI less than 32, have tried CPAP/oral appliance without success, and have appropriate upper airway anatomy (determined by a sleep endoscopy performed by Dr. Gunn).    Hypoglossal Nerve Stimulation Pathway:    The sleep surgeon s office will work with the patient through the insurance prior-authorization process (including communications and appeals).    Nasal Procedures:  Nasal obstruction can interfere with nasal breathing during the day and night.  Studies have shown that relief of nasal obstruction can improve the ability of some patients to tolerate positive airway pressure therapy for obstructive sleep apnea1.  Treatment options include medications such as nasal saline, topical corticosteroid and antihistamine sprays, and oral medications such as antihistamines or decongestants. Non-surgical treatments can include external nasal dilators for selected patients. If these are not successful by themselves, surgery can improve the nasal airway either alone or in combination with these other options.      Combination Procedures:  Combination of surgical procedures and other treatments may be recommended, particularly if patients have more than one area of narrowing or persistent positional disease.  The success rate of combination surgery ranges from 66-80%2,3.    References  Fidel CARTAGENA. The Role of the Nose in Snoring and Obstructive Sleep Apnoea: An Update.  Eur Arch Otorhinolaryngol. 2011; 268: 1365-73.   Jean SM; Marcelo JA; Deniz JR; Pallanch JF; Len MB; Akil SG; James DILLARD. Surgical modifications of the upper airway for obstructive sleep apnea in adults: a systematic review and meta-analysis. SLEEP 2010;33(10):5955-2952. Josafat GARCIA. Hypopharyngeal surgery in  obstructive sleep apnea: an evidence-based medicine review.  Arch Otolaryngol Head Neck Surg. 2006 Feb;132(2):206-13.  Arnel YH1, Surya Y, Efra YELITZA. The efficacy of anatomically based multilevel surgery for obstructive sleep apnea. Otolaryngol Head Neck Surg. 2003 Oct;129(4):327-35.  Josafat GARCIA, Goldberg A. Hypopharyngeal Surgery in Obstructive Sleep Apnea: An Evidence-Based Medicine Review. Arch Otolaryngol Head Neck Surg. 2006 Feb;132(2):206-13.  Aime FAJARDO et al. Upper-Airway Stimulation for Obstructive Sleep Apnea.  N Engl J Med. 2014 Jan 9;370(2):139-49.  Giorgi Y et al. Increased Incidence of Cardiovascular Disease in Middle-aged Men with Obstructive Sleep Apnea. Am J Respir Crit Care Med; 2002 166: 159-165  Nevilledarcie SAENZ et al. Studying Life Effects and Effectiveness of Palatopharyngoplasty (SLEEP) study: Subjective Outcomes of Isolated Uvulopalatopharyngoplasty. Otolaryngol Head Neck Surg. 2011; 144: 623-631.        WHAT IF I ONLY HAVE SNORING?    Mandibular advancement devices, lateral sleep positioning, long-term weight loss and treatment of nasal allergies have been shown to improve snoring.  Exercising tongue muscles with a game (https://www.ncbi.nlm.nih.gov/pubmed/52138852) or stimulating the tongue during the day with a device (https://doi.org/10.3390/hth84017273) have improved snoring in some individuals.    Remember to Drive Safe... Drive Alive     Sleep health profoundly affects your health, mood, and your safety.  Thirty three percent of the population (one in three of us) is not getting enough sleep and many have a sleep disorder. Not getting enough sleep or having an untreated / undertreated sleep condition may make us sleepy without even knowing it. In fact, our driving could be dramatically impaired due to our sleep health. As your provider, here are some things I would like you to know about driving:     Here are some warning signs for impairment and dangerous drowsy driving:              -Having  been awake more than 16 hours               -Looking tired               -Eyelid drooping              -Head nodding (it could be too late at this point)              -Driving for more than 30 minutes     Some things you could do to make the driving safer if you are experiencing some drowsiness:              -Stop driving and rest              -Call for transportation              -Make sure your sleep disorder is adequately treated     Some things that have been shown NOT to work when experiencing drowsiness while driving:              -Turning on the radio              -Opening windows              -Eating any  distracting  /  entertaining  foods (e.g., sunflower seeds, candy, or any other)              -Talking on the phone      Your decision may not only impact your life, but also the life of others. Please, remember to drive safe for yourself and all of us.

## 2022-07-18 NOTE — TELEPHONE ENCOUNTER
DME supply(s) have been requested by the patient. DME orders(s) have been queued up and pended for the provider to review. Patient reports the need for DME supplies due to need to monitor home b/p. Once completed, please route the encounter to care coordinator to fax completed documentation and order requisition to [facility].  Jaymie Coats RN, BC  Manager [facility] Care Coordinator   [phone]  (Fax)

## 2022-07-18 NOTE — TELEPHONE ENCOUNTER
Central Prior Authorization Team   Phone: [phone]      Prior Authorization Approval    Authorization Effective Date: 6/18/2022  Authorization Expiration Date: 7/18/2023  Medication: Victoza 18 mg/3 ml pen inj - APPROVED  Approved Dose/Quantity: 9 FOR 30  Reference #:     Insurance Company: [employer] - Phone [phone] Fax [phone]  Expected CoPay:       CoPay Card Available:      Foundation Assistance Needed:    Which Pharmacy is filling the prescription (Not needed for infusion/clinic administered): [facility], [place] - [address] [place] [address]  Pharmacy Notified: Yes  Patient Notified: Yes (**Instructed pharmacy to notify patient when script is ready to /ship.**)

## 2022-07-18 NOTE — PROGRESS NOTES
[facility] Care Coordination Contact      [facility] Health Plan or Product Change    CC received notification that member's health plan or health plan product changed from [employer] [facility] to [employer] [facility] effective 7/1/22.   Member declined need for home visit. CC reviewed previous Health Risk Assessment/LTCC and POC with member and no changes noted.    Transitional HRA completed. Care Plan Summary updated and reflects current services.  Required referral authorization information communicated to CMS: Yes, new referral for homemaking, pending agency.   Writer reviewed the following with member:  ER visits: No  Hospitalizations: Yes -  [facility]  TCU stays: No  Significant health status changes: Recent hospital observation 7/12/22-7/14/18, Generalized weakness, Hypokalemia. New referral for home PT.   Falls/Injuries: No  ADL/IADL changes: No  Changes in services: new referral for in home PT.   Member was approved for EW 5/4/22, discussed again with member EW services. Offered PERS, Home Delivered Meals, Store To Door, member would like to proceed with homemaking every other week to assist with cleaning her bathroom, sweeping and vacuuming.  Explained to member that to recent staffing difficulties, CC will work with placing referrals and unsure when an agency will have availability, member stated understanding.   CC will mail HCD to member, she shared that she has dyslexia and it takes her time to read through information. Offered CC assist, she stated understanding.   Will place MTM referral due to recent transition and number of medications that member takes daily.   CC to place referral for home b/p monitoring kit.   Provided member contact information to [employer] because she is very interested in swimming for weight loss.     Follow-Up Plan: Member informed of future contact, plan to f/u with member with at next regularly scheduled contact.  Contact information shared with member  and family, encouraged member to call with any questions or concerns.  Jaymie Coats RN, BC  Manager [facility] Care Coordinator   [phone]  (Fax)

## 2022-07-18 NOTE — PROGRESS NOTES
Roxanna is a 66 year old who is being evaluated via a billable video visit.      How would you like to obtain your AVS? MyChart  If the video visit is dropped, the invitation should be resent by: Text to cell phone: [phone]  Will anyone else be joining your video visit? Beatriz Escamilla        Video-Visit Details    Video Start Time: 2:03 PM    Type of service:  Video Visit    Video End Time:2:44 PM    Originating Location (pt. Location): Home    Distant Location (provider location):  [facility]     Platform used for Video Visit: DailyTicket

## 2022-07-18 NOTE — TELEPHONE ENCOUNTER
Santiago PT with [facility], calling to request verbal orders for PT visits 2x/week for 2 weeks, then 1x/week for 2 weeks.      Verbal approval given per protocol.

## 2022-07-19 ENCOUNTER — PATIENT OUTREACH (OUTPATIENT)
Dept: GERIATRIC MEDICINE | Facility: CLINIC | Age: 67
End: 2022-07-19

## 2022-07-19 ENCOUNTER — OFFICE VISIT (OUTPATIENT)
Dept: INTERNAL MEDICINE | Facility: CLINIC | Age: 67
End: 2022-07-19
Payer: COMMERCIAL

## 2022-07-19 ENCOUNTER — TELEPHONE (OUTPATIENT)
Dept: GERIATRIC MEDICINE | Facility: CLINIC | Age: 67
End: 2022-07-19

## 2022-07-19 VITALS
DIASTOLIC BLOOD PRESSURE: 82 MMHG | HEIGHT: 63 IN | TEMPERATURE: 98.8 F | SYSTOLIC BLOOD PRESSURE: 140 MMHG | HEART RATE: 71 BPM | OXYGEN SATURATION: 96 % | BODY MASS INDEX: 45.13 KG/M2 | WEIGHT: 254.7 LBS

## 2022-07-19 DIAGNOSIS — F32.A MILD DEPRESSION: ICD-10-CM

## 2022-07-19 DIAGNOSIS — G89.29 CHRONIC PAIN OF LEFT KNEE: Primary | ICD-10-CM

## 2022-07-19 DIAGNOSIS — M25.562 CHRONIC PAIN OF LEFT KNEE: Primary | ICD-10-CM

## 2022-07-19 DIAGNOSIS — E03.9 HYPOTHYROIDISM, UNSPECIFIED TYPE: ICD-10-CM

## 2022-07-19 DIAGNOSIS — M54.16 LUMBAR RADICULOPATHY: ICD-10-CM

## 2022-07-19 DIAGNOSIS — R26.9 ABNORMAL GAIT: ICD-10-CM

## 2022-07-19 DIAGNOSIS — D47.3 ESSENTIAL (HEMORRHAGIC) THROMBOCYTHEMIA (H): ICD-10-CM

## 2022-07-19 DIAGNOSIS — R94.31 NONSPECIFIC ABNORMAL ELECTROCARDIOGRAM (ECG) (EKG): ICD-10-CM

## 2022-07-19 DIAGNOSIS — I10 ESSENTIAL HYPERTENSION: ICD-10-CM

## 2022-07-19 DIAGNOSIS — Z23 NEED FOR COVID-19 VACCINE: ICD-10-CM

## 2022-07-19 DIAGNOSIS — E66.01 MORBID OBESITY (H): ICD-10-CM

## 2022-07-19 DIAGNOSIS — R79.89 LOW TSH LEVEL: ICD-10-CM

## 2022-07-19 DIAGNOSIS — Z09 HOSPITAL DISCHARGE FOLLOW-UP: Primary | ICD-10-CM

## 2022-07-19 DIAGNOSIS — N17.9 AKI (ACUTE KIDNEY INJURY) (H): ICD-10-CM

## 2022-07-19 LAB
ANION GAP SERPL CALCULATED.3IONS-SCNC: 7 MMOL/L (ref 3–14)
BUN SERPL-MCNC: 8 MG/DL (ref 7–30)
CALCIUM SERPL-MCNC: 9.3 MG/DL (ref 8.5–10.1)
CHLORIDE BLD-SCNC: 105 MMOL/L (ref 94–109)
CO2 SERPL-SCNC: 26 MMOL/L (ref 20–32)
CREAT SERPL-MCNC: 0.88 MG/DL (ref 0.52–1.04)
ERYTHROCYTE [DISTWIDTH] IN BLOOD BY AUTOMATED COUNT: 17.4 % (ref 10–15)
FERRITIN SERPL-MCNC: 33 NG/ML (ref 8–252)
GFR SERPL CREATININE-BSD FRML MDRD: 72 ML/MIN/1.73M2
GLUCOSE BLD-MCNC: 92 MG/DL (ref 70–99)
HCT VFR BLD AUTO: 39.2 % (ref 35–47)
HGB BLD-MCNC: 12.2 G/DL (ref 11.7–15.7)
MCH RBC QN AUTO: 26.8 PG (ref 26.5–33)
MCHC RBC AUTO-ENTMCNC: 31.1 G/DL (ref 31.5–36.5)
MCV RBC AUTO: 86 FL (ref 78–100)
PLATELET # BLD AUTO: 394 10E3/UL (ref 150–450)
POTASSIUM BLD-SCNC: 3.8 MMOL/L (ref 3.4–5.3)
RBC # BLD AUTO: 4.55 10E6/UL (ref 3.8–5.2)
SODIUM SERPL-SCNC: 138 MMOL/L (ref 133–144)
WBC # BLD AUTO: 8.5 10E3/UL (ref 4–11)

## 2022-07-19 PROCEDURE — 85027 COMPLETE CBC AUTOMATED: CPT | Performed by: NURSE PRACTITIONER

## 2022-07-19 PROCEDURE — 82728 ASSAY OF FERRITIN: CPT | Performed by: NURSE PRACTITIONER

## 2022-07-19 PROCEDURE — 36415 COLL VENOUS BLD VENIPUNCTURE: CPT | Performed by: NURSE PRACTITIONER

## 2022-07-19 PROCEDURE — 91305 COVID-19,PF,PFIZER (12+ YRS): CPT | Performed by: NURSE PRACTITIONER

## 2022-07-19 PROCEDURE — 80048 BASIC METABOLIC PNL TOTAL CA: CPT | Performed by: NURSE PRACTITIONER

## 2022-07-19 PROCEDURE — 99495 TRANSJ CARE MGMT MOD F2F 14D: CPT | Performed by: NURSE PRACTITIONER

## 2022-07-19 PROCEDURE — 0054A COVID-19,PF,PFIZER (12+ YRS): CPT | Performed by: NURSE PRACTITIONER

## 2022-07-19 NOTE — PROGRESS NOTES
[facility] Coordination Contact    7/18/22 Rec'd tele call from Santiago PT [facility] to report that he did see member for start of care, stating that member is doing well. Plan is to see member for a few weeks.   Santiago is recommending a new 4 WW.   7/19/22 Spoke with member to share above information, explained that there would be a waiver obligation $65-$145, member agreeable.  Explained that CC will obtain Rx from PCP and place referral with [facility], estimated time for delivery 2-3 weeks.   Jaymie Coats RN, BC  Manager [facility] Coordinator   [phone]  (Fax)

## 2022-07-19 NOTE — PATIENT INSTRUCTIONS
-Please get blood work today; I will be in touch with you regarding results  -Continue losartan, metoprolol as is  -I will let you know if we can resume the water pill (triameterene-hydrochlorothiazide)  -You will need thyroid labs in 6 weeks- Discuss at follow up with Dr. Jama

## 2022-07-19 NOTE — LETTER
[facility] [employer] Advance Care Planning       Maxine Sears  [address]   [place] [place] [facility] [address]      Dear Maxine    You shared with me your interest in receiving information on Advance Care Planning and Health Care Directives. Discussing and making decisions about this part of our health is very important.  A Health Care Directive is a written document that outlines your goals, values, beliefs and choices for health care and medical treatment in the event you are unable to speak for yourself.     We greatly value the opportunity to assist you in documenting your choices and to honor your   wishes. We ve enclosed [place] Health Care Directive, worksheet and resources to help you get started thinking about your values and goals. We have several options for additional resources:       Health Care Directives and Advance Care Planning resources can be viewed and printed   for free at our web site:  [email or web address].       Free group classes on Advance Care Planning and completing a Health Care Directive are available at multiple locations and times. These classes are led by trained staff who will provide information and guide you through a Health Care Directive.  They can also review, notarize and add your Health Care Directive to your medical record. [place] for a class at [email or web address] or by calling [employer] at [phone] or toll free [phone].      COPIES of completed Health Care Directives can be brought or mailed to any of our   locations, including the address listed below. You can also email a copy to [email or web address] .      Email or call me at the contact information listed below for questions, assistance, or to   make an appointment to discuss creating a Health Care Directive. You can also contact   our [facility] [employer] Department for questions or assistance.       Sincerely,     Jaymie Coats RN / [facility] Care Coordinator  Ph   [phone] / Fax  [phone]  [email or web address]

## 2022-07-19 NOTE — PROGRESS NOTES
Assessment & Plan     Hospital discharge follow-up  - See plan below  - Home PT ordered- pt waiting to schedule appt    SOBEIDA (acute kidney injury) (H)  - Recently admitted for weakness and lightheadedness.  Found to have an SOBEIDA, creatinine 2.53 on admission from baseline of 0.80.  Etiology suspected to be hypovolemia due to orthostatic symptoms and hypokalemia on exam.  She also had some recent constipation followed by diarrhea and nausea with poor intake likely.  Her PTA losartan was held and started a reduced dose at discharge and her PTA Maxide was held as well.  Patient has been taking her usual dose of losartan and she did take 1 dose of Maxide for lower extremity swelling but has not taken any further.  - Symptoms improved- no further dizziness.  Some generalized weakness but improving- not able to completely do all ADL's due to easily fatigued  - Check BMP; suspect will be back to normal, if so, resume Maxide  - Stay hydrated  - Return if symptoms worsen (dizziness, low intake, etc)  - Basic metabolic panel  (Ca, Cl, CO2, Creat, Gluc, K, Na, BUN)  - CBC with platelets  - Basic metabolic panel  (Ca, Cl, CO2, Creat, Gluc, K, Na, BUN)  - CBC with platelets    Essential hypertension  - Mildly above goal  - Continue Losartan 100 mg/day; metoprolol 12.5 mg/day;  if BMP WNL resume Maxide  - Basic metabolic panel  (Ca, Cl, CO2, Creat, Gluc, K, Na, BUN)  - CBC with platelets  - Basic metabolic panel  (Ca, Cl, CO2, Creat, Gluc, K, Na, BUN)  - CBC with platelets    Low TSH level  Hypothyroidism, unspecified type  - Levothyroxine dose reduced during hospital stay due to low TSH and elevated T4  - Continue levothyroxine 125 mcg/day  - Check TSH in 6-8 weeks    Essential (hemorrhagic) thrombocythemia (H)  - Plts stable- 394    Mild depression (H)  - Reports mood is stable      Need for COVID-19 vaccine  - Booster today  - COVID-19,PF,PFIZER (12+ YRS)    Non-specific abnormal electrocardiogram (EGG) (EKG)  - Noted on  "hospital admission.  Had reported some CP with Physical therapy.  Currently denies.  EKG no specific new changes; troponin neg x2.  Discharge summary recommends possible outpatient cardiac stress test  - Pt denies further CP at this time, would be agreeable to stress testing - ordered       BMI:   Estimated body mass index is 45.12 kg/m  as calculated from the following:    Height as of this encounter: 1.6 m (5' 3\").    Weight as of this encounter: 115.5 kg (254 lb 11.2 oz).       See Patient Instructions    Return for as scheduled with Dr. Jama on 7/29.    LUPE Salazar CNP  [facility] PATRICIA Mcknight is a 66 year old, presenting for the following health issues:  Hospital F/U      HPI       Hospital Follow-up Visit:    Hospital/Nursing Home/IP Rehab Facility: [facility]  Date of Admission: 07/12/2022  Date of Discharge: 07/14/2022  Reason(s) for Admission: Acute kidney injury, improving  Suspected orthostatic hypotension      Was your hospitalization related to COVID-19? No   Problems taking medications regularly:  None  Medication changes since discharge: None  Problems adhering to non-medication therapy:  None    Summary of hospitalization:  [facility] discharge summary reviewed  Diagnostic Tests/Treatments reviewed.  Follow up needed: none  Other Healthcare Providers Involved in Patient s Care:         Homecare  Update since discharge: improved.       Post Discharge Medication Reconciliation: discharge medications reconciled, continue medications without change.  Plan of care communicated with patient              Hospital Discharge:  Hospital Course  Maxine Sears is a 66 year-old female with history of depression, duodenal ulcer, hypertension, hypothyroidism, obesity, NELY, paroxysmal atrial fibrillation with prior watchman, RLS who presents with generalized weakness, lightheadedness and was registered to observation " on 7/12/22 for further evaluation. For full HPI please see admission H&P from Dr. Tacos Huggins.      Acute kidney injury, improving  Suspected orthostatic hypotension, resolved  * Presents with generalized weakness, light-headedness with position changes  * Cr 2.53 on admission from baseline of 0.80. Given concurrent orthostasis symptoms and hypokalemia suspect hypovolemia.  * Recently constipated, followed by diarrhea associated with some nausea and poor intake possibly contributing to hypovolemia combined with PTA diuretic. Ongoing losartan use potentially contributing to SOBEIDA as well.  * Note that she received contrast study in ED  * Cr 2.53-->1.58-->1.00  * UA mod LE, 17 wbc, neg nitrite, patient denies urinary symptoms. Reports has had UTI in the past and this does not feel like UTI. UCx negative x2.     Patient was registered to observation.  She was initiated on maintenance IV fluids.  She was borderline positive orthostatic which improved with IV fluids.  Her prior to admission losartan, triamterene-hydrochlorothiazide were held on admission.  Physical therapy was consulted and recommended home physical therapy on discharge which was arranged by care cremate her.  She was advised to wear compression stockings.  She was restarted on half a dose of losartan 50 mg on the day of discharge and instructed to increase to her home dose of 100 mg on 7/15.  She has a blood pressure cuff at home and will monitor her blood pressure appropriately.  She was instructed to hold her triamterene/hydrochlorothiazide until follow-up with her primary care provider next week when she will also repeat a basic metabolic panel to follow-up on kidney function.  Day of discharge she is feeling well and ready to go home.     History of hypothyroidism with suspected iatrogenic hyperthyroidism  TSH 0.02, free T4 1.60. Reports taking her levothyroxine as prescribed, no recent dose changes.  No clear hyperthyroid symptoms.  Her PTA  levothyroxine was reduced to 125 mcg.  She will need a repeat TSH in 6 to 8 weeks with primary care.     Hypokalemia, resolved  K+ 2.9 on admission. Secondary to poor intake, recent diarrhea, +/- diuretic use. Magnesium WNL. Hypokalemia resolved with repletion.     Non-specific EKG changes  * EKG not overwhelmingly different from prior EKG  * No chest pain or concerning cardiac symptoms  * Electrolyte disturbances may be contributing  * Serial troponins negative x4  * Repeated troponin 7/13 x2 as patient reported very mild transient chest pressure while working with PT, EKG reviewed and improved from prior.  She is able to continue to ambulate without recurrence of any chest discomfort.  Her troponin trended completely normal.  I do think primary care should consider an outpatient nuclear stress test at some point due to risk factors.  Of note she did have a negative stress test in 2016 and there is no heart strain on CTA imaging although does note moderate coronary calcifications.     Diabetes mellitus, type 2, well controlled  HgbA1c 6.6% 3/14/22. Prior to admission on liraglutide 1.8 mg daily.  PTA Victoza was held on admission and resumed on discharge.  Blood glucose was monitored and sliding scale insulin utilized as needed.              Recent Labs   Lab 07/14/22  0637 07/13/22  1727 07/13/22  1210 07/13/22  0645 07/13/22  0644 07/12/22  2127   * 106* 201* 101* 101* 119*         Hypertension  She was continued on PTA Toprol XL. As above her PTA losartan was held, restarted at 50 mg 7/14 and she was instructed to increase to PTA dosing 100 mg on 7/15.  She is continuing to hold triamterene/hydrochlorothiazide until follow-up with primary care and BMP next week.  She will monitor for lower extremity edema.  She is instructed to wear compression stockings and check blood pressure at home with her home blood pressure cuff.     Paroxysmal atrial fibrillation with prior watchman procedure  NSR on admission  and during hospital stay. Monitored on telemetry which was unremarkable other than nonspecific ST-T changes as above. She was continued on PTA metoprolol XL, flecainide, and aspirin. She is historically not on AC due to prior history of GI bleeding, previously on Xarelto. Now has Watchman in place     Chronic stable diagnoses and other pertinent medical history: Appropriate PTA medications will be resumed  Hyperlipidemia: Hold prior to admission statin while observation status  History of duodenal ulcer  GERD: Continue prior to admission PPI  Obstructive sleep apnea: Continue CPAP per home settings  Morbid obesity: Body mass index is 48.54 kg/m .. Increase in all-cause morbidity and mortality. Encourage weight loss.      Patient reports that things are going okay.  She is feeling better.  No further dizziness, questionable slight weakness.  She does have a referral for home physical therapy.  She has been taking her losartan as previously prescribed, 100 mg daily as she did not realize she was supposed to take a half a dose for 1 day.  She has been holding her hydrochlorothiazide however, did note that she had some mildly increased lower leg swelling and took a half a pill the other day but has not taken any further.  She denies chest pain, shortness of breath.  She is eating and drinking okay.  Voiding without difficulty she does note that some household tasks, such as cleaning are more difficult; she does the best that she can; cannot hire outside help due to cost. Home PT ordered- waiting call for appt.  She is taking the reduced dose of levothyroxine.  Has no other concerns today.      Review of Systems   CONSTITUTIONAL:NEGATIVE for fever, chills, change in weight  EYES: NEGATIVE for vision changes or irritation  ENT/MOUTH: NEGATIVE for ear, mouth and throat problems  RESP:NEGATIVE for significant cough or SOB  CV: NEGATIVE for chest pain, palpitations; Mild LE edema  GI: NEGATIVE for nausea, abdominal pain,  "heartburn, or change in bowel habits  : Voiding ok  MUSCULOSKELETAL: NEGATIVE for significant arthralgias or myalgia  NEURO:  No dizziness      Objective    BP (!) 140/82 (BP Location: Left arm, Patient Position: Sitting, Cuff Size: Adult Large)   Pulse 71   Temp 98.8  F (37.1  C) (Oral)   Ht 1.6 m (5' 3\")   Wt 115.5 kg (254 lb 11.2 oz)   SpO2 96%   BMI 45.12 kg/m    Body mass index is 45.12 kg/m .     Physical Exam   GENERAL APPEARANCE: healthy, alert and no distress  EYES: Eyes grossly normal to inspection, PERRL and conjunctivae and sclerae normal  RESP: lungs clear to auscultation - no rales, rhonchi or wheezes  CV: regular rates and rhythm, normal S1 S2, no S3 or S4 and no murmur, click or rub; +1 bilateral LE pretibial edema  ABDOMEN: soft, nontender, without hepatosplenomegaly or masses and bowel sounds normal  MS: extremities normal- no gross deformities noted  SKIN: no suspicious lesions or rashes  NEURO: Normal strength and tone, mentation intact and speech normal  PSYCH: mentation appears normal and affect normal/bright      -Hospital discharge summary, lab results, imaging reports reviewed        .  ..  "

## 2022-07-19 NOTE — TELEPHONE ENCOUNTER
DME supply(s) have been requested by the patient. DME orders(s) have been queued up and pended for the provider to review. Patient reports the need for DME supplies due to request from PT for safety with ambulation. Once completed, please route the encounter to care coordinator to fax completed documentation and order requisition to [facility].  Jaymie Coats RN, BC  Manager [facility] Care Coordinator   [phone]  (Fax)

## 2022-07-19 NOTE — PROGRESS NOTES
[facility] Care Coordination Contact    Order placed with [facility] (p: [phone]; f: [phone]) for BP unit.  Order placed on 7/19/22. Database updated.  As required, authorization submitted to health plan.    Mailed HCD info    Vandana Belcher  Case Management Specialist  [facility]  [phone]

## 2022-07-20 ENCOUNTER — MEDICAL CORRESPONDENCE (OUTPATIENT)
Dept: HEALTH INFORMATION MANAGEMENT | Facility: CLINIC | Age: 67
End: 2022-07-20

## 2022-07-20 NOTE — TELEPHONE ENCOUNTER
Order signed for  DME for 4 wheeled walker with seat as requested.  Message routed back to CC as requested to print and fax

## 2022-07-22 NOTE — PROGRESS NOTES
[facility] Care Coordination Contact    Order placed with [facility] (p: [phone]; f: [phone]) for 4WW.  Order placed on 7/22/22. Database updated.  As required, authorization submitted to health plan.    Vandana Belcher  Case Management Specialist  [facility]  [phone]

## 2022-07-23 NOTE — PROGRESS NOTES
Roxanna is a 66 year old who is being evaluated via a billable video visit.      If the video visit is dropped, the invitation should be resent by: Text to cell phone: [phone]  Will anyone else be joining your video visit? No      Video-Visit Details    Type of service:  Video Visit    Video Start Time: 11:37 AM    Video End Time:12:02 PM    Originating Location (pt. Location): Home    Distant Location (provider location):  [facility] [place]     Platform used for Video Visit: [employer]        2022    Return Medical Weight Management Note     Maxine Sears  MRN:  [id number]  :  1955      Dear Yuval Jama,    I had the pleasure of doing a visit with your patient Maxine Sears.  She is a 66 year old female who I am continuing to see for treatment of obesity related to:    No flowsheet data found.    Assessment & Plan   Problem List Items Addressed This Visit     Morbid obesity (H) - Primary     Patient was congratulated on wt loss success thus far. Healthy habits to assist with further weight loss were discussed. She is down 7% of her baseline weight. Roxanna will continue the Victoza. HgA1C was ordered.  She will Start swimming at [employer] 3x a week.               Relevant Medications    liraglutide (VICTOZA) 18 MG/3ML solution    NELY on CPAP     Has sleep study scheduled.           Diabetes mellitus, type 2 (H)     Monitoring. Will continue Victoza. HgA1C ordered.            Relevant Medications    liraglutide (VICTOZA) 18 MG/3ML solution    Other Relevant Orders    Hemoglobin A1c         PATIENT INSTRUCTIONS:  Continue Victoza 1.8 mg week.  Start swimming at [employer] 3 x a week.  Watch bariatric information session.     FOLLOW-UP:  Return to clinic in 8-10 weeks.    32 minutes spent on the date of the encounter doing chart review, history and exam, result review, counseling, developing plan of care, documentation, and further activities as  noted      INTERVAL HX: Vira Mcknight returns for medical weight management follow up.  Pt was last seen for [facility] initial evaluation evaluation.  Was started on Victoza.  She gets full a lot faster.  In the beginning when she ate too much she would sometimes get an upset stomach. This has resolved now.  Also had some constipation.  Now she takes Miralax prn.  Has been in the hospital since or last visit with acute kidney injury and dehydration.     Did not watch the bariatric information session. Was unable to find link.      WEIGHT METRICS:  Body mass index is 44.99 kg/m .   Current Weight: 254 lb (115.2 kg) (pt reported)  Last Visits Weight: 254 lb 11.2 oz (115.5 kg)  Initial Weight (lbs): 274 lbs  Cumulative weight loss (lbs): 20  Weight Loss Percentage: 7.3%    Wt Readings from Last 10 Encounters:   07/26/22 254 lb (115.2 kg)   07/19/22 254 lb 11.2 oz (115.5 kg)   07/18/22 264 lb (119.7 kg)   07/13/22 259 lb (117.5 kg)   05/24/22 274 lb (124.3 kg)   05/23/22 274 lb (124.3 kg)   03/24/22 273 lb 6.4 oz (124 kg)   03/15/22 274 lb (124.3 kg)   03/08/22 274 lb (124.3 kg)   10/25/21 264 lb (119.7 kg)     Weight Loss Medication (AOM) History Reviewed       Current weight loss medication/s taking:    Victoza   If not taking an AOM prescribed to you, please indicate why: -   Any side effects from the medication?   Early on constipation and nausea but none currently       Changes and Difficulties      Diet changes since last visit:   Smaller portions at meal, decreased her carbohydrates at meal times.     With regards to diet, pt still struggling with: none     Activity changes since last visit:   Trying to walk more, was able to get something set up to go swimming.  Goal is to start doing this at [employer].  Insurance will pay for this.     With regards to activity, pt still struggling with:        MEDICATIONS:   Current Outpatient Medications   Medication     acetaminophen (TYLENOL) 500 MG tablet     aspirin (ASA) 81 MG EC  tablet     diphenhydrAMINE (BENADRYL) 25 MG tablet     flecainide (TAMBOCOR) 150 MG tablet     gabapentin (NEURONTIN) 300 MG capsule     insulin pen needle (31G X 6 MM) 31G X 6 MM miscellaneous     levothyroxine (SYNTHROID/LEVOTHROID) 125 MCG tablet     liraglutide (VICTOZA) 18 MG/3ML solution     losartan (COZAAR) 100 MG tablet     metoprolol succinate ER (TOPROL-XL) 25 MG 24 hr tablet     pantoprazole (PROTONIX) 40 MG EC tablet     rosuvastatin (CRESTOR) 10 MG tablet     SUMAtriptan (IMITREX) 100 MG tablet     triamterene-HCTZ (MAXZIDE-25) 37.5-25 MG tablet     No current facility-administered medications for this visit.       LABS:  Hemoglobin A1C POCT   Date Value Ref Range Status   03/13/2019 5.9 (H) 0 - 5.6 % Final     Comment:     Normal <5.7% Prediabetes 5.7-6.4%  Diabetes 6.5% or higher - adopted from ADA   consensus guidelines.       Hemoglobin A1C   Date Value Ref Range Status   03/14/2022 6.6 (H) 0.0 - 5.6 % Final     Comment:     Normal <5.7%   Prediabetes 5.7-6.4%    Diabetes 6.5% or higher     Note: Adopted from ADA consensus guidelines.     Sodium   Date Value Ref Range Status   07/19/2022 138 133 - 144 mmol/L Final   03/11/2021 137 133 - 144 mmol/L Final     Potassium   Date Value Ref Range Status   07/19/2022 3.8 3.4 - 5.3 mmol/L Final   03/11/2021 4.0 3.4 - 5.3 mmol/L Final     Chloride   Date Value Ref Range Status   07/19/2022 105 94 - 109 mmol/L Final   03/11/2021 103 94 - 109 mmol/L Final     Carbon Dioxide   Date Value Ref Range Status   03/11/2021 27 20 - 32 mmol/L Final     Carbon Dioxide (CO2)   Date Value Ref Range Status   07/19/2022 26 20 - 32 mmol/L Final     Anion Gap   Date Value Ref Range Status   07/19/2022 7 3 - 14 mmol/L Final   03/11/2021 7 3 - 14 mmol/L Final     Glucose   Date Value Ref Range Status   07/19/2022 92 70 - 99 mg/dL Final   03/11/2021 100 (H) 70 - 99 mg/dL Final     Urea Nitrogen   Date Value Ref Range Status   07/19/2022 8 7 - 30 mg/dL Final   03/11/2021 12 7 -  30 mg/dL Final     Creatinine   Date Value Ref Range Status   07/19/2022 0.88 0.52 - 1.04 mg/dL Final   03/11/2021 0.90 0.52 - 1.04 mg/dL Final     GFR Estimate   Date Value Ref Range Status   07/19/2022 72 >60 mL/min/1.73m2 Final     Comment:     Effective December 21, 2021 eGFRcr in adults is calculated using the 2021 CKD-EPI creatinine equation which includes age and gender (Bruna et al., NE, DOI: 10.[facility]6/IOAZuw7173818)   03/11/2021 67 >60 mL/min/[1.73_m2] Final     Comment:     Non  GFR Calc  Starting 12/18/2018, serum creatinine based estimated GFR (eGFR) will be   calculated using the Chronic Kidney Disease Epidemiology Collaboration   (CKD-EPI) equation.       Calcium   Date Value Ref Range Status   07/19/2022 9.3 8.5 - 10.1 mg/dL Final   03/11/2021 9.5 8.5 - 10.1 mg/dL Final     Bilirubin Total   Date Value Ref Range Status   07/12/2022 1.1 0.2 - 1.3 mg/dL Final   03/11/2021 0.5 0.2 - 1.3 mg/dL Final     Alkaline Phosphatase   Date Value Ref Range Status   07/12/2022 77 40 - 150 U/L Final   03/11/2021 104 40 - 150 U/L Final     ALT   Date Value Ref Range Status   07/12/2022 18 0 - 50 U/L Final   03/11/2021 19 0 - 50 U/L Final     AST   Date Value Ref Range Status   07/12/2022 13 0 - 45 U/L Final   03/11/2021 13 0 - 45 U/L Final     Cholesterol   Date Value Ref Range Status   03/14/2022 158 <200 mg/dL Final   03/11/2021 128 <200 mg/dL Final     HDL Cholesterol   Date Value Ref Range Status   03/11/2021 64 >49 mg/dL Final     Direct Measure HDL   Date Value Ref Range Status   03/14/2022 64 >=50 mg/dL Final     LDL Cholesterol Calculated   Date Value Ref Range Status   03/14/2022 48 <=100 mg/dL Final   03/11/2021 24 <100 mg/dL Final     Comment:     Desirable:       <100 mg/dl     Triglycerides   Date Value Ref Range Status   03/14/2022 229 (H) <150 mg/dL Final   03/11/2021 201 (H) <150 mg/dL Final     Comment:     Borderline high:  150-199 mg/dl  High:             200-499 mg/dl  Very high:     "   >499 mg/dl          PE:  Ht 5' 3\" (1.6 m)   Wt 254 lb (115.2 kg)   BMI 44.99 kg/m    GENERAL: Healthy, alert and no distress  EYES: Eyes grossly normal to inspection.  No discharge or erythema, or obvious scleral/conjunctival abnormalities.  RESP: No audible wheeze, cough, or visible cyanosis.  No visible retractions or increased work of breathing.    SKIN: Visible skin clear. No significant rash, abnormal pigmentation or lesions.  NEURO: Cranial nerves grossly intact.  Mentation and speech appropriate for age.  PSYCH: Mentation appears normal, affect normal/bright, judgement and insight intact, normal speech and appearance well-groomed.    Sincerely,      Blessing Nix PA-C    "

## 2022-07-26 ENCOUNTER — VIRTUAL VISIT (OUTPATIENT)
Dept: SURGERY | Facility: CLINIC | Age: 67
End: 2022-07-26
Payer: COMMERCIAL

## 2022-07-26 VITALS — BODY MASS INDEX: 45 KG/M2 | HEIGHT: 63 IN | WEIGHT: 254 LBS

## 2022-07-26 DIAGNOSIS — Z53.9 DIAGNOSIS NOT YET DEFINED: Primary | ICD-10-CM

## 2022-07-26 DIAGNOSIS — E66.01 MORBID OBESITY (H): Primary | ICD-10-CM

## 2022-07-26 DIAGNOSIS — G47.33 OSA ON CPAP: ICD-10-CM

## 2022-07-26 DIAGNOSIS — E11.9 TYPE 2 DIABETES MELLITUS WITHOUT COMPLICATION, WITHOUT LONG-TERM CURRENT USE OF INSULIN (H): ICD-10-CM

## 2022-07-26 PROCEDURE — 99214 OFFICE O/P EST MOD 30 MIN: CPT | Mod: 95 | Performed by: PHYSICIAN ASSISTANT

## 2022-07-26 PROCEDURE — G0180 MD CERTIFICATION HHA PATIENT: HCPCS | Performed by: INTERNAL MEDICINE

## 2022-07-26 RX ORDER — LIRAGLUTIDE 6 MG/ML
1.8 INJECTION SUBCUTANEOUS DAILY
Qty: 9 ML | Refills: 2 | Status: SHIPPED | OUTPATIENT
Start: 2022-07-26 | End: 2023-05-05

## 2022-07-26 NOTE — PROGRESS NOTES
Per CC, the following agencies have been called on 7/21/22 for hmkg service availability:    [facility]  [phone] (do not offer hmkg services)    [facility]  [phone] (do not offer hmkg services)    [facility]  [phone] (no staff available)    [facility] -[phone] (do not offer hmkg services)    [facility]  [phone] (In process of hiring - placed on waiting list)    [facility]  [phone] (no staff available)    [facility] [phone] (no staff available)    [facility] [phone] (do not offer hmkg services)    [facility]  [phone]  [email or web address] - Kassidy Staff coordinator - R hand side of home page - complete referral from and Kassidy will review.    [facility] [phone] (do not offer hmkg services)    Vandana Belcher  Case Management Specialist  [facility]  [phone]

## 2022-07-26 NOTE — PATIENT INSTRUCTIONS
"Nice to talk with you today. Thank you for allowing me the privilege of caring for you. We hope we provided you with the excellent service you deserve.     To ensure the quality of our services you may receive a patient satisfaction survey from an independent monitoring company.  The greatest compliment you can give is \"Likely to Recommend\"    Below is our plan we discussed.-  TINA Funk      Plan:  Continue Victoza 1.8 mg week.  Have Hemoglobin A1C drawn at follow up appt with Dr. Jama.    Goals:  Watch bariatric information session.   Start swimming at [employer] 3 x a week.    FOLLOW-UP:    Please call [phone] to schedule your next visit this month with the dietician and in 8-10 weeks with Blessing Nix PA-C.      "

## 2022-07-26 NOTE — ASSESSMENT & PLAN NOTE
Patient was congratulated on wt loss success thus far. Healthy habits to assist with further weight loss were discussed. She is down 7% of her baseline weight. Roxanna will continue the Victoza. HgA1C was ordered.  She will Start swimming at [employer] 3x a week.

## 2022-07-28 ENCOUNTER — MYC MEDICAL ADVICE (OUTPATIENT)
Dept: SLEEP MEDICINE | Facility: CLINIC | Age: 67
End: 2022-07-28

## 2022-07-28 ENCOUNTER — VIRTUAL VISIT (OUTPATIENT)
Dept: PHARMACY | Facility: CLINIC | Age: 67
End: 2022-07-28
Attending: INTERNAL MEDICINE
Payer: COMMERCIAL

## 2022-07-28 DIAGNOSIS — E78.5 HYPERLIPIDEMIA LDL GOAL <100: ICD-10-CM

## 2022-07-28 DIAGNOSIS — I10 ESSENTIAL HYPERTENSION: ICD-10-CM

## 2022-07-28 DIAGNOSIS — N17.9 ACUTE KIDNEY INJURY (H): Primary | ICD-10-CM

## 2022-07-28 DIAGNOSIS — G43.009 MIGRAINE WITHOUT AURA AND WITHOUT STATUS MIGRAINOSUS, NOT INTRACTABLE: ICD-10-CM

## 2022-07-28 DIAGNOSIS — I48.0 PAROXYSMAL ATRIAL FIBRILLATION (H): ICD-10-CM

## 2022-07-28 DIAGNOSIS — E03.9 HYPOTHYROIDISM, UNSPECIFIED TYPE: ICD-10-CM

## 2022-07-28 DIAGNOSIS — G25.81 RLS (RESTLESS LEGS SYNDROME): ICD-10-CM

## 2022-07-28 DIAGNOSIS — K21.9 GASTROESOPHAGEAL REFLUX DISEASE, UNSPECIFIED WHETHER ESOPHAGITIS PRESENT: ICD-10-CM

## 2022-07-28 DIAGNOSIS — E11.9 TYPE 2 DIABETES MELLITUS WITHOUT COMPLICATION, WITHOUT LONG-TERM CURRENT USE OF INSULIN (H): ICD-10-CM

## 2022-07-28 DIAGNOSIS — J30.2 SEASONAL ALLERGIES: ICD-10-CM

## 2022-07-28 PROCEDURE — 99605 MTMS BY PHARM NP 15 MIN: CPT | Performed by: PHARMACIST

## 2022-07-28 RX ORDER — TRIAMTERENE/HYDROCHLOROTHIAZID 37.5-25 MG
1 TABLET ORAL DAILY
Qty: 90 TABLET | Refills: 3 | COMMUNITY
Start: 2022-07-28 | End: 2022-10-04

## 2022-07-28 NOTE — LETTER
July 28, 2022  Maxine Sears  [address]   [place] [address]    Dear [facility]        Thank you for talking with me on Jul 28, 2022 about your health and medications. As a follow-up to our conversation, I have included two documents:      1. Your Recommended To-Do List has steps you should take to get the best results from your medications.  2. Your Medication List will help you keep track of your medications and how to take them.    If you want to talk about these documents, please call Virginia Sauceda RPH at phone: [phone], Monday-Friday 8-4:30pm.    I look forward to working with you and your doctors to make sure your medications work well for you.    Sincerely,    Virginia Sauceda RPH  [facility] Pharmacist, [facility]

## 2022-07-28 NOTE — PROGRESS NOTES
Medication Therapy Management (MTM) Encounter    ASSESSMENT:                            Medication Adherence/Access: No issues identified    Acute kidney injury:  resolved    Type 2 Diabetes: A1c at goal <7%.  Continue current regimen.  Discussed risk of SOBEIDA with Victoza secondary to n/v but patient has not had n/v.  Will follow-up with primary care provider as planned to discuss blood glucose testing.    Hyperlipidemia: stable    Afib/Hypertension: stable    GERD: stable    Allergic Rhinitis:  Patient prefers to stay on diphenhydramine.  Reviewed potential risks with patient today.    Hypothyroidism: no symptoms at this time, will recheck labs as planned    RLS: stable    Migraines: stable    PLAN:                            Reviewed medications and answered patients questions today.     Follow-up: follow-up with Lauren Bloch as planned in May 2023.    SUBJECTIVE/OBJECTIVE:                          Maxine Sears is a 66 year old female called for a follow-up visit.  Today's visit is a follow-up MTM visit from 7/6/22 with Lauren Bloch.  Referred by  following transition.  Discharged from [facility] on 7/14 due to SOBEIDA.     Reason for visit: questions about medication - wonders what metoprolol is for    Allergies/ADRs: Reviewed in chart  Tobacco: She reports that she quit smoking about 50 years ago. Her smoking use included cigarettes. She started smoking about 51 years ago. She has a 0.25 pack-year smoking history. She has never used smokeless tobacco.  Alcohol: not currently using    Medication Adherence/Access: no issues reported        Acute kidney injury:  Feeling a lot better.   Staying hydrated now and has Gatorade.  No concerns at this time.        * Presents with generalized weakness, light-headedness with position changes  * Cr 2.53 on admission from baseline of 0.80. Given concurrent orthostasis symptoms and hypokalemia suspect hypovolemia.  * Recently constipated, followed by diarrhea  associated with some nausea and poor intake possibly contributing to hypovolemia combined with PTA diuretic. Ongoing losartan use potentially contributing to SOBEIDA as well.  * Note that she received contrast study in ED  * Cr 2.53-->1.58-->1.00  * UA mod LE, 17 wbc, neg nitrite, patient denies urinary symptoms. Reports has had UTI in the past and this does not feel like UTI. UCx negative x2.     Patient was registered to observation.  She was initiated on maintenance IV fluids.  She was borderline positive orthostatic which improved with IV fluids.  Her prior to admission losartan, triamterene-hydrochlorothiazide were held on admission.  Physical therapy was consulted and recommended home physical therapy on discharge which was arranged by care cremate her.  She was advised to wear compression stockings.  She was restarted on half a dose of losartan 50 mg on the day of discharge and instructed to increase to her home dose of 100 mg on 7/15.  She has a blood pressure cuff at home and will monitor her blood pressure appropriately.  She was instructed to hold her triamterene/hydrochlorothiazide until follow-up with her primary care provider next week when she will also repeat a basic metabolic panel to follow-up on kidney function.  Day of discharge she is feeling well and ready to go home.      Type 2 Diabetes:   Victoza 1.8 mg daily     Started Victoza to help with weight loss. Patient is experiencing the following side effects: constipation, bowel movement every 4-7 days. Baseline is ~2-3 bowel movements per week.  Using Miralax as needed.  Gets in 64+ oz water daily.  Blood sugar monitoring: never, plans to discuss with primary care provider   Symptoms of low blood sugar? none  Symptoms of high blood sugar? none  Eye exam: last done Jan 2021  Foot exam: due  Aspirin: Taking 81mg daily and denies side effects   Statin: Yes: rosuvastatin 10 mg daily   ACEi/ARB: Yes: Losartan 100 mg daily.   Urine Albumin: No results  "found for: [facility]   Lab Results   Component Value Date    A1C 6.6 03/14/2022    A1C 5.9 03/13/2019    A1C 6.3 11/12/2018     Weight: 258 lb   Wt Readings from Last 4 Encounters:   05/24/22 274 lb (124.3 kg)   05/23/22 274 lb (124.3 kg)   03/24/22 273 lb 6.4 oz (124 kg)   03/15/22 274 lb (124.3 kg)     Estimated body mass index is 48.54 kg/m  as calculated from the following:    Height as of 5/24/22: 5' 3\" (1.6 m).    Weight as of 5/24/22: 274 lb (124.3 kg).    Hyperlipidemia:   Rosustatin 10mg daily.      Patient reports no significant myalgias or other side effects.    Recent Labs   Lab Test 03/14/22  1035 03/11/21  1534 04/24/17  1535 09/24/15  1039 10/14/14  1546   CHOL 158 128   < > 215* 211*   HDL 64 64   < > 46* 48*   LDL 48 24   < > 111 87   TRIG 229* 201*   < > 291* 378*   CHOLHDLRATIO  --   --   --  4.7 4.4    < > = values in this interval not displayed.     Afib/Hypertension: Patient is currently taking aspirin 81mg for anticoagulation. Patient reports no current concerns of bruising or bleeding. Patient does have a history of GI bleed. 3/2019 - duodenal ulcer - and rehospitalization 4/2019 with lower gastrointestinal bleed, Xarelto stopped at that time. We restarted Xarelto 20 mg daily 10/2019 but Hgb dropped >1 gram in ~1 month. Had been kept off of AC despite CHADSVASc 2 (HTN, sex). Hx LAAO Watchman in 2020.   Patient is also taking Flecainide 150 mg twice daily, losartan 100 mg daily, metoprolol ER 12.5 mg daily, triamterene-hydrochlorothiazide 37.5-25 mg daily. Patient reports no current medication side effects.   Patient does self-monitor blood pressure, but just moved a couple weeks ago and hasn't unpacked that box so unsure of what current blood pressure is.  [id number] Score    Date Calculated: 4/30/2019  3:58 PM     4 (Age (65-74), female, HTN, diabetes)     Specialist: Cardiology: Vikram, last seen 3/2022.     BP Readings from Last 3 Encounters:   03/24/22 138/81   03/15/22 128/76   03/08/22 " "128/80     GERD:   Protonix (pantoprazole) 40 mg once daily.     Patient reports no current symptoms. The patient does have a history of GI bleed, see above. Patient feels that current regimen is effective.    Allergic Rhinitis:   Diphenhydramine 25mg once daily.      Primary symptoms are runny nose, sneezing and itchy/watery eyes. Patient feels that current therapy is effective. Reports the other allergy pills are too expensive so prefers to take the diphenhydramine daily from June to September.     Hypothyroidism:   levothyroxine 125 mcg daily (dose reduced while inpatient). Patient is having the following symptoms: none.   TSH   Date Value Ref Range Status   07/12/2022 0.02 (L) 0.40 - 4.00 mU/L Final   03/11/2021 0.45 0.40 - 4.00 mU/L Final     T4 Free   Date Value Ref Range Status   01/29/2020 1.15 0.76 - 1.46 ng/dL Final     Free T4   Date Value Ref Range Status   07/12/2022 1.60 (H) 0.76 - 1.46 ng/dL Final       RLS:   Gabapentin 600 mg nightly     Reports symptoms are \"pretty well\" managed \"for the most part\". Medication side effects: none. She finds if she is overtired then will move more so tries to ensure getting adequate sleep.  Notices a difference when she takes them.        Migraines:   Abortive  Sumatriptan 100 mg as needed  acetaminophen 1000 mg as needed, no more than 2000 mg per day     Will use acetaminophen for headaches and sometimes and lower back when hurts. Does find somewhat helpful. Migraine on Tuesday; prior 2-3 months.      Today's Vitals: There were no vitals taken for this visit.  ----------------  Post Discharge Medication Reconciliation Status: discharge medications reconciled, continue medications without change.    I spent 15 minutes with this patient today. All changes were made via collaborative practice agreement with Yuval Jama MD. A copy of the visit note was provided to the patient's provider(s).    The patient was sent via [employer] a summary of these recommendations. "     Virginia Sauceda , Pharm D  [phone] (phone)  Medication Therapy Management Pharmacist     Telemedicine Visit Details  Type of service:  Telephone visit  Start Time: 300pm  End Time: 3:16 PM  Originating Location (patient location): Home  Distant Location (provider location):  [facility]     Medication Therapy Recommendations  No medication therapy recommendations to display

## 2022-07-28 NOTE — LETTER
"Recommended To-Do List      Prepared on: 7/28/2022     You can get the best results from your medications by completing the items on this \"To-Do List.\"      Bring your To-Do List when you go to your doctor. And, share it with your family or caregivers.    My To-Do List:  What we talked about: What I should do:    Medication review   Continue current regimen                "

## 2022-07-28 NOTE — LETTER
_  Medication List        Prepared on: 7/28/2022     Bring your Medication List when you go to the doctor, hospital, or   emergency room. And, share it with your family or caregivers.     Note any changes to how you take your medications.  Cross out medications when you no longer use them.    Medication How I take it Why I use it Prescriber   acetaminophen (TYLENOL) 500 MG tablet Take 1-2 tablets (500-1,000 mg) by mouth 3 times daily as needed for mild pain pain Patient Reported   aspirin (ASA) 81 MG EC tablet Take 1 tablet (81 mg) by mouth daily Paroxysmal Atrial Fibrillation (H) Ilda Sue PA-C   diphenhydrAMINE (BENADRYL) 25 MG tablet Take 1 tablet (25 mg) by mouth daily allergies Patient reported   flecainide (TAMBOCOR) 150 MG tablet Take 1 tablet (150 mg) by mouth every 12 hours Paroxysmal Atrial Fibrillation (H) Ilda Sue PA-C   gabapentin (NEURONTIN) 300 MG capsule Take 1 capsule (600 mg) by mouth At Bedtime Restless legs Dr. Jama   levothyroxine (SYNTHROID/LEVOTHROID) 125 MCG tablet Take 1 tablet (125 mcg) by mouth daily Hypothyroidism, unspecified type Lizet Austin PA-C   liraglutide (VICTOZA) 18 MG/3ML solution Inject 1.8 mg under the skin daily Type 2 diabetes mellitus without complication, without long-term current use of insulin (H); Morbid Obesity (H) Blessing Nix PA-C   losartan (COZAAR) 100 MG tablet Take 1 tablet (100 mg) by mouth daily Essential Hypertension Yuval Jama MD   metoprolol succinate ER (TOPROL-XL) 25 MG 24 hr tablet Take 0.5 tablets (12.5 mg) by mouth daily Atrial fibrillation with RVR (H) Ilda Sue PA-C   pantoprazole (PROTONIX) 40 MG EC tablet Take 1 tablet (40 mg) by mouth daily Acute GI Bleeding Yuval Jama MD   rosuvastatin (CRESTOR) 10 MG tablet Take 1 tablet (10 mg) by mouth daily Hyperlipidemia LDL Goal <100 Yuval Jama MD   SUMAtriptan (IMITREX) 100 MG tablet Take 1 tablet (100 mg) by mouth at onset of  headache for migraine May repeat in 2 hours if needed: max 2/day; Migraine without aura and without status migrainosus, not intractable Yuval Jama MD   triamterene-HCTZ (MAXZIDE-25) 37.5-25 MG tablet Take 1 tablet (37.5-25 mg) by mouth daily Essential Hypertension Yuval Jama MD         Add new medications, over-the-counter drugs, herbals, vitamins, or  minerals in the blank rows below.    Medication How I take it Why I use it Prescriber                          Allergies:      morphine; ceclor [cefaclor]; cymbalta; diltiazem; lisinopril; sertraline        Side effects I have had:               Other Information:              My notes and questions:

## 2022-07-28 NOTE — NURSING NOTE
Patient was already scheduled for sleep study and follow up. Writer sent [employer] message with sleep study information for [facility].

## 2022-07-28 NOTE — PATIENT INSTRUCTIONS
"Recommendations from today's MTM visit:                                                    MTM (medication therapy management) is a service provided by a clinical pharmacist designed to help you get the most of out of your medicines.   Today we reviewed what your medicines are for, how to know if they are working, that your medicines are safe and how to make your medicine regimen as easy as possible.      Reviewed medications and answered questions about why you take certain medications.  Please reach out if you have additional questions.     Follow-up: Return in about 10 months (around 5/23/2023) for Medication Therapy Management.    It was great speaking with you today.  I value your experience and would be very thankful for your time in providing feedback in our clinic survey. In the next few days, you may receive an email or text message from [employer] with a link to a survey related to your  clinical pharmacist.\"     To schedule another MTM appointment, please call the clinic directly or you may call the MTM scheduling line at [phone] or toll-free at [phone].     My Clinical Pharmacist's contact information:                                                      Please feel free to contact me with any questions or concerns you have.      Virginia Sauceda , Pharm D  [phone] (phone)  Medication Therapy Management Pharmacist     "

## 2022-07-29 ENCOUNTER — OFFICE VISIT (OUTPATIENT)
Dept: INTERNAL MEDICINE | Facility: CLINIC | Age: 67
End: 2022-07-29
Payer: COMMERCIAL

## 2022-07-29 DIAGNOSIS — E11.69 TYPE 2 DIABETES MELLITUS WITH OTHER SPECIFIED COMPLICATION, WITHOUT LONG-TERM CURRENT USE OF INSULIN (H): ICD-10-CM

## 2022-07-29 DIAGNOSIS — E78.5 HYPERLIPIDEMIA LDL GOAL <100: ICD-10-CM

## 2022-07-29 DIAGNOSIS — Z23 NEED FOR PROPHYLACTIC VACCINATION AGAINST STREPTOCOCCUS PNEUMONIAE (PNEUMOCOCCUS): ICD-10-CM

## 2022-07-29 DIAGNOSIS — E03.9 HYPOTHYROIDISM, UNSPECIFIED TYPE: ICD-10-CM

## 2022-07-29 DIAGNOSIS — M85.851 OSTEOPENIA OF BOTH HIPS: ICD-10-CM

## 2022-07-29 DIAGNOSIS — M85.852 OSTEOPENIA OF BOTH HIPS: ICD-10-CM

## 2022-07-29 DIAGNOSIS — Z12.31 ENCOUNTER FOR SCREENING MAMMOGRAM FOR BREAST CANCER: ICD-10-CM

## 2022-07-29 DIAGNOSIS — E66.01 MORBID OBESITY (H): ICD-10-CM

## 2022-07-29 DIAGNOSIS — Z00.00 MEDICARE ANNUAL WELLNESS VISIT, SUBSEQUENT: Primary | ICD-10-CM

## 2022-07-29 PROCEDURE — 99214 OFFICE O/P EST MOD 30 MIN: CPT | Mod: 25 | Performed by: INTERNAL MEDICINE

## 2022-07-29 PROCEDURE — G0439 PPPS, SUBSEQ VISIT: HCPCS | Performed by: INTERNAL MEDICINE

## 2022-07-29 PROCEDURE — 99207 PR FOOT EXAM NO CHARGE: CPT | Performed by: INTERNAL MEDICINE

## 2022-07-29 PROCEDURE — G0009 ADMIN PNEUMOCOCCAL VACCINE: HCPCS | Performed by: INTERNAL MEDICINE

## 2022-07-29 PROCEDURE — 90677 PCV20 VACCINE IM: CPT | Performed by: INTERNAL MEDICINE

## 2022-07-29 ASSESSMENT — ENCOUNTER SYMPTOMS
NAUSEA: 0
FEVER: 0
PALPITATIONS: 0
SORE THROAT: 0
DIZZINESS: 0
FREQUENCY: 0
HEARTBURN: 0
BREAST MASS: 0
DYSURIA: 0
HEMATOCHEZIA: 0
NERVOUS/ANXIOUS: 0
EYE PAIN: 0
ABDOMINAL PAIN: 0
CONSTIPATION: 1
DIARRHEA: 0
JOINT SWELLING: 0
MYALGIAS: 0
COUGH: 0
PARESTHESIAS: 0
HEMATURIA: 0
ARTHRALGIAS: 1
CHILLS: 0
SHORTNESS OF BREATH: 0

## 2022-07-29 ASSESSMENT — ACTIVITIES OF DAILY LIVING (ADL)
CURRENT_FUNCTION: HOUSEWORK REQUIRES ASSISTANCE
CURRENT_FUNCTION: TRANSPORTATION REQUIRES ASSISTANCE

## 2022-07-29 NOTE — PROGRESS NOTES
"  SUBJECTIVE:   CC: Maxine Sears is an 66 year old woman who presents for preventive health visit and follow-up regarding diabetes, hypertension, hyperlipidemia, hypothyroidism and other medical issues as below      Patient has been advised of split billing requirements and indicates understanding: Yes  Healthy Habits:     In general, how would you rate your overall health?  Fair    Frequency of exercise:  None    Do you usually eat at least 4 servings of fruit and vegetables a day, include whole grains    & fiber and avoid regularly eating high fat or \"junk\" foods?  Yes    Taking medications regularly:  Yes    Ability to successfully perform activities of daily living:  Transportation requires assistance and housework requires assistance    Home Safety:  No safety concerns identified    Hearing Impairment:  No hearing concerns    In the past 6 months, have you been bothered by leaking of urine?  No    In general, how would you rate your overall mental or emotional health?  Fair      PHQ-2 Total Score: 1    Additional concerns today:  Yes              Today's PHQ-2 Score:   PHQ-2 (  [employer]) 2022   Q1: Little interest or pleasure in doing things 1   Q2: Feeling down, depressed or hopeless 0   PHQ-2 Score 1   PHQ-2 Total Score (12-17 Years)- Positive if 3 or more points; Administer PHQ-A if positive -   Q1: Little interest or pleasure in doing things Several days   Q2: Feeling down, depressed or hopeless Not at all   PHQ-2 Score 1       Abuse: Current or Past (Physical, Sexual or Emotional) - No  Do you feel safe in your environment? Yes        Social History     Tobacco Use     Smoking status: Former Smoker     Packs/day: 0.25     Years: 1.00     Pack years: 0.25     Types: Cigarettes     Start date: 1971     Quit date: 1972     Years since quittin.6     Smokeless tobacco: Never Used     Tobacco comment: very minimal smoking history   Substance Use Topics     Alcohol use: No "         Alcohol Use 7/29/2022   Prescreen: >3 drinks/day or >7 drinks/week? Not Applicable   Prescreen: >3 drinks/day or >7 drinks/week? -       Reviewed orders with patient.  Reviewed health maintenance and updated orders accordingly - Yes  Labs reviewed in EPIC      Component      Latest Ref Rng & Units 3/14/2022 7/12/2022 7/19/2022   Sodium      133 - 144 mmol/L 138  138   Potassium      3.4 - 5.3 mmol/L 3.9  3.8   Chloride      94 - 109 mmol/L 103  105   Carbon Dioxide      20 - 32 mmol/L 27  26   Anion Gap      3 - 14 mmol/L 8  7   Urea Nitrogen      7 - 30 mg/dL 11  8   Creatinine      0.52 - 1.04 mg/dL 0.80  0.88   Calcium      8.5 - 10.1 mg/dL 9.2  9.3   Glucose      70 - 99 mg/dL 129 (H)  92   Alkaline Phosphatase      40 - 150 U/L 100     AST      0 - 45 U/L 12     ALT      0 - 50 U/L 21     Protein Total      6.8 - 8.8 g/dL 7.6     Albumin      3.4 - 5.0 g/dL 3.6     Bilirubin Total      0.2 - 1.3 mg/dL 0.4     GFR Estimate      >60 mL/min/1.73m2 81  72   WBC      4.0 - 11.0 10e3/uL   8.5   RBC Count      3.80 - 5.20 10e6/uL   4.55   Hemoglobin      11.7 - 15.7 g/dL   12.2   Hematocrit      35.0 - 47.0 %   39.2   MCV      78 - 100 fL   86   MCH      26.5 - 33.0 pg   26.8   MCHC      31.5 - 36.5 g/dL   31.1 (L)   RDW      10.0 - 15.0 %   17.4 (H)   Platelet Count      150 - 450 10e3/uL   394   Cholesterol      <200 mg/dL 158     Triglycerides      <150 mg/dL 229 (H)     HDL Cholesterol      >=50 mg/dL 64     LDL Cholesterol Calculated      <=100 mg/dL 48     Non HDL Cholesterol      <130 mg/dL 94     Patient Fasting > 8hrs?       Yes     Hemoglobin A1C      0.0 - 5.6 % 6.6 (H)     TSH      0.40 - 4.00 mU/L 1.35 0.02 (L)    T4 Free      0.76 - 1.46 ng/dL  1.60 (H)        Breast Cancer Screening:    Breast CA Risk Assessment (FHS-7) 7/29/2022   Do you have a family history of breast, colon, or ovarian cancer? No / Unknown         Mammogram Screening: Recommended mammography every 1-2 years with patient  "discussion and risk factor consideration  Pertinent mammograms are reviewed under the imaging tab.    History of abnormal Pap smear: NO - age 65 - see link Cervical Cytology Screening Guidelines     Reviewed and updated as needed this visit by clinical staff   Tobacco  Allergies  Meds                Reviewed and updated as needed this visit by Provider                       Review of Systems   Constitutional: Negative for chills and fever.   HENT: Negative for congestion, ear pain, hearing loss and sore throat.    Eyes: Positive for visual disturbance. Negative for pain.   Respiratory: Negative for cough and shortness of breath.    Cardiovascular: Negative for chest pain, palpitations and peripheral edema.   Gastrointestinal: Positive for constipation. Negative for abdominal pain, diarrhea, heartburn, hematochezia and nausea.   Breasts:  Negative for tenderness, breast mass and discharge.   Genitourinary: Negative for dysuria, frequency, hematuria, pelvic pain, urgency, vaginal bleeding and vaginal discharge.   Musculoskeletal: Positive for arthralgias. Negative for joint swelling and myalgias.   Neurological: Negative for dizziness and paresthesias.   Psychiatric/Behavioral: The patient is not nervous/anxious.      Has some visual acuity issues bilaterally.  Denies loss of vision.  Denies eye pain.  Due for eye exam  Has been working with weight loss clinic and on [employer].  Has lost 21 pounds in 2 months.  Not checking blood sugars at home  Occasional constipation.  Denies seeing blood in her stools.  Normal colonoscopy 2019   Stable mild arthralgias.  Not stopping activity.  Improved with Tylenol  Working with sleep clinic.  Will be having future sleep study for obstructive sleep apnea      OBJECTIVE:   /72 (BP Location: Left arm)   Pulse 67   Temp 98  F (36.7  C)   Ht 1.588 m (5' 2.5\")   Wt 114.8 kg (253 lb)   SpO2 98%   BMI 45.54 kg/m    Physical Exam  Eye: PERRL, EOMI  HENT: ear canals and TM's " normal and nose and mouth without ulcers or lesions.  Narrowed posterior pharyngeal airway due to obesity  Neck: no adenopathy. Thyroid normal to palpation. No bruits  Pulm: Lungs clear to auscultation   CV: Regular rates and rhythm  GI: Soft, morbidly obese, nontender, Normal active bowel sounds, No hepatosplenomegaly or masses palpable  Ext: Peripheral pulses intact. Minimal BLE ankle  edema.  Neuro: Normal strength and tone, sensory exam grossly normal      ASSESSMENT/PLAN:   1. Medicare annual wellness visit, subsequent  See plan discussion below for wellness issues to.  Independent with ADLs    2. Type 2 diabetes mellitus with other specified complication, without long-term current use of insulin (H)  Previous A1c at goal plus patient has not lost 21 pounds since then with addition of Victoza by weight loss clinic.  Checking blood sugars at this time with stable control.  Recheck A1c in 3 months  - Hemoglobin A1c; Future  - FOOT EXAM  - Albumin Random Urine Quantitative with Creat Ratio; Future    3. Morbid obesity (H)  Congratulated patient on 21 pound weight loss.  Continue Victoza, diet, exercise    4. Hypothyroidism, unspecified type  Previous thyroid function too high.  Patient's thyroxine dose was decreased couple weeks ago to levothyroxine 125 mcg.  Recheck thyroid function lab in 4 weeks  - TSH with free T4 reflex; Future    5. Hyperlipidemia LDL goal <100  On statin.  Lipids at goal except for triglyceride elevation prior to recent weight loss.  Continue rosuvastatin and repeat lab 6 months  - Comprehensive metabolic panel; Future  - Lipid panel reflex to direct LDL Fasting; Future  - rosuvastatin (CRESTOR) 10 MG tablet; Take 1 tablet (10 mg) by mouth daily  Dispense: 90 tablet; Refill: 3    6. Need for prophylactic vaccination against Streptococcus pneumoniae (pneumococcus)  Candidate for pneumococcal prevention vaccination  - PNEUMOCOCCAL 20 VALENT CONJUGATE (PREVNAR 20)    7. Encounter for screening  "mammogram for breast cancer  Due for breast screening  - MA SCREENING DIGITAL BILAT - Future  (s+30); Future    8. Osteopenia of both hips  Low fracture risk.  Last DEXA 2019.  Repeat in 1 year      Patient has been advised of split billing requirements and indicates understanding: Yes    COUNSELING:  Reviewed preventive health counseling, as reflected in patient instructions    Estimated body mass index is 45.54 kg/m  as calculated from the following:    Height as of this encounter: 1.588 m (5' 2.5\").    Weight as of this encounter: 114.8 kg (253 lb).    Weight management plan: Discussed healthy diet and exercise guidelines and continue Victoza    She reports that she quit smoking about 50 years ago. Her smoking use included cigarettes. She started smoking about 51 years ago. She has a 0.25 pack-year smoking history. She has never used smokeless tobacco.      Counseling Resources:  ATP IV Guidelines  Pooled Cohorts Equation Calculator  Breast Cancer Risk Calculator  BRCA-Related Cancer Risk Assessment: FHS-7 Tool  FRAX Risk Assessment  ICSI Preventive Guidelines  Dietary Guidelines for Americans, 2010  [employer] MyPlate  ASA Prophylaxis  Lung CA Screening    PLAN:  Continue current medications  Schedule a future lab appointment  non-fasting in early September  for thyroid and diabetes  Mammogram . This [facility] at the [facility].    Vaccinations: Pneumococcal 20 vaccine  Check with insurance or speak with your pharmacist re: Shingrix vaccine coverage for shingles prevention.  This is a 2 shot series done 2-6 months apart  I would recommend you receive an influenza (flu) vaccine  this Fall (October)  Schedule an eye exam appointment if insurance will cover. Please ask the eye clinic to fax us a report of your eye exam to [phone], attention Dr Jama  Repeat  DEXA bone density test in 1 year  Sleep study in October as scheduled  Continue diet/exercise  Pt was informed regarding extra E&M billing " for management of new or established medical issues not related to today's wellness visit    Yuval Jama MD  [facility]

## 2022-07-29 NOTE — PATIENT INSTRUCTIONS
Continue current medications  Schedule a future lab appointment  non-fasting in early September  for thyroid and diabetes  Mammogram . This will be done at the [facility].    Vaccinations: Pneumococcal 20 vaccine  Check with insurance or speak with your pharmacist re: Shingrix vaccine coverage for shingles prevention.  This is a 2 shot series done 2-6 months apart  I would recommend you receive an influenza (flu) vaccine  this Fall (October)  Schedule an eye exam appointment if insurance will cover. Please ask the eye clinic to fax us a report of your eye exam to [phone], attention Dr Jama  Repeat  DEXA bone density test in 1 year  Sleep study in October as scheduled  Continue diet/exercise  Pt was informed regarding extra E&M billing for management of new or established medical issues not related to today's wellness visit

## 2022-07-30 VITALS
DIASTOLIC BLOOD PRESSURE: 72 MMHG | TEMPERATURE: 98 F | BODY MASS INDEX: 44.83 KG/M2 | OXYGEN SATURATION: 98 % | WEIGHT: 253 LBS | SYSTOLIC BLOOD PRESSURE: 134 MMHG | HEIGHT: 63 IN | HEART RATE: 67 BPM

## 2022-07-30 PROBLEM — M85.852 OSTEOPENIA OF BOTH HIPS: Status: ACTIVE | Noted: 2022-07-30

## 2022-07-30 PROBLEM — M85.851 OSTEOPENIA OF BOTH HIPS: Status: ACTIVE | Noted: 2022-07-30

## 2022-07-30 RX ORDER — ROSUVASTATIN CALCIUM 10 MG/1
10 TABLET, COATED ORAL DAILY
Qty: 90 TABLET | Refills: 3 | Status: SHIPPED | OUTPATIENT
Start: 2022-07-30 | End: 2023-08-18

## 2022-08-01 ENCOUNTER — HOSPITAL ENCOUNTER (OUTPATIENT)
Dept: NUCLEAR MEDICINE | Facility: CLINIC | Age: 67
Setting detail: NUCLEAR MEDICINE
Discharge: HOME | End: 2022-08-01
Attending: NURSE PRACTITIONER | Admitting: NURSE PRACTITIONER
Payer: COMMERCIAL

## 2022-08-01 ENCOUNTER — HOSPITAL ENCOUNTER (OUTPATIENT)
Facility: CLINIC | Age: 67
Discharge: HOME | End: 2022-08-01
Admitting: NURSE PRACTITIONER
Payer: COMMERCIAL

## 2022-08-01 ENCOUNTER — HOSPITAL ENCOUNTER (OUTPATIENT)
Dept: CARDIOLOGY | Facility: CLINIC | Age: 67
Discharge: HOME | End: 2022-08-01
Attending: NURSE PRACTITIONER | Admitting: NURSE PRACTITIONER
Payer: COMMERCIAL

## 2022-08-01 VITALS
DIASTOLIC BLOOD PRESSURE: 64 MMHG | RESPIRATION RATE: 16 BRPM | HEART RATE: 68 BPM | SYSTOLIC BLOOD PRESSURE: 152 MMHG | OXYGEN SATURATION: 98 %

## 2022-08-01 DIAGNOSIS — R94.31 NONSPECIFIC ABNORMAL ELECTROCARDIOGRAM (ECG) (EKG): ICD-10-CM

## 2022-08-01 LAB
CV STRESS MAX HR HE: 81
RATE PRESSURE PRODUCT: NORMAL
STRESS ECHO BASELINE DIASTOLIC HE: 64
STRESS ECHO BASELINE HR: 57 BPM
STRESS ECHO BASELINE SYSTOLIC BP: 152
STRESS ECHO CALCULATED PERCENT HR: 53 %
STRESS ECHO LAST STRESS DIASTOLIC BP: 57
STRESS ECHO LAST STRESS SYSTOLIC BP: 156
STRESS ECHO TARGET HR: 154

## 2022-08-01 PROCEDURE — 78452 HT MUSCLE IMAGE SPECT MULT: CPT | Mod: 26 | Performed by: INTERNAL MEDICINE

## 2022-08-01 PROCEDURE — 93016 CV STRESS TEST SUPVJ ONLY: CPT

## 2022-08-01 PROCEDURE — 78452 HT MUSCLE IMAGE SPECT MULT: CPT

## 2022-08-01 PROCEDURE — 250N000011 HC RX IP 250 OP 636: Performed by: INTERNAL MEDICINE

## 2022-08-01 PROCEDURE — 93018 CV STRESS TEST I&R ONLY: CPT | Performed by: INTERNAL MEDICINE

## 2022-08-01 PROCEDURE — 999N000049 HC STATISTIC ECHO STRESS OR NM NPI

## 2022-08-01 PROCEDURE — 343N000001 HC RX 343: Performed by: NURSE PRACTITIONER

## 2022-08-01 PROCEDURE — A9502 TC99M TETROFOSMIN: HCPCS | Performed by: NURSE PRACTITIONER

## 2022-08-01 PROCEDURE — 93017 CV STRESS TEST TRACING ONLY: CPT

## 2022-08-01 RX ORDER — REGADENOSON 0.08 MG/ML
0.4 INJECTION, SOLUTION INTRAVENOUS ONCE
Status: COMPLETED | OUTPATIENT
Start: 2022-08-01 | End: 2022-08-01

## 2022-08-01 RX ORDER — ALBUTEROL SULFATE 90 UG/1
2 AEROSOL, METERED RESPIRATORY (INHALATION) EVERY 5 MIN PRN
Status: DISCONTINUED | OUTPATIENT
Start: 2022-08-01 | End: 2022-08-01

## 2022-08-01 RX ORDER — ACYCLOVIR 200 MG/1
0-1 CAPSULE ORAL
Status: DISCONTINUED | OUTPATIENT
Start: 2022-08-01 | End: 2022-08-01

## 2022-08-01 RX ORDER — AMINOPHYLLINE 25 MG/ML
50-100 INJECTION, SOLUTION INTRAVENOUS
Status: DISCONTINUED | OUTPATIENT
Start: 2022-08-01 | End: 2022-08-01

## 2022-08-01 RX ORDER — CAFFEINE CITRATE 20 MG/ML
60 SOLUTION INTRAVENOUS
Status: DISCONTINUED | OUTPATIENT
Start: 2022-08-01 | End: 2022-08-01

## 2022-08-01 RX ADMIN — TETROFOSMIN 33 MCI.: 1.38 INJECTION, POWDER, LYOPHILIZED, FOR SOLUTION INTRAVENOUS at 09:43

## 2022-08-01 RX ADMIN — TETROFOSMIN 12.9 MCI.: 1.38 INJECTION, POWDER, LYOPHILIZED, FOR SOLUTION INTRAVENOUS at 08:00

## 2022-08-01 RX ADMIN — REGADENOSON 0.4 MG: 0.08 INJECTION, SOLUTION INTRAVENOUS at 09:35

## 2022-08-01 NOTE — PROGRESS NOTES
Care Suites Admission Nursing Note    Patient Information  Name: Maxine Sears  Age: 66 year old  Reason for admission: Lexiscan   Care Suites arrival time: ~0930    Patient Admission/Assessment   Pre-procedure assessment complete: Yes  If abnormal assessment/labs, provider notified: N/A  NPO: N/A  Medications held per instructions/orders: Yes  Patient oriented to room: Yes  Education/questions answered: Yes  Plan/other: Lexiscan as planned after NM     Discharge Planning  Discharge location: home after NM in 1 hour    Renate Ye RN

## 2022-08-01 NOTE — PROGRESS NOTES
Lexiscan Stress: Pt tolerated well. VSS. Pt denied chest pain or pressure prior to injection. Pt had a slight LEON as her baseline but no other C/O.  During the procedure pt c/o Dizziness and a little SOB.  After injection pt back to baseline.

## 2022-08-08 ENCOUNTER — MEDICAL CORRESPONDENCE (OUTPATIENT)
Dept: HEALTH INFORMATION MANAGEMENT | Facility: CLINIC | Age: 67
End: 2022-08-08

## 2022-08-12 ENCOUNTER — PATIENT OUTREACH (OUTPATIENT)
Dept: GERIATRIC MEDICINE | Facility: CLINIC | Age: 67
End: 2022-08-12

## 2022-08-12 NOTE — PROGRESS NOTES
[facility] Care Coordination Contact    Call placed to [facility] to follow up on initial referral for homemaking, informed that staff have been hired and they will f/u with CC within the month.  Faxed referral to [employer] to inquire on homemaking options.  Call placed to member to inquire on how she is doing. Member stated that she is doing well, but feeling tired today. Member stated that all of her heart testing has turned out very well.   Shared that calls made to 10 different homemaking agencies, shared information on [facility]. CC will continue to follow.  Jaymie Coats RN, BC  Manager [facility] Care Coordinator   [phone]  (Fax)

## 2022-08-15 NOTE — PROGRESS NOTES
Per SHEREE, CC notified.  Maxine Sears 1955 bp unit 7/19/2022 DELIVERED 8/8/2022     Vandana Belcher  Case Management Specialist  [facility]  [phone]

## 2022-08-15 NOTE — PROGRESS NOTES
[facility] Care Coordination Contact    Rec'd tele call from [employer], they do not have staffing for homemaking in [place] at this time.  Jaymie Coats RN, BC  Manager [facility] Care Coordinator   [phone]  (Fax)

## 2022-08-17 ENCOUNTER — PATIENT OUTREACH (OUTPATIENT)
Dept: GERIATRIC MEDICINE | Facility: CLINIC | Age: 67
End: 2022-08-17

## 2022-08-17 NOTE — PROGRESS NOTES
CC updated program tasks and targets for [employer] launch.  Jaymie Coats RN, BC  Manager [facility] Coordinator   [phone]  (Fax)

## 2022-08-24 NOTE — PROGRESS NOTES
Per [facility],  CC notified    Maxine Sears 1955 4WW 7/22/2022 Jaymie ROMERO 8/17/2022     Vandana Belcher  Case Management Specialist  [facility]  [phone]

## 2022-08-26 NOTE — TELEPHONE ENCOUNTER
[facility]  Clinic Office Hours  Monday-- Friday  8:00 am -- 4:30 pm [facility]  Clinic Numbers- [phone]  Sleep Lab Address   [address] [place] [id number]  Sleep Lab Phone: [phone]    Your sleep study has been scheduled for: 11/16/22 @8p (disregard previous date)           Sleep Lab Telephone: [phone]      Thank you for choosing [facility].  Please take your time to read through this information.  This information has been designed to help you understand what it takes to begin your journey to a better night's rest. The contents of this packet include:  n Study Preparation Instructions  n An outline of your Sleep Study's Procedures.  n Directions to the [facility] Location.     Our technical staff will accommodate you as much as they are able. However, if you have special needs during the night in which you require a Personal Care Attendant, please make arrangements to bring that person along with you and notify us ahead of time.        SLEEP MEDICINE CLINIC     n Upon arrival please park in the East Parking Ramp and park on the main level. Take elevator or stairs to Level B of ramp.  n Enter back door and press silver button on the wall to your right (doorbell) this will alert staff you have arrived as building is locked after 5:30.  n Please bring your insurance card to the study with you  n You will receive a confirmation call 48 hours prior to your study time. If you do not receive a confirmation call, please call The [facility] at [phone].  if you need to cancel or reschedule for any reason, please contact us 48 hours before your scheduled appointment at [phone]  Make arrangements to follow-up with your specialist to review your sleep study.  We hope to make your night with us as comfortable and as pleasant as possible. If you have any questions after reading through this packet, please feel free to call us.        TO  PREPARE FOR YOUR SLEEP STUDY, PLEASE FOLLOW THE FOLLOWING GUIDELINES:    1. Please have the next day available for continued testing if  necessary.   2. Please bathe and wash your hair the day of the tests. Hair should be clean, dry, and free from excessive oils, gels or sprays.  3. Upon arrival, men should be clean shaven (no stubble). If you have a mustache, goatee, or beard, it is not necessary to shave it off.  4. Bring comfortable 2-piece sleepwear.  5. If you prefer, you can bring your own pillow, blanket, orthopedic device, etc.  6. Arise at your regular time on the morning of your study and DO NOT NAP during the day.  7. Avoid stimulants (coffee, tea, chocolate, soda pop, energy drinks, any other caffeine drinks) after noon on the day of the study.  8. Eat a normal evening meal before you arrive for your study.   9.  If you prefer a specific snack before bedtime, please bring it along. We have a refrigerator and a microwave available.  10. Continue to take all prescribed medications unless otherwise ordered by your provider.  11. Bring medications for a 24-hour period, as well as any medications prescribed specifically for your sleep study.  12. If you are incontinent please bring your Adult pads, depends or diapers.  13. Feel free to bring your own toiletries (Our lab will have them if you forget).  14. Please DO NOT wear gel nail polish or gel covering, it is ideal not to have any polish on at all, this can cause discrepancies to the equipment that is used and needed to perform your sleep study.      1. Arrival      Please arrive at [facility] at 8:00 P.M. to complete any necessary paperwork.  2. Interview      After settling into your room, your technologist will explain what will happen during your study. Please feel free to ask any questions about your study.   3. Patient Set-up  The entire process is non-invasive and painless. We will be measuring your head and placing electrodes on your head and  face. This will allow us to monitor your sleep. In addition, we will place monitors and sensors on your body that will monitor your breathing, limb movements, snoring and heart rate. Your set-up will take about 45 minutes to 1 hour to complete.  4. Lights Out   When it is time for bed, your technologist will help you get comfortable. Comfort is essential for a good night's sleep. If you are uncomfortable in any way, please tell your technologist. Before turning out the lights, your technologist will check your equipment. These checks will help you to have the best study possible. After the lights are turned off, you are free to sleep in whatever position is most comfortable to you. Please note: at some point during the night, you will be asked to sleep on your back. If you are unable to sleep on your back, please let your technologist know. Also, even though you will be connected to wires, you will be able to use the restroom when needed.   **Remember: this is a medical test so phones and television will be turned off for this test.  5. Lights On  The technologist will allow you to sleep in until 6:00 -- 7:00 A.M. If you have a specific wake-up time requirement, please notify the technologist so that he or she may accommodate your needs. All the water-soluble conduction gel, electrodes and monitoring devices are easily removed. Upon completion, you will want to shower before you start your day. We do supply soap, shampoo and towels, though, if you prefer to bring your own supplies, please feel free to do so. A light breakfast of juice, fresh fruit, cereal, toast or English muffin is available for you in our Sleep Center nutrition area. Please be aware that your technologist will not be able to disclose any information regarding the findings of your study. This information will come from your provider at your follow-up appt.  Please schedule follow up appointment two weeks after your sleep study, if a follow up has  not already been scheduled for your results.  Scheduling number is [phone]      CANCELLATION / RESCHEDULING    Our care team at [facility], strives to provide exceptional - high quality care to all of our customers.     We have set aside our technical experts time and a room especially for you to perform your sleep study.     We appreciate your partnership in helping us best meet the need for our services for as many people in need as we possibly can. This is limited to the number of sleep study rooms and skilled care providers we have available.     Working together to serve your comprehensive sleep health needs is very important to us. However, we do understand that changing an appointment is sometimes necessary.    - If you find that you are unable to make your scheduled appointment, please contact us directly at least 24-hours prior to your scheduled appointment at [phone]. If you are greeted by our answering system, please spell your name, give your date of birth and leave a detailed message.    - If you cancel with less than a 24-hour notice or do not show up for your sleep study without prior notification, we will send a letter to your primary care provider and our sleep  will contact you personally to assist in rescheduling your study.            Thank you for choosing [facility] for your sleep health needs.

## 2022-09-03 ENCOUNTER — HEALTH MAINTENANCE LETTER (OUTPATIENT)
Age: 67
End: 2022-09-03

## 2022-09-06 ENCOUNTER — TELEPHONE (OUTPATIENT)
Dept: INTERNAL MEDICINE | Facility: CLINIC | Age: 67
End: 2022-09-06

## 2022-09-06 NOTE — TELEPHONE ENCOUNTER
Patient calls to report she thinks she has a UTI. Patient reports:  Burning when voids  Vaginal Itchiness    This RN advised patient to submit an eVisit. This RN provided patient with information regarding what an eVisit is and how to start an eVisit. This RN advised patient to reach back out to clinic if she needs any assistance, has questions/concerns.     Patient verbalized understanding.     Jessica Hughes RN BSN MSN  [facility]

## 2022-09-08 ENCOUNTER — LAB (OUTPATIENT)
Dept: LAB | Facility: CLINIC | Age: 67
End: 2022-09-08
Payer: COMMERCIAL

## 2022-09-08 DIAGNOSIS — N39.0 URINARY TRACT INFECTION WITHOUT HEMATURIA, SITE UNSPECIFIED: ICD-10-CM

## 2022-09-08 DIAGNOSIS — E03.9 HYPOTHYROIDISM, UNSPECIFIED TYPE: ICD-10-CM

## 2022-09-08 DIAGNOSIS — E11.69 TYPE 2 DIABETES MELLITUS WITH OTHER SPECIFIED COMPLICATION, WITHOUT LONG-TERM CURRENT USE OF INSULIN (H): ICD-10-CM

## 2022-09-08 LAB
ALBUMIN UR-MCNC: NEGATIVE MG/DL
APPEARANCE UR: CLEAR
BACTERIA #/AREA URNS HPF: ABNORMAL /HPF
BILIRUB UR QL STRIP: NEGATIVE
COLOR UR AUTO: YELLOW
GLUCOSE UR STRIP-MCNC: NEGATIVE MG/DL
HBA1C MFR BLD: 6 % (ref 0–5.6)
HGB UR QL STRIP: NEGATIVE
KETONES UR STRIP-MCNC: NEGATIVE MG/DL
LEUKOCYTE ESTERASE UR QL STRIP: ABNORMAL
NITRATE UR QL: NEGATIVE
PH UR STRIP: 7 [PH] (ref 5–7)
RBC #/AREA URNS AUTO: ABNORMAL /HPF
SP GR UR STRIP: 1.01 (ref 1–1.03)
SQUAMOUS #/AREA URNS AUTO: ABNORMAL /LPF
TSH SERPL DL<=0.005 MIU/L-ACNC: 0.86 MU/L (ref 0.4–4)
UROBILINOGEN UR STRIP-ACNC: 0.2 E.U./DL
WBC #/AREA URNS AUTO: ABNORMAL /HPF

## 2022-09-08 PROCEDURE — 84443 ASSAY THYROID STIM HORMONE: CPT

## 2022-09-08 PROCEDURE — 36415 COLL VENOUS BLD VENIPUNCTURE: CPT

## 2022-09-08 PROCEDURE — 83036 HEMOGLOBIN GLYCOSYLATED A1C: CPT

## 2022-09-08 PROCEDURE — 87086 URINE CULTURE/COLONY COUNT: CPT

## 2022-09-08 PROCEDURE — 81001 URINALYSIS AUTO W/SCOPE: CPT

## 2022-09-09 ENCOUNTER — PATIENT OUTREACH (OUTPATIENT)
Dept: GERIATRIC MEDICINE | Facility: CLINIC | Age: 67
End: 2022-09-09

## 2022-09-09 LAB — BACTERIA UR CULT: NORMAL

## 2022-09-09 NOTE — PROGRESS NOTES
[facility] Care Coordination Contact    Call placed to [employer] to inquire on availability for homemaking. [facility] policy is at least 4 hours a visit. Inquired on every other week as member would not want 4 hours per week, informed that they will look at options and call CC back.  Call placed to member to share above information, member would like every other week.  Discussed waiver obligation, stating that she will likely be responsible to pay for all of her homemaking services. Member would like to wait for a week to get her finances in order.   Explained that CC is unsure if they will have staffing availability and will keep her informed.  Jaymie Cotas RN, BC  Manager [facility] Care Coordinator   [phone]  (Fax)

## 2022-09-19 NOTE — PROGRESS NOTES
[facility] Care Coordination Contact    E-mail sent to [facility] to follow up on homemaking request.  Jaymie Coats RN, BC  Manager [facility] Care Coordinator   [phone]  (Fax)

## 2022-09-21 NOTE — PROGRESS NOTES
[facility] Care Coordination Contact    9/21/22 Call placed to member to let her know that [facility] has staffing for every other week. Member stated that due to her waiver obligation she is not able to afford to pay for the services at this time. Member shared that she lost her food support due to her income.  Member stated that she is managing at this time.  Explained that EW is open but she is not receiving any EW services, discussed PERS. Member requests that CC follow up with her in the near future to to discuss.  Member is paying 30% of her income towards rent. Provided information on VEAP, member is aware of this service and utilizes. Member stated that her [employer] also has resources that are helpful  CC will reach out to CHW to follow up with member to review additional resources.  Updated [place] that member has deferred services at this time  Jaymie Coats RN, BC  Manager [facility] Care Coordinator   [phone]  (Fax)

## 2022-09-22 ENCOUNTER — PATIENT OUTREACH (OUTPATIENT)
Dept: GERIATRIC MEDICINE | Facility: CLINIC | Age: 67
End: 2022-09-22

## 2022-09-22 NOTE — PROGRESS NOTES
[facility] Care Coordination Contact     CHW, per CC Jaymie followed up w/ mbr on food support.   Mbr,would like to find support which delivers food she does not drive or locations close to her home.    CHW,  reviewed w/ and sent info via e-mail on the following resources and advised mbr to call if she had questions after review of the information:            [employer]-    https://www.hungersolutions.org/    [place] Energy Assistance Program-  Application requested [place] for mbr  [email or web address]-[email or web address]-assistance/    Community Action Partnership  Community Action Partnership [facility] [phone]     TRINIDAD Chavez  [facility]  [phone]

## 2022-09-29 ENCOUNTER — PATIENT OUTREACH (OUTPATIENT)
Dept: GERIATRIC MEDICINE | Facility: CLINIC | Age: 67
End: 2022-09-29

## 2022-09-29 NOTE — PROGRESS NOTES
[facility] Care Coordination Contact     CHW, spoke w/ mbr to remind to schedule diabetic eye exam. Mbr, will call CHW back week of 10/3 to assist w/ finding a provider.      TRINIDAD Chavez  [facility]  [phone]

## 2022-10-04 DIAGNOSIS — I10 ESSENTIAL HYPERTENSION: ICD-10-CM

## 2022-10-04 RX ORDER — TRIAMTERENE/HYDROCHLOROTHIAZID 37.5-25 MG
1 TABLET ORAL DAILY
Qty: 90 TABLET | Refills: 3 | Status: SHIPPED | OUTPATIENT
Start: 2022-10-04 | End: 2023-06-27

## 2022-10-04 NOTE — TELEPHONE ENCOUNTER
Patient has been out of medication for almost a week. States that she was having difficulty due to new pharmacy.    Routing refill request to provider for review/approval because:  Medication is reported/historical  Loulou Quispe RN

## 2022-10-10 ENCOUNTER — PATIENT OUTREACH (OUTPATIENT)
Dept: GERIATRIC MEDICINE | Facility: CLINIC | Age: 67
End: 2022-10-10

## 2022-10-10 DIAGNOSIS — M54.16 LUMBAR RADICULOPATHY: Primary | ICD-10-CM

## 2022-10-10 NOTE — TELEPHONE ENCOUNTER
Routing refill request to provider for review/approval because:  Drug not on the [facility] refill protocol   Medication is reported/historical  Selam LAM RN  [facility]

## 2022-10-10 NOTE — PROGRESS NOTES
[facility] Care Coordination Contact     CHW, followed up w/ Mbr on food support. CHW assisted mbr w/ completion of SNAP benefit online application and mailed hard copy to mbr. Mbr, will follow up w/sending documents to [facility] to complete application. CHW, provided Mbr [facility] # if she had further questions. [phone].    Mbr, will touch base w/ CHW w/ in week .       TRINIDAD Chavez  [facility]  [phone]

## 2022-10-11 RX ORDER — GABAPENTIN 300 MG/1
CAPSULE ORAL
Qty: 270 CAPSULE | Refills: 3 | Status: SHIPPED | OUTPATIENT
Start: 2022-10-11 | End: 2023-07-05

## 2022-10-11 NOTE — TELEPHONE ENCOUNTER
Per review of chart, patient has chronically been taking 900 mg of gabapentin at bedtime for radiculopathy.  Medication refilled

## 2022-10-17 ENCOUNTER — PATIENT OUTREACH (OUTPATIENT)
Dept: GERIATRIC MEDICINE | Facility: CLINIC | Age: 67
End: 2022-10-17

## 2022-10-17 NOTE — PROGRESS NOTES
[facility] Care Coordination Contact     CHW, Followed up w/ mbr on diabetic eye exam and food support.  CHW provide d mbr # for [place] [facility] and she will schedule appt.    Mbr, received via e-mail  update from Snap benefit program  informing her she would be  receiving  via mail a document request to respond to. Mbr, had not received request and noted she will follow up upon receipt.   Mbr, will follow unit(s) w/ CHW if she need additional support in the event she does not get food support and need help.      TRINIDAD Chavez  [facility]  [phone]

## 2022-10-24 ENCOUNTER — PATIENT OUTREACH (OUTPATIENT)
Dept: GERIATRIC MEDICINE | Facility: CLINIC | Age: 67
End: 2022-10-24

## 2022-10-24 NOTE — PROGRESS NOTES
[facility] Care Coordination Contact      [facility] Six-Month Telephone Assessment    6 month telephone assessment completed on 10/24/22.    ER visits: yes x 1  Hospitalizations: No  TCU stays: No  Significant health status changes: none  Falls/Injuries: No  ADL/IADL changes: No  Changes in services: No    Caregiver Assessment follow up:  NA    Goals: See POC in chart for goal progress documentation.      Will see member in 6 months for an annual health risk assessment.   Encouraged member to call CC with any questions or concerns in the meantime.     Aracely Sharma RN  [facility]  Office:[phone]  Cell Phone: [phone]

## 2022-11-28 DIAGNOSIS — E03.9 HYPOTHYROIDISM, UNSPECIFIED TYPE: ICD-10-CM

## 2022-11-28 NOTE — TELEPHONE ENCOUNTER
Routing refill request to provider for review/approval because:  Previous rx signed by different provider. Is PCP OK refilling at this time?    Selam LAM RN  [facility]

## 2022-11-28 NOTE — TELEPHONE ENCOUNTER
Medication Question or Refill    Contacts       Type Contact Phone/Fax    11/28/2022 10:38 AM CST Phone (Incoming) Roxanna Sears (Self) [phone] (M)          What medication are you calling about (include dose and sig)?:    Disp Refills Start End MARIUM   levothyroxine (SYNTHROID/LEVOTHROID) 125 MCG tablet              Controlled Substance Agreement on file:   CSA -- Patient Level:    CSA: None found at the patient level.       Who prescribed the medication?:      Do you need a refill? Yes: PT STATED THAT SHE HAS BEEN OUT OF THIS FOR A WEEK AND A HALF     When did you use the medication last? 1.5 WEEKS AGO     Patient offered an appointment? Yes: PT SCHEDULED FOR VIRTUAL VISIT 12/06/22    Do you have any questions or concerns?  No    Preferred Pharmacy:   [address] [address]  Phone: [phone] Fax: [phone]      Could we send this information to you in [facility] or would you prefer to receive a phone call?:   Patient would prefer a phone call   Okay to leave a detailed message?: Yes at Cell number on file:    Telephone Information:   Mobile [phone]

## 2022-11-29 RX ORDER — LEVOTHYROXINE SODIUM 125 UG/1
125 TABLET ORAL DAILY
Qty: 90 TABLET | Refills: 1 | Status: SHIPPED | OUTPATIENT
Start: 2022-11-29 | End: 2023-06-02

## 2022-12-06 ENCOUNTER — VIRTUAL VISIT (OUTPATIENT)
Dept: INTERNAL MEDICINE | Facility: CLINIC | Age: 67
End: 2022-12-06
Payer: COMMERCIAL

## 2022-12-06 DIAGNOSIS — M25.561 CHRONIC PAIN OF BOTH KNEES: Primary | ICD-10-CM

## 2022-12-06 DIAGNOSIS — E66.01 MORBID OBESITY (H): ICD-10-CM

## 2022-12-06 DIAGNOSIS — G89.29 CHRONIC PAIN OF BOTH KNEES: Primary | ICD-10-CM

## 2022-12-06 DIAGNOSIS — M25.562 CHRONIC PAIN OF BOTH KNEES: Primary | ICD-10-CM

## 2022-12-06 PROCEDURE — 99213 OFFICE O/P EST LOW 20 MIN: CPT | Mod: 95 | Performed by: INTERNAL MEDICINE

## 2022-12-06 ASSESSMENT — PATIENT HEALTH QUESTIONNAIRE - PHQ9
SUM OF ALL RESPONSES TO PHQ QUESTIONS 1-9: 8
SUM OF ALL RESPONSES TO PHQ QUESTIONS 1-9: 8
10. IF YOU CHECKED OFF ANY PROBLEMS, HOW DIFFICULT HAVE THESE PROBLEMS MADE IT FOR YOU TO DO YOUR WORK, TAKE CARE OF THINGS AT HOME, OR GET ALONG WITH OTHER PEOPLE: SOMEWHAT DIFFICULT

## 2022-12-06 NOTE — PROGRESS NOTES
Roxanna is a 67 year old who is being evaluated via a billable video visit.      How would you like to obtain your AVS? MyChart  If the video visit is dropped, the invitation should be resent by: Send to e-mail at: [email or web address]  Will anyone else be joining your video visit? No          ASSESSMENT:   1. Chronic pain of both knees  Persistent pain with degenerative joint disease worsened by obesity.  Ortho referral placed for patient to follow-up for possible injection therapy versus bracing versus physical therapy versus other.  Given obesity, best to try and treat conservatively before considering TKA  - Orthopedic  Referral; Future    2. Morbid obesity (H)  Contributing to #1.  Counseled regarding calorie/carbohydrate reduction for weight loss.  Already on Victoza      PLAN:  Referral to [place] [facility].  [facility]  will call you to coordinate your appointment.  A representative will call you within 1 business day to help schedule your appointment, or you may contact the  Representative at: [phone]  Reduce calorie/carbohydrate (sugar, bread, potato, pasta, rice, alcohol etc)  intake in diet.  Increase color on your plate with vegetables.       Video-Visit Details    Type of service:  Video Visit    Video Start Time: 11:48am    Video End Time:12:04pm    Originating Location (pt. Location): Home    Distant Location (provider location):  [facility]     Platform used for Video Visit: Efren Jama MD  Internal Medicine Department  [facility]  Internal Medicine Department      (Chart documentation was completed, in part, with [employer] voice-recognition software. Even though reviewed, some grammatical, spelling, and word errors may remain.)         Subjective   Roxanna is a 67 year old, presenting for the following health issues:  Knee Pain    Knee Pain  This is a chronic  problem. The current episode started more than 1 year ago. The problem occurs constantly. The problem has been unchanged. The symptoms are aggravated by walking and standing. She has tried acetaminophen, heat and rest for the symptoms. The treatment provided mild relief.          KNEE BILATERAL ONE - TWO VIEW  DATE/TIME: 3/15/2022 10:00 AM      INDICATION: Bilateral knee pain.   COMPARISON: 3/14/2022.                                                                      IMPRESSION:  1.  Right knee: Moderate degenerative arthrosis. Moderate marginal  osteophytosis in the patellofemoral compartment. Moderate size joint  effusion. No fracture.  2.  Left knee: Advanced degenerative arthrosis. Advanced marginal  osteophytosis in the patellofemoral compartment. Small joint effusion.  No fracture.     DAVID TAVAREZ MD     Saw [facility] in March 2022.  Note reviewed.  Was offered knee injection,, bracing, physical therapy at that time.  Was recommended that patient follow-up when she made decision but no follow-up over time.  Pain bilateral knees.  Degenerative changes noted on x-ray above.  Pain with ambulation.  History of morbid obesity with BMI 45.  Patient has not weighed herself recently.  On Tylenol for pain with moderate control       Additional ROS:   Constitutional, HEENT, Cardiovascular, Pulmonary, GI and , Neuro, MSK and Psych review of systems/symptoms are otherwise negative or unchanged from previous, except as noted above.           Objective :  No vitals obtained today    Physical Exam:  GENERAL:   alert and no distress  EYES: Eyes grossly normal to inspection, conjunctivae and sclerae normal  RESP: no audible wheeze, cough, or visible cyanosis.  No visible retractions or increased work of breathing.  Able to speak fully in complete sentences.  NEURO: Cranial nerves grossly intact, mentation intact and speech normal  PSYCH: mentation appears normal, affect normal/bright, judgement and insight intact,  normal speech and appearance well-groomed  ABD: Obese

## 2023-01-09 ENCOUNTER — PATIENT OUTREACH (OUTPATIENT)
Dept: GERIATRIC MEDICINE | Facility: CLINIC | Age: 68
End: 2023-01-09

## 2023-01-09 NOTE — PROGRESS NOTES
[facility] Care Coordination Contact    Rec'd transferred voice message from member inquiring on who her CC is, stating that she is interested in PCA.  Call placed to member to introduce myself, member stated that does recall speaking to this CC in the past. Member inquired on PCA, stating that she is in need of assistance with cleaning her apartment. Inquired on ADL needs, member reports independence.  Explained that this CC has had conversations with her regarding homemaking and that she is opened to EW, but had deferred homemaking due to a waiver obligation. Member acknowledged. Offered to place referrals for homemaking.   Member stated that CC should call her back after 2/4/23, stating that she will have more information on her finances. Inquired if she has any informal supports to assist her, member does not. Inquired on how she is currently managing her homemaking, member stated that she does her best but her carpet is in need of vacuuming. Member shared that her income increased and so did her rent, but her food support decreased. Member pays monthly credit cards (3) $75 monthly, furniture bill of $140/mon, cell phone, intranet monthly. Member stated that her electric bill was $100 this month. Member is working with [employer] on food support. CC provided phone number to review if she is eligible to receive electric assistance.   Noted a referral for member to see ortho regarding bilateral knee pain, offered to call and assist with scheduling an appt, member stated that she will call. CC provided contact number to ortho.  PLAN: CC to f/u with member after 2/4/23, will mail member this CCs business card.   Jaymie Coats RN, BC  Manager [facility] Care Coordinator   [phone]  (Fax)

## 2023-01-10 DIAGNOSIS — I10 ESSENTIAL HYPERTENSION: ICD-10-CM

## 2023-01-11 ENCOUNTER — PATIENT OUTREACH (OUTPATIENT)
Dept: GERIATRIC MEDICINE | Facility: CLINIC | Age: 68
End: 2023-01-11
Payer: COMMERCIAL

## 2023-01-11 RX ORDER — LOSARTAN POTASSIUM 100 MG/1
TABLET ORAL
Qty: 90 TABLET | Refills: 3 | Status: SHIPPED | OUTPATIENT
Start: 2023-01-11 | End: 2024-01-03

## 2023-01-11 NOTE — PROGRESS NOTES
[facility] Care Coordination Contact    Member changed health plan products from [facility] MSHO to [facility] MSC+ effective 1/1/2023. CC to complete items on Product Change/ Health Plan Change check list including MMIS entry. CMS verified [place] and other required tasks.    Vandana Belcher  Case Management Specialist  [facility]  [phone]

## 2023-01-14 ENCOUNTER — HEALTH MAINTENANCE LETTER (OUTPATIENT)
Age: 68
End: 2023-01-14

## 2023-01-17 ENCOUNTER — PATIENT OUTREACH (OUTPATIENT)
Dept: GERIATRIC MEDICINE | Facility: CLINIC | Age: 68
End: 2023-01-17

## 2023-01-17 NOTE — PROGRESS NOTES
Encounter opened due to Regulatory [employer] [place] Update to open FVP Program.    Vandana Belcher  Case Management Specialist  [facility]  [phone]

## 2023-01-17 NOTE — PROGRESS NOTES
Encounter opened due to Regulatory [employer] [place] Update to close FVP Program.    Vandana Belcher  Case Management Specialist  [facility]  [phone]

## 2023-01-18 ENCOUNTER — PATIENT OUTREACH (OUTPATIENT)
Dept: GERIATRIC MEDICINE | Facility: CLINIC | Age: 68
End: 2023-01-18
Payer: COMMERCIAL

## 2023-01-18 NOTE — PROGRESS NOTES
[facility] Care Coordination Contact      [facility] Health Plan or Product Change    CC received notification that member's health plan or health plan product changed from are MSHO to [facility] MSC+ effective 1/1/2023.   CC reviewed previous Health Risk Assessment/LTCC and POC with member and no changes noted.    Transitional HRA completed. Care Plan Summary updated and reflects current services.  Required referral authorization information communicated to CMS: No  Writer reviewed the following with member:  ER visits: No  Hospitalizations: Yes, 7/23 [facility]   TCU stays: No  Significant health status changes: None reported, member reports increased knee pain and frustration over weight gain due to discontinuing of Victoza.  Discussed previous discussion on placing referral for homemaking.   Falls/Injuries: No  ADL/IADL changes: No  Changes in services: No    Note that annual assessment has been scheduled for 2/1/23.     Follow-Up Plan: Member informed of future contact, plan to f/u with member with at next regularly scheduled contact.  Contact information shared with member and family, encouraged member to call with any questions or concerns.  Jaymie Coats RN, BC  Manager [facility] Care Coordinator   [phone]  (Fax)

## 2023-01-18 NOTE — PROGRESS NOTES
[facility] Care Coordination Contact    Called member to schedule annual HRA home visit. HRA has been scheduled for 2/1/23 at 1 PM..   Jaymie Coats RN, BC  Manager [facility] Care Coordinator   [phone]  (Fax)

## 2023-02-02 ENCOUNTER — PATIENT OUTREACH (OUTPATIENT)
Dept: GERIATRIC MEDICINE | Facility: CLINIC | Age: 68
End: 2023-02-02
Payer: COMMERCIAL

## 2023-02-02 SDOH — ECONOMIC STABILITY: INCOME INSECURITY: IN THE LAST 12 MONTHS, WAS THERE A TIME WHEN YOU WERE NOT ABLE TO PAY THE MORTGAGE OR RENT ON TIME?: NO

## 2023-02-02 SDOH — HEALTH STABILITY: PHYSICAL HEALTH: ON AVERAGE, HOW MANY MINUTES DO YOU ENGAGE IN EXERCISE AT THIS LEVEL?: 0 MIN

## 2023-02-02 SDOH — ECONOMIC STABILITY: FOOD INSECURITY: WITHIN THE PAST 12 MONTHS, THE FOOD YOU BOUGHT JUST DIDN'T LAST AND YOU DIDN'T HAVE MONEY TO GET MORE.: SOMETIMES TRUE

## 2023-02-02 SDOH — ECONOMIC STABILITY: FOOD INSECURITY: WITHIN THE PAST 12 MONTHS, YOU WORRIED THAT YOUR FOOD WOULD RUN OUT BEFORE YOU GOT MONEY TO BUY MORE.: SOMETIMES TRUE

## 2023-02-02 SDOH — HEALTH STABILITY: PHYSICAL HEALTH: ON AVERAGE, HOW MANY DAYS PER WEEK DO YOU ENGAGE IN MODERATE TO STRENUOUS EXERCISE (LIKE A BRISK WALK)?: 0 DAYS

## 2023-02-02 ASSESSMENT — LIFESTYLE VARIABLES
AUDIT-C TOTAL SCORE: 0
HOW OFTEN DO YOU HAVE A DRINK CONTAINING ALCOHOL: NEVER
HOW MANY STANDARD DRINKS CONTAINING ALCOHOL DO YOU HAVE ON A TYPICAL DAY: PATIENT DOES NOT DRINK
SKIP TO QUESTIONS 9-10: 1
HOW OFTEN DO YOU HAVE SIX OR MORE DRINKS ON ONE OCCASION: NEVER

## 2023-02-02 ASSESSMENT — ACTIVITIES OF DAILY LIVING (ADL): DEPENDENT_IADLS:: SHOPPING;CLEANING;TRANSPORTATION

## 2023-02-02 ASSESSMENT — PATIENT HEALTH QUESTIONNAIRE - PHQ9: SUM OF ALL RESPONSES TO PHQ QUESTIONS 1-9: 12

## 2023-02-02 NOTE — Clinical Note
Dr. Jama, I am a care coordinator with [facility] working with Roxanna Sears. We completed her annual assessment yesterday and are required to share notes with the PCP. I have encouraged Roxanna to schedule an appt with you to review her PHQ and reports of depression. If I can be of any assistance, feel free to reach out to me. Thank you, Jaymie Coats RN, BC Manager [facility] Care Coordinator  [phone]  (Fax)

## 2023-02-02 NOTE — PROGRESS NOTES
[facility] Care Coordination Contact  W, attempted to [facility] mbr to remind due for wellness and eye exam.  No voice mail available.      TRINIDAD Chavez  [facility]  [phone]

## 2023-02-02 NOTE — PROGRESS NOTES
[facility] Care Coordination Contact    1/31/23 Rescheduled annual assessment to 2/2/23 at 9 AM.  Jaymie Coats RN, BC  Manager [facility] Care Coordinator   [phone]  (Fax)

## 2023-02-02 NOTE — PROGRESS NOTES
[facility] Care Coordination Contact    [facility] Home Visit Assessment     Home visit for Health Risk Assessment with Maxine Sears completed on February 2, 2023    Type of residence:: Apartment - handicap accessible  Current living arrangement:: I live alone     Assessment completed with:: Patient    Roxanna has resided in her apt for 8 years, the apt is somewhat cluttered and unkempt. Roxanna reports that due to back pain and bilateral knee pain she has difficulty with heavier chores. Roxanna is opened to the Elderly Waiver program but has deferred any services due to a waiver obligation. Roxanna shared that she hired a private pay homemaker, she is familiar with the caregiver as she assists her sister with homemaking tasks. Roxanna stated that the the homemaker has been to her apartment and has agreed to assist with services and will begin 2/12/23.   Roxanna shared that she is on a wait list to move to an apartment in [place] where her sister resides, she was informed that it could by a year before they will have an opening.     Current Care Plan  Member currently receiving the following home care services:   NA  Member currently receiving the following community resources: [facility], Roxanna has been offered [employer] and homemaking services through Elderly Waiver, but has deferred. Roxanna is requesting to close the wavier program.     Medication Review  Medication reconciliation completed in Epic: Yes  Medication set-up & administration: Independent and sets up on own weekly.  Self-administers medications.  Medication Risk Assessment Medication (1 or more, place referral to MTM):   MTM Referral Placed: No: Member is already followed by MTM. Will follow up with current MTM.Last appt 7/22     Mental/Behavioral Health   Depression Screening:   PHQ-2 Total Score (Adult) - Positive if 3 or more points; Administer PHQ-9 if positive: (!) 5  PHQ-9 Total Score: 12    Mental health DX:: Yes    Roxanna reported  depressed mood, shared accounts of her past that she said she tries to forget. Roxanna believes her depression contributes to lack of motivation and lack of following through.   Roxanna denies suicidal thoughts or causing harm to herself.   Roxanna is back to attending [place] twice a week and meets twice weekly with a group of 6 others in her apartment building to play games, stated last week she passed on all activities because she did not feel like participating.     Falls Assessment:   Fallen 2 or more times in the past year?: No   Any fall with injury in the past year?: No  Reports a fear of falling.     ADL/IADL Dependencies:   Dependent ADLs:: Ambulation-walker, Ambulation-cane, Independent  Dependent IADLs:: Shopping, Cleaning, Transportation      PCA Assessment completed at visit: No     Elderly Waiver Eligibility: Yes, but member declines EW services; will not open to EW    Care Plan & Recommendations:   Roxanna will schedule a f/u with PCP to review her depressed mood.   Roxanna will schedule her Cardiology/Sleep Medicine and Eye exam appt's.   Roxanna would like to continue to lose weight, complete her HCD (this CC will schedule a f/u appt to assist),   Roxanna is aware that she meets eligibility for EW, at this time would like to discontinue.     See LTCC for detailed assessment information.    Follow-Up Plan: Member informed of future contact, plan to f/u with member with a 6 month telephone assessment.  Contact information shared with member and family, encouraged member to call with any questions or concerns at any time.    [place] care continuum providers: Please see Snapshot and Care Management Flowsheets for Specific details of care plan.    This CC note routed to PCP.  Jaymie Coats RN, BC  Manager [facility] Care Coordinator   [phone]  (Fax)    2/6/2023 Call placed to [employer] per member's request to change back to [facility]. CC was informed that member enrolled with [employer] 1/1/23 as a  "Medicare Advantage plan. Member can call [employer] to complete an application on the phone to apply for [facility].   Call placed to [employer] to inquire if elderly waiver is closed would member have a spend down. Informed that member will have a spend down of $282. Inquired on how this spend down would be provided to member, informed that it is a \"pot luck\" style, service providers will bill and be informed of member's spend down. Inquired if this has changed, stating that in the past DHS would send a monthly bill to a member for the spend down. CC was directed to call [facility] MA Line at [phone], call placed to [facility] informed that they are not able to provide assistance with this question. Call placed back to [employer], confirmed monthly spend down as noted above.  CC will have member contact [employer] to review spend down.  Member rec'd a bill from [place], CC called [place] [facility], but due to no consent to share information with this CC, unable to review bill.      Call placed to member, shared above information. Provided tele number for [employer], request that she call to understand her options-waiver obligation or spend down. Explained that CC will not close EW at this time. Provided member tele number to billing at [place] and the number to [employer].   CC will f/u with member later this week.  Jaymie Coats RN, BC  Manager [facility] Care Coordinator   [phone]  (Fax)    "

## 2023-02-10 ENCOUNTER — PATIENT OUTREACH (OUTPATIENT)
Dept: GERIATRIC MEDICINE | Facility: CLINIC | Age: 68
End: 2023-02-10
Payer: COMMERCIAL

## 2023-02-10 NOTE — PROGRESS NOTES
[facility] Care Coordination Contact    Call placed to member, no answer and unable to leave voice message.   CC to review with member:  -did she f/u with [employer] to inquire on spend down  -did she f/u with [facility] to inquire on bill  -f/u with Dr. Jama to review symptoms of depression.      CC processed MMIS to keep member open to EW at this time, until clarification from member. CC will attempt to reach member early next week.  Jaymie Coats RN, BC  Manager [facility] Care Coordinator   [phone]  (Fax)

## 2023-02-13 NOTE — PROGRESS NOTES
[facility] Care Coordination Contact    Call placed to member to follow up on EW  Member stated that she made two attempts to reach [employer] but was not able to speak to anyone, stating that they were busy. Member did share that she called the health plan and they will mail her out an application. Member will call clinic today to schedule a PCP appt to discuss her mood/depression.   Encouraged her to contact [employer] again sometime this week, if she has not had success, CC will assist with a conference call. Explained that CC will keep EW open until she has been informed of her responsibility of a spend down. Member stated understanding.  CC will f/u with member 2/17/23.  Jaymie Coats RN, BC  Manager [facility] Care Coordinator   [phone]  (Fax)

## 2023-02-20 NOTE — PROGRESS NOTES
"[facility] Care Coordination Contact    2/17/2023 Call placed to member, she shared that she has not been able to make contact with [employer]  PLAN: CC to assist member with a conference call to [employer]   Note plan was for 2/20-but offices are closed due to Presidents Day, per member's request to call [employer] on 2/22/23 @ 11 AM.  Jaymie Coats RN, BC  Manager [facility] Care Coordinator   [phone]  (Fax)    2/22/23  Conference call with member and [employer] to review EW obligation and spend down. Spoke with Pauline at [facility], she shared that member's current waiver obligation is $274 and will increase to $312 in October and if not on EW, spend down would be $215 and will change to $331 in October. Pauline shared that a spend down defaults as a \"potluck\", stating that if member has medical bills, she would be responsible for the spend down before insurance will pay the remaining bill.  Thanked Pauline for her time, member had no further questions.   Discussed options with member, she would like to remain on EW and CC to place a referral for a PERS ([facility] Care mini guardian). Member is aware of a $65 install feel and $45 per month. Discussed homemaking options as member has shared that she is paying privately for 1-2 hours every other week. Member would like to continue to pay privately for her homemaker, she is aware that this is an EW service, and will contact this CC if she would like to review options and change to formal homemaker services.    [facility] on line PERS application completed.   Jaymie Coats RN, BC  Manager [facility] Care Coordinator   [phone]  (Fax)      "

## 2023-02-24 ENCOUNTER — PATIENT OUTREACH (OUTPATIENT)
Dept: GERIATRIC MEDICINE | Facility: CLINIC | Age: 68
End: 2023-02-24
Payer: COMMERCIAL

## 2023-02-24 NOTE — LETTER
February 24, 2023    DANIELLE VELASQUEZ  [address]   [place] [address]        Dear Danielle:    At [facility], we re dedicated to improving your health and wellness. Enclosed is the Care Plan developed with you on 2/2/2023. Please review the Care Plan carefully.    As a reminder, during your visit we talked about:    Ways to manage your physical and mental health    Using health care to maintain and improve your health     Your preventive care needs     Remember to contact your care coordinator if you:    Are hospitalized, or plan to be hospitalized     Have a fall      Have a change in your physical or mental health    Need help finding support or services    If you have questions, or don t agree with your Care Plan, call me at [phone]. You can also call me if your needs change. TTY users, call the [place] [employer] at (416) or 1-344.760.4200 (yazcqa-jx-cgqbhp relay service).    Sincerely,        Jaymie Coats RN    E-mail: [email or web address]  Phone: [phone]      [employer]    [id number] accepted    [id number] (07/2022)

## 2023-03-03 ENCOUNTER — APPOINTMENT (OUTPATIENT)
Dept: GENERAL RADIOLOGY | Facility: CLINIC | Age: 68
End: 2023-03-03
Attending: EMERGENCY MEDICINE
Payer: COMMERCIAL

## 2023-03-03 ENCOUNTER — HOSPITAL ENCOUNTER (EMERGENCY)
Facility: CLINIC | Age: 68
Discharge: HOME | End: 2023-03-04
Attending: EMERGENCY MEDICINE | Admitting: EMERGENCY MEDICINE
Payer: COMMERCIAL

## 2023-03-03 DIAGNOSIS — R07.9 CHEST PAIN, UNSPECIFIED TYPE: ICD-10-CM

## 2023-03-03 LAB
ALBUMIN SERPL BCG-MCNC: 4 G/DL (ref 3.5–5.2)
ALP SERPL-CCNC: 85 U/L (ref 35–104)
ALT SERPL W P-5'-P-CCNC: 14 U/L (ref 10–35)
ANION GAP SERPL CALCULATED.3IONS-SCNC: 11 MMOL/L (ref 7–15)
AST SERPL W P-5'-P-CCNC: 21 U/L (ref 10–35)
BASOPHILS # BLD AUTO: 0.1 10E3/UL (ref 0–0.2)
BASOPHILS NFR BLD AUTO: 1 %
BILIRUB SERPL-MCNC: 0.2 MG/DL
BUN SERPL-MCNC: 13.6 MG/DL (ref 8–23)
CALCIUM SERPL-MCNC: 9.1 MG/DL (ref 8.8–10.2)
CHLORIDE SERPL-SCNC: 96 MMOL/L (ref 98–107)
CREAT SERPL-MCNC: 0.76 MG/DL (ref 0.51–0.95)
D DIMER PPP FEU-MCNC: 0.35 UG/ML FEU (ref 0–0.5)
DEPRECATED HCO3 PLAS-SCNC: 27 MMOL/L (ref 22–29)
EOSINOPHIL # BLD AUTO: 0.3 10E3/UL (ref 0–0.7)
EOSINOPHIL NFR BLD AUTO: 3 %
ERYTHROCYTE [DISTWIDTH] IN BLOOD BY AUTOMATED COUNT: 14.7 % (ref 10–15)
GFR SERPL CREATININE-BSD FRML MDRD: 85 ML/MIN/1.73M2
GLUCOSE SERPL-MCNC: 116 MG/DL (ref 70–99)
HCT VFR BLD AUTO: 40.3 % (ref 35–47)
HGB BLD-MCNC: 13.4 G/DL (ref 11.7–15.7)
HOLD SPECIMEN: NORMAL
IMM GRANULOCYTES # BLD: 0.1 10E3/UL
IMM GRANULOCYTES NFR BLD: 1 %
LIPASE SERPL-CCNC: 49 U/L (ref 13–60)
LYMPHOCYTES # BLD AUTO: 5.3 10E3/UL (ref 0.8–5.3)
LYMPHOCYTES NFR BLD AUTO: 50 %
MCH RBC QN AUTO: 29.1 PG (ref 26.5–33)
MCHC RBC AUTO-ENTMCNC: 33.3 G/DL (ref 31.5–36.5)
MCV RBC AUTO: 88 FL (ref 78–100)
MONOCYTES # BLD AUTO: 0.8 10E3/UL (ref 0–1.3)
MONOCYTES NFR BLD AUTO: 7 %
NEUTROPHILS # BLD AUTO: 4 10E3/UL (ref 1.6–8.3)
NEUTROPHILS NFR BLD AUTO: 38 %
NRBC # BLD AUTO: 0 10E3/UL
NRBC BLD AUTO-RTO: 0 /100
PLAT MORPH BLD: NORMAL
PLATELET # BLD AUTO: 350 10E3/UL (ref 150–450)
POTASSIUM SERPL-SCNC: 4 MMOL/L (ref 3.4–5.3)
PROT SERPL-MCNC: 7.1 G/DL (ref 6.4–8.3)
RBC # BLD AUTO: 4.6 10E6/UL (ref 3.8–5.2)
RBC MORPH BLD: NORMAL
SODIUM SERPL-SCNC: 134 MMOL/L (ref 136–145)
TROPONIN T SERPL HS-MCNC: <6 NG/L
WBC # BLD AUTO: 10.6 10E3/UL (ref 4–11)

## 2023-03-03 PROCEDURE — 83690 ASSAY OF LIPASE: CPT | Performed by: EMERGENCY MEDICINE

## 2023-03-03 PROCEDURE — 85379 FIBRIN DEGRADATION QUANT: CPT | Performed by: EMERGENCY MEDICINE

## 2023-03-03 PROCEDURE — 71046 X-RAY EXAM CHEST 2 VIEWS: CPT

## 2023-03-03 PROCEDURE — 36415 COLL VENOUS BLD VENIPUNCTURE: CPT | Performed by: EMERGENCY MEDICINE

## 2023-03-03 PROCEDURE — 80053 COMPREHEN METABOLIC PANEL: CPT | Performed by: EMERGENCY MEDICINE

## 2023-03-03 PROCEDURE — 93005 ELECTROCARDIOGRAM TRACING: CPT

## 2023-03-03 PROCEDURE — 85025 COMPLETE CBC W/AUTO DIFF WBC: CPT | Performed by: EMERGENCY MEDICINE

## 2023-03-03 PROCEDURE — 99285 EMERGENCY DEPT VISIT HI MDM: CPT | Mod: 25

## 2023-03-03 PROCEDURE — 84484 ASSAY OF TROPONIN QUANT: CPT | Performed by: EMERGENCY MEDICINE

## 2023-03-03 ASSESSMENT — ACTIVITIES OF DAILY LIVING (ADL): ADLS_ACUITY_SCORE: 35

## 2023-03-03 ASSESSMENT — ENCOUNTER SYMPTOMS
DIARRHEA: 0
COUGH: 1
DIAPHORESIS: 0
FATIGUE: 1
ABDOMINAL PAIN: 0
VOMITING: 0
NAUSEA: 0
LIGHT-HEADEDNESS: 1

## 2023-03-04 VITALS
OXYGEN SATURATION: 97 % | DIASTOLIC BLOOD PRESSURE: 87 MMHG | SYSTOLIC BLOOD PRESSURE: 186 MMHG | WEIGHT: 270 LBS | HEART RATE: 60 BPM | HEIGHT: 62 IN | BODY MASS INDEX: 49.69 KG/M2 | TEMPERATURE: 98.6 F | RESPIRATION RATE: 34 BRPM

## 2023-03-04 ASSESSMENT — ENCOUNTER SYMPTOMS
CHILLS: 0
FEVER: 0
WOUND: 0
SHORTNESS OF BREATH: 0

## 2023-03-04 NOTE — ED PROVIDER NOTES
History   Chief Complaint:  Pleurisy     The history is provided by the patient and medical records.      Maxine Sears is a 67 year old female with a history of atrial fibrillation, hypertension, and hypothyroidism who presents via EMS with pleurisy. The patient states she has had pain across her back with mild radiation to her chest wall today at 1430. Symptoms have been intermittent. She then had sudden onset of warmth across her chest with chest burning radiating down her bilateral upper extremities.  These symptoms lasted 1 to 2 minutes each time and not last any longer.  She describes it as a burning type pain.  Due to this, she presented to the emergency department today. She notes she had mild pleuritic pain earlier today, however this has now resolved in the emergency department. She endorses fatigue and lightheadedness. She denies diaphoresis. She denies abdominal pain, nausea, vomting, or diarrhea. She developed a cold last week and has a residual mild cough. She denies productive cough or hemoptysis. She denies personal history of myocardial infarction, coronary artery disease, or DVT/PE. She endorses family history of heart disease. She has history of atrial fibrillation, but is not currently anticoagulated.     Independent Historian:   None - Patient Only    Review of External Notes:      ROS:  Review of Systems   Constitutional: Positive for fatigue. Negative for chills, diaphoresis and fever.   Respiratory: Positive for cough. Negative for shortness of breath.    Cardiovascular: Positive for chest pain.   Gastrointestinal: Negative for abdominal pain, diarrhea, nausea and vomiting.   Skin: Negative for rash and wound.   Neurological: Positive for light-headedness.   All other systems reviewed and are negative.    Allergies:  Morphine  Ceclor   Cymbalta  Diltiazem  Lisinopril  Sertraline     Medications:    Aspirin (81 mg)  Neurontin  Tambocor   Synthroid   Victoza  Cozaar  Metoprolol  "  Protonix  Crestor  Imitrex   Maxzide     Past Medical History:    Depression  Duodenal ulcer   Hypertension  GERD  Hiatal hernia  Hypothyroidism   Migraine without aura   Obesity   Obstructive sleep apnea on CPAP  Osteoarthritis   Paroxysmal atrial fibrillation   Restless legs syndrome  Degenerative joint disease     Past Surgical History:    Appendectomy  Cholecystectomy  Colonoscopy   Atrial appendage closure, left  Esophagogastroduodenoscopy   Hysterectomy  Tonsillectomy    Tubal ligation     Family History:    family history includes Cerebrovascular Disease in her father; Heart Failure in her mother; Heart Surgery in her brother and father; Hyperlipidemia in her brother, brother, and sister; Hypertension in her mother; Myocardial Infarction in her mother; Sleep Apnea in her sister.    Social History:  The patient arrived to the emergency department via EMS.   reports that she quit smoking about 51 years ago. Her smoking use included cigarettes. She started smoking about 52 years ago. She has a 0.25 pack-year smoking history. She has never used smokeless tobacco. She reports that she does not drink alcohol and does not use drugs.  PCP: Yuval Jama     Physical Exam     Patient Vitals for the past 24 hrs:   BP Temp Temp src Pulse Resp SpO2 Height Weight   03/03/23 2109 (!) 186/87 98.6  F (37  C) Oral 72 15 96 % 1.575 m (5' 2\") 122.5 kg (270 lb)      Physical Exam  Constitutional: Well appearing.  HEENT: Atraumatic. Moist mucous membranes.  Neck: Soft.  Supple.  No JVD.  Cardiac: Regular rate and rhythm.  No murmur or rub.  Radial pulses 2+ and symmetric.  Respiratory: Clear to auscultation bilaterally.  No respiratory distress.  No wheezing, rhonchi, or rales.  Abdomen: Soft and nontender.  Nondistended.  Musculoskeletal: No edema.  Normal range of motion.  Neurologic: Alert and oriented x3.  Normal tone and bulk.   Skin: No rashes.  No edema.  Psych: Normal affect.  Normal behavior.          Emergency " Department Course   Imaging:  XR Chest 2 Views   Final Result   IMPRESSION: Eventration right hemidiaphragm. Lungs, costophrenic angles clear. Heart size normal. Pulmonary vasculature normal. No acute osseous process.      Report per radiology    Laboratory:  Labs Ordered and Resulted from Time of ED Arrival to Time of ED Departure   COMPREHENSIVE METABOLIC PANEL - Abnormal       Result Value    Sodium 134 (*)     Potassium 4.0      Chloride 96 (*)     Carbon Dioxide (CO2) 27      Anion Gap 11      Urea Nitrogen 13.6      Creatinine 0.76      Calcium 9.1      Glucose 116 (*)     Alkaline Phosphatase 85      AST 21      ALT 14      Protein Total 7.1      Albumin 4.0      Bilirubin Total 0.2      GFR Estimate 85     TROPONIN T, HIGH SENSITIVITY - Normal    Troponin T, High Sensitivity <6     D DIMER QUANTITATIVE - Normal    D-Dimer Quantitative 0.35     LIPASE - Normal    Lipase 49     CBC WITH PLATELETS AND DIFFERENTIAL    WBC Count 10.6      RBC Count 4.60      Hemoglobin 13.4      Hematocrit 40.3      MCV 88      MCH 29.1      MCHC 33.3      RDW 14.7      Platelet Count 350      % Neutrophils 38      % Lymphocytes 50      % Monocytes 7      % Eosinophils 3      % Basophils 1      % Immature Granulocytes 1      NRBCs per 100 WBC 0      Absolute Neutrophils 4.0      Absolute Lymphocytes 5.3      Absolute Monocytes 0.8      Absolute Eosinophils 0.3      Absolute Basophils 0.1      Absolute Immature Granulocytes 0.1      Absolute NRBCs 0.0     RBC AND PLATELET MORPHOLOGY    Platelet Assessment        Value: Automated Count Confirmed. Platelet morphology is normal.    RBC Morphology Confirmed RBC Indices        Emergency Department Course & Assessments:     Interventions:  Medications - No data to display     Assessments:  2144 I obtained history and performed an exam of the patient as documented above.  I rechecked the patient and explained findings.    Independent Interpretation (X-rays, CTs, rhythm strip):  I  reviewed the patient's chest x-ray.     Consultations/Discussion of Management or Tests:  None     Social Determinants of Health affecting care:   None    Disposition:  The patient was discharged to home.     Impression & Plan    Medical Decision Making:  Maxine Sears is a 67-year-old woman who is afebrile and hemodynamically stable.  Differential diagnosis includes ACS versus musculoskeletal pain versus PE versus aortic dissection versus pneumothorax versus GERD other.  Her EKG demonstrates no acute ischemic changes and is unchanged from previous on my read.  Her D-dimer is within normal limits and she is low risk Wells criteria for PE making a PE exceedingly unlikely.  Her troponin is less than 6 given our algorithm, as well as symptoms now 6 hours in duration, this excludes ACS.  Chest x-ray reveals no acute abnormalities.  Her symptoms are resolved on my reevaluation.  She has no abdominal pain or tenderness.  She is status postcholecystectomy.  She did not feel this is similar to her GERD symptoms in the past.  She does not feel this is presentation of atrial fibrillation as this did not feel similar to previous episodes.  We discussed very atypical and very short lasting symptoms, however, she does have risk factors including her age, family history, hypertension, and hyperlipidemia and apparently diabetes, however, she tells me she is not on any medication for diabetes.  We discussed that her risk factors would place her in moderate category from heart score, however, given that her symptoms are very atypical and short lasting, we had a shared decision-making discussion and she would prefer to discharge home and did not wanting observation admission that I offered given her heart score.  She is agreeable to an outpatient stress test which was ordered.  I gave her very strict return precautions and welcomed her to return anytime she changes her mind about the observation admission.  She is in agreement  her questions were answered.  We discussed supportive care at home and strict return precautions were given.  She was in no distress at time of discharge.    Diagnosis:    ICD-10-CM    1. Chest pain, unspecified type  R07.9 NM Lexiscan stress test (nuc card)           Discharge Medications:  New Prescriptions    No medications on file      Scribe Disclosure:  CARMEN, Virginia Trinidad, am serving as a scribe at 9:28 PM on 3/3/2023 to document services personally performed by Erick Martínez MD based on my observations and the provider's statements to me.      Erick Martínez MD  03/04/23 0100

## 2023-03-06 LAB
ATRIAL RATE - MUSE: NORMAL BPM
DIASTOLIC BLOOD PRESSURE - MUSE: NORMAL MMHG
INTERPRETATION ECG - MUSE: NORMAL
P AXIS - MUSE: NORMAL DEGREES
PR INTERVAL - MUSE: NORMAL MS
QRS DURATION - MUSE: 112 MS
QT - MUSE: 432 MS
QTC - MUSE: 445 MS
R AXIS - MUSE: 36 DEGREES
SYSTOLIC BLOOD PRESSURE - MUSE: NORMAL MMHG
T AXIS - MUSE: 41 DEGREES
VENTRICULAR RATE- MUSE: 64 BPM

## 2023-03-07 ENCOUNTER — PATIENT OUTREACH (OUTPATIENT)
Dept: GERIATRIC MEDICINE | Facility: CLINIC | Age: 68
End: 2023-03-07
Payer: COMMERCIAL

## 2023-03-07 NOTE — PROGRESS NOTES
[facility] Care Coordination Contact  CC received notification of Emergency Room visit.  ER visit occurred on 3/3/2023 at [facility] with Dx of   Chest pain.    CC contacted member and reviewed discharge summary.  Member has a follow-up appointment with PCP: Member stated that she called her cardiology clinic, they would like her to have a stress test first and then they will schedule an appt with cardiology. Member stated she is waiting for the clinic to call her back.  Enc'd member to call this CC back if she has difficulty in scheduling her appt's.   Member has had a change in condition: No  New referrals placed: No  Home Visit Needed: No  Care plan reviewed and updated.  PCP notified of ED visit via EMR.    Member has not rec'd her PERS yet. CC will f/u with [facility].  Jaymie Coats RN, BC  Manager [facility] Care Coordinator   [phone]  (Fax)

## 2023-03-09 ENCOUNTER — PATIENT OUTREACH (OUTPATIENT)
Dept: GERIATRIC MEDICINE | Facility: CLINIC | Age: 68
End: 2023-03-09
Payer: COMMERCIAL

## 2023-03-09 NOTE — PROGRESS NOTES
[facility] Care Coordination Contact    3/8/23 Call placed to [facility] to inquire on referral for PERS. Informed that they have not rec'd the referral. Completed referral via phone. Explained that member has a waiver obligation.  Jaymie Coats RN, BC  Manager [facility] Care Coordinator   [phone]  (Fax)

## 2023-03-29 ENCOUNTER — PATIENT OUTREACH (OUTPATIENT)
Dept: GERIATRIC MEDICINE | Facility: CLINIC | Age: 68
End: 2023-03-29
Payer: COMMERCIAL

## 2023-03-29 NOTE — PROGRESS NOTES
[facility] Care Coordination Contact    Rec'd vm from [facility] to report that the rec'd the auth for member's PERS 4/1/23, but due to rate change the auth will need to be adjusted to $50.  Call placed back to [facility] to report that CC did receive message and will update a new auth.  Jaymie Coats RN, BC  Manager [facility] Coordinator   [phone]  (Fax)

## 2023-04-12 DIAGNOSIS — I48.91 ATRIAL FIBRILLATION WITH RVR (H): ICD-10-CM

## 2023-04-12 DIAGNOSIS — I48.0 PAROXYSMAL ATRIAL FIBRILLATION (H): ICD-10-CM

## 2023-04-12 RX ORDER — FLECAINIDE ACETATE 150 MG/1
150 TABLET ORAL EVERY 12 HOURS
Qty: 180 TABLET | Refills: 0 | Status: SHIPPED | OUTPATIENT
Start: 2023-04-12 | End: 2023-05-05

## 2023-04-12 RX ORDER — METOPROLOL SUCCINATE 25 MG/1
12.5 TABLET, EXTENDED RELEASE ORAL DAILY
Qty: 45 TABLET | Refills: 0 | Status: SHIPPED | OUTPATIENT
Start: 2023-04-12 | End: 2023-05-05

## 2023-04-23 ENCOUNTER — HEALTH MAINTENANCE LETTER (OUTPATIENT)
Age: 68
End: 2023-04-23

## 2023-04-24 ENCOUNTER — PATIENT OUTREACH (OUTPATIENT)
Dept: GERIATRIC MEDICINE | Facility: CLINIC | Age: 68
End: 2023-04-24
Payer: COMMERCIAL

## 2023-04-24 NOTE — PROGRESS NOTES
[facility] Care Coordination Contact    Rec'd vm from [facility] [employer] to report that unit was installed on 4/21/2023.    Cost per month $50 and $65 1 x activation fee.  Jaymie Coats RN, BC  Manager [facility] Care Coordinator   [phone]  (Fax)

## 2023-05-03 NOTE — PROGRESS NOTES
[facility]    I had the pleasure of seeing Roxanna when she came for annual follow-up.  This 67 year old sees Dr. Vaughn for her history of:    1. Paroxysmal AFib first diagnosed 9/2015.  Most recent DCCV 2/2019.  Hospitalized 1/2020 and flecainide increased.  2. GI bleeds. Had Hgb 7.2 g/dL 3/2019. EGD revealed duodenal ulcer which was cauterized.  Xarelto was held, but then later restarted.  She was rehospitalized 4/2019 with a lower GI bleed.  Hemoglobin got down to 8.7 g/dL.  Xarelto was discontinued, and Dr. Vaughn recommended not restarting this for at least 3-6 months. Capsule endoscopy 5/2019 showed normal mucosa but did not reach the colon (Dr. Ko). We restarted Xarelto 20 mg daily 10/2019 but Hgb dropped >1 gram in ~1 month. Had been kept off of AC despite CHADSVASc 2 (HTN, sex). S/p 27 mm LAAO Watchman 10/8/2020 with Dr. Vaughn and Dr. Toth  3. HTN  4. Coronary Calcification noted on CTA Heart 9/2020. Stress Test 3/2016 without ischemia  5. Pulmonary Nodules noted on CTA Heart 9/2020 - low risk - no Follow-up; high risk - optional 12 m CT follow-up. Roxanna states she considers herself low risk as smoked for <6 months  6. Prediabetes - A1c 6.0% 9/2022    I saw Roxanna last in 3/2022 at which time she was doing well heart-wise, but was having a lot of L knee pain requiring her to use a walker.  She'd not had any recurrent prolonged arrhythmias on flecainide. No changes were made and annual follow-up with EKG rec'd.    Interval History:  She was hospitalized 7/2022 with weakness and lightheadedness, with SOBEIDA.  Creatinine had increased from 0.8-2.53, and she was rehydrated.  Symptoms were likely due to orthostasis, which also improved with IV hydration.  TSH was quite low and T4 was high, causing PTA levothyroxine to be reduced.  Outpatient stress testing was recommended as she had some chest discomfort working with physical therapy.  Lisinopril/HCTZ was held on discharge.    Nuclear stress  "test 8/2022 was without ischemia/infarction and unchanged since stress test 2016.    She was in the ER 3/3 - 3/4/2023 with back pain and radiation to the chest wall.  She had a burning radiation down bilateral upper extremities.  Troponins were negative, and I believe she was discharged with plans for another stress test.  This has not been done.    HPI:  Roxanna continues to suffer from significant knee and back pain. This has limited her activity significantly and weight has gone up ~20#.  She's also been eating quite a bit of ice cream and \"knows she has to cut back.\"    We reviewed her ER visit 3/2023 which she thinks was brought on by Chloricedin HBP she was taking for cold/viral sxs.    No recurrent atrial fibrillation. Tolerating flecainide without issues. No problems with ASA. No bleeding issues.      Denies edema, orthopnea, PND.       VITALS:  Vitals: /76   Pulse 79   Ht 1.575 m (5' 2\")   Wt 124.8 kg (275 lb 1.6 oz)   BMI 50.32 kg/m      Diagnostic Testing:  EKG 3/2023 today which I overread, showed SR 62 bpm with QRS duration stable 114 ms on  flecainde 150 mg BID and metoprolol XL 12.5 mg daily   Nuclear stress test 8/2022 negative for ischemia/infarction.  No change since 2016.  Normal LVEF.  FRIDA (11/20/2020): Normal LV function.  EF estimated 55-60%.  The Watchman device was well-seated with adequate compression and minimal leak.  FRIDA 10/8/2020 with nl LVEF, nl RVEF, mild dilation of LA. Trace valve abnls  CT A Heart 9/8/2020 with normal aorta. Coronary calcification noted. Non-cardiac portion with 4 mm non-calcified medial RML nodule, indeterminate 4 mm RUL nodule anterior and 3 mm lingular nodule noted. Low risk - no follow-up needed; High-risk - optional CT follow-up 12 m  Echocardiogam 1/2020 showed EF 55-60%. No RWMA. RV OK. Mild MAC. Trace TR.   Echocardiogram 7/2017 showed an EF of 55-60% with borderline LVH.  LA was mildly dilated at 4.6 cm with a volume index of 24.2 mL/m .  She was " noted to have a dilated IVC with elevated right atrial pressure at 15-20.  No significant valvular abnormalities were noted.  Nuclear stress test 3/2016 was negative for ischemia  Component      Latest Ref Rng 3/3/2023  9:23 PM   WBC      4.0 - 11.0 10e3/uL 10.6    RBC Count      3.80 - 5.20 10e6/uL 4.60    Hemoglobin      11.7 - 15.7 g/dL 13.4    Hematocrit      35.0 - 47.0 % 40.3    MCV      78 - 100 fL 88    MCH      26.5 - 33.0 pg 29.1    MCHC      31.5 - 36.5 g/dL 33.3    RDW      10.0 - 15.0 % 14.7    Platelet Count      150 - 450 10e3/uL 350      Component      Latest Ref Rng 3/3/2023  9:23 PM   Sodium      136 - 145 mmol/L 134 (L)    Potassium      3.4 - 5.3 mmol/L 4.0    Chloride      98 - 107 mmol/L 96 (L)    Carbon Dioxide (CO2)      22 - 29 mmol/L 27    Anion Gap      7 - 15 mmol/L 11    Urea Nitrogen      8.0 - 23.0 mg/dL 13.6    Creatinine      0.51 - 0.95 mg/dL 0.76    Calcium      8.8 - 10.2 mg/dL 9.1    Glucose      70 - 99 mg/dL 116 (H)        Plan:  1. Annual follow-up     Assessment/Plan:    1. Paroxysmal AFib; high HASBLED Score    Remains on flecainide 150 mg BID and Metoprolol XL 12.5 mg daily with excellent results.    EKG in ER 3/2023 with acceptable QRS duration    S/p 27 mm Watchman placement 10/2020 and on ASA only    CBC 3/2023 in ER showed stable Hgb 13.4 g/dL    PLAN:    Continue ASA only    EKG and follow-up in 1 year. Flecainide & metoprolol XL refilled    2. HTN    On Maxzide 37.5/25, losartan 100 daily and metoprolol Xl 12.5 mg daily    BPs look good one I rechecked    BMP from ER 3/2023 as above    PLAN:    Continue current meds    Encouraged weight loss    3. Dyslipidemia    No problems on Crestor 10 mg daily.  Due for labs at Dr. Jama's office    PLAN:    Continue Crestor 10 mg daily. Routine follow-up with PCP set up 7/2023    Encouraged weight loss        Monika Sue PA-C, MSPAS      Orders Placed This Encounter   Procedures     Follow-Up with Cardiology ANALY     EKG 12-lead  complete w/read - Clinics (performed today)     Orders Placed This Encounter   Medications     metoprolol succinate ER (TOPROL XL) 25 MG 24 hr tablet     Sig: Take 0.5 tablets (12.5 mg) by mouth daily     Dispense:  45 tablet     Refill:  3     Keep on file until pt due for refills     flecainide (TAMBOCOR) 150 MG tablet     Sig: Take 1 tablet (150 mg) by mouth every 12 hours     Dispense:  180 tablet     Refill:  3     Medications Discontinued During This Encounter   Medication Reason     metoprolol succinate ER (TOPROL XL) 25 MG 24 hr tablet Reorder (No AVS / No eCancel)     insulin pen needle (31G X 6 MM) 31G X 6 MM miscellaneous Stopped by Patient (No AVS)     liraglutide (VICTOZA) 18 MG/3ML solution Stopped by Patient (No AVS)     flecainide (TAMBOCOR) 150 MG tablet Reorder (No AVS / No eCancel)         Encounter Diagnoses   Name Primary?     Atrial fibrillation with RVR (H) Yes     Paroxysmal atrial fibrillation (H)        CURRENT MEDICATIONS:  Current Outpatient Medications   Medication Sig Dispense Refill     acetaminophen (TYLENOL) 500 MG tablet Take 500-1,000 mg by mouth 3 times daily as needed for mild pain       aspirin (ASA) 81 MG EC tablet Take 1 tablet (81 mg) by mouth daily       Cholecalciferol (VITAMIN D3 PO) Take by mouth daily       flecainide (TAMBOCOR) 150 MG tablet Take 1 tablet (150 mg) by mouth every 12 hours 180 tablet 3     gabapentin (NEURONTIN) 300 MG capsule TAKE THREE CAPSULES BY MOUTH EVERY NIGHT AT BEDTIME 270 capsule 3     levothyroxine (SYNTHROID/LEVOTHROID) 125 MCG tablet Take 1 tablet (125 mcg) by mouth daily 90 tablet 1     losartan (COZAAR) 100 MG tablet TAKE 1 TABLET BY MOUTH DAILY 90 tablet 3     metoprolol succinate ER (TOPROL XL) 25 MG 24 hr tablet Take 0.5 tablets (12.5 mg) by mouth daily 45 tablet 3     pantoprazole (PROTONIX) 40 MG EC tablet Take 1 tablet (40 mg) by mouth daily 90 tablet 3     rosuvastatin (CRESTOR) 10 MG tablet Take 1 tablet (10 mg) by mouth daily 90  "tablet 3     SUMAtriptan (IMITREX) 100 MG tablet Take 1 tablet (100 mg) by mouth at onset of headache for migraine May repeat in 2 hours if needed: max 2/day; 10 tablet 11     triamterene-HCTZ (MAXZIDE-25) 37.5-25 MG tablet Take 1 tablet by mouth daily 90 tablet 3       ALLERGIES     Allergies   Allergen Reactions     Morphine Anaphylaxis     Ceclor [Cefaclor] Hives     Diltiazem Hives     Duloxetine Hcl      Foggy head feeling     Lisinopril Cough     Sertraline Nausea and Vomiting         Review of Systems:  Skin:        Eyes:       ENT:       Respiratory:  Positive for sleep apnea  Cardiovascular:  Negative    Gastroenterology:      Genitourinary:       Musculoskeletal:       Neurologic:       Psychiatric:       Heme/Lymph/Imm:       Endocrine:         Physical Exam:  Vitals: /76   Pulse 79   Ht 1.575 m (5' 2\")   Wt 124.8 kg (275 lb 1.6 oz)   BMI 50.32 kg/m      Constitutional:  cooperative, alert and oriented, well developed, well nourished, in no acute distress        Skin:  warm and dry to the touch, no apparent skin lesions or masses noted        Head:  normocephalic, no masses or lesions        Eyes:  pupils equal and round;conjunctivae and lids unremarkable;sclera white        ENT:  not assessed this visit        Neck:  JVP normal;no carotid bruit        Chest:  normal respiratory excursion        Cardiac: regular rhythm;no murmurs, gallops or rubs detected                  Abdomen:  abdomen soft obese      Vascular: pulses full and equal                                      Extremities and Back:  no deformities, clubbing, cyanosis, erythema observed;no edema        Neurological:  no gross motor deficits            PAST MEDICAL HISTORY:  Past Medical History:   Diagnosis Date     Depression       Duodenal ulcer 03/04/2019     Essential hypertension       GERD (gastroesophageal reflux disease)      Hiatal hernia      Hypothyroidism      Migraine without aura and without status migrainosus, not " intractable  Aug 2016     Obesity      NELY on CPAP      Osteoarthritis      Paroxysmal atrial fibrillation (H) 2015     RLS (restless legs syndrome)      Symptomatic menopausal or female climacteric states (aka FLASHES)      TMJ disease        PAST SURGICAL HISTORY:  Past Surgical History:   Procedure Laterality Date     APPENDECTOMY       CHOLECYSTECTOMY       COLONOSCOPY N/A 2019    Procedure: COLONOSCOPY;  Surgeon: Vahid Cardona MD;  Location:  GI     EP LEFT ATRIAL APPENDAGE CLOSURE N/A 10/8/2020    Procedure: EP Left Atrial Appendage Closure;  Surgeon: Constantin Vaughn MD;  Location:  HEART CARDIAC CATH LAB     ESOPHAGOSCOPY, GASTROSCOPY, DUODENOSCOPY (EGD), COMBINED N/A 2019    Procedure: COMBINED ESOPHAGOSCOPY, GASTROSCOPY, DUODENOSCOPY (EGD);  Surgeon: Ben Ko MD;  Location:  GI     HYSTERECTOMY, [facility]  1999     TONSILLECTOMY         FAMILY HISTORY:  Family History   Problem Relation Age of Onset     Myocardial Infarction Mother      Heart Failure Mother      Hypertension Mother         Mother     Heart Surgery Father         bypass surgery     Cerebrovascular Disease Father         Father     Heart Surgery Brother      Hyperlipidemia Brother         Brother     Hyperlipidemia Sister         Sister     Hyperlipidemia Brother         Brother     Sleep Apnea Sister        SOCIAL HISTORY:  Social History     Socioeconomic History     Marital status:      Spouse name: None     Number of children: None     Years of education: None     Highest education level: None   Tobacco Use     Smoking status: Former     Packs/day: 0.25     Years: 1.00     Pack years: 0.25     Types: Cigarettes     Start date: 1971     Quit date: 1972     Years since quittin.3     Smokeless tobacco: Never     Tobacco comments:     very minimal smoking history   Vaping Use     Vaping status: Never Used     Passive vaping exposure: Yes   Substance and Sexual Activity      Alcohol use: No     Drug use: No     Sexual activity: Never   Other Topics Concern     Parent/sibling w/ CABG, MI or angioplasty before 65F 55M? Yes     Comment: Mother     Caffeine Concern No     Sleep Concern Yes     Weight Concern No     Special Diet No     Exercise No     Seat Belt Yes     Social Determinants of Health     Food Insecurity: Food Insecurity Present (2/2/2023)    Hunger Vital Sign      Worried About Running Out of Food in the Last Year: Sometimes true      Ran Out of Food in the Last Year: Sometimes true   Physical Activity: Inactive (2/2/2023)    Exercise Vital Sign      Days of Exercise per Week: 0 days      Minutes of Exercise per Session: 0 min   Housing Stability: Low Risk  (2/2/2023)    Housing Stability Vital Sign      Unable to Pay for Housing in the Last Year: No      Number of Places Lived in the Last Year: 1      Unstable Housing in the Last Year: No

## 2023-05-05 ENCOUNTER — OFFICE VISIT (OUTPATIENT)
Dept: CARDIOLOGY | Facility: CLINIC | Age: 68
End: 2023-05-05
Payer: COMMERCIAL

## 2023-05-05 VITALS
WEIGHT: 275.1 LBS | HEIGHT: 62 IN | HEART RATE: 79 BPM | SYSTOLIC BLOOD PRESSURE: 136 MMHG | BODY MASS INDEX: 50.62 KG/M2 | DIASTOLIC BLOOD PRESSURE: 76 MMHG

## 2023-05-05 DIAGNOSIS — I48.0 PAROXYSMAL ATRIAL FIBRILLATION (H): ICD-10-CM

## 2023-05-05 DIAGNOSIS — I48.91 ATRIAL FIBRILLATION WITH RVR (H): Primary | ICD-10-CM

## 2023-05-05 PROCEDURE — 99214 OFFICE O/P EST MOD 30 MIN: CPT | Performed by: PHYSICIAN ASSISTANT

## 2023-05-05 PROCEDURE — 93000 ELECTROCARDIOGRAM COMPLETE: CPT | Performed by: PHYSICIAN ASSISTANT

## 2023-05-05 RX ORDER — METOPROLOL SUCCINATE 25 MG/1
12.5 TABLET, EXTENDED RELEASE ORAL DAILY
Qty: 45 TABLET | Refills: 3 | Status: SHIPPED | OUTPATIENT
Start: 2023-05-05 | End: 2024-04-03

## 2023-05-05 RX ORDER — FLECAINIDE ACETATE 150 MG/1
150 TABLET ORAL EVERY 12 HOURS
Qty: 180 TABLET | Refills: 3 | Status: SHIPPED | OUTPATIENT
Start: 2023-05-05 | End: 2024-05-03

## 2023-05-05 NOTE — PATIENT INSTRUCTIONS
Roxanna - it was good to see you today!    EKG today looked good! Normal rhythm and good intervals on flecainide  BP high initially - better when I rechecked  Discussed ER visit 3/2023 for Chest/Back flushing pain - agreed would not get another stress test as 8/2022 stress test looked good    PLAN:  1 year follow-up with EKG but CALL if issues prior! [phone]

## 2023-05-05 NOTE — LETTER
5/5/2023    Yuval Jama MD  [address] [facility] [address]    RE: Maxine Sears       Dear Colleague,     I had the pleasure of seeing Maxine Sears in the [facility].  [facility]    I had the pleasure of seeing Roxanna when she came for annual follow-up.  This 67 year old sees Dr. Vaughn for her history of:    1. Paroxysmal AFib first diagnosed 9/2015.  Most recent DCCV 2/2019.  Hospitalized 1/2020 and flecainide increased.  2. GI bleeds. Had Hgb 7.2 g/dL 3/2019. EGD revealed duodenal ulcer which was cauterized.  Xarelto was held, but then later restarted.  She was rehospitalized 4/2019 with a lower GI bleed.  Hemoglobin got down to 8.7 g/dL.  Xarelto was discontinued, and Dr. Vaughn recommended not restarting this for at least 3-6 months. Capsule endoscopy 5/2019 showed normal mucosa but did not reach the colon (Dr. Ko). We restarted Xarelto 20 mg daily 10/2019 but Hgb dropped >1 gram in ~1 month. Had been kept off of AC despite CHADSVASc 2 (HTN, sex). S/p 27 mm LAAO Watchman 10/8/2020 with Dr. Vaughn and Dr. Toth  3. HTN  4. Coronary Calcification noted on CTA Heart 9/2020. Stress Test 3/2016 without ischemia  5. Pulmonary Nodules noted on CTA Heart 9/2020 - low risk - no Follow-up; high risk - optional 12 m CT follow-up. Roxanna states she considers herself low risk as smoked for <6 months  6. Prediabetes - A1c 6.0% 9/2022    I saw Roxanna last in 3/2022 at which time she was doing well heart-wise, but was having a lot of L knee pain requiring her to use a walker.  She'd not had any recurrent prolonged arrhythmias on flecainide. No changes were made and annual follow-up with EKG rec'd.    Interval History:  She was hospitalized 7/2022 with weakness and lightheadedness, with SOBEIDA.  Creatinine had increased from 0.8-2.53, and she was rehydrated.  Symptoms were likely due to orthostasis, which also improved with IV hydration.  TSH was quite low and T4 was high,  "causing PTA levothyroxine to be reduced.  Outpatient stress testing was recommended as she had some chest discomfort working with physical therapy.  Lisinopril/HCTZ was held on discharge.    Nuclear stress test 8/2022 was without ischemia/infarction and unchanged since stress test 2016.    She was in the ER 3/3 - 3/4/2023 with back pain and radiation to the chest wall.  She had a burning radiation down bilateral upper extremities.  Troponins were negative, and I believe she was discharged with plans for another stress test.  This has not been done.    HPI:  Roxanna continues to suffer from significant knee and back pain. This has limited her activity significantly and weight has gone up ~20#.  She's also been eating quite a bit of ice cream and \"knows she has to cut back.\"    We reviewed her ER visit 3/2023 which she thinks was brought on by Chloricedin HBP she was taking for cold/viral sxs.    No recurrent atrial fibrillation. Tolerating flecainide without issues. No problems with ASA. No bleeding issues.      Denies edema, orthopnea, PND.       VITALS:  Vitals: /76   Pulse 79   Ht 1.575 m (5' 2\")   Wt 124.8 kg (275 lb 1.6 oz)   BMI 50.32 kg/m      Diagnostic Testing:  EKG 3/2023 today which I overread, showed SR 62 bpm with QRS duration stable 114 ms on  flecainde 150 mg BID and metoprolol XL 12.5 mg daily   Nuclear stress test 8/2022 negative for ischemia/infarction.  No change since 2016.  Normal LVEF.  FRIDA (11/20/2020): Normal LV function.  EF estimated 55-60%.  The Watchman device was well-seated with adequate compression and minimal leak.  FRIDA 10/8/2020 with nl LVEF, nl RVEF, mild dilation of LA. Trace valve abnls  CT A Heart 9/8/2020 with normal aorta. Coronary calcification noted. Non-cardiac portion with 4 mm non-calcified medial RML nodule, indeterminate 4 mm RUL nodule anterior and 3 mm lingular nodule noted. Low risk - no follow-up needed; High-risk - optional CT follow-up 12 m  Echocardiogam " 1/2020 showed EF 55-60%. No RWMA. RV OK. Mild MAC. Trace TR.   Echocardiogram 7/2017 showed an EF of 55-60% with borderline LVH.  LA was mildly dilated at 4.6 cm with a volume index of 24.2 mL/m .  She was noted to have a dilated IVC with elevated right atrial pressure at 15-20.  No significant valvular abnormalities were noted.  Nuclear stress test 3/2016 was negative for ischemia  Component      Latest Ref Rng 3/3/2023  9:23 PM   WBC      4.0 - 11.0 10e3/uL 10.6    RBC Count      3.80 - 5.20 10e6/uL 4.60    Hemoglobin      11.7 - 15.7 g/dL 13.4    Hematocrit      35.0 - 47.0 % 40.3    MCV      78 - 100 fL 88    MCH      26.5 - 33.0 pg 29.1    MCHC      31.5 - 36.5 g/dL 33.3    RDW      10.0 - 15.0 % 14.7    Platelet Count      150 - 450 10e3/uL 350      Component      Latest Ref Rng 3/3/2023  9:23 PM   Sodium      136 - 145 mmol/L 134 (L)    Potassium      3.4 - 5.3 mmol/L 4.0    Chloride      98 - 107 mmol/L 96 (L)    Carbon Dioxide (CO2)      22 - 29 mmol/L 27    Anion Gap      7 - 15 mmol/L 11    Urea Nitrogen      8.0 - 23.0 mg/dL 13.6    Creatinine      0.51 - 0.95 mg/dL 0.76    Calcium      8.8 - 10.2 mg/dL 9.1    Glucose      70 - 99 mg/dL 116 (H)        Plan:  1. Annual follow-up     Assessment/Plan:    Paroxysmal AFib; high HASBLED Score  Remains on flecainide 150 mg BID and Metoprolol XL 12.5 mg daily with excellent results.  EKG in ER 3/2023 with acceptable QRS duration  S/p 27 mm Watchman placement 10/2020 and on ASA only  CBC 3/2023 in ER showed stable Hgb 13.4 g/dL    PLAN:  Continue ASA only  EKG and follow-up in 1 year. Flecainide & metoprolol XL refilled    HTN  On Maxzide 37.5/25, losartan 100 daily and metoprolol Xl 12.5 mg daily  BPs look good one I rechecked  BMP from ER 3/2023 as above    PLAN:  Continue current meds  Encouraged weight loss    Dyslipidemia  No problems on Crestor 10 mg daily.  Due for labs at Dr. Jama's office    PLAN:  Continue Crestor 10 mg daily. Routine follow-up with PCP  set up 7/2023  Encouraged weight loss        Monika Sue PA-C, MSPAS      Orders Placed This Encounter   Procedures    Follow-Up with Cardiology ANALY    EKG 12-lead complete w/read - Clinics (performed today)     Orders Placed This Encounter   Medications    metoprolol succinate ER (TOPROL XL) 25 MG 24 hr tablet     Sig: Take 0.5 tablets (12.5 mg) by mouth daily     Dispense:  45 tablet     Refill:  3     Keep on file until pt due for refills    flecainide (TAMBOCOR) 150 MG tablet     Sig: Take 1 tablet (150 mg) by mouth every 12 hours     Dispense:  180 tablet     Refill:  3     Medications Discontinued During This Encounter   Medication Reason    metoprolol succinate ER (TOPROL XL) 25 MG 24 hr tablet Reorder (No AVS / No eCancel)    insulin pen needle (31G X 6 MM) 31G X 6 MM miscellaneous Stopped by Patient (No AVS)    liraglutide (VICTOZA) 18 MG/3ML solution Stopped by Patient (No AVS)    flecainide (TAMBOCOR) 150 MG tablet Reorder (No AVS / No eCancel)         Encounter Diagnoses   Name Primary?    Atrial fibrillation with RVR (H) Yes    Paroxysmal atrial fibrillation (H)        CURRENT MEDICATIONS:  Current Outpatient Medications   Medication Sig Dispense Refill    acetaminophen (TYLENOL) 500 MG tablet Take 500-1,000 mg by mouth 3 times daily as needed for mild pain      aspirin (ASA) 81 MG EC tablet Take 1 tablet (81 mg) by mouth daily      Cholecalciferol (VITAMIN D3 PO) Take by mouth daily      flecainide (TAMBOCOR) 150 MG tablet Take 1 tablet (150 mg) by mouth every 12 hours 180 tablet 3    gabapentin (NEURONTIN) 300 MG capsule TAKE THREE CAPSULES BY MOUTH EVERY NIGHT AT BEDTIME 270 capsule 3    levothyroxine (SYNTHROID/LEVOTHROID) 125 MCG tablet Take 1 tablet (125 mcg) by mouth daily 90 tablet 1    losartan (COZAAR) 100 MG tablet TAKE 1 TABLET BY MOUTH DAILY 90 tablet 3    metoprolol succinate ER (TOPROL XL) 25 MG 24 hr tablet Take 0.5 tablets (12.5 mg) by mouth daily 45 tablet 3    pantoprazole (PROTONIX)  "40 MG EC tablet Take 1 tablet (40 mg) by mouth daily 90 tablet 3    rosuvastatin (CRESTOR) 10 MG tablet Take 1 tablet (10 mg) by mouth daily 90 tablet 3    SUMAtriptan (IMITREX) 100 MG tablet Take 1 tablet (100 mg) by mouth at onset of headache for migraine May repeat in 2 hours if needed: max 2/day; 10 tablet 11    triamterene-HCTZ (MAXZIDE-25) 37.5-25 MG tablet Take 1 tablet by mouth daily 90 tablet 3       ALLERGIES     Allergies   Allergen Reactions    Morphine Anaphylaxis    Ceclor [Cefaclor] Hives    Diltiazem Hives    Duloxetine Hcl      Foggy head feeling    Lisinopril Cough    Sertraline Nausea and Vomiting         Review of Systems:  Skin:        Eyes:       ENT:       Respiratory:  Positive for sleep apnea  Cardiovascular:  Negative    Gastroenterology:      Genitourinary:       Musculoskeletal:       Neurologic:       Psychiatric:       Heme/Lymph/Imm:       Endocrine:         Physical Exam:  Vitals: /76   Pulse 79   Ht 1.575 m (5' 2\")   Wt 124.8 kg (275 lb 1.6 oz)   BMI 50.32 kg/m      Constitutional:  cooperative, alert and oriented, well developed, well nourished, in no acute distress        Skin:  warm and dry to the touch, no apparent skin lesions or masses noted        Head:  normocephalic, no masses or lesions        Eyes:  pupils equal and round;conjunctivae and lids unremarkable;sclera white        ENT:  not assessed this visit        Neck:  JVP normal;no carotid bruit        Chest:  normal respiratory excursion        Cardiac: regular rhythm;no murmurs, gallops or rubs detected                  Abdomen:  abdomen soft obese      Vascular: pulses full and equal                                      Extremities and Back:  no deformities, clubbing, cyanosis, erythema observed;no edema        Neurological:  no gross motor deficits           PAST MEDICAL HISTORY:  Past Medical History:   Diagnosis Date    Depression      Duodenal ulcer 03/04/2019    Essential hypertension      GERD " (gastroesophageal reflux disease)     Hiatal hernia     Hypothyroidism     Migraine without aura and without status migrainosus, not intractable  Aug 2016    Obesity     NELY on CPAP     Osteoarthritis     Paroxysmal atrial fibrillation (H) 2015    RLS (restless legs syndrome)     Symptomatic menopausal or female climacteric states (aka FLASHES)     TMJ disease        PAST SURGICAL HISTORY:  Past Surgical History:   Procedure Laterality Date    APPENDECTOMY      CHOLECYSTECTOMY      COLONOSCOPY N/A 2019    Procedure: COLONOSCOPY;  Surgeon: Vahid Cardona MD;  Location:  GI    EP LEFT ATRIAL APPENDAGE CLOSURE N/A 10/8/2020    Procedure: EP Left Atrial Appendage Closure;  Surgeon: Constantin Vaughn MD;  Location:  HEART CARDIAC CATH LAB    ESOPHAGOSCOPY, GASTROSCOPY, DUODENOSCOPY (EGD), COMBINED N/A 2019    Procedure: COMBINED ESOPHAGOSCOPY, GASTROSCOPY, DUODENOSCOPY (EGD);  Surgeon: Ben Ko MD;  Location:  GI    HYSTERECTOMY, [facility]  1999    TONSILLECTOMY         FAMILY HISTORY:  Family History   Problem Relation Age of Onset    Myocardial Infarction Mother     Heart Failure Mother     Hypertension Mother         Mother    Heart Surgery Father         bypass surgery    Cerebrovascular Disease Father         Father    Heart Surgery Brother     Hyperlipidemia Brother         Brother    Hyperlipidemia Sister         Sister    Hyperlipidemia Brother         Brother    Sleep Apnea Sister        SOCIAL HISTORY:  Social History     Socioeconomic History    Marital status:      Spouse name: None    Number of children: None    Years of education: None    Highest education level: None   Tobacco Use    Smoking status: Former     Packs/day: 0.25     Years: 1.00     Pack years: 0.25     Types: Cigarettes     Start date: 1971     Quit date: 1972     Years since quittin.3    Smokeless tobacco: Never    Tobacco comments:     very minimal smoking history   Vaping Use     Vaping status: Never Used     Passive vaping exposure: Yes   Substance and Sexual Activity    Alcohol use: No    Drug use: No    Sexual activity: Never   Other Topics Concern    Parent/sibling w/ CABG, MI or angioplasty before 65F 55M? Yes     Comment: Mother    Caffeine Concern No    Sleep Concern Yes    Weight Concern No    Special Diet No    Exercise No    Seat Belt Yes     Social Determinants of Health     Food Insecurity: Food Insecurity Present (2/2/2023)    Hunger Vital Sign     Worried About Running Out of Food in the Last Year: Sometimes true     Ran Out of Food in the Last Year: Sometimes true   Physical Activity: Inactive (2/2/2023)    Exercise Vital Sign     Days of Exercise per Week: 0 days     Minutes of Exercise per Session: 0 min   Housing Stability: Low Risk  (2/2/2023)    Housing Stability Vital Sign     Unable to Pay for Housing in the Last Year: No     Number of Places Lived in the Last Year: 1     Unstable Housing in the Last Year: No           Thank you for allowing me to participate in the care of your patient.      Sincerely,     Ilda Sue PA-C     [facility]  cc:   No referring provider defined for this encounter.

## 2023-05-22 ENCOUNTER — VIRTUAL VISIT (OUTPATIENT)
Dept: PHARMACY | Facility: CLINIC | Age: 68
End: 2023-05-22
Payer: COMMERCIAL

## 2023-05-22 DIAGNOSIS — I48.0 PAROXYSMAL ATRIAL FIBRILLATION (H): ICD-10-CM

## 2023-05-22 DIAGNOSIS — G25.81 RLS (RESTLESS LEGS SYNDROME): ICD-10-CM

## 2023-05-22 DIAGNOSIS — I10 ESSENTIAL HYPERTENSION: ICD-10-CM

## 2023-05-22 DIAGNOSIS — E03.9 HYPOTHYROIDISM, UNSPECIFIED TYPE: ICD-10-CM

## 2023-05-22 DIAGNOSIS — Z78.9 TAKES DIETARY SUPPLEMENTS: ICD-10-CM

## 2023-05-22 DIAGNOSIS — G43.009 MIGRAINE WITHOUT AURA AND WITHOUT STATUS MIGRAINOSUS, NOT INTRACTABLE: ICD-10-CM

## 2023-05-22 DIAGNOSIS — E11.9 TYPE 2 DIABETES MELLITUS WITHOUT COMPLICATION, WITHOUT LONG-TERM CURRENT USE OF INSULIN (H): Primary | ICD-10-CM

## 2023-05-22 DIAGNOSIS — Z71.85 VACCINE COUNSELING: ICD-10-CM

## 2023-05-22 DIAGNOSIS — K21.9 GASTROESOPHAGEAL REFLUX DISEASE, UNSPECIFIED WHETHER ESOPHAGITIS PRESENT: ICD-10-CM

## 2023-05-22 DIAGNOSIS — E78.5 HYPERLIPIDEMIA LDL GOAL <100: ICD-10-CM

## 2023-05-22 PROCEDURE — 99207 PR NO CHARGE LOS: CPT | Performed by: PHARMACIST

## 2023-05-22 NOTE — LETTER
"Recommended To-Do List      Prepared on: May 22, 2023       You can get the best results from your medications by completing the items on this \"To-Do List.\"      Bring your To-Do List when you go to your doctor. And, share it with your family or caregivers.    My To-Do List:  What we talked about: What I should do:   A new medication that may be of benefit to you    You are due for the COVID booster and Shingrix vaccines          What we talked about: What I should do:                     "

## 2023-05-22 NOTE — PATIENT INSTRUCTIONS
"Recommendations from today's MTM visit:                                                         1.  Labs and visit with Dr. Jama as planned in July.  2. Due for COVID booster and Shingrix vaccine    Follow-up: Return in about 1 year (around 5/22/2024) for Medication Therapy Management.    It was great speaking with you today.  I value your experience and would be very thankful for your time in providing feedback in our clinic survey. In the next few days, you may receive an email or text message from [employer] with a link to a survey related to your  clinical pharmacist.\"     To schedule another MTM appointment, please call the clinic directly or you may call the MTM scheduling line at [phone] or toll-free at [phone].     My Clinical Pharmacist's contact information:                                                      Please feel free to contact me with any questions or concerns you have.      Virginia Sauceda , Pharm D  [phone] (phone)  Medication Therapy Management Pharmacist     "

## 2023-05-22 NOTE — LETTER
_  Medication List        Prepared on: May 22, 2023     Bring your Medication List when you go to the doctor, hospital, or   emergency room. And, share it with your family or caregivers.     Note any changes to how you take your medications.  Cross out medications when you no longer use them.    Medication How I take it Why I use it Prescriber   acetaminophen (TYLENOL) 500 MG tablet Take 500-1,000 mg by mouth 3 times daily as needed for mild pain  Headaches Patient Reported   aspirin (ASA) 81 MG EC tablet Take 1 tablet (81 mg) by mouth daily Paroxysmal Atrial Fibrillation (H) Ilda Sue PA-C   Cholecalciferol (VITAMIN D3 PO) Take 50 mcg by mouth daily  Diet Supplement Patient Reported   flecainide (TAMBOCOR) 150 MG tablet Take 1 tablet (150 mg) by mouth every 12 hours Paroxysmal Atrial Fibrillation (H) Ilda Sue PA-C   gabapentin (NEURONTIN) 300 MG capsule TAKE THREE CAPSULES BY MOUTH EVERY NIGHT AT BEDTIME Lumbar Radiculopathy Yuval Jama MD   levothyroxine (SYNTHROID/LEVOTHROID) 125 MCG tablet Take 1 tablet (125 mcg) by mouth daily Hypothyroidism, unspecified type Yuval Jama MD   losartan (COZAAR) 100 MG tablet TAKE 1 TABLET BY MOUTH DAILY Essential Hypertension Yuval Jama MD   metoprolol succinate ER (TOPROL XL) 25 MG 24 hr tablet Take 0.5 tablets (12.5 mg) by mouth daily Atrial fibrillation with RVR (H) Ilda Sue PA-C   pantoprazole (PROTONIX) 40 MG EC tablet Take 1 tablet (40 mg) by mouth daily Acute GI Bleeding Yuval Jama MD   rosuvastatin (CRESTOR) 10 MG tablet Take 1 tablet (10 mg) by mouth daily Hyperlipidemia LDL Goal <100 Yuval Jama MD   SUMAtriptan (IMITREX) 100 MG tablet Take 1 tablet (100 mg) by mouth at onset of headache for migraine May repeat in 2 hours if needed: max 2/day; Migraine without aura and without status migrainosus, not intractable Yuval Jama MD   triamterene-HCTZ (MAXZIDE-25) 37.5-25 MG tablet Take 1 tablet by mouth daily Essential  Hypertension Yuval Jama MD         Add new medications, over-the-counter drugs, herbals, vitamins, or  minerals in the blank rows below.    Medication How I take it Why I use it Prescriber                                      Allergies:      morphine; ceclor [cefaclor]; diltiazem; duloxetine hcl; lisinopril; sertraline        Side effects I have had:               Other Information:              My notes and questions:

## 2023-05-22 NOTE — LETTER
May 22, 2023  Maxine Sears  [address]   [facility] [address]    Dear [facility]     Thank you for talking with me on May 22, 2023 about your health and medications. As a follow-up to our conversation, I have included two documents:      1. Your Recommended To-Do List has steps you should take to get the best results from your medications.  2. Your Medication List will help you keep track of your medications and how to take them.    If you want to talk about these documents, please call Virginia Sauceda RPH at phone: [phone], Monday-Friday 8-4:30pm.    I look forward to working with you and your doctors to make sure your medications work well for you.    Sincerely,  Virginia Sauceda RPH  [facility] Pharmacist, [facility]

## 2023-05-22 NOTE — PROGRESS NOTES
Medication Therapy Management (MTM) Encounter    ASSESSMENT:                            Medication Adherence/Access: No issues identified    Type 2 Diabetes: A1c at goal. Not checking home readings.  Having labs and primary care provider visit in July.    Hyperlipidemia: stable    Afib/Hypertension: most recent blood pressure at goal    GERD/Ulcer:  stable    Hypothyroidism:  TSH at goal, T4 was elevated.  Will recheck with labs in July.      RLS: stable    Migraines:  stable    Supplements: stable    Vaccines: Due for COVID booster and Shingrix    PLAN:                            1.  Labs and visit with Dr. Jama as planned in July.  2. Due for COVID booster and Shingrix vaccine    Follow-up: Return in about 1 year (around 5/22/2024) for Medication Therapy Management.    SUBJECTIVE/OBJECTIVE:                          Roxanna Sears is a 67 year old female called for a follow-up visit.  Today's visit is a follow-up MTM visit from 7/28/22.     Reason for visit: medication review    Allergies/ADRs: Reviewed in chart  Tobacco: She reports that she quit smoking about 51 years ago. Her smoking use included cigarettes. She started smoking about 52 years ago. She has a 0.25 pack-year smoking history. She has never used smokeless tobacco.  Alcohol: not currently using    Medication Adherence/Access: no issues reported; uses pill boxes      Type 2 Diabetes: no medication at this time    Past medications:  Victoza    Blood sugar monitoring: never  Symptoms of low blood sugar? none  Symptoms of high blood sugar? none  Eye exam: last done Jan 2021  Foot exam: due  Aspirin: Taking 81mg daily and denies side effects   Statin: Yes: rosuvastatin 10 mg daily   ACEi/ARB: Yes: Losartan 100 mg daily.   Urine Albumin: No results found for: UMALCR   Lab Results   Component Value Date    A1C 6.0 09/08/2022    A1C 6.6 03/14/2022    A1C 5.9 03/13/2019    A1C 6.3 11/12/2018       Hyperlipidemia:   Rosustatin 10mg daily.      Patient reports no  significant myalgias or other side effects.  Labs scheduled in July.      Recent Labs   Lab Test 03/14/22  1035 03/11/21  1534 04/24/17  1535 09/24/15  1039   CHOL 158 128   < > 215*   HDL 64 64   < > 46*   LDL 48 24   < > 111   TRIG 229* 201*   < > 291*   CHOLHDLRATIO  --   --   --  4.7    < > = values in this interval not displayed.       Afib/Hypertension: Patient is currently taking aspirin 81mg, Flecainide 150 mg twice daily, losartan 100 mg daily, metoprolol ER 12.5 mg daily, triamterene-hydrochlorothiazide 37.5-25 mg daily.   Patient reports no current medication side effects.   Patient does self-monitor blood pressure at times - pretty much the same, has been awhile since checking.    Followed by Cardiology.      BP Readings from Last 3 Encounters:   05/05/23 136/76   03/03/23 (!) 186/87   08/01/22 (!) 152/64       GERD:   Protonix (pantoprazole) 40 mg once daily.     Patient reports no current symptoms. The patient does have a history of GI bleed. Patient feels that current regimen is effective.      Hypothyroidism:   levothyroxine 125 mcg daily. Patient is having the following symptoms: none.   TSH   Date Value Ref Range Status   09/08/2022 0.86 0.40 - 4.00 mU/L Final   03/11/2021 0.45 0.40 - 4.00 mU/L Final     T4 Free   Date Value Ref Range Status   01/29/2020 1.15 0.76 - 1.46 ng/dL Final     Free T4   Date Value Ref Range Status   07/12/2022 1.60 (H) 0.76 - 1.46 ng/dL Final       RLS:   Gabapentin 900 mg nightly   This is working well for her symptoms.  No concern with side effects.        Migraines:   Abortive  Sumatriptan 100 mg as needed  acetaminophen 1000 mg as needed, no more than 2000 mg per day     Will use acetaminophen for headaches and sometimes and lower back when hurts. Does find somewhat helpful. Has not used the sumatriptan in some time.        Supplements: Currently taking vitamin D 50 mcg daily. No reported issues at this time.   Vitamin D Deficiency Screening Results:  No results found  for: VITDT        Today's Vitals: There were no vitals taken for this visit.  ----------------      I spent 17 minutes with this patient today. All changes were made via collaborative practice agreement with Yuval Jama MD. A copy of the visit note was provided to the patient's provider(s).    A summary of these recommendations was sent via [employer].    Virginia Sauceda , Pharm D  [phone] (phone)  Medication Therapy Management Pharmacist     Telemedicine Visit Details  Type of service:  Telephone visit  Start Time: 1230pm  End Time: 12:47 PM     Medication Therapy Recommendations  Vaccine counseling    Rationale: Preventive therapy - Needs additional medication therapy - Indication   Recommendation: Start Medication - PFIZER-BIONTECH COVID-19 VACC IM   Status: Accepted - no CPA Needed

## 2023-06-26 DIAGNOSIS — I10 ESSENTIAL HYPERTENSION: ICD-10-CM

## 2023-06-27 RX ORDER — TRIAMTERENE/HYDROCHLOROTHIAZID 37.5-25 MG
1 TABLET ORAL DAILY
Qty: 90 TABLET | Refills: 3 | Status: SHIPPED | OUTPATIENT
Start: 2023-06-27 | End: 2024-07-02

## 2023-06-29 ENCOUNTER — PATIENT OUTREACH (OUTPATIENT)
Dept: CARE COORDINATION | Facility: CLINIC | Age: 68
End: 2023-06-29
Payer: COMMERCIAL

## 2023-07-05 DIAGNOSIS — M54.16 LUMBAR RADICULOPATHY: ICD-10-CM

## 2023-07-05 RX ORDER — GABAPENTIN 300 MG/1
CAPSULE ORAL
Qty: 270 CAPSULE | Refills: 1 | Status: SHIPPED | OUTPATIENT
Start: 2023-07-05 | End: 2024-01-14

## 2023-07-10 ASSESSMENT — ANXIETY QUESTIONNAIRES
1. FEELING NERVOUS, ANXIOUS, OR ON EDGE: SEVERAL DAYS
GAD7 TOTAL SCORE: 8
2. NOT BEING ABLE TO STOP OR CONTROL WORRYING: MORE THAN HALF THE DAYS
4. TROUBLE RELAXING: NOT AT ALL
6. BECOMING EASILY ANNOYED OR IRRITABLE: SEVERAL DAYS
5. BEING SO RESTLESS THAT IT IS HARD TO SIT STILL: NOT AT ALL
7. FEELING AFRAID AS IF SOMETHING AWFUL MIGHT HAPPEN: MORE THAN HALF THE DAYS
3. WORRYING TOO MUCH ABOUT DIFFERENT THINGS: MORE THAN HALF THE DAYS
GAD7 TOTAL SCORE: 8

## 2023-07-11 ENCOUNTER — OFFICE VISIT (OUTPATIENT)
Dept: INTERNAL MEDICINE | Facility: CLINIC | Age: 68
End: 2023-07-11
Payer: COMMERCIAL

## 2023-07-11 ENCOUNTER — PATIENT OUTREACH (OUTPATIENT)
Dept: GERIATRIC MEDICINE | Facility: CLINIC | Age: 68
End: 2023-07-11

## 2023-07-11 DIAGNOSIS — E66.01 MORBID OBESITY (H): ICD-10-CM

## 2023-07-11 DIAGNOSIS — F32.1 MODERATE MAJOR DEPRESSION (H): Primary | ICD-10-CM

## 2023-07-11 DIAGNOSIS — G47.33 OSA (OBSTRUCTIVE SLEEP APNEA): ICD-10-CM

## 2023-07-11 DIAGNOSIS — Z12.31 ENCOUNTER FOR SCREENING MAMMOGRAM FOR BREAST CANCER: ICD-10-CM

## 2023-07-11 DIAGNOSIS — E11.9 TYPE 2 DIABETES MELLITUS WITHOUT COMPLICATION, WITHOUT LONG-TERM CURRENT USE OF INSULIN (H): ICD-10-CM

## 2023-07-11 DIAGNOSIS — E78.5 HYPERLIPIDEMIA LDL GOAL <100: ICD-10-CM

## 2023-07-11 DIAGNOSIS — E03.9 HYPOTHYROIDISM, UNSPECIFIED TYPE: ICD-10-CM

## 2023-07-11 LAB
ALBUMIN SERPL BCG-MCNC: 4.6 G/DL (ref 3.5–5.2)
ALP SERPL-CCNC: 89 U/L (ref 35–104)
ALT SERPL W P-5'-P-CCNC: 14 U/L (ref 0–50)
ANION GAP SERPL CALCULATED.3IONS-SCNC: 12 MMOL/L (ref 7–15)
AST SERPL W P-5'-P-CCNC: 26 U/L (ref 0–45)
BILIRUB SERPL-MCNC: 0.4 MG/DL
BUN SERPL-MCNC: 10.7 MG/DL (ref 8–23)
CALCIUM SERPL-MCNC: 9.8 MG/DL (ref 8.8–10.2)
CHLORIDE SERPL-SCNC: 97 MMOL/L (ref 98–107)
CHOLEST SERPL-MCNC: 133 MG/DL
CREAT SERPL-MCNC: 0.77 MG/DL (ref 0.51–0.95)
DEPRECATED HCO3 PLAS-SCNC: 26 MMOL/L (ref 22–29)
GFR SERPL CREATININE-BSD FRML MDRD: 84 ML/MIN/1.73M2
GLUCOSE SERPL-MCNC: 138 MG/DL (ref 70–99)
HBA1C MFR BLD: 6.7 % (ref 0–5.6)
HDLC SERPL-MCNC: 56 MG/DL
LDLC SERPL CALC-MCNC: 35 MG/DL
NONHDLC SERPL-MCNC: 77 MG/DL
POTASSIUM SERPL-SCNC: 3.6 MMOL/L (ref 3.4–5.3)
PROT SERPL-MCNC: 7.5 G/DL (ref 6.4–8.3)
SODIUM SERPL-SCNC: 135 MMOL/L (ref 136–145)
T4 FREE SERPL-MCNC: 1.3 NG/DL (ref 0.9–1.7)
TRIGL SERPL-MCNC: 209 MG/DL
TSH SERPL DL<=0.005 MIU/L-ACNC: 5.45 UIU/ML (ref 0.3–4.2)

## 2023-07-11 PROCEDURE — 80053 COMPREHEN METABOLIC PANEL: CPT | Performed by: INTERNAL MEDICINE

## 2023-07-11 PROCEDURE — 36415 COLL VENOUS BLD VENIPUNCTURE: CPT | Performed by: INTERNAL MEDICINE

## 2023-07-11 PROCEDURE — 84439 ASSAY OF FREE THYROXINE: CPT | Performed by: INTERNAL MEDICINE

## 2023-07-11 PROCEDURE — 84443 ASSAY THYROID STIM HORMONE: CPT | Performed by: INTERNAL MEDICINE

## 2023-07-11 PROCEDURE — 83036 HEMOGLOBIN GLYCOSYLATED A1C: CPT | Performed by: INTERNAL MEDICINE

## 2023-07-11 PROCEDURE — 99214 OFFICE O/P EST MOD 30 MIN: CPT | Performed by: INTERNAL MEDICINE

## 2023-07-11 PROCEDURE — 80061 LIPID PANEL: CPT | Performed by: INTERNAL MEDICINE

## 2023-07-11 ASSESSMENT — ANXIETY QUESTIONNAIRES: GAD7 TOTAL SCORE: 8

## 2023-07-11 NOTE — PROGRESS NOTES
ASSESSMENT:   1. Moderate major depression (H)  Uncontrolled.  Start Trintellix.  Follow-up with me in 5 weeks regarding mental health status  - vortioxetine (TRINTELLIX) 5 MG tablet; Take 1 tablet (5 mg) by mouth daily  Dispense: 90 tablet; Refill: 1    2. Hypothyroidism, unspecified type  On levothyroxine.  Previously controlled.  Due for lab follow-up  - TSH with free T4 reflex; Future  - TSH with free T4 reflex  - T4 free    3. Type 2 diabetes mellitus without complication, without long-term current use of insulin (H)  Not checking blood sugars.  Previously controlled.  Due for lab follow-up.  Continue diet management pending lab results  - HEMOGLOBIN A1C; Future  - Comprehensive metabolic panel; Future  - HEMOGLOBIN A1C  - Comprehensive metabolic panel    4. Morbid obesity (H)  Weight gain 24 pounds in the last year after previous 20 pound weight loss.   Weight loss when on Victoza but pt stopped med. Await thyroid results. Possible future trial Vini    5. Hyperlipidemia LDL goal <100   On statin. Due for lab follow-up  - Comprehensive metabolic panel; Future  - Lipid panel reflex to direct LDL Non-fasting; Future  - Comprehensive metabolic panel  - Lipid panel reflex to direct LDL Non-fasting    6. Encounter for screening mammogram for breast cancer  No sx  - *MA Screening Digital Bilateral; Future     7. NELY (obstructive sleep apnea)  Not currently on CPAP due to previous machine recall.  Counseled patient to speak with her sleep clinic regarding new CPAP machine        PLAN:   Trintillex 5mg tab,1 tab daily in AM with breakfast for depression. If not helping in 2 weeks and feeling OK, then increase to 2 tabs (10mg) daily   Labs as  ordered   Continue other medications  Covid bivalent booster  and influenza/flu vaccinations in the  Fall (October).  May get through a pharmacy or at clinic  Mammogram   9/9/23 or later  This will be done at the [facility]. Call [phone] or use Orbster  to schedule  or    At  or [email or web address] , then schedule follow-up virtual telephone appointment with me in 5 weeks re: mental health. Call earlier or Mychart message if side effects with the nee medication. If troubles, then will refer to psychiatry  Talk with your sleep clinic regarding possible new CPAP machine for sleep apnea management   Possible future restart GLP1 agonist like Mounjaro for weight loss. Await lab results      (Chart documentation was completed, in part, with Fidbacks voice-recognition software. Even though reviewed, some grammatical, spelling, and word errors may remain.)    Yuval Jama MD  Internal Medicine Department  [facility]      Glenn Mcknight is a 67 year old, presenting for the following health issues:  Depression and Thyroid Problem       History of Present Illness       Mental Health Follow-up:  Patient presents to follow-up on Depression & Anxiety.Patient's depression since last visit has been:  Worse  The patient is not having other symptoms associated with depression.  Patient's anxiety since last visit has been:  Bad  The patient is not having other symptoms associated with anxiety.  Any significant life events: grief or loss  Patient is feeling anxious or having panic attacks.  Patient has no concerns about alcohol or drug use.    She eats 2-3 servings of fruits and vegetables daily.She consumes 0 sweetened beverage(s) daily.She exercises with enough effort to increase her heart rate 9 or less minutes per day.  She exercises with enough effort to increase her heart rate 3 or less days per week.   She is taking medications regularly.       Hyperlipidemia Follow-Up    Are you regularly taking any medication or supplement to lower your cholesterol?   Yes- rosuvastatin  Are you having muscle aches or other side effects that you think could be caused by your cholesterol lowering medication?  No    Hypertension Follow-up    Do you  check your blood pressure regularly outside of the clinic? Yes   Are you following a low salt diet? Yes  Are your blood pressures ever more than 140 on the top number (systolic) OR more   than 90 on the bottom number (diastolic), for example 140/90? No      Diabetes    Most recent lab results reviewed with pt.      Component      Latest Ref Rng 3/14/2022  10:35 AM   WBC      4.0 - 11.0 10e3/uL    RBC Count      3.80 - 5.20 10e6/uL    Hemoglobin      11.7 - 15.7 g/dL    Hematocrit      35.0 - 47.0 %    MCV      78 - 100 fL    MCH      26.5 - 33.0 pg    MCHC      31.5 - 36.5 g/dL    RDW      10.0 - 15.0 %    Platelet Count      150 - 450 10e3/uL    % Neutrophils      %    % Lymphocytes      %    % Monocytes      %    % Eosinophils      %    % Basophils      %    % Immature Granulocytes      %    NRBCs per 100 WBC      <1 /100    Absolute Neutrophils      1.6 - 8.3 10e3/uL    Absolute Lymphocytes      0.8 - 5.3 10e3/uL    Absolute Monocytes      0.0 - 1.3 10e3/uL    Absolute Eosinophils      0.0 - 0.7 10e3/uL    Absolute Basophils      0.0 - 0.2 10e3/uL    Absolute Immature Granulocytes      <=0.4 10e3/uL    Absolute NRBCs      10e3/uL    Sodium      136 - 145 mmol/L 138    Potassium      3.4 - 5.3 mmol/L 3.9    Chloride      94 - 109 mmol/L 103    Carbon Dioxide      20 - 32 mmol/L 27    Anion Gap      7 - 15 mmol/L 8    Urea Nitrogen      7 - 30 mg/dL 11    Creatinine      0.51 - 0.95 mg/dL 0.80    Calcium      8.8 - 10.2 mg/dL 9.2    Glucose      70 - 99 mg/dL 129 (H)    Alkaline Phosphatase      35 - 104 U/L 100    AST      10 - 35 U/L 12    ALT      10 - 35 U/L 21    Protein Total      6.4 - 8.3 g/dL 7.6    Albumin      3.4 - 5.0 g/dL 3.6    Bilirubin Total      <=1.2 mg/dL 0.4    GFR Estimate      >60 mL/min/1.73m2 81    Potassium      3.4 - 5.3 mmol/L    Chloride      98 - 107 mmol/L    Carbon Dioxide (CO2)      22 - 29 mmol/L    Urea Nitrogen      8.0 - 23.0 mg/dL    Glucose      70 - 99 mg/dL    Albumin       3.5 - 5.2 g/dL    Cholesterol      <200 mg/dL 158    Triglycerides      <150 mg/dL 229 (H)    HDL Cholesterol      >=50 mg/dL 64    LDL Cholesterol Calculated      <=100 mg/dL 48    Non HDL Cholesterol      <130 mg/dL 94    Patient Fasting? Yes    Platelet Morphology      Automated Count Confirmed. Platelet morphology is normal.     RBC Morphology    Hemoglobin A1C      0.0 - 5.6 % 6.6 (H)    TSH      0.40 - 4.00 mU/L 1.35      Component      Latest Ref [facility] 9/8/2022  8:38 AM   WBC      4.0 - 11.0 10e3/uL    RBC Count      3.80 - 5.20 10e6/uL    Hemoglobin      11.7 - 15.7 g/dL    Hematocrit      35.0 - 47.0 %    MCV      78 - 100 fL    MCH      26.5 - 33.0 pg    MCHC      31.5 - 36.5 g/dL    RDW      10.0 - 15.0 %    Platelet Count      150 - 450 10e3/uL    % Neutrophils      %    % Lymphocytes      %    % Monocytes      %    % Eosinophils      %    % Basophils      %    % Immature Granulocytes      %    NRBCs per 100 WBC      <1 /100    Absolute Neutrophils      1.6 - 8.3 10e3/uL    Absolute Lymphocytes      0.8 - 5.3 10e3/uL    Absolute Monocytes      0.0 - 1.3 10e3/uL    Absolute Eosinophils      0.0 - 0.7 10e3/uL    Absolute Basophils      0.0 - 0.2 10e3/uL    Absolute Immature Granulocytes      <=0.4 10e3/uL    Absolute NRBCs      10e3/uL    Sodium      136 - 145 mmol/L    Potassium      3.4 - 5.3 mmol/L    Chloride      94 - 109 mmol/L    Carbon Dioxide      20 - 32 mmol/L    Anion Gap      7 - 15 mmol/L    Urea Nitrogen      7 - 30 mg/dL    Creatinine      0.51 - 0.95 mg/dL    Calcium      8.8 - 10.2 mg/dL    Glucose      70 - 99 mg/dL    Alkaline Phosphatase      35 - 104 U/L    AST      10 - 35 U/L    ALT      10 - 35 U/L    Protein Total      6.4 - 8.3 g/dL    Albumin      3.4 - 5.0 g/dL    Bilirubin Total      <=1.2 mg/dL    GFR Estimate      >60 mL/min/1.73m2    Potassium      3.4 - 5.3 mmol/L    Chloride      98 - 107 mmol/L    Carbon Dioxide (CO2)      22 - 29 mmol/L    Urea Nitrogen      8.0 - 23.0  mg/dL    Glucose      70 - 99 mg/dL    Albumin      3.5 - 5.2 g/dL    Cholesterol      <200 mg/dL    Triglycerides      <150 mg/dL    HDL Cholesterol      >=50 mg/dL    LDL Cholesterol Calculated      <=100 mg/dL    Non HDL Cholesterol      <130 mg/dL    Patient Fasting?    Platelet Morphology      Automated Count Confirmed. Platelet morphology is normal.     RBC Morphology    Hemoglobin A1C      0.0 - 5.6 % 6.0 (H)    TSH      0.40 - 4.00 mU/L 0.86      Component      Latest Ref [facility] 3/3/2023  9:23 PM   WBC      4.0 - 11.0 10e3/uL 10.6    RBC Count      3.80 - 5.20 10e6/uL 4.60    Hemoglobin      11.7 - 15.7 g/dL 13.4    Hematocrit      35.0 - 47.0 % 40.3    MCV      78 - 100 fL 88    MCH      26.5 - 33.0 pg 29.1    MCHC      31.5 - 36.5 g/dL 33.3    RDW      10.0 - 15.0 % 14.7    Platelet Count      150 - 450 10e3/uL 350    % Neutrophils      % 38    % Lymphocytes      % 50    % Monocytes      % 7    % Eosinophils      % 3    % Basophils      % 1    % Immature Granulocytes      % 1    NRBCs per 100 WBC      <1 /100 0    Absolute Neutrophils      1.6 - 8.3 10e3/uL 4.0    Absolute Lymphocytes      0.8 - 5.3 10e3/uL 5.3    Absolute Monocytes      0.0 - 1.3 10e3/uL 0.8    Absolute Eosinophils      0.0 - 0.7 10e3/uL 0.3    Absolute Basophils      0.0 - 0.2 10e3/uL 0.1    Absolute Immature Granulocytes      <=0.4 10e3/uL 0.1    Absolute NRBCs      10e3/uL 0.0    Sodium      136 - 145 mmol/L 134 (L)    Potassium      3.4 - 5.3 mmol/L    Chloride      94 - 109 mmol/L    Carbon Dioxide      20 - 32 mmol/L    Anion Gap      7 - 15 mmol/L 11    Urea Nitrogen      7 - 30 mg/dL    Creatinine      0.51 - 0.95 mg/dL 0.76    Calcium      8.8 - 10.2 mg/dL 9.1    Glucose      70 - 99 mg/dL    Alkaline Phosphatase      35 - 104 U/L 85    AST      10 - 35 U/L 21    ALT      10 - 35 U/L 14    Protein Total      6.4 - 8.3 g/dL 7.1    Albumin      3.4 - 5.0 g/dL    Bilirubin Total      <=1.2 mg/dL 0.2    GFR Estimate      >60  "mL/min/1.73m2 85    Potassium      3.4 - 5.3 mmol/L 4.0    Chloride      98 - 107 mmol/L 96 (L)    Carbon Dioxide (CO2)      22 - 29 mmol/L 27    Urea Nitrogen      8.0 - 23.0 mg/dL 13.6    Glucose      70 - 99 mg/dL 116 (H)    Albumin      3.5 - 5.2 g/dL 4.0    Cholesterol      <200 mg/dL    Triglycerides      <150 mg/dL    HDL Cholesterol      >=50 mg/dL    LDL Cholesterol Calculated      <=100 mg/dL    Non HDL Cholesterol      <130 mg/dL    Patient Fasting?    Platelet Morphology      Automated Count Confirmed. Platelet morphology is normal.  Automated Count Confirmed. Platelet morphology is normal.    RBC Morphology Confirmed RBC Indices    Hemoglobin A1C      0.0 - 5.6 %    TSH      0.40 - 4.00 mU/L       Legend:  (H) High  (L) Low    Not checking blood sugars at home.  Not using CPAP due to recall.  Has not spoken with her sleep clinic about new machine. No naps. Some fatigue in daytime  Denies chest pain, shortness of breath, abdominal pain.  Going to bed about 10 PM and getting up at 5 AM  \"Crappy\" mood.  Has been memories from childhood and marriage.  Patient declines mental health counseling as she does not want to bring up memories from the past as she is suppressing them now and states life is better overall now.  Decreased motivation.  Denies current suicidal ideation. GAD7 and PHQ9 scores  noted    Additional ROS:   Constitutional, HEENT, Cardiovascular, Pulmonary, GI and , Neuro, MSK and Psych review of systems/symptoms are otherwise negative or unchanged from previous, except as noted above.      OBJECTIVE:  /74   Pulse 60   Temp 97.3  F (36.3  C) (Temporal)   Ht 1.575 m (5' 2\")   Wt 126.5 kg (278 lb 12.8 oz)   SpO2 97%   BMI 50.99 kg/m     Estimated body mass index is 50.99 kg/m  as calculated from the following:    Height as of this encounter: 1.575 m (5' 2\").    Weight as of this encounter: 126.5 kg (278 lb 12.8 oz).     Neck: no adenopathy. Thyroid normal to palpation. No " bruits  Pulm: Lungs clear to auscultation   CV: Regular rates and rhythm  GI: Soft, nontender, Normal active bowel sounds, No hepatosplenomegaly or masses palpable  Ext: Peripheral pulses intact. Trace BLE edema.  Neuro: Normal strength and tone, sensory exam grossly normal  Gen: Flattened affect.  Normal dress, thought content and eye contact

## 2023-07-11 NOTE — LETTER
July 11, 2023    DANIELLE VELASQUEZ  [address]   [place] [address]      Dear Danielle,    Welcome to [facility] health program. My name is Jaymie Coats RN. I am your [facility] care coordinator. You're eligible for care coordination through your [facility] Product.    As your care coordinator, we ll:  Meet to go over your care coordination benefits  Talk about your physical and mental health care needs   Review your preventative care needs  Create a plan that meets your needs with the services you choose    What happens next?  I ll call you soon to introduce myself and tell you more about my role. We ll then plan time to go over your health and safety needs. Our goal is to keep you as healthy and independent as possible.    [facility] combines the benefits you may already have receive from Medical Assistance, Medicare and the Prescription Drug Coverage Program. Soon you'll receive a new member identification (ID) card from [facility]. Use this card whenever you get health services.    The [facility] care coordination program is voluntary and offered to you at no cost. If you wish to stop being in the care coordination program or have questions, call me at [phone]. If you reach my voicemail, leave a message and your phone number. TTY users, call the [place] Relay at 009 or [phone] (reyjbi-[email or web address] relay service).    Sincerely,    Jaymie Coats RN    E-mail: [email or web address]  Phone: [phone]      [facility] ([facility] [place] is a health plan that contracts with both Medicare and the [place] Medical Assistance (Medicaid) program to provide benefits of both programs to enrollees. Enrollment in [facility] depends on contract renewal.    [id number] accepted   [id number]         [id number] (07/2022)

## 2023-07-11 NOTE — PATIENT INSTRUCTIONS
Trintillex 5mg tab,1 tab daily in AM with breakfast for depression. If not helping in 2 weeks and feeling OK, then increase to 2 tabs (10mg) daily   Labs as  ordered   Continue other medications  Covid bivalent booster  and influenza/flu vaccinations in the  Fall (October).  May get through a pharmacy or at clinic  Mammogram   9/9/23 or later  [facility] Call [phone] or use AirPlug to schedule  or    At  or [employer] , then schedule follow-up virtual telephone appointment with me in 5 weeks re: mental health. Call earlier or AirPlug message if side effects with the nee medication. If troubles, then will refer to psychiatry  Talk with your sleep clinic regarding possible new CPAP machine for sleep apnea management  Possible future restart GLP1 agonist like Mounjaro for weight loss. Await lab results

## 2023-07-11 NOTE — PROGRESS NOTES
[facility] Care Coordination Contact    Member changed health plan products from [facility] MSC+ to [facility] MSHO effective 7/1/2023. CC to complete items on Product Change/ Health Plan Change check list including MMIS entry. CMS verified [place] and other required tasks.    Vandana Belcher  Case Management Specialist  [facility]  [phone]

## 2023-07-13 ENCOUNTER — PATIENT OUTREACH (OUTPATIENT)
Dept: CARE COORDINATION | Facility: CLINIC | Age: 68
End: 2023-07-13
Payer: COMMERCIAL

## 2023-07-20 ENCOUNTER — PATIENT OUTREACH (OUTPATIENT)
Dept: GERIATRIC MEDICINE | Facility: CLINIC | Age: 68
End: 2023-07-20
Payer: COMMERCIAL

## 2023-07-20 NOTE — PROGRESS NOTES
[facility] Care Coordination Contact      [facility] Health Plan or Product Change    7/20/2023 CC received notification that member's health plan or health plan product changed from [employer] MSC+ to [employer] MSHO effective 7/1/2023.  CC contacted member and discussed change. Member declined need for home visit. CC reviewed previous Health Risk Assessment/LTCC and POC with member and no changes noted.    Transitional HRA completed. Care Plan Summary updated and reflects current services.  Required referral authorization information communicated to CMS: No  Reviewed [employer] Supplemental Benefits for 2023, member stated an interested in the Reemo watch, Electric Toothbrush, One Pass. Shared information on the Grand pad, MSHO  Service and Healthy Savings as member member has appropriate dx for benefit, explained that she should watch her mail and if no information rec'd by end of this month, she should contact [facility] customer service.      Follow-Up Plan: Member informed of future contact, plan to f/u with member with at next regularly scheduled contact.  Contact information shared with member and family, encouraged member to call with any questions or concerns.    [facility] Six-Month Telephone Assessment    6 month telephone assessment completed on 7/20/2023.    ER visits: Yes -  [facility]  Hospitalizations: No  TCU stays: No  Significant health status changes: Recently started on an antidepressant for depression  Falls/Injuries: No  ADL/IADL changes: No  Changes in services: No    Caregiver Assessment follow up:  Na    Goals: See POC in chart for goal progress documentation.    Member has gained weight, she stated an interest in One Pass, CC will provide information, she is aware of the 3 round trip rides to a fitness center. CC offered information on [facility] Supplemental benefit for Weight Watchers, member not interested at this time.   Member continues to be on the wait  "list to move to the apartment where her sister resides.   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   Member did follow up with PCP to review PHQ and was started on an antidepressant. Member shared she has been down and stressed about a friend who recently passed away, \"but doing ok.\"   Member did complete an MTM appointment and cardiology. Member will schedule her follow up sleep appointment to review CPAP options.     Will see member in 6 months for an annual health risk assessment.   Encouraged member to call CC with any questions or concerns in the meantime.   Jaymie Coats RN, BC  Manager [facility] Care Coordinator   [phone]                 "

## 2023-07-24 ENCOUNTER — PATIENT OUTREACH (OUTPATIENT)
Dept: GERIATRIC MEDICINE | Facility: CLINIC | Age: 68
End: 2023-07-24
Payer: COMMERCIAL

## 2023-07-24 NOTE — PROGRESS NOTES
[facility] Care Coordination Contact    CC placed referrals for Reemo Smart Watch and Grand Pad.  Letter completed and to be mailed to member with above information and information on how to obtain the electric toothbrush and One Pass (participating providers).  Jaymie Coats RN, BC  Manager [facility] Care Coordinator   [phone]  (Fax)     agree

## 2023-07-26 NOTE — PROGRESS NOTES
[facility] Care Coordination Contact    Rec'd e-mail 7/25/2023 to report that the order has been placed, and member can expect to see the GrandPad delivered within 5-7 business days.  Jaymie Coats RN, BC  Manager [facility] Care Coordinator   [phone]  (Fax)

## 2023-08-01 NOTE — PROGRESS NOTES
[facility] Care Coordination Contact    Call placed to member to discuss referral for the Grand Pad. Member stated that she rec'd the Grand Pad last Friday and that she missed a call from a person to assist with questions but they stated that she would call her back. Member stated that she feels comfortable with the equipment and has been playing with it.  She is aware of the [place] sindy that can provide her more information.  Jaymie Coats RN, BC  Manager [facility] Care Coordinator   [phone]  (Fax)

## 2023-08-09 VITALS
HEART RATE: 60 BPM | OXYGEN SATURATION: 97 % | WEIGHT: 278.8 LBS | SYSTOLIC BLOOD PRESSURE: 136 MMHG | DIASTOLIC BLOOD PRESSURE: 74 MMHG | TEMPERATURE: 97.3 F | BODY MASS INDEX: 51.3 KG/M2 | HEIGHT: 62 IN

## 2023-08-09 PROBLEM — D47.3 ESSENTIAL (HEMORRHAGIC) THROMBOCYTHEMIA (H): Status: RESOLVED | Noted: 2022-07-19 | Resolved: 2023-08-09

## 2023-08-18 DIAGNOSIS — K92.2 ACUTE GI BLEEDING: ICD-10-CM

## 2023-08-18 DIAGNOSIS — E03.9 HYPOTHYROIDISM, UNSPECIFIED TYPE: ICD-10-CM

## 2023-08-18 DIAGNOSIS — E78.5 HYPERLIPIDEMIA LDL GOAL <100: ICD-10-CM

## 2023-08-18 RX ORDER — LEVOTHYROXINE SODIUM 125 UG/1
125 TABLET ORAL DAILY
Qty: 90 TABLET | Refills: 3 | OUTPATIENT
Start: 2023-08-18

## 2023-08-18 RX ORDER — LEVOTHYROXINE SODIUM 137 UG/1
137 TABLET ORAL DAILY
Qty: 90 TABLET | Refills: 1 | Status: SHIPPED | OUTPATIENT
Start: 2023-08-18 | End: 2023-11-13

## 2023-08-18 RX ORDER — ROSUVASTATIN CALCIUM 10 MG/1
10 TABLET, COATED ORAL DAILY
Qty: 90 TABLET | Refills: 3 | Status: SHIPPED | OUTPATIENT
Start: 2023-08-18 | End: 2024-09-13

## 2023-08-18 RX ORDER — PANTOPRAZOLE SODIUM 40 MG/1
40 TABLET, DELAYED RELEASE ORAL DAILY
Qty: 90 TABLET | Refills: 3 | Status: SHIPPED | OUTPATIENT
Start: 2023-08-18 | End: 2024-07-02

## 2023-08-18 ASSESSMENT — PATIENT HEALTH QUESTIONNAIRE - PHQ9: SUM OF ALL RESPONSES TO PHQ QUESTIONS 1-9: 1

## 2023-08-18 NOTE — TELEPHONE ENCOUNTER
Spoke with patient and reviewed recent labs from July.  Thyroid function too low.  Will stop levothyroxine 125 mcg tablet and instead start levothyroxine 137 mcg tablet, 1 tablet daily.  Patient will repeat thyroid lab nonfasting in 6 weeks and [t will schedule appointment.  Other medications refilled.  Patient now taking Trintellix 5 mg daily and states mood is doing very well.  PHQ-9 assessment redone with score of 1.  Will await thyroid lab results again in 6 weeks

## 2023-08-23 ENCOUNTER — PATIENT OUTREACH (OUTPATIENT)
Dept: GERIATRIC MEDICINE | Facility: CLINIC | Age: 68
End: 2023-08-23
Payer: COMMERCIAL

## 2023-08-23 NOTE — PROGRESS NOTES
[facility] Care Coordination Contact      CHW,spoke w/ mbr to remind to schedule eye and wellness exams. Mbr, noted she has seen other eye dr. and has not been happy. CHW, asked Mbr would she like to go to [facility] which is close to her  home Mbr noted yes. CHW scheduled appt for Mbr.     Appt:9/28/23 @8:30am  Dr: Therese Perez     Mbr will follow up to schedule wellness exam.     TRINIDAD Chavez  [facility]  [phone]

## 2023-08-31 ENCOUNTER — PATIENT OUTREACH (OUTPATIENT)
Dept: GERIATRIC MEDICINE | Facility: CLINIC | Age: 68
End: 2023-08-31
Payer: COMMERCIAL

## 2023-08-31 NOTE — PROGRESS NOTES
[facility] Care Coordination Contact    Call placed to member to follow up on Grand Pad and Reemo watch ([facility] supplemental benefit). Per  member that Grand Pad is working great, but the  for the watch is not working. CC will send email to [facility] support to contact her to provide assistance.    Email sent to [email or web address] to request follow up with member regarding .  Jaymie Coats RN, BC  Manager [facility] Care Coordinator   [phone]  (Fax)

## 2023-09-26 ENCOUNTER — PATIENT OUTREACH (OUTPATIENT)
Dept: GERIATRIC MEDICINE | Facility: CLINIC | Age: 68
End: 2023-09-26
Payer: COMMERCIAL

## 2023-09-26 NOTE — PROGRESS NOTES
[facility] Care Coordination Contact    Internal CC change effective 10/01/2023.  Mailed member CC Change letter.  Additional tasks to be completed by CMS include: update database & EPIC, enter CC Change in MMIS, and move member file.

## 2023-09-26 NOTE — LETTER
September 26, 2023    DANIELLE VELASQUEZ  [address]   [place] [address]      Dear Danielle:    As a member of [facility] ([facility]), you are provided a care coordinator. I will be your new care coordinator as of 10/01/2023. I will be calling you soon to see how you are doing and determine your needs.    If you have any questions, please feel free to call me at [phone]. If you reach my voice mail, please leave a message and your phone number. If you are hearing impaired, please call the [place] Relay at 478 or [phone] ([email or web address]-tbrhhu relay service).    I look forward to speaking with you soon.    Sincerely,    Virginia Mairna RN, BSN  [phone]  [email or web address] is a health plan that contracts with both Medicare and the [place] Medical Assistance (Medicaid) program to provide benefits of both programs to enrollees. Enrollment in [facility] depends on contract renewal.      [facility]  [id number] DHS Approved ([id number])  [id number] (11/18)

## 2023-09-30 ENCOUNTER — HEALTH MAINTENANCE LETTER (OUTPATIENT)
Age: 68
End: 2023-09-30

## 2023-11-13 DIAGNOSIS — E03.9 HYPOTHYROIDISM, UNSPECIFIED TYPE: ICD-10-CM

## 2023-11-13 RX ORDER — LEVOTHYROXINE SODIUM 137 UG/1
137 TABLET ORAL DAILY
Qty: 30 TABLET | Refills: 0 | Status: SHIPPED | OUTPATIENT
Start: 2023-11-13 | End: 2024-02-05

## 2023-11-14 NOTE — TELEPHONE ENCOUNTER
Overdue for TSH recheck after dose increase. HAs appt in December. WIll refill for 30 days for now and recheck TSH at appt in Dec. If OK, will then write for longterm refill

## 2023-12-07 DIAGNOSIS — F32.1 MODERATE MAJOR DEPRESSION (H): ICD-10-CM

## 2023-12-07 RX ORDER — VORTIOXETINE 5 MG/1
5 TABLET, FILM COATED ORAL DAILY
Qty: 30 TABLET | Refills: 6 | Status: SHIPPED | OUTPATIENT
Start: 2023-12-07 | End: 2024-03-01 | Stop reason: DRUGHIGH

## 2023-12-08 ENCOUNTER — PATIENT OUTREACH (OUTPATIENT)
Dept: GERIATRIC MEDICINE | Facility: CLINIC | Age: 68
End: 2023-12-08
Payer: COMMERCIAL

## 2023-12-08 NOTE — PROGRESS NOTES
[facility] Care Coordination Contact    Called member to schedule annual HRA home visit. HRA has been scheduled for Thursday 12/14/23 at 10:00 AM.  Care coordinator gave member contact information in case she needs to reschedule.    Virginia Marina RN  [facility]  [phone]

## 2023-12-09 ENCOUNTER — HEALTH MAINTENANCE LETTER (OUTPATIENT)
Age: 68
End: 2023-12-09

## 2023-12-14 ENCOUNTER — PATIENT OUTREACH (OUTPATIENT)
Dept: GERIATRIC MEDICINE | Facility: CLINIC | Age: 68
End: 2023-12-14
Payer: COMMERCIAL

## 2023-12-14 SDOH — HEALTH STABILITY: PHYSICAL HEALTH: ON AVERAGE, HOW MANY MINUTES DO YOU ENGAGE IN EXERCISE AT THIS LEVEL?: 0 MIN

## 2023-12-14 SDOH — HEALTH STABILITY: PHYSICAL HEALTH: ON AVERAGE, HOW MANY DAYS PER WEEK DO YOU ENGAGE IN MODERATE TO STRENUOUS EXERCISE (LIKE A BRISK WALK)?: 0 DAYS

## 2023-12-14 ASSESSMENT — SOCIAL DETERMINANTS OF HEALTH (SDOH)
DO YOU BELONG TO ANY CLUBS OR ORGANIZATIONS SUCH AS CHURCH GROUPS UNIONS, FRATERNAL OR ATHLETIC GROUPS, OR SCHOOL GROUPS?: NO
HOW OFTEN DO YOU ATTEND CHURCH OR RELIGIOUS SERVICES?: 1 TO 4 TIMES PER YEAR
HOW OFTEN DO YOU ATTENT MEETINGS OF THE CLUB OR ORGANIZATION YOU BELONG TO?: NEVER
IN A TYPICAL WEEK, HOW MANY TIMES DO YOU TALK ON THE PHONE WITH FAMILY, FRIENDS, OR NEIGHBORS?: MORE THAN THREE TIMES A WEEK
HOW OFTEN DO YOU GET TOGETHER WITH FRIENDS OR RELATIVES?: TWICE A WEEK

## 2023-12-14 ASSESSMENT — ACTIVITIES OF DAILY LIVING (ADL): DEPENDENT_IADLS:: CLEANING;TRANSPORTATION

## 2023-12-14 ASSESSMENT — PATIENT HEALTH QUESTIONNAIRE - PHQ9: SUM OF ALL RESPONSES TO PHQ QUESTIONS 1-9: 4

## 2023-12-14 NOTE — Clinical Note
Hi Dr. Jama, I am Roxanna's new care coordinator from [facility].  I was out to her apartment today to see her and completed her yearly assessment. Please reach out if you have any questions or concerns. Thanks, Virginia Marina RN [facility] [phone]

## 2023-12-14 NOTE — COMMUNITY RESOURCES LIST (ENGLISH)
12/14/2023    Melody [employer]  N/A  For questions about this resource list or additional care needs, please contact your primary care clinic or care manager.  Phone: [phone]   Email: N/A   Address: [address] [place] [address]   Hours: N/A        Exercise and Recreation       Gym or workout facility  1  [employer] [facility] Distance: 0.92 miles      In-Person   [address] [place] [address]  Language: English  Hours: Mon - Sun Open 24 Hours  Fees: Insurance, Self Pay, Sliding Fee   Phone: (825) 299-4813 Website: [email or web address]     2  [facility]  [facility] Distance: 1.25 miles      In-Person   [address] [place] [address]  Language: English  Hours: Mon - Fri 5:00 AM - 9:00 PM , Sat - Sun 7:00 AM - 5:00 PM  Fees: Free, Insurance, Self Pay, Sliding Fee   Phone: (733) 842-3254 Email: customerservice@[email or web address] Website: [email or web address]://[email or web address]/locations/[email or web address]_ca          Important Numbers & Websites       Emergency Services   911  [facility] Services   311  Poison Control   (644) 657-1144  Suicide Prevention Lifeline   (401) 739-3410 (TALK)  Child Abuse Hotline   (934) 291-3125 (4-A-Child)  Sexual Assault Hotline   (835) 646-2080 (HOPE)  National Runaway Safeline   (153) 333-3725 (RUNAWAY)  All-Options Talkline   (800) 771-2376  Substance Abuse Referral   (602) 605-7669 (HELP)

## 2023-12-14 NOTE — PROGRESS NOTES
[facility] Care Coordination Contact    [facility] Home Visit Assessment     Home visit for Health Risk Assessment with Maxine Sears completed on December 14, 2023    Type of residence:: Apartment  Current living arrangement: I live alone     Assessment completed with: Patient    Current Care Plan  Member currently receiving the following home care services:  None at this time  Member currently receiving the following community resources: Care Coordination. PERS pendant. [employer] mobility and medical transportation.      Medication Review  Medication reconciliation completed in Epic: Yes  Medication set-up & administration: Independent and sets up on own weekly.  Self-administers medications.  Medication Risk Assessment Medication (1 or more, place referral to MTM): N/A: No risk factors identified  MTM Referral Placed: No: None needed at this time.    Mental/Behavioral Health   Depression Screening:   PHQ-2 Total Score (Adult) - Positive if 3 or more points; Administer PHQ-9 if positive: 0  PHQ-9 Total Score: 4    Mental health DX:: Yes   Mental health DX how managed:: Medication, BHC Services at Primary Care    Falls Assessment:   Fallen 2 or more times in the past year?: No   Any fall with injury in the past year?: No    ADL/IADL Dependencies:   Dependent ADLs:: Ambulation-walker  Dependent IADLs:: Cleaning, Transportation (Receives help from friend and a house keeping for deep cleaning her apartment. Friend, Donita Jordan picks her up weekly for grocery shopping and going to [place] X2 per week.)    Health Plan sponsored benefits: [facility]: Reviewed preventative health screening and health plan supplemental benefits/incentives. Reviewed medication disposal form.    PCA Assessment completed at visit: Not Applicable     Elderly Waiver Eligibility: Yes, but member declines EW services; will not open to EW    Care Plan & Recommendations: Roxanna is her own guardian and representative payee. Roxanna  currently resides in a senior supportive building, she shared that she is no longer interested  in moving as it is too difficult and she likes where she is living currently. Roxanna's friend, Donita Jordan picks her up twice per week to attend community functions at [facility] where she enjoys sewing. She reports that Pat is her friend in her apartment building that she is close with and sees her frequently each week and enjoys attending events such as Bingo within her building. Her main goals at this time are to lose weight which she feels will help with her chronic low back and knee pain. She plans to make a follow up appointment with Kaylynn as she reports relief from the injection treatments she received at her last visit which are now wearing off. She would like to get approved for a scooter and this care coordinator will work on this with Roxanna. Roxanna is open to elderly waiver but due to her monthly obligation prefers to decline at this time. She receives housekeeping assistance as needed and this weekend her  is coming over to help de-clutter her living room and kitchen as well as do some deep cleaning such as vacuuming. She reports wearing her PERS pendant when her pain level is increased over a 6 and when she is up and about for an extended period of time. Roxanna is interested in obtaining a scooter so she can get to and from shopping close to her home. She has a friends in the building, Krystal who currently has one and they would be able to do this together as she currently lives close to grocery and other shopping areas.  This care coordinator will follow-up on this and see if Roxanna would like a referral from Dr. Jama to the wheelchair seating an and mobility clinic where she would be assessed by OT to see if she qualifies for a scooter.    See [facility] for detailed assessment information.    Follow-Up Plan: Member informed of future contact, plan to f/u with member with a 6 month telephone  assessment.  Contact information shared with member and she is encouraged to call with any questions or concerns at any time.    [place] care continuum providers: Please see Snapshot and Care Management Flowsheets for Specific details of care plan.    This CC note routed to PCP, Yuval Jama RN  [facility]  [phone]

## 2023-12-14 NOTE — COMMUNITY RESOURCES LIST (ENGLISH)
12/14/2023    OPE [employer]view [email or web address]  N/A  For questions about this resource list or additional care needs, please contact your primary care clinic or care manager.  Phone: [phone]   Email: N/A   Address: [address] [place] [address]   Hours: N/A        Exercise and Recreation       Gym or workout facility  1  [employer] [facility] Distance: 0.92 miles      In-Person   [address] [place] [address]  Language: English  Hours: Mon - Sun Open 24 Hours  Fees: Insurance, Self Pay, Sliding Fee   Phone: (872) 976-8972 Website: [email or web address]     2  [facility]  the [facility] Distance: 1.25 miles      In-Person   [address] [place] [address]  Language: English  Hours: Mon - Fri 5:00 AM - 9:00 PM , Sat - Sun 7:00 AM - 5:00 PM  Fees: Free, Insurance, Self Pay, Sliding Fee   Phone: (251) 943-2873 Email: [email or web address] Website: [email or web address]://[email or web address] Communicationscamn.org/locations/[facility]_ca          Important Numbers & Websites       Emergency Services   911  [facility] Services   311  Poison Control   (785) 839-7265  Suicide Prevention Lifeline   (588) 874-5707 (TALK)  Child Abuse Hotline   (335) 168-4789 (4-A-Child)  Sexual Assault Hotline   (189) 959-6584 (HOPE)  National Runaway Safeline   (988) 826-9544 (RUNAWAY)  All-Options Talkline   (449) 634-3668  Substance Abuse Referral   (700) 468-1031 (HELP)

## 2023-12-14 NOTE — COMMUNITY RESOURCES LIST (ENGLISH)
12/14/2023    [employer] Orders  N/A  For questions about this resource list or additional care needs, please contact your primary care clinic or care manager.  Phone: [phone]   Email: N/A   Address: [address] [place] [address]   Hours: N/A        Exercise and Recreation       Gym or workout facility  1  [employer] Distance: 0.92 miles      In-Person   [address] [place] [address]  Language: English  Hours: Mon - Sun Open 24 Hours  Fees: Insurance, Self Pay, Sliding Fee   Phone: (112) 286-4514 Website: [email or web address]     2  [facility]  the [facility] Distance: 1.25 miles      In-Person   [address] [place] [address]  Language: English  Hours: Mon - Fri 5:00 AM - 9:00 PM , Sat - Sun 7:00 AM - 5:00 PM  Fees: Free, Insurance, Self Pay, Sliding Fee   Phone: (865) 602-8393 Email: [email or web address].org Website: https://www.Abundance Generationcamn.org/locations/[facility]_ca          Important Numbers & Websites       Emergency Services   911  [facility] Services   311  Poison Control   (134) 787-5281  Suicide Prevention Lifeline   (615) 205-4015 (TALK)  Child Abuse Hotline   (863) 406-5717 (4-A-Child)  Sexual Assault Hotline   (762) 920-9036 (HOPE)  National Runaway Safeline   (201) 758-7610 (RUNAWAY)  All-Options Talkline   (426) 902-1021  Substance Abuse Referral   (802) 290-8458 (HELP)

## 2023-12-14 NOTE — COMMUNITY RESOURCES LIST (ENGLISH)
12/14/2023    weartolookview [employer]elo  N/A  For questions about this resource list or additional care needs, please contact your primary care clinic or care manager.  Phone: [phone]   Email: N/A   Address: [address] [place] [address]   Hours: N/A        Exercise and Recreation       Gym or workout facility  1  [employer] [facility] Distance: 0.92 miles      In-Person   [address] [place] [address]  Language: English  Hours: Mon - Sun Open 24 Hours  Fees: Insurance, Self Pay, Sliding Fee   Phone: (862) 992-6040 Website: [email or web address]     2  [facility]  the [facility] Distance: 1.25 miles      In-Person   [address] [place] [address]  Language: English  Hours: Mon - Fri 5:00 AM - 9:00 PM , Sat - Sun 7:00 AM - 5:00 PM  Fees: Free, Insurance, Self Pay, Sliding Fee   Phone: (426) 962-5811 Email: customerservice@[employer].org Website: https://www.Simplificarecamn.org/locations/[facility]_ca          Important Numbers & Websites       Emergency Services   911  [facility] Services   311  Poison Control   (988) 844-3095  Suicide Prevention Lifeline   (462) 888-1335 (TALK)  Child Abuse Hotline   (386) 932-4237 (4-A-Child)  Sexual Assault Hotline   (803) 323-3213 (HOPE)  National Runaway Safeline   (800) 260-9270 (RUNAWAY)  All-Options Talkline   (455) 322-9573  Substance Abuse Referral   (470) 732-5232 (HELP)

## 2023-12-14 NOTE — COMMUNITY RESOURCES LIST (ENGLISH)
12/14/2023    [employer]  N/A  For questions about this resource list or additional care needs, please contact your primary care clinic or care manager.  Phone: [phone]   Email: N/A   Address: [address] [place] [address]   Hours: N/A        Exercise and Recreation       Gym or workout facility  1  [employer] [facility] Distance: 0.92 miles      In-Person   [address] [place] [address]  Language: English  Hours: Mon - Sun Open 24 Hours  Fees: Insurance, Self Pay, Sliding Fee   Phone: (337) 872-8707 Website: [email or web address]     2  [facility]  [facility] Distance: 1.25 miles      In-Person   [address] [place] [address]  Language: English  Hours: Mon - Fri 5:00 AM - 9:00 PM , Sat - Sun 7:00 AM - 5:00 PM  Fees: Free, Insurance, Self Pay, Sliding Fee   Phone: (425) 425-6270 Email: [email or web address].org Website: https://www.Sentrigocamn.org/locations/[facility]_ca          Important Numbers & Websites       Emergency Services   911  [facility] Services   311  Poison Control   (833) 812-5536  Suicide Prevention Lifeline   (630) 167-7022 (TALK)  Child Abuse Hotline   (137) 905-9356 (4-A-Child)  Sexual Assault Hotline   (413) 481-9174 (HOPE)  National Runaway Safeline   (799) 732-8385 (RUNAWAY)  All-Options Talkline   (840) 794-3973  Substance Abuse Referral   (484) 309-6772 (HELP)

## 2023-12-19 ENCOUNTER — PATIENT OUTREACH (OUTPATIENT)
Dept: GERIATRIC MEDICINE | Facility: CLINIC | Age: 68
End: 2023-12-19
Payer: COMMERCIAL

## 2023-12-19 DIAGNOSIS — R26.9 ABNORMAL GAIT: Primary | ICD-10-CM

## 2023-12-19 DIAGNOSIS — M54.16 LUMBAR RADICULOPATHY: ICD-10-CM

## 2023-12-19 DIAGNOSIS — E66.01 MORBID OBESITY (H): ICD-10-CM

## 2023-12-19 NOTE — PROGRESS NOTES
"[facility] Care Coordination Contact    Left Roxanna a voicemail requesting a call back, re: scooter and requesting verification of Donita Jordan's phone number (friend and informal support).  Awaiting call back.    Virginia Marina RN  [facility]  [phone]    12/19/23 12:40PM: received call back from Roxanna to discuss process of evaluation for obtaining a scooter. Care coordinator informed her that 1st step is to be evaluated by OT at the wheelchair, seating and mobility clinic.  Roxanna would like to move forward with this process.  Care coordinator informed her that message will be sent to Dr. Jama requesting referral to wheelchair seating and mobility clinic. Once referral is placed the clinic will call Roxanna to schedule an appointment for the evaluation.  If OT recommends scooter this care coordinator can assist with authorization process under her medical assistance coverage. Roxanna is in agreement to this plan.      She also gives me Donita Camacho phone number: Cell: [phone]. This care coordinator will call her to attempt to complete informal care giver assessment form.      Virginia Marina RN  [facility]  [phone]    12/20/23:  Note received from Dr. Jama:   Referral placed for OT evaluation for a scooter as requested by CC. Inform pt central scheduling will call her to schedule and if she has not heard from them in next  2 business days, pt to then call call [phone] for [facility]. However, not clear that pt will quality for scooter. Have not seen pt for awhile but most recent  Care Coordination visit note from 12/14/23 states that  she is ambulating with a walker and \"Roxanna is interested in obtaining a scooter so she can get to and from shopping close to her home\". Generally, Medicare will only cover scooter if needed for mobility use IN HER HOME (apartment) and has nothing to do with general mobility needs in her building or to be able to go out shopping. " Since it sounds like pt is able to ambulate and do ADLs in her home with a walker, probable will not qualify unless being paid for through county billing/funds that may have different requirements than Medicare but will await to see both what OT says and also what paperwork I may be eventually sent to address and be asked to sign. Will route this back to Virginia Marina to make pt aware of plan and so she is aware.     This care coordinator will advise member of the above, re: scheduling and will await evaluation by OT.  Virginia Marina RN  [facility]  [phone]

## 2023-12-20 ENCOUNTER — PATIENT OUTREACH (OUTPATIENT)
Dept: GERIATRIC MEDICINE | Facility: CLINIC | Age: 68
End: 2023-12-20
Payer: COMMERCIAL

## 2023-12-20 NOTE — TELEPHONE ENCOUNTER
" Referral placed for OT evaluation for a scooter as requested by CC. Inform pt central scheduling will call her to schedule and if she has not heard from them in next  2 business days, pt to then call call [phone] for  Dinamundo Snyder. However, not clear that pt will quality for scooter. Have not seen pt for awhile but most recent  Care Coordination visit note from 12/14/23 states that  she is ambulating with a walker and \"Roxanna is interested in obtaining a scooter so she can get to and from shopping close to her home\". Generally, Medicare will only cover scooter if needed for mobility use IN HER HOME (apartment) and has nothing to do with general mobility needs in her building or to be able to go out shopping. Since it sounds like pt is able to ambulate and do ADLs in her home with a walker, probable will not qualify unless being paid for through [facility] billing/funds that may have different requirements than Medicare but will await to see both what OT says and also what paperwork I may be eventually sent to address and be asked to sign. Will route this back to Virginia Marina to make pt aware of plan and so she is aware  "

## 2023-12-20 NOTE — PROGRESS NOTES
[facility] Care Coordination Contact    Received after visit chart from care coordinator.  Completed following tasks: Mailed copy of care plan/support plan to member, Mailed Safe Medication Disposal , Submitted referrals/auths for PERS, and Updated services in Database   and Provider Signature - No POC Shared:  Member indicates that they do not want their POC shared with any EW providers.    Vandaan Belcher  Case Management Specialist  [facility]  [phone]

## 2023-12-20 NOTE — LETTER
December 20, 2023      DANIELLE VELASQUEZ  [address]   [facility] [place] [place] [address]      Dear Danielle:    At [facility], we re dedicated to improving your health and wellness. Enclosed is the Care Plan developed with you on 12/14/2023. Please review the Care Plan carefully.    As a reminder, during your visit we talked about:  Ways to manage your physical and mental health  Using health care to maintain and improve your health   Your preventive care needs     Remember to contact your care coordinator if you:  Are hospitalized, or plan to be hospitalized   Have a fall    Have a change in your physical or mental health  Need help finding support or services    If you have questions, or don t agree with your Care Plan, call me at [phone]. You can also call me if your needs change. TTY users, call the [place] Relay at (586) or 1-546.484.9377 (cfiwfa-rx-xtamqi relay service).    Sincerely,    Virginia Marina RN, BSN  [phone]  [email or web address] is a health plan that contracts with both Medicare and the [place] Medical Assistance (Medicaid) program to provide benefits of both programs to enrollees. Enrollment in [facility] depends on contract renewal.    [id number] accepted    [id number] (07/2022)

## 2024-01-02 DIAGNOSIS — I10 ESSENTIAL HYPERTENSION: ICD-10-CM

## 2024-01-03 RX ORDER — LOSARTAN POTASSIUM 100 MG/1
TABLET ORAL
Qty: 90 TABLET | Refills: 1 | Status: SHIPPED | OUTPATIENT
Start: 2024-01-03 | End: 2024-02-22

## 2024-01-03 NOTE — TELEPHONE ENCOUNTER
Prescription approved per [facility] Refill Protocol.  Socorro Mariscal, RN  [facility] Triage Nurse

## 2024-01-04 ENCOUNTER — TELEPHONE (OUTPATIENT)
Dept: INTERNAL MEDICINE | Facility: CLINIC | Age: 69
End: 2024-01-04
Payer: COMMERCIAL

## 2024-01-13 DIAGNOSIS — M54.16 LUMBAR RADICULOPATHY: ICD-10-CM

## 2024-01-14 RX ORDER — GABAPENTIN 300 MG/1
CAPSULE ORAL
Qty: 270 CAPSULE | Refills: 1 | Status: SHIPPED | OUTPATIENT
Start: 2024-01-14 | End: 2024-07-24

## 2024-01-30 ENCOUNTER — THERAPY VISIT (OUTPATIENT)
Dept: OCCUPATIONAL THERAPY | Facility: CLINIC | Age: 69
End: 2024-01-30
Attending: INTERNAL MEDICINE
Payer: COMMERCIAL

## 2024-01-30 DIAGNOSIS — E66.01 MORBID OBESITY (H): ICD-10-CM

## 2024-01-30 DIAGNOSIS — Z78.9 IMPAIRED MOBILITY AND ADLS: Primary | ICD-10-CM

## 2024-01-30 DIAGNOSIS — Z74.09 IMPAIRED MOBILITY AND ADLS: Primary | ICD-10-CM

## 2024-01-30 DIAGNOSIS — M54.16 LUMBAR RADICULOPATHY: ICD-10-CM

## 2024-01-30 DIAGNOSIS — R26.9 ABNORMAL GAIT: ICD-10-CM

## 2024-01-30 PROCEDURE — 97542 WHEELCHAIR MNGMENT TRAINING: CPT | Mod: GO | Performed by: OCCUPATIONAL THERAPIST

## 2024-01-30 NOTE — PROGRESS NOTES
"                                                                             SEATING AND WHEELED MOBILITY ASSESSMENT    [facility] Rehabilitation Services  Occupational Therapy   Date of service: January 30, 2024    Referring provider:    Yuval Jama MD     Order Date 12/19/23  Onset Date: 12/19/23    Order Details: rubens reilly    Funding:[facility]    Vendor: [employer]    Height/Weight: 5'2\" / 262    Medical diagnosis:   Nervous and Auditory  Migraine without aura and without status migrainosus, not intractable  Chronic pain of left knee  Lumbar radiculopathy     Respiratory  NELY on CPAP  Pulmonary nodules     Digestive  Morbid obesity (H)  Gastroesophageal reflux disease, esophagitis presence not specified  Iron deficiency     Endocrine  Hypothyroidism  Hyperlipidemia LDL goal <100  Diabetes mellitus, type 2 (H)  Hypokalemia     Circulatory  Essential hypertension  Paroxysmal atrial fibrillation (H)     Musculoskeletal and Integumentary  Osteoarthritis  TMJ disease  RLS (restless legs syndrome)  Osteopenia of both hips     Behavioral  Mild depression     Other  Health Care Home  Abnormal electrocardiogram  Generalized weakness     Cardio-respiratory status:  Cpap     Patient concerns/goals: mobility    Living environment:  Apartment    Living environment barriers:  None      Current assistance/living environment:  Lives alone    Community Mobility:  Transportation: Car, Metro mobility or paid help  Community Mobility Requirements: Medical Appointments, Shopping, Work    Current mobility equipment:  4 wheeled walker with seat  Standard cane(s)    Fall Risk Screen:   Has the patient fallen 2 or more times in the last year? No      Has the patient fallen and had an injury in the past year? No      25 ft walk: 10.28 seconds for 25 ft with no ad and wide abhishek      ADL: ind    IADL:    Medications:ind  Meals: sitting on walker  Home management:  homemaker  Driving:  assistance    Services:   Weekly assist " privately paid    Evaluation     Pain assessment:  Pain present  Location: back and knees with weight bearing activities/Ratin/10 in back, knees 5/10    Cognition:  wnl    Vision: glasses    Hearing: wnl    Fatigue:  Reported Problems: limited breath support, very SOB with 80 ft to clinic door    Balance:  Unsupported Sitting Balance: Uses UE for Balance  Sitting Balance in Chair: Uses UE for Balance  Standing Balance: Uses UE for Balance    Ambulation:  Level of [place]: Independent  Assistive Device(s): Cane (single end), Walker (four wheeled)    Transfers:   ind    Neuromuscular:  Coordination:  wnl    Sensation:  Sensory Deficits Reported: right hand with poor postures    Head and Neck:  Head and Neck Position: WFL  Head Control: Adequate    Upper Extremities  UE ROM: WFl  UE Strength:  4/5    Lower Extremities:  LE ROM: hips to 100 degrees with sitting, knees and ankles full  LE Strength:  hips 3-, otherwise 4-    Impairments:  Fatigue  Muscle atrophy  Balance  Pain     Treatment diagnosis:  Impaired mobility  Impaired activities of daily living     Recommendations/Plan of care:  Patient would benefit from interventions to enhance safety and independence.  Occupational therapy intervention for  wheelchair management.    Goals:   Target date:   Patient, family and/or caregiver will verbalize/demonstrate understanding of compensatory methods /equipment to enhance functional independence and safety.    Educational assessment/barriers to learning:  No barriers noted    Treatment provided this date:   Wheelchair management, 25 minutes   Determined need for scooter due to pain limiting all function.  Safe trial in clinic    Response to treatment/recommendations: receptive    Goal attainment:  All goals met    Risks and benefits of evaluation/treatment have been explained.  Patient, family and/or caregiver are in agreement with Plan of Care.     Timed Code Treatment Minutes: 25  Total Treatment Time (sum of  timed and untimed services): 25    Electronically signed by:  Jordyn MUELLER/NOEMY, ATP      Occupational Therapist, Assistive   [phone]      fax: [phone]      [email or web address]  Seating Clinic- [facility], [address]  [address]  [place] [address]  January 30, 2024      [employer] Power Operated Vehicle / Scooter -  This device is being requested for this patient with mobility impairments to allow her to be able to complete all of her mobility related activities of daily living in a safe fashion, without risk of injury from falling, and in a reasonable time frame. She demonstrated during the home evaluation that she was able to transfer to/from the requested scooter, operate the tiller steering system, able to maintain postural stability and position while operating the POV, and operate the on/off mechanism and the speed dial appropriately and safely. They are very willing and physically / cognitively able to use the recommended equipment to assist with mobility related activities of daily living and general mobility. There is a mobility limitation that cannot be sufficiently and safely resolved by the use of an appropriately fitted cane or walker and they do not have sufficient upper extremity function to self-propel an optimally-configured manual wheelchair in their home to perform MRADLs during a typical day due to limitations in strength, endurance, range of motion, and coordination. This equipment is reasonable and necessary with reference to accepted standards of medical practice and treatment of this patient's condition and is not being recommended as a convenience item. Without this recommended equipment, she is highly likely to sustain injuries from falls, develop pressure sores or postural compensation, and/or be bed confined, which those costs far exceed the cost of the requested equipment.    This therapist has no  financial connection to the equipment being requested or vendor being used.  I have read and concur with the above recommendations.  Physician Printed Name __________________________________________  Physician Signature  _____________________________________________  Date of Signature ______________________________  Physician Phone  ______________________________

## 2024-02-05 DIAGNOSIS — E03.9 HYPOTHYROIDISM, UNSPECIFIED TYPE: ICD-10-CM

## 2024-02-08 RX ORDER — LEVOTHYROXINE SODIUM 137 UG/1
137 TABLET ORAL DAILY
Qty: 30 TABLET | Refills: 0 | Status: SHIPPED | OUTPATIENT
Start: 2024-02-08 | End: 2024-03-01

## 2024-02-09 NOTE — TELEPHONE ENCOUNTER
Call pt.  Had Levothyroxine dose increased in August 2023 and was to have nonfasting TSH lab rechecked 6 weeks later but never did. Medication refilled for 30 days only for now. Call pt and get lab appt scheduled to be done in the next 2 weeks. If thyroid function now normal, will address future refills requests as appropriate  for larger quantity of pills

## 2024-02-16 ENCOUNTER — LAB (OUTPATIENT)
Dept: LAB | Facility: CLINIC | Age: 69
End: 2024-02-16
Payer: COMMERCIAL

## 2024-02-16 DIAGNOSIS — E03.9 HYPOTHYROIDISM, UNSPECIFIED TYPE: ICD-10-CM

## 2024-02-16 LAB
T4 FREE SERPL-MCNC: 1.46 NG/DL (ref 0.9–1.7)
TSH SERPL DL<=0.005 MIU/L-ACNC: 0.08 UIU/ML (ref 0.3–4.2)

## 2024-02-16 PROCEDURE — 84439 ASSAY OF FREE THYROXINE: CPT

## 2024-02-16 PROCEDURE — 84443 ASSAY THYROID STIM HORMONE: CPT

## 2024-02-16 PROCEDURE — 36415 COLL VENOUS BLD VENIPUNCTURE: CPT

## 2024-02-19 ENCOUNTER — TELEPHONE (OUTPATIENT)
Dept: INTERNAL MEDICINE | Facility: CLINIC | Age: 69
End: 2024-02-19
Payer: COMMERCIAL

## 2024-02-19 NOTE — TELEPHONE ENCOUNTER
Forms/Letter Request    Type of form/letter: DME (wheelchair, hospital bed)    Type of DME requested: Power Operated Scooter    Do we have the form/letter: Yes: Form is in providers folder    Who is the form from? [facility] (if other please explain)    Where did/will the form come from? form was faxed in    When is form/letter needed by: As Soon As Possible    How would you like the form/letter returned: Fax : [phone]    Patient Notified form requests are processed in 5-7 business days:No    Could we send this information to you in [place] or would you prefer to receive a phone call?:   No preference   Okay to leave a detailed message?: Yes at Cell number on file:    Telephone Information:   Mobile [phone]

## 2024-02-22 DIAGNOSIS — I10 ESSENTIAL HYPERTENSION: ICD-10-CM

## 2024-02-22 RX ORDER — LOSARTAN POTASSIUM 100 MG/1
100 TABLET ORAL DAILY
Qty: 90 TABLET | Refills: 1 | Status: SHIPPED | OUTPATIENT
Start: 2024-02-22 | End: 2024-07-30

## 2024-02-22 NOTE — TELEPHONE ENCOUNTER
Received a call from Susie at [facility] stating the patient is needing a refill of her Losartan. Susie states they did not receive the script sent on 1/3/24 for 90 tablets with 1 additional refill on file.     Medication re-pended to the requested pharmacy and will route to the refill pool for review HP since patient is completely out of medication.     Thank you,  Socorro Mariscal RN

## 2024-02-27 NOTE — TELEPHONE ENCOUNTER
Forms from [employer] and Medicare rules require face to face visit addressing mobility issue specifically and documentation thereof for rubens to potentially be covered by insurance.  Patient last seen July 2023 and precipitated she is not addressed at that time.  Patient also due for follow-up regarding diabetes, depression, thyroid management, etc.  Call patient and schedule appointment with me in the next few weeks to specifically address mobility issue and other established problems.  May use open [facility], same-day, next day, next week appointment slot for appointment with me in clinic.  Call patient and assist in scheduling appointment.  Will hang on to the paperwork until patient seen

## 2024-03-01 ENCOUNTER — MEDICAL CORRESPONDENCE (OUTPATIENT)
Dept: HEALTH INFORMATION MANAGEMENT | Facility: CLINIC | Age: 69
End: 2024-03-01

## 2024-03-01 ENCOUNTER — OFFICE VISIT (OUTPATIENT)
Dept: INTERNAL MEDICINE | Facility: CLINIC | Age: 69
End: 2024-03-01
Payer: COMMERCIAL

## 2024-03-01 VITALS
DIASTOLIC BLOOD PRESSURE: 58 MMHG | SYSTOLIC BLOOD PRESSURE: 100 MMHG | TEMPERATURE: 98.1 F | OXYGEN SATURATION: 93 % | HEART RATE: 79 BPM | WEIGHT: 266 LBS | BODY MASS INDEX: 48.65 KG/M2 | RESPIRATION RATE: 16 BRPM

## 2024-03-01 DIAGNOSIS — R26.9 ABNORMAL GAIT: ICD-10-CM

## 2024-03-01 DIAGNOSIS — E78.5 HYPERLIPIDEMIA LDL GOAL <100: ICD-10-CM

## 2024-03-01 DIAGNOSIS — I48.0 PAROXYSMAL ATRIAL FIBRILLATION (H): ICD-10-CM

## 2024-03-01 DIAGNOSIS — M25.562 CHRONIC PAIN OF BOTH KNEES: ICD-10-CM

## 2024-03-01 DIAGNOSIS — M25.561 CHRONIC PAIN OF BOTH KNEES: ICD-10-CM

## 2024-03-01 DIAGNOSIS — F32.1 MODERATE MAJOR DEPRESSION (H): ICD-10-CM

## 2024-03-01 DIAGNOSIS — E11.9 TYPE 2 DIABETES MELLITUS WITHOUT COMPLICATION, WITHOUT LONG-TERM CURRENT USE OF INSULIN (H): ICD-10-CM

## 2024-03-01 DIAGNOSIS — E03.9 HYPOTHYROIDISM, UNSPECIFIED TYPE: ICD-10-CM

## 2024-03-01 DIAGNOSIS — G89.29 CHRONIC PAIN OF BOTH KNEES: ICD-10-CM

## 2024-03-01 DIAGNOSIS — E66.01 MORBID OBESITY (H): ICD-10-CM

## 2024-03-01 DIAGNOSIS — M54.16 LUMBAR RADICULOPATHY: ICD-10-CM

## 2024-03-01 PROCEDURE — 99214 OFFICE O/P EST MOD 30 MIN: CPT | Performed by: INTERNAL MEDICINE

## 2024-03-01 RX ORDER — LEVOTHYROXINE SODIUM 137 UG/1
TABLET ORAL
Qty: 30 TABLET | Refills: 1 | Status: SHIPPED | OUTPATIENT
Start: 2024-03-01 | End: 2024-06-01

## 2024-03-01 RX ORDER — LEVOTHYROXINE SODIUM 125 UG/1
TABLET ORAL
Qty: 90 TABLET | Refills: 3 | Status: SHIPPED | OUTPATIENT
Start: 2024-03-01

## 2024-03-01 NOTE — PATIENT INSTRUCTIONS
Increase Trintellix to 5mg tab, 2 tabs (10mg) daily for mental health until run out. Then change to a 10mg tab, 1 tab daily   Change Levothyroxine to 125mcg tab daily except take 137mcg on Mondays and Fridays  Call  [phone] or use [employer] to schedule a future lab appointment  fasting in 6 weeks.   For fasting labs, please refrain from eating for 8 hours or more.   Drink 2 glasses of water before your lab appointment. It is fine to take your  oral medications on the morning of the lab test as usual  Send me update in [employer] along with PHQ9 questionnaire in 6 weeks or earlier if having troubles  with the higher dose of Trintellix  Will complete forms for scooter application

## 2024-03-01 NOTE — PROGRESS NOTES
"  ASSESSMENT:    1. Abnormal gait  Currently ambulating with a cane/walker but still has some difficulty with this including in her home to perform MRADLs Doesn't have UE strength to properly self-propel in manual WC to perform those MRADLs. Has minerva tested by OT for scooter with ability to operate safely and room in her apartment to use. Will also assist with allowing for activities outside her home which should also help mental health. MD in support for pt to use power operated vehicle/scooter    2. Morbid obesity (H)   Weight down 12 pounds from last visit. Had been on Victoza in past but had some nausea issues. Contributing to gait issues, knee pain. LBP, DM, lipids, etc. Counseled re\": carb/calorie reduction and activity walking as able or other exrcises for further weight loss. Possible future trial to see if tolerates Mounjaro but  will defer for now as other med changes made    3. Lumbar radiculopathy   Contributing to gait abnormality. Working  with [facility]. Prior LESI with partial response. Declines repeat injection at this time and wishing to continue Gabapentin which works moderately well    4. Chronic pain of both knees    DJD bilateral knees. Declines knee injection at this time    5. Hypothyroidism, unspecified type   Thyroid function now too high. Will reduce Levothyroxine dose  as below and repeat labs 6 weeks  - levothyroxine (SYNTHROID/LEVOTHROID) 125 MCG tablet; Take 1 tab by mouth daily except Mondays and Fridays (5 days a week)  Dispense: 90 tablet; Refill: 3  - levothyroxine (SYNTHROID/LEVOTHROID) 137 MCG tablet; Take 1 tab by  mouth  on Mondays and Fridays ( 2 days a week)  Dispense: 30 tablet; Refill: 1  - TSH with free T4 reflex; Future    6. Hyperlipidemia LDL goal <100  Previously controlled except elevated trigs. Continue statin and repeat labs 6 weeks  - Lipid panel reflex to direct LDL Fasting; Future  - Comprehensive metabolic panel; Future    7. Moderate major depression (H)   " Uncontrolled. Increase Trintellix  - vortioxetine (TRINTELLIX) 10 MG tablet; Take 1 tablet (10 mg) by mouth daily  Dispense: 30 tablet; Refill: 11    8. Type 2 diabetes mellitus without complication, without long-term current use of insulin (H)   Not checking sugars.Previously controlled. Continue meds and repeat A1C 6 weeks  - Albumin Random Urine Quantitative with Creat Ratio; Future  - Hemoglobin A1c; Future  - Comprehensive metabolic panel; Future     9. Paroxysmal atrial fibrillation (H)   On Flecainide. Denies recent palpitations    PLAN:   Increase Trintellix to 5mg tab, 2 tabs (10mg) daily for mental health until run out. Then change to a 10mg tab, 1 tab daily   Change Levothyroxine to 125mcg tab daily except take 137mcg on Mondays and Fridays  Call  [phone] or use "[facility]" to schedule a future lab appointment  fasting in 6 weeks.   For fasting labs, please refrain from eating for 8 hours or more.   Drink 2 glasses of water before your lab appointment. It is fine to take your  oral medications on the morning of the lab test as usual  Send me update in [employer] along with PHQ9 questionnaire in 6 weeks or earlier if having troubles  with the higher dose of Trintellix  Will complete forms for scooter application           Glenn Mcknight is a 68 year old, presenting for the following health issues:  Forms    History of Present Illness       Reason for visit:  If is about the Scooter that they called me on    She eats 2-3 servings of fruits and vegetables daily.She consumes 0 sweetened beverage(s) daily.She exercises with enough effort to increase her heart rate 9 or less minutes per day.  She exercises with enough effort to increase her heart rate 3 or less days per week.   She is taking medications regularly.     Most recent lab results reviewed with pt.      Component      Latest Ref Rng 7/11/2023  4:17 PM 2/16/2024  12:59 PM   Sodium      136 - 145 mmol/L 135 (L)     Potassium      3.4 - 5.3 mmol/L 3.6      Chloride      98 - 107 mmol/L 97 (L)     Carbon Dioxide (CO2)      22 - 29 mmol/L 26     Anion Gap      7 - 15 mmol/L 12     Urea Nitrogen      8.0 - 23.0 mg/dL 10.7     Creatinine      0.51 - 0.95 mg/dL 0.77     Calcium      8.8 - 10.2 mg/dL 9.8     Glucose      70 - 99 mg/dL 138 (H)     Alkaline Phosphatase      35 - 104 U/L 89     AST      0 - 45 U/L 26     ALT      0 - 50 U/L 14     Protein Total      6.4 - 8.3 g/dL 7.5     Albumin      3.5 - 5.2 g/dL 4.6     Bilirubin Total      <=1.2 mg/dL 0.4     GFR Estimate      >60 mL/min/1.73m2 84     Cholesterol      <200 mg/dL 133     Triglycerides      <150 mg/dL 209 (H)     HDL Cholesterol      >=50 mg/dL 56     LDL Cholesterol Calculated      <=100 mg/dL 35     Non HDL Cholesterol      <130 mg/dL 77     Hemoglobin A1C      0.0 - 5.6 % 6.7 (H)     TSH      0.30 - 4.20 uIU/mL 5.45 (H)  0.08 (L)    T4 Free      0.90 - 1.70 ng/dL 1.30  1.46       Using a cane and walker with ambulation now. Has walker today. Still has some troubles ambulating in her apartment getting around to do daily activities (bathroom, perparing meals, etc)  and also outside her home. Hard to shop, get to MD appts. Has chronic bilateral knee and back pain with radiculopathy RLE. Working with [facility].  HAS met with OT and underwent testing for power scooter and based on OT exam and trial, OT feels pt would be good candidate. Recent thyroid med dose increase to 137mcg  daily but thyroid function now too high.  Weight down from last visit  Not checking sugars  Denies chest pain, shortness of breath, abd pain.   Depression despite Trintellix. NO suicidal ideation. Part of mood related to reduced mobility per pt       Additional ROS:   Constitutional, HEENT, Cardiovascular, Pulmonary, GI and , Neuro, MSK and Psych review of systems/symptoms are otherwise negative or unchanged from previous, except as noted above.      OBJECTIVE:  /58 (BP Location: Left arm, Patient Position: Sitting, Cuff Size:  "Adult Large)   Pulse 79   Temp 98.1  F (36.7  C) (Temporal)   Resp 16   Wt 120.7 kg (266 lb)   SpO2 93%   BMI 48.65 kg/m     Estimated body mass index is 48.65 kg/m  as calculated from the following:    Height as of 7/11/23: 1.575 m (5' 2\").    Weight as of this encounter: 120.7 kg (266 lb).   Gen: Mild flattened affect. Normal eye contact and appearance/thought content  Neck: no adenopathy. Thyroid normal to palpation. No bruits  Pulm: Lungs clear to auscultation   CV: Regular rates and rhythm  GI: Soft, obese, nontender, Normal active bowel sounds, No hepatosplenomegaly or masses palpable  Ext: Peripheral pulses intact. Mild BLE edema. Tenderness to palpation bilateral knee joint line  Back: Tenderness to palpation bilateral paralumbar musculature.  Negative straight leg raise test bilaterally   Neuro: Normal strength and tone, sensory exam grossly normal. Ambulating with walker. Gait slow and antalgic appeariong      (Chart documentation was completed, in part, with [employer] voice-recognition software. Even though reviewed, some grammatical, spelling, and word errors may remain.)    Yuval Jama MD  Internal Medicine Department  [facility]            "

## 2024-04-03 ENCOUNTER — MEDICAL CORRESPONDENCE (OUTPATIENT)
Dept: HEALTH INFORMATION MANAGEMENT | Facility: CLINIC | Age: 69
End: 2024-04-03
Payer: COMMERCIAL

## 2024-04-03 ENCOUNTER — PATIENT OUTREACH (OUTPATIENT)
Dept: GERIATRIC MEDICINE | Facility: CLINIC | Age: 69
End: 2024-04-03
Payer: COMMERCIAL

## 2024-04-03 DIAGNOSIS — K92.2 ACUTE GI BLEEDING: ICD-10-CM

## 2024-04-03 DIAGNOSIS — I48.91 ATRIAL FIBRILLATION WITH RVR (H): ICD-10-CM

## 2024-04-03 RX ORDER — METOPROLOL SUCCINATE 25 MG/1
12.5 TABLET, EXTENDED RELEASE ORAL DAILY
Qty: 45 TABLET | Refills: 0 | Status: SHIPPED | OUTPATIENT
Start: 2024-04-03 | End: 2024-09-16

## 2024-04-03 RX ORDER — PANTOPRAZOLE SODIUM 40 MG/1
40 TABLET, DELAYED RELEASE ORAL DAILY
Qty: 90 TABLET | Refills: 3 | OUTPATIENT
Start: 2024-04-03

## 2024-04-03 NOTE — TELEPHONE ENCOUNTER
Script sent to the pharmacy on 8/18/23 for 90 tablets with 3 additional refills on file. Patient should have refills on file with the pharmacy.     Socorro Mariscal RN

## 2024-04-03 NOTE — PROGRESS NOTES
[facility] Care Coordination Contact    CHW spoke w/member to schedule the following preventive exam: diabetic eye, and wellness visit.  Member needs transportation assistance.  CHW gave member the following information:    [facility]  [address], Clinic: [address]  [place] [address]  Phone: [phone]    Date: Friday, May 24/2024  Time : 2:00 PM    --------------------------------------------          Wellness visit  [facility]  Address: [address] [address]  Phone: [phone]    Date: Wednesday, July 10/2024  Time : 1:10 PM      TRINIDAD Castro  [facility]  [phone]

## 2024-04-27 ENCOUNTER — HEALTH MAINTENANCE LETTER (OUTPATIENT)
Age: 69
End: 2024-04-27

## 2024-05-02 ENCOUNTER — PATIENT OUTREACH (OUTPATIENT)
Dept: GERIATRIC MEDICINE | Facility: CLINIC | Age: 69
End: 2024-05-02
Payer: COMMERCIAL

## 2024-05-02 DIAGNOSIS — M54.16 LUMBAR RADICULOPATHY: ICD-10-CM

## 2024-05-02 DIAGNOSIS — I10 ESSENTIAL HYPERTENSION: ICD-10-CM

## 2024-05-02 RX ORDER — LOSARTAN POTASSIUM 100 MG/1
100 TABLET ORAL DAILY
Qty: 90 TABLET | Refills: 1 | OUTPATIENT
Start: 2024-05-02

## 2024-05-02 RX ORDER — GABAPENTIN 300 MG/1
CAPSULE ORAL
Qty: 270 CAPSULE | Refills: 1 | OUTPATIENT
Start: 2024-05-02

## 2024-05-02 NOTE — PROGRESS NOTES
[facility] Care Coordination Contact    Arranged transportation thru [facility] [phone] for the below appt:  Appt Date & Time: 5/24 at 2pm  Clinic Name & Address:  [facility], [address], [place], [place]  Transportation Provider: [employer] [phone]   time:  1-1:30pm  Return ride  time: will call    Notified member of  time.    Member also asked about the status of her scooter.  This CMS will share her inquiry with the CC    Vandana Belcher  Case Management Specialist  [facility]  [phone]

## 2024-05-03 DIAGNOSIS — I48.0 PAROXYSMAL ATRIAL FIBRILLATION (H): ICD-10-CM

## 2024-05-03 RX ORDER — FLECAINIDE ACETATE 150 MG/1
150 TABLET ORAL EVERY 12 HOURS
Qty: 180 TABLET | Refills: 1 | Status: SHIPPED | OUTPATIENT
Start: 2024-05-03

## 2024-05-03 NOTE — TELEPHONE ENCOUNTER
Received refill request from [facility] pharmacy for Flecainide 150mg twice daily.    Last OV   \\ with MITZI Bond   No upcoming appointments currently scheduled.    Last EK23      Writer placed call to patient to request that she call in and get scheduled.  Roxanna states she will do that.    [facility] Refill Guideline reviewed.  Medication meets criteria for refill.    Sarahy Bower RN on 5/3/2024 at 2:14 PM

## 2024-05-05 ENCOUNTER — HOSPITAL ENCOUNTER (EMERGENCY)
Facility: CLINIC | Age: 69
Discharge: HOME | End: 2024-05-05
Attending: EMERGENCY MEDICINE | Admitting: EMERGENCY MEDICINE
Payer: COMMERCIAL

## 2024-05-05 VITALS
BODY MASS INDEX: 49.5 KG/M2 | OXYGEN SATURATION: 97 % | WEIGHT: 269 LBS | RESPIRATION RATE: 18 BRPM | HEART RATE: 68 BPM | HEIGHT: 62 IN | DIASTOLIC BLOOD PRESSURE: 85 MMHG | SYSTOLIC BLOOD PRESSURE: 134 MMHG | TEMPERATURE: 98.3 F

## 2024-05-05 DIAGNOSIS — M25.561 CHRONIC PAIN OF BOTH KNEES: ICD-10-CM

## 2024-05-05 DIAGNOSIS — M25.562 CHRONIC PAIN OF BOTH KNEES: ICD-10-CM

## 2024-05-05 DIAGNOSIS — G89.29 CHRONIC PAIN OF BOTH KNEES: ICD-10-CM

## 2024-05-05 PROCEDURE — 250N000013 HC RX MED GY IP 250 OP 250 PS 637: Performed by: EMERGENCY MEDICINE

## 2024-05-05 PROCEDURE — 99283 EMERGENCY DEPT VISIT LOW MDM: CPT

## 2024-05-05 RX ORDER — TRAMADOL HYDROCHLORIDE 50 MG/1
50 TABLET ORAL ONCE
Status: COMPLETED | OUTPATIENT
Start: 2024-05-05 | End: 2024-05-05

## 2024-05-05 RX ORDER — TRAMADOL HYDROCHLORIDE 50 MG/1
50 TABLET ORAL EVERY 6 HOURS PRN
Qty: 10 TABLET | Refills: 0 | Status: SHIPPED | OUTPATIENT
Start: 2024-05-05 | End: 2024-05-08

## 2024-05-05 RX ADMIN — TRAMADOL HYDROCHLORIDE 50 MG: 50 TABLET, COATED ORAL at 10:21

## 2024-05-05 ASSESSMENT — COLUMBIA-SUICIDE SEVERITY RATING SCALE - C-SSRS
2. HAVE YOU ACTUALLY HAD ANY THOUGHTS OF KILLING YOURSELF IN THE PAST MONTH?: NO
6. HAVE YOU EVER DONE ANYTHING, STARTED TO DO ANYTHING, OR PREPARED TO DO ANYTHING TO END YOUR LIFE?: NO
1. IN THE PAST MONTH, HAVE YOU WISHED YOU WERE DEAD OR WISHED YOU COULD GO TO SLEEP AND NOT WAKE UP?: NO

## 2024-05-05 ASSESSMENT — ACTIVITIES OF DAILY LIVING (ADL)
ADLS_ACUITY_SCORE: 38

## 2024-05-05 NOTE — DISCHARGE INSTRUCTIONS
Left dtailed message.   Tylenol as needed, you can use the tramadol for more severe pain when needed.  Use your walker at all times.  Contact your orthopedic clinic tomorrow to schedule an appointment this week for a recheck and further evaluation and recommendation.  You could discuss knee replacement with your orthopedist.

## 2024-05-05 NOTE — ED TRIAGE NOTES
Pt  BIBA from her home where she lives independently ..  68 y female - lives with a friend . R knee pain - it is bone on bone - yesterday pain started to increase more than normal - would like another xray of her knee... even though she does know that it is bone on bone ... Pt did not take any meds - she had had stomach ulcers before - but knows she can take tylenol - bit did not take tylenol .. Pt's friend could take her to the hospital but pt wanted EMS to do so . VSS- pt is able to walk - she walked from the stretcher to the cart . Pt has a scooter at home but reports she does not know how to use .

## 2024-05-05 NOTE — ED NOTES
Pt was able to ambulate down the ely and back with the assistance of a walker. Pt stated her pain was at a 6 or 7 out of 10 but the pain was manageable.

## 2024-05-05 NOTE — ED NOTES
PT and I talked about tylenol and using the knee scooter.  Also pt mentioned on Monday a lift chair is being delivered. Pt stated that she does know how to use the scooter .

## 2024-05-05 NOTE — ED NOTES
Bed: ED28  Expected date:   Expected time:   Means of arrival:   Comments:  Sarah1/green  68 F R knee pain  0806

## 2024-05-05 NOTE — ED PROVIDER NOTES
"  Emergency Department Note      History of Present Illness     Chief Complaint  Knee Pain    HPI  Maxine Sears is a 68 year old female with a history of chronic knee pain, DJD, and bilateral osteoarthritis of knees who presents via EMS with worsening medial right knee pain. She notes she cannot bear weight on her knee when she tries to stand. She notes she has bone on bone but has not talked about knee replacement yet. She notes sometimes the pain goes away. She takes Tylenol. She is not on blood thinners. She has a cane and a walker and recently bought an electric scooter. She denies history of kidney failure or disease. She has also been dealing with left knee pain. She has gone to [facility] in the past for cortisone injections. She has a history of bleeding ulcers in her stomach. She has never been on Tramadol. She did not drive here.     Independent Historian  None    Review of External Notes  Office visit from 3/1/2024.   Past Medical History   Medical History and Problem List  Bilateral ostearthritis of knees  Chronic pain of both knees  Depression  Duodenal ulcer   DJD  Essential hypertension  GERD  Hiatal hernia  Hypothyroidism  Hyperlipidemia   Kidney infection   Lumbar radiculopathy  Migraine without aura and without status migrainosus  Obesity  NELY on CPAP  Osteoarthritis  Paroxysmal atrial fibrillation  RLS  Symptomatic menopausal or female climacteric states  TMJ disease  Type 2 diabetes mellitus     Medications  Aspirin 81 MG  Flecainide   Gabapentin   Levothyroxine   Losartan   Metoprolol succinate ER  Pantoprazole   Rosuvastatin   Sumatriptan  Triamterene-HCTZ  Vortioxetine     Surgical History   Appendectomy  Cholecystectomy  EP left atrial appendage closure  Hysterectomy   Tonsillectomy   DOM and BSO    Physical Exam   Patient Vitals for the past 24 hrs:   BP Temp Temp src Pulse Resp SpO2 Height Weight   05/05/24 0904 134/85 98.3  F (36.8  C) Oral 68 18 97 % 1.575 m (5' 2\") 122 kg (269 lb) "     Physical Exam  Nursing note and vitals reviewed.    Constitutional:  Appears comfortable.    Cardiovascular:  Normal rate, regular rhythm.     DP pulse 2+.  Cap refill less than 2 seconds.  Musculoskeletal:  Medial joint line tenderness of right knee. Normal ROM.  Ankles and hips are normal.  At this time no redness and no obvious swelling.  Neurological:   Alert and oriented.  Sensation intact.    Skin:    Skin is warm and dry.   Psychiatric:   Behavior is normal. Appropriate mood and affect.     Judgment and thought content normal.      Diagnostics     Independent Interpretation  None  ED Course    Medications Administered  Medications   traMADol (ULTRAM) tablet 50 mg (50 mg Oral $Given 5/5/24 1021)     Discussion of Management  See ED course below.     Social Determinants of Health adding to complexity of care  None    ED Course  ED Course as of 05/05/24 1125   Sun May 05, 2024   0933 I obtained the patient's history and examined as noted above.    1115 I rechecked the patient and explained findings.      Medical Decision Making / Diagnosis   CMS Diagnoses: None    MIPS     None    [facility]  Maxine Sears is a 68 year old female who has chronic arthritis in her knees has had injections comes in with increasing pain in the knees so she was having a hard time walking today.  On exam she has medial joint line tenderness especially the right knee.  She said she has pain in the left knee right now as well.  There is no swelling no redness no recent trauma.  I did not feel imaging was indicated.  Patient was given a tramadol tablet and tolerated it well with no side effects.  She got up and was able to ambulate with some pain with her walker.  She needs to get back in with the orthopedist to talk about possible knee injection or maybe another cortisone injection.  She will continue to use her walker and going to prescribe some tramadol for her but she can use Tylenol first.  She is comfortable this plan will  follow-up with her doctor.    Tylenol as needed, you can use the tramadol for more severe pain when needed.  Use your walker at all times.  Contact your orthopedic clinic tomorrow to schedule an appointment this week for a recheck and further evaluation and recommendation.  You could discuss knee replacement with your orthopedist.    Disposition  The patient was discharged.     ICD-10 Codes:    ICD-10-CM    1. Chronic pain of both knees  M25.561     M25.562     G89.29     DJD         Discharge Medications  New Prescriptions    TRAMADOL (ULTRAM) 50 MG TABLET    Take 1 tablet (50 mg) by mouth every 6 hours as needed for severe pain     Scribe Disclosure:  I, Susan Villatoro, am serving as a scribe at 9:01 AM on 5/5/2024 to document services personally performed by Aracely Maurice MD based on my observations and the provider's statements to me.      Aracely Maurice MD    [employer]      Aracely Maurice MD  05/05/24 112

## 2024-05-07 ENCOUNTER — PATIENT OUTREACH (OUTPATIENT)
Dept: GERIATRIC MEDICINE | Facility: CLINIC | Age: 69
End: 2024-05-07
Payer: COMMERCIAL

## 2024-05-07 NOTE — PROGRESS NOTES
[facility] Care Coordination Contact  CC received notification of Emergency Room visit.  ER visit occurred on 5/5/24 at [facility] with Dx of chronic pain of both knees.    CC contacted member and left a message requesting a return call.  Member has a follow-up appointment with PCP: No: Offered Assistance with setting up a follow up appointment  Member has had a change in condition: No  New referrals placed: No  Home Visit Needed: No  Care plan reviewed and updated.  PCP notified of ED visit via EMR.    Virginia Marina RN  [facility]  [phone]

## 2024-05-31 ENCOUNTER — PATIENT OUTREACH (OUTPATIENT)
Dept: GERIATRIC MEDICINE | Facility: CLINIC | Age: 69
End: 2024-05-31
Payer: COMMERCIAL

## 2024-05-31 DIAGNOSIS — E03.9 HYPOTHYROIDISM, UNSPECIFIED TYPE: ICD-10-CM

## 2024-05-31 DIAGNOSIS — K92.2 ACUTE GI BLEEDING: ICD-10-CM

## 2024-05-31 DIAGNOSIS — E78.5 HYPERLIPIDEMIA LDL GOAL <100: ICD-10-CM

## 2024-05-31 RX ORDER — ROSUVASTATIN CALCIUM 10 MG/1
10 TABLET, COATED ORAL DAILY
Qty: 90 TABLET | Refills: 3 | OUTPATIENT
Start: 2024-05-31

## 2024-05-31 RX ORDER — PANTOPRAZOLE SODIUM 40 MG/1
40 TABLET, DELAYED RELEASE ORAL DAILY
Qty: 90 TABLET | Refills: 3 | OUTPATIENT
Start: 2024-05-31

## 2024-05-31 NOTE — PROGRESS NOTES
[facility] Care Coordination Contact    No longer active with [facility] community case management effective 04/30/24.  Reason for community disenrollment: Other:  late notification of disenrollment from [facility] to [facility], no change in PCP. [facility] has been contacted to request reassignment back to [facility] for 6/1/24 or 7/1/24.    Virginia Marina RN  [facility]  [phone]

## 2024-06-01 RX ORDER — LEVOTHYROXINE SODIUM 137 UG/1
TABLET ORAL
Qty: 10 TABLET | Refills: 0 | Status: SHIPPED | OUTPATIENT
Start: 2024-06-01 | End: 2024-09-24

## 2024-06-02 NOTE — TELEPHONE ENCOUNTER
Patient has active MyChart but does not appear to be using.  Was last seen 3/1/2024 and instructed to have fasting labs done 6 weeks later but no appointment made.  Overdue for recheck of thyroid function, etc.  Medication refilled for 1 month.  Call patient.  Assist in scheduling a fasting lab appointment for patient in the next couple weeks.  Future labs previously ordered

## 2024-06-05 ENCOUNTER — TRANSFERRED RECORDS (OUTPATIENT)
Dept: MULTI SPECIALTY CLINIC | Facility: CLINIC | Age: 69
End: 2024-06-05

## 2024-06-05 LAB — RETINOPATHY: NORMAL

## 2024-06-11 ENCOUNTER — PATIENT OUTREACH (OUTPATIENT)
Dept: GERIATRIC MEDICINE | Facility: CLINIC | Age: 69
End: 2024-06-11
Payer: COMMERCIAL

## 2024-06-11 NOTE — PROGRESS NOTES
[facility] Care Coordination Contact    Member became effective with [facility] on 6/1/2024 with [facility]  Previous Health Plan: [facility]  Previous Care System: [employer]  Previous care coordinators name and number: Carmencita Garrison - no working phone # available  Waiver Type: EW  Last MMIS Entry: Date 05/01/2024 and Type INS & CC change  MMIS visit date (and type) if different from above: 12/14/2023 entered by [facility] CC  Services Listed in MMIS:   35 F CASE MGMT 06 O HOMEMAKER 52 F EMERG RSP  04 O HOME MEALS 47 F WVRAC HENDRICKSON 83 F PERS PEND  UTF received: No: Requested on 6/11/24 - emailed sppenrollment at [facility] to request  Mailed welcome letter and Mailed [employer] document  Address/Phone discrepancy: none  MnChoices: Entered into Revised    Vandana Belcher  Case Management Specialist  [facility]  [phone]

## 2024-06-11 NOTE — LETTER
June 11, 2024    Important [employer] Information    DANIELLE DEX KELLYORE  [address]   [place] [address]    Your New Care Coordinator  Dear Danielle,  My name is Virginia Marina RN, BSN  and I am your new Care Coordinator. You may reach me by calling [phone]. I will be in touch with you shortly to address any questions you may have.   I have also been in contact with  Carmencita Garrison , your previous care coordinator, to ensure a smooth transition.  Questions?  Call me at [phone] Monday-Friday between 8am and 5pm. TTY: [phone]. I look forward to working with you as a [employer]  member.  Sincerely,    Virginia Marina RN, BSN  [phone]  [email or web address]    cc: member records

## 2024-06-12 ENCOUNTER — PATIENT OUTREACH (OUTPATIENT)
Dept: GERIATRIC MEDICINE | Facility: CLINIC | Age: 69
End: 2024-06-12
Payer: COMMERCIAL

## 2024-06-12 NOTE — PROGRESS NOTES
[facility] Care Coordination Contact      [facility] Health Plan or Product Change    CC received notification that member's health plan or health plan product changed from [employer] [facility] to [employer] [facility] effective 06/01/24.  CC contacted member and discussed change and face-to-face visit was offered. Member declined need for home visit. CC reviewed previous Health Risk Assessment/LTCC and POC with member and no changes noted.    Called member and introduced self as member s new CC. Confirmed with member that the welcome letter with writer's name and contact information has been received.  Reviewed LTCC/Health Risk Assessment (HRA) and POC with member. No changes noted.  Transitional HRA completed. Care Plan Summary updated and reflects current services.  Required referral authorization information communicated to CMS: Yes, care coordinator will communicate this as needed.  Writer reviewed the following with member:  ER visits: No  Hospitalizations: No  TCU stays: No  Significant health status changes: None reported at this time  Falls/Injuries: No  ADL/IADL changes: No  Changes in services: No    Follow-Up Plan: Member informed of future contact, plan to f/u with member with at next regularly scheduled contact.  Contact information shared with member and family, encouraged member to call with any questions or concerns.    Virginia Marina RN  [facility]  [phone]

## 2024-06-12 NOTE — PROGRESS NOTES
"[facility] Care Coordination Contact      [facility] Six-Month Telephone Assessment    6 month telephone assessment completed on 06/12/24.    ER visits: Yes -  [facility] for chronic pain of both knees.  Hospitalizations: No  TCU stays: No  Significant health status changes: Roxanna reports that she is scheduled for left knee replacement surgery on July 18th at [facility], she will have her right one replaced approximatley 6-8 weeks following. She also reports going to [facility] for injections into her back to help with chronic back pain.   Falls/Injuries: No  ADL/IADL changes: No  Changes in services: No    Caregiver Assessment follow up:  care coordinator to contact [facility], re: return of PERS. Roxanna reports that she received a $300 plus bill which she is unable to afford to pay so returned her device approximately 2 weeks ago. Care coordinator will look into other options for this and get back to Roxanna. Roxanna would also like one grab bar installed in her apartment shower, she reports having one there currently but she would like 2 for additional stability, denies need for grab bars for the toilet.  Care coordinator will look into the install and get back to Roxanna.     Goals: See POC in chart for goal progress documentation.  Roxanna reports overall she feels things are \"looking up.\" She feels things are finally going in the right direction regarding her knee and back pain. She recently had friends from [employer] over to help rearrange her bedroom, but furniture and other items are now sitting in her living room. [employer] friends are coming over prior to her 7/18/24 knee replacement surgery to rearrange her apartment and make it more comfortable. Roxanna has been enjoying riding her new scooter to the "[employer] where she swims weekly, she also rides her scooter to and from [employer], [employer], and [employer] which are closeby her apartment. She reports purchasing a lift chair which has " "helped her significantly. At this time she has not lost any weight which is one of her goals but she knows she has been eating \"too much junk food\" like chips and icecream and also not going for walks due to the pain in her knees and back. She does attend \"coffee time\" at her apartment which is every T, Th and Sat from 6am-noon where she enjoys socializing. She also enjoys weekly game nights in her building with friends. She reports that her mental health is doing \"ok\" at this time and she has been working with her provider to adjust the dosage of her medication.     Will see member in 6 months for an annual health risk assessment.   Encouraged member to call CC with any questions or concerns in the meantime.     Virginia Marina RN  [facility]  [phone]            "

## 2024-06-14 ENCOUNTER — LAB (OUTPATIENT)
Dept: LAB | Facility: CLINIC | Age: 69
End: 2024-06-14
Payer: COMMERCIAL

## 2024-06-14 DIAGNOSIS — E11.9 TYPE 2 DIABETES MELLITUS WITHOUT COMPLICATION, WITHOUT LONG-TERM CURRENT USE OF INSULIN (H): ICD-10-CM

## 2024-06-14 DIAGNOSIS — E03.9 HYPOTHYROIDISM, UNSPECIFIED TYPE: ICD-10-CM

## 2024-06-14 DIAGNOSIS — E78.5 HYPERLIPIDEMIA LDL GOAL <100: ICD-10-CM

## 2024-06-14 LAB
ALBUMIN SERPL BCG-MCNC: 4.3 G/DL (ref 3.5–5.2)
ALP SERPL-CCNC: 95 U/L (ref 40–150)
ALT SERPL W P-5'-P-CCNC: 14 U/L (ref 0–50)
ANION GAP SERPL CALCULATED.3IONS-SCNC: 12 MMOL/L (ref 7–15)
AST SERPL W P-5'-P-CCNC: 19 U/L (ref 0–45)
BILIRUB SERPL-MCNC: 0.5 MG/DL
BUN SERPL-MCNC: 12.6 MG/DL (ref 8–23)
CALCIUM SERPL-MCNC: 9.5 MG/DL (ref 8.8–10.2)
CHLORIDE SERPL-SCNC: 99 MMOL/L (ref 98–107)
CHOLEST SERPL-MCNC: 147 MG/DL
CREAT SERPL-MCNC: 0.75 MG/DL (ref 0.51–0.95)
CREAT UR-MCNC: 65.9 MG/DL
DEPRECATED HCO3 PLAS-SCNC: 26 MMOL/L (ref 22–29)
EGFRCR SERPLBLD CKD-EPI 2021: 86 ML/MIN/1.73M2
FASTING STATUS PATIENT QL REPORTED: YES
FASTING STATUS PATIENT QL REPORTED: YES
GLUCOSE SERPL-MCNC: 113 MG/DL (ref 70–99)
HBA1C MFR BLD: 6.9 % (ref 0–5.6)
HDLC SERPL-MCNC: 57 MG/DL
LDLC SERPL CALC-MCNC: 59 MG/DL
MICROALBUMIN UR-MCNC: <12 MG/L
MICROALBUMIN/CREAT UR: NORMAL MG/G{CREAT}
NONHDLC SERPL-MCNC: 90 MG/DL
POTASSIUM SERPL-SCNC: 4.2 MMOL/L (ref 3.4–5.3)
PROT SERPL-MCNC: 7.1 G/DL (ref 6.4–8.3)
SODIUM SERPL-SCNC: 137 MMOL/L (ref 135–145)
TRIGL SERPL-MCNC: 156 MG/DL
TSH SERPL DL<=0.005 MIU/L-ACNC: 0.76 UIU/ML (ref 0.3–4.2)

## 2024-06-14 PROCEDURE — 84443 ASSAY THYROID STIM HORMONE: CPT

## 2024-06-14 PROCEDURE — 82043 UR ALBUMIN QUANTITATIVE: CPT

## 2024-06-14 PROCEDURE — 80053 COMPREHEN METABOLIC PANEL: CPT

## 2024-06-14 PROCEDURE — 36415 COLL VENOUS BLD VENIPUNCTURE: CPT

## 2024-06-14 PROCEDURE — 82570 ASSAY OF URINE CREATININE: CPT

## 2024-06-14 PROCEDURE — 83036 HEMOGLOBIN GLYCOSYLATED A1C: CPT

## 2024-06-14 PROCEDURE — 80061 LIPID PANEL: CPT

## 2024-06-17 DIAGNOSIS — K92.2 ACUTE GI BLEEDING: ICD-10-CM

## 2024-06-17 RX ORDER — PANTOPRAZOLE SODIUM 40 MG/1
40 TABLET, DELAYED RELEASE ORAL DAILY
Qty: 90 TABLET | Refills: 0 | OUTPATIENT
Start: 2024-06-17

## 2024-06-18 ENCOUNTER — APPOINTMENT (OUTPATIENT)
Dept: GENERAL RADIOLOGY | Facility: CLINIC | Age: 69
End: 2024-06-18
Attending: EMERGENCY MEDICINE
Payer: COMMERCIAL

## 2024-06-18 ENCOUNTER — HOSPITAL ENCOUNTER (EMERGENCY)
Facility: CLINIC | Age: 69
Discharge: HOME | End: 2024-06-19
Attending: EMERGENCY MEDICINE | Admitting: EMERGENCY MEDICINE
Payer: COMMERCIAL

## 2024-06-18 DIAGNOSIS — S80.02XA CONTUSION OF LEFT KNEE, INITIAL ENCOUNTER: ICD-10-CM

## 2024-06-18 DIAGNOSIS — M17.0 PRIMARY OSTEOARTHRITIS OF BOTH KNEES: ICD-10-CM

## 2024-06-18 DIAGNOSIS — S80.01XA CONTUSION OF RIGHT KNEE, INITIAL ENCOUNTER: ICD-10-CM

## 2024-06-18 PROCEDURE — 99283 EMERGENCY DEPT VISIT LOW MDM: CPT

## 2024-06-18 PROCEDURE — 73562 X-RAY EXAM OF KNEE 3: CPT | Mod: 50

## 2024-06-18 ASSESSMENT — COLUMBIA-SUICIDE SEVERITY RATING SCALE - C-SSRS
1. IN THE PAST MONTH, HAVE YOU WISHED YOU WERE DEAD OR WISHED YOU COULD GO TO SLEEP AND NOT WAKE UP?: NO
6. HAVE YOU EVER DONE ANYTHING, STARTED TO DO ANYTHING, OR PREPARED TO DO ANYTHING TO END YOUR LIFE?: NO
2. HAVE YOU ACTUALLY HAD ANY THOUGHTS OF KILLING YOURSELF IN THE PAST MONTH?: NO

## 2024-06-19 VITALS
OXYGEN SATURATION: 98 % | WEIGHT: 269 LBS | RESPIRATION RATE: 20 BRPM | DIASTOLIC BLOOD PRESSURE: 58 MMHG | HEART RATE: 70 BPM | BODY MASS INDEX: 49.2 KG/M2 | SYSTOLIC BLOOD PRESSURE: 160 MMHG | TEMPERATURE: 97.8 F

## 2024-06-19 PROCEDURE — 250N000013 HC RX MED GY IP 250 OP 250 PS 637: Performed by: EMERGENCY MEDICINE

## 2024-06-19 RX ORDER — ACETAMINOPHEN 500 MG
1000 TABLET ORAL ONCE
Status: COMPLETED | OUTPATIENT
Start: 2024-06-19 | End: 2024-06-19

## 2024-06-19 RX ADMIN — ACETAMINOPHEN 1000 MG: 500 TABLET, FILM COATED ORAL at 00:57

## 2024-06-19 ASSESSMENT — ACTIVITIES OF DAILY LIVING (ADL): ADLS_ACUITY_SCORE: 38

## 2024-06-19 NOTE — ED TRIAGE NOTES
Pt BIBA from home. Pt c/o fall this am getting out of the pool. Pt states she fell landing on both her knees. Pt endorses pain in bilateral knee's. Pt denies LOC or hitting her head. Pt denies blood thinners.

## 2024-06-19 NOTE — ED PROVIDER NOTES
Emergency Department Note      History of Present Illness     Chief Complaint  Fall    HPI  Maxine Sears is a 68 year old female who presents with bilateral knee pain.  Patient reports that she went to the pool today to exercise and as she was climbing out of the pool she was unable to support herself and her knees came down hard on the tile floor of the pool.  Since then she has had pain with ambulation and weightbearing in the bilateral knees, she reports the left knee hurts worse than the right knee.  Pain is diffuse across both knees.  No numbness tingling or weakness.  No wounds.  Patient notes chronic knee pain and she is scheduled to have bilateral knee replacements this year due to arthritis.  She denies any other injuries or symptoms at this time.  She took 1 pill of ibuprofen prior to arrival for pain relief.    Independent Historian  None    Review of External Notes  None  Past Medical History   Medical History and Problem List  Past Medical History:   Diagnosis Date    Depression      Duodenal ulcer 03/04/2019    Essential hypertension      GERD (gastroesophageal reflux disease)     Hiatal hernia     Hypothyroidism     Migraine without aura and without status migrainosus, not intractable  Aug 2016    Obesity     NELY on CPAP     Osteoarthritis     Paroxysmal atrial fibrillation (H) 01/05/2015    RLS (restless legs syndrome)     Symptomatic menopausal or female climacteric states (aka FLASHES)     TMJ disease        Medications  acetaminophen 500 MG CAPS  diclofenac (VOLTAREN) 1 % topical gel  acetaminophen (TYLENOL) 500 MG tablet  aspirin (ASA) 81 MG EC tablet  Cholecalciferol (VITAMIN D3 PO)  flecainide (TAMBOCOR) 150 MG tablet  gabapentin (NEURONTIN) 300 MG capsule  levothyroxine (SYNTHROID/LEVOTHROID) 125 MCG tablet  levothyroxine (SYNTHROID/LEVOTHROID) 137 MCG tablet  losartan (COZAAR) 100 MG tablet  metoprolol succinate ER (TOPROL XL) 25 MG 24 hr tablet  pantoprazole (PROTONIX) 40 MG EC  tablet  rosuvastatin (CRESTOR) 10 MG tablet  SUMAtriptan (IMITREX) 100 MG tablet  triamterene-HCTZ (MAXZIDE-25) 37.5-25 MG tablet  vortioxetine (TRINTELLIX) 10 MG tablet        Surgical History   Past Surgical History:   Procedure Laterality Date    APPENDECTOMY      CHOLECYSTECTOMY      COLONOSCOPY N/A 4/12/2019    Procedure: COLONOSCOPY;  Surgeon: Vahid Cardona MD;  Location:  GI    EP LEFT ATRIAL APPENDAGE CLOSURE N/A 10/8/2020    Procedure: EP Left Atrial Appendage Closure;  Surgeon: Constantin Vaughn MD;  Location:  HEART CARDIAC CATH LAB    ESOPHAGOSCOPY, GASTROSCOPY, DUODENOSCOPY (EGD), COMBINED N/A 4/11/2019    Procedure: COMBINED ESOPHAGOSCOPY, GASTROSCOPY, DUODENOSCOPY (EGD);  Surgeon: Ben Ko MD;  Location:  GI    HYSTERECTOMY, DOM  08/1999    IR LUMBAR PUNCTURE  8/17/2023    TONSILLECTOMY       Physical Exam   Patient Vitals for the past 24 hrs:   BP Temp Temp src Pulse Resp SpO2 Weight   06/18/24 2306 (!) 171/72 97.8  F (36.6  C) Temporal 70 16 98 % 122 kg (269 lb)     Physical Exam  General: Resting comfortably  Head:  Scalp, face, and head appear normal  Eyes:  Pupils equal, round    Conjunctivae noninjected and sclera white  ENT:    The nose is normal    Ears/pinnae are normal  CV:  Bilateral lower extremities warm and well perfused. DP pulses 2+ and intact bilaterally.   MSK:  Mild pain over the bilateral anterior knees. Normal ROM of bilateral knees. CMS intact bilateral lower extremities. No palpable knee joint effusion.  No significant joint laxity.  Small old appearing ecchymosis to the right medial knee.  No wounds.  No focal bony tenderness, patellar tenderness.  Skin:  No rash or lesions noted.  Neuro:  Speech is normal and fluent    Moves all extremities spontaneously  Psych: Awake, Alert. Normal affect      Appropriate interactions       Diagnostics   Lab Results   Labs Ordered and Resulted from Time of ED Arrival to Time of ED Departure - No data to  display    Imaging  XR Knee Bilateral 3 Views   Final Result   IMPRESSION:    RIGHT KNEE: Moderate medial compartment joint space loss, unchanged. The lateral compartment is preserved. Posterior patellar spurring. Small suprapatellar joint effusion. No evidence for fracture. No evidence for joint effusion or fracture.       LEFT KNEE: Severe knee joint space loss. Moderate to severe patellofemoral joint space narrowing and posterior patellar spurring. No evidence for joint effusion or fracture.            Independent Interpretation  See below   ED Course    Medications Administered  Medications   acetaminophen (TYLENOL) tablet 1,000 mg (1,000 mg Oral $Given 6/19/24 0057)       Procedures  Procedures     Discussion of Management  None    Social Determinants of Health adding to complexity of care  None    ED Course  ED Course as of 06/19/24 0101   Wed Jun 19, 2024   0043 Patient seen and evaluated    0043 I performed an independent interpretation of the patient's bilateral knee radiographs.  No fracture or dislocation is seen.     Medical Decision Making / Diagnosis   CMS Diagnoses: None    MIPS     None    [facility]  Maxine Sears is a 68 year old female who presents with bilateral knee pain after she slipped while climbing out of a pool.  Radiographs are negative for any fracture or dislocation to the bilateral knees.  She has advanced osteoarthritis and degenerative change which the patient is aware of and reports that she is scheduled to have knee replacement surgery later this year.  ED evaluation is consistent with mild bilateral knee contusions.  No significant joint effusions.  I recommended Tylenol, diclofenac topical gel, Ace wraps and use of assistive devices such as cane and walker for ambulation.  Follow-up with PCP and/or orthopedics as needed if not improving.  No other injuries.  No neurovascular compromise.  Patient is agreeable to plan of care and she was discharged in stable  condition.    Disposition  The patient was discharged.     ICD-10 Codes:    ICD-10-CM    1. Primary osteoarthritis of both knees  M17.0       2. Contusion of left knee, initial encounter  S80.02XA       3. Contusion of right knee, initial encounter  S80.01XA            Discharge Medications  New Prescriptions    ACETAMINOPHEN 500 MG CAPS    Take 2 capsules by mouth every 8 hours as needed (For aches, pain, fever) Do not take more than 4000mg in 24 hours.    DICLOFENAC (VOLTAREN) 1 % TOPICAL GEL    Apply up to 4 g to each affected area up to 4 times daily; maximum dose per area: 16 g/day; maximum total body dose (all combined areas): 32 g/day         MD Gregory Vasquez Ryan Clay, MD  06/19/24 0101

## 2024-06-19 NOTE — ED TRIAGE NOTES
Triage Assessment (Adult)       Row Name 06/18/24 6432          Triage Assessment    Airway WDL WDL        Respiratory WDL    Respiratory WDL WDL        Skin Circulation/Temperature WDL    Skin Circulation/Temperature WDL WDL        Peripheral/Neurovascular WDL    Peripheral Neurovascular WDL WDL        Cognitive/Neuro/Behavioral WDL    Cognitive/Neuro/Behavioral WDL WDL

## 2024-06-20 ENCOUNTER — PATIENT OUTREACH (OUTPATIENT)
Dept: GERIATRIC MEDICINE | Facility: CLINIC | Age: 69
End: 2024-06-20
Payer: COMMERCIAL

## 2024-06-20 NOTE — PROGRESS NOTES
[facility] Care Coordination Contact  CC received notification of Emergency Room visit.  ER visit occurred on 6/18/24 at [facility] with Dx of slipped while getting out of the [employer] pool due to the ramp there being broken she had to try to get in and out using the ladder and her knees would not support her.    CC contacted member and reviewed discharge summary.  Member has a follow-up appointment with PCP: Roxanna has upcoming knee replacement surgery and feels she will just wait a couple of days to see how her knees are doing. She reports not being bruised on the outside but on the inside of her knees she was given rx's for extra strength Tylenol and a pain cream which she reports helps somewhat. Her sister is staying with her currently to assist her with things around the house.   Member has had a change in condition: No  New referrals placed: No  Home Visit Needed: No  Care plan reviewed and updated.  PCP notified of ED visit via EMR.    Virginia Marina RN  [facility]  [phone]

## 2024-06-25 DIAGNOSIS — K92.2 ACUTE GI BLEEDING: ICD-10-CM

## 2024-06-25 RX ORDER — PANTOPRAZOLE SODIUM 40 MG/1
40 TABLET, DELAYED RELEASE ORAL DAILY
Qty: 90 TABLET | Refills: 3 | OUTPATIENT
Start: 2024-06-25

## 2024-06-28 ENCOUNTER — PATIENT OUTREACH (OUTPATIENT)
Dept: GERIATRIC MEDICINE | Facility: CLINIC | Age: 69
End: 2024-06-28
Payer: COMMERCIAL

## 2024-06-28 NOTE — PROGRESS NOTES
[facility] Care Coordination Contact    Received a request to submit a DTR for the terminated of [employer]. Documentation completed and faxed to the health plan. Care Coordinator aware.    Lindsay Gale RN  Utilization   [facility]  [phone]

## 2024-07-01 DIAGNOSIS — K92.2 ACUTE GI BLEEDING: ICD-10-CM

## 2024-07-01 DIAGNOSIS — I10 ESSENTIAL HYPERTENSION: ICD-10-CM

## 2024-07-02 RX ORDER — TRIAMTERENE/HYDROCHLOROTHIAZID 37.5-25 MG
1 TABLET ORAL DAILY
Qty: 90 TABLET | Refills: 0 | Status: SHIPPED | OUTPATIENT
Start: 2024-07-02

## 2024-07-02 RX ORDER — PANTOPRAZOLE SODIUM 40 MG/1
40 TABLET, DELAYED RELEASE ORAL DAILY
Qty: 90 TABLET | Refills: 0 | Status: SHIPPED | OUTPATIENT
Start: 2024-07-02 | End: 2024-09-13

## 2024-07-05 ENCOUNTER — OFFICE VISIT (OUTPATIENT)
Dept: INTERNAL MEDICINE | Facility: CLINIC | Age: 69
End: 2024-07-05
Payer: COMMERCIAL

## 2024-07-05 DIAGNOSIS — F32.A MILD DEPRESSION: ICD-10-CM

## 2024-07-05 DIAGNOSIS — G47.33 OSA ON CPAP: ICD-10-CM

## 2024-07-05 DIAGNOSIS — M25.562 CHRONIC PAIN OF LEFT KNEE: ICD-10-CM

## 2024-07-05 DIAGNOSIS — K21.9 GASTROESOPHAGEAL REFLUX DISEASE WITHOUT ESOPHAGITIS: ICD-10-CM

## 2024-07-05 DIAGNOSIS — Z01.818 PREOPERATIVE EXAMINATION: Primary | ICD-10-CM

## 2024-07-05 DIAGNOSIS — G43.009 MIGRAINE WITHOUT AURA AND WITHOUT STATUS MIGRAINOSUS, NOT INTRACTABLE: ICD-10-CM

## 2024-07-05 DIAGNOSIS — G89.29 CHRONIC PAIN OF LEFT KNEE: ICD-10-CM

## 2024-07-05 DIAGNOSIS — I10 ESSENTIAL HYPERTENSION: ICD-10-CM

## 2024-07-05 DIAGNOSIS — E11.9 TYPE 2 DIABETES MELLITUS WITHOUT COMPLICATION, WITHOUT LONG-TERM CURRENT USE OF INSULIN (H): ICD-10-CM

## 2024-07-05 DIAGNOSIS — E78.5 HYPERLIPIDEMIA LDL GOAL <100: ICD-10-CM

## 2024-07-05 DIAGNOSIS — M54.16 LUMBAR RADICULOPATHY: ICD-10-CM

## 2024-07-05 DIAGNOSIS — I48.0 PAROXYSMAL ATRIAL FIBRILLATION (H): ICD-10-CM

## 2024-07-05 LAB
ERYTHROCYTE [DISTWIDTH] IN BLOOD BY AUTOMATED COUNT: 14.4 % (ref 10–15)
HCT VFR BLD AUTO: 41.3 % (ref 35–47)
HGB BLD-MCNC: 13.4 G/DL (ref 11.7–15.7)
MCH RBC QN AUTO: 29.3 PG (ref 26.5–33)
MCHC RBC AUTO-ENTMCNC: 32.4 G/DL (ref 31.5–36.5)
MCV RBC AUTO: 90 FL (ref 78–100)
PLATELET # BLD AUTO: 440 10E3/UL (ref 150–450)
RBC # BLD AUTO: 4.58 10E6/UL (ref 3.8–5.2)
WBC # BLD AUTO: 13.1 10E3/UL (ref 4–11)

## 2024-07-05 PROCEDURE — 36415 COLL VENOUS BLD VENIPUNCTURE: CPT | Performed by: INTERNAL MEDICINE

## 2024-07-05 PROCEDURE — 85027 COMPLETE CBC AUTOMATED: CPT | Performed by: INTERNAL MEDICINE

## 2024-07-05 PROCEDURE — 96127 BRIEF EMOTIONAL/BEHAV ASSMT: CPT | Performed by: INTERNAL MEDICINE

## 2024-07-05 PROCEDURE — 99214 OFFICE O/P EST MOD 30 MIN: CPT | Performed by: INTERNAL MEDICINE

## 2024-07-05 PROCEDURE — 93000 ELECTROCARDIOGRAM COMPLETE: CPT | Performed by: INTERNAL MEDICINE

## 2024-07-05 ASSESSMENT — PATIENT HEALTH QUESTIONNAIRE - PHQ9
10. IF YOU CHECKED OFF ANY PROBLEMS, HOW DIFFICULT HAVE THESE PROBLEMS MADE IT FOR YOU TO DO YOUR WORK, TAKE CARE OF THINGS AT HOME, OR GET ALONG WITH OTHER PEOPLE: SOMEWHAT DIFFICULT
SUM OF ALL RESPONSES TO PHQ QUESTIONS 1-9: 8
SUM OF ALL RESPONSES TO PHQ QUESTIONS 1-9: 8

## 2024-07-05 NOTE — PROGRESS NOTES
Preoperative Evaluation  [address] [address]  Phone: [phone]  Primary Provider: Yuval Jama MD  Pre-op Performing Provider: Yuval Jama MD  Jul 5, 2024 7/5/2024   Surgical Information   What procedure is being done? L Knee Replacement surgery.   Facility or Hospital where procedure/surgery will be performed: [facility].   Who is doing the procedure / surgery?    Date of surgery / procedure: [id number]   Time of surgery / procedure: 10:30am   Where do you plan to recover after surgery? at home with family        Fax number for surgical facility: [phone]    Assessment & Plan     The proposed surgical procedure is considered INTERMEDIATE risk.    Preoperative examination  Appears medically able to proceed with surgery as scheduled  - EKG 12-lead complete w/read - Clinics  - CBC with platelets; Future  - CBC with platelets    Chronic pain of left knee  Chronic left knee pain.  Has failed conservative treatment    Type 2 diabetes mellitus without complication, without long-term current use of insulin (H)  Controlled.  Recent A1c 6.9  - EKG 12-lead complete w/read - Clinics    Essential hypertension  Mildly above goal.  Consistent with pain response.  Continue current medication for now    Gastroesophageal reflux disease without esophagitis  Controlled with pantoprazole.  Has recurrence of symptoms off of PPI therapy    Hyperlipidemia LDL goal <100  On rosuvastatin.  Lipids at goal    Lumbar radiculopathy  Stable radicular pain.  Continue gabapentin    Mild depression  Send controlled. PHQ9 = 8.  Patient feels mental health will improve when she is able to be more active.  Continue Trintellix    NELY on CPAP  Previously prescribed a CPAP machine but has not been using for over 2 years since her machine was recalled. Will need to be monitored closely in Perioperative time period for signs of apnea, O2  desaturations    Paroxysmal atrial fibrillation (H)  Currently in sinus rhythm with flecainide.  Previous Watchman procedure on LAA 2020 and now off of anticoagulation    Migraine without aura and without status migrainosus, not intractable  Will migraine.  Controlled with as needed sumatriptan.  Currently asymptomatic           Risks and Recommendations  The patient has the following additional risks and recommendations for perioperative complications:   - No identified additional risk factors other than previously addressed    Patient instructions   Approval given to proceed with proposed procedure, without further diagnostic evaluation..  No solid food after midnight prior to your  procedure. May have clear liquids up to 2 hours prior to the  procedure (8:30am)   Stop  Aspirin 7 days prior and Diclofenac gel 3 days prior to your  procedure to reduce risk of bleeding.   May use Tylenol for pain control  between then and your procedure  Take flecainide, levothyroxine, metoprolol, pantoprazole, rosuvastatin with a small sip of water to swallow the medication when you first get up the  AM of the  procedure  prior to 8:00am  Hold other prescription medications  (including Metformin , Losartan, hydrochlorothiazide) and over the counter medications/supplements the day of the  procedure. May resume usual medications/supplements after the procedure unless instructed otherwise by your surgeon   Post-op DVT prophylaxis orders per surgeon    Recommendation  Approval given to proceed with proposed procedure, without further diagnostic evaluation.    Glenn Mcknight is a 68 year old, presenting for the following:  Pre-Op Exam    Via the Health Maintenance questionnaire, the patient has reported the following services have been completed -Eye Exam: I dont remember the name 2024-06-05, this information has been sent to the abstraction team.      HPI related to upcoming procedure:   Chronic left knee pain.  Has failed  conservative treatment.  Affecting ambulation and patient now using a scooter.  Presents for clearance to undergo surgical management with left knee replacement.  Regarding current exercise tolerance, pt last  swam 2  weeks ago for 10 min  and did 40  min water aerobics  without chest pain or shortness of breath. Tripped 2 weeks ago straining the knee and making pain worse so  has not returned to the pool since then          7/5/2024   Pre-Op Questionnaire   Have you ever had a heart attack or stroke? No   Have you ever had surgery on your heart or blood vessels, such as a stent placement, a coronary artery bypass, or surgery on an artery in your head, neck, heart, or legs? No   Do you have chest pain with activity? No   Do you have a history of heart failure? No   Do you currently have a cold, bronchitis or symptoms of other infection? No   Do you have a cough, shortness of breath, or wheezing? No   Do you or anyone in your family have previous history of blood clots? No   Do you or does anyone in your family have a serious bleeding problem such as prolonged bleeding following surgeries or cuts? No   Have you ever had problems with anemia or been told to take iron pills? (!) YES  Hx anemia July 2022 when  had acute renal insufficiency due to dehydration. Resolved   Have you had any abnormal blood loss such as black, tarry or bloody stools, or abnormal vaginal bleeding? No   Have you ever had a blood transfusion? (!) YES in  1999 after hysterectomy and secondary bleeding.  No bleeding issues with other surgeries   Have you ever had a transfusion reaction? No   Are you willing to have a blood transfusion if it is medically needed before, during, or after your surgery? Yes   Have you or any of your relatives ever had problems with anesthesia? No   Do you have sleep apnea, excessive snoring or daytime drowsiness? No   Do you have any artifical heart valves or other implanted medical devices like a pacemaker,  defibrillator, or continuous glucose monitor? No   Do you have artificial joints? No   Are you allergic to latex? No        Health Care Directive  Patient does not have a Health Care Directive or Living Will: Discussed advance care planning with patient; information given to patient to review.    Preoperative Review of    reviewed - Tramadol  #10 tabs  prescribed 5/6/24 by  other provider      Status of Chronic Conditions:  See assessment/plan section above for active medical problems.  Problems all longstanding and stable, except as noted/documented.  See ROS in addition for pertinent symptoms related to these conditions.      Patient Active Problem List    Diagnosis Date Noted    Type 2 diabetes mellitus without complication, without long-term current use of insulin (H) 07/17/2024     Priority: Medium    Osteopenia of both hips 07/30/2022     Priority: Medium    Mild depression 12/05/2021     Priority: Medium    Pulmonary nodules 10/25/2020     Priority: Medium     3-4mm. Does not require repeat CT chest with low risk status per radiology recs      Hyperlipidemia LDL goal <100 10/25/2020     Priority: Medium    Paroxysmal atrial fibrillation (H) 09/01/2020     Priority: Medium     Added automatically from request for surgery [id number]      Chronic pain of left knee 06/14/2020     Priority: Medium    Lumbar radiculopathy 06/14/2020     Priority: Medium    Gastroesophageal reflux disease, esophagitis presence not specified 01/10/2018     Priority: Medium     IMO Regulatory Load OCT 2020      NELY on CPAP      Priority: Medium    RLS (restless legs syndrome)      Priority: Medium    Essential hypertension 06/29/2016     Priority: Medium    Migraine without aura and without status migrainosus, not intractable 03/19/2016     Priority: Medium    Morbid obesity (H) 12/09/2015     Priority: Medium    Hypothyroidism 12/03/2015     Priority: Medium    Osteoarthritis      Priority: Medium    TMJ disease      Priority:  Medium      Past Medical History:   Diagnosis Date    Depression      Duodenal ulcer 03/04/2019    Essential hypertension      Gastroesophageal reflux disease, esophagitis presence not specified 01/10/2018    IMO Regulatory Load OCT 2020      GERD (gastroesophageal reflux disease)     Hiatal hernia     Hyperlipidemia LDL goal <100 10/25/2020    Hypothyroidism     Lumbar radiculopathy 06/14/2020    Migraine without aura and without status migrainosus, not intractable 08/2016    Mild depression 12/05/2021    Morbid obesity (H) 12/09/2015    Obesity     NELY on CPAP     Osteoarthritis     Paroxysmal atrial fibrillation (H) 01/05/2015    RLS (restless legs syndrome)     Symptomatic menopausal or female climacteric states (aka FLASHES)     TMJ disease      Past Surgical History:   Procedure Laterality Date    APPENDECTOMY      CHOLECYSTECTOMY      COLONOSCOPY N/A 4/12/2019    Procedure: COLONOSCOPY;  Surgeon: Vahid Cardona MD;  Location:  GI    EP LEFT ATRIAL APPENDAGE CLOSURE N/A 10/8/2020    Procedure: EP Left Atrial Appendage Closure;  Surgeon: Constantin Vaughn MD;  Location:  [facility]    ESOPHAGOSCOPY, GASTROSCOPY, DUODENOSCOPY (EGD), COMBINED N/A 4/11/2019    Procedure: COMBINED ESOPHAGOSCOPY, GASTROSCOPY, DUODENOSCOPY (EGD);  Surgeon: Ben Ko MD;  Location:  GI    HYSTERECTOMY, DOM  08/1999    IR LUMBAR PUNCTURE  8/17/2023    TONSILLECTOMY       Current Outpatient Medications   Medication Sig Dispense Refill    acetaminophen (TYLENOL) 500 MG tablet Take 500-1,000 mg by mouth 3 times daily as needed for mild pain      acetaminophen 500 MG CAPS Take 2 capsules by mouth every 8 hours as needed (For aches, pain, fever) Do not take more than 4000mg in 24 hours. 60 capsule 0    aspirin (ASA) 81 MG EC tablet Take 1 tablet (81 mg) by mouth daily      Cholecalciferol (VITAMIN D3 PO) Take 50 mcg by mouth daily      diclofenac (VOLTAREN) 1 % topical gel Apply up to 4 g to each  affected area up to 4 times daily; maximum dose per area: 16 g/day; maximum total body dose (all combined areas): 32 g/day 100 g 1    flecainide (TAMBOCOR) 150 MG tablet Take 1 tablet (150 mg) by mouth every 12 hours 180 tablet 1    gabapentin (NEURONTIN) 300 MG capsule TAKE THREE CAPSULES BY MOUTH EVERY NIGHT AT BEDTIME 270 capsule 1    levothyroxine (SYNTHROID/LEVOTHROID) 125 MCG tablet Take 1 tab by mouth daily except  and  (5 days a week) 90 tablet 3    levothyroxine (SYNTHROID/LEVOTHROID) 137 MCG tablet TAKE 1 TAB BY MOUTH ON  AND  ( 2 DAYS A WEEK) 10 tablet 0    losartan (COZAAR) 100 MG tablet Take 1 tablet (100 mg) by mouth daily 90 tablet 1    metoprolol succinate ER (TOPROL XL) 25 MG 24 hr tablet Take 0.5 tablets (12.5 mg) by mouth daily 45 tablet 0    pantoprazole (PROTONIX) 40 MG EC tablet TAKE 1 TABLET (40 MG) BY MOUTH DAILY 90 tablet 0    rosuvastatin (CRESTOR) 10 MG tablet TAKE 1 TABLET (10 MG) BY MOUTH DAILY 90 tablet 3    SUMAtriptan (IMITREX) 100 MG tablet Take 1 tablet (100 mg) by mouth at onset of headache for migraine May repeat in 2 hours if needed: max 2/day; 10 tablet 11    triamterene-HCTZ (MAXZIDE-25) 37.5-25 MG tablet TAKE 1 TABLET BY MOUTH DAILY 90 tablet 0    vortioxetine (TRINTELLIX) 10 MG tablet Take 1 tablet (10 mg) by mouth daily 30 tablet 11       Allergies   Allergen Reactions    Morphine Anaphylaxis    Ceclor [Cefaclor] Hives    Diltiazem Hives    Duloxetine Hcl      Foggy head feeling    Lisinopril Cough    Sertraline Nausea and Vomiting        Social History     Tobacco Use    Smoking status: Former     Current packs/day: 0.00     Average packs/day: 0.3 packs/day for 1 year (0.3 ttl pk-yrs)     Types: Cigarettes     Start date: 1971     Quit date: 1972     Years since quittin.5    Smokeless tobacco: Never    Tobacco comments:     very minimal smoking history   Substance Use Topics    Alcohol use: No     Family History   Problem Relation  "Age of Onset    Myocardial Infarction Mother     Heart Failure Mother     Hypertension Mother         Mother    Heart Surgery Father         bypass surgery    Cerebrovascular Disease Father         Father    Heart Surgery Brother     Hyperlipidemia Brother         Brother    Hyperlipidemia Sister         Sister    Hyperlipidemia Brother         Brother    Sleep Apnea Sister      History   Drug Use No             Review of Systems  CONSTITUTIONAL: NEGATIVE for fever, chills.Weight down 7 pounds  INTEGUMENTARY/SKIN: NEGATIVE for worrisome rashes, moles or lesions  EYES: NEGATIVE for vision changes or irritation.  Has glasses  ENT/MOUTH: NEGATIVE for ear, mouth and throat problems  RESP: NEGATIVE for significant cough or SOB. Has hx NELY but not being treated  with CPAP after machine recalled years ago  BREAST: NEGATIVE for masses, tenderness or discharge  CV: NEGATIVE for chest pain, palpitations. Minimal peripheral edema without orthopnea  GI: NEGATIVE for nausea, abdominal pain, heartburn (with PPI), or change in bowel habits  : NEGATIVE for frequency, dysuria, or hematuria  MUSCULOSKELETAL: See HPI  NEURO: NEGATIVE for weakness, dizziness.   Hx lumbar radiculopathy improved with Gabapentin  ENDOCRINE: NEGATIVE for temperature intolerance.POSITIVE for obesity  HEME: NEGATIVE for bleeding problems in  general.   Occ bruising with ASA use  PSYCHIATRIC:   POSITIVE for stable depression.  Managed by  psychiatry. PHQ9 = 8. Some stress with  upcoming move to new apartment.  No suicidal ideation. Feels sx will improve further when able to be more active    Objective    BP (!) 144/80   Pulse 58   Temp 96.9  F (36.1  C) (Temporal)   Ht 1.575 m (5' 2\")   Wt 123.1 kg (271 lb 6.4 oz)   SpO2 98%   BMI 49.64 kg/m     Estimated body mass index is 49.2 kg/m  as calculated from the following:    Height as of 5/5/24: 1.575 m (5' 2\").    Weight as of 6/18/24: 122 kg (269 lb).  Physical Exam  Eye: PERRL, EOMI  HENT: ear canals " and TM's normal and nose and mouth without ulcers or lesions.  Narrowed posterior pharyngeal airway due to obesity  Neck: no adenopathy. Thyroid normal to palpation. No bruits  Pulm: Lungs clear to auscultation   CV: Regular rates and rhythm  GI: Soft, obese, nontender, Normal active bowel sounds, No hepatosplenomegaly or masses palpable  Ext: Peripheral pulses intact. Minimal BLE ankle nonpitting edema.  Tender to palpation left knee joint line.  No effusion  Neuro: Normal strength and tone with extremities, sensory exam grossly normal. Pt sitting on a scooter and gait was not assessed      Recent Labs   Lab Test 06/14/24  1020 07/11/23  1617    135*   POTASSIUM 4.2 3.6   CR 0.75 0.77   A1C 6.9* 6.7*        Diagnostics  Component      Latest Ref Rng 6/14/2024  10:20 AM 7/5/2024  3:44 PM   Sodium      135 - 145 mmol/L 137     Potassium      3.4 - 5.3 mmol/L 4.2     Carbon Dioxide (CO2)      22 - 29 mmol/L 26     Anion Gap      7 - 15 mmol/L 12     Urea Nitrogen      8.0 - 23.0 mg/dL 12.6     Creatinine      0.51 - 0.95 mg/dL 0.75     GFR Estimate      >60 mL/min/1.73m2 86     Calcium      8.8 - 10.2 mg/dL 9.5     Chloride      98 - 107 mmol/L 99     Glucose      70 - 99 mg/dL 113 (H)     Alkaline Phosphatase      40 - 150 U/L 95     AST      0 - 45 U/L 19     ALT      0 - 50 U/L 14     Protein Total      6.4 - 8.3 g/dL 7.1     Albumin      3.5 - 5.2 g/dL 4.3     Bilirubin Total      <=1.2 mg/dL 0.5     Patient Fasting? Yes     WBC      4.0 - 11.0 10e3/uL  13.1 (H)    RBC Count      3.80 - 5.20 10e6/uL  4.58    Hemoglobin      11.7 - 15.7 g/dL  13.4    Hematocrit      35.0 - 47.0 %  41.3    MCV      78 - 100 fL  90    MCH      26.5 - 33.0 pg  29.3    MCHC      31.5 - 36.5 g/dL  32.4    RDW      10.0 - 15.0 %  14.4    Platelet Count      150 - 450 10e3/uL  440    TSH      0.30 - 4.20 uIU/mL 0.76     Hemoglobin A1C      0.0 - 5.6 % 6.9 (H)          EKG: Sinus bradycardia with rate 53.  First-degree AV block  (230ms).  Nonspecific T wave inversions in lead 3 and V3. No acute ST/T changes    Revised Cardiac Risk Index (RCRI)  The patient has the following serious cardiovascular risks for perioperative complications:   - No serious cardiac risks = 0 points     RCRI Interpretation: 0 points: Class I (very low risk - 0.4% complication rate)         Signed Electronically by: Yuval Jama MD  Copy of this evaluation report is provided to requesting physician.

## 2024-07-10 ENCOUNTER — OFFICE VISIT (OUTPATIENT)
Dept: INTERNAL MEDICINE | Facility: CLINIC | Age: 69
End: 2024-07-10
Payer: COMMERCIAL

## 2024-07-10 VITALS
TEMPERATURE: 98.8 F | DIASTOLIC BLOOD PRESSURE: 80 MMHG | OXYGEN SATURATION: 97 % | HEART RATE: 62 BPM | WEIGHT: 269 LBS | BODY MASS INDEX: 49.5 KG/M2 | HEIGHT: 62 IN | RESPIRATION RATE: 15 BRPM | SYSTOLIC BLOOD PRESSURE: 140 MMHG

## 2024-07-10 DIAGNOSIS — G89.29 CHRONIC PAIN OF LEFT KNEE: ICD-10-CM

## 2024-07-10 DIAGNOSIS — E11.9 TYPE 2 DIABETES MELLITUS WITHOUT COMPLICATION, WITHOUT LONG-TERM CURRENT USE OF INSULIN (H): ICD-10-CM

## 2024-07-10 DIAGNOSIS — I10 ESSENTIAL HYPERTENSION: ICD-10-CM

## 2024-07-10 DIAGNOSIS — M25.562 CHRONIC PAIN OF LEFT KNEE: ICD-10-CM

## 2024-07-10 DIAGNOSIS — Z00.00 MEDICARE ANNUAL WELLNESS VISIT, SUBSEQUENT: Primary | ICD-10-CM

## 2024-07-10 DIAGNOSIS — Z78.0 ASYMPTOMATIC MENOPAUSAL STATE: ICD-10-CM

## 2024-07-10 DIAGNOSIS — Z12.31 ENCOUNTER FOR SCREENING MAMMOGRAM FOR BREAST CANCER: ICD-10-CM

## 2024-07-10 DIAGNOSIS — F32.A MILD DEPRESSION: ICD-10-CM

## 2024-07-10 PROCEDURE — G0439 PPPS, SUBSEQ VISIT: HCPCS | Performed by: INTERNAL MEDICINE

## 2024-07-10 PROCEDURE — 99214 OFFICE O/P EST MOD 30 MIN: CPT | Mod: 25 | Performed by: INTERNAL MEDICINE

## 2024-07-10 RX ORDER — METFORMIN HCL 500 MG
1000 TABLET, EXTENDED RELEASE 24 HR ORAL
Qty: 60 TABLET | Refills: 11 | Status: SHIPPED | OUTPATIENT
Start: 2024-07-10

## 2024-07-10 SDOH — HEALTH STABILITY: PHYSICAL HEALTH: ON AVERAGE, HOW MANY DAYS PER WEEK DO YOU ENGAGE IN MODERATE TO STRENUOUS EXERCISE (LIKE A BRISK WALK)?: 0 DAYS

## 2024-07-10 ASSESSMENT — SOCIAL DETERMINANTS OF HEALTH (SDOH): HOW OFTEN DO YOU GET TOGETHER WITH FRIENDS OR RELATIVES?: THREE TIMES A WEEK

## 2024-07-10 NOTE — PROGRESS NOTES
Preventive Care Visit  [facility]  Yuval Jama MD, Internal Medicine  Jul 10, 2024      ASSESSMENT:    1. Medicare annual wellness visit, subsequent  See plan discussion below regarding healthcare maintenance recommendations.  Otherwise up-to-date for age.  Counseled again regarding calorie/carbohydrate reduction and walking exercise once the symptoms improved for further weight loss    2. Mild depression  Mild.  Needs improved control.  PHQ-9 = 8.  On Trintellix.  Patient feels mood will improve after she completes her knee replacement and can be more active.  Declines medication change at this time.  Will monitor mental health with same current management after upcoming surgery.  Patient will be moving to a new apartment in September and requests a letter for her to have her dog remain with her who acts as an emotional support animal to her and benefiting her mental health    3. Type 2 diabetes mellitus without complication, without long-term current use of insulin (H)  Needs improved control as recent A1c 6.9 and continuing to rise.  Counseled regarding carbohydrate reduction in diet.  Activity as able once knee replacement occurs.  Patient will also start metformin.  Warned regarding possible loose stool side effects and will inform physician if that occurs.  Otherwise repeat A1c in 3 months  - metFORMIN (GLUCOPHAGE XR) 500 MG 24 hr tablet; Take 2 tablets (1,000 mg) by mouth daily (with dinner)  Dispense: 60 tablet; Refill: 11  - Hemoglobin A1c; Future  - Basic metabolic panel; Future    4. Chronic pain of left knee  Scheduled for left knee replacement later in July.  Recent preop completed and cleared medically proceed with surgery    5. Encounter for screening mammogram for breast cancer  Candidate for breast cancer screening.  Asymptomatic  - MA Screen Bilateral w/Alonzo; Future    6. Asymptomatic menopausal state  Candidate for bone density assessment.  Menopausal  - DX Bone Density;  Future    7. Essential hypertension  Blood pressure mildly above goal < 130/80.  Will continue current management for now and await response of blood pressure to increase activity after knee replacement and when pain symptoms also improved        PLAN:   Start Metformin  500mg tab, 1 tab daily at supper for 1 week. Then increase to 2 tabs  daily at supper for diabetes/blood sugar control.  Contact clinic if,side effects  with the new medication  Continue other medications  Call  [phone] or use [employer] to schedule a future lab appointment  non-fasting in 3 months for diabetes.   Reduce calorie/carbohydrate (sugar, bread, potato, pasta, rice, alcohol etc)  intake in diet.  Increase color on your plate with vegetables.   Bone density (DEXA)   and Mammogram     This will be done at the [facility]. Call [phone] or use [employer] to schedule.   Consider Shingrix vaccinations at your pharmacy (now Medicare covered). This is a 2 shot series done 2-6 months apart  I would recommend an updated covid vaccination late this summer/early Fall when available and an influenza/flu vaccination in the Fall (mid/late October) 2024 at the clinic or any pharmacy  Left knee surgery 7/18/24 as scheduled with instructions prior to surgery per recent preop visit  I would recommend  a  RSV vaccine (respiratory syncytial virus risk reduction) in  early Fall. Get at a pharmacy  Pt was informed regarding extra E&M billing for management of new or established medical issues not related to today's wellness/screening visit  Letter support  for emotional support  animal           Glenn Mcknight is a 68 year old, presenting for the following:  Medicare Visit  And follow-up medical issues as above.  Some other medical issues were also addressed recently at last preoperative visit       Health Care Directive  Patient does not have a Health Care Directive or Living Will: Patient states has Advance Directive and will bring in a  copy to clinic. Pt has paperwork just has to fill it out    HPI    Pt needs a statement that her dog is an emotional support animal. Pt is moving into a new place soon.              7/10/2024   General Health   How would you rate your overall physical health? (!) FAIR   Feel stress (tense, anxious, or unable to sleep) Only a little      (!) STRESS CONCERN      7/10/2024   Nutrition   Diet: I don't know            7/10/2024   Exercise   Days per week of moderate/strenous exercise 0 days      (!) EXERCISE CONCERN      7/10/2024   Social Factors   Frequency of gathering with friends or relatives Three times a week   Worry food won't last until get money to buy more No   Food not last or not have enough money for food? No   Do you have housing? (Housing is defined as stable permanent housing and does not include staying ouside in a car, in a tent, in an abandoned building, in an overnight shelter, or couch-surfing.) Yes   Are you worried about losing your housing? No   Lack of transportation? No   Unable to get utilities (heat,electricity)? No            7/10/2024   Fall Risk   Fallen 2 or more times in the past year? No   Trouble with walking or balance? Yes             7/10/2024   Activities of Daily Living- Home Safety   Needs help with the following daily activites None of the above   Safety concerns in the home None of the above            7/10/2024   Dental   Dentist two times every year? Yes            7/10/2024   Hearing Screening   Hearing concerns? None of the above            7/10/2024   Driving Risk Screening   Patient/family members have concerns about driving No            7/10/2024   General Alertness/Fatigue Screening   Have you been more tired than usual lately? No            7/10/2024   Urinary Incontinence Screening   Bothered by leaking urine in past 6 months No            7/10/2024   TB Screening   Were you born outside of the [place]? Yes          Today's PHQ-9 Score:       7/10/2024    12:42 PM   PHQ-9  SCORE   PHQ-9 Total Score MyChart 8 (Mild depression)   PHQ-9 Total Score 8         7/10/2024   Substance Use   Alcohol more than 3/day or more than 7/wk Not Applicable   Do you have a current opioid prescription? No   How severe/bad is pain from 1 to 10? 0/10 (No Pain)   Do you use any other substances recreationally? No        Social History     Tobacco Use    Smoking status: Former     Current packs/day: 0.00     Average packs/day: 0.3 packs/day for 1 year (0.3 ttl pk-yrs)     Types: Cigarettes     Start date: 1971     Quit date: 1972     Years since quittin.5    Smokeless tobacco: Never    Tobacco comments:     very minimal smoking history   Vaping Use    Vaping status: Never Used   Substance Use Topics    Alcohol use: No    Drug use: No            2022   LAST FHS-7 RESULTS   1st degree relative breast or ovarian cancer No   Any relative bilateral breast cancer No   Any male have breast cancer No   Any ONE woman have BOTH breast AND ovarian cancer No   Any woman with breast cancer before 50yrs No   2 or more relatives with breast AND/OR ovarian cancer No   2 or more relatives with breast AND/OR bowel cancer No         Mammogram Screening - Mammogram every 1-2 years updated in Health Maintenance based on mutual decision making      History of abnormal Pap smear: Status post hysterectomy with removal of cervix and no history of CIN2 or greater or cervical cancer. Health Maintenance and Surgical History updated.       ASCVD Risk   The 10-year ASCVD risk score (Marianela ESCUDERO, et al., 2019) is: 24.9%    Values used to calculate the score:      Age: 68 years      Sex: Female      Is Non- : No      Diabetic: Yes      Tobacco smoker: No      Systolic Blood Pressure: 160 mmHg      Is BP treated: Yes      HDL Cholesterol: 57 mg/dL      Total Cholesterol: 147 mg/dL    Due for bone density/DEXA follow-up       Reviewed and updated as needed this visit by Provider                       Current providers sharing in care for this patient include:  Patient Care Team:  Yuval Jama MD as PCP - General (Internal Medicine)  Yuval Jama MD (Internal Medicine)  Yuval Jama MD as Assigned PCP  Ilda Sue PA-C as Assigned Heart and Vascular Provider  Bloch, Lauren Turner, [facility] as Pharmacist (Pharmacist)  Khushbu Traore [facility] as Pharmacist (Pharmacist)  Virginia Sauceda, [facility] as Assigned MT Pharmacist  Damari Arias Chi, OD as MD (Optometry)  Damari Arias Chi, OD as MD (Optometry)  Virginia Marina, RN as Lead Care Coordinator (Primary Care - CC)    The following health maintenance items are reviewed in Epic and correct as of today:  Health Maintenance   Topic Date Due    MIGRAINE ACTION PLAN  Never done    ZOSTER IMMUNIZATION (1 of 2) Never done    RSV VACCINE (Pregnancy & 60+) (1 - 1-dose 60+ series) Never done    ANNUAL REVIEW OF HM ORDERS  10/25/2022    MEDICARE ANNUAL WELLNESS VISIT  07/29/2023    DIABETIC FOOT EXAM  07/30/2023    COVID-19 Vaccine (6 - 2023-24 season) 03/02/2024    INFLUENZA VACCINE (1) 09/01/2024    MAMMO SCREENING  09/08/2024    A1C  09/14/2024    PHQ-9  01/10/2025    EYE EXAM  06/05/2025    ALT  06/14/2025    BMP  06/14/2025    LIPID  06/14/2025    MICROALBUMIN  06/14/2025    TSH W/FREE T4 REFLEX  06/14/2025    CBC  07/05/2025    FALL RISK ASSESSMENT  07/10/2025    COLORECTAL CANCER SCREENING  04/12/2029    ADVANCE CARE PLANNING  07/05/2029    DTAP/TDAP/TD IMMUNIZATION (3 - Td or Tdap) 10/07/2029    DEXA  10/10/2034    HEPATITIS C SCREENING  Completed    DEPRESSION ACTION PLAN  Completed    Pneumococcal Vaccine: 65+ Years  Completed    IPV IMMUNIZATION  Aged Out    HPV IMMUNIZATION  Aged Out    MENINGITIS IMMUNIZATION  Aged Out    RSV MONOCLONAL ANTIBODY  Aged Out         Review of Systems  CONSTITUTIONAL: NEGATIVE for fever, chills. Weight down  9 pounds  INTEGUMENTARY/SKIN: NEGATIVE for worrisome rashes, moles or lesions  EYES: NEGATIVE for vision changes  "or irritation.  eye exam  June 2024. Has glasses  ENT/MOUTH: NEGATIVE for ear, mouth and throat problems  RESP: NEGATIVE for significant cough or SOB  BREAST: NEGATIVE for masses, tenderness or discharge  CV: NEGATIVE for chest pain, palpitations or peripheral edema  GI: NEGATIVE for nausea, abdominal pain, heartburn, or change in bowel habits  : NEGATIVE for frequency, dysuria, or hematuria  MUSCULOSKELETAL:  POSITIVE for left > right knee pain/. Future TKA  NEURO: NEGATIVE for weakness, dizziness or paresthesias  ENDOCRINE: NEGATIVE for temperature intolerance. POSITIVE for obesity. Hx DM.  Not checking blood sugars.  Recent A1c worsened to 6.9  HEME: NEGATIVE for bleeding problems  PSYCHIATRIC:  POSITIVE for  depression. PHQ9  =  8.  No suicidal ideation.  Has ELISSA and needs letter for future move September to new apt      Objective    Exam  BP (!) 140/80 (BP Location: Left arm, Patient Position: Sitting, Cuff Size: Adult Regular)   Pulse 62   Temp 98.8  F (37.1  C) (Oral)   Resp 15   Ht 1.575 m (5' 2\")   Wt 122 kg (269 lb)   SpO2 97%   BMI 49.20 kg/m     Estimated body mass index is 49.2 kg/m  as calculated from the following:    Height as of this encounter: 1.575 m (5' 2\").    Weight as of this encounter: 122 kg (269 lb).    Physical Exam    General appearance - alert, no distress.  Minimally flattened appearing affect.  Normal thought content, eye contact and dress  Skin - No rashes or lesions.  Head - normocephalic, atraumatic  Eyes - TYRONE, EOMI, fundi exam with nondilated pupils negative.  Ears - External ears normal. Canals clear. TM's normal.  Nose/Sinuses - Nares normal. Septum midline. Mucosa normal. No drainage or sinus tenderness.  Oropharynx - No erythema, no adenopathy, no exudates.  Neck - Supple without adenopathy or thyromegaly. No bruits.  Lungs - Clear to auscultation without wheezes/rhonchi.  Heart - Regular rate and rhythm without murmurs, clicks, or gallops.  Nodes - No " supraclavicular, axillary, or inguinal adenopathy palpable.  Breasts - deferred  Abdomen - Abdomen morbidly obese, soft, non-tender. BS normal. No masses or hepatosplenomegaly palpable. No bruits.  Extremities -No cyanosis, clubbing or edema.    Musculoskeletal - Spine ROM normal. Muscular strength intact.  Tenderness to palpation left knee joint line.  No effusion.  Peripheral pulses - radial=4/4, femoral=4/4, posterior tibial=4/4, dorsalis pedis=4/4,  Neuro - Gait antalgic and slowed with knee issues but stable.  Reflexes normal and symmetric. Sensation grossly WNL.  Genital/Rectal - deferred           7/10/2024   Mini Cog   Clock Draw Score 2 Normal   3 Item Recall 3 objects recalled   Mini Cog Total Score 5            Signed Electronically by: Yuval Jama MD

## 2024-07-10 NOTE — LETTER
2024    Regarding:  Maxine Sears  [address]   [place] [address]   55      To Whom It May Concern,     I am writing this letter in regards to my patient Maxine Sears. She has a history of depression which is managed with medication therapy.  In addition, she gains great emotional support through the presence of her dog.  She informs me that she will be moving to a new apartment in 2024.  I consider her dog to be part of her active management for her mental health and consider the dog to be an emotional support animal.  Therefore, I medically request that she be allowed to have her pet dog with her at her new apartment. If you have further questions regarding this matter, please feel free to contact me at [phone].    Sincerely,            Yuval Jama MD          
3

## 2024-07-10 NOTE — PATIENT INSTRUCTIONS
Metformin  500mg tab, 1 tab daily at supper for 1 week. Then increase to 2 tabs  daily at supper for diabetes/blood sugar control.  Contact clinic if,side effects  with the new medication  Continue other medications  Call  [phone] or use [employer] to schedule a future lab appointment  non-fasting in 3 months for diabetes.   Reduce calorie/carbohydrate (sugar, bread, potato, pasta, rice, alcohol etc)  intake in diet.  Increase color on your plate with vegetables.   Bone density (DEXA)   and Mammogram     This will be done at the [facility]. Call [phone] or use [employer] to schedule.   Consider Shingrix vaccinations at your pharmacy (now Medicare covered). This is a 2 shot series done 2-6 months apart  I would recommend an updated covid vaccination late this summer/early Fall when available and an influenza/flu vaccination in the Fall (mid/late October) 2024 at the clinic or any pharmacy  I would recommend  a  RSV vaccine (respiratory syncytial virus risk reduction) in  early Fall. Get at a pharmacy  Pt was informed regarding extra E&M billing for management of new or established medical issues not related to today's wellness/screening visit

## 2024-07-17 VITALS
BODY MASS INDEX: 49.94 KG/M2 | OXYGEN SATURATION: 98 % | TEMPERATURE: 96.9 F | HEART RATE: 58 BPM | SYSTOLIC BLOOD PRESSURE: 144 MMHG | HEIGHT: 62 IN | DIASTOLIC BLOOD PRESSURE: 80 MMHG | WEIGHT: 271.4 LBS

## 2024-07-17 PROBLEM — E87.6 HYPOKALEMIA: Status: RESOLVED | Noted: 2022-07-12 | Resolved: 2024-07-17

## 2024-07-17 PROBLEM — E61.1 IRON DEFICIENCY: Status: RESOLVED | Noted: 2020-02-02 | Resolved: 2024-07-17

## 2024-07-17 PROBLEM — E11.9 TYPE 2 DIABETES MELLITUS WITHOUT COMPLICATION, WITHOUT LONG-TERM CURRENT USE OF INSULIN (H): Status: ACTIVE | Noted: 2024-07-17

## 2024-07-17 NOTE — PATIENT INSTRUCTIONS
Approval given to proceed with proposed procedure, without further diagnostic evaluation..  No solid food after midnight prior to your  procedure. May have clear liquids up to 2 hours prior to the  procedure (8:30am)   Stop  Aspirin 7 days prior and Diclofenac gel 3 days prior to your  procedure to reduce risk of bleeding.   May use Tylenol for pain control  between then and your procedure  Take flecainide, levothyroxine, metoprolol, pantoprazole, rosuvastatin with a small sip of water to swallow the medication when you first get up the  AM of the  procedure  prior to 8:00am  Hold other prescription medications  (including Metformin , Losartan, hydrochlorothiazide) and over the counter medications/supplements the day of the  procedure. May resume usual medications/supplements after the procedure unless instructed otherwise by your surgeon   Post-op DVT prophylaxis orders per surgeon

## 2024-07-19 ENCOUNTER — PATIENT OUTREACH (OUTPATIENT)
Dept: GERIATRIC MEDICINE | Facility: CLINIC | Age: 69
End: 2024-07-19
Payer: COMMERCIAL

## 2024-07-19 NOTE — PROGRESS NOTES
TRANSITIONS OF CARE (SANTOS) LOG    SANTOS tasks should be completed by the CC within one (1) business day of notification of each transition. Follow up contact with member is required after return to their usual care setting.  Note:  If CC finds out about the transitions fifteen (15) days or more after the member has returned to their usual care setting, no SANTOS log is needed. However, the CC should check in with the member to discuss the transition process, any changes needed to the care plan and document it in a case note.     Member Name:  Maxine Sears [facility] Name:  [employer] Member ID#: [id number]   Product: [facility] Care Coordinator Contact:  Virginia Marina RN, BSN Agency/County/Care System: [facility]   Transition Communication Actions from Care Management Contact   Transition #1   Notification Date: 7/19/24 Transition Date:   7/18/24 Transition From: Home     Is this the member s usual care setting?               yes Transition To: Hospital, [facility]    Transition Type:  Planned    Documentation from conversation with the member/responsible party, provider, discharging and receiving facility:   Date: 7/19/24: Received notification of transition to home.  CC contacted member and left a message requesting a return call.  Member has a follow-up appointment with PCP in 7 days: Per Roxanna appointment scheduled with ortho next week.  Member has had a change in condition: No  Home visit needed: No  Care plan reviewed and updated.  New referrals placed: No  Transition log completed.   PCP, Yuval Jama, notified of transition back to home via EMR.    Roxanna had planned left knee replacement surgery on 7/18/24. Per previous conversation with Roxanna her sister will be staying with her to assist post surgery. Care coordinator attempted outreach today and left non detailed voicemail for Roxanna. Will attempt again at a later date. Roxanna is seen at [facility] orthopedics and will receive PT [place]  [place] [facility] [place] [place].    Virginia Marina RN  [facility]  [phone]         *RETURN TO USUAL CARE SETTING: *Complete tasks below when the member is discharging TO their usual care setting within one (1) business day of notification..      For situations where the Care Coordinator is notified of the discharge prior to the date of discharge, the Care Coordinator must follow up with the member or designated representative to confirm that discharge actually occurred and discuss required SANTOS tasks as outlined in the SANTOS Instructions.  (This includes situations where it may be a  new  usual care setting for the member. (i.e., a community member who decides upon permanent nursing home placement following hospitalization and rehab).    Discuss with Member/Responsible Party:    Check  Yes  - if the member, family member and/or SNF/facility staff manages the following:    If  No  provide explanation in the comments section.          Date completed: 7/23/24: Communicated with member or their designated representative about the following:  care transition process; about changes to the member s health status; plan of care updates; education about transitions and how to prevent unplanned transitions/readmissions    Four Pillars for Optimal Transition:    Check  Yes  - if the member, family member and/or SNF/facility staff manages the following:    If  No  provide explanation in the comments section.          [x]  Yes     []  No Does the member have a follow-up appointment scheduled with primary care or specialist? (Mental health hospitalizations--the appt. should be w/in 7 days)              For mental health hospitalizations:  []  Yes     []  No     Does the member have a follow-up appointment scheduled with a mental health practitioner within 7 days of discharge?  NA  [x]  Yes     []  No     Has a medication review been completed with member? If no, refer to PCP, home care nurse, MTM,  pharmacist  [x]  Yes     []  No     Can the member manage their medications or is there a system in place to manage medications (e.g. home care set-up)?         [x]  Yes     []  No     Can the member verbalize warning signs and symptoms to watch for and how to respond?  [x]  Yes     []  No     Does the member have a copy of and understand their discharge instructions?  If no, assist to obtain copy of discharge instructions, review discharge instructions, and assist to contact PCP to discuss questions about their recent hospitalization.  [x]  Yes     []  No     Does the member have adequate food, housing and transportation?  If no, add goal and discuss additional supports available to the member                                                                                                                                                                                 [x]  Yes     []  No     Is the member safe in their home?  If no, document needs and support provided                                                                                                                                                                          []  Yes     [x]  No     Are there any concerns of vulnerability, abuse, or neglect?  If yes, document concerns and actions taken by Care Coordinator as a mandated                                                                                                                                                                              [x]  Yes     []  No     Does the member use a Personal Health Care Record?  Check  Yes  if visit summary, discharge summary, and/or healthcare summary are being used as a PHR.                                                                                                                                                                                  [x]  Yes     []  No     Have you reviewed the discharge summary with the member? If  No  provide  explanation in comments.  [x]  Yes     []  No     Have you updated the member s care plan/support plan? Add new diagnosis, medications, treatments, goals & interventions, as applicable. If No, provide explanation in comments.    Comments:   7/23/24: care coordinator spoke to Roxanna who reports that her sister is staying with her currently to assist her as needed. Her first PT appointment is tomorrow (PT is coming out to Roxanna's apartment).  She reports that the surgery went well, and that she is experiencing pain (more so at night) but is able to cope by taking the pain meds and OTC Tylenol. She reports she is up and moving around a bit more each day. Roxanna states that the grab bar in her bathroom is set to be installed this Thursday- as this is covered under elderly waiver she will not need to add an additional service at this time until she is due for her next assessment. To keep elderly waiver open she will need to have a service in place (possibly PERS pendant).  Roxanna received and understands her discharge instructions and has no further questions at this time.     Virginia Marina RN  [facility]  [phone]

## 2024-07-22 ENCOUNTER — TELEPHONE (OUTPATIENT)
Dept: INTERNAL MEDICINE | Facility: CLINIC | Age: 69
End: 2024-07-22
Payer: COMMERCIAL

## 2024-07-22 NOTE — TELEPHONE ENCOUNTER
Home Care is calling regarding an established patient with [facility].       Requesting orders from: Yuval Jama  Provider is following patient: Yes  Is this a 60-day recertification request?  No    Orders Requested    Physical Therapy  Request for delay in care, service is not able to be provided within same scheduled day.   Delay till Wednesday the 24th due to availability       Confirmed ok to leave a detailed message with call back.  Contact information confirmed and updated as needed.    Faith Wynn RN

## 2024-07-23 NOTE — TELEPHONE ENCOUNTER
Intake dept staff asking for urgent approval to delay start of care so they can see her first thing tomorrow morning. Request routed to provider pool for assistance.     Thank you-    Bhavna Gallagher RN

## 2024-07-24 ENCOUNTER — MEDICAL CORRESPONDENCE (OUTPATIENT)
Dept: HEALTH INFORMATION MANAGEMENT | Facility: CLINIC | Age: 69
End: 2024-07-24
Payer: COMMERCIAL

## 2024-07-24 ENCOUNTER — TELEPHONE (OUTPATIENT)
Dept: INTERNAL MEDICINE | Facility: CLINIC | Age: 69
End: 2024-07-24
Payer: COMMERCIAL

## 2024-07-24 DIAGNOSIS — M54.16 LUMBAR RADICULOPATHY: ICD-10-CM

## 2024-07-24 RX ORDER — GABAPENTIN 300 MG/1
300 CAPSULE ORAL AT BEDTIME
Qty: 90 CAPSULE | Refills: 3 | Status: SHIPPED | OUTPATIENT
Start: 2024-07-24

## 2024-07-24 NOTE — TELEPHONE ENCOUNTER
Home Care is calling regarding an established patient with [facility].       Requesting orders from: Yuval Jama  Provider is following patient: Yes  Is this a 60-day recertification request?  No    Orders Requested    Physical Therapy  Request for initial certification (first set of orders)   Frequency:  1x/wk for 6 wks    Pt/home care would also like PCP to know that her gabapentin script is inaccurate, and she is only taking 300 mg daily at bedtime.   Updated script pended for provider review.     Information was gathered and will be sent to provider for review.  RN will contact Home Care with information after provider review.  Confirmed ok to leave a detailed message with call back.  Contact information confirmed and updated as needed.    Luann Cody RN

## 2024-07-29 DIAGNOSIS — I10 ESSENTIAL HYPERTENSION: ICD-10-CM

## 2024-07-30 RX ORDER — LOSARTAN POTASSIUM 100 MG/1
100 TABLET ORAL DAILY
Qty: 90 TABLET | Refills: 3 | Status: SHIPPED | OUTPATIENT
Start: 2024-07-30 | End: 2024-10-02

## 2024-08-02 DIAGNOSIS — I10 ESSENTIAL HYPERTENSION: ICD-10-CM

## 2024-08-02 RX ORDER — LOSARTAN POTASSIUM 100 MG/1
100 TABLET ORAL DAILY
Qty: 19 TABLET | Refills: 1 | OUTPATIENT
Start: 2024-08-02

## 2024-08-08 ENCOUNTER — TELEPHONE (OUTPATIENT)
Dept: INTERNAL MEDICINE | Facility: CLINIC | Age: 69
End: 2024-08-08
Payer: COMMERCIAL

## 2024-08-08 NOTE — TELEPHONE ENCOUNTER
Forms/Letter Request    Type of form/letter: OTHER: Apartment       Do we have the form/letter: No    Who is the form from? Patient    Where did/will the form come from? Patient or family brought in       When is form/letter needed by: ASAP    How would you like the form/letter returned:     Patient Notified form requests are processed in 5-7 business days:Yes    Could we send this information to you in [facility] or would you prefer to receive a phone call?:   Patient would prefer a phone call   Okay to leave a detailed message?: Yes at Cell number on file:    Telephone Information:   Mobile [phone]

## 2024-08-08 NOTE — TELEPHONE ENCOUNTER
Spoke with patient and she is trying to get into a larger apartment. She needs a form letter to be filled out due to her needing more room since she has a scooter and wheelchair. She is hoping to have this  filled out as soon as possible. She needs the larger apartment to have more room for all her medical supplies to be chandan to still move in the apartment. Patient will drop form off to be completed.

## 2024-08-09 ENCOUNTER — PATIENT OUTREACH (OUTPATIENT)
Dept: CARE COORDINATION | Facility: CLINIC | Age: 69
End: 2024-08-09
Payer: COMMERCIAL

## 2024-08-09 NOTE — PROGRESS NOTES
Order delivered from [facility] on 7/25/2024 for grab bars with install. DME spreadsheet updated and CC notified.    Vandana Belcher  Case Management Specialist  [facility]  [phone]

## 2024-08-12 ENCOUNTER — MEDICAL CORRESPONDENCE (OUTPATIENT)
Dept: HEALTH INFORMATION MANAGEMENT | Facility: CLINIC | Age: 69
End: 2024-08-12

## 2024-08-12 DIAGNOSIS — Z53.9 DIAGNOSIS NOT YET DEFINED: Primary | ICD-10-CM

## 2024-08-12 PROCEDURE — G0180 MD CERTIFICATION HHA PATIENT: HCPCS | Performed by: INTERNAL MEDICINE

## 2024-08-13 NOTE — TELEPHONE ENCOUNTER
[facility] [phone]. Form sent to be scanned into chart.

## 2024-08-20 ENCOUNTER — TELEPHONE (OUTPATIENT)
Dept: INTERNAL MEDICINE | Facility: CLINIC | Age: 69
End: 2024-08-20
Payer: COMMERCIAL

## 2024-08-20 NOTE — TELEPHONE ENCOUNTER
"  Forms/Letter Request    Type of form/letter: OTHER: REGARDING TE ON  PATIENT NEEDS FORMS TO BE REFAX TO : [facility] FAX#[phone] AND NEED TO WRITE ON THE COVER PAGE: ATTN: SARA GONZALEZ AND ALSO PLEASE PUT PATIENTS NAME,  AND ADDRESS. PER PATIENT:  THEY MADE HAVE DISREGARD IT DUE TO NO ATTN DETAIL WRITTEN ON IT.        Do we have the form/letter: Yes:     Who is the form from? Patient    Where did/will the form come from? Patient or family brought in       When is form/letter needed by: ASAP    How would you like the form/letter returned:  PER PATIENT JUST NEED TO RE-FAX FORMS TO '[facility]\" AND WRITE ON COVER PAGE:  ATTENTION : SARA CAMACHO     Patient Notified form requests are processed in 5-7 business days:Yes    Could we send this information to you in [employer] or would you prefer to receive a phone call?:   Patient would prefer a phone call   Okay to leave a detailed message?: Yes at Cell number on file:    Telephone Information:   Mobile [phone]       "

## 2024-08-28 ENCOUNTER — TELEPHONE (OUTPATIENT)
Dept: INTERNAL MEDICINE | Facility: CLINIC | Age: 69
End: 2024-08-28
Payer: COMMERCIAL

## 2024-08-28 NOTE — TELEPHONE ENCOUNTER
Refaxed completed form to Yajaira Silveira at [phone].  Left message with Yajaira to call back to confirm she received fax.

## 2024-08-28 NOTE — TELEPHONE ENCOUNTER
Reason for Call:  Form, our goal is to have forms completed with 72 hours, however, some forms may require a visit or additional information.    Type of letter, form or note:  medical    Who is the form from?: Patient    Where did the form come from: Patient or family brought in       What clinic location was the form placed at?: Internal Medicine    Where the form was placed: Given to physician    What number is listed as a contact on the form?  [phone]         Additional comments  asap please pt was seen 7/10/2024    Call taken on 8/28/2024 at 11:02 AM by Shelbi Sanchez

## 2024-09-03 ENCOUNTER — PATIENT OUTREACH (OUTPATIENT)
Dept: GERIATRIC MEDICINE | Facility: CLINIC | Age: 69
End: 2024-09-03
Payer: COMMERCIAL

## 2024-09-03 NOTE — PROGRESS NOTES
[facility] Care Coordination Contact    CHW spoke with member to remind member MA renewal paperwork because the eligibility review is due by (10/01/2024). Member confirmed that she is working on MA renewal paperwork and once it has been completed will be sent back.    TRINIDAD Castro  [facility]  [phone]

## 2024-09-06 ENCOUNTER — PATIENT OUTREACH (OUTPATIENT)
Dept: CARE COORDINATION | Facility: CLINIC | Age: 69
End: 2024-09-06
Payer: COMMERCIAL

## 2024-09-10 DIAGNOSIS — I10 ESSENTIAL HYPERTENSION: ICD-10-CM

## 2024-09-10 RX ORDER — LOSARTAN POTASSIUM 100 MG/1
100 TABLET ORAL DAILY
Qty: 19 TABLET | Refills: 1 | OUTPATIENT
Start: 2024-09-10

## 2024-09-13 DIAGNOSIS — K92.2 ACUTE GI BLEEDING: ICD-10-CM

## 2024-09-13 DIAGNOSIS — E78.5 HYPERLIPIDEMIA LDL GOAL <100: ICD-10-CM

## 2024-09-13 DIAGNOSIS — I10 ESSENTIAL HYPERTENSION: ICD-10-CM

## 2024-09-13 RX ORDER — PANTOPRAZOLE SODIUM 40 MG/1
40 TABLET, DELAYED RELEASE ORAL DAILY
Qty: 90 TABLET | Refills: 0 | Status: SHIPPED | OUTPATIENT
Start: 2024-09-13

## 2024-09-13 RX ORDER — LOSARTAN POTASSIUM 100 MG/1
100 TABLET ORAL DAILY
Qty: 19 TABLET | Refills: 1 | OUTPATIENT
Start: 2024-09-13

## 2024-09-13 RX ORDER — ROSUVASTATIN CALCIUM 10 MG/1
10 TABLET, COATED ORAL DAILY
Qty: 90 TABLET | Refills: 2 | Status: SHIPPED | OUTPATIENT
Start: 2024-09-13

## 2024-09-16 DIAGNOSIS — I48.91 ATRIAL FIBRILLATION WITH RVR (H): ICD-10-CM

## 2024-09-17 RX ORDER — METOPROLOL SUCCINATE 25 MG/1
12.5 TABLET, EXTENDED RELEASE ORAL DAILY
Qty: 45 TABLET | Refills: 3 | Status: SHIPPED | OUTPATIENT
Start: 2024-09-17

## 2024-09-20 DIAGNOSIS — E03.9 HYPOTHYROIDISM, UNSPECIFIED TYPE: ICD-10-CM

## 2024-09-23 NOTE — TELEPHONE ENCOUNTER
Called and spoke with pt.   She states she is still taking levothyroxine (SYNTHROID/LEVOTHROID) 137 MCG tablet as last prescribed (TAKE 1 TAB BY MOUTH ON MONDAYS AND FRIDAYS ( 2 DAYS A WEEK).    Routing to PCP for review, med pended.     Thank you,  Luann Cody RN

## 2024-09-24 RX ORDER — LEVOTHYROXINE SODIUM 137 UG/1
TABLET ORAL
Qty: 30 TABLET | Refills: 3 | Status: SHIPPED | OUTPATIENT
Start: 2024-09-24

## 2024-09-26 ENCOUNTER — TELEPHONE (OUTPATIENT)
Dept: PHARMACY | Facility: CLINIC | Age: 69
End: 2024-09-26
Payer: COMMERCIAL

## 2024-09-26 ENCOUNTER — PATIENT OUTREACH (OUTPATIENT)
Dept: GERIATRIC MEDICINE | Facility: CLINIC | Age: 69
End: 2024-09-26
Payer: COMMERCIAL

## 2024-09-26 NOTE — PROGRESS NOTES
0700:Verbal shift change report given to SONIA Sánchez (oncoming nurse) by Kandice Pavon (offgoing nurse). Report included the following information SBAR, Kardex, Intake/Output and MAR.   0903:Peg tube very sluggish. Hard to flush. Pt did receive am medications. 1015: rounding. Orders given to d/c scheduled medications. I informed  that I would inform the daughter when she comes in. Syble Certain stated to call if needed. Pt refuses any offered fluids. 1103: Very restless attempting to get out of bed. Pt putting bed in high position and playing with all the bed controls. Pt wet. Paz care given and fresh attends applied. Pt still trying to get out of bed and medicated with 2 mg haldol sublingual. Bed in low position and bed locked and bed alarm activated. 1110:Daughter Mami called to check on her dad, and I explained to her about the peg tube clogging up and that  discontinued the scheduled medications. She stated that she understood. I informed her that he has been restless today and I gave him haldol. She stated that she usually has to give this every 4 hours at home. She stated that she would be here around 2 PM today with the cat in the carrier. 1300:Resting calmly in bed. 1430:Daughter and giselle arrived. Pt awake and accepting tea brought from home. 1730: Incontinant of large amount urine. Pt not had BM since 10/2. Digital exam showed NO stool in rectal vault      NAME OF PATIENT:  Shira Lagunas Sr.     LEVEL OF CARE:  Respite    REASON FOR GIP: NA      *PATIENT REMAINS ELIGIBLE FOR GIP LEVEL OF CARE AS EVIDENCED BY: (MUST BE ADDRESSED OF PATIENT GIP)      REASON FOR RESPITE:  Caregiver breakdown    O2 SAFETY:  Concentrator positioning (6\" from furniture/drapes), No petroleum based products on face while oxygen in use and Oxygen sign on the door    FALL INTERVENTIONS PROVIDED:   Implemented/recommended use of non-skid footwear, Implemented/recommended use of fall risk [facility] Care Coordination Contact    CHW spoke with member regarding her MA renewal paperwork because the eligibility review is due by (10/01/2024).  Member said that she had completed MA renewal paperwork to sent back but when she moved to another apartment at the same building, she couldn't find the documents.  CHW will send her a new MA renewal package tomorrow (09/27/2024) and gave her [facility] phone # [phone] and [facility] # [phone].    On the other hand, member mentioned that she suffers dyslexia, making it difficult for her to read the documents and the persona who helps her to complete MA renewal forms is on vacation.  CHW gave her organization name that assists with completing her MA paperwork by phone [employer] # [phone].   CHW provided her contact information if she needs further assistance.      TRINIDAD Castro  [facility]  [phone]     9/27/24: Per EPIC chart apartment number is #609, previous apartment number was #[id number]. This care coordinator will request that address be updated in [employer].    Virginia Marina RN  [facility]  [phone]     identification flag to all team members, Implemented/recommended assistive devices and encouraged their use, Implemented/recommended resources for alarm system (personal alarm, bed alarm, call bell, etc.) , Implemented/recommended environmental changes (remove hazards, lower bed, improve lighting, etc.) and Implemented/recommended increased supervision/assistance    INTERDISPLINARY COMMUNICATION/COLLABORATION:  Physician, MSW, [place] and RN, CNA    [facility] [facility] DOC[facility]:NA      Reason medication is being initiated:  NA    MD / Provider name consulted re: change in status / initiation of new medication:  NA    New Symptom(s):  NA    New Order(s): NA    Name of the person notified of the changes: NA    Name of person being taught:NA    Instructions given:  NA    Side Effects taught:  NA    Response to teaching:  NA      COMFORTABLE DYING MEASURE:  Is Patient/family satisfied with symptom level?  yes    DISCHARGE PLAN:  Home with Family followed by Venkatesh Navarrete.

## 2024-09-26 NOTE — TELEPHONE ENCOUNTER
We have attempted to contact this patient three times to set up a MTM follow up appointment and were unsuccessful. Contact attempts were made via [employer] and letter x2. We will no longer continue to contact this patient to schedule a visit at this time. Please refer back to MTM if you believe this patient would continue to benefit from our services.     Thank you!    Khushbu Traore, PharmD  Medication Therapy Management Pharmacist  Voicemail: [phone]

## 2024-09-27 ENCOUNTER — PATIENT OUTREACH (OUTPATIENT)
Dept: GERIATRIC MEDICINE | Facility: CLINIC | Age: 69
End: 2024-09-27
Payer: COMMERCIAL

## 2024-10-01 ENCOUNTER — PATIENT OUTREACH (OUTPATIENT)
Dept: GERIATRIC MEDICINE | Facility: CLINIC | Age: 69
End: 2024-10-01
Payer: COMMERCIAL

## 2024-10-01 NOTE — PROGRESS NOTES
[facility] Care Coordination Contact    CHW follow up with member to verify if the member received MA renewal paperwork sent by FVP/CHW and if the [facility] agency helped her to complete the documents. CHW left a voice mail message.    10/01/24. Member called back and she said that she will send back MA renewal paperwork tomorrow (10/02/2024).       TRINIDAD Castro  [facility]  [phone]

## 2024-10-02 ENCOUNTER — OFFICE VISIT (OUTPATIENT)
Dept: INTERNAL MEDICINE | Facility: CLINIC | Age: 69
End: 2024-10-02
Payer: COMMERCIAL

## 2024-10-02 ENCOUNTER — PATIENT OUTREACH (OUTPATIENT)
Dept: GERIATRIC MEDICINE | Facility: CLINIC | Age: 69
End: 2024-10-02

## 2024-10-02 VITALS
OXYGEN SATURATION: 100 % | SYSTOLIC BLOOD PRESSURE: 201 MMHG | HEART RATE: 60 BPM | HEIGHT: 62 IN | BODY MASS INDEX: 49.5 KG/M2 | DIASTOLIC BLOOD PRESSURE: 81 MMHG | TEMPERATURE: 97.6 F | WEIGHT: 269 LBS

## 2024-10-02 DIAGNOSIS — I10 ESSENTIAL HYPERTENSION: ICD-10-CM

## 2024-10-02 DIAGNOSIS — E11.9 TYPE 2 DIABETES MELLITUS WITHOUT COMPLICATION, WITHOUT LONG-TERM CURRENT USE OF INSULIN (H): ICD-10-CM

## 2024-10-02 DIAGNOSIS — R51.9 NONINTRACTABLE HEADACHE, UNSPECIFIED CHRONICITY PATTERN, UNSPECIFIED HEADACHE TYPE: ICD-10-CM

## 2024-10-02 DIAGNOSIS — E66.01 MORBID OBESITY (H): Primary | ICD-10-CM

## 2024-10-02 PROCEDURE — 99214 OFFICE O/P EST MOD 30 MIN: CPT | Performed by: INTERNAL MEDICINE

## 2024-10-02 RX ORDER — LOSARTAN POTASSIUM 100 MG/1
100 TABLET ORAL DAILY
Qty: 90 TABLET | Refills: 3 | Status: SHIPPED | OUTPATIENT
Start: 2024-10-02

## 2024-10-02 RX ORDER — SUMATRIPTAN SUCCINATE 100 MG/1
100 TABLET ORAL
Qty: 10 TABLET | Refills: 11 | Status: CANCELLED | OUTPATIENT
Start: 2024-10-02

## 2024-10-02 ASSESSMENT — PATIENT HEALTH QUESTIONNAIRE - PHQ9
10. IF YOU CHECKED OFF ANY PROBLEMS, HOW DIFFICULT HAVE THESE PROBLEMS MADE IT FOR YOU TO DO YOUR WORK, TAKE CARE OF THINGS AT HOME, OR GET ALONG WITH OTHER PEOPLE: NOT DIFFICULT AT ALL
SUM OF ALL RESPONSES TO PHQ QUESTIONS 1-9: 6
SUM OF ALL RESPONSES TO PHQ QUESTIONS 1-9: 6

## 2024-10-02 NOTE — PROGRESS NOTES
[facility] Care Coordination Contact     CHW, followed to see if Member returned her MA paperwork. Left voice mail to rtn call.       TRINIDAD Chavez  [facility]  [phone]

## 2024-10-02 NOTE — PROGRESS NOTES
ASSESSMENT:    1. Essential hypertension  Uncontrolled but has been off of losartan.  Previously blood pressure was controlled on medication therapy.  Restart losartan and will get blood pressure recheck through pharmacy in 3 to 4 weeks  - losartan (COZAAR) 100 MG tablet; Take 1 tablet (100 mg) by mouth daily.  Dispense: 90 tablet; Refill: 3  - losartan (COZAAR) 100 MG tablet; Take 1 tablet (100 mg) by mouth daily.  Dispense: 90 tablet; Refill: 3    2. Nonintractable headache, unspecified chronicity pattern, unspecified headache type  Likely blood pressure and tension related.  No acute neurologic deficits.  No temporal artery or TMJ area tenderness.  Mild.  Will await response to blood pressure control.  If symptoms persist, patient will inform physician    3. Type 2 diabetes mellitus without complication, without long-term current use of insulin (H)  Previously overall controlled with most recent A1c 6.9.  Not checking blood sugars but sugars most likely improved with patient having started metformin since last A1c.  Continue current management and repeat A1c in 3 months as previously ordered     4. Morbid obesity (H)  Contributing to diabetes and other medical issues.  Weight unchanged over the last 5 months.  Prior activity has been limited by joint symptoms though patient now status post TKA.  Counseled regarding diet and walking activity as able. Possible future GLP1 Ag if not improving not improving in future      PLAN:   Restart Losartan 100mg tab, 1 tab daily for blood pressure   Continue other medications  I would recommend  a  RSV vaccine (respiratory syncytial virus risk reduction) and blood pressure recheck at the [facility] pharmacy in 3-4 weeks. Call [phone] to schedule the vaccine and blood pressure recheck appointment  Call  [phone] or use [employer] to schedule a future lab appointment  non-fasting in 3 months for A1C diabetic lab.   If headache not improving with improved blood  pressure control, then contact clinic.  May use Tylenol as needed for pain   Reduce calorie/carbohydrate (sugar, bread, potato, pasta, rice, alcohol etc)  intake in diet.  Increase color on your plate with vegetables. Increase  frequency of walking or other aerobic exercise as able            Subjective   Roxanna is a 69 year old, presenting for the following health issues:  Headache  Pt has been missing losartan they have not had a prescription for about a month      History of Present Illness       Reason for visit:  For headache and medicine.   She is taking medications regularly.     Most recent lab results reviewed with pt.      Component      Latest Ref Rng 6/14/2024  10:20 AM 6/14/2024  10:25 AM 7/5/2024  3:44 PM   Sodium      135 - 145 mmol/L 137      Potassium      3.4 - 5.3 mmol/L 4.2      Carbon Dioxide (CO2)      22 - 29 mmol/L 26      Anion Gap      7 - 15 mmol/L 12      Urea Nitrogen      8.0 - 23.0 mg/dL 12.6      Creatinine      0.51 - 0.95 mg/dL 0.75      GFR Estimate      >60 mL/min/1.73m2 86      Calcium      8.8 - 10.2 mg/dL 9.5      Chloride      98 - 107 mmol/L 99      Glucose      70 - 99 mg/dL 113 (H)      Alkaline Phosphatase      40 - 150 U/L 95      AST      0 - 45 U/L 19      ALT      0 - 50 U/L 14      Protein Total      6.4 - 8.3 g/dL 7.1      Albumin      3.5 - 5.2 g/dL 4.3      Bilirubin Total      <=1.2 mg/dL 0.5      Patient Fasting? Yes      Patient Fasting? Yes      WBC      4.0 - 11.0 10e3/uL   13.1 (H)    RBC Count      3.80 - 5.20 10e6/uL   4.58    Hemoglobin      11.7 - 15.7 g/dL   13.4    Hematocrit      35.0 - 47.0 %   41.3    MCV      78 - 100 fL   90    MCH      26.5 - 33.0 pg   29.3    MCHC      31.5 - 36.5 g/dL   32.4    RDW      10.0 - 15.0 %   14.4    Platelet Count      150 - 450 10e3/uL   440    Cholesterol      <200 mg/dL 147      Triglycerides      <150 mg/dL 156 (H)      HDL Cholesterol      >=50 mg/dL 57      LDL Cholesterol Calculated      <=100 mg/dL 59      Non  "HDL Cholesterol      <130 mg/dL 90      Creatinine Urine      mg/dL  65.9     Albumin Urine mg/L      mg/L  <12.0     Albumin Urine mg/g Cr  --     TSH      0.30 - 4.20 uIU/mL 0.76      Hemoglobin A1C      0.0 - 5.6 % 6.9 (H)         Legend:  (H) High    Losartan prescription previously refilled the patient's pharmacy in computer shows that that pharmacy electronically received a refill patient states she was told refill approval was not received by them and has been out of losartan for about a month.  Blood pressure is elevated since that time.  Intermittent headache.  Patient describes a headache as bilateral sides and along the top and back of her head.  Does note some tension or tightness occasionally in the upper back and neck.  Denies vision changes.  No tenderness along the TMJ area with chewing or specifically in the temporal artery area.  No associated nausea or vomiting.  No neurologic acute deficits.  Denies chest pain, shortness of breath.  Denies sinus congestion, nasal discharge  Not checking blood sugars.  Last A1c 6.9.  Was started on metformin after that last lab result       Additional ROS:   Constitutional, HEENT, Cardiovascular, Pulmonary, GI and , Neuro, MSK and Psych review of systems/symptoms are otherwise negative or unchanged from previous, except as noted above.      OBJECTIVE:  BP (!) 201/81   Pulse 60   Temp 97.6  F (36.4  C) (Temporal)   Ht 1.575 m (5' 2\")   Wt 122 kg (269 lb)   SpO2 100%   BMI 49.20 kg/m     Estimated body mass index is 49.2 kg/m  as calculated from the following:    Height as of this encounter: 1.575 m (5' 2\").    Weight as of this encounter: 122 kg (269 lb).  Eye: PERRL, EOMI  HENT: ear canals and TM's normal and nose and mouth without ulcers or lesions.  No sinus congestion noted.  No TMJ or temporal artery area tenderness to palpation  Neck: no adenopathy. Thyroid normal to palpation. No bruits.  Minimal tenderness to palpation bilateral paracervical " musculature.  Neck range of motion intact. No nucchal rigidity  Pulm: Lungs clear to auscultation   CV: Regular rates and rhythm  GI: Soft, obese, nontender, Normal active bowel sounds, No hepatosplenomegaly or masses palpable  Ext: Peripheral pulses intact. No edema.  Neuro: Normal  general extremity strength and tone, sensory exam grossly normal.  Patient alert and answering questions appropriately.  Patient sitting on scooter and gait not assessed      (Chart documentation was completed, in part, with [employer] voice-recognition software. Even though reviewed, some grammatical, spelling, and word errors may remain.)    Yuval Jama MD  Internal Medicine Department  [facility]

## 2024-10-02 NOTE — PATIENT INSTRUCTIONS
Restart Losartan 100mg tab, 1 tab daily for blood pressure   Continue other medications  I would recommend  a  RSV vaccine (respiratory syncytial virus risk reduction) and blood pressure recheck at the [facility] pharmacy in 3-4 weeks. Call [phone] to schedule the vaccine and blood pressure recheck appointment  Call  [phone] or use [employer] to schedule a future lab appointment  non-fasting in 3 months for A1C diabetic lab.   If headache not improving with improved blood pressure control, then contact clinic.  May use Tylenol as needed for pain   Reduce calorie/carbohydrate (sugar, bread, potato, pasta, rice, alcohol etc)  intake in diet.  Increase color on your plate with vegetables. Increase  frequency of walking or other aerobic exercise as able

## 2024-10-07 ENCOUNTER — PATIENT OUTREACH (OUTPATIENT)
Dept: GERIATRIC MEDICINE | Facility: CLINIC | Age: 69
End: 2024-10-07
Payer: COMMERCIAL

## 2024-10-07 NOTE — PROGRESS NOTES
[facility] Care Coordination Contact    10/07/2024.  CHW spoke with member again regarding her MA renewal paperwork because the eligibility review is due by (10/01/2024).  Member confirmed that she has been completed MA renewal and sent back by (10/01/2024).    TRINIDAD Castro  [facility]  [phone]

## 2024-10-16 ENCOUNTER — PATIENT OUTREACH (OUTPATIENT)
Dept: GERIATRIC MEDICINE | Facility: CLINIC | Age: 69
End: 2024-10-16
Payer: COMMERCIAL

## 2024-10-16 NOTE — PROGRESS NOTES
[facility] Care Coordination Contact    Referral sent to [facility] Behavioral Health Team for telephonic management of depression.  Roxanna's  from [facility] sent referral to this care coordinator to send on to [facility] team.  Referral form completed and emailed.      Virginia Marina RN  [facility]  [phone]

## 2024-10-28 DIAGNOSIS — M54.16 LUMBAR RADICULOPATHY: ICD-10-CM

## 2024-10-29 DIAGNOSIS — I48.0 PAROXYSMAL ATRIAL FIBRILLATION (H): ICD-10-CM

## 2024-10-29 RX ORDER — FLECAINIDE ACETATE 150 MG/1
150 TABLET ORAL EVERY 12 HOURS
Qty: 180 TABLET | Refills: 0 | Status: SHIPPED | OUTPATIENT
Start: 2024-10-29

## 2024-10-29 RX ORDER — GABAPENTIN 300 MG/1
300 CAPSULE ORAL AT BEDTIME
Qty: 90 CAPSULE | Refills: 3 | Status: SHIPPED | OUTPATIENT
Start: 2024-10-29

## 2024-11-05 ENCOUNTER — PATIENT OUTREACH (OUTPATIENT)
Dept: GERIATRIC MEDICINE | Facility: CLINIC | Age: 69
End: 2024-11-05
Payer: COMMERCIAL

## 2024-11-05 NOTE — PROGRESS NOTES
[facility] Care Coordination Contact    Called member to schedule annual HRA home visit. HRA has been scheduled for Tuesday 10/19/24 at 10:00.    Virginia Marina RN  [facility]  [phone]

## 2024-11-19 ENCOUNTER — PATIENT OUTREACH (OUTPATIENT)
Dept: GERIATRIC MEDICINE | Facility: CLINIC | Age: 69
End: 2024-11-19
Payer: COMMERCIAL

## 2024-11-19 ASSESSMENT — ACTIVITIES OF DAILY LIVING (ADL): DEPENDENT_IADLS:: CLEANING;TRANSPORTATION

## 2024-11-19 ASSESSMENT — PATIENT HEALTH QUESTIONNAIRE - PHQ9: SUM OF ALL RESPONSES TO PHQ QUESTIONS 1-9: 3

## 2024-11-19 NOTE — PROGRESS NOTES
[facility] Care Coordination Contact    [facility] Home Visit Assessment     Home visit for Health Risk Assessment with Maxine Sears completed on November 19, 2024    Type of residence:: Apartment - handicap accessible  Current living arrangement:: I live alone     Assessment completed with:: Patient    Current Care Plan  Member currently receiving the following home care services:     Member currently receiving the following community resources: [employer] [facility], Meals on Wheels (care coordinator will send referral).   Roxanna is an independent woman who lives alone in her apartment with her dog, Tawnya who is her constant . She enjoys sewing and making quilts which are sent [place] to those in need. She participates in actives like coffee hour, [employer], of [employer] within her apartment complex. She also likes to get out to [facility] with her friend, Donita Jordan who also picks her up to go out to eat on occasion. She enjoys the company of her good friend, Krystal who lives within her same building and sees/talks with her daily. Roxanna is able to direct her own care and is capable of managing her medications and activities of daily living on her own (dressing, grooming, bathing, toileting, mobility, and transfers).  She receives help with heavy housekeeping from her friend Krystal or on occasion her nephew. At this time, she is not interested in starting ICLS services but is aware that she can reach out to this care coordinator at any time to request services to begin, she would like to ask her friend Krystal if she is interested in being her ICLS worker. Roxanna would like to remain open to elderly waiver and receive home delivered meals (5 per week), at this time she is not interested in a PERS, or ICLS services.     Medication Review  Medication reconciliation completed in Epic: Yes  Medication set-up & administration: Independent- uses pill boxes.   Self-administers  medications.  Medication Risk Assessment Medication (1 or more, place referral to MTM): Recent falls within past year  MTM Referral Placed: No: Member is already followed by MTM. Will follow up with current MTM.    Mental/Behavioral Health   Depression Screening:           Mental health DX:: Yes   Mental health DX how managed:: Medication, Other (support of friends/family/Ten[employer])    Falls Assessment:   Fallen 2 or more times in the past year?: (!) Yes   Any fall with injury in the past year?: No    ADL/IADL Dependencies:   Dependent ADLs:: Ambulation-cane, Ambulation-walker  Dependent IADLs:: Cleaning, Transportation      PCA Assessment completed at visit: [employer] assessment completed, and assessment indicates that member does not meet access criteria for CFSS/PCA.     Elderly Waiver Eligibility: Yes-will continue on EW      See [employer] Assessment for detailed assessment information.    Follow-Up Plan: Member informed of future contact, plan to f/u with member with a 6 month telephone assessment.  Contact information shared with member and family, encouraged member to call with any questions or concerns at any time.    [place] care continuum providers: Please see Snapshot and Care Management Flowsheets for Specific details of care plan.    This CC note routed to PCP, Yuval Jama RN  [facility]  [phone]    11/21/24: Per VINCENT Roxanna's MA is not active at this time. Care coordinator unable to complete MMIS entry or support plan. Call placed to [facility] to enquire about MA status as per Roxanna all paperwork was completed and mailed back to the [facility].      Per [place]: Roxanna is on a spend down of $215 for Oct and Nov and in Dec it will go up to $255 as per their records her elderly waiver is ending. Care coordinator explained that [employer] assessment was completed on 11/19/24 and that Roxanna qualifies for elderly waiver and status should remain open. [facility] rep reports that  her MA has been active since 10/1/24.     Waiver obligation going forward: $215 and U code has been removed.    Virginia Marina RN  [facility]  [phone]

## 2024-11-19 NOTE — Clinical Note
Kostas Jama, I am Roxanna's care coordinator from [facility].  I was out to see her today for her annual assessment and she will continue to remain open to elderly waiver services, I plan to sent in a referral for home delivered meals. To note she reports that she has been taking an additional half tab per day (total 15mg) of her Vortioxetine for the past week or so and feels it is helping her depression symptoms. She had planned to send a Share0 message to your office to discuss dose increase but has not done so yet. Perhaps nursing staff can reach out to her to discuss further?  Virginia Marina RN [facility] [phone]

## 2024-11-22 ENCOUNTER — MYC MEDICAL ADVICE (OUTPATIENT)
Dept: INTERNAL MEDICINE | Facility: CLINIC | Age: 69
End: 2024-11-22
Payer: COMMERCIAL

## 2024-11-22 DIAGNOSIS — F32.0 MILD SINGLE CURRENT EPISODE OF MAJOR DEPRESSIVE DISORDER (H): Primary | ICD-10-CM

## 2024-11-22 NOTE — TELEPHONE ENCOUNTER
To PCP:    Pt requesting increase on Triltellix from 10 mg to 20 mg.    Pharmacy pended if appropriate.    Please advise, thank you.    Teddy Raman RN  [facility] Internal Medicine

## 2024-11-23 ENCOUNTER — HEALTH MAINTENANCE LETTER (OUTPATIENT)
Age: 69
End: 2024-11-23

## 2024-11-29 DIAGNOSIS — I10 ESSENTIAL HYPERTENSION: ICD-10-CM

## 2024-11-29 DIAGNOSIS — K92.2 ACUTE GI BLEEDING: ICD-10-CM

## 2024-11-29 RX ORDER — PANTOPRAZOLE SODIUM 40 MG/1
40 TABLET, DELAYED RELEASE ORAL DAILY
Qty: 12 TABLET | Refills: 3 | OUTPATIENT
Start: 2024-11-29

## 2024-11-30 RX ORDER — TRIAMTERENE AND HYDROCHLOROTHIAZIDE 37.5; 25 MG/1; MG/1
1 TABLET ORAL DAILY
Qty: 90 TABLET | Refills: 0 | Status: SHIPPED | OUTPATIENT
Start: 2024-11-30

## 2024-12-17 DIAGNOSIS — K92.2 ACUTE GI BLEEDING: ICD-10-CM

## 2024-12-17 RX ORDER — PANTOPRAZOLE SODIUM 40 MG/1
40 TABLET, DELAYED RELEASE ORAL DAILY
Qty: 90 TABLET | Refills: 0 | Status: SHIPPED | OUTPATIENT
Start: 2024-12-17

## 2024-12-19 ENCOUNTER — PATIENT OUTREACH (OUTPATIENT)
Dept: CARE COORDINATION | Facility: CLINIC | Age: 69
End: 2024-12-19
Payer: COMMERCIAL

## 2024-12-27 ENCOUNTER — HOSPITAL ENCOUNTER (EMERGENCY)
Facility: CLINIC | Age: 69
Discharge: HOME | End: 2024-12-27
Attending: EMERGENCY MEDICINE | Admitting: EMERGENCY MEDICINE
Payer: COMMERCIAL

## 2024-12-27 VITALS
DIASTOLIC BLOOD PRESSURE: 57 MMHG | SYSTOLIC BLOOD PRESSURE: 131 MMHG | RESPIRATION RATE: 18 BRPM | HEART RATE: 88 BPM | TEMPERATURE: 98.5 F | OXYGEN SATURATION: 98 %

## 2024-12-27 DIAGNOSIS — R11.2 NAUSEA AND VOMITING, UNSPECIFIED VOMITING TYPE: ICD-10-CM

## 2024-12-27 LAB
ALBUMIN SERPL BCG-MCNC: 4.2 G/DL (ref 3.5–5.2)
ALP SERPL-CCNC: 105 U/L (ref 40–150)
ALT SERPL W P-5'-P-CCNC: 22 U/L (ref 0–50)
ANION GAP SERPL CALCULATED.3IONS-SCNC: 18 MMOL/L (ref 7–15)
AST SERPL W P-5'-P-CCNC: 38 U/L (ref 0–45)
BASOPHILS # BLD AUTO: 0 10E3/UL (ref 0–0.2)
BASOPHILS NFR BLD AUTO: 0 %
BILIRUB SERPL-MCNC: 0.6 MG/DL
BUN SERPL-MCNC: 21.5 MG/DL (ref 8–23)
CALCIUM SERPL-MCNC: 8.8 MG/DL (ref 8.8–10.4)
CHLORIDE SERPL-SCNC: 98 MMOL/L (ref 98–107)
CREAT SERPL-MCNC: 0.86 MG/DL (ref 0.51–0.95)
EGFRCR SERPLBLD CKD-EPI 2021: 73 ML/MIN/1.73M2
EOSINOPHIL # BLD AUTO: 0 10E3/UL (ref 0–0.7)
EOSINOPHIL NFR BLD AUTO: 0 %
ERYTHROCYTE [DISTWIDTH] IN BLOOD BY AUTOMATED COUNT: 14.4 % (ref 10–15)
FLUAV RNA SPEC QL NAA+PROBE: NEGATIVE
FLUBV RNA RESP QL NAA+PROBE: NEGATIVE
GLUCOSE BLDC GLUCOMTR-MCNC: 166 MG/DL (ref 70–99)
GLUCOSE SERPL-MCNC: 182 MG/DL (ref 70–99)
HCO3 SERPL-SCNC: 20 MMOL/L (ref 22–29)
HCT VFR BLD AUTO: 45.8 % (ref 35–47)
HGB BLD-MCNC: 14.9 G/DL (ref 11.7–15.7)
HOLD SPECIMEN: 0
HOLD SPECIMEN: 0
IMM GRANULOCYTES # BLD: 0.1 10E3/UL
IMM GRANULOCYTES NFR BLD: 0 %
LIPASE SERPL-CCNC: 34 U/L (ref 13–60)
LYMPHOCYTES # BLD AUTO: 0.5 10E3/UL (ref 0.8–5.3)
LYMPHOCYTES NFR BLD AUTO: 4 %
MCH RBC QN AUTO: 29 PG (ref 26.5–33)
MCHC RBC AUTO-ENTMCNC: 32.5 G/DL (ref 31.5–36.5)
MCV RBC AUTO: 89 FL (ref 78–100)
MONOCYTES # BLD AUTO: 0.8 10E3/UL (ref 0–1.3)
MONOCYTES NFR BLD AUTO: 5 %
NEUTROPHILS # BLD AUTO: 13.1 10E3/UL (ref 1.6–8.3)
NEUTROPHILS NFR BLD AUTO: 91 %
NRBC # BLD AUTO: 0 10E3/UL
NRBC BLD AUTO-RTO: 0 /100
PLATELET # BLD AUTO: 475 10E3/UL (ref 150–450)
POTASSIUM SERPL-SCNC: 4.8 MMOL/L (ref 3.4–5.3)
PROT SERPL-MCNC: 7.5 G/DL (ref 6.4–8.3)
RBC # BLD AUTO: 5.14 10E6/UL (ref 3.8–5.2)
RSV RNA SPEC NAA+PROBE: NEGATIVE
SARS-COV-2 RNA RESP QL NAA+PROBE: NEGATIVE
SODIUM SERPL-SCNC: 136 MMOL/L (ref 135–145)
WBC # BLD AUTO: 14.5 10E3/UL (ref 4–11)

## 2024-12-27 PROCEDURE — 83690 ASSAY OF LIPASE: CPT | Performed by: EMERGENCY MEDICINE

## 2024-12-27 PROCEDURE — 87637 SARSCOV2&INF A&B&RSV AMP PRB: CPT | Performed by: EMERGENCY MEDICINE

## 2024-12-27 PROCEDURE — 82962 GLUCOSE BLOOD TEST: CPT

## 2024-12-27 PROCEDURE — 84155 ASSAY OF PROTEIN SERUM: CPT | Performed by: EMERGENCY MEDICINE

## 2024-12-27 PROCEDURE — 99283 EMERGENCY DEPT VISIT LOW MDM: CPT

## 2024-12-27 PROCEDURE — 36415 COLL VENOUS BLD VENIPUNCTURE: CPT | Performed by: EMERGENCY MEDICINE

## 2024-12-27 PROCEDURE — 85004 AUTOMATED DIFF WBC COUNT: CPT | Performed by: EMERGENCY MEDICINE

## 2024-12-27 PROCEDURE — 85018 HEMOGLOBIN: CPT | Performed by: EMERGENCY MEDICINE

## 2024-12-27 PROCEDURE — 82310 ASSAY OF CALCIUM: CPT | Performed by: EMERGENCY MEDICINE

## 2024-12-27 RX ORDER — ONDANSETRON 2 MG/ML
4 INJECTION INTRAMUSCULAR; INTRAVENOUS
Status: DISCONTINUED | OUTPATIENT
Start: 2024-12-27 | End: 2024-12-27 | Stop reason: HOSPADM

## 2024-12-27 RX ORDER — ONDANSETRON 4 MG/1
4 TABLET, ORALLY DISINTEGRATING ORAL EVERY 8 HOURS PRN
Qty: 10 TABLET | Refills: 0 | Status: SHIPPED | OUTPATIENT
Start: 2024-12-27

## 2024-12-27 ASSESSMENT — ACTIVITIES OF DAILY LIVING (ADL)
ADLS_ACUITY_SCORE: 47

## 2024-12-27 ASSESSMENT — COLUMBIA-SUICIDE SEVERITY RATING SCALE - C-SSRS
6. HAVE YOU EVER DONE ANYTHING, STARTED TO DO ANYTHING, OR PREPARED TO DO ANYTHING TO END YOUR LIFE?: NO
1. IN THE PAST MONTH, HAVE YOU WISHED YOU WERE DEAD OR WISHED YOU COULD GO TO SLEEP AND NOT WAKE UP?: NO
2. HAVE YOU ACTUALLY HAD ANY THOUGHTS OF KILLING YOURSELF IN THE PAST MONTH?: NO

## 2024-12-27 NOTE — ED TRIAGE NOTES
EMS report: Patient called EMS for N/V. VSS per EMS report Heart rate 90, FS glucose 186, ems gave 4 mg ODT zofran PTA.      Triage Assessment (Adult)       Row Name 12/27/24 0605          Triage Assessment    Airway WDL WDL        Respiratory WDL    Respiratory WDL WDL        Skin Circulation/Temperature WDL    Skin Circulation/Temperature WDL WDL        Cardiac WDL    Cardiac WDL WDL        Peripheral/Neurovascular WDL    Peripheral Neurovascular WDL WDL        Cognitive/Neuro/Behavioral WDL    Cognitive/Neuro/Behavioral WDL WDL

## 2024-12-27 NOTE — ED PROVIDER NOTES
Emergency Department Note      History of Present Illness     Chief Complaint   Nausea & Vomiting    HPI   Maxine Sears is a 69 year old female with a history as noted below who presents to the ED for nausea and vomiting. This morning, the patient began to experience nausea and vomiting. She originally attributed it to food poisoning. She ordered in Chinese food last night. She denies any abdominal pain. No chest pain or shortness of breath. No urinary symptoms. She has a history of appendectomy, tubal ligation, and hysterectomy. No history of bowel obstruction.     Independent Historian   None    Review of External Notes   None    Past Medical History   Medical History and Problem List   Osteoarthritis  Sleep apnea  TMJ disease  Restless leg syndrome  Atrial fibrillation  Osteopenia  Type 2 diabetes mellitus  Depression  Hyperlipidemia  Pulmonary nodules  Lumbar radiculopathy  Iron deficiency  GERD  Hypertension  Morbid obesity  Hypothyroidism    Medications   Atenolol  Levothyroxine  Estradiol  Triamterene-hydrochlorothiazide  Losartan  Metoprolol succinate  Pantoprazole  Rosuvastatin  Vortioxetine  Gabapentin  Metformin    Surgical History   Appendectomy  Cholecystectomy  Colonoscopy  EGD  Hysterectomy  Lumbar puncture  Tonsillectomy    Physical Exam   Patient Vitals for the past 24 hrs:   BP Temp Temp src Pulse Resp SpO2   12/27/24 1002 131/57 -- -- -- 18 98 %   12/27/24 0733 108/71 -- -- 88 20 96 %   12/27/24 0610 109/69 98.5  F (36.9  C) Oral 99 22 96 %     Physical Exam  General: Alert, appears well-developed and well-nourished. Cooperative.     In mild distress  HEENT:  Head:  Atraumatic  Ears:  External ears are normal  Mouth/Throat:  Oropharynx is without erythema or exudate and mucous membranes are moist.   Eyes:   Conjunctivae normal and EOM are normal. No scleral icterus.  CV:  Normal rate, regular rhythm, normal heart sounds and radial pulses are 2+ and symmetric.  No murmur.  Resp:  Breath  sounds are clear bilaterally    Non-labored, no retractions or accessory muscle use  GI:  Abdomen is soft, no distension, no tenderness. No rebound or guarding.  No CVA tenderness bilaterally  MS:  Normal range of motion. No edema.    Normal strength in all 4 extremities.     Back atraumatic.    No midline cervical, thoracic, or lumbar tenderness  Skin:  Warm and dry.  No rash or lesions noted.  Neuro:   Alert. Normal strength.  GCS: 15  Psych: Normal mood and affect.    Diagnostics   Lab Results   Labs Ordered and Resulted from Time of ED Arrival to Time of ED Departure   GLUCOSE BY METER - Abnormal       Result Value    GLUCOSE BY METER POCT 166 (*)    COMPREHENSIVE METABOLIC PANEL - Abnormal    Sodium 136      Potassium 4.8      Carbon Dioxide (CO2) 20 (*)     Anion Gap 18 (*)     Urea Nitrogen 21.5      Creatinine 0.86      GFR Estimate 73      Calcium 8.8      Chloride 98      Glucose 182 (*)     Alkaline Phosphatase 105      AST 38      ALT 22      Protein Total 7.5      Albumin 4.2      Bilirubin Total 0.6     CBC WITH PLATELETS AND DIFFERENTIAL - Abnormal    WBC Count 14.5 (*)     RBC Count 5.14      Hemoglobin 14.9      Hematocrit 45.8      MCV 89      MCH 29.0      MCHC 32.5      RDW 14.4      Platelet Count 475 (*)     % Neutrophils 91      % Lymphocytes 4      % Monocytes 5      % Eosinophils 0      % Basophils 0      % Immature Granulocytes 0      NRBCs per 100 WBC 0      Absolute Neutrophils 13.1 (*)     Absolute Lymphocytes 0.5 (*)     Absolute Monocytes 0.8      Absolute Eosinophils 0.0      Absolute Basophils 0.0      Absolute Immature Granulocytes 0.1      Absolute NRBCs 0.0     INFLUENZA A/B, RSV AND SARS-COV2 PCR - Normal    Influenza A PCR Negative      Influenza B PCR Negative      RSV PCR Negative      SARS CoV2 PCR Negative     LIPASE - Normal    Lipase 34     GLUCOSE MONITOR NURSING POCT     Imaging   No orders to display     Independent Interpretation   None    ED Course    Medications  Administered   Medications   ondansetron (ZOFRAN) injection 4 mg (has no administration in time range)     Procedures   Procedures     Discussion of Management   None    ED Course   ED Course as of 12/27/24 1056   Fri Dec 27, 2024   0821 I obtained history and examined the patient as noted above.       Additional Documentation  None    Medical Decision Making / Diagnosis   CMS Diagnoses: None    MIPS       None    [facility]   Maxine Sears is a 69 year old female who presents for evaluation of nausea and vomiting with mild abdominal pain in a nonfocal abdominal exam. There are no ill contacts. I considered a broad differential diagnosis for this patient including viral gastroenteritis, food poisoning, bowel obstruction, intra-abdominal infection such as colitis, cholecystitis, UTI, pyelonephritis, appendicitis, etc.  Doubt new onset DKA. Doubt brain malignancy or increased ICP.     She no signs of worrisome intra-abdominal pathologies detected during the visit today.  She has a completely benign abdominal exam without rebound, guarding, or marked tenderness to palpation.  Supportive outpatient management is therefore indicated.  Abdominal pain precautions are given for home.    No indication for CT at this time.  It was discussed to return to the ED for blood in stool, increasing pain, or fevers more than 102.   She feels much improved after interventions in ED.   She passed oral challenge prior to d/c.    Disposition   The patient was discharged.     Diagnosis     ICD-10-CM    1. Nausea and vomiting, unspecified vomiting type  R11.2          Discharge Medications   New Prescriptions    ONDANSETRON (ZOFRAN ODT) 4 MG ODT TAB    Take 1 tablet (4 mg) by mouth every 8 hours as needed for nausea or vomiting.     Scribe Disclosure:  Michael MORALES, am serving as a scribe at 8:43 AM on 12/27/2024 to document services personally performed by Doron Matos MD based on my observations and the provider's statements to  me.        Doron Matos MD  12/27/24 3850

## 2024-12-27 NOTE — ED NOTES
Pt has taken ice chips and feels mildly nauseous, but does not yet want nausea medication. Has not yet drank any water.

## 2024-12-31 ENCOUNTER — PATIENT OUTREACH (OUTPATIENT)
Dept: GERIATRIC MEDICINE | Facility: CLINIC | Age: 69
End: 2024-12-31
Payer: COMMERCIAL

## 2024-12-31 NOTE — PROGRESS NOTES
[facility] Care Coordination Contact  CC received notification of Emergency Room visit.  ER visit occurred on 12/27/24 at [facility] with Dx of nausea and vomiting.    CC contacted member and left a message requesting a return call.  Member has a follow-up appointment with PCP: No: Offered Assistance with setting up a follow up appointment via voicemail  Member has had a change in condition: No  New referrals placed: No  Home Visit Needed: No  Support Plan reviewed and updated.  PCP notified of ED visit via EMR.    Virginia Marina RN  [facility]  [phone]

## 2025-01-22 ENCOUNTER — PATIENT OUTREACH (OUTPATIENT)
Dept: GERIATRIC MEDICINE | Facility: CLINIC | Age: 70
End: 2025-01-22
Payer: COMMERCIAL

## 2025-01-22 NOTE — PROGRESS NOTES
[facility] Care Coordination Contact       CHW spoke w/ Member to remind to schedule mammogram. Member noted she will follow up to schedule.     TRINIDAD Chavez  [facility]  [phone]

## 2025-02-18 DIAGNOSIS — E78.5 HYPERLIPIDEMIA LDL GOAL <100: ICD-10-CM

## 2025-02-18 RX ORDER — ROSUVASTATIN CALCIUM 10 MG/1
10 TABLET, COATED ORAL DAILY
Qty: 90 TABLET | Refills: 3 | OUTPATIENT
Start: 2025-02-18

## 2025-03-08 ENCOUNTER — HEALTH MAINTENANCE LETTER (OUTPATIENT)
Age: 70
End: 2025-03-08

## 2025-03-12 ENCOUNTER — ANCILLARY PROCEDURE (OUTPATIENT)
Dept: CT IMAGING | Facility: CLINIC | Age: 70
End: 2025-03-12
Attending: INTERNAL MEDICINE
Payer: COMMERCIAL

## 2025-03-12 ENCOUNTER — NURSE TRIAGE (OUTPATIENT)
Dept: INTERNAL MEDICINE | Facility: CLINIC | Age: 70
End: 2025-03-12

## 2025-03-12 ENCOUNTER — OFFICE VISIT (OUTPATIENT)
Dept: PEDIATRICS | Facility: CLINIC | Age: 70
End: 2025-03-12
Payer: COMMERCIAL

## 2025-03-12 VITALS
BODY MASS INDEX: 49.02 KG/M2 | TEMPERATURE: 99 F | OXYGEN SATURATION: 96 % | HEART RATE: 73 BPM | SYSTOLIC BLOOD PRESSURE: 150 MMHG | DIASTOLIC BLOOD PRESSURE: 82 MMHG | WEIGHT: 268 LBS | RESPIRATION RATE: 22 BRPM

## 2025-03-12 DIAGNOSIS — R51.9 ACUTE NONINTRACTABLE HEADACHE, UNSPECIFIED HEADACHE TYPE: ICD-10-CM
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
## 2025-03-12 DIAGNOSIS — E11.9 TYPE 2 DIABETES MELLITUS WITHOUT COMPLICATION, WITHOUT LONG-TERM CURRENT USE OF INSULIN (H): ICD-10-CM

## 2025-03-12 LAB
ALBUMIN SERPL-MCNC: 3.6 G/DL (ref 3.4–5)
ALP SERPL-CCNC: 71 U/L (ref 40–150)
ALT SERPL W P-5'-P-CCNC: 11 U/L (ref 0–50)
ANION GAP SERPL CALCULATED.3IONS-SCNC: 14 MMOL/L (ref 3–14)
AST SERPL W P-5'-P-CCNC: 25 U/L (ref 0–45)
BASOPHILS # BLD AUTO: 0 10E3/UL (ref 0–0.2)
BASOPHILS NFR BLD AUTO: 1 %
BILIRUB SERPL-MCNC: 0.7 MG/DL (ref 0.2–1.3)
BUN SERPL-MCNC: 11 MG/DL (ref 7–30)
CALCIUM SERPL-MCNC: 10.2 MG/DL (ref 8.5–10.1)
CHLORIDE BLD-SCNC: 98 MMOL/L (ref 94–109)
CO2 SERPL-SCNC: 27 MMOL/L (ref 20–32)
CREAT SERPL-MCNC: 0.8 MG/DL (ref 0.52–1.04)
EGFRCR SERPLBLD CKD-EPI 2021: 79 ML/MIN/1.73M2
EOSINOPHIL # BLD AUTO: 0.2 10E3/UL (ref 0–0.7)
EOSINOPHIL NFR BLD AUTO: 2 %
ERYTHROCYTE [DISTWIDTH] IN BLOOD BY AUTOMATED COUNT: 15.8 % (ref 10–15)
EST. AVERAGE GLUCOSE BLD GHB EST-MCNC: 126 MG/DL
GLUCOSE BLD-MCNC: 134 MG/DL (ref 70–99)
HBA1C MFR BLD: 6 % (ref 0–5.6)
HCT VFR BLD AUTO: 40.8 % (ref 35–47)
HGB BLD-MCNC: 13.1 G/DL (ref 11.7–15.7)
IMM GRANULOCYTES # BLD: 0 10E3/UL
IMM GRANULOCYTES NFR BLD: 0 %
LYMPHOCYTES # BLD AUTO: 3.1 10E3/UL (ref 0.8–5.3)
LYMPHOCYTES NFR BLD AUTO: 37 %
MCH RBC QN AUTO: 29.4 PG (ref 26.5–33)
MCHC RBC AUTO-ENTMCNC: 32.1 G/DL (ref 31.5–36.5)
MCV RBC AUTO: 92 FL (ref 78–100)
MONOCYTES # BLD AUTO: 0.6 10E3/UL (ref 0–1.3)
MONOCYTES NFR BLD AUTO: 7 %
NEUTROPHILS # BLD AUTO: 4.6 10E3/UL (ref 1.6–8.3)
NEUTROPHILS NFR BLD AUTO: 54 %
PLATELET # BLD AUTO: 383 10E3/UL (ref 150–450)
POTASSIUM BLD-SCNC: 3.7 MMOL/L (ref 3.4–5.3)
PROT SERPL-MCNC: 7 G/DL (ref 6.8–8.8)
RBC # BLD AUTO: 4.45 10E6/UL (ref 3.8–5.2)
SODIUM SERPL-SCNC: 139 MMOL/L (ref 135–145)
WBC # BLD AUTO: 8.6 10E3/UL (ref 4–11)

## 2025-03-12 PROCEDURE — 93000 ELECTROCARDIOGRAM COMPLETE: CPT | Performed by: INTERNAL MEDICINE

## 2025-03-12 PROCEDURE — 85025 COMPLETE CBC W/AUTO DIFF WBC: CPT | Performed by: INTERNAL MEDICINE

## 2025-03-12 PROCEDURE — 3077F SYST BP >= 140 MM HG: CPT | Performed by: INTERNAL MEDICINE

## 2025-03-12 PROCEDURE — 99214 OFFICE O/P EST MOD 30 MIN: CPT | Performed by: INTERNAL MEDICINE

## 2025-03-12 PROCEDURE — 80053 COMPREHEN METABOLIC PANEL: CPT | Performed by: INTERNAL MEDICINE

## 2025-03-12 PROCEDURE — 36415 COLL VENOUS BLD VENIPUNCTURE: CPT | Performed by: INTERNAL MEDICINE

## 2025-03-12 PROCEDURE — 3079F DIAST BP 80-89 MM HG: CPT | Performed by: INTERNAL MEDICINE

## 2025-03-12 PROCEDURE — 70450 CT HEAD/BRAIN W/O DYE: CPT

## 2025-03-12 PROCEDURE — 83036 HEMOGLOBIN GLYCOSYLATED A1C: CPT | Performed by: INTERNAL MEDICINE
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
## 2025-03-18 DIAGNOSIS — E03.9 HYPOTHYROIDISM, UNSPECIFIED TYPE: ICD-10-CM

## 2025-03-18 DIAGNOSIS — I10 ESSENTIAL HYPERTENSION: ICD-10-CM

## 2025-03-18 RX ORDER — TRIAMTERENE AND HYDROCHLOROTHIAZIDE 37.5; 25 MG/1; MG/1
1 TABLET ORAL DAILY
Qty: 90 TABLET | Refills: 0 | Status: SHIPPED | OUTPATIENT
Start: 2025-03-18

## 2025-03-18 RX ORDER — LEVOTHYROXINE SODIUM 125 UG/1
TABLET ORAL
Qty: 90 TABLET | Refills: 1 | Status: SHIPPED | OUTPATIENT
Start: 2025-03-18

## 2025-03-24 DIAGNOSIS — K92.2 ACUTE GI BLEEDING: ICD-10-CM

## 2025-03-24 RX ORDER — PANTOPRAZOLE SODIUM 40 MG/1
40 TABLET, DELAYED RELEASE ORAL DAILY
Qty: 90 TABLET | Refills: 1 | Status: SHIPPED | OUTPATIENT
Start: 2025-03-24

## 2025-04-17 ENCOUNTER — PATIENT OUTREACH (OUTPATIENT)
Dept: GERIATRIC MEDICINE | Facility: CLINIC | Age: 70
End: 2025-04-17
Payer: COMMERCIAL
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
## 2025-04-23 ENCOUNTER — PATIENT OUTREACH (OUTPATIENT)
Dept: GERIATRIC MEDICINE | Facility: CLINIC | Age: 70
End: 2025-04-23
Payer: COMMERCIAL

## 2025-04-23 NOTE — PROGRESS NOTES
[facility] Care Coordination Contact    Received a request to submit a DTR for the terminated of Meals. Documentation completed and faxed to the health plan. Care Coordinator aware.    Lindsay Gale RN  Utilization   [facility]  [phone]

## 2025-04-23 NOTE — PROGRESS NOTES
[facility] Care Coordination Contact    Care coordinator received voicemail from Roxanna with contact name for ICLS agency/worker (listed below). Care coordinator contacted Theresa at agency and email sent with ICLS planning form for her to review and send back to this care coordinator.  New auth needed for ICLS services (24 units/6 hours/week) with [employer] sent to CMS staff. Also requested that CMS staff notify [employer] that member no longer wants any meals weekly, DTR sent to Lindsay.  Care coordinator spoke with Roxanna to explain ICLS hours/plan and let her know if she has a waiver obligation she will continue to be required to pay this, Roxanna is not sure she has one so care coordinator recommended she call [facility] to verify as this care coordinator does not have access to her financial information, that is managed directly by the [facility].  Roxanna is questioning if she needs to pay $214 bill from [employer], care coordinator verified with [employer] that she will need to pay bill. Once notification is received from health plan regarding dates of DTS care coordinator will update support plan to reflect this.  Once ICLS planning form is sign by agency and returned to care coordinator, care coordinator will take form to Roxanna to sign.    Virginia Marina RN  [facility]  [phone]

## 2025-04-30 DIAGNOSIS — I48.0 PAROXYSMAL ATRIAL FIBRILLATION (H): ICD-10-CM

## 2025-04-30 RX ORDER — FLECAINIDE ACETATE 150 MG/1
150 TABLET ORAL EVERY 12 HOURS
Qty: 60 TABLET | Refills: 0 | Status: SHIPPED | OUTPATIENT
Start: 2025-04-30

## 2025-05-01 ENCOUNTER — PATIENT OUTREACH (OUTPATIENT)
Dept: GERIATRIC MEDICINE | Facility: CLINIC | Age: 70
End: 2025-05-01
Payer: COMMERCIAL

## 2025-05-01 NOTE — PROGRESS NOTES
[facility] Care Coordination Contact    Care coordinator met with Roxanna today at her apartment to go over ICLS planning form and complete signature line.  Roxanna understands ICLS plans and signed form. Care coordinator upload form to her file.  Roxanna is concerned that she may owe $ to [employer].  Care coordinator called [employer] and per rep Roxanna does not have a bill, meals were covered by waiver. Roxanna plans to call [facility] next week to ask about her waiver obligation if any, she will let care coordinator know once she speaks to the [facility]. No further questions or concerns at this time.    Virginia Marina RN  [facility]  [phone]

## 2025-05-05 ENCOUNTER — OFFICE VISIT (OUTPATIENT)
Dept: INTERNAL MEDICINE | Facility: CLINIC | Age: 70
End: 2025-05-05
Payer: COMMERCIAL

## 2025-05-05 VITALS
TEMPERATURE: 97.4 F | HEART RATE: 55 BPM | HEIGHT: 62 IN | DIASTOLIC BLOOD PRESSURE: 86 MMHG | RESPIRATION RATE: 18 BRPM | WEIGHT: 265.6 LBS | BODY MASS INDEX: 48.87 KG/M2 | OXYGEN SATURATION: 98 % | SYSTOLIC BLOOD PRESSURE: 140 MMHG

## 2025-05-05 DIAGNOSIS — E78.5 HYPERLIPIDEMIA LDL GOAL <100: ICD-10-CM

## 2025-05-05 DIAGNOSIS — E03.9 HYPOTHYROIDISM, UNSPECIFIED TYPE: ICD-10-CM

## 2025-05-05 DIAGNOSIS — M25.561 CHRONIC PAIN OF RIGHT KNEE: ICD-10-CM

## 2025-05-05 DIAGNOSIS — Z23 NEED FOR COVID-19 VACCINE: ICD-10-CM

## 2025-05-05 DIAGNOSIS — E66.01 MORBID OBESITY (H): ICD-10-CM

## 2025-05-05 DIAGNOSIS — E11.9 TYPE 2 DIABETES MELLITUS WITHOUT COMPLICATION, WITHOUT LONG-TERM CURRENT USE OF INSULIN (H): Primary | ICD-10-CM

## 2025-05-05 DIAGNOSIS — G25.0 ESSENTIAL TREMOR: ICD-10-CM

## 2025-05-05 DIAGNOSIS — I48.0 PAROXYSMAL ATRIAL FIBRILLATION (H): ICD-10-CM

## 2025-05-05 DIAGNOSIS — G89.29 CHRONIC PAIN OF RIGHT KNEE: ICD-10-CM

## 2025-05-05 DIAGNOSIS — F32.1 MODERATE MAJOR DEPRESSION (H): ICD-10-CM

## 2025-05-05 DIAGNOSIS — E83.52 HYPERCALCEMIA: ICD-10-CM

## 2025-05-05 LAB
CALCIUM SERPL-MCNC: 9.6 MG/DL (ref 8.8–10.4)
CHOLEST SERPL-MCNC: 172 MG/DL
CREAT UR-MCNC: 91.7 MG/DL
FASTING STATUS PATIENT QL REPORTED: YES
HDLC SERPL-MCNC: 87 MG/DL
LDLC SERPL CALC-MCNC: 66 MG/DL
MICROALBUMIN UR-MCNC: 135 MG/L
MICROALBUMIN/CREAT UR: 147.22 MG/G CR (ref 0–25)
NONHDLC SERPL-MCNC: 85 MG/DL
PTH-INTACT SERPL-MCNC: 55 PG/ML (ref 15–65)
T4 FREE SERPL-MCNC: 0.6 NG/DL (ref 0.9–1.7)
TRIGL SERPL-MCNC: 96 MG/DL
TSH SERPL DL<=0.005 MIU/L-ACNC: 67.9 UIU/ML (ref 0.3–4.2)

## 2025-05-05 PROCEDURE — 36415 COLL VENOUS BLD VENIPUNCTURE: CPT | Performed by: INTERNAL MEDICINE

## 2025-05-05 PROCEDURE — 91320 SARSCV2 VAC 30MCG TRS-SUC IM: CPT | Performed by: INTERNAL MEDICINE

## 2025-05-05 PROCEDURE — 82570 ASSAY OF URINE CREATININE: CPT | Performed by: INTERNAL MEDICINE

## 2025-05-05 PROCEDURE — 3079F DIAST BP 80-89 MM HG: CPT | Performed by: INTERNAL MEDICINE

## 2025-05-05 PROCEDURE — 3077F SYST BP >= 140 MM HG: CPT | Performed by: INTERNAL MEDICINE

## 2025-05-05 PROCEDURE — 84439 ASSAY OF FREE THYROXINE: CPT | Performed by: INTERNAL MEDICINE

## 2025-05-05 PROCEDURE — 80061 LIPID PANEL: CPT | Performed by: INTERNAL MEDICINE

## 2025-05-05 PROCEDURE — 82043 UR ALBUMIN QUANTITATIVE: CPT | Performed by: INTERNAL MEDICINE

## 2025-05-05 PROCEDURE — 83970 ASSAY OF PARATHORMONE: CPT | Performed by: INTERNAL MEDICINE

## 2025-05-05 PROCEDURE — 90480 ADMN SARSCOV2 VAC 1/ONLY CMP: CPT | Performed by: INTERNAL MEDICINE

## 2025-05-05 PROCEDURE — 84443 ASSAY THYROID STIM HORMONE: CPT | Performed by: INTERNAL MEDICINE

## 2025-05-05 PROCEDURE — 99214 OFFICE O/P EST MOD 30 MIN: CPT | Performed by: INTERNAL MEDICINE

## 2025-05-05 PROCEDURE — 82310 ASSAY OF CALCIUM: CPT | Performed by: INTERNAL MEDICINE

## 2025-05-05 ASSESSMENT — PATIENT HEALTH QUESTIONNAIRE - PHQ9: SUM OF ALL RESPONSES TO PHQ QUESTIONS 1-9: 5

## 2025-05-05 NOTE — PROGRESS NOTES
"  {PROVIDER CHARTING PREFERENCE:908262}    Subjective   Roxanna is a 69 year old, presenting for the following health issues:  Recheck Medication       History of Present Illness       Reason for visit:  Annual checkup.   She is taking medications regularly.        Most recent lab results reviewed with pt.       Has  mild RUE tremor. Has coffee with caffeine very other day. No change  tremor on days without caffeine       Additional ROS:   Constitutional, HEENT, Cardiovascular, Pulmonary, GI and , Neuro, MSK and Psych review of systems/symptoms are otherwise negative or unchanged from previous, except as noted above.      OBJECTIVE:  BP (!) 140/86   Pulse 55   Temp 97.4  F (36.3  C) (Temporal)   Resp 18   Ht 1.575 m (5' 2\")   Wt 120.5 kg (265 lb 9.6 oz)   SpO2 98%   BMI 48.58 kg/m     Estimated body mass index is 48.58 kg/m  as calculated from the following:    Height as of this encounter: 1.575 m (5' 2\").    Weight as of this encounter: 120.5 kg (265 lb 9.6 oz).  {:412513}    (Chart documentation was completed, in part, with [facility] voice-recognition software. Even though reviewed, some grammatical, spelling, and word errors may remain.)    Yuval Jama MD  Internal Medicine Department  [facility]            " recheck  Reduce calorie/carbohydrate (sugar, bread, potato, pasta, rice, alcohol etc)  intake in diet.  Increase color on your plate with vegetables. Increase  frequency of walking or other aerobic exercise as able with knee  issues  Continue current medications pending lab results  Monitor tremor for now.  If bothersome to the point that you wish to start medication therapy, then contact clinic           Subjective   Roxanna is a 69 year old, presenting for the following health issues:  Recheck Medication          Most recent lab results reviewed with pt.      Component      Latest Ref Rng 3/12/2025  1:46 PM   WBC      4.0 - 11.0 10e3/uL 8.6    RBC Count      3.80 - 5.20 10e6/uL 4.45    Hemoglobin      11.7 - 15.7 g/dL 13.1    Hematocrit      35.0 - 47.0 % 40.8    MCV      78 - 100 fL 92    MCH      26.5 - 33.0 pg 29.4    MCHC      31.5 - 36.5 g/dL 32.1    RDW      10.0 - 15.0 % 15.8 (H)    Platelet Count      150 - 450 10e3/uL 383    % Neutrophils      % 54    % Lymphocytes      % 37    % Monocytes      % 7    % Eosinophils      % 2    % Basophils      % 1    % Immature Granulocytes      % 0    Absolute Neutrophil      1.6 - 8.3 10e3/uL 4.6    Absolute Lymphocytes      0.8 - 5.3 10e3/uL 3.1    Absolute Monocytes      0.0 - 1.3 10e3/uL 0.6    Absolute Eosinophils      0.0 - 0.7 10e3/uL 0.2    Absolute Basophils      0.0 - 0.2 10e3/uL 0.0    Absolute Immature Granulocytes      <=0.4 10e3/uL 0.0    Sodium      135 - 145 mmol/L 139    Potassium      3.4 - 5.3 mmol/L 3.7    Chloride      94 - 109 mmol/L 98    Carbon Dioxide      20 - 32 mmol/L 27    Anion Gap      3 - 14 mmol/L 14    Urea Nitrogen      7 - 30 mg/dL 11    Creatinine      0.52 - 1.04 mg/dL 0.80    GFR Estimate      >60 mL/min/1.73m2 79    Calcium      8.5 - 10.1 mg/dL 10.2 (H)    Glucose      70 - 99 mg/dL 134 (H)    Alkaline Phosphatase      40 - 150 U/L 71    AST      0 - 45 U/L 25    ALT      0 - 50 U/L 11    Protein Total      6.8 - 8.8 g/dL 7.0   "  Albumin      3.4 - 5.0 g/dL 3.6    Bilirubin Total      0.2 - 1.3 mg/dL 0.7    Estimated Average Glucose      <117 mg/dL 126 (H)    Hemoglobin A1C      0.0 - 5.6 % 6.0 (H)            Has  mild RUE tremor. Has coffee with caffeine very other day. No change  tremor on days without caffeine  Not checking blood sugars at home  Chronic right knee pain.  Needs future knee arthroplasty.  Also chronic back pain after walking for about 10 minutes.  History of spinal stenosis without current radiculopathy.  Scheduled to have an epidural lumbar injection with [facility] tomorrow  Due for eye exam.  Denies chest pain, shortness of breath with usual mild ADL activity.  Denies current vision changes.  Generally using a scooter with mobility.  Stable mood.  PHQ-9 = 5.  No SI  Not able to walk 200 feet without having to stop because of back and knee pain    Additional ROS:   Constitutional, HEENT, Cardiovascular, Pulmonary, GI and , Neuro, MSK and Psych review of systems/symptoms are otherwise negative or unchanged from previous, except as noted above.      OBJECTIVE:  BP (!) 140/86   Pulse 55   Temp 97.4  F (36.3  C) (Temporal)   Resp 18   Ht 1.575 m (5' 2\")   Wt 120.5 kg (265 lb 9.6 oz)   SpO2 98%   BMI 48.58 kg/m     Estimated body mass index is 48.58 kg/m  as calculated from the following:    Height as of this encounter: 1.575 m (5' 2\").    Weight as of this encounter: 120.5 kg (265 lb 9.6 oz).  Eye: PERRL, EOMI  HENT: ear canals and TM's normal and nose and mouth without ulcers or lesions   Neck: no adenopathy. Thyroid normal to palpation. No bruits  Pulm: Lungs clear to auscultation   CV: Regular rates and rhythm  GI: Soft, morbidly obese nontender, Normal active bowel sounds, No hepatosplenomegaly or masses palpable  Ext: Peripheral pulses intact. Mild BLE  edema.  Neuro: Normal strength and tone, sensory exam grossly normal.  Minimal bilateral hand intention tremor.  No cogwheel rigidity.  MSK: Tenderness to palpation " bilateral paralumbar musculature.  Tenderness to palpation right knee joint line.  No effusion    (Chart documentation was completed, in part, with [employer] voice-recognition software. Even though reviewed, some grammatical, spelling, and word errors may remain.)    Yuval Jama MD  Internal Medicine Department  [facility]

## 2025-05-07 ENCOUNTER — PATIENT OUTREACH (OUTPATIENT)
Dept: GERIATRIC MEDICINE | Facility: CLINIC | Age: 70
End: 2025-05-07
Payer: COMMERCIAL

## 2025-05-07 NOTE — PROGRESS NOTES
[facility] Care Coordination Contact    Received authorization task from care coordinator.  Completed following tasks: Submitted referrals/auths for scooter battery repair and Updated services in Database.    Abeba Bonilla    Care Management Specialist   [facility]  [phone]

## 2025-05-07 NOTE — PROGRESS NOTES
[facility] Care Coordination Contact    Quote received from [facility] for delivery and labor costs for scooter battery.  Support plan updated, quote saved to Roxanna's auth folder, and CMS staff tasked to complete auth and send to [employer]. Total cost under elderly waiver budget is $130 (one time fee).    Virginia Marina RN  [facility]  [phone]

## 2025-05-27 DIAGNOSIS — I10 ESSENTIAL HYPERTENSION: ICD-10-CM

## 2025-05-28 RX ORDER — TRIAMTERENE AND HYDROCHLOROTHIAZIDE 37.5; 25 MG/1; MG/1
1 TABLET ORAL DAILY
Qty: 90 TABLET | Refills: 1 | Status: SHIPPED | OUTPATIENT
Start: 2025-05-28

## 2025-06-02 DIAGNOSIS — E78.5 HYPERLIPIDEMIA LDL GOAL <100: ICD-10-CM

## 2025-06-03 RX ORDER — ROSUVASTATIN CALCIUM 10 MG/1
10 TABLET, COATED ORAL DAILY
Qty: 90 TABLET | Refills: 2 | Status: SHIPPED | OUTPATIENT
Start: 2025-06-03

## 2025-06-04 ENCOUNTER — OFFICE VISIT (OUTPATIENT)
Dept: INTERNAL MEDICINE | Facility: CLINIC | Age: 70
End: 2025-06-04
Payer: COMMERCIAL

## 2025-06-04 VITALS
OXYGEN SATURATION: 98 % | RESPIRATION RATE: 18 BRPM | BODY MASS INDEX: 48.99 KG/M2 | WEIGHT: 266.2 LBS | HEART RATE: 56 BPM | DIASTOLIC BLOOD PRESSURE: 76 MMHG | SYSTOLIC BLOOD PRESSURE: 175 MMHG | HEIGHT: 62 IN | TEMPERATURE: 97.6 F

## 2025-06-04 DIAGNOSIS — M25.561 CHRONIC PAIN OF RIGHT KNEE: ICD-10-CM

## 2025-06-04 DIAGNOSIS — G89.29 CHRONIC PAIN OF RIGHT KNEE: ICD-10-CM

## 2025-06-04 DIAGNOSIS — Z01.818 PREOP GENERAL PHYSICAL EXAM: ICD-10-CM

## 2025-06-04 DIAGNOSIS — E03.9 HYPOTHYROIDISM, UNSPECIFIED TYPE: ICD-10-CM

## 2025-06-04 DIAGNOSIS — E11.9 TYPE 2 DIABETES MELLITUS WITHOUT COMPLICATION, WITHOUT LONG-TERM CURRENT USE OF INSULIN (H): Primary | ICD-10-CM

## 2025-06-04 PROBLEM — I48.0 PAROXYSMAL ATRIAL FIBRILLATION (H): Status: RESOLVED | Noted: 2020-09-01 | Resolved: 2025-06-04

## 2025-06-04 PROBLEM — F32.1 MODERATE MAJOR DEPRESSION (H): Status: ACTIVE | Noted: 2025-06-04

## 2025-06-04 LAB
ANION GAP SERPL CALCULATED.3IONS-SCNC: 12 MMOL/L (ref 7–15)
BUN SERPL-MCNC: 10.9 MG/DL (ref 8–23)
CALCIUM SERPL-MCNC: 9.6 MG/DL (ref 8.8–10.4)
CHLORIDE SERPL-SCNC: 94 MMOL/L (ref 98–107)
CREAT SERPL-MCNC: 0.8 MG/DL (ref 0.51–0.95)
EGFRCR SERPLBLD CKD-EPI 2021: 79 ML/MIN/1.73M2
ERYTHROCYTE [DISTWIDTH] IN BLOOD BY AUTOMATED COUNT: 13.9 % (ref 10–15)
GLUCOSE SERPL-MCNC: 88 MG/DL (ref 70–99)
HCO3 SERPL-SCNC: 25 MMOL/L (ref 22–29)
HCT VFR BLD AUTO: 37.7 % (ref 35–47)
HGB BLD-MCNC: 13.2 G/DL (ref 11.7–15.7)
MCH RBC QN AUTO: 30.9 PG (ref 26.5–33)
MCHC RBC AUTO-ENTMCNC: 35 G/DL (ref 31.5–36.5)
MCV RBC AUTO: 88 FL (ref 78–100)
PLATELET # BLD AUTO: 385 10E3/UL (ref 150–450)
POTASSIUM SERPL-SCNC: 3.9 MMOL/L (ref 3.4–5.3)
RBC # BLD AUTO: 4.27 10E6/UL (ref 3.8–5.2)
SODIUM SERPL-SCNC: 131 MMOL/L (ref 135–145)
T4 FREE SERPL-MCNC: 0.69 NG/DL (ref 0.9–1.7)
TSH SERPL DL<=0.005 MIU/L-ACNC: 59.9 UIU/ML (ref 0.3–4.2)
WBC # BLD AUTO: 11 10E3/UL (ref 4–11)

## 2025-06-04 PROCEDURE — 84443 ASSAY THYROID STIM HORMONE: CPT | Performed by: INTERNAL MEDICINE

## 2025-06-04 PROCEDURE — 36415 COLL VENOUS BLD VENIPUNCTURE: CPT | Performed by: INTERNAL MEDICINE

## 2025-06-04 PROCEDURE — 80048 BASIC METABOLIC PNL TOTAL CA: CPT | Performed by: INTERNAL MEDICINE

## 2025-06-04 PROCEDURE — 85027 COMPLETE CBC AUTOMATED: CPT | Performed by: INTERNAL MEDICINE

## 2025-06-04 PROCEDURE — 84439 ASSAY OF FREE THYROXINE: CPT | Performed by: INTERNAL MEDICINE

## 2025-06-04 NOTE — PATIENT INSTRUCTIONS
Labs as ordered  Eye exam with [facility]  Handicap parking permit form completed.  Bring to the [employer]  Follow-up with me in 1 month for blood pressure recheck  Reduce calorie/carbohydrate (sugar, bread, potato, pasta, rice, alcohol etc)  intake in diet.  Increase color on your plate with vegetables. Increase  frequency of walking or other aerobic exercise as able with knee  issues  Continue current medications pending lab results  Monitor tremor for now.  If bothersome to the point that you wish to start medication therapy, then contact clinic

## 2025-06-04 NOTE — PROGRESS NOTES
Preoperative Evaluation  [address] [address]  Phone: [phone]  Primary Provider: Yuval Jama MD  Pre-op Performing Provider: Yuval Jama MD  Jun 4, 2025 6/4/2025   Surgical Information   What procedure is being done?  Right Knee replacement.   Facility or Hospital where procedure/surgery will be performed: [facility]   Who is doing the procedure / surgery?    Date of surgery / procedure: July 2 , 2025   Time of surgery / procedure: 9:35am   Where do you plan to recover after surgery? at home with family     Fax number for surgical facility: [phone]    Assessment & Plan     The proposed surgical procedure is considered INTERMEDIATE risk.    {Diag Picklist:104258}           {Risks and Recommendations :681600}    {REQUIRED Document medication hold or pull data from patient instructions:401234}    Recommendation  {IMPORTANT - Approval:617212:}       Glenn Mcknight is a 69 year old, presenting for the following:  Pre-Op Exam        HPI: ***          6/4/2025   Pre-Op Questionnaire   Have you ever had a heart attack or stroke? No   Have you ever had surgery on your heart or blood vessels, such as a stent placement, a coronary artery bypass, or surgery on an artery in your head, neck, heart, or legs? No   Do you have chest pain with activity? No   Do you have a history of heart failure? No   Do you currently have a cold, bronchitis or symptoms of other infection? No   Do you have a cough, shortness of breath, or wheezing? No   Do you or anyone in your family have previous history of blood clots? No   Do you or does anyone in your family have a serious bleeding problem such as prolonged bleeding following surgeries or cuts? No   Have you ever had problems with anemia or been told to take iron pills? No   Have you had any abnormal blood loss such as black, tarry or bloody stools, or abnormal vaginal bleeding?  No   Have you ever had a blood transfusion? No   Are you willing to have a blood transfusion if it is medically needed before, during, or after your surgery? Yes   Have you or any of your relatives ever had problems with anesthesia? No   Do you have sleep apnea, excessive snoring or daytime drowsiness? No   Do you have any artifical heart valves or other implanted medical devices like a pacemaker, defibrillator, or continuous glucose monitor? No   Do you have artificial joints? (!) YES   Are you allergic to latex? No     Advance Care Planning  {The storyboard will display whether the patient has ACP docs on file. Hover over the Code section in the storyboard to access the ACP documents. :742245}  {(AWV REQUIRED) Advance Care Planning Reviewed:478976}    Preoperative Review of   {Mnpmpreport:961896}  {Review MNPMP for all patients per [facility] MNPMP Profile:353874}    {Chronic problem details (Optional) :603948}    Patient Active Problem List    Diagnosis Date Noted    Moderate major depression (H) 06/04/2025     Priority: Medium    Type 2 diabetes mellitus without complication, without long-term current use of insulin (H) 07/17/2024     Priority: Medium    Osteopenia of both hips 07/30/2022     Priority: Medium    Mild depression 12/05/2021     Priority: Medium    Pulmonary nodules 10/25/2020     Priority: Medium     3-4mm. Does not require repeat CT chest with low risk status per radiology recs      Hyperlipidemia LDL goal <100 10/25/2020     Priority: Medium    Chronic pain of left knee 06/14/2020     Priority: Medium    Lumbar radiculopathy 06/14/2020     Priority: Medium    Gastroesophageal reflux disease, esophagitis presence not specified 01/10/2018     Priority: Medium     IMO Regulatory Load OCT 2020      NELY on CPAP      Priority: Medium    RLS (restless legs syndrome)      Priority: Medium    Essential hypertension 06/29/2016     Priority: Medium    Migraine without aura and without status migrainosus, not  intractable 03/19/2016     Priority: Medium    Morbid obesity (H) 12/09/2015     Priority: Medium    Hypothyroidism 12/03/2015     Priority: Medium    Osteoarthritis      Priority: Medium    TMJ disease      Priority: Medium      Past Medical History:   Diagnosis Date    Depression      Duodenal ulcer 03/04/2019    Essential hypertension      Gastroesophageal reflux disease, esophagitis presence not specified 01/10/2018    IMO Regulatory Load OCT 2020      GERD (gastroesophageal reflux disease)     Hiatal hernia     Hyperlipidemia LDL goal <100 10/25/2020    Hypothyroidism     Lumbar radiculopathy 06/14/2020    Migraine without aura and without status migrainosus, not intractable 08/2016    Mild depression 12/05/2021    Morbid obesity (H) 12/09/2015    Obesity     NELY on CPAP     Osteoarthritis     Paroxysmal atrial fibrillation (H) 01/05/2015    RLS (restless legs syndrome)     Symptomatic menopausal or female climacteric states (aka FLASHES)     TMJ disease      Past Surgical History:   Procedure Laterality Date    APPENDECTOMY      CHOLECYSTECTOMY      COLONOSCOPY N/A 4/12/2019    Procedure: COLONOSCOPY;  Surgeon: Vahid Cardona MD;  Location:  GI    EP LEFT ATRIAL APPENDAGE CLOSURE N/A 10/8/2020    Procedure: EP Left Atrial Appendage Closure;  Surgeon: Constantin Vaughn MD;  Location:  HEART CARDIAC CATH LAB    ESOPHAGOSCOPY, GASTROSCOPY, DUODENOSCOPY (EGD), COMBINED N/A 4/11/2019    Procedure: COMBINED ESOPHAGOSCOPY, GASTROSCOPY, DUODENOSCOPY (EGD);  Surgeon: Ben Ko MD;  Location:  GI    HYSTERECTOMY, DOM  08/1999    IR LUMBAR PUNCTURE  8/17/2023    TONSILLECTOMY       Current Outpatient Medications   Medication Sig Dispense Refill    acetaminophen (TYLENOL) 500 MG tablet Take 500-1,000 mg by mouth 3 times daily as needed for mild pain      acetaminophen 500 MG CAPS Take 2 capsules by mouth every 8 hours as needed (For aches, pain, fever) Do not take more than 4000mg in 24  hours. 60 capsule 0    aspirin (ASA) 81 MG EC tablet Take 1 tablet (81 mg) by mouth daily      flecainide (TAMBOCOR) 150 MG tablet Take 1 tablet (150 mg) by mouth every 12 hours. Pt must make follow-up visit with Monika Sue for further refills.  Call [phone] 60 tablet 0    gabapentin (NEURONTIN) 300 MG capsule TAKE 1 CAPSULE (300 MG) BY MOUTH AT BEDTIME 90 capsule 3    levothyroxine (SYNTHROID/LEVOTHROID) 125 MCG tablet TAKE 1 TAB BY MOUTH DAILY EXCEPT  AND  (5 DAYS A WEEK) 90 tablet 1    levothyroxine (SYNTHROID/LEVOTHROID) 137 MCG tablet TAKE 1 TAB BY MOUTH ON  AND  ( 2 DAYS A WEEK) 30 tablet 3    losartan (COZAAR) 100 MG tablet Take 1 tablet (100 mg) by mouth daily. 90 tablet 3    metFORMIN (GLUCOPHAGE XR) 500 MG 24 hr tablet Take 2 tablets (1,000 mg) by mouth daily (with dinner) 60 tablet 11    metoprolol succinate ER (TOPROL XL) 25 MG 24 hr tablet Take 0.5 tablets (12.5 mg) by mouth daily. 45 tablet 3    ondansetron (ZOFRAN ODT) 4 MG ODT tab Take 1 tablet (4 mg) by mouth every 8 hours as needed for nausea or vomiting. 10 tablet 0    pantoprazole (PROTONIX) 40 MG EC tablet TAKE 1 TABLET (40 MG) BY MOUTH DAILY 90 tablet 1    rosuvastatin (CRESTOR) 10 MG tablet Take 1 tablet (10 mg) by mouth daily. 90 tablet 2    triamterene-HCTZ (MAXZIDE-25) 37.5-25 MG tablet TAKE 1 TABLET BY MOUTH DAILY 90 tablet 1    vortioxetine (TRINTELLIX) 20 MG tablet Take 1 tablet (20 mg) by mouth daily. 90 tablet 3       Allergies   Allergen Reactions    Morphine Anaphylaxis    Ceclor [Cefaclor] Hives    Diltiazem Hives    Duloxetine Hcl      Foggy head feeling    Lisinopril Cough    Sertraline Nausea and Vomiting    Seasonal Allergies         Social History     Tobacco Use    Smoking status: Former     Current packs/day: 0.00     Average packs/day: 0.3 packs/day for 1 year (0.3 ttl pk-yrs)     Types: Cigarettes     Start date: 1971     Quit date: 1972     Years since quittin.4    Smokeless  "tobacco: Never    Tobacco comments:     very minimal smoking history   Substance Use Topics    Alcohol use: No     {FAMILY HISTORY (Optional):958002291}  History   Drug Use No             Review of Systems  CONSTITUTIONAL: NEGATIVE for fever, chill. Weight down 5 pounds  INTEGUMENTARY/SKIN: NEGATIVE for worrisome rashes, moles or lesions  EYES: NEGATIVE for vision changes or irritation.  Has glasses  ENT/MOUTH: NEGATIVE for ear, mouth and throat problems  RESP: NEGATIVE for significant cough or SOB  BREAST: NEGATIVE for masses, tenderness or discharge  CV: NEGATIVE for chest pain, palpitations or peripheral edema  GI: NEGATIVE for nausea, abdominal pain, heartburn, or change in bowel habits  : NEGATIVE for frequency, dysuria, or hematuria  MUSCULOSKELETAL:  See HPI. Chronic LBP without radiation  NEURO: NEGATIVE for weakness, dizziness or paresthesias  ENDOCRINE: NEGATIVE for temperature intolerance. Hx elevated TSH 1 month ago. Pt states compliant with taking  Levothyroxine  HEME: NEGATIVE for bleeding problems  PSYCHIATRIC: NEGATIVE for changes in mood or affect with Trintellix. Followed by psychiatry. Most recent PHQ9 = 5    Objective    BP (!) 175/76   Pulse 56   Temp 97.6  F (36.4  C) (Temporal)   Resp 18   Ht 1.575 m (5' 2\")   Wt 120.7 kg (266 lb 3.2 oz)   SpO2 98%   BMI 48.69 kg/m     Estimated body mass index is 48.69 kg/m  as calculated from the following:    Height as of this encounter: 1.575 m (5' 2\").    Weight as of this encounter: 120.7 kg (266 lb 3.2 oz).  Physical Exam  {Exam List :987998}    Recent Labs   Lab Test 25  1346 24  0740 24  1544 24  1020   HGB 13.1 14.9   < >  --     475*   < >  --     136  --  137   POTASSIUM 3.7 4.8  --  4.2   CR 0.80 0.86  --  0.75   A1C 6.0*  --   --  6.9*    < > = values in this interval not displayed.        Diagnostics  {LABS:862673}   {EK}    Revised Cardiac Risk Index (RCRI)  The patient has the following " serious cardiovascular risks for perioperative complications:  {PREOP REVISED CARDIAC RISK INDEX (RCRI) :710878}     RCRI Interpretation: {REVISED CARDIAC RISK INTERPRETATION :267721}         Signed Electronically by: Yuval Jama MD  A copy of this evaluation report is provided to the requesting physician.    {Provider Resources  Preop [facility] Preop Guidelines  Revised Cardiac Risk Index :880596}   {Email feedback regarding this note to [email or web address]   :097745}

## 2025-06-10 ENCOUNTER — PATIENT OUTREACH (OUTPATIENT)
Dept: CARE COORDINATION | Facility: CLINIC | Age: 70
End: 2025-06-10
Payer: COMMERCIAL

## 2025-06-22 ENCOUNTER — HEALTH MAINTENANCE LETTER (OUTPATIENT)
Age: 70
End: 2025-06-22

## 2025-06-24 ENCOUNTER — PATIENT OUTREACH (OUTPATIENT)
Dept: CARE COORDINATION | Facility: CLINIC | Age: 70
End: 2025-06-24
Payer: COMMERCIAL

## 2025-06-26 DIAGNOSIS — K92.2 ACUTE GI BLEEDING: ICD-10-CM

## 2025-06-26 DIAGNOSIS — I10 ESSENTIAL HYPERTENSION: ICD-10-CM

## 2025-06-26 DIAGNOSIS — I48.91 ATRIAL FIBRILLATION WITH RVR (H): ICD-10-CM

## 2025-06-26 DIAGNOSIS — E11.9 TYPE 2 DIABETES MELLITUS WITHOUT COMPLICATION, WITHOUT LONG-TERM CURRENT USE OF INSULIN (H): ICD-10-CM

## 2025-06-26 RX ORDER — PANTOPRAZOLE SODIUM 40 MG/1
40 TABLET, DELAYED RELEASE ORAL DAILY
Qty: 90 TABLET | Refills: 2 | Status: SHIPPED | OUTPATIENT
Start: 2025-06-26

## 2025-06-26 RX ORDER — METFORMIN HYDROCHLORIDE 500 MG/1
1000 TABLET, EXTENDED RELEASE ORAL
Qty: 180 TABLET | Refills: 0 | Status: SHIPPED | OUTPATIENT
Start: 2025-06-26

## 2025-06-27 ENCOUNTER — TELEPHONE (OUTPATIENT)
Dept: INTERNAL MEDICINE | Facility: CLINIC | Age: 70
End: 2025-06-27
Payer: COMMERCIAL

## 2025-06-27 DIAGNOSIS — E03.9 HYPOTHYROIDISM, UNSPECIFIED TYPE: Primary | ICD-10-CM

## 2025-06-29 ENCOUNTER — HOSPITAL ENCOUNTER (EMERGENCY)
Facility: CLINIC | Age: 70
Discharge: HOME | End: 2025-06-29
Attending: EMERGENCY MEDICINE | Admitting: EMERGENCY MEDICINE
Payer: COMMERCIAL

## 2025-06-29 VITALS
HEART RATE: 78 BPM | HEIGHT: 63 IN | OXYGEN SATURATION: 98 % | TEMPERATURE: 98.5 F | RESPIRATION RATE: 10 BRPM | SYSTOLIC BLOOD PRESSURE: 170 MMHG | WEIGHT: 268 LBS | DIASTOLIC BLOOD PRESSURE: 96 MMHG | BODY MASS INDEX: 47.48 KG/M2

## 2025-06-29 DIAGNOSIS — E03.9 HYPOTHYROIDISM, UNSPECIFIED TYPE: ICD-10-CM

## 2025-06-29 DIAGNOSIS — I48.91 ATRIAL FIBRILLATION WITH RAPID VENTRICULAR RESPONSE (H): ICD-10-CM

## 2025-06-29 PROBLEM — I48.0 PAROXYSMAL ATRIAL FIBRILLATION (H): Status: ACTIVE | Noted: 2025-06-29

## 2025-06-29 PROBLEM — G89.29 CHRONIC PAIN OF RIGHT KNEE: Status: ACTIVE | Noted: 2025-06-29

## 2025-06-29 PROBLEM — M25.561 CHRONIC PAIN OF RIGHT KNEE: Status: ACTIVE | Noted: 2025-06-29

## 2025-06-29 LAB
ANION GAP SERPL CALCULATED.3IONS-SCNC: 14 MMOL/L (ref 7–15)
ATRIAL RATE - MUSE: 76 BPM
ATRIAL RATE - MUSE: NORMAL BPM
BASOPHILS # BLD AUTO: 0.1 10E3/UL (ref 0–0.2)
BASOPHILS NFR BLD AUTO: 1 %
BUN SERPL-MCNC: 9.1 MG/DL (ref 8–23)
CALCIUM SERPL-MCNC: 9.2 MG/DL (ref 8.8–10.4)
CHLORIDE SERPL-SCNC: 96 MMOL/L (ref 98–107)
CREAT SERPL-MCNC: 0.6 MG/DL (ref 0.51–0.95)
DIASTOLIC BLOOD PRESSURE - MUSE: NORMAL MMHG
DIASTOLIC BLOOD PRESSURE - MUSE: NORMAL MMHG
EGFRCR SERPLBLD CKD-EPI 2021: >90 ML/MIN/1.73M2
EOSINOPHIL # BLD AUTO: 0.2 10E3/UL (ref 0–0.7)
EOSINOPHIL NFR BLD AUTO: 2 %
ERYTHROCYTE [DISTWIDTH] IN BLOOD BY AUTOMATED COUNT: 14.2 % (ref 10–15)
GLUCOSE SERPL-MCNC: 107 MG/DL (ref 70–99)
HCO3 SERPL-SCNC: 25 MMOL/L (ref 22–29)
HCT VFR BLD AUTO: 39 % (ref 35–47)
HGB BLD-MCNC: 13.5 G/DL (ref 11.7–15.7)
HOLD SPECIMEN: NORMAL
IMM GRANULOCYTES # BLD: 0.1 10E3/UL
IMM GRANULOCYTES NFR BLD: 1 %
INTERPRETATION ECG - MUSE: NORMAL
INTERPRETATION ECG - MUSE: NORMAL
LYMPHOCYTES # BLD AUTO: 4.5 10E3/UL (ref 0.8–5.3)
LYMPHOCYTES NFR BLD AUTO: 40 %
MCH RBC QN AUTO: 30.9 PG (ref 26.5–33)
MCHC RBC AUTO-ENTMCNC: 34.6 G/DL (ref 31.5–36.5)
MCV RBC AUTO: 89 FL (ref 78–100)
MONOCYTES # BLD AUTO: 0.8 10E3/UL (ref 0–1.3)
MONOCYTES NFR BLD AUTO: 8 %
NEUTROPHILS # BLD AUTO: 5.4 10E3/UL (ref 1.6–8.3)
NEUTROPHILS NFR BLD AUTO: 49 %
NRBC # BLD AUTO: 0 10E3/UL
NRBC BLD AUTO-RTO: 0 /100
P AXIS - MUSE: 81 DEGREES
P AXIS - MUSE: NORMAL DEGREES
PLATELET # BLD AUTO: 401 10E3/UL (ref 150–450)
POTASSIUM SERPL-SCNC: 4 MMOL/L (ref 3.4–5.3)
PR INTERVAL - MUSE: 220 MS
PR INTERVAL - MUSE: NORMAL MS
QRS DURATION - MUSE: 92 MS
QRS DURATION - MUSE: 96 MS
QT - MUSE: 322 MS
QT - MUSE: 388 MS
QTC - MUSE: 433 MS
QTC - MUSE: 436 MS
R AXIS - MUSE: 36 DEGREES
R AXIS - MUSE: 68 DEGREES
RBC # BLD AUTO: 4.37 10E6/UL (ref 3.8–5.2)
SODIUM SERPL-SCNC: 135 MMOL/L (ref 135–145)
SYSTOLIC BLOOD PRESSURE - MUSE: NORMAL MMHG
SYSTOLIC BLOOD PRESSURE - MUSE: NORMAL MMHG
T AXIS - MUSE: -75 DEGREES
T AXIS - MUSE: 40 DEGREES
T4 FREE SERPL-MCNC: 0.62 NG/DL (ref 0.9–1.7)
TSH SERPL DL<=0.005 MIU/L-ACNC: 103.8 UIU/ML (ref 0.3–4.2)
VENTRICULAR RATE- MUSE: 109 BPM
VENTRICULAR RATE- MUSE: 76 BPM
WBC # BLD AUTO: 11.1 10E3/UL (ref 4–11)

## 2025-06-29 PROCEDURE — 80048 BASIC METABOLIC PNL TOTAL CA: CPT | Performed by: EMERGENCY MEDICINE

## 2025-06-29 PROCEDURE — 84439 ASSAY OF FREE THYROXINE: CPT

## 2025-06-29 PROCEDURE — 93005 ELECTROCARDIOGRAM TRACING: CPT

## 2025-06-29 PROCEDURE — 85004 AUTOMATED DIFF WBC COUNT: CPT | Performed by: EMERGENCY MEDICINE

## 2025-06-29 PROCEDURE — 99284 EMERGENCY DEPT VISIT MOD MDM: CPT

## 2025-06-29 PROCEDURE — 250N000013 HC RX MED GY IP 250 OP 250 PS 637: Performed by: EMERGENCY MEDICINE

## 2025-06-29 PROCEDURE — 93005 ELECTROCARDIOGRAM TRACING: CPT | Mod: 76

## 2025-06-29 PROCEDURE — 85025 COMPLETE CBC W/AUTO DIFF WBC: CPT | Performed by: EMERGENCY MEDICINE

## 2025-06-29 PROCEDURE — 36415 COLL VENOUS BLD VENIPUNCTURE: CPT | Performed by: EMERGENCY MEDICINE

## 2025-06-29 PROCEDURE — 84443 ASSAY THYROID STIM HORMONE: CPT

## 2025-06-29 RX ORDER — FLECAINIDE ACETATE 150 MG/1
150 TABLET ORAL EVERY 12 HOURS
Qty: 180 TABLET | Refills: 0 | Status: SHIPPED | OUTPATIENT
Start: 2025-06-29 | End: 2025-09-27

## 2025-06-29 RX ADMIN — FLECAINIDE ACETATE 150 MG: 100 TABLET ORAL at 07:15

## 2025-06-29 ASSESSMENT — ACTIVITIES OF DAILY LIVING (ADL)
ADLS_ACUITY_SCORE: 47
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
## 2025-06-29 NOTE — ED NOTES
Bed: ED04  Expected date:   Expected time:   Means of arrival:   Comments:  Pete ParsonsF Afib -120 weak sob eta [id number]

## 2025-06-29 NOTE — ED TRIAGE NOTES
BIBA woke up with palpitation feeling.  Has had an issue getting flecainide from pharmacy so has not had it for a few days.  EMS/Fire gave 324 mg aspirin.  Patient in A-fib      Triage Assessment (Adult)       Row Name 06/29/25 0458          Triage Assessment    Airway WDL WDL        Respiratory WDL    Respiratory WDL WDL        Skin Circulation/Temperature WDL    Skin Circulation/Temperature WDL WDL        Cardiac WDL    Cardiac WDL X     Cardiac Rhythm Atrial fibrillation        Peripheral/Neurovascular WDL    Peripheral Neurovascular WDL WDL        Cognitive/Neuro/Behavioral WDL    Cognitive/Neuro/Behavioral WDL WDL

## 2025-06-30 RX ORDER — LEVOTHYROXINE SODIUM 150 UG/1
150 TABLET ORAL
Qty: 90 TABLET | Refills: 3 | Status: SHIPPED | OUTPATIENT
Start: 2025-06-30

## 2025-06-30 NOTE — TELEPHONE ENCOUNTER
Azul with [facility] called requesting pre-op notes/ labs or pertinent information. Pt has surgery scheduled 07/02/2025.     Preop notes are not complete.  Routing message to PCP and [facility] to follow up on request above.Pre-op needs to be faxed to [phone].

## 2025-06-30 NOTE — TELEPHONE ENCOUNTER
Spoke with pt. Says she has been consistent with taking Levothyroxine daily in AM without food but TSH significantly worsened. Also had A fib over the weekend when off of Flecainide. Pt states she has new RF for Flecainide being delivered later today. Denies current palpitations. Need to cancel surgery  scheduled for 7/2/25 until heart is more stable with me and TSH is corrected.   Spoke with EDEN and Dr Marquez's team to cancel surgery and they will inform hospital/ Dr Marquez  Pt will stop current Levothyroxine 12/5/137 tabs and started Levothyroxine 150mcg tab, 1 tab daily in AM and will have repeat  thyroid labs in 6 weeks

## 2025-07-01 ENCOUNTER — PATIENT OUTREACH (OUTPATIENT)
Dept: GERIATRIC MEDICINE | Facility: CLINIC | Age: 70
End: 2025-07-01
Payer: COMMERCIAL

## 2025-07-01 RX ORDER — METOPROLOL SUCCINATE 25 MG/1
12.5 TABLET, EXTENDED RELEASE ORAL DAILY
Qty: 45 TABLET | Refills: 3 | Status: SHIPPED | OUTPATIENT
Start: 2025-07-01

## 2025-07-01 RX ORDER — LOSARTAN POTASSIUM 100 MG/1
100 TABLET ORAL DAILY
Qty: 90 TABLET | Refills: 3 | Status: SHIPPED | OUTPATIENT
Start: 2025-07-01

## 2025-07-01 NOTE — PROGRESS NOTES
[facility] Care Coordination Contact  CC received notification of Emergency Room visit.  ER visit occurred on 6/29/25 at [facility] with Dx of Maxine Sears is a 69 year old female presents with atrial fibrillation and has been off of her flecainide due to difficulties with pharmacy.  She converted in the ED without treatment.  She was given her dose of flecainide.  Will write for medications at home.  She has no concerns for ACS.  Her TSH/Free T4 is slightly worse and her PCP can adjust .    CC contacted member and reviewed discharge summary.  Member has a follow-up appointment with PCP: No: Offered Assistance with setting up a follow up appointment  Member has had a change in condition: No  New referrals placed: No  Home Visit Needed: No  Support Plan reviewed and updated.  PCP notified of ED visit via EMR.    Virginia Marina RN  [facility]  [phone]

## 2025-07-14 ENCOUNTER — TELEPHONE (OUTPATIENT)
Dept: PHARMACY | Facility: OTHER | Age: 70
End: 2025-07-14
Payer: COMMERCIAL

## 2025-07-14 NOTE — TELEPHONE ENCOUNTER
MTM Recruitment: [employer]     Referral outreach attempt #1 on July 14, 2025      Outcome: left voicemail- Call back number [phone]    Griselda Goodson PharmD  Medication Therapy Management (MTM) Pharmacist

## 2025-07-17 ENCOUNTER — TRANSFERRED RECORDS (OUTPATIENT)
Dept: HEALTH INFORMATION MANAGEMENT | Facility: CLINIC | Age: 70
End: 2025-07-17
Payer: COMMERCIAL

## 2025-07-17 LAB — RETINOPATHY: NORMAL

## 2025-07-21 ENCOUNTER — TELEPHONE (OUTPATIENT)
Dept: INTERNAL MEDICINE | Facility: CLINIC | Age: 70
End: 2025-07-21
Payer: COMMERCIAL

## 2025-07-21 NOTE — TELEPHONE ENCOUNTER
Patient Quality Outreach    Patient is due for the following:   Diabetes -  A1C, Eye Exam, and Foot Exam  Hypertension -  BP check  Breast Cancer Screening - Mammogram  Physical Annual Wellness Visit      Topic Date Due    Zoster (Shingles) Vaccine (1 of 2) Never done       Action(s) Taken:   Schedule a Annual Wellness Visit    Type of outreach:    Sent [employer] message.    Questions for provider review:    None         Karena Bravo  Chart routed to None.

## 2025-07-28 ENCOUNTER — PATIENT OUTREACH (OUTPATIENT)
Dept: GERIATRIC MEDICINE | Facility: CLINIC | Age: 70
End: 2025-07-28
Payer: COMMERCIAL

## 2025-07-28 NOTE — LETTER
July 28, 2025    Important [employer] Information    DANIELLE KOWALSKI ROBINORE  [address]   [place] [address]    Your New Care Coordinator  Dear Danielle,  My name is Jaymie Coats RN  and I am your new Care Coordinator. You may reach me by calling [phone]. I will be in touch with you shortly to address any questions you may have.   I have also been in contact with Virginia Marina RN, BSN, your previous care coordinator, to ensure a smooth transition.  Questions?  Call me at [phone] Monday-Friday between 8am and 5pm. TTY: [phone]. I look forward to working with you as a [employer]  member.  Sincerely,    Jaymie Coats RN    E-mail: [email or web address]  Phone: [phone]      [facility]    cc: member records

## 2025-07-28 NOTE — PROGRESS NOTES
[facility] Care Coordination Contact    Internal CC change effective 8/1/2025.  Mailed member CC Change letter.  Additional tasks to be completed by CMS include: update database & EPIC, enter CC Change in MMIS, and move member file.    Abeba Ortez  Care Management Specialist  [facility]  [phone]

## 2025-08-07 ENCOUNTER — LAB (OUTPATIENT)
Dept: LAB | Facility: CLINIC | Age: 70
End: 2025-08-07
Payer: COMMERCIAL

## 2025-08-07 DIAGNOSIS — E03.9 HYPOTHYROIDISM, UNSPECIFIED TYPE: ICD-10-CM

## 2025-08-07 LAB
T3FREE SERPL-MCNC: 2.4 PG/ML (ref 2–4.4)
T4 FREE SERPL-MCNC: 1.11 NG/DL (ref 0.9–1.7)
TSH SERPL DL<=0.005 MIU/L-ACNC: 20.3 UIU/ML (ref 0.3–4.2)

## 2025-08-14 ENCOUNTER — PATIENT OUTREACH (OUTPATIENT)
Dept: GERIATRIC MEDICINE | Facility: CLINIC | Age: 70
End: 2025-08-14
Payer: COMMERCIAL

## 2025-08-24 ENCOUNTER — HEALTH MAINTENANCE LETTER (OUTPATIENT)
Age: 70
End: 2025-08-24

## 2025-09-03 ENCOUNTER — PATIENT OUTREACH (OUTPATIENT)
Dept: GERIATRIC MEDICINE | Facility: CLINIC | Age: 70
End: 2025-09-03
Payer: COMMERCIAL

## 2026-01-05 NOTE — PATIENT INSTRUCTIONS
Will cancel surgery scheduled for 7/2/2025.  Labs as ordered  Based on results, will adjust Levothyroxine dosing  Continue other medications for now  Future repeat urine protein level and A1c once TSH normal

## (undated) DEVICE — NDL TRNSEPT 18GA X 71CM 86 DEG

## (undated) DEVICE — CLOSURE DEVICE 6FR VASC PROGLIDE MEDICATED SUTURE 12673-03

## (undated) DEVICE — GUIDEWIRE VASC SAFARI2 0.035X275CM H74939406XS1

## (undated) DEVICE — PACK EP SRG PROC LF DISP SAN32EPFSR

## (undated) DEVICE — Device

## (undated) DEVICE — INTRODUCER SHEATH FAST-CATH SWARTZ 8.5FRX63CM SL1 CVD 406849

## (undated) DEVICE — MEDITRACE MULTIFUNTION ADULT RADIOTRANSPARENT ELECTRODE FOR ZOLL

## (undated) DEVICE — RAD INTRODUCER KIT MICRO 5FRX10CM .018 NITINOL G/W

## (undated) DEVICE — TRUSEAL ACCESS SHEATH WITH DILATOR, DOUBLE CURVE

## (undated) DEVICE — CATH ANGIO INFINITI PIGTAIL 145 6 SH 6FRX110CM  534-652S

## (undated) DEVICE — GUIDEWIRE TRNSEPT 0.014 X35CM SAFE SE

## (undated) RX ORDER — FLUMAZENIL 0.1 MG/ML
INJECTION, SOLUTION INTRAVENOUS
Status: DISPENSED
Start: 2020-11-20

## (undated) RX ORDER — FENTANYL CITRATE 50 UG/ML
INJECTION, SOLUTION INTRAMUSCULAR; INTRAVENOUS
Status: DISPENSED
Start: 2020-10-08

## (undated) RX ORDER — NALOXONE HYDROCHLORIDE 0.4 MG/ML
INJECTION, SOLUTION INTRAMUSCULAR; INTRAVENOUS; SUBCUTANEOUS
Status: DISPENSED
Start: 2020-11-20

## (undated) RX ORDER — ACETAMINOPHEN 500 MG
TABLET ORAL
Status: DISPENSED
Start: 2020-10-08

## (undated) RX ORDER — LIDOCAINE HYDROCHLORIDE 40 MG/ML
SOLUTION TOPICAL
Status: DISPENSED
Start: 2020-11-20

## (undated) RX ORDER — FENTANYL CITRATE 50 UG/ML
INJECTION, SOLUTION INTRAMUSCULAR; INTRAVENOUS
Status: DISPENSED
Start: 2019-03-03

## (undated) RX ORDER — FENTANYL CITRATE 50 UG/ML
INJECTION, SOLUTION INTRAMUSCULAR; INTRAVENOUS
Status: DISPENSED
Start: 2019-04-12

## (undated) RX ORDER — REGADENOSON 0.08 MG/ML
INJECTION, SOLUTION INTRAVENOUS
Status: DISPENSED
Start: 2022-08-01

## (undated) RX ORDER — AMINOPHYLLINE 25 MG/ML
INJECTION, SOLUTION INTRAVENOUS
Status: DISPENSED
Start: 2022-08-01

## (undated) RX ORDER — FENTANYL CITRATE 50 UG/ML
INJECTION, SOLUTION INTRAMUSCULAR; INTRAVENOUS
Status: DISPENSED
Start: 2020-11-20

## (undated) RX ORDER — HEPARIN SODIUM 1000 [USP'U]/ML
INJECTION, SOLUTION INTRAVENOUS; SUBCUTANEOUS
Status: DISPENSED
Start: 2020-10-08

## (undated) RX ORDER — CLINDAMYCIN PHOSPHATE 900 MG/50ML
INJECTION, SOLUTION INTRAVENOUS
Status: DISPENSED
Start: 2020-10-08

## (undated) RX ORDER — LIDOCAINE HYDROCHLORIDE 10 MG/ML
INJECTION, SOLUTION EPIDURAL; INFILTRATION; INTRACAUDAL; PERINEURAL
Status: DISPENSED
Start: 2020-10-08

## (undated) RX ORDER — GLYCOPYRROLATE 0.2 MG/ML
INJECTION, SOLUTION INTRAMUSCULAR; INTRAVENOUS
Status: DISPENSED
Start: 2020-11-20

## (undated) RX ORDER — FENTANYL CITRATE 50 UG/ML
INJECTION, SOLUTION INTRAMUSCULAR; INTRAVENOUS
Status: DISPENSED
Start: 2019-04-11